# Patient Record
Sex: MALE | Race: ASIAN | NOT HISPANIC OR LATINO | URBAN - METROPOLITAN AREA
[De-identification: names, ages, dates, MRNs, and addresses within clinical notes are randomized per-mention and may not be internally consistent; named-entity substitution may affect disease eponyms.]

---

## 2015-07-07 RX ORDER — INSULIN GLARGINE 100 [IU]/ML
30 INJECTION, SOLUTION SUBCUTANEOUS
Qty: 0 | Refills: 0 | COMMUNITY
Start: 2015-07-07

## 2015-07-07 RX ORDER — ASPIRIN/CALCIUM CARB/MAGNESIUM 324 MG
1 TABLET ORAL
Qty: 0 | Refills: 0 | COMMUNITY
Start: 2015-07-07

## 2017-01-07 ENCOUNTER — OUTPATIENT (OUTPATIENT)
Dept: OUTPATIENT SERVICES | Facility: HOSPITAL | Age: 67
LOS: 1 days | End: 2017-01-07
Payer: MEDICAID

## 2017-01-07 ENCOUNTER — APPOINTMENT (OUTPATIENT)
Dept: CT IMAGING | Facility: IMAGING CENTER | Age: 67
End: 2017-01-07

## 2017-01-07 DIAGNOSIS — Z95.5 PRESENCE OF CORONARY ANGIOPLASTY IMPLANT AND GRAFT: Chronic | ICD-10-CM

## 2017-01-07 DIAGNOSIS — Z00.8 ENCOUNTER FOR OTHER GENERAL EXAMINATION: ICD-10-CM

## 2017-01-07 PROCEDURE — 74177 CT ABD & PELVIS W/CONTRAST: CPT

## 2017-01-07 PROCEDURE — 82565 ASSAY OF CREATININE: CPT

## 2017-01-25 ENCOUNTER — APPOINTMENT (OUTPATIENT)
Dept: CARDIOLOGY | Facility: CLINIC | Age: 67
End: 2017-01-25

## 2017-01-25 ENCOUNTER — NON-APPOINTMENT (OUTPATIENT)
Age: 67
End: 2017-01-25

## 2017-01-25 VITALS
SYSTOLIC BLOOD PRESSURE: 111 MMHG | OXYGEN SATURATION: 98 % | HEART RATE: 64 BPM | WEIGHT: 165 LBS | DIASTOLIC BLOOD PRESSURE: 67 MMHG | BODY MASS INDEX: 25.9 KG/M2 | HEIGHT: 67 IN

## 2017-03-17 ENCOUNTER — APPOINTMENT (OUTPATIENT)
Dept: RADIOLOGY | Facility: IMAGING CENTER | Age: 67
End: 2017-03-17

## 2017-03-17 ENCOUNTER — OUTPATIENT (OUTPATIENT)
Dept: OUTPATIENT SERVICES | Facility: HOSPITAL | Age: 67
LOS: 1 days | End: 2017-03-17
Payer: MEDICAID

## 2017-03-17 DIAGNOSIS — Z00.8 ENCOUNTER FOR OTHER GENERAL EXAMINATION: ICD-10-CM

## 2017-03-17 DIAGNOSIS — Z95.5 PRESENCE OF CORONARY ANGIOPLASTY IMPLANT AND GRAFT: Chronic | ICD-10-CM

## 2017-03-17 PROCEDURE — 72110 X-RAY EXAM L-2 SPINE 4/>VWS: CPT

## 2017-03-17 PROCEDURE — 72220 X-RAY EXAM SACRUM TAILBONE: CPT

## 2017-04-25 ENCOUNTER — APPOINTMENT (OUTPATIENT)
Dept: CARDIOLOGY | Facility: CLINIC | Age: 67
End: 2017-04-25

## 2017-04-25 ENCOUNTER — NON-APPOINTMENT (OUTPATIENT)
Age: 67
End: 2017-04-25

## 2017-04-25 VITALS
RESPIRATION RATE: 14 BRPM | DIASTOLIC BLOOD PRESSURE: 67 MMHG | HEART RATE: 58 BPM | HEIGHT: 67 IN | WEIGHT: 165 LBS | OXYGEN SATURATION: 96 % | BODY MASS INDEX: 25.9 KG/M2 | SYSTOLIC BLOOD PRESSURE: 131 MMHG

## 2017-05-31 ENCOUNTER — RX RENEWAL (OUTPATIENT)
Age: 67
End: 2017-05-31

## 2017-07-26 ENCOUNTER — APPOINTMENT (OUTPATIENT)
Dept: CARDIOLOGY | Facility: CLINIC | Age: 67
End: 2017-07-26

## 2017-09-06 ENCOUNTER — NON-APPOINTMENT (OUTPATIENT)
Age: 67
End: 2017-09-06

## 2017-09-06 ENCOUNTER — APPOINTMENT (OUTPATIENT)
Dept: CARDIOLOGY | Facility: CLINIC | Age: 67
End: 2017-09-06
Payer: MEDICAID

## 2017-09-06 VITALS
BODY MASS INDEX: 26.21 KG/M2 | HEIGHT: 67 IN | HEART RATE: 65 BPM | DIASTOLIC BLOOD PRESSURE: 77 MMHG | SYSTOLIC BLOOD PRESSURE: 131 MMHG | WEIGHT: 167 LBS | OXYGEN SATURATION: 99 %

## 2017-09-06 PROCEDURE — 99215 OFFICE O/P EST HI 40 MIN: CPT

## 2017-09-06 PROCEDURE — 93000 ELECTROCARDIOGRAM COMPLETE: CPT

## 2017-10-07 ENCOUNTER — INPATIENT (INPATIENT)
Facility: HOSPITAL | Age: 67
LOS: 1 days | Discharge: HOME | End: 2017-10-09
Attending: HOSPITALIST | Admitting: HOSPITALIST
Payer: MEDICAID

## 2017-10-07 VITALS
HEART RATE: 75 BPM | RESPIRATION RATE: 18 BRPM | DIASTOLIC BLOOD PRESSURE: 76 MMHG | OXYGEN SATURATION: 99 % | TEMPERATURE: 99 F | SYSTOLIC BLOOD PRESSURE: 107 MMHG

## 2017-10-07 DIAGNOSIS — R07.9 CHEST PAIN, UNSPECIFIED: ICD-10-CM

## 2017-10-07 DIAGNOSIS — I20.9 ANGINA PECTORIS, UNSPECIFIED: ICD-10-CM

## 2017-10-07 DIAGNOSIS — Z95.5 PRESENCE OF CORONARY ANGIOPLASTY IMPLANT AND GRAFT: Chronic | ICD-10-CM

## 2017-10-07 LAB
ALBUMIN SERPL ELPH-MCNC: 4 G/DL — SIGNIFICANT CHANGE UP (ref 3.3–5)
ALP SERPL-CCNC: 96 U/L — SIGNIFICANT CHANGE UP (ref 40–120)
ALT FLD-CCNC: 17 U/L — SIGNIFICANT CHANGE UP (ref 4–41)
APTT BLD: 28.3 SEC — SIGNIFICANT CHANGE UP (ref 27.5–37.4)
AST SERPL-CCNC: 18 U/L — SIGNIFICANT CHANGE UP (ref 4–40)
BASOPHILS # BLD AUTO: 0.04 K/UL — SIGNIFICANT CHANGE UP (ref 0–0.2)
BASOPHILS NFR BLD AUTO: 0.4 % — SIGNIFICANT CHANGE UP (ref 0–2)
BILIRUB SERPL-MCNC: 0.3 MG/DL — SIGNIFICANT CHANGE UP (ref 0.2–1.2)
BUN SERPL-MCNC: 15 MG/DL — SIGNIFICANT CHANGE UP (ref 7–23)
CALCIUM SERPL-MCNC: 8.7 MG/DL — SIGNIFICANT CHANGE UP (ref 8.4–10.5)
CHLORIDE SERPL-SCNC: 91 MMOL/L — LOW (ref 98–107)
CK MB BLD-MCNC: 2.5 — SIGNIFICANT CHANGE UP (ref 0–2.5)
CK MB BLD-MCNC: 4.36 NG/ML — SIGNIFICANT CHANGE UP (ref 1–6.6)
CK SERPL-CCNC: 176 U/L — SIGNIFICANT CHANGE UP (ref 30–200)
CO2 SERPL-SCNC: 26 MMOL/L — SIGNIFICANT CHANGE UP (ref 22–31)
CREAT SERPL-MCNC: 1.17 MG/DL — SIGNIFICANT CHANGE UP (ref 0.5–1.3)
EOSINOPHIL # BLD AUTO: 0.25 K/UL — SIGNIFICANT CHANGE UP (ref 0–0.5)
EOSINOPHIL NFR BLD AUTO: 2.5 % — SIGNIFICANT CHANGE UP (ref 0–6)
GLUCOSE SERPL-MCNC: 297 MG/DL — HIGH (ref 70–99)
HCT VFR BLD CALC: 38.4 % — LOW (ref 39–50)
HGB BLD-MCNC: 12.8 G/DL — LOW (ref 13–17)
IMM GRANULOCYTES # BLD AUTO: 0.02 # — SIGNIFICANT CHANGE UP
IMM GRANULOCYTES NFR BLD AUTO: 0.2 % — SIGNIFICANT CHANGE UP (ref 0–1.5)
INR BLD: 0.9 — SIGNIFICANT CHANGE UP (ref 0.88–1.17)
LYMPHOCYTES # BLD AUTO: 3.05 K/UL — SIGNIFICANT CHANGE UP (ref 1–3.3)
LYMPHOCYTES # BLD AUTO: 31.1 % — SIGNIFICANT CHANGE UP (ref 13–44)
MCHC RBC-ENTMCNC: 27.6 PG — SIGNIFICANT CHANGE UP (ref 27–34)
MCHC RBC-ENTMCNC: 33.3 % — SIGNIFICANT CHANGE UP (ref 32–36)
MCV RBC AUTO: 82.8 FL — SIGNIFICANT CHANGE UP (ref 80–100)
MONOCYTES # BLD AUTO: 1.08 K/UL — HIGH (ref 0–0.9)
MONOCYTES NFR BLD AUTO: 11 % — SIGNIFICANT CHANGE UP (ref 2–14)
NEUTROPHILS # BLD AUTO: 5.38 K/UL — SIGNIFICANT CHANGE UP (ref 1.8–7.4)
NEUTROPHILS NFR BLD AUTO: 54.8 % — SIGNIFICANT CHANGE UP (ref 43–77)
NRBC # FLD: 0 — SIGNIFICANT CHANGE UP
PLATELET # BLD AUTO: 270 K/UL — SIGNIFICANT CHANGE UP (ref 150–400)
PMV BLD: 9.8 FL — SIGNIFICANT CHANGE UP (ref 7–13)
POTASSIUM SERPL-MCNC: 3.6 MMOL/L — SIGNIFICANT CHANGE UP (ref 3.5–5.3)
POTASSIUM SERPL-SCNC: 3.6 MMOL/L — SIGNIFICANT CHANGE UP (ref 3.5–5.3)
PROT SERPL-MCNC: 7.4 G/DL — SIGNIFICANT CHANGE UP (ref 6–8.3)
PROTHROM AB SERPL-ACNC: 10.1 SEC — SIGNIFICANT CHANGE UP (ref 9.8–13.1)
RBC # BLD: 4.64 M/UL — SIGNIFICANT CHANGE UP (ref 4.2–5.8)
RBC # FLD: 13.8 % — SIGNIFICANT CHANGE UP (ref 10.3–14.5)
SODIUM SERPL-SCNC: 132 MMOL/L — LOW (ref 135–145)
TROPONIN T SERPL-MCNC: < 0.06 NG/ML — SIGNIFICANT CHANGE UP (ref 0–0.06)
WBC # BLD: 9.82 K/UL — SIGNIFICANT CHANGE UP (ref 3.8–10.5)
WBC # FLD AUTO: 9.82 K/UL — SIGNIFICANT CHANGE UP (ref 3.8–10.5)

## 2017-10-07 PROCEDURE — 71020: CPT | Mod: 26

## 2017-10-07 RX ORDER — DEXTROSE 50 % IN WATER 50 %
25 SYRINGE (ML) INTRAVENOUS ONCE
Qty: 0 | Refills: 0 | Status: DISCONTINUED | OUTPATIENT
Start: 2017-10-07 | End: 2017-10-09

## 2017-10-07 RX ORDER — ACETAMINOPHEN 500 MG
650 TABLET ORAL EVERY 6 HOURS
Qty: 0 | Refills: 0 | Status: DISCONTINUED | OUTPATIENT
Start: 2017-10-07 | End: 2017-10-09

## 2017-10-07 RX ORDER — SODIUM CHLORIDE 9 MG/ML
3 INJECTION INTRAMUSCULAR; INTRAVENOUS; SUBCUTANEOUS EVERY 8 HOURS
Qty: 0 | Refills: 0 | Status: DISCONTINUED | OUTPATIENT
Start: 2017-10-07 | End: 2017-10-09

## 2017-10-07 RX ORDER — CLOPIDOGREL BISULFATE 75 MG/1
75 TABLET, FILM COATED ORAL DAILY
Qty: 0 | Refills: 0 | Status: DISCONTINUED | OUTPATIENT
Start: 2017-10-08 | End: 2017-10-09

## 2017-10-07 RX ORDER — NITROGLYCERIN 6.5 MG
0.4 CAPSULE, EXTENDED RELEASE ORAL ONCE
Qty: 0 | Refills: 0 | Status: COMPLETED | OUTPATIENT
Start: 2017-10-07 | End: 2017-10-07

## 2017-10-07 RX ORDER — INSULIN LISPRO 100/ML
VIAL (ML) SUBCUTANEOUS AT BEDTIME
Qty: 0 | Refills: 0 | Status: DISCONTINUED | OUTPATIENT
Start: 2017-10-07 | End: 2017-10-09

## 2017-10-07 RX ORDER — INSULIN LISPRO 100/ML
10 VIAL (ML) SUBCUTANEOUS
Qty: 0 | Refills: 0 | Status: DISCONTINUED | OUTPATIENT
Start: 2017-10-07 | End: 2017-10-08

## 2017-10-07 RX ORDER — ASPIRIN/CALCIUM CARB/MAGNESIUM 324 MG
162 TABLET ORAL ONCE
Qty: 0 | Refills: 0 | Status: COMPLETED | OUTPATIENT
Start: 2017-10-07 | End: 2017-10-07

## 2017-10-07 RX ORDER — DEXTROSE 50 % IN WATER 50 %
1 SYRINGE (ML) INTRAVENOUS ONCE
Qty: 0 | Refills: 0 | Status: DISCONTINUED | OUTPATIENT
Start: 2017-10-07 | End: 2017-10-09

## 2017-10-07 RX ORDER — SODIUM CHLORIDE 9 MG/ML
1000 INJECTION, SOLUTION INTRAVENOUS
Qty: 0 | Refills: 0 | Status: DISCONTINUED | OUTPATIENT
Start: 2017-10-07 | End: 2017-10-09

## 2017-10-07 RX ORDER — ENOXAPARIN SODIUM 100 MG/ML
40 INJECTION SUBCUTANEOUS EVERY 24 HOURS
Qty: 0 | Refills: 0 | Status: DISCONTINUED | OUTPATIENT
Start: 2017-10-07 | End: 2017-10-09

## 2017-10-07 RX ORDER — INSULIN GLARGINE 100 [IU]/ML
30 INJECTION, SOLUTION SUBCUTANEOUS AT BEDTIME
Qty: 0 | Refills: 0 | Status: DISCONTINUED | OUTPATIENT
Start: 2017-10-07 | End: 2017-10-08

## 2017-10-07 RX ORDER — ASPIRIN/CALCIUM CARB/MAGNESIUM 324 MG
81 TABLET ORAL DAILY
Qty: 0 | Refills: 0 | Status: DISCONTINUED | OUTPATIENT
Start: 2017-10-07 | End: 2017-10-09

## 2017-10-07 RX ORDER — CLOPIDOGREL BISULFATE 75 MG/1
300 TABLET, FILM COATED ORAL ONCE
Qty: 0 | Refills: 0 | Status: COMPLETED | OUTPATIENT
Start: 2017-10-07 | End: 2017-10-07

## 2017-10-07 RX ORDER — INSULIN LISPRO 100/ML
VIAL (ML) SUBCUTANEOUS
Qty: 0 | Refills: 0 | Status: DISCONTINUED | OUTPATIENT
Start: 2017-10-07 | End: 2017-10-09

## 2017-10-07 RX ORDER — SENNA PLUS 8.6 MG/1
2 TABLET ORAL AT BEDTIME
Qty: 0 | Refills: 0 | Status: DISCONTINUED | OUTPATIENT
Start: 2017-10-07 | End: 2017-10-09

## 2017-10-07 RX ORDER — NITROGLYCERIN 6.5 MG
1 CAPSULE, EXTENDED RELEASE ORAL DAILY
Qty: 0 | Refills: 0 | Status: DISCONTINUED | OUTPATIENT
Start: 2017-10-07 | End: 2017-10-09

## 2017-10-07 RX ORDER — GLUCAGON INJECTION, SOLUTION 0.5 MG/.1ML
1 INJECTION, SOLUTION SUBCUTANEOUS ONCE
Qty: 0 | Refills: 0 | Status: DISCONTINUED | OUTPATIENT
Start: 2017-10-07 | End: 2017-10-09

## 2017-10-07 RX ORDER — DEXTROSE 50 % IN WATER 50 %
12.5 SYRINGE (ML) INTRAVENOUS ONCE
Qty: 0 | Refills: 0 | Status: DISCONTINUED | OUTPATIENT
Start: 2017-10-07 | End: 2017-10-09

## 2017-10-07 RX ORDER — SIMVASTATIN 20 MG/1
20 TABLET, FILM COATED ORAL AT BEDTIME
Qty: 0 | Refills: 0 | Status: DISCONTINUED | OUTPATIENT
Start: 2017-10-07 | End: 2017-10-09

## 2017-10-07 RX ORDER — METOPROLOL TARTRATE 50 MG
25 TABLET ORAL DAILY
Qty: 0 | Refills: 0 | Status: DISCONTINUED | OUTPATIENT
Start: 2017-10-07 | End: 2017-10-08

## 2017-10-07 RX ORDER — HYDROCHLOROTHIAZIDE 25 MG
12.5 TABLET ORAL DAILY
Qty: 0 | Refills: 0 | Status: DISCONTINUED | OUTPATIENT
Start: 2017-10-07 | End: 2017-10-09

## 2017-10-07 RX ORDER — DOCUSATE SODIUM 100 MG
100 CAPSULE ORAL THREE TIMES A DAY
Qty: 0 | Refills: 0 | Status: DISCONTINUED | OUTPATIENT
Start: 2017-10-07 | End: 2017-10-09

## 2017-10-07 RX ADMIN — Medication 0.4 MILLIGRAM(S): at 19:23

## 2017-10-07 RX ADMIN — Medication 162 MILLIGRAM(S): at 19:06

## 2017-10-07 RX ADMIN — Medication 5 MILLIGRAM(S): at 22:21

## 2017-10-07 RX ADMIN — CLOPIDOGREL BISULFATE 300 MILLIGRAM(S): 75 TABLET, FILM COATED ORAL at 22:21

## 2017-10-07 NOTE — H&P ADULT - PROBLEM SELECTOR PLAN 1
Plan as per cardiology:   Anngina pectoris. Recommendation: Chest pain likely 2/2 demand given elevated BP  -restart home antihypertensives  -start Enalapril 5mg daily  -nitro paste PRN for chest pain  -trend CE  -ASA, Plavix load then ASA 81mg, Plavix 75mg daily  -TTE in AM to evaluate LV function  -consider ischemic evaluation. Chest pain likely 2/2 demand given elevated BP On telemetry monitoring  Plan as per cardiology:   Angina pectoris. Recommendation: Chest pain likely 2/2 demand given elevated BP  -restart home antihypertensives  -start Enalapril 5mg daily  -nitro paste PRN for chest pain  -trend CE  -f/u repeat ECG  -ASA, Plavix load then ASA 81mg, Plavix 75mg daily  -TTE in AM to evaluate LV function  -consider ischemic evaluation. Chest pain likely 2/2 demand given elevated BP  Importance of medication compliance and medication clarification discussed with patient (patient under assumption that taking 1 tablet of HCTZ and 1 tablet of metoprolol was equal to taking 2 tabs of metoprolol)

## 2017-10-07 NOTE — ED PROVIDER NOTE - OBJECTIVE STATEMENT
67M h/o  HTN, HLD, DM, CAD s/p CABG in 2012 and stents in 2014 p/w CP. The pain is retrosternal, radiates to the L arm, worse with exertion, intermittent x 5 days lasting 4-5 hours at a time, associated with SOB. No fever, cough, N/V, or diaphoresis. The pain is not pleuritic. No hx of DVT/PE/cancer, calf pain/swelling, hemoptysis, or recent trauma/surgery/immobilization. Took ASA 81mg today. Cardiologist: Casimiro Pratt.

## 2017-10-07 NOTE — H&P ADULT - PROBLEM SELECTOR PLAN 3
F/u A1C  If elevated, consider Endocrine consult and Dietary counseling for better BS control.   FS QID  HISS  Continues Lantus and Humalog   DM diet. Glucose elevated to 297; notes random home FS = 300 to 400; fasting values ~ 180's, pre-lunch values ~ 200's and bedtime values ~ 300's.   F/u A1C - If elevated, consider Endocrine consult and Dietary counseling for better BG control.   FS QID  HISS  Continues Lantus (30 units HS) and Humalog   DM diet.

## 2017-10-07 NOTE — H&P ADULT - NSHPLABSRESULTS_GEN_ALL_CORE
CXR preliminary = clear    EKG NSR@ 74b/ min, TWI in V1V2, QW V1 V2 ( Seen by cardiology and no Heparin gtt/ urgent cath needed)     CARDIAC MARKERS ( 07 Oct 2017 18:30 )  x     / < 0.06 ng/mL / 176 u/L / 4.36 ng/mL / x                              12.8   9.82  )-----------( 270      ( 07 Oct 2017 18:30 )             38.4   10-07    132<L>  |  91<L>  |  15  ----------------------------<  297<H>  3.6   |  26  |  1.17    Ca    8.7      07 Oct 2017 18:30    TPro  7.4  /  Alb  4.0  /  TBili  0.3  /  DBili  x   /  AST  18  /  ALT  17  /  AlkPhos  96  10-07

## 2017-10-07 NOTE — ED PROVIDER NOTE - ATTENDING CONTRIBUTION TO CARE
ANABELLA Attending Note - Dr. Rodriguez  67M h/o  HTN, HLD, DM, CAD s/p CABG in 2012 and stents in 2014 p/w CP. The pain is retrosternal, radiates to the L arm, worse with exertion, intermittent x 5 days lasting 4-5 hours at a time, associated with SOB and patient states pain is similar to pain before his stent palacement.  PE: pt is alert and oriented, perrl, ent normal, membranes are moist, neck supple. no lymphadenopathy or thyroid enlargement, No JVD.  Chest clear to P&A, Heart- reg rhythm without murmur, rubs or gallops, radial pulses equal bilaterally.  Abd is soft, non-tender, Bowel sounds are active. no mass or organomegaly. : No CVA tenderness. Neuro:  Pt alert and oriented x 3. Perrl    Distal neurosensory is intact. Motor function is 5/5 strength bilaterally.  No focal deficits. Extremities:  No edema.  Skin: warm and dry.  Impression:  ACS  Plan: EKG was repeated because of continued 3/10 pain after NTG which decreased pain from 8 to 3.  Second EKG showed possible dynamic changes in V2-V3.  Cardiology was called.

## 2017-10-07 NOTE — H&P ADULT - NEGATIVE NEUROLOGICAL SYMPTOMS
no transient paralysis/no weakness/no generalized seizures/no syncope/no tremors/no focal seizures/no vertigo/no paresthesias

## 2017-10-07 NOTE — H&P ADULT - FAMILY HISTORY
Sibling  Still living? Unknown  Family history of diabetes mellitus type II, Age at diagnosis: Age Unknown

## 2017-10-07 NOTE — H&P ADULT - ASSESSMENT
67M admitted for chest pain. 67M, self ambulating with a history of HTN, Dyslipidemia, DM2, CAD s/p CABG in 2012 and stents (2) in 2014, BPH and Bladder neck obstruction, experiencing, non exertional, intermittent, substernal chest pain, radiating to the L arm, shortness of breath.  Admitted for chest pain.

## 2017-10-07 NOTE — ED PROVIDER NOTE - MEDICAL DECISION MAKING DETAILS
67M p/w CP consistent with ACS. EKG: NSR. Presentation not consistent with PE (Wells' = 0), PNA, or dissection. Plan: ekg, cxr, labs, troponin, aspirin, nitro, admit.

## 2017-10-07 NOTE — H&P ADULT - RS GEN PE MLT RESP DETAILS PC
no intercostal retractions/no rales/airway patent/no wheezes/good air movement/clear to auscultation bilaterally/no subcutaneous emphysema/no chest wall tenderness/breath sounds equal/no rhonchi/respirations non-labored

## 2017-10-07 NOTE — H&P ADULT - NEGATIVE OPHTHALMOLOGIC SYMPTOMS
no lacrimation L/no lacrimation R/no blurred vision L/no discharge L/no discharge R/no blurred vision R/no photophobia

## 2017-10-07 NOTE — ED PROVIDER NOTE - PSH
Bladder neck obstruction  3 surgeries for BPH, most recent 2012  History of coronary artery bypass graft  X2 in March of 2012  Presence of stent in coronary artery  x2 2014

## 2017-10-07 NOTE — CONSULT NOTE ADULT - PROBLEM SELECTOR RECOMMENDATION 9
Chest pain likely 2/2 demand given elevated BP  -restart home antihypertensives  -start Enalapril 5mg daily  -trend CE  -ASA, plavix load then ASA 81mg plavix 75mg daily  -TTE in AM to evaluate LV function  -consider ischemic evaluation Chest pain likely 2/2 demand given elevated BP  -restart home antihypertensives  -start Enalapril 5mg daily  -nitro paste PRN for chest pain  -trend CE  -ASA, plavix load then ASA 81mg plavix 75mg daily  -TTE in AM to evaluate LV function  -consider ischemic evaluation

## 2017-10-07 NOTE — H&P ADULT - HISTORY OF PRESENT ILLNESS
67M, self ambulating with a history of HTN, Dyslipidemia, DM2, CAD s/p CABG in 2012 and stents in 2014 experiencing, non exertional, intermittent, substernal chest pain, radiating to the L arm, SOB. The chest pain becomes worse on exertion.  In ED found to be hypertensive 's.  Chest pain relieved with sublingual nitro.  Denies nausea, vomit, palpitations, chills, diaphoresis. The pt reports, home SBP =  160-170's has been associated with chest pain and SOB.  He also notes random home FS = 300 to 400. Current BS = 297.   The pt was seen by the cardio team. Their consult and recommendations are in the chart.   Recommendation: Chest pain likely 2/2 demand given elevated BP  -restart home antihypertensives  -start Enalapril 5mg daily  -nitro paste PRN for chest pain  -trend CE  -ASA, Plavix load then ASA 81mg, Plavix 75mg daily  -TTE in AM to evaluate LV function  -consider ischemic evaluation. Chest pain likely 2/2 demand given elevated BP 67M, self ambulating with a history of HTN, Dyslipidemia, DM2, CAD s/p CABG in 2012 and stents (2) in 2014, BPH and Bladder neck obstruction, experiencing, non exertional, intermittent, substernal chest pain, radiating to the L arm, SOB. The chest pain becomes worse on exertion; limited to one block and associated with pains similar to that experienced prior to his CAB and stents placement.  In ED found to be hypertensive 's.  Chest pain relieved with sublingual nitro.  Denies nausea, vomit, palpitations, chills, diaphoresis. The pt reports, home SBP =  160-170's has been associated with chest pain and SOB.      He also notes random home FS = 300 to 400; fasting values ~ 180's, pre-lunch values ~ 200's and bedtime values ~ 300's. Current BS = 297.  The pt was seen by the cardio team. Their consult and recommendations are in the chart.   Recommendation: Chest pain likely 2/2 demand given elevated BP  -restart home antihypertensives  -start Enalapril 5mg daily  -nitro paste PRN for chest pain  -trend CE  -ASA, Plavix load then ASA 81mg, Plavix 75mg daily  -TTE in AM to evaluate LV function  -consider ischemic evaluation. Chest pain likely 2/2 demand given elevated BP

## 2017-10-07 NOTE — ED ADULT TRIAGE NOTE - CHIEF COMPLAINT QUOTE
Pt c/o left sided chest pain radiating to left arm. c/o SOB and MENDOZA x2 days. Denies n/v. Hx of CABG, cardiac stents x2

## 2017-10-07 NOTE — ED PROVIDER NOTE - PMH
CAD (coronary artery disease)  CABG x2 2012, Stents x2 2014  Diabetes mellitus    GERD (gastroesophageal reflux disease)    HLD (hyperlipidemia)    Hypertension

## 2017-10-07 NOTE — H&P ADULT - NEGATIVE ENMT SYMPTOMS
no tinnitus/no hearing difficulty/no recurrent cold sores/no ear pain/no sinus symptoms/no post-nasal discharge/no gum bleeding/no nasal congestion/no nasal discharge/no nasal obstruction/no vertigo/no abnormal taste sensation

## 2017-10-07 NOTE — ED ADULT NURSE NOTE - OBJECTIVE STATEMENT
pt presents with 4-5 days of chest pain pt alert and oriented x3, cardiac monitor shows RSR no ectopy, PIV placed labs drawn and sent as ordered Pt c/o pain at the present time 7/10 denies nausea or vomiting or any other concerns pt presents with 4-5 days of chest pain  radiating down his left arm with SOB on exertion pt alert and oriented x3, cardiac monitor shows RSR no ectopy, PIV placed labs drawn and sent as ordered Pt c/o pain at the present time 7/10 denies nausea or vomiting or any other concerns

## 2017-10-07 NOTE — H&P ADULT - PROBLEM SELECTOR PLAN 4
DASH diet  Continue BP med,   ACE and NTP added SBP elevated to 189 since being in the ED  DASH diet  Continue BP medl; discussed need for compliance and also clarified medications for patient  ACE and NTP added  Continue to encourage compliance

## 2017-10-08 DIAGNOSIS — E11.65 TYPE 2 DIABETES MELLITUS WITH HYPERGLYCEMIA: ICD-10-CM

## 2017-10-08 DIAGNOSIS — I25.10 ATHEROSCLEROTIC HEART DISEASE OF NATIVE CORONARY ARTERY WITHOUT ANGINA PECTORIS: ICD-10-CM

## 2017-10-08 DIAGNOSIS — E11.9 TYPE 2 DIABETES MELLITUS WITHOUT COMPLICATIONS: ICD-10-CM

## 2017-10-08 DIAGNOSIS — R07.9 CHEST PAIN, UNSPECIFIED: ICD-10-CM

## 2017-10-08 DIAGNOSIS — Z29.9 ENCOUNTER FOR PROPHYLACTIC MEASURES, UNSPECIFIED: ICD-10-CM

## 2017-10-08 DIAGNOSIS — I10 ESSENTIAL (PRIMARY) HYPERTENSION: ICD-10-CM

## 2017-10-08 DIAGNOSIS — E78.5 HYPERLIPIDEMIA, UNSPECIFIED: ICD-10-CM

## 2017-10-08 LAB
BUN SERPL-MCNC: 13 MG/DL — SIGNIFICANT CHANGE UP (ref 7–23)
CALCIUM SERPL-MCNC: 8.6 MG/DL — SIGNIFICANT CHANGE UP (ref 8.4–10.5)
CHLORIDE SERPL-SCNC: 97 MMOL/L — LOW (ref 98–107)
CHOLEST SERPL-MCNC: 133 MG/DL — SIGNIFICANT CHANGE UP (ref 120–199)
CK SERPL-CCNC: 126 U/L — SIGNIFICANT CHANGE UP (ref 30–200)
CK SERPL-CCNC: 127 U/L — SIGNIFICANT CHANGE UP (ref 30–200)
CO2 SERPL-SCNC: 24 MMOL/L — SIGNIFICANT CHANGE UP (ref 22–31)
CREAT SERPL-MCNC: 1.05 MG/DL — SIGNIFICANT CHANGE UP (ref 0.5–1.3)
GLUCOSE SERPL-MCNC: 406 MG/DL — HIGH (ref 70–99)
HBA1C BLD-MCNC: 12.8 % — HIGH (ref 4–5.6)
HCT VFR BLD CALC: 37.6 % — LOW (ref 39–50)
HDLC SERPL-MCNC: 36 MG/DL — SIGNIFICANT CHANGE UP (ref 35–55)
HGB BLD-MCNC: 12.4 G/DL — LOW (ref 13–17)
LIPID PNL WITH DIRECT LDL SERPL: 81 MG/DL — SIGNIFICANT CHANGE UP
MAGNESIUM SERPL-MCNC: 2.1 MG/DL — SIGNIFICANT CHANGE UP (ref 1.6–2.6)
MCHC RBC-ENTMCNC: 27.1 PG — SIGNIFICANT CHANGE UP (ref 27–34)
MCHC RBC-ENTMCNC: 33 % — SIGNIFICANT CHANGE UP (ref 32–36)
MCV RBC AUTO: 82.3 FL — SIGNIFICANT CHANGE UP (ref 80–100)
NRBC # FLD: 0 — SIGNIFICANT CHANGE UP
PHOSPHATE SERPL-MCNC: 2.9 MG/DL — SIGNIFICANT CHANGE UP (ref 2.5–4.5)
PLATELET # BLD AUTO: 259 K/UL — SIGNIFICANT CHANGE UP (ref 150–400)
PMV BLD: 9.9 FL — SIGNIFICANT CHANGE UP (ref 7–13)
POTASSIUM SERPL-MCNC: 4.5 MMOL/L — SIGNIFICANT CHANGE UP (ref 3.5–5.3)
POTASSIUM SERPL-SCNC: 4.5 MMOL/L — SIGNIFICANT CHANGE UP (ref 3.5–5.3)
RBC # BLD: 4.57 M/UL — SIGNIFICANT CHANGE UP (ref 4.2–5.8)
RBC # FLD: 13.8 % — SIGNIFICANT CHANGE UP (ref 10.3–14.5)
SODIUM SERPL-SCNC: 135 MMOL/L — SIGNIFICANT CHANGE UP (ref 135–145)
TRIGL SERPL-MCNC: 127 MG/DL — SIGNIFICANT CHANGE UP (ref 10–149)
TROPONIN T SERPL-MCNC: < 0.06 NG/ML — SIGNIFICANT CHANGE UP (ref 0–0.06)
TROPONIN T SERPL-MCNC: < 0.06 NG/ML — SIGNIFICANT CHANGE UP (ref 0–0.06)
TSH SERPL-MCNC: 2.36 UIU/ML — SIGNIFICANT CHANGE UP (ref 0.27–4.2)
WBC # BLD: 6.44 K/UL — SIGNIFICANT CHANGE UP (ref 3.8–10.5)
WBC # FLD AUTO: 6.44 K/UL — SIGNIFICANT CHANGE UP (ref 3.8–10.5)

## 2017-10-08 PROCEDURE — 99223 1ST HOSP IP/OBS HIGH 75: CPT

## 2017-10-08 PROCEDURE — 12345: CPT | Mod: NC

## 2017-10-08 PROCEDURE — 99222 1ST HOSP IP/OBS MODERATE 55: CPT

## 2017-10-08 PROCEDURE — 99223 1ST HOSP IP/OBS HIGH 75: CPT | Mod: GC

## 2017-10-08 RX ORDER — INFLUENZA VIRUS VACCINE 15; 15; 15; 15 UG/.5ML; UG/.5ML; UG/.5ML; UG/.5ML
0.5 SUSPENSION INTRAMUSCULAR ONCE
Qty: 0 | Refills: 0 | Status: DISCONTINUED | OUTPATIENT
Start: 2017-10-08 | End: 2017-10-09

## 2017-10-08 RX ORDER — HUMAN INSULIN 100 [IU]/ML
14 INJECTION, SUSPENSION SUBCUTANEOUS ONCE
Qty: 0 | Refills: 0 | Status: DISCONTINUED | OUTPATIENT
Start: 2017-10-08 | End: 2017-10-08

## 2017-10-08 RX ORDER — INSULIN GLARGINE 100 [IU]/ML
36 INJECTION, SOLUTION SUBCUTANEOUS AT BEDTIME
Qty: 0 | Refills: 0 | Status: DISCONTINUED | OUTPATIENT
Start: 2017-10-08 | End: 2017-10-09

## 2017-10-08 RX ORDER — INSULIN LISPRO 100/ML
12 VIAL (ML) SUBCUTANEOUS
Qty: 0 | Refills: 0 | Status: DISCONTINUED | OUTPATIENT
Start: 2017-10-08 | End: 2017-10-09

## 2017-10-08 RX ORDER — METOPROLOL TARTRATE 50 MG
25 TABLET ORAL
Qty: 0 | Refills: 0 | Status: DISCONTINUED | OUTPATIENT
Start: 2017-10-08 | End: 2017-10-08

## 2017-10-08 RX ORDER — METOPROLOL TARTRATE 50 MG
25 TABLET ORAL EVERY 12 HOURS
Qty: 0 | Refills: 0 | Status: DISCONTINUED | OUTPATIENT
Start: 2017-10-08 | End: 2017-10-09

## 2017-10-08 RX ADMIN — Medication 12 UNIT(S): at 18:19

## 2017-10-08 RX ADMIN — SODIUM CHLORIDE 3 MILLILITER(S): 9 INJECTION INTRAMUSCULAR; INTRAVENOUS; SUBCUTANEOUS at 14:00

## 2017-10-08 RX ADMIN — INSULIN GLARGINE 36 UNIT(S): 100 INJECTION, SOLUTION SUBCUTANEOUS at 21:17

## 2017-10-08 RX ADMIN — Medication 2: at 18:20

## 2017-10-08 RX ADMIN — SIMVASTATIN 20 MILLIGRAM(S): 20 TABLET, FILM COATED ORAL at 21:06

## 2017-10-08 RX ADMIN — Medication 10 UNIT(S): at 09:18

## 2017-10-08 RX ADMIN — Medication 100 MILLIGRAM(S): at 06:32

## 2017-10-08 RX ADMIN — CLOPIDOGREL BISULFATE 75 MILLIGRAM(S): 75 TABLET, FILM COATED ORAL at 12:56

## 2017-10-08 RX ADMIN — Medication 100 MILLIGRAM(S): at 21:06

## 2017-10-08 RX ADMIN — ENOXAPARIN SODIUM 40 MILLIGRAM(S): 100 INJECTION SUBCUTANEOUS at 06:32

## 2017-10-08 RX ADMIN — Medication 12 UNIT(S): at 12:56

## 2017-10-08 RX ADMIN — Medication 12.5 MILLIGRAM(S): at 06:32

## 2017-10-08 RX ADMIN — Medication 25 MILLIGRAM(S): at 18:20

## 2017-10-08 RX ADMIN — Medication 100 MILLIGRAM(S): at 16:20

## 2017-10-08 RX ADMIN — SENNA PLUS 2 TABLET(S): 8.6 TABLET ORAL at 21:06

## 2017-10-08 RX ADMIN — Medication 81 MILLIGRAM(S): at 12:56

## 2017-10-08 RX ADMIN — Medication 6: at 12:55

## 2017-10-08 RX ADMIN — SODIUM CHLORIDE 3 MILLILITER(S): 9 INJECTION INTRAMUSCULAR; INTRAVENOUS; SUBCUTANEOUS at 06:30

## 2017-10-08 RX ADMIN — Medication 10: at 09:16

## 2017-10-08 RX ADMIN — Medication 100 MILLIGRAM(S): at 13:06

## 2017-10-08 RX ADMIN — Medication 5 MILLIGRAM(S): at 09:44

## 2017-10-08 RX ADMIN — SODIUM CHLORIDE 3 MILLILITER(S): 9 INJECTION INTRAMUSCULAR; INTRAVENOUS; SUBCUTANEOUS at 22:00

## 2017-10-08 NOTE — PROGRESS NOTE ADULT - PROBLEM SELECTOR PLAN 4
BP better controlled. If BP still elevated, would recommend titrating up enalapril and/or HCTZ before adding additional antihypertensive agent to increase patient medication adherence  - metoprolol 25mg BID  - enalapril 5mg daily  - HCTZ 12.5mg

## 2017-10-08 NOTE — PROGRESS NOTE ADULT - PROBLEM SELECTOR PLAN 3
Poorly controlled with persistent hyperglycemia HbA1c 12.8%  FS QID  HISS  Continues Lantus (30 units HS) and Humalog   DM diet.  - endo consult, appreciate recs. Poorly controlled with persistent hyperglycemia HbA1c 12.8%  FS QID  HISS  - lantus 36 units HS) and Humalog 12 units TID as per endo recs  DM diet.  - endo consult, appreciate recs.

## 2017-10-08 NOTE — CONSULT NOTE ADULT - SUBJECTIVE AND OBJECTIVE BOX
HPI:  66 yo male with uncontrolled T2DM HbA1c 12.8%, HTN, HLD, CAD s/p CABG presents with chest pain and SOB.    T2DM- diagnosed about 20 years ago, has an endocrinologist Dr. Freeman? Previously on Humalog 75/25 mix then switched to Basaglar 30u qHS and Novolog 10u before breakfast and lunch. Pt usu does not eat dinner meals. Checks FS 3x daily. AM 220s, lunch 280s, dinner 320s. No hypoglycemia episodes or symptoms.  Was previously on Metformin but was discontinued.    Complications- last optho 4-5 months ago, mild retinopathy in L eye. + Neuropathy sx in lower extremities. No known CKD or proteinuria. Diet- AM - flower bread, yogurt, eggs, lunch- bread, smith, dinner-doesn't eat. Walks for exercise.    No family history of diabetes.    PAST MEDICAL & SURGICAL HISTORY:  GERD (gastroesophageal reflux disease)  HLD (hyperlipidemia)  Hypertension  CAD (coronary artery disease): CABG x2 2012, Stents x2 2014  Diabetes mellitus  Presence of stent in coronary artery: x2 2014  History of coronary artery bypass graft: X2 in March of 2012  Bladder neck obstruction: 3 surgeries for BPH, most recent 2012      FAMILY HISTORY:  Family history of diabetes mellitus type II (Sibling)      Social History: No smoking or EtoH use    Outpatient Medications:  insulin glargine 100 units/mL subcutaneous solution: 30 unit(s) subcutaneous once a day (at bedtime), Last Dose Taken:    · 	simvastatin 20 mg oral tablet: 1 tab(s) orally once a day (at bedtime), Last Dose Taken:    · 	aspirin 81 mg oral tablet, chewable: 2 tab(s) orally once a day, Last Dose Taken:    · 	clopidogrel 75 mg oral tablet: 1 tab(s) orally once a day, Last Dose Taken:    · 	docusate sodium 100 mg oral capsule: 1 cap(s) orally 3 times a day, Last Dose Taken:    · 	senna oral tablet: 2 tab(s) orally once a day (at bedtime), Last Dose Taken:    · 	Toprol-XL 25 mg oral tablet, extended release: 1 tab(s) orally once a day  · 	hydroCHLOROthiazide 12.5 mg oral tablet: 1 tab(s) orally once a day, Last Dose Taken:    · 	Basaglar KwikPen 100 units/mL subcutaneous solution: 30  subcutaneous once a day (at bedtime), Last Dose Taken:      MEDICATIONS  (STANDING):  aspirin enteric coated 81 milliGRAM(s) Oral daily  clopidogrel Tablet 75 milliGRAM(s) Oral daily  dextrose 5%. 1000 milliLiter(s) (50 mL/Hr) IV Continuous <Continuous>  dextrose 50% Injectable 12.5 Gram(s) IV Push once  dextrose 50% Injectable 25 Gram(s) IV Push once  dextrose 50% Injectable 25 Gram(s) IV Push once  docusate sodium 100 milliGRAM(s) Oral three times a day  enalapril 5 milliGRAM(s) Oral daily  enoxaparin Injectable 40 milliGRAM(s) SubCutaneous every 24 hours  hydrochlorothiazide 12.5 milliGRAM(s) Oral daily  insulin glargine Injectable (LANTUS) 30 Unit(s) SubCutaneous at bedtime  insulin lispro (HumaLOG) corrective regimen sliding scale   SubCutaneous three times a day before meals  insulin lispro (HumaLOG) corrective regimen sliding scale   SubCutaneous at bedtime  insulin lispro Injectable (HumaLOG) 10 Unit(s) SubCutaneous before breakfast  insulin lispro Injectable (HumaLOG) 10 Unit(s) SubCutaneous before lunch  metoprolol 25 milliGRAM(s) Oral every 12 hours  senna 2 Tablet(s) Oral at bedtime  simvastatin 20 milliGRAM(s) Oral at bedtime  sodium chloride 0.9% lock flush 3 milliLiter(s) IV Push every 8 hours    MEDICATIONS  (PRN):  acetaminophen   Tablet. 650 milliGRAM(s) Oral every 6 hours PRN mild and moderate pain  dextrose Gel 1 Dose(s) Oral once PRN Blood Glucose LESS THAN 70 milliGRAM(s)/deciliter  glucagon  Injectable 1 milliGRAM(s) IntraMuscular once PRN Glucose LESS THAN 70 milligrams/deciliter  nitroglycerin    2% Ointment 1 Inch(s) Transdermal daily PRN Chest pain      Allergies    No Known Allergies    Review of Systems:  Constitutional: No fever  Eyes: No blurry vision  Neuro: No tremors  HEENT: No pain  Cardiovascular: + chest pain, palpitations  Respiratory: + SOB, no cough  GI: No nausea, vomiting, abdominal pain  : No dysuria  Skin: no rash    ALL OTHER SYSTEMS REVIEWED AND NEGATIVE    PHYSICAL EXAM:  VITALS: T(C): 36.4 (10-08-17 @ 09:43)  T(F): 97.6 (10-08-17 @ 09:43), Max: 98.7 (10-07-17 @ 18:09)  HR: 80 (10-08-17 @ 09:43) (67 - 90)  BP: 124/68 (10-08-17 @ 09:43) (107/76 - 189/67)  RR:  (16 - 22)  SpO2:  (96% - 100%)  Wt(kg): --  GENERAL: NAD  EYES: No proptosis, no lid lag, anicteric  HEENT:  Atraumatic, Normocephalic, moist mucous membranes  THYROID: Normal size, no palpable nodules  RESPIRATORY: Clear to auscultation bilaterally; No rales, rhonchi, wheezing, or rubs  CARDIOVASCULAR: Regular rate and rhythm; No murmurs; no peripheral edema  GI: Soft, nontender, non distended, normal bowel sounds  SKIN: Dry, intact, No rashes or lesions  MUSCULOSKELETAL: Full range of motion, normal strength  NEURO: sensation intact, extraocular movements intact, no tremor, normal reflexes  PSYCH: Alert and oriented x 3, normal affect, normal mood    CAPILLARY BLOOD GLUCOSE  375 (10-08 @ 09:05)  395 (10-08 @ 01:12)                            12.4   6.44  )-----------( 259      ( 08 Oct 2017 06:45 )             37.6       10-08    135  |  97<L>  |  13  ----------------------------<  406<H>  4.5   |  24  |  1.05    EGFR if : 84  EGFR if non : 72    Ca    8.6      10-08  Mg     2.1     10-08  Phos  2.9     10-08    TPro  7.4  /  Alb  4.0  /  TBili  0.3  /  DBili  x   /  AST  18  /  ALT  17  /  AlkPhos  96  10-07      Thyroid Function Tests:  10-08 @ 06:45 TSH 2.36 FreeT4 -- T3 -- Anti TPO -- Anti Thyroglobulin Ab -- TSI --      Hemoglobin A1C, Whole Blood: 12.8 % <H> [4.0 - 5.6] (10-08-17 @ 06:45)      10-08 Chol 133 LDL 81 HDL 36 Trig 127
CHIEF COMPLAINT: Chest pain    HISTORY OF PRESENT ILLNESS:  This is a 67yoM with PMHx HTN, HLD, DM, CAD s/p CABG in 2012 and stents in 2014 p/w severe midsternal nonpleuritic chest pain x 2 days associated with SOB. In ED found to be hypertensive 's.  Chest pain relieved with sublingual nitro.  Denies recent illness, fever, nausea/vomiting, palpitations, recent travel or diaphoresis.  Does report increased BP taken at home -170's which has been associated with chest pain and SOB.  Stable angina unchanged.  Also notes blood sugar has been uncontrolled in the past year.  Random fingersticks done at home shows -400.     Allergies  No Known Allergies    MEDICATIONS:    Aspirin 81 MG TABS; TAKE 1 TABLET DAILY  HumaLOG 100 UNIT/ML Subcutaneous Solution; USE AS DIRECTED  HydroCHLOROthiazide 12.5 MG Oral Capsule; TAKE 1 CAPSULE ONCE DAILY  Lantus 100 UNIT/ML Subcutaneous Solution; INJECT SUBCUTANEOUSLY AS  DIRECTED  Metoprolol Tartrate 25 MG Oral Tablet; TAKE 1 TABLET TWICE DAILY  Senna 8.6 MG Oral Tablet; TAKE 1 TABLET 3 times daily  Simvastatin 20 MG Oral Tablet; TAKE 1 TABLET DAILY    PAST MEDICAL & SURGICAL HISTORY:  GERD (gastroesophageal reflux disease)  HLD (hyperlipidemia)  Hypertension  CAD (coronary artery disease): CABG x2 2012, Stents x2 2014  Diabetes mellitus  Presence of stent in coronary artery: x2 2014  History of coronary artery bypass graft: X2 in March of 2012  Bladder neck obstruction: 3 surgeries for BPH, most recent 2012    FAMILY HISTORY:  Family history of diabetes mellitus type II (Sibling)    SOCIAL HISTORY:    denies tobacco, alcohol or illicit drug use    REVIEW OF SYSTEMS:  See HPI. Otherwise, 10 point ROS done and otherwise negative.    PHYSICAL EXAM:  T(C): 37.1 (10-07-17 @ 18:09), Max: 37.1 (10-07-17 @ 18:09)  HR: 73 (10-07-17 @ 19:35) (73 - 90)  BP: 147/66 (10-07-17 @ 19:35) (107/76 - 189/67)  RR: 16 (10-07-17 @ 19:35) (16 - 22)  SpO2: 100% (10-07-17 @ 19:35) (99% - 100%)  Wt(kg): --  I&O's Summary      Appearance: Normal	  HEENT:   Normal oral mucosa, PERRL, EOMI	  Lymphatic: No lymphadenopathy  Cardiovascular: Normal S1 S2, No JVD, No murmurs, No edema  Respiratory: Lungs clear to auscultation	  Psychiatry: A & O x 3, Mood & affect appropriate  Gastrointestinal:  Soft, Non-tender, + BS	  Skin: No rashes, No ecchymoses, No cyanosis	  Neurologic: Non-focal  Extremities: Normal range of motion, No clubbing, cyanosis or edema  Vascular: Peripheral pulses palpable 2+ bilaterally    LABS:	 	  CBC Full  -  ( 07 Oct 2017 18:30 )  WBC Count : 9.82 K/uL  Hemoglobin : 12.8 g/dL  Hematocrit : 38.4 %  Platelet Count - Automated : 270 K/uL  Mean Cell Volume : 82.8 fL  Mean Cell Hemoglobin : 27.6 pg  Mean Cell Hemoglobin Concentration : 33.3 %  Auto Neutrophil # : 5.38 K/uL  Auto Lymphocyte # : 3.05 K/uL  Auto Monocyte # : 1.08 K/uL  Auto Eosinophil # : 0.25 K/uL  Auto Basophil # : 0.04 K/uL  Auto Neutrophil % : 54.8 %  Auto Lymphocyte % : 31.1 %  Auto Monocyte % : 11.0 %  Auto Eosinophil % : 2.5 %  Auto Basophil % : 0.4 %    10-07    132<L>  |  91<L>  |  15  ----------------------------<  297<H>  3.6   |  26  |  1.17    Ca    8.7      07 Oct 2017 18:30    TPro  7.4  /  Alb  4.0  /  TBili  0.3  /  DBili  x   /  AST  18  /  ALT  17  /  AlkPhos  96  10-07    CARDIAC MARKERS ( 07 Oct 2017 18:30 )  x     / < 0.06 ng/mL / 176 u/L / 4.36 ng/mL / x        EKG: NSR; no GEORGE; unchanged from previous EKG

## 2017-10-08 NOTE — CONSULT NOTE ADULT - ASSESSMENT
66 yo male with uncontrolled T2DM HbA1c 12.8%, HTN, HLD, CAD s/p CABG presents with chest pain and SOB.
67yoM with PMHx HTN, HLD, DM, CAD s/p CABG in 2012 and stents in 2014 p/w severe midsternal nonpleuritic chest pain x 2 days associated with SOB. In ED found to be hypertensive 's.  Chest pain relieved with sublingual nitro.

## 2017-10-08 NOTE — CONSULT NOTE ADULT - ATTENDING COMMENTS
Patient seen and examined and cased discussed with Dr Julio.  68 yo male with uncontrolled T2DM HbA1c 12.8%, HTN, HLD, CAD s/p CABG presents to ED with chest pain, pending evaluation.  Recently changed from mixed insulin to basal bolus about one week prior to admission.  Continue with basal bolus regimen as detailed in Dr. Julio's note.  Continue with HCTZ and lisinopril for BP control for BP goal of less than 140/90mmHg.  Given history of CABG, would change simvastatin to either Lipitor 40mg or Crestor 10mg.

## 2017-10-08 NOTE — CONSULT NOTE ADULT - PROBLEM SELECTOR RECOMMENDATION 9
inpatient: give NPH 14u x1 now stat  - increase Lantus to 36u qHS  - increase Humalog to 12u TID with meals  - continue with moderate scale with meals and at bedtime  - discharge plan: basal/bolus dose TBD  - encouraged consistent carb diet and exercise  - outpatient follow up with private endocrinologist Dr. Freeman

## 2017-10-09 ENCOUNTER — TRANSCRIPTION ENCOUNTER (OUTPATIENT)
Age: 67
End: 2017-10-09

## 2017-10-09 VITALS
SYSTOLIC BLOOD PRESSURE: 112 MMHG | HEART RATE: 78 BPM | DIASTOLIC BLOOD PRESSURE: 77 MMHG | TEMPERATURE: 98 F | RESPIRATION RATE: 18 BRPM | OXYGEN SATURATION: 98 %

## 2017-10-09 LAB
24R-OH-CALCIDIOL SERPL-MCNC: 21 NG/ML — LOW (ref 30–80)
BUN SERPL-MCNC: 16 MG/DL — SIGNIFICANT CHANGE UP (ref 7–23)
CALCIUM SERPL-MCNC: 8.7 MG/DL — SIGNIFICANT CHANGE UP (ref 8.4–10.5)
CHLORIDE SERPL-SCNC: 98 MMOL/L — SIGNIFICANT CHANGE UP (ref 98–107)
CO2 SERPL-SCNC: 22 MMOL/L — SIGNIFICANT CHANGE UP (ref 22–31)
CREAT SERPL-MCNC: 1.13 MG/DL — SIGNIFICANT CHANGE UP (ref 0.5–1.3)
GLUCOSE SERPL-MCNC: 195 MG/DL — HIGH (ref 70–99)
HCT VFR BLD CALC: 40.8 % — SIGNIFICANT CHANGE UP (ref 39–50)
HGB BLD-MCNC: 13.3 G/DL — SIGNIFICANT CHANGE UP (ref 13–17)
MAGNESIUM SERPL-MCNC: 2.1 MG/DL — SIGNIFICANT CHANGE UP (ref 1.6–2.6)
MCHC RBC-ENTMCNC: 26.5 PG — LOW (ref 27–34)
MCHC RBC-ENTMCNC: 32.6 % — SIGNIFICANT CHANGE UP (ref 32–36)
MCV RBC AUTO: 81.3 FL — SIGNIFICANT CHANGE UP (ref 80–100)
NRBC # FLD: 0 — SIGNIFICANT CHANGE UP
NT-PROBNP SERPL-SCNC: 59.57 PG/ML — SIGNIFICANT CHANGE UP
PLATELET # BLD AUTO: 291 K/UL — SIGNIFICANT CHANGE UP (ref 150–400)
PMV BLD: 9.8 FL — SIGNIFICANT CHANGE UP (ref 7–13)
POTASSIUM SERPL-MCNC: 3.8 MMOL/L — SIGNIFICANT CHANGE UP (ref 3.5–5.3)
POTASSIUM SERPL-SCNC: 3.8 MMOL/L — SIGNIFICANT CHANGE UP (ref 3.5–5.3)
RBC # BLD: 5.02 M/UL — SIGNIFICANT CHANGE UP (ref 4.2–5.8)
RBC # FLD: 13.9 % — SIGNIFICANT CHANGE UP (ref 10.3–14.5)
SODIUM SERPL-SCNC: 135 MMOL/L — SIGNIFICANT CHANGE UP (ref 135–145)
WBC # BLD: 9.13 K/UL — SIGNIFICANT CHANGE UP (ref 3.8–10.5)
WBC # FLD AUTO: 9.13 K/UL — SIGNIFICANT CHANGE UP (ref 3.8–10.5)

## 2017-10-09 PROCEDURE — 99239 HOSP IP/OBS DSCHRG MGMT >30: CPT

## 2017-10-09 PROCEDURE — 93306 TTE W/DOPPLER COMPLETE: CPT | Mod: 26

## 2017-10-09 PROCEDURE — 99232 SBSQ HOSP IP/OBS MODERATE 35: CPT

## 2017-10-09 PROCEDURE — 93016 CV STRESS TEST SUPVJ ONLY: CPT | Mod: GC

## 2017-10-09 PROCEDURE — 78452 HT MUSCLE IMAGE SPECT MULT: CPT | Mod: 26

## 2017-10-09 PROCEDURE — 93018 CV STRESS TEST I&R ONLY: CPT | Mod: GC

## 2017-10-09 RX ORDER — INSULIN GLARGINE 100 [IU]/ML
30 INJECTION, SOLUTION SUBCUTANEOUS
Qty: 0 | Refills: 0 | COMMUNITY

## 2017-10-09 RX ORDER — CLOPIDOGREL BISULFATE 75 MG/1
1 TABLET, FILM COATED ORAL
Qty: 30 | Refills: 0
Start: 2017-10-09 | End: 2017-11-08

## 2017-10-09 RX ORDER — INSULIN GLARGINE 100 [IU]/ML
36 INJECTION, SOLUTION SUBCUTANEOUS
Qty: 1 | Refills: 0
Start: 2017-10-09 | End: 2017-11-08

## 2017-10-09 RX ORDER — METOPROLOL TARTRATE 50 MG
1 TABLET ORAL
Qty: 0 | Refills: 0 | COMMUNITY

## 2017-10-09 RX ORDER — INSULIN ASPART 100 [IU]/ML
14 INJECTION, SOLUTION SUBCUTANEOUS
Qty: 1 | Refills: 0
Start: 2017-10-09 | End: 2017-11-08

## 2017-10-09 RX ORDER — DOCUSATE SODIUM 100 MG
1 CAPSULE ORAL
Qty: 0 | Refills: 0 | DISCHARGE
Start: 2017-10-09

## 2017-10-09 RX ORDER — INSULIN LISPRO 100/ML
14 VIAL (ML) SUBCUTANEOUS
Qty: 0 | Refills: 0 | Status: DISCONTINUED | OUTPATIENT
Start: 2017-10-09 | End: 2017-10-09

## 2017-10-09 RX ORDER — ERGOCALCIFEROL 1.25 MG/1
1 CAPSULE ORAL
Qty: 4 | Refills: 0
Start: 2017-10-09 | End: 2017-11-08

## 2017-10-09 RX ORDER — SIMVASTATIN 20 MG/1
1 TABLET, FILM COATED ORAL
Qty: 0 | Refills: 0 | DISCHARGE
Start: 2017-10-09

## 2017-10-09 RX ORDER — INSULIN ASPART 100 [IU]/ML
0 INJECTION, SOLUTION SUBCUTANEOUS
Qty: 0 | Refills: 0 | COMMUNITY

## 2017-10-09 RX ORDER — ASPIRIN/CALCIUM CARB/MAGNESIUM 324 MG
1 TABLET ORAL
Qty: 0 | Refills: 0 | DISCHARGE
Start: 2017-10-09

## 2017-10-09 RX ORDER — ERGOCALCIFEROL 1.25 MG/1
50000 CAPSULE ORAL
Qty: 0 | Refills: 0 | Status: DISCONTINUED | OUTPATIENT
Start: 2017-10-09 | End: 2017-10-09

## 2017-10-09 RX ORDER — METOPROLOL TARTRATE 50 MG
1 TABLET ORAL
Qty: 0 | Refills: 0 | DISCHARGE
Start: 2017-10-09

## 2017-10-09 RX ORDER — SENNA PLUS 8.6 MG/1
2 TABLET ORAL
Qty: 0 | Refills: 0 | DISCHARGE
Start: 2017-10-09

## 2017-10-09 RX ADMIN — Medication 100 MILLIGRAM(S): at 05:52

## 2017-10-09 RX ADMIN — Medication 25 MILLIGRAM(S): at 17:49

## 2017-10-09 RX ADMIN — Medication 14 UNIT(S): at 13:03

## 2017-10-09 RX ADMIN — ENOXAPARIN SODIUM 40 MILLIGRAM(S): 100 INJECTION SUBCUTANEOUS at 05:52

## 2017-10-09 RX ADMIN — Medication 12.5 MILLIGRAM(S): at 05:52

## 2017-10-09 RX ADMIN — Medication 12: at 13:01

## 2017-10-09 RX ADMIN — Medication 81 MILLIGRAM(S): at 12:58

## 2017-10-09 RX ADMIN — Medication 100 MILLIGRAM(S): at 12:58

## 2017-10-09 RX ADMIN — Medication 5 MILLIGRAM(S): at 05:52

## 2017-10-09 RX ADMIN — CLOPIDOGREL BISULFATE 75 MILLIGRAM(S): 75 TABLET, FILM COATED ORAL at 12:58

## 2017-10-09 RX ADMIN — Medication 8: at 17:48

## 2017-10-09 RX ADMIN — SODIUM CHLORIDE 3 MILLILITER(S): 9 INJECTION INTRAMUSCULAR; INTRAVENOUS; SUBCUTANEOUS at 05:51

## 2017-10-09 RX ADMIN — Medication 14 UNIT(S): at 17:51

## 2017-10-09 RX ADMIN — ERGOCALCIFEROL 50000 UNIT(S): 1.25 CAPSULE ORAL at 15:10

## 2017-10-09 NOTE — DISCHARGE NOTE ADULT - INSTRUCTIONS
diabetic. low salt. low fat pt to report any unusual symptoms such as chest pain shortness of breath, persistent nausea and vomiting, etc to MD/911

## 2017-10-09 NOTE — PROGRESS NOTE ADULT - PROBLEM SELECTOR PLAN 2
Pt undergoing w/u for CP with primary team.  If were found to have MI, this can be an additional reason for hyperglycemia on top of above mentioned factors in Interval history. Pt undergoing w/u for CP with primary team.  If were found to have MI, this can be an additional reason for hyperglycemia on top of above mentioned factors in Interval history.      p [phone]

## 2017-10-09 NOTE — DISCHARGE NOTE ADULT - MEDICATION SUMMARY - MEDICATIONS TO CHANGE
I will SWITCH the dose or number of times a day I take the medications listed below when I get home from the hospital:    aspirin 81 mg oral tablet, chewable  -- 2 tab(s) by mouth once a day    amLODIPine 2.5 mg oral tablet  -- 1 tab(s) by mouth once a day    insulin glargine 100 units/mL subcutaneous solution  -- 30 unit(s) subcutaneous once a day (at bedtime)    Basaglar KwikPen 100 units/mL subcutaneous solution  -- 30  subcutaneous once a day (at bedtime)

## 2017-10-09 NOTE — DISCHARGE NOTE ADULT - MEDICATION SUMMARY - MEDICATIONS TO TAKE
I will START or STAY ON the medications listed below when I get home from the hospital:    aspirin 81 mg oral delayed release tablet  -- 1 tab(s) by mouth once a day  -- Indication: For blood thinner    enalapril 5 mg oral tablet  -- 1 tab(s) by mouth once a day  -- Indication: For blood pressure    NovoLOG 100 units/mL subcutaneous solution  -- 14 unit(s) subcutaneous 3 times a day (before meals)   -- Indication: For Diabetes mellitus, type 2    insulin glargine 100 units/mL subcutaneous solution  -- 36 unit(s) subcutaneous once a day (at bedtime)   -- Indication: For Diabetes mellitus, type 2    simvastatin 20 mg oral tablet  -- 1 tab(s) by mouth once a day (at bedtime)  -- Indication: For Cholesterol    clopidogrel 75 mg oral tablet  -- 1 tab(s) by mouth once a day  -- Indication: For blood thinner    metoprolol tartrate 25 mg oral tablet  -- 1 tab(s) by mouth every 12 hours  -- Indication: For Heart rate    hydroCHLOROthiazide 12.5 mg oral capsule  -- 1 cap(s) by mouth once a day  -- Indication: For fluid balance    docusate sodium 100 mg oral capsule  -- 1 cap(s) by mouth 3 times a day  -- Indication: For Constipation    senna oral tablet  -- 2 tab(s) by mouth once a day (at bedtime)  -- Indication: For Constipation    ergocalciferol 50,000 intl units (1.25 mg) oral capsule  -- 1 cap(s) by mouth once a week  -- Indication: For supplement

## 2017-10-09 NOTE — DISCHARGE NOTE ADULT - CARE PLAN
Principal Discharge DX:	Chest pain  Goal:	stay pain free  Instructions for follow-up, activity and diet:	follow up with Cardiologist within 1 week  continue medications as prescribed  seek medical attention if symptoms of chest pain, shortness of breath  Secondary Diagnosis:	Diabetes mellitus, type 2  Instructions for follow-up, activity and diet:	dibateic diet  monitor blood sugars  the doses of your insulin have changed  follow up with Endocrinologist  seek medical attention for levels below 100 or above 250  Secondary Diagnosis:	HLD (hyperlipidemia)  Instructions for follow-up, activity and diet:	continue medications  follow up with MD for management

## 2017-10-09 NOTE — DISCHARGE NOTE ADULT - PATIENT PORTAL LINK FT
“You can access the Follow[facility] Patient Portal, offered by [facility], by registering with the following website: [email or web address]”

## 2017-10-09 NOTE — PROGRESS NOTE ADULT - PROBLEM SELECTOR PLAN 1
Currently working on titrating pt's outpt regimen of basaglar/novolog.  Would leave pt on 36 units lantus for now as appeared relatively flat o/n (by BMP glc reading today as again no AM fingerstick checked).  Would encourage to give novolog standing dose for meal tray if delivered late (can HOLD SSI if meal given at off time as stacking comes from giving SSI too close together).  Would recommend increasing from 12 to 14 units humalog tidac and continue moderate SSI for now.      As for BG of 466, this was from several things.  Pt's sugars was checked within 1 hour of finishing a late breakfast; pt was not given insulin coverage for breakfast either.  Further, pt was not given CDI for his AM BMP sugar of 196.  Would expect with pt's large SSI dose that is due at lunch for this to correct, but do need to pay close attention to this and push PO fluid intake (water) to the extent tolerated by pt.
Unclear precipitating factor. Possible symptom associated with hypertensive urgency since pt found to have SBP>180  - telemetry monitoring  - check pro-BNP  - continue home antihypertensives  - continue Enalapril 5mg daily  - nitro paste PRN for chest pain  - continue ASA 81mg, Plavix 75mg daily  - obtain TTE

## 2017-10-09 NOTE — PROGRESS NOTE ADULT - PROBLEM SELECTOR PROBLEM 1
Type 2 diabetes mellitus with hyperglycemia, with long-term current use of insulin
Chest pain, unspecified type

## 2017-10-09 NOTE — DISCHARGE NOTE ADULT - HOSPITAL COURSE
This is a 66 yo male with uncontrolled T2DM HbA1c 12.8%, HTN, CAD s/p CABG presents with chest pain and SOB. On admission, patient is hemodynamically stable and afebrile. EKG on admission shows not acute ST-T wave changes and is unchanged from previously. Labs indicate uncontrolled hyperglycemia with negative CE's 3x. CXR negative. Cardiology and Endocrinology consults placed. Cardio recommend TTE and NST. TTE is normal without any wall motion abnormalities and preserved EF. NST study completed this AM, results pending. Endocrinology increased insulin to Lantus 36units and Humalog 14 units while hospitalized due to elevated FS's. Patient also ASA and Plavix loaded. He was started on Enalapril 4mg qdaily, which he will be discharged with. Patient hemodynamically stable for discharge home. He has no further chest pain and is asymptomatic at this time. New medications on discharge are Plavix, Enalapril, Ergocalciferol, and higher doses of insulin. Patient is to follow up with cardiology, Endocrinology, and PCP.     Problem list:  1. Rule out ACS  2. CAD  4. Uncontrolled type 2 diabetes mellitus with other circulatory problem with long term insulin use  4. Essential HTN  5. Hyperlipidemia  6. Vitamin D Deficiency    DISCHARGE TIME- 35 MINUTES SPENT PREPARING DISCHARGE, INCLUDING PAPERWORK, MEDICATION RECONCILIATION, AND COUNSELLING OF PATIENT.   5. This is a 66 yo male with uncontrolled T2DM HbA1c 12.8%, HTN, CAD s/p CABG presents with chest pain and SOB. On admission, patient is hemodynamically stable and afebrile. EKG on admission shows not acute ST-T wave changes and is unchanged from previously. Labs indicate uncontrolled hyperglycemia with negative CE's 3x. CXR negative. Cardiology and Endocrinology consults placed. Cardio recommend TTE and NST. TTE is normal without any wall motion abnormalities and preserved EF. NST study completed this AM, results pending. Endocrinology increased insulin to Lantus 36units and Humalog 14 units while hospitalized due to elevated FS's. Patient also ASA and Plavix loaded. He was started on Enalapril 4mg qdaily, which he will be discharged with. Patient hemodynamically stable for discharge home. He has no further chest pain and is asymptomatic at this time. New medications on discharge are Plavix, Enalapril, Ergocalciferol, and higher doses of insulin. Patient is to follow up with cardiology, Endocrinology, and PCP.     Problem list:  1. Rule out ACS  2. CAD  4. Uncontrolled type 2 diabetes mellitus with other circulatory problem with long term insulin use  4. Essential HTN  5. Hyperlipidemia  6. Vitamin D Deficiency    DISCHARGE TIME- 35 MINUTES SPENT PREPARING DISCHARGE, INCLUDING PAPERWORK, MEDICATION RECONCILIATION, AND COUNSELLING OF PATIENT.   5.     ST is normal, STEVEN Rosales

## 2017-10-09 NOTE — DISCHARGE NOTE ADULT - CARE PROVIDER_API CALL
Casimiro Pratt), Cardiovascular Disease; Nuclear Cardiology  [id number] [address] [place] [address]  Phone: [phone]  Fax: [phone]    follow up,   with your Endocrinologist within 1 week  Phone: (   )    -  Fax: (   )    -

## 2017-10-09 NOTE — DISCHARGE NOTE ADULT - PLAN OF CARE
stay pain free follow up with Cardiologist within 1 week  continue medications as prescribed  seek medical attention if symptoms of chest pain, shortness of breath dibateic diet  monitor blood sugars  the doses of your insulin have changed  follow up with Endocrinologist  seek medical attention for levels below 100 or above 250 continue medications  follow up with MD for management

## 2017-10-09 NOTE — DISCHARGE NOTE ADULT - MEDICATION SUMMARY - MEDICATIONS TO STOP TAKING
I will STOP taking the medications listed below when I get home from the hospital:    metformin 1000 mg oral tablet  -- 1 tab(s) by mouth 2 times a day - resume metformin on 6/27/14.    isosorbide dinitrate 30 mg oral tablet  --  by mouth once a day    Percocet 5/325 325 mg-5 mg oral tablet  -- 1 tab(s) by mouth every 6 hours, As Needed MDD:6 for breakthrough pain  -- Caution federal law prohibits the transfer of this drug to any person other  than the person for whom it was prescribed.  May cause drowsiness.  Alcohol may intensify this effect.  Use care when operating dangerous machinery.  This prescription cannot be refilled.  This product contains acetaminophen.  Do not use  with any other product containing acetaminophen to prevent possible liver damage.  Using more of this medication than prescribed may cause serious breathing problems.    Toprol-XL 25 mg oral tablet, extended release  -- 1 tab(s) by mouth once a day

## 2017-10-09 NOTE — PROGRESS NOTE ADULT - SUBJECTIVE AND OBJECTIVE BOX
Chief Complaint: type 2 diabetes    Interval history: While I was in room, pt's lunchtime sugar was checked and was 466.  However, he was off unit in early AM at cardiac study, so sugar not checked at all (on AM BMP was 195) and insulin not given nor CDI.  However, pt did receive breakfast and ate it within 1 hour of lunch check, so multiple reasons for lunch sugar to be elevated.     MEDICATIONS  (STANDING):  aspirin enteric coated 81 milliGRAM(s) Oral daily  clopidogrel Tablet 75 milliGRAM(s) Oral daily  dextrose 5%. 1000 milliLiter(s) (50 mL/Hr) IV Continuous <Continuous>  dextrose 50% Injectable 12.5 Gram(s) IV Push once  dextrose 50% Injectable 25 Gram(s) IV Push once  dextrose 50% Injectable 25 Gram(s) IV Push once  docusate sodium 100 milliGRAM(s) Oral three times a day  enalapril 5 milliGRAM(s) Oral daily  enoxaparin Injectable 40 milliGRAM(s) SubCutaneous every 24 hours  hydrochlorothiazide 12.5 milliGRAM(s) Oral daily  influenza   Vaccine 0.5 milliLiter(s) IntraMuscular once  insulin glargine Injectable (LANTUS) 36 Unit(s) SubCutaneous at bedtime  insulin lispro (HumaLOG) corrective regimen sliding scale   SubCutaneous three times a day before meals  insulin lispro (HumaLOG) corrective regimen sliding scale   SubCutaneous at bedtime  insulin lispro Injectable (HumaLOG) 12 Unit(s) SubCutaneous three times a day before meals  metoprolol 25 milliGRAM(s) Oral every 12 hours  senna 2 Tablet(s) Oral at bedtime  simvastatin 20 milliGRAM(s) Oral at bedtime  sodium chloride 0.9% lock flush 3 milliLiter(s) IV Push every 8 hours    MEDICATIONS  (PRN):  acetaminophen   Tablet. 650 milliGRAM(s) Oral every 6 hours PRN mild and moderate pain  dextrose Gel 1 Dose(s) Oral once PRN Blood Glucose LESS THAN 70 milliGRAM(s)/deciliter  glucagon  Injectable 1 milliGRAM(s) IntraMuscular once PRN Glucose LESS THAN 70 milligrams/deciliter  guaiFENesin   Syrup  (Sugar-Free) 100 milliGRAM(s) Oral every 6 hours PRN Cough  nitroglycerin    2% Ointment 1 Inch(s) Transdermal daily PRN Chest pain      Allergies    No Known Allergies    Intolerances      Review of Systems:  Skin: no rash  Endocrine: no polyuria, polydipsia    ALL OTHER SYSTEMS REVIEWED AND NEGATIVE    PHYSICAL EXAM:  VITALS: T(C): 36.2 (10-09-17 @ 10:20)  T(F): 97.2 (10-09-17 @ 10:20), Max: 98.2 (10-08-17 @ 17:20)  HR: 79 (10-09-17 @ 10:20) (60 - 79)  BP: 106/61 (10-09-17 @ 10:20) (106/61 - 137/79)  RR:  (16 - 18)  SpO2:  (96% - 100%)  Wt(kg): --  GENERAL: NAD, well-developed  EYES: No proptosis, sclera anicteric  HEENT:  Atraumatic, Normocephalic, moist mucous membranes  SKIN: Dry, intact, No diffuse rashes   PSYCH: Alert and oriented x 3, normal affect, normal mood    CAPILLARY BLOOD GLUCOSE  466 (10-09 @ 12:05)  208 (10-08 @ 20:51)  171 (10-08 @ 17:44)  278 (10-08 @ 12:50)  375 (10-08 @ 09:05)  395 (10-08 @ 01:12)      10-09    135  |  98  |  16  ----------------------------<  195<H>  3.8   |  22  |  1.13    EGFR if : 76  EGFR if non : 66    Ca    8.7      10-09  Mg     2.1     10-09  Phos  2.9     10-08    TPro  7.4  /  Alb  4.0  /  TBili  0.3  /  DBili  x   /  AST  18  /  ALT  17  /  AlkPhos  96  10-07          Thyroid Function Tests:  10-08 @ 06:45 TSH 2.36 FreeT4 -- T3 -- Anti TPO -- Anti Thyroglobulin Ab -- TSI --      Hemoglobin A1C, Whole Blood: 12.8 % <H> [4.0 - 5.6] (10-08-17 @ 06:45)
Patient is a 67y old  Male who presents with a chief complaint of Chest pain x 2 days (07 Oct 2017 23:54)    SUBJECTIVE / OVERNIGHT EVENTS:  Patient currently without any chest pain or SOB. No complaints. Patient admits to longstanding history of poorly controlled DM and poor exercise tolerance of about 1 block due to SOB.    MEDICATIONS  (STANDING):  aspirin enteric coated 81 milliGRAM(s) Oral daily  clopidogrel Tablet 75 milliGRAM(s) Oral daily  dextrose 5%. 1000 milliLiter(s) (50 mL/Hr) IV Continuous <Continuous>  dextrose 50% Injectable 12.5 Gram(s) IV Push once  dextrose 50% Injectable 25 Gram(s) IV Push once  dextrose 50% Injectable 25 Gram(s) IV Push once  docusate sodium 100 milliGRAM(s) Oral three times a day  enalapril 5 milliGRAM(s) Oral daily  enoxaparin Injectable 40 milliGRAM(s) SubCutaneous every 24 hours  hydrochlorothiazide 12.5 milliGRAM(s) Oral daily  influenza   Vaccine 0.5 milliLiter(s) IntraMuscular once  insulin glargine Injectable (LANTUS) 36 Unit(s) SubCutaneous at bedtime  insulin lispro (HumaLOG) corrective regimen sliding scale   SubCutaneous three times a day before meals  insulin lispro (HumaLOG) corrective regimen sliding scale   SubCutaneous at bedtime  insulin lispro Injectable (HumaLOG) 12 Unit(s) SubCutaneous three times a day before meals  metoprolol 25 milliGRAM(s) Oral every 12 hours  senna 2 Tablet(s) Oral at bedtime  simvastatin 20 milliGRAM(s) Oral at bedtime  sodium chloride 0.9% lock flush 3 milliLiter(s) IV Push every 8 hours    MEDICATIONS  (PRN):  acetaminophen   Tablet. 650 milliGRAM(s) Oral every 6 hours PRN mild and moderate pain  dextrose Gel 1 Dose(s) Oral once PRN Blood Glucose LESS THAN 70 milliGRAM(s)/deciliter  glucagon  Injectable 1 milliGRAM(s) IntraMuscular once PRN Glucose LESS THAN 70 milligrams/deciliter  guaiFENesin   Syrup  (Sugar-Free) 100 milliGRAM(s) Oral every 6 hours PRN Cough  nitroglycerin    2% Ointment 1 Inch(s) Transdermal daily PRN Chest pain      Vital Signs Last 24 Hrs  T(C): 36.8 (08 Oct 2017 17:20), Max: 37.1 (07 Oct 2017 18:09)  T(F): 98.2 (08 Oct 2017 17:20), Max: 98.7 (07 Oct 2017 18:09)  HR: 70 (08 Oct 2017 17:20) (67 - 90)  BP: 117/56 (08 Oct 2017 17:20) (107/76 - 189/67)  BP(mean): --  RR: 16 (08 Oct 2017 17:20) (16 - 22)  SpO2: 98% (08 Oct 2017 17:20) (96% - 100%)  CAPILLARY BLOOD GLUCOSE  278 (08 Oct 2017 12:50)  375 (08 Oct 2017 09:05)  395 (08 Oct 2017 01:12)      PHYSICAL EXAM  GENERAL: NAD, well-developed  NECK: Supple, No JVD  CHEST/LUNG: Clear to auscultation bilaterally; No wheeze  HEART: Regular rate and rhythm; No murmurs, rubs, or gallops  ABDOMEN: Soft, Nontender, Nondistended; Bowel sounds present  EXTREMITIES:  2+ Peripheral Pulses, No clubbing, cyanosis, or edema  PSYCH: AAOx3    LABS:                        12.4   6.44  )-----------( 259      ( 08 Oct 2017 06:45 )             37.6     10-08    135  |  97<L>  |  13  ----------------------------<  406<H>  4.5   |  24  |  1.05    Ca    8.6      08 Oct 2017 06:45  Phos  2.9     10-08  Mg     2.1     10-08    TPro  7.4  /  Alb  4.0  /  TBili  0.3  /  DBili  x   /  AST  18  /  ALT  17  /  AlkPhos  96  10-07    PT/INR - ( 07 Oct 2017 18:30 )   PT: 10.1 SEC;   INR: 0.90          PTT - ( 07 Oct 2017 18:30 )  PTT:28.3 SEC  CARDIAC MARKERS ( 08 Oct 2017 06:45 )  x     / < 0.06 ng/mL / 126 u/L / x     / x      CARDIAC MARKERS ( 08 Oct 2017 00:30 )  x     / < 0.06 ng/mL / 127 u/L / x     / x      CARDIAC MARKERS ( 07 Oct 2017 18:30 )  x     / < 0.06 ng/mL / 176 u/L / 4.36 ng/mL / x        Consultant(s) Notes Reviewed:  Yes  Care Discussed with Consultants/Other Providers: Yes

## 2017-10-09 NOTE — PROGRESS NOTE ADULT - ASSESSMENT
66 yo male with uncontrolled T2DM HbA1c 12.8%, HTN, HLD, CAD s/p CABG presents with chest pain and SOB.
Patient is a 66 yo M with medical hx notable for HTN, poorly controlled DM2, CAD s/p CABG in 2012 and stents (2) in 2014 presenting with chest pain and SOB, admitted for further cardiac w/u to assess for ACS. Chest pain now resolved. Normal EKG. Cardiac enzymes neg x3.

## 2017-12-05 ENCOUNTER — RX RENEWAL (OUTPATIENT)
Age: 67
End: 2017-12-05

## 2017-12-06 ENCOUNTER — APPOINTMENT (OUTPATIENT)
Dept: CARDIOLOGY | Facility: CLINIC | Age: 67
End: 2017-12-06
Payer: MEDICAID

## 2017-12-06 ENCOUNTER — NON-APPOINTMENT (OUTPATIENT)
Age: 67
End: 2017-12-06

## 2017-12-06 VITALS
HEIGHT: 67 IN | DIASTOLIC BLOOD PRESSURE: 75 MMHG | HEART RATE: 65 BPM | BODY MASS INDEX: 27.15 KG/M2 | WEIGHT: 173 LBS | SYSTOLIC BLOOD PRESSURE: 125 MMHG | OXYGEN SATURATION: 98 %

## 2017-12-06 PROCEDURE — 93000 ELECTROCARDIOGRAM COMPLETE: CPT

## 2017-12-06 PROCEDURE — 99215 OFFICE O/P EST HI 40 MIN: CPT

## 2017-12-18 NOTE — ED ADULT NURSE NOTE - PAIN RATING/NUMBER SCALE (0-10): ACTIVITY
Dx: Gait instability, Heart failure, LVAD (left ventricular assist device) present          Authorized # of Visits:  [id number]         Next MD visit: none scheduled  Fall Risk: High        Precautions:  Pacemaker, O2 2L,    LVAD (brand: Heartware) Restrictions: demonstrating improved gait speed for improved participation in ADL such as community ambulation.  - original goal met, progress to <10 sec  · Pt will perform DGI with score of 20/24 or greater with least restrictive AD to demonstrate ability to ambulate sa fatigue) Flight of stairs with handrail x 13 steps, reciprocal CGA for safety (improved) - - - Flight of stairs with handrail x 8 steps, reciprocal SBA for safety Seated on SB min A  - marching x 20  - alt UE/LE x 10 R/L         - alt tap to cone stack 2 x 31# x 30 (likes this a lot) Shuttle  - DLS, 46# x 20, 89# x 15 (makes knees feel really good) Shuttle  - DLS, 86# x 20  - SLS, 18# x 15 R/L Shuttle  - DLS, 74# x 20  - SLS, 18# x 15 R/L - - Shuttle  - DLS, 86# x 20  - SLS, 31# x 15 R/L   Seated RTB rows x 7

## 2018-02-20 ENCOUNTER — RX RENEWAL (OUTPATIENT)
Age: 68
End: 2018-02-20

## 2018-03-06 ENCOUNTER — NON-APPOINTMENT (OUTPATIENT)
Age: 68
End: 2018-03-06

## 2018-03-06 ENCOUNTER — APPOINTMENT (OUTPATIENT)
Dept: CARDIOLOGY | Facility: CLINIC | Age: 68
End: 2018-03-06
Payer: MEDICAID

## 2018-03-06 VITALS
WEIGHT: 172 LBS | HEART RATE: 54 BPM | HEIGHT: 67 IN | OXYGEN SATURATION: 100 % | DIASTOLIC BLOOD PRESSURE: 73 MMHG | BODY MASS INDEX: 27 KG/M2 | SYSTOLIC BLOOD PRESSURE: 125 MMHG

## 2018-03-06 PROCEDURE — 99215 OFFICE O/P EST HI 40 MIN: CPT

## 2018-03-06 PROCEDURE — 93000 ELECTROCARDIOGRAM COMPLETE: CPT

## 2018-03-20 ENCOUNTER — APPOINTMENT (OUTPATIENT)
Dept: GASTROENTEROLOGY | Facility: CLINIC | Age: 68
End: 2018-03-20
Payer: MEDICAID

## 2018-03-20 VITALS
DIASTOLIC BLOOD PRESSURE: 70 MMHG | HEIGHT: 67 IN | HEART RATE: 68 BPM | WEIGHT: 172 LBS | SYSTOLIC BLOOD PRESSURE: 120 MMHG | BODY MASS INDEX: 27 KG/M2 | OXYGEN SATURATION: 99 % | TEMPERATURE: 98.6 F

## 2018-03-20 DIAGNOSIS — F15.90 OTHER STIMULANT USE, UNSPECIFIED, UNCOMPLICATED: ICD-10-CM

## 2018-03-20 DIAGNOSIS — Z78.9 OTHER SPECIFIED HEALTH STATUS: ICD-10-CM

## 2018-03-20 DIAGNOSIS — Z12.11 ENCOUNTER FOR SCREENING FOR MALIGNANT NEOPLASM OF COLON: ICD-10-CM

## 2018-03-20 PROCEDURE — 99204 OFFICE O/P NEW MOD 45 MIN: CPT

## 2018-03-20 RX ORDER — INSULIN ASPART 100 [IU]/ML
100 INJECTION, SOLUTION INTRAVENOUS; SUBCUTANEOUS
Refills: 0 | Status: ACTIVE | COMMUNITY

## 2018-03-20 RX ORDER — PEN NEEDLE, DIABETIC 32 GX 1/4"
32G X 6 MM NEEDLE, DISPOSABLE MISCELLANEOUS
Qty: 100 | Refills: 0 | Status: ACTIVE | COMMUNITY
Start: 2018-03-09

## 2018-04-13 ENCOUNTER — APPOINTMENT (OUTPATIENT)
Dept: OPHTHALMOLOGY | Facility: CLINIC | Age: 68
End: 2018-04-13

## 2018-04-27 ENCOUNTER — OUTPATIENT (OUTPATIENT)
Dept: OUTPATIENT SERVICES | Facility: HOSPITAL | Age: 68
LOS: 1 days | Discharge: HOME | End: 2018-04-27

## 2018-04-27 ENCOUNTER — APPOINTMENT (OUTPATIENT)
Dept: GASTROENTEROLOGY | Facility: HOSPITAL | Age: 68
End: 2018-04-27
Payer: MEDICAID

## 2018-04-27 DIAGNOSIS — Z95.5 PRESENCE OF CORONARY ANGIOPLASTY IMPLANT AND GRAFT: Chronic | ICD-10-CM

## 2018-04-27 DIAGNOSIS — R19.7 DIARRHEA, UNSPECIFIED: ICD-10-CM

## 2018-04-27 LAB
GLUCOSE BLDC GLUCOMTR-MCNC: 226 MG/DL — HIGH (ref 70–99)
GLUCOSE BLDC GLUCOMTR-MCNC: 296 MG/DL — HIGH (ref 70–99)
GLUCOSE BLDC GLUCOMTR-MCNC: 311 MG/DL — HIGH (ref 70–99)
GLUCOSE BLDC GLUCOMTR-MCNC: 320 MG/DL — HIGH (ref 70–99)
GLUCOSE BLDC GLUCOMTR-MCNC: 321 MG/DL — HIGH (ref 70–99)

## 2018-04-27 PROCEDURE — 45378 DIAGNOSTIC COLONOSCOPY: CPT

## 2018-05-29 ENCOUNTER — APPOINTMENT (OUTPATIENT)
Dept: RADIOLOGY | Facility: IMAGING CENTER | Age: 68
End: 2018-05-29
Payer: MEDICAID

## 2018-05-29 ENCOUNTER — OUTPATIENT (OUTPATIENT)
Dept: OUTPATIENT SERVICES | Facility: HOSPITAL | Age: 68
LOS: 1 days | End: 2018-05-29
Payer: MEDICAID

## 2018-05-29 DIAGNOSIS — Z00.8 ENCOUNTER FOR OTHER GENERAL EXAMINATION: ICD-10-CM

## 2018-05-29 DIAGNOSIS — Z95.5 PRESENCE OF CORONARY ANGIOPLASTY IMPLANT AND GRAFT: Chronic | ICD-10-CM

## 2018-05-29 PROCEDURE — 73521 X-RAY EXAM HIPS BI 2 VIEWS: CPT | Mod: 26

## 2018-05-29 PROCEDURE — 73521 X-RAY EXAM HIPS BI 2 VIEWS: CPT

## 2018-06-26 ENCOUNTER — APPOINTMENT (OUTPATIENT)
Dept: CARDIOLOGY | Facility: CLINIC | Age: 68
End: 2018-06-26
Payer: MEDICAID

## 2018-06-26 ENCOUNTER — NON-APPOINTMENT (OUTPATIENT)
Age: 68
End: 2018-06-26

## 2018-06-26 VITALS
OXYGEN SATURATION: 98 % | DIASTOLIC BLOOD PRESSURE: 77 MMHG | WEIGHT: 170 LBS | BODY MASS INDEX: 26.68 KG/M2 | HEART RATE: 73 BPM | SYSTOLIC BLOOD PRESSURE: 130 MMHG | HEIGHT: 67 IN

## 2018-06-26 PROCEDURE — 99215 OFFICE O/P EST HI 40 MIN: CPT

## 2018-06-26 PROCEDURE — 93000 ELECTROCARDIOGRAM COMPLETE: CPT

## 2018-07-01 ENCOUNTER — OUTPATIENT (OUTPATIENT)
Dept: OUTPATIENT SERVICES | Facility: HOSPITAL | Age: 68
LOS: 1 days | End: 2018-07-01
Payer: MEDICAID

## 2018-07-01 DIAGNOSIS — Z95.5 PRESENCE OF CORONARY ANGIOPLASTY IMPLANT AND GRAFT: Chronic | ICD-10-CM

## 2018-07-01 PROCEDURE — G9001: CPT

## 2018-07-06 ENCOUNTER — APPOINTMENT (OUTPATIENT)
Dept: CV DIAGNOSITCS | Facility: HOSPITAL | Age: 68
End: 2018-07-06
Payer: MEDICAID

## 2018-07-06 ENCOUNTER — OUTPATIENT (OUTPATIENT)
Dept: OUTPATIENT SERVICES | Facility: HOSPITAL | Age: 68
LOS: 1 days | End: 2018-07-06

## 2018-07-06 DIAGNOSIS — Z95.5 PRESENCE OF CORONARY ANGIOPLASTY IMPLANT AND GRAFT: Chronic | ICD-10-CM

## 2018-07-06 DIAGNOSIS — I25.10 ATHEROSCLEROTIC HEART DISEASE OF NATIVE CORONARY ARTERY WITHOUT ANGINA PECTORIS: ICD-10-CM

## 2018-07-06 PROCEDURE — 93880 EXTRACRANIAL BILAT STUDY: CPT | Mod: 26

## 2018-07-09 ENCOUNTER — EMERGENCY (EMERGENCY)
Facility: HOSPITAL | Age: 68
LOS: 1 days | Discharge: HOME | End: 2018-07-09
Attending: EMERGENCY MEDICINE | Admitting: EMERGENCY MEDICINE
Payer: MEDICAID

## 2018-07-09 VITALS
OXYGEN SATURATION: 100 % | TEMPERATURE: 98 F | SYSTOLIC BLOOD PRESSURE: 182 MMHG | HEART RATE: 78 BPM | DIASTOLIC BLOOD PRESSURE: 99 MMHG | RESPIRATION RATE: 16 BRPM

## 2018-07-09 DIAGNOSIS — Z95.5 PRESENCE OF CORONARY ANGIOPLASTY IMPLANT AND GRAFT: Chronic | ICD-10-CM

## 2018-07-09 PROCEDURE — 99283 EMERGENCY DEPT VISIT LOW MDM: CPT

## 2018-07-09 RX ORDER — OXYCODONE AND ACETAMINOPHEN 5; 325 MG/1; MG/1
1 TABLET ORAL ONCE
Qty: 0 | Refills: 0 | Status: DISCONTINUED | OUTPATIENT
Start: 2018-07-09 | End: 2018-07-09

## 2018-07-09 RX ORDER — DIAZEPAM 5 MG
1 TABLET ORAL
Qty: 8 | Refills: 0
Start: 2018-07-09 | End: 2018-07-10

## 2018-07-09 RX ORDER — LIDOCAINE 4 G/100G
1 CREAM TOPICAL ONCE
Qty: 0 | Refills: 0 | Status: COMPLETED | OUTPATIENT
Start: 2018-07-09 | End: 2018-07-09

## 2018-07-09 RX ADMIN — OXYCODONE AND ACETAMINOPHEN 1 TABLET(S): 5; 325 TABLET ORAL at 13:28

## 2018-07-09 RX ADMIN — LIDOCAINE 1 PATCH: 4 CREAM TOPICAL at 13:27

## 2018-07-09 NOTE — ED PROVIDER NOTE - MEDICAL DECISION MAKING DETAILS
68 Y/O M  with complaints of rt hip pain radiating down posterior aspect rt leg 1 mo duration, likely sciatica, no s/s of cord compression will attempt pain control, give percocet, muscle relaxer, lidocaine patch, back pain referral sheet for further care and eval.

## 2018-07-09 NOTE — ED PROVIDER NOTE - OBJECTIVE STATEMENT
66 Y/O M presents with progressively worsening rt hip pain radiating down right leg x 1 month. Pt visited PCP, had XR and physical therapy with no relief. Pain worst at night when lying down. Pt is ambulatory and is not on any pain medications. Denies any swelling, bowel, urinary incontinence or long travels. No further complaints.

## 2018-07-09 NOTE — ED PROVIDER NOTE - PHYSICAL EXAMINATION
normal gait, non tender to palpation over rt hip, FROM at rt hip normal gait, non tender to palpation over rt hip, FROM at rt hip, non ttp spinous midline  pos slr test at rle at 60 deg

## 2018-07-10 DIAGNOSIS — Z71.89 OTHER SPECIFIED COUNSELING: ICD-10-CM

## 2018-07-29 ENCOUNTER — APPOINTMENT (OUTPATIENT)
Dept: MRI IMAGING | Facility: IMAGING CENTER | Age: 68
End: 2018-07-29
Payer: MEDICAID

## 2018-07-29 ENCOUNTER — OUTPATIENT (OUTPATIENT)
Dept: OUTPATIENT SERVICES | Facility: HOSPITAL | Age: 68
LOS: 1 days | End: 2018-07-29
Payer: MEDICAID

## 2018-07-29 DIAGNOSIS — Z95.5 PRESENCE OF CORONARY ANGIOPLASTY IMPLANT AND GRAFT: Chronic | ICD-10-CM

## 2018-07-29 DIAGNOSIS — Z00.8 ENCOUNTER FOR OTHER GENERAL EXAMINATION: ICD-10-CM

## 2018-07-29 PROCEDURE — 72148 MRI LUMBAR SPINE W/O DYE: CPT

## 2018-07-29 PROCEDURE — 72148 MRI LUMBAR SPINE W/O DYE: CPT | Mod: 26

## 2018-07-30 ENCOUNTER — APPOINTMENT (OUTPATIENT)
Dept: ORTHOPEDIC SURGERY | Facility: CLINIC | Age: 68
End: 2018-07-30

## 2018-09-25 ENCOUNTER — NON-APPOINTMENT (OUTPATIENT)
Age: 68
End: 2018-09-25

## 2018-09-25 ENCOUNTER — APPOINTMENT (OUTPATIENT)
Dept: CARDIOLOGY | Facility: CLINIC | Age: 68
End: 2018-09-25
Payer: MEDICAID

## 2018-09-25 VITALS
SYSTOLIC BLOOD PRESSURE: 132 MMHG | DIASTOLIC BLOOD PRESSURE: 74 MMHG | HEIGHT: 67 IN | WEIGHT: 157 LBS | RESPIRATION RATE: 18 BRPM | HEART RATE: 61 BPM | BODY MASS INDEX: 24.64 KG/M2 | OXYGEN SATURATION: 98 % | TEMPERATURE: 97.8 F

## 2018-09-25 PROCEDURE — 99215 OFFICE O/P EST HI 40 MIN: CPT

## 2018-09-25 PROCEDURE — 93000 ELECTROCARDIOGRAM COMPLETE: CPT

## 2018-09-25 RX ORDER — LINACLOTIDE 290 UG/1
290 CAPSULE, GELATIN COATED ORAL
Qty: 30 | Refills: 2 | Status: DISCONTINUED | OUTPATIENT
Start: 2018-03-20 | End: 2018-09-25

## 2018-09-25 RX ORDER — METFORMIN HYDROCHLORIDE 1000 MG/1
1000 TABLET, COATED ORAL TWICE DAILY
Qty: 180 | Refills: 3 | Status: DISCONTINUED | COMMUNITY
Start: 2018-03-06 | End: 2018-09-25

## 2018-09-25 RX ORDER — POLYETHYLENE GLYCOL-3350, SODIUM CHLORIDE, POTASSIUM CHLORIDE AND SODIUM BICARBONATE 420; 11.2; 5.72; 1.48 G/438.4G; G/438.4G; G/438.4G; G/438.4G
420 POWDER, FOR SOLUTION ORAL
Qty: 420 | Refills: 0 | Status: DISCONTINUED | COMMUNITY
Start: 2018-03-20 | End: 2018-09-25

## 2018-09-25 RX ORDER — INSULIN GLARGINE 100 [IU]/ML
100 INJECTION, SOLUTION SUBCUTANEOUS
Refills: 0 | Status: ACTIVE | COMMUNITY
Start: 2018-09-25

## 2018-12-18 ENCOUNTER — APPOINTMENT (OUTPATIENT)
Dept: CARDIOLOGY | Facility: CLINIC | Age: 68
End: 2018-12-18
Payer: MEDICAID

## 2018-12-18 VITALS
WEIGHT: 164 LBS | OXYGEN SATURATION: 97 % | DIASTOLIC BLOOD PRESSURE: 76 MMHG | SYSTOLIC BLOOD PRESSURE: 131 MMHG | HEART RATE: 95 BPM | BODY MASS INDEX: 25.74 KG/M2 | HEIGHT: 67 IN

## 2018-12-18 DIAGNOSIS — I70.219 ATHEROSCLEROSIS OF NATIVE ARTERIES OF EXTREMITIES WITH INTERMITTENT CLAUDICATION, UNSPECIFIED EXTREMITY: ICD-10-CM

## 2018-12-18 PROCEDURE — 93000 ELECTROCARDIOGRAM COMPLETE: CPT

## 2018-12-18 PROCEDURE — 99215 OFFICE O/P EST HI 40 MIN: CPT

## 2018-12-18 NOTE — REASON FOR VISIT
[Follow-Up - Clinic] : a clinic follow-up of [Coronary Artery Disease] : coronary artery disease [Hyperlipidemia] : hyperlipidemia [Hypertension] : hypertension [FreeTextEntry1] : Since his last visit, the patient has been doing well. His only complaint is his left leg is cool to touch in the evenings. He denies having pain in that area, but notes that there is a significant temperature difference between his ankle and thigh, as well as his left ankle compared to his right ankle.\par \par 1. Peripheral arterial disease. Schedule LEROY.\par \par 2. Hypertension. Blood pressure is well controlled.\par \par 3. Coronary artery disease. There are no signs of angina.\par \par 4. Hyperlipidemia. Continue simvastatin.\par

## 2018-12-18 NOTE — PHYSICAL EXAM
[General Appearance - Well Developed] : well developed [Normal Appearance] : normal appearance [Well Groomed] : well groomed [General Appearance - Well Nourished] : well nourished [No Deformities] : no deformities [General Appearance - In No Acute Distress] : no acute distress [Normal Conjunctiva] : the conjunctiva exhibited no abnormalities [Eyelids - No Xanthelasma] : the eyelids demonstrated no xanthelasmas [Normal Oral Mucosa] : normal oral mucosa [No Oral Pallor] : no oral pallor [No Oral Cyanosis] : no oral cyanosis [Normal Jugular Venous A Waves Present] : normal jugular venous A waves present [Normal Jugular Venous V Waves Present] : normal jugular venous V waves present [No Jugular Venous Billings A Waves] : no jugular venous billings A waves [Respiration, Rhythm And Depth] : normal respiratory rhythm and effort [Exaggerated Use Of Accessory Muscles For Inspiration] : no accessory muscle use [Auscultation Breath Sounds / Voice Sounds] : lungs were clear to auscultation bilaterally [Heart Rate And Rhythm] : heart rate and rhythm were normal [Heart Sounds] : normal S1 and S2 [Murmurs] : no murmurs present [Abdomen Soft] : soft [Abdomen Tenderness] : non-tender [Abdomen Mass (___ Cm)] : no abdominal mass palpated [Abnormal Walk] : normal gait [Gait - Sufficient For Exercise Testing] : the gait was sufficient for exercise testing [Nail Clubbing] : no clubbing of the fingernails [Cyanosis, Localized] : no localized cyanosis [Petechial Hemorrhages (___cm)] : no petechial hemorrhages [Skin Color & Pigmentation] : normal skin color and pigmentation [] : no rash [No Venous Stasis] : no venous stasis [Skin Lesions] : no skin lesions [No Skin Ulcers] : no skin ulcer [No Xanthoma] : no  xanthoma was observed [FreeTextEntry1] : healing midline sternotomy wound [Oriented To Time, Place, And Person] : oriented to person, place, and time [Affect] : the affect was normal [Mood] : the mood was normal [No Anxiety] : not feeling anxious

## 2018-12-18 NOTE — ASSESSMENT
[FreeTextEntry1] : 1. Peripheral arterial disease. Schedule LEROY.\par \par 2. Hypertension. Blood pressure is well controlled.\par \par 3. Coronary artery disease. There are no signs of angina.\par \par 4. Hyperlipidemia. Continue simvastatin.\par

## 2018-12-27 ENCOUNTER — OUTPATIENT (OUTPATIENT)
Dept: OUTPATIENT SERVICES | Facility: HOSPITAL | Age: 68
LOS: 1 days | End: 2018-12-27

## 2018-12-27 ENCOUNTER — APPOINTMENT (OUTPATIENT)
Dept: CV DIAGNOSITCS | Facility: HOSPITAL | Age: 68
End: 2018-12-27
Payer: MEDICAID

## 2018-12-27 DIAGNOSIS — Z95.5 PRESENCE OF CORONARY ANGIOPLASTY IMPLANT AND GRAFT: Chronic | ICD-10-CM

## 2018-12-27 DIAGNOSIS — I70.219 ATHEROSCLEROSIS OF NATIVE ARTERIES OF EXTREMITIES WITH INTERMITTENT CLAUDICATION, UNSPECIFIED EXTREMITY: ICD-10-CM

## 2018-12-27 PROCEDURE — 93923 UPR/LXTR ART STDY 3+ LVLS: CPT | Mod: 26

## 2019-03-18 ENCOUNTER — RX RENEWAL (OUTPATIENT)
Age: 69
End: 2019-03-18

## 2019-03-19 ENCOUNTER — NON-APPOINTMENT (OUTPATIENT)
Age: 69
End: 2019-03-19

## 2019-03-19 ENCOUNTER — APPOINTMENT (OUTPATIENT)
Dept: CARDIOLOGY | Facility: CLINIC | Age: 69
End: 2019-03-19
Payer: MEDICAID

## 2019-03-19 VITALS
OXYGEN SATURATION: 97 % | BODY MASS INDEX: 25.11 KG/M2 | DIASTOLIC BLOOD PRESSURE: 74 MMHG | HEIGHT: 67 IN | WEIGHT: 160 LBS | HEART RATE: 71 BPM | SYSTOLIC BLOOD PRESSURE: 137 MMHG | RESPIRATION RATE: 16 BRPM

## 2019-03-19 PROCEDURE — 99214 OFFICE O/P EST MOD 30 MIN: CPT

## 2019-03-19 PROCEDURE — 93000 ELECTROCARDIOGRAM COMPLETE: CPT

## 2019-03-19 NOTE — PHYSICAL EXAM
[General Appearance - Well Developed] : well developed [Normal Appearance] : normal appearance [Well Groomed] : well groomed [General Appearance - Well Nourished] : well nourished [No Deformities] : no deformities [General Appearance - In No Acute Distress] : no acute distress [Normal Conjunctiva] : the conjunctiva exhibited no abnormalities [Eyelids - No Xanthelasma] : the eyelids demonstrated no xanthelasmas [Normal Oral Mucosa] : normal oral mucosa [No Oral Cyanosis] : no oral cyanosis [No Oral Pallor] : no oral pallor [Normal Jugular Venous A Waves Present] : normal jugular venous A waves present [Normal Jugular Venous V Waves Present] : normal jugular venous V waves present [No Jugular Venous Billings A Waves] : no jugular venous billings A waves [Respiration, Rhythm And Depth] : normal respiratory rhythm and effort [Exaggerated Use Of Accessory Muscles For Inspiration] : no accessory muscle use [Auscultation Breath Sounds / Voice Sounds] : lungs were clear to auscultation bilaterally [Heart Rate And Rhythm] : heart rate and rhythm were normal [Heart Sounds] : normal S1 and S2 [Murmurs] : no murmurs present [Abdomen Soft] : soft [Abdomen Tenderness] : non-tender [Abnormal Walk] : normal gait [Abdomen Mass (___ Cm)] : no abdominal mass palpated [Gait - Sufficient For Exercise Testing] : the gait was sufficient for exercise testing [Nail Clubbing] : no clubbing of the fingernails [Cyanosis, Localized] : no localized cyanosis [Petechial Hemorrhages (___cm)] : no petechial hemorrhages [Skin Color & Pigmentation] : normal skin color and pigmentation [] : no rash [Skin Lesions] : no skin lesions [No Venous Stasis] : no venous stasis [No Skin Ulcers] : no skin ulcer [No Xanthoma] : no  xanthoma was observed [Oriented To Time, Place, And Person] : oriented to person, place, and time [Affect] : the affect was normal [FreeTextEntry1] : healing midline sternotomy wound [Mood] : the mood was normal [No Anxiety] : not feeling anxious

## 2019-03-19 NOTE — ASSESSMENT
[FreeTextEntry1] : 1. Coronary artery disease. No signs of angina.\par \par 2. Hyperlipidemia. Continue simvastatin.\par \par 3. Hypertension. Blood pressure well controlled.\par

## 2019-03-19 NOTE — REASON FOR VISIT
[Follow-Up - Clinic] : a clinic follow-up of [Coronary Artery Disease] : coronary artery disease [Hyperlipidemia] : hyperlipidemia [Hypertension] : hypertension [FreeTextEntry1] : Since his last visit, he feels well and he has had no chest pain or shortness of breath. Continues to complain of coldness in his feet. His ABIs are negative for obstructive disease.\par \par 1. Coronary artery disease. No signs of angina.\par \par 2. Hyperlipidemia. Continue simvastatin.\par \par 3. Hypertension. Blood pressure well controlled.\par

## 2019-06-25 ENCOUNTER — NON-APPOINTMENT (OUTPATIENT)
Age: 69
End: 2019-06-25

## 2019-06-25 ENCOUNTER — APPOINTMENT (OUTPATIENT)
Dept: CARDIOLOGY | Facility: CLINIC | Age: 69
End: 2019-06-25
Payer: MEDICAID

## 2019-06-25 VITALS
HEART RATE: 67 BPM | OXYGEN SATURATION: 97 % | HEIGHT: 67 IN | WEIGHT: 160 LBS | SYSTOLIC BLOOD PRESSURE: 134 MMHG | BODY MASS INDEX: 25.11 KG/M2 | RESPIRATION RATE: 16 BRPM | DIASTOLIC BLOOD PRESSURE: 73 MMHG

## 2019-06-25 PROCEDURE — 99214 OFFICE O/P EST MOD 30 MIN: CPT

## 2019-06-25 PROCEDURE — 93000 ELECTROCARDIOGRAM COMPLETE: CPT

## 2019-06-25 NOTE — ASSESSMENT
[FreeTextEntry1] : 1. Coronary artery disease. Continue current medications. There are no signs of angina.\par \par 2. Hypertension. Blood pressure is well-controlled.\par \par 3. Hyperlipidemia. Continue simvastatin. He checks his blood work with his primary medical physician.\par \par Followup in 6 months\par

## 2019-06-25 NOTE — PHYSICAL EXAM
[General Appearance - Well Developed] : well developed [Normal Appearance] : normal appearance [Well Groomed] : well groomed [General Appearance - Well Nourished] : well nourished [No Deformities] : no deformities [General Appearance - In No Acute Distress] : no acute distress [Normal Conjunctiva] : the conjunctiva exhibited no abnormalities [Eyelids - No Xanthelasma] : the eyelids demonstrated no xanthelasmas [Normal Oral Mucosa] : normal oral mucosa [No Oral Pallor] : no oral pallor [No Oral Cyanosis] : no oral cyanosis [Normal Jugular Venous A Waves Present] : normal jugular venous A waves present [Normal Jugular Venous V Waves Present] : normal jugular venous V waves present [No Jugular Venous Billings A Waves] : no jugular venous billings A waves [Respiration, Rhythm And Depth] : normal respiratory rhythm and effort [Auscultation Breath Sounds / Voice Sounds] : lungs were clear to auscultation bilaterally [Exaggerated Use Of Accessory Muscles For Inspiration] : no accessory muscle use [Heart Sounds] : normal S1 and S2 [Murmurs] : no murmurs present [Heart Rate And Rhythm] : heart rate and rhythm were normal [Abdomen Soft] : soft [Abdomen Tenderness] : non-tender [Abdomen Mass (___ Cm)] : no abdominal mass palpated [Gait - Sufficient For Exercise Testing] : the gait was sufficient for exercise testing [Abnormal Walk] : normal gait [Cyanosis, Localized] : no localized cyanosis [Nail Clubbing] : no clubbing of the fingernails [Petechial Hemorrhages (___cm)] : no petechial hemorrhages [Skin Color & Pigmentation] : normal skin color and pigmentation [No Venous Stasis] : no venous stasis [] : no rash [Skin Lesions] : no skin lesions [No Skin Ulcers] : no skin ulcer [No Xanthoma] : no  xanthoma was observed [FreeTextEntry1] : healing midline sternotomy wound [Affect] : the affect was normal [Oriented To Time, Place, And Person] : oriented to person, place, and time [Mood] : the mood was normal [No Anxiety] : not feeling anxious

## 2019-06-25 NOTE — REASON FOR VISIT
[Follow-Up - Clinic] : a clinic follow-up of [Coronary Artery Disease] : coronary artery disease [Hyperlipidemia] : hyperlipidemia [FreeTextEntry1] : Since his last visit, he has been doing well. He has had no chest pain or shortness of breath.\par \par 1. Coronary artery disease. Continue current medications. There are no signs of angina.\par \par 2. Hypertension. Blood pressure is well-controlled.\par \par 3. Hyperlipidemia. Continue simvastatin. He checks his blood work with his primary medical physician.\par \par Followup in 6 months\par  [Hypertension] : hypertension

## 2019-07-30 ENCOUNTER — APPOINTMENT (OUTPATIENT)
Dept: GASTROENTEROLOGY | Facility: CLINIC | Age: 69
End: 2019-07-30
Payer: MEDICAID

## 2019-07-30 VITALS
SYSTOLIC BLOOD PRESSURE: 130 MMHG | OXYGEN SATURATION: 99 % | DIASTOLIC BLOOD PRESSURE: 70 MMHG | TEMPERATURE: 98 F | HEIGHT: 67 IN | BODY MASS INDEX: 25.11 KG/M2 | WEIGHT: 160 LBS | HEART RATE: 80 BPM

## 2019-07-30 VITALS — WEIGHT: 175 LBS | BODY MASS INDEX: 27.41 KG/M2

## 2019-07-30 PROCEDURE — 99214 OFFICE O/P EST MOD 30 MIN: CPT

## 2019-07-30 NOTE — HISTORY OF PRESENT ILLNESS
[de-identified] : 68 year old man with chronic constipation.  He also complains of RUQ pain and bloating. He is taking, MiraLAX, Senna and lactulose with no relief. He has failed linzess. He denies rectal bleeding, melena or hematemesis. He has stable CAD and is s/p coronary bypass.

## 2019-07-30 NOTE — PHYSICAL EXAM
[General Appearance - Alert] : alert [General Appearance - In No Acute Distress] : in no acute distress [Neck Cervical Mass (___cm)] : no neck mass was observed [Neck Appearance] : the appearance of the neck was normal [Thyroid Nodule] : there were no palpable thyroid nodules [Thyroid Diffuse Enlargement] : the thyroid was not enlarged [Jugular Venous Distention Increased] : there was no jugular-venous distention [Auscultation Breath Sounds / Voice Sounds] : lungs were clear to auscultation bilaterally [Heart Rate And Rhythm] : heart rate was normal and rhythm regular [Heart Sounds] : normal S1 and S2 [Heart Sounds Gallop] : no gallops [Murmurs] : no murmurs [Abdomen Soft] : soft [Bowel Sounds] : normal bowel sounds [Heart Sounds Pericardial Friction Rub] : no pericardial rub [] : no hepato-splenomegaly [Abdomen Tenderness] : non-tender [Cervical Lymph Nodes Enlarged Anterior Bilaterally] : anterior cervical [Cervical Lymph Nodes Enlarged Posterior Bilaterally] : posterior cervical [Abdomen Mass (___ Cm)] : no abdominal mass palpated [Supraclavicular Lymph Nodes Enlarged Bilaterally] : supraclavicular [No CVA Tenderness] : no ~M costovertebral angle tenderness [No Spinal Tenderness] : no spinal tenderness

## 2019-08-07 ENCOUNTER — FORM ENCOUNTER (OUTPATIENT)
Age: 69
End: 2019-08-07

## 2019-08-08 ENCOUNTER — APPOINTMENT (OUTPATIENT)
Dept: ULTRASOUND IMAGING | Facility: IMAGING CENTER | Age: 69
End: 2019-08-08
Payer: MEDICAID

## 2019-08-08 ENCOUNTER — OUTPATIENT (OUTPATIENT)
Dept: OUTPATIENT SERVICES | Facility: HOSPITAL | Age: 69
LOS: 1 days | End: 2019-08-08
Payer: MEDICAID

## 2019-08-08 DIAGNOSIS — Z95.5 PRESENCE OF CORONARY ANGIOPLASTY IMPLANT AND GRAFT: Chronic | ICD-10-CM

## 2019-08-08 DIAGNOSIS — R10.11 RIGHT UPPER QUADRANT PAIN: ICD-10-CM

## 2019-08-08 PROCEDURE — 76700 US EXAM ABDOM COMPLETE: CPT

## 2019-08-08 PROCEDURE — 76700 US EXAM ABDOM COMPLETE: CPT | Mod: 26

## 2019-08-13 ENCOUNTER — APPOINTMENT (OUTPATIENT)
Dept: GASTROENTEROLOGY | Facility: CLINIC | Age: 69
End: 2019-08-13
Payer: MEDICAID

## 2019-08-13 VITALS
TEMPERATURE: 98.4 F | SYSTOLIC BLOOD PRESSURE: 120 MMHG | OXYGEN SATURATION: 97 % | DIASTOLIC BLOOD PRESSURE: 70 MMHG | BODY MASS INDEX: 27.62 KG/M2 | RESPIRATION RATE: 16 BRPM | WEIGHT: 176 LBS | HEIGHT: 67 IN | HEART RATE: 66 BPM

## 2019-08-13 PROCEDURE — 99213 OFFICE O/P EST LOW 20 MIN: CPT

## 2019-08-13 RX ORDER — SENNOSIDES 8.6 MG TABLETS 8.6 MG/1
8.6 TABLET ORAL 3 TIMES DAILY
Qty: 270 | Refills: 1 | Status: DISCONTINUED | COMMUNITY
Start: 2017-01-25 | End: 2019-08-13

## 2019-08-13 NOTE — HISTORY OF PRESENT ILLNESS
[de-identified] : 69 year old man with constipation. He has no results with Truncal. He complains of the stools being hard. He takes Lactulose. He denies rectal bleeding, melena or hematemesis. abdominal sonogram is negative.

## 2019-08-13 NOTE — PHYSICAL EXAM
[General Appearance - Alert] : alert [General Appearance - In No Acute Distress] : in no acute distress [Auscultation Breath Sounds / Voice Sounds] : lungs were clear to auscultation bilaterally [Heart Rate And Rhythm] : heart rate was normal and rhythm regular [Murmurs] : no murmurs [Heart Sounds] : normal S1 and S2 [Heart Sounds Gallop] : no gallops [Heart Sounds Pericardial Friction Rub] : no pericardial rub [Bowel Sounds] : normal bowel sounds [Abdomen Soft] : soft [Abdomen Tenderness] : non-tender [] : no hepato-splenomegaly [Cervical Lymph Nodes Enlarged Posterior Bilaterally] : posterior cervical [Abdomen Mass (___ Cm)] : no abdominal mass palpated [Supraclavicular Lymph Nodes Enlarged Bilaterally] : supraclavicular [Cervical Lymph Nodes Enlarged Anterior Bilaterally] : anterior cervical [No Spinal Tenderness] : no spinal tenderness [No CVA Tenderness] : no ~M costovertebral angle tenderness

## 2019-09-10 ENCOUNTER — APPOINTMENT (OUTPATIENT)
Dept: GASTROENTEROLOGY | Facility: CLINIC | Age: 69
End: 2019-09-10
Payer: MEDICAID

## 2019-09-10 VITALS
DIASTOLIC BLOOD PRESSURE: 70 MMHG | HEIGHT: 67 IN | SYSTOLIC BLOOD PRESSURE: 130 MMHG | HEART RATE: 66 BPM | WEIGHT: 180 LBS | OXYGEN SATURATION: 96 % | TEMPERATURE: 97.5 F | BODY MASS INDEX: 28.25 KG/M2

## 2019-09-10 PROCEDURE — 99213 OFFICE O/P EST LOW 20 MIN: CPT

## 2019-09-10 RX ORDER — LORATADINE 5 MG
17 TABLET,CHEWABLE ORAL DAILY
Qty: 1 | Refills: 0 | Status: ACTIVE | COMMUNITY
Start: 2019-09-10 | End: 1900-01-01

## 2019-09-10 NOTE — PHYSICAL EXAM
[General Appearance - Alert] : alert [General Appearance - In No Acute Distress] : in no acute distress [Neck Appearance] : the appearance of the neck was normal [Thyroid Diffuse Enlargement] : the thyroid was not enlarged [Jugular Venous Distention Increased] : there was no jugular-venous distention [Neck Cervical Mass (___cm)] : no neck mass was observed [Thyroid Nodule] : there were no palpable thyroid nodules [Heart Rate And Rhythm] : heart rate was normal and rhythm regular [Auscultation Breath Sounds / Voice Sounds] : lungs were clear to auscultation bilaterally [Heart Sounds] : normal S1 and S2 [Murmurs] : no murmurs [Heart Sounds Gallop] : no gallops [Heart Sounds Pericardial Friction Rub] : no pericardial rub [Breast Palpation Mass] : no palpable masses [Breast Appearance] : normal in appearance [Abdomen Tenderness] : non-tender [Bowel Sounds] : normal bowel sounds [Abdomen Soft] : soft [Abdomen Mass (___ Cm)] : no abdominal mass palpated [] : no hepato-splenomegaly [Cervical Lymph Nodes Enlarged Posterior Bilaterally] : posterior cervical [Supraclavicular Lymph Nodes Enlarged Bilaterally] : supraclavicular [Cervical Lymph Nodes Enlarged Anterior Bilaterally] : anterior cervical [No CVA Tenderness] : no ~M costovertebral angle tenderness [No Spinal Tenderness] : no spinal tenderness

## 2019-09-10 NOTE — PHYSICAL EXAM
[General Appearance - Alert] : alert [General Appearance - In No Acute Distress] : in no acute distress [Neck Appearance] : the appearance of the neck was normal [Thyroid Diffuse Enlargement] : the thyroid was not enlarged [Neck Cervical Mass (___cm)] : no neck mass was observed [Jugular Venous Distention Increased] : there was no jugular-venous distention [Thyroid Nodule] : there were no palpable thyroid nodules [Heart Rate And Rhythm] : heart rate was normal and rhythm regular [Auscultation Breath Sounds / Voice Sounds] : lungs were clear to auscultation bilaterally [Heart Sounds] : normal S1 and S2 [Murmurs] : no murmurs [Heart Sounds Gallop] : no gallops [Heart Sounds Pericardial Friction Rub] : no pericardial rub [Breast Appearance] : normal in appearance [Breast Palpation Mass] : no palpable masses [Abdomen Soft] : soft [Bowel Sounds] : normal bowel sounds [Abdomen Tenderness] : non-tender [Abdomen Mass (___ Cm)] : no abdominal mass palpated [] : no hepato-splenomegaly [Cervical Lymph Nodes Enlarged Posterior Bilaterally] : posterior cervical [Supraclavicular Lymph Nodes Enlarged Bilaterally] : supraclavicular [Cervical Lymph Nodes Enlarged Anterior Bilaterally] : anterior cervical [No CVA Tenderness] : no ~M costovertebral angle tenderness [No Spinal Tenderness] : no spinal tenderness

## 2019-09-10 NOTE — HISTORY OF PRESENT ILLNESS
[de-identified] : 69 year old man with chronic constipation. he had no relief with Amitiza but he only took it once/day. He has not been taking stool softeners.

## 2019-09-10 NOTE — HISTORY OF PRESENT ILLNESS
[de-identified] : 69 year old man with chronic constipation. he had no relief with Amitiza but he only took it once/day. He has not been taking stool softeners.

## 2019-10-04 ENCOUNTER — APPOINTMENT (OUTPATIENT)
Dept: GASTROENTEROLOGY | Facility: HOSPITAL | Age: 69
End: 2019-10-04

## 2019-12-03 ENCOUNTER — MEDICATION RENEWAL (OUTPATIENT)
Age: 69
End: 2019-12-03

## 2019-12-31 ENCOUNTER — APPOINTMENT (OUTPATIENT)
Dept: CARDIOLOGY | Facility: CLINIC | Age: 69
End: 2019-12-31

## 2020-01-01 ENCOUNTER — APPOINTMENT (OUTPATIENT)
Dept: CV DIAGNOSTICS | Facility: HOSPITAL | Age: 70
End: 2020-01-01
Payer: MEDICAID

## 2020-01-01 ENCOUNTER — APPOINTMENT (OUTPATIENT)
Dept: GASTROENTEROLOGY | Facility: CLINIC | Age: 70
End: 2020-01-01
Payer: MEDICAID

## 2020-01-01 ENCOUNTER — EMERGENCY (EMERGENCY)
Facility: HOSPITAL | Age: 70
LOS: 1 days | Discharge: HOME | End: 2020-01-01
Attending: EMERGENCY MEDICINE | Admitting: EMERGENCY MEDICINE
Payer: MEDICAID

## 2020-01-01 ENCOUNTER — APPOINTMENT (OUTPATIENT)
Dept: CARDIOLOGY | Facility: CLINIC | Age: 70
End: 2020-01-01
Payer: MEDICAID

## 2020-01-01 ENCOUNTER — TRANSCRIPTION ENCOUNTER (OUTPATIENT)
Age: 70
End: 2020-01-01

## 2020-01-01 ENCOUNTER — OUTPATIENT (OUTPATIENT)
Dept: OUTPATIENT SERVICES | Facility: HOSPITAL | Age: 70
LOS: 1 days | End: 2020-01-01

## 2020-01-01 ENCOUNTER — INPATIENT (INPATIENT)
Facility: HOSPITAL | Age: 70
LOS: 1 days | Discharge: HOME HEALTH | End: 2020-10-02
Attending: STUDENT IN AN ORGANIZED HEALTH CARE EDUCATION/TRAINING PROGRAM | Admitting: STUDENT IN AN ORGANIZED HEALTH CARE EDUCATION/TRAINING PROGRAM
Payer: MEDICAID

## 2020-01-01 ENCOUNTER — RX RENEWAL (OUTPATIENT)
Age: 70
End: 2020-01-01

## 2020-01-01 ENCOUNTER — NON-APPOINTMENT (OUTPATIENT)
Age: 70
End: 2020-01-01

## 2020-01-01 ENCOUNTER — APPOINTMENT (OUTPATIENT)
Dept: NEUROLOGY | Facility: CLINIC | Age: 70
End: 2020-01-01

## 2020-01-01 ENCOUNTER — APPOINTMENT (OUTPATIENT)
Dept: NEUROLOGY | Facility: CLINIC | Age: 70
End: 2020-01-01
Payer: MEDICAID

## 2020-01-01 ENCOUNTER — APPOINTMENT (OUTPATIENT)
Dept: CARDIOLOGY | Facility: CLINIC | Age: 70
End: 2020-01-01

## 2020-01-01 ENCOUNTER — APPOINTMENT (OUTPATIENT)
Dept: GASTROENTEROLOGY | Facility: HOSPITAL | Age: 70
End: 2020-01-01
Payer: MEDICAID

## 2020-01-01 ENCOUNTER — OUTPATIENT (OUTPATIENT)
Dept: OUTPATIENT SERVICES | Facility: HOSPITAL | Age: 70
LOS: 1 days | Discharge: HOME | End: 2020-01-01

## 2020-01-01 VITALS
OXYGEN SATURATION: 100 % | HEIGHT: 67 IN | WEIGHT: 165 LBS | SYSTOLIC BLOOD PRESSURE: 157 MMHG | TEMPERATURE: 96.8 F | HEART RATE: 60 BPM | BODY MASS INDEX: 25.9 KG/M2 | DIASTOLIC BLOOD PRESSURE: 68 MMHG

## 2020-01-01 VITALS
RESPIRATION RATE: 21 BRPM | HEART RATE: 73 BPM | WEIGHT: 164.91 LBS | TEMPERATURE: 98 F | OXYGEN SATURATION: 99 % | HEIGHT: 67 IN | SYSTOLIC BLOOD PRESSURE: 156 MMHG | DIASTOLIC BLOOD PRESSURE: 56 MMHG

## 2020-01-01 VITALS
HEART RATE: 68 BPM | WEIGHT: 165 LBS | BODY MASS INDEX: 25.9 KG/M2 | DIASTOLIC BLOOD PRESSURE: 59 MMHG | SYSTOLIC BLOOD PRESSURE: 126 MMHG | HEIGHT: 67 IN | TEMPERATURE: 97.3 F

## 2020-01-01 VITALS
HEART RATE: 83 BPM | RESPIRATION RATE: 17 BRPM | DIASTOLIC BLOOD PRESSURE: 62 MMHG | OXYGEN SATURATION: 98 % | SYSTOLIC BLOOD PRESSURE: 150 MMHG

## 2020-01-01 VITALS
TEMPERATURE: 97 F | RESPIRATION RATE: 14 BRPM | HEART RATE: 75 BPM | DIASTOLIC BLOOD PRESSURE: 57 MMHG | OXYGEN SATURATION: 99 % | SYSTOLIC BLOOD PRESSURE: 144 MMHG

## 2020-01-01 VITALS
WEIGHT: 170 LBS | TEMPERATURE: 97.2 F | HEIGHT: 67 IN | BODY MASS INDEX: 26.68 KG/M2 | DIASTOLIC BLOOD PRESSURE: 63 MMHG | SYSTOLIC BLOOD PRESSURE: 144 MMHG | OXYGEN SATURATION: 98 % | HEART RATE: 56 BPM

## 2020-01-01 VITALS
HEIGHT: 67 IN | HEART RATE: 66 BPM | SYSTOLIC BLOOD PRESSURE: 147 MMHG | BODY MASS INDEX: 25.9 KG/M2 | DIASTOLIC BLOOD PRESSURE: 72 MMHG | WEIGHT: 165 LBS

## 2020-01-01 VITALS
TEMPERATURE: 97.6 F | HEIGHT: 67 IN | BODY MASS INDEX: 26.68 KG/M2 | HEART RATE: 64 BPM | OXYGEN SATURATION: 98 % | WEIGHT: 170 LBS | DIASTOLIC BLOOD PRESSURE: 70 MMHG | SYSTOLIC BLOOD PRESSURE: 153 MMHG

## 2020-01-01 VITALS
DIASTOLIC BLOOD PRESSURE: 69 MMHG | SYSTOLIC BLOOD PRESSURE: 157 MMHG | WEIGHT: 165 LBS | HEIGHT: 67 IN | BODY MASS INDEX: 25.9 KG/M2 | HEART RATE: 62 BPM

## 2020-01-01 VITALS
OXYGEN SATURATION: 98 % | WEIGHT: 169 LBS | SYSTOLIC BLOOD PRESSURE: 130 MMHG | BODY MASS INDEX: 26.53 KG/M2 | DIASTOLIC BLOOD PRESSURE: 70 MMHG | HEIGHT: 67 IN | TEMPERATURE: 98 F | HEART RATE: 68 BPM

## 2020-01-01 VITALS
SYSTOLIC BLOOD PRESSURE: 167 MMHG | HEIGHT: 67 IN | OXYGEN SATURATION: 100 % | HEART RATE: 78 BPM | RESPIRATION RATE: 16 BRPM | DIASTOLIC BLOOD PRESSURE: 68 MMHG | TEMPERATURE: 98 F

## 2020-01-01 VITALS
SYSTOLIC BLOOD PRESSURE: 137 MMHG | OXYGEN SATURATION: 100 % | DIASTOLIC BLOOD PRESSURE: 57 MMHG | TEMPERATURE: 98 F | HEART RATE: 60 BPM | RESPIRATION RATE: 19 BRPM

## 2020-01-01 VITALS — SYSTOLIC BLOOD PRESSURE: 136 MMHG | DIASTOLIC BLOOD PRESSURE: 70 MMHG | HEART RATE: 64 BPM

## 2020-01-01 VITALS — TEMPERATURE: 95.3 F

## 2020-01-01 VITALS — TEMPERATURE: 95.7 F

## 2020-01-01 DIAGNOSIS — I10 ESSENTIAL (PRIMARY) HYPERTENSION: ICD-10-CM

## 2020-01-01 DIAGNOSIS — K58.8 OTHER IRRITABLE BOWEL SYNDROME: ICD-10-CM

## 2020-01-01 DIAGNOSIS — Z95.5 PRESENCE OF CORONARY ANGIOPLASTY IMPLANT AND GRAFT: Chronic | ICD-10-CM

## 2020-01-01 DIAGNOSIS — E87.1 HYPO-OSMOLALITY AND HYPONATREMIA: ICD-10-CM

## 2020-01-01 DIAGNOSIS — R07.9 CHEST PAIN, UNSPECIFIED: ICD-10-CM

## 2020-01-01 DIAGNOSIS — K59.00 CONSTIPATION, UNSPECIFIED: ICD-10-CM

## 2020-01-01 DIAGNOSIS — E78.5 HYPERLIPIDEMIA, UNSPECIFIED: ICD-10-CM

## 2020-01-01 DIAGNOSIS — K59.09 OTHER CONSTIPATION: ICD-10-CM

## 2020-01-01 DIAGNOSIS — E78.49 OTHER HYPERLIPIDEMIA: ICD-10-CM

## 2020-01-01 DIAGNOSIS — E11.9 TYPE 2 DIABETES MELLITUS WITHOUT COMPLICATIONS: ICD-10-CM

## 2020-01-01 DIAGNOSIS — K58.9 IRRITABLE BOWEL SYNDROME WITHOUT DIARRHEA: ICD-10-CM

## 2020-01-01 DIAGNOSIS — R10.11 RIGHT UPPER QUADRANT PAIN: ICD-10-CM

## 2020-01-01 DIAGNOSIS — Z02.9 ENCOUNTER FOR ADMINISTRATIVE EXAMINATIONS, UNSPECIFIED: ICD-10-CM

## 2020-01-01 DIAGNOSIS — E11.9 TYPE 2 DIABETES MELLITUS W/OUT COMPLICATIONS: ICD-10-CM

## 2020-01-01 DIAGNOSIS — Z79.899 OTHER LONG TERM (CURRENT) DRUG THERAPY: ICD-10-CM

## 2020-01-01 DIAGNOSIS — I25.10 ATHEROSCLEROTIC HEART DISEASE OF NATIVE CORONARY ARTERY WITHOUT ANGINA PECTORIS: ICD-10-CM

## 2020-01-01 DIAGNOSIS — Z29.9 ENCOUNTER FOR PROPHYLACTIC MEASURES, UNSPECIFIED: ICD-10-CM

## 2020-01-01 DIAGNOSIS — K58.1 IRRITABLE BOWEL SYNDROME WITH CONSTIPATION: ICD-10-CM

## 2020-01-01 DIAGNOSIS — I20.8 OTHER FORMS OF ANGINA PECTORIS: ICD-10-CM

## 2020-01-01 DIAGNOSIS — E11.65 TYPE 2 DIABETES MELLITUS WITH HYPERGLYCEMIA: ICD-10-CM

## 2020-01-01 LAB
ALBUMIN SERPL ELPH-MCNC: 4.1 G/DL — SIGNIFICANT CHANGE UP (ref 3.3–5)
ALP SERPL-CCNC: 99 U/L — SIGNIFICANT CHANGE UP (ref 40–120)
ALT FLD-CCNC: 17 U/L — SIGNIFICANT CHANGE UP (ref 4–41)
ANION GAP SERPL CALC-SCNC: 11 MMO/L — SIGNIFICANT CHANGE UP (ref 7–14)
ANION GAP SERPL CALC-SCNC: 13 MMO/L — SIGNIFICANT CHANGE UP (ref 7–14)
ANION GAP SERPL CALC-SCNC: 15 MMO/L — HIGH (ref 7–14)
APPEARANCE UR: CLEAR — SIGNIFICANT CHANGE UP
APTT BLD: 30.5 SEC — SIGNIFICANT CHANGE UP (ref 27–36.3)
AST SERPL-CCNC: 16 U/L — SIGNIFICANT CHANGE UP (ref 4–40)
BASE EXCESS BLDV CALC-SCNC: 4.4 MMOL/L — SIGNIFICANT CHANGE UP
BASOPHILS # BLD AUTO: 0.02 K/UL — SIGNIFICANT CHANGE UP (ref 0–0.2)
BASOPHILS # BLD AUTO: 0.04 K/UL — SIGNIFICANT CHANGE UP (ref 0–0.2)
BASOPHILS NFR BLD AUTO: 0.2 % — SIGNIFICANT CHANGE UP (ref 0–2)
BASOPHILS NFR BLD AUTO: 0.5 % — SIGNIFICANT CHANGE UP (ref 0–2)
BILIRUB SERPL-MCNC: 0.4 MG/DL — SIGNIFICANT CHANGE UP (ref 0.2–1.2)
BILIRUB UR-MCNC: NEGATIVE — SIGNIFICANT CHANGE UP
BLOOD GAS VENOUS - CREATININE: 0.96 MG/DL — SIGNIFICANT CHANGE UP (ref 0.5–1.3)
BLOOD GAS VENOUS - FIO2: 21 — SIGNIFICANT CHANGE UP
BLOOD UR QL VISUAL: NEGATIVE — SIGNIFICANT CHANGE UP
BUN SERPL-MCNC: 13 MG/DL — SIGNIFICANT CHANGE UP (ref 7–23)
BUN SERPL-MCNC: 15 MG/DL — SIGNIFICANT CHANGE UP (ref 7–23)
BUN SERPL-MCNC: 15 MG/DL — SIGNIFICANT CHANGE UP (ref 7–23)
CALCIUM SERPL-MCNC: 9.1 MG/DL — SIGNIFICANT CHANGE UP (ref 8.4–10.5)
CALCIUM SERPL-MCNC: 9.5 MG/DL — SIGNIFICANT CHANGE UP (ref 8.4–10.5)
CALCIUM SERPL-MCNC: 9.6 MG/DL — SIGNIFICANT CHANGE UP (ref 8.4–10.5)
CHLORIDE BLDV-SCNC: 95 MMOL/L — LOW (ref 96–108)
CHLORIDE SERPL-SCNC: 90 MMOL/L — LOW (ref 98–107)
CHLORIDE SERPL-SCNC: 92 MMOL/L — LOW (ref 98–107)
CHLORIDE SERPL-SCNC: 97 MMOL/L — LOW (ref 98–107)
CHOLEST SERPL-MCNC: 115 MG/DL — LOW (ref 120–199)
CK MB BLD-MCNC: 10.15 NG/ML — HIGH (ref 1–6.6)
CK MB BLD-MCNC: 3.2 — HIGH (ref 0–2.5)
CK MB BLD-MCNC: 3.6 — HIGH (ref 0–2.5)
CK MB BLD-MCNC: 3.6 — HIGH (ref 0–2.5)
CK MB BLD-MCNC: 7.12 NG/ML — HIGH (ref 1–6.6)
CK MB BLD-MCNC: 7.82 NG/ML — HIGH (ref 1–6.6)
CK MB BLD-MCNC: 8.62 NG/ML — HIGH (ref 1–6.6)
CK SERPL-CCNC: 199 U/L — SIGNIFICANT CHANGE UP (ref 30–200)
CK SERPL-CCNC: 240 U/L — HIGH (ref 30–200)
CK SERPL-CCNC: 248 U/L — HIGH (ref 30–200)
CK SERPL-CCNC: 279 U/L — HIGH (ref 30–200)
CO2 SERPL-SCNC: 21 MMOL/L — LOW (ref 22–31)
CO2 SERPL-SCNC: 23 MMOL/L — SIGNIFICANT CHANGE UP (ref 22–31)
CO2 SERPL-SCNC: 26 MMOL/L — SIGNIFICANT CHANGE UP (ref 22–31)
COLOR SPEC: COLORLESS — SIGNIFICANT CHANGE UP
CREAT SERPL-MCNC: 0.83 MG/DL — SIGNIFICANT CHANGE UP (ref 0.5–1.3)
CREAT SERPL-MCNC: 0.86 MG/DL — SIGNIFICANT CHANGE UP (ref 0.5–1.3)
CREAT SERPL-MCNC: 0.94 MG/DL — SIGNIFICANT CHANGE UP (ref 0.5–1.3)
EOSINOPHIL # BLD AUTO: 0.06 K/UL — SIGNIFICANT CHANGE UP (ref 0–0.5)
EOSINOPHIL # BLD AUTO: 0.11 K/UL — SIGNIFICANT CHANGE UP (ref 0–0.5)
EOSINOPHIL NFR BLD AUTO: 0.8 % — SIGNIFICANT CHANGE UP (ref 0–6)
EOSINOPHIL NFR BLD AUTO: 1.2 % — SIGNIFICANT CHANGE UP (ref 0–6)
FOLATE SERPL-MCNC: 10.1 NG/ML — SIGNIFICANT CHANGE UP (ref 4.7–20)
GAS PNL BLDV: 125 MMOL/L — LOW (ref 136–146)
GLUCOSE BLDC GLUCOMTR-MCNC: 170 MG/DL — HIGH (ref 70–99)
GLUCOSE BLDC GLUCOMTR-MCNC: 220 MG/DL — HIGH (ref 70–99)
GLUCOSE BLDC GLUCOMTR-MCNC: 339 MG/DL — HIGH (ref 70–99)
GLUCOSE BLDC GLUCOMTR-MCNC: 367 MG/DL — HIGH (ref 70–99)
GLUCOSE BLDC GLUCOMTR-MCNC: 387 MG/DL — HIGH (ref 70–99)
GLUCOSE BLDC GLUCOMTR-MCNC: 435 MG/DL — HIGH (ref 70–99)
GLUCOSE BLDC GLUCOMTR-MCNC: 482 MG/DL — CRITICAL HIGH (ref 70–99)
GLUCOSE BLDV-MCNC: 459 MG/DL — CRITICAL HIGH (ref 70–99)
GLUCOSE SERPL-MCNC: 208 MG/DL — HIGH (ref 70–99)
GLUCOSE SERPL-MCNC: 239 MG/DL — HIGH (ref 70–99)
GLUCOSE SERPL-MCNC: 479 MG/DL — CRITICAL HIGH (ref 70–99)
GLUCOSE UR-MCNC: >1000 — HIGH
HAV IGM SER-ACNC: NONREACTIVE — SIGNIFICANT CHANGE UP
HBA1C BLD-MCNC: 9.7 % — HIGH (ref 4–5.6)
HBV CORE IGM SER-ACNC: NONREACTIVE — SIGNIFICANT CHANGE UP
HBV SURFACE AG SER-ACNC: NONREACTIVE — SIGNIFICANT CHANGE UP
HCO3 BLDV-SCNC: 26 MMOL/L — SIGNIFICANT CHANGE UP (ref 20–27)
HCT VFR BLD CALC: 32.4 % — LOW (ref 39–50)
HCT VFR BLD CALC: 33.7 % — LOW (ref 39–50)
HCT VFR BLD CALC: 35.7 % — LOW (ref 39–50)
HCT VFR BLDV CALC: 36.8 % — LOW (ref 39–51)
HCV AB S/CO SERPL IA: 0.1 S/CO — SIGNIFICANT CHANGE UP (ref 0–0.99)
HCV AB SERPL-IMP: SIGNIFICANT CHANGE UP
HDLC SERPL-MCNC: 47 MG/DL — SIGNIFICANT CHANGE UP (ref 35–55)
HGB BLD-MCNC: 10.7 G/DL — LOW (ref 13–17)
HGB BLD-MCNC: 11.1 G/DL — LOW (ref 13–17)
HGB BLD-MCNC: 11.5 G/DL — LOW (ref 13–17)
HGB BLDV-MCNC: 12 G/DL — LOW (ref 13–17)
IMM GRANULOCYTES NFR BLD AUTO: 0.3 % — SIGNIFICANT CHANGE UP (ref 0–1.5)
IMM GRANULOCYTES NFR BLD AUTO: 0.4 % — SIGNIFICANT CHANGE UP (ref 0–1.5)
INR BLD: 0.97 — SIGNIFICANT CHANGE UP (ref 0.88–1.16)
IRON SATN MFR SERPL: 272 UG/DL — SIGNIFICANT CHANGE UP (ref 155–535)
IRON SATN MFR SERPL: 65 UG/DL — SIGNIFICANT CHANGE UP (ref 45–165)
KETONES UR-MCNC: NEGATIVE — SIGNIFICANT CHANGE UP
LACTATE BLDV-MCNC: 1.6 MMOL/L — SIGNIFICANT CHANGE UP (ref 0.5–2)
LEUKOCYTE ESTERASE UR-ACNC: NEGATIVE — SIGNIFICANT CHANGE UP
LIPID PNL WITH DIRECT LDL SERPL: 58 MG/DL — SIGNIFICANT CHANGE UP
LYMPHOCYTES # BLD AUTO: 1.68 K/UL — SIGNIFICANT CHANGE UP (ref 1–3.3)
LYMPHOCYTES # BLD AUTO: 2.01 K/UL — SIGNIFICANT CHANGE UP (ref 1–3.3)
LYMPHOCYTES # BLD AUTO: 21.5 % — SIGNIFICANT CHANGE UP (ref 13–44)
LYMPHOCYTES # BLD AUTO: 21.7 % — SIGNIFICANT CHANGE UP (ref 13–44)
MAGNESIUM SERPL-MCNC: 2 MG/DL — SIGNIFICANT CHANGE UP (ref 1.6–2.6)
MAGNESIUM SERPL-MCNC: 2.2 MG/DL — SIGNIFICANT CHANGE UP (ref 1.6–2.6)
MCHC RBC-ENTMCNC: 27 PG — SIGNIFICANT CHANGE UP (ref 27–34)
MCHC RBC-ENTMCNC: 27.3 PG — SIGNIFICANT CHANGE UP (ref 27–34)
MCHC RBC-ENTMCNC: 27.6 PG — SIGNIFICANT CHANGE UP (ref 27–34)
MCHC RBC-ENTMCNC: 32.2 % — SIGNIFICANT CHANGE UP (ref 32–36)
MCHC RBC-ENTMCNC: 32.9 % — SIGNIFICANT CHANGE UP (ref 32–36)
MCHC RBC-ENTMCNC: 33 % — SIGNIFICANT CHANGE UP (ref 32–36)
MCV RBC AUTO: 82.8 FL — SIGNIFICANT CHANGE UP (ref 80–100)
MCV RBC AUTO: 83.5 FL — SIGNIFICANT CHANGE UP (ref 80–100)
MCV RBC AUTO: 83.8 FL — SIGNIFICANT CHANGE UP (ref 80–100)
MONOCYTES # BLD AUTO: 0.58 K/UL — SIGNIFICANT CHANGE UP (ref 0–0.9)
MONOCYTES # BLD AUTO: 0.74 K/UL — SIGNIFICANT CHANGE UP (ref 0–0.9)
MONOCYTES NFR BLD AUTO: 7.4 % — SIGNIFICANT CHANGE UP (ref 2–14)
MONOCYTES NFR BLD AUTO: 8 % — SIGNIFICANT CHANGE UP (ref 2–14)
NEUTROPHILS # BLD AUTO: 5.43 K/UL — SIGNIFICANT CHANGE UP (ref 1.8–7.4)
NEUTROPHILS # BLD AUTO: 6.37 K/UL — SIGNIFICANT CHANGE UP (ref 1.8–7.4)
NEUTROPHILS NFR BLD AUTO: 68.6 % — SIGNIFICANT CHANGE UP (ref 43–77)
NEUTROPHILS NFR BLD AUTO: 69.4 % — SIGNIFICANT CHANGE UP (ref 43–77)
NITRITE UR-MCNC: NEGATIVE — SIGNIFICANT CHANGE UP
NRBC # FLD: 0 K/UL — SIGNIFICANT CHANGE UP (ref 0–0)
PCO2 BLDV: 49 MMHG — SIGNIFICANT CHANGE UP (ref 41–51)
PH BLDV: 7.39 PH — SIGNIFICANT CHANGE UP (ref 7.32–7.43)
PH UR: 6 — SIGNIFICANT CHANGE UP (ref 5–8)
PHOSPHATE SERPL-MCNC: 2.9 MG/DL — SIGNIFICANT CHANGE UP (ref 2.5–4.5)
PLATELET # BLD AUTO: 331 K/UL — SIGNIFICANT CHANGE UP (ref 150–400)
PLATELET # BLD AUTO: 333 K/UL — SIGNIFICANT CHANGE UP (ref 150–400)
PLATELET # BLD AUTO: 341 K/UL — SIGNIFICANT CHANGE UP (ref 150–400)
PMV BLD: 9.5 FL — SIGNIFICANT CHANGE UP (ref 7–13)
PMV BLD: 9.7 FL — SIGNIFICANT CHANGE UP (ref 7–13)
PMV BLD: SIGNIFICANT CHANGE UP FL (ref 7–13)
PO2 BLDV: 21 MMHG — LOW (ref 35–40)
POTASSIUM BLDV-SCNC: 4.2 MMOL/L — SIGNIFICANT CHANGE UP (ref 3.4–4.5)
POTASSIUM SERPL-MCNC: 3.9 MMOL/L — SIGNIFICANT CHANGE UP (ref 3.5–5.3)
POTASSIUM SERPL-MCNC: 4.3 MMOL/L — SIGNIFICANT CHANGE UP (ref 3.5–5.3)
POTASSIUM SERPL-MCNC: 4.4 MMOL/L — SIGNIFICANT CHANGE UP (ref 3.5–5.3)
POTASSIUM SERPL-SCNC: 3.9 MMOL/L — SIGNIFICANT CHANGE UP (ref 3.5–5.3)
POTASSIUM SERPL-SCNC: 4.3 MMOL/L — SIGNIFICANT CHANGE UP (ref 3.5–5.3)
POTASSIUM SERPL-SCNC: 4.4 MMOL/L — SIGNIFICANT CHANGE UP (ref 3.5–5.3)
PROT SERPL-MCNC: 7.2 G/DL — SIGNIFICANT CHANGE UP (ref 6–8.3)
PROT UR-MCNC: NEGATIVE — SIGNIFICANT CHANGE UP
PROTHROM AB SERPL-ACNC: 11.1 SEC — SIGNIFICANT CHANGE UP (ref 10.6–13.6)
RBC # BLD: 3.88 M/UL — LOW (ref 4.2–5.8)
RBC # BLD: 4.07 M/UL — LOW (ref 4.2–5.8)
RBC # BLD: 4.26 M/UL — SIGNIFICANT CHANGE UP (ref 4.2–5.8)
RBC # FLD: 13.7 % — SIGNIFICANT CHANGE UP (ref 10.3–14.5)
RBC # FLD: 13.7 % — SIGNIFICANT CHANGE UP (ref 10.3–14.5)
RBC # FLD: 13.8 % — SIGNIFICANT CHANGE UP (ref 10.3–14.5)
SAO2 % BLDV: 27.3 % — LOW (ref 60–85)
SARS-COV-2 RNA SPEC QL NAA+PROBE: SIGNIFICANT CHANGE UP
SODIUM SERPL-SCNC: 127 MMOL/L — LOW (ref 135–145)
SODIUM SERPL-SCNC: 128 MMOL/L — LOW (ref 135–145)
SODIUM SERPL-SCNC: 133 MMOL/L — LOW (ref 135–145)
SP GR SPEC: 1.01 — SIGNIFICANT CHANGE UP (ref 1–1.04)
T4 FREE SERPL-MCNC: 1.28 NG/DL — SIGNIFICANT CHANGE UP (ref 0.9–1.8)
TRIGL SERPL-MCNC: 69 MG/DL — SIGNIFICANT CHANGE UP (ref 10–149)
TROPONIN T, HIGH SENSITIVITY: 34 NG/L — SIGNIFICANT CHANGE UP (ref ?–14)
TROPONIN T, HIGH SENSITIVITY: 35 NG/L — SIGNIFICANT CHANGE UP (ref ?–14)
TROPONIN T, HIGH SENSITIVITY: 38 NG/L — SIGNIFICANT CHANGE UP (ref ?–14)
TROPONIN T, HIGH SENSITIVITY: 39 NG/L — SIGNIFICANT CHANGE UP (ref ?–14)
TROPONIN T, HIGH SENSITIVITY: 42 NG/L — SIGNIFICANT CHANGE UP (ref ?–14)
TSH SERPL-MCNC: 2 UIU/ML — SIGNIFICANT CHANGE UP (ref 0.27–4.2)
UIBC SERPL-MCNC: 206.8 UG/DL — SIGNIFICANT CHANGE UP (ref 110–370)
UROBILINOGEN FLD QL: NORMAL — SIGNIFICANT CHANGE UP
VIT B12 SERPL-MCNC: 606 PG/ML — SIGNIFICANT CHANGE UP (ref 200–900)
WBC # BLD: 7.82 K/UL — SIGNIFICANT CHANGE UP (ref 3.8–10.5)
WBC # BLD: 8.85 K/UL — SIGNIFICANT CHANGE UP (ref 3.8–10.5)
WBC # BLD: 9.28 K/UL — SIGNIFICANT CHANGE UP (ref 3.8–10.5)
WBC # FLD AUTO: 7.82 K/UL — SIGNIFICANT CHANGE UP (ref 3.8–10.5)
WBC # FLD AUTO: 8.85 K/UL — SIGNIFICANT CHANGE UP (ref 3.8–10.5)
WBC # FLD AUTO: 9.28 K/UL — SIGNIFICANT CHANGE UP (ref 3.8–10.5)

## 2020-01-01 PROCEDURE — 99232 SBSQ HOSP IP/OBS MODERATE 35: CPT

## 2020-01-01 PROCEDURE — 99072 ADDL SUPL MATRL&STAF TM PHE: CPT

## 2020-01-01 PROCEDURE — 93000 ELECTROCARDIOGRAM COMPLETE: CPT

## 2020-01-01 PROCEDURE — 99205 OFFICE O/P NEW HI 60 MIN: CPT

## 2020-01-01 PROCEDURE — 93306 TTE W/DOPPLER COMPLETE: CPT | Mod: 26

## 2020-01-01 PROCEDURE — 78452 HT MUSCLE IMAGE SPECT MULT: CPT | Mod: 26

## 2020-01-01 PROCEDURE — 99285 EMERGENCY DEPT VISIT HI MDM: CPT

## 2020-01-01 PROCEDURE — 99214 OFFICE O/P EST MOD 30 MIN: CPT

## 2020-01-01 PROCEDURE — 93018 CV STRESS TEST I&R ONLY: CPT | Mod: GC

## 2020-01-01 PROCEDURE — 45378 DIAGNOSTIC COLONOSCOPY: CPT | Mod: 33

## 2020-01-01 PROCEDURE — 99239 HOSP IP/OBS DSCHRG MGMT >30: CPT

## 2020-01-01 PROCEDURE — 99213 OFFICE O/P EST LOW 20 MIN: CPT | Mod: 95

## 2020-01-01 PROCEDURE — 99223 1ST HOSP IP/OBS HIGH 75: CPT

## 2020-01-01 PROCEDURE — 71046 X-RAY EXAM CHEST 2 VIEWS: CPT | Mod: 26

## 2020-01-01 PROCEDURE — 99213 OFFICE O/P EST LOW 20 MIN: CPT

## 2020-01-01 PROCEDURE — 99204 OFFICE O/P NEW MOD 45 MIN: CPT

## 2020-01-01 PROCEDURE — 93016 CV STRESS TEST SUPVJ ONLY: CPT | Mod: GC

## 2020-01-01 PROCEDURE — 99283 EMERGENCY DEPT VISIT LOW MDM: CPT

## 2020-01-01 PROCEDURE — 99223 1ST HOSP IP/OBS HIGH 75: CPT | Mod: GC

## 2020-01-01 RX ORDER — SODIUM CHLORIDE 9 MG/ML
3 INJECTION INTRAMUSCULAR; INTRAVENOUS; SUBCUTANEOUS EVERY 8 HOURS
Refills: 0 | Status: DISCONTINUED | OUTPATIENT
Start: 2020-01-01 | End: 2020-01-01

## 2020-01-01 RX ORDER — DOCUSATE SODIUM 100 MG/1
100 CAPSULE, LIQUID FILLED ORAL 3 TIMES DAILY
Qty: 1 | Refills: 3 | Status: ACTIVE | OUTPATIENT
Start: 2019-08-13

## 2020-01-01 RX ORDER — POLYETHYLENE GLYCOL 3350 17 G/17G
17 POWDER, FOR SOLUTION ORAL AT BEDTIME
Refills: 0 | Status: DISCONTINUED | OUTPATIENT
Start: 2020-01-01 | End: 2020-01-01

## 2020-01-01 RX ORDER — METOPROLOL TARTRATE 50 MG
25 TABLET ORAL
Refills: 0 | Status: DISCONTINUED | OUTPATIENT
Start: 2020-01-01 | End: 2020-01-01

## 2020-01-01 RX ORDER — METOPROLOL TARTRATE 50 MG
1 TABLET ORAL
Qty: 60 | Refills: 0
Start: 2020-01-01 | End: 2020-01-01

## 2020-01-01 RX ORDER — INSULIN LISPRO 100/ML
12 VIAL (ML) SUBCUTANEOUS
Refills: 0 | Status: DISCONTINUED | OUTPATIENT
Start: 2020-01-01 | End: 2020-01-01

## 2020-01-01 RX ORDER — DEXTROSE 50 % IN WATER 50 %
12.5 SYRINGE (ML) INTRAVENOUS ONCE
Refills: 0 | Status: DISCONTINUED | OUTPATIENT
Start: 2020-01-01 | End: 2020-01-01

## 2020-01-01 RX ORDER — POLYETHYLENE GLYCOL 3350 17 G/17G
17 POWDER, FOR SOLUTION ORAL
Qty: 238 | Refills: 0
Start: 2020-01-01 | End: 2020-01-01

## 2020-01-01 RX ORDER — INSULIN GLARGINE 100 [IU]/ML
30 INJECTION, SOLUTION SUBCUTANEOUS
Qty: 1 | Refills: 0
Start: 2020-01-01

## 2020-01-01 RX ORDER — INSULIN LISPRO 100/ML
12 VIAL (ML) SUBCUTANEOUS
Qty: 5 | Refills: 0
Start: 2020-01-01

## 2020-01-01 RX ORDER — INSULIN LISPRO 100/ML
20 VIAL (ML) SUBCUTANEOUS
Qty: 0 | Refills: 0 | DISCHARGE
Start: 2020-01-01

## 2020-01-01 RX ORDER — INSULIN GLARGINE 100 [IU]/ML
40 INJECTION, SOLUTION SUBCUTANEOUS
Qty: 0 | Refills: 0 | DISCHARGE

## 2020-01-01 RX ORDER — PLECANATIDE 3 MG/1
1 TABLET ORAL
Qty: 0 | Refills: 0 | DISCHARGE

## 2020-01-01 RX ORDER — SODIUM CHLORIDE 9 MG/ML
1000 INJECTION, SOLUTION INTRAVENOUS
Refills: 0 | Status: DISCONTINUED | OUTPATIENT
Start: 2020-01-01 | End: 2020-01-01

## 2020-01-01 RX ORDER — LUBIPROSTONE 24 UG/1
24 CAPSULE, GELATIN COATED ORAL TWICE DAILY
Qty: 60 | Refills: 5 | Status: DISCONTINUED | COMMUNITY
Start: 2019-08-13 | End: 2020-01-01

## 2020-01-01 RX ORDER — ATORVASTATIN CALCIUM 80 MG/1
1 TABLET, FILM COATED ORAL
Qty: 30 | Refills: 0
Start: 2020-01-01 | End: 2020-01-01

## 2020-01-01 RX ORDER — INSULIN GLARGINE 100 [IU]/ML
20 INJECTION, SOLUTION SUBCUTANEOUS ONCE
Refills: 0 | Status: COMPLETED | OUTPATIENT
Start: 2020-01-01 | End: 2020-01-01

## 2020-01-01 RX ORDER — INSULIN LISPRO 100/ML
VIAL (ML) SUBCUTANEOUS AT BEDTIME
Refills: 0 | Status: DISCONTINUED | OUTPATIENT
Start: 2020-01-01 | End: 2020-01-01

## 2020-01-01 RX ORDER — TRAZODONE HCL 50 MG
1 TABLET ORAL
Qty: 0 | Refills: 0 | DISCHARGE

## 2020-01-01 RX ORDER — INSULIN GLARGINE 100 [IU]/ML
30 INJECTION, SOLUTION SUBCUTANEOUS EVERY MORNING
Refills: 0 | Status: DISCONTINUED | OUTPATIENT
Start: 2020-01-01 | End: 2020-01-01

## 2020-01-01 RX ORDER — TRAZODONE HCL 50 MG
1 TABLET ORAL
Qty: 30 | Refills: 0
Start: 2020-01-01 | End: 2020-01-01

## 2020-01-01 RX ORDER — DEXTROSE 50 % IN WATER 50 %
25 SYRINGE (ML) INTRAVENOUS ONCE
Refills: 0 | Status: DISCONTINUED | OUTPATIENT
Start: 2020-01-01 | End: 2020-01-01

## 2020-01-01 RX ORDER — ASPIRIN/CALCIUM CARB/MAGNESIUM 324 MG
81 TABLET ORAL DAILY
Refills: 0 | Status: DISCONTINUED | OUTPATIENT
Start: 2020-01-01 | End: 2020-01-01

## 2020-01-01 RX ORDER — INSULIN LISPRO 100/ML
VIAL (ML) SUBCUTANEOUS
Refills: 0 | Status: DISCONTINUED | OUTPATIENT
Start: 2020-01-01 | End: 2020-01-01

## 2020-01-01 RX ORDER — TRAZODONE HCL 50 MG
50 TABLET ORAL AT BEDTIME
Refills: 0 | Status: DISCONTINUED | OUTPATIENT
Start: 2020-01-01 | End: 2020-01-01

## 2020-01-01 RX ORDER — INSULIN GLARGINE 100 [IU]/ML
20 INJECTION, SOLUTION SUBCUTANEOUS
Qty: 0 | Refills: 0 | DISCHARGE
Start: 2020-01-01

## 2020-01-01 RX ORDER — DEXTROSE 50 % IN WATER 50 %
15 SYRINGE (ML) INTRAVENOUS ONCE
Refills: 0 | Status: DISCONTINUED | OUTPATIENT
Start: 2020-01-01 | End: 2020-01-01

## 2020-01-01 RX ORDER — INSULIN LISPRO 100/ML
6 VIAL (ML) SUBCUTANEOUS ONCE
Refills: 0 | Status: COMPLETED | OUTPATIENT
Start: 2020-01-01 | End: 2020-01-01

## 2020-01-01 RX ORDER — LACTULOSE 10 G/15ML
20 SOLUTION ORAL
Qty: 1 | Refills: 0 | Status: ACTIVE | COMMUNITY
Start: 2020-01-01

## 2020-01-01 RX ORDER — ASPIRIN/CALCIUM CARB/MAGNESIUM 324 MG
162 TABLET ORAL ONCE
Refills: 0 | Status: DISCONTINUED | OUTPATIENT
Start: 2020-01-01 | End: 2020-01-01

## 2020-01-01 RX ORDER — INFLUENZA VIRUS VACCINE 15; 15; 15; 15 UG/.5ML; UG/.5ML; UG/.5ML; UG/.5ML
0.5 SUSPENSION INTRAMUSCULAR ONCE
Refills: 0 | Status: DISCONTINUED | OUTPATIENT
Start: 2020-01-01 | End: 2020-01-01

## 2020-01-01 RX ORDER — INSULIN GLARGINE 100 [IU]/ML
40 INJECTION, SOLUTION SUBCUTANEOUS
Qty: 0 | Refills: 0 | DISCHARGE
Start: 2020-01-01

## 2020-01-01 RX ORDER — INSULIN LISPRO 100/ML
15 VIAL (ML) SUBCUTANEOUS
Refills: 0 | Status: DISCONTINUED | OUTPATIENT
Start: 2020-01-01 | End: 2020-01-01

## 2020-01-01 RX ORDER — LANOLIN ALCOHOL/MO/W.PET/CERES
3 CREAM (GRAM) TOPICAL AT BEDTIME
Refills: 0 | Status: DISCONTINUED | OUTPATIENT
Start: 2020-01-01 | End: 2020-01-01

## 2020-01-01 RX ORDER — MULTIVIT WITH MIN/MFOLATE/K2 340-15/3 G
1 POWDER (GRAM) ORAL ONCE
Refills: 0 | Status: COMPLETED | OUTPATIENT
Start: 2020-01-01 | End: 2020-01-01

## 2020-01-01 RX ORDER — GLUCAGON INJECTION, SOLUTION 0.5 MG/.1ML
1 INJECTION, SOLUTION SUBCUTANEOUS ONCE
Refills: 0 | Status: DISCONTINUED | OUTPATIENT
Start: 2020-01-01 | End: 2020-01-01

## 2020-01-01 RX ORDER — INSULIN GLARGINE 100 [IU]/ML
20 INJECTION, SOLUTION SUBCUTANEOUS EVERY MORNING
Refills: 0 | Status: DISCONTINUED | OUTPATIENT
Start: 2020-01-01 | End: 2020-01-01

## 2020-01-01 RX ORDER — SODIUM SULFATE, POTASSIUM SULFATE, MAGNESIUM SULFATE 17.5; 3.13; 1.6 G/ML; G/ML; G/ML
17.5-3.13-1.6 SOLUTION, CONCENTRATE ORAL
Qty: 1 | Refills: 0 | Status: ACTIVE | COMMUNITY
Start: 2020-01-01 | End: 1900-01-01

## 2020-01-01 RX ORDER — ATORVASTATIN CALCIUM 80 MG/1
80 TABLET, FILM COATED ORAL AT BEDTIME
Refills: 0 | Status: DISCONTINUED | OUTPATIENT
Start: 2020-01-01 | End: 2020-01-01

## 2020-01-01 RX ORDER — ASPIRIN/CALCIUM CARB/MAGNESIUM 324 MG
162 TABLET ORAL ONCE
Refills: 0 | Status: COMPLETED | OUTPATIENT
Start: 2020-01-01 | End: 2020-01-01

## 2020-01-01 RX ORDER — SIMVASTATIN 20 MG/1
20 TABLET, FILM COATED ORAL AT BEDTIME
Refills: 0 | Status: DISCONTINUED | OUTPATIENT
Start: 2020-01-01 | End: 2020-01-01

## 2020-01-01 RX ORDER — HEPARIN SODIUM 5000 [USP'U]/ML
5000 INJECTION INTRAVENOUS; SUBCUTANEOUS EVERY 12 HOURS
Refills: 0 | Status: DISCONTINUED | OUTPATIENT
Start: 2020-01-01 | End: 2020-01-01

## 2020-01-01 RX ORDER — INSULIN LISPRO 100/ML
7 VIAL (ML) SUBCUTANEOUS
Refills: 0 | Status: DISCONTINUED | OUTPATIENT
Start: 2020-01-01 | End: 2020-01-01

## 2020-01-01 RX ORDER — LANOLIN ALCOHOL/MO/W.PET/CERES
1 CREAM (GRAM) TOPICAL
Qty: 30 | Refills: 0
Start: 2020-01-01 | End: 2020-01-01

## 2020-01-01 RX ORDER — HYDROCHLOROTHIAZIDE 25 MG
12.5 TABLET ORAL DAILY
Refills: 0 | Status: DISCONTINUED | OUTPATIENT
Start: 2020-01-01 | End: 2020-01-01

## 2020-01-01 RX ORDER — POLYETHYLENE GLYCOL 3350, SODIUM CHLORIDE, SODIUM BICARBONATE AND POTASSIUM CHLORIDE WITH LEMON FLAVOR 420; 11.2; 5.72; 1.48 G/4L; G/4L; G/4L; G/4L
420 POWDER, FOR SOLUTION ORAL
Qty: 1 | Refills: 0 | Status: ACTIVE | COMMUNITY
Start: 2020-01-01 | End: 1900-01-01

## 2020-01-01 RX ORDER — LOSARTAN POTASSIUM 25 MG/1
25 TABLET, FILM COATED ORAL DAILY
Qty: 1 | Refills: 3 | Status: ACTIVE | COMMUNITY
Start: 2020-01-01 | End: 1900-01-01

## 2020-01-01 RX ORDER — ASPIRIN/CALCIUM CARB/MAGNESIUM 324 MG
1 TABLET ORAL
Qty: 30 | Refills: 0
Start: 2020-01-01 | End: 2020-01-01

## 2020-01-01 RX ORDER — INSULIN LISPRO 100/ML
20 VIAL (ML) SUBCUTANEOUS
Qty: 0 | Refills: 0 | DISCHARGE

## 2020-01-01 RX ORDER — HYDROCHLOROTHIAZIDE 25 MG/1
25 TABLET ORAL DAILY
Qty: 30 | Refills: 5 | Status: ACTIVE | COMMUNITY
Start: 2019-12-03 | End: 1900-01-01

## 2020-01-01 RX ADMIN — Medication 5: at 09:29

## 2020-01-01 RX ADMIN — INSULIN GLARGINE 30 UNIT(S): 100 INJECTION, SOLUTION SUBCUTANEOUS at 09:07

## 2020-01-01 RX ADMIN — Medication 1 BOTTLE: at 11:31

## 2020-01-01 RX ADMIN — POLYETHYLENE GLYCOL 3350 17 GRAM(S): 17 POWDER, FOR SOLUTION ORAL at 22:33

## 2020-01-01 RX ADMIN — Medication 6 UNIT(S): at 21:59

## 2020-01-01 RX ADMIN — SODIUM CHLORIDE 3 MILLILITER(S): 9 INJECTION INTRAMUSCULAR; INTRAVENOUS; SUBCUTANEOUS at 22:00

## 2020-01-01 RX ADMIN — Medication 25 MILLIGRAM(S): at 05:58

## 2020-01-01 RX ADMIN — HEPARIN SODIUM 5000 UNIT(S): 5000 INJECTION INTRAVENOUS; SUBCUTANEOUS at 17:16

## 2020-01-01 RX ADMIN — SIMVASTATIN 20 MILLIGRAM(S): 20 TABLET, FILM COATED ORAL at 22:33

## 2020-01-01 RX ADMIN — Medication 2: at 22:13

## 2020-01-01 RX ADMIN — HEPARIN SODIUM 5000 UNIT(S): 5000 INJECTION INTRAVENOUS; SUBCUTANEOUS at 06:13

## 2020-01-01 RX ADMIN — Medication 7 UNIT(S): at 13:00

## 2020-01-01 RX ADMIN — SODIUM CHLORIDE 3 MILLILITER(S): 9 INJECTION INTRAMUSCULAR; INTRAVENOUS; SUBCUTANEOUS at 08:34

## 2020-01-01 RX ADMIN — SODIUM CHLORIDE 3 MILLILITER(S): 9 INJECTION INTRAMUSCULAR; INTRAVENOUS; SUBCUTANEOUS at 06:24

## 2020-01-01 RX ADMIN — SODIUM CHLORIDE 3 MILLILITER(S): 9 INJECTION INTRAMUSCULAR; INTRAVENOUS; SUBCUTANEOUS at 14:08

## 2020-01-01 RX ADMIN — ATORVASTATIN CALCIUM 80 MILLIGRAM(S): 80 TABLET, FILM COATED ORAL at 22:09

## 2020-01-01 RX ADMIN — POLYETHYLENE GLYCOL 3350 17 GRAM(S): 17 POWDER, FOR SOLUTION ORAL at 22:13

## 2020-01-01 RX ADMIN — SODIUM CHLORIDE 3 MILLILITER(S): 9 INJECTION INTRAMUSCULAR; INTRAVENOUS; SUBCUTANEOUS at 13:01

## 2020-01-01 RX ADMIN — INSULIN GLARGINE 20 UNIT(S): 100 INJECTION, SOLUTION SUBCUTANEOUS at 09:36

## 2020-01-01 RX ADMIN — Medication 3 MILLIGRAM(S): at 22:13

## 2020-01-01 RX ADMIN — Medication 5 MILLIGRAM(S): at 05:58

## 2020-01-01 RX ADMIN — SODIUM CHLORIDE 3 MILLILITER(S): 9 INJECTION INTRAMUSCULAR; INTRAVENOUS; SUBCUTANEOUS at 22:34

## 2020-01-01 RX ADMIN — Medication 4: at 13:05

## 2020-01-01 RX ADMIN — Medication 7 UNIT(S): at 09:08

## 2020-01-01 RX ADMIN — Medication 8: at 17:52

## 2020-01-01 RX ADMIN — Medication 81 MILLIGRAM(S): at 11:39

## 2020-01-01 RX ADMIN — HEPARIN SODIUM 5000 UNIT(S): 5000 INJECTION INTRAVENOUS; SUBCUTANEOUS at 05:58

## 2020-01-01 RX ADMIN — Medication 2: at 09:09

## 2020-01-01 RX ADMIN — Medication 81 MILLIGRAM(S): at 13:00

## 2020-01-01 RX ADMIN — Medication 25 MILLIGRAM(S): at 22:33

## 2020-01-01 RX ADMIN — Medication 1 ENEMA: at 11:31

## 2020-01-01 RX ADMIN — Medication 50 MILLIGRAM(S): at 22:09

## 2020-01-01 RX ADMIN — INSULIN GLARGINE 20 UNIT(S): 100 INJECTION, SOLUTION SUBCUTANEOUS at 14:35

## 2020-01-01 RX ADMIN — Medication 25 MILLIGRAM(S): at 17:16

## 2020-01-01 RX ADMIN — Medication 8: at 13:00

## 2020-01-01 RX ADMIN — Medication 3 MILLIGRAM(S): at 22:33

## 2020-01-01 RX ADMIN — Medication 5 MILLIGRAM(S): at 06:12

## 2020-01-01 RX ADMIN — Medication 162 MILLIGRAM(S): at 14:35

## 2020-01-01 RX ADMIN — Medication 25 MILLIGRAM(S): at 06:13

## 2020-02-25 ENCOUNTER — NON-APPOINTMENT (OUTPATIENT)
Age: 70
End: 2020-02-25

## 2020-02-25 ENCOUNTER — APPOINTMENT (OUTPATIENT)
Dept: CARDIOLOGY | Facility: CLINIC | Age: 70
End: 2020-02-25
Payer: MEDICAID

## 2020-02-25 VITALS
OXYGEN SATURATION: 97 % | BODY MASS INDEX: 27.94 KG/M2 | RESPIRATION RATE: 16 BRPM | DIASTOLIC BLOOD PRESSURE: 71 MMHG | HEIGHT: 67 IN | HEART RATE: 55 BPM | WEIGHT: 178 LBS | SYSTOLIC BLOOD PRESSURE: 129 MMHG

## 2020-02-25 PROCEDURE — 99214 OFFICE O/P EST MOD 30 MIN: CPT

## 2020-02-25 PROCEDURE — 93000 ELECTROCARDIOGRAM COMPLETE: CPT

## 2020-02-25 NOTE — ASSESSMENT
[FreeTextEntry1] : 1.  CAD.  No signs of angina.  Continue current medications.\par \par 2.  Hyperlipidemia.  Continue simvastatin.\par \par 3.  Hypertension.  His blood pressure is adequately controlled.

## 2020-02-25 NOTE — REASON FOR VISIT
[Follow-Up - Clinic] : a clinic follow-up of [Coronary Artery Disease] : coronary artery disease [Hyperlipidemia] : hyperlipidemia [Hypertension] : hypertension [FreeTextEntry1] : Since his last visit, he has been doing very well.  He has had no chest pain or shortness of breath.  His major complaint is of sleep disturbance.  We reviewed proper sleep hygiene.\par \par 1.  CAD.  No signs of angina.  Continue current medications.\par \par 2.  Hyperlipidemia.  Continue simvastatin.\par \par 3.  Hypertension.  His blood pressure is adequately controlled.

## 2020-02-25 NOTE — PHYSICAL EXAM
[General Appearance - Well Developed] : well developed [Normal Appearance] : normal appearance [Well Groomed] : well groomed [General Appearance - In No Acute Distress] : no acute distress [General Appearance - Well Nourished] : well nourished [No Deformities] : no deformities [Normal Conjunctiva] : the conjunctiva exhibited no abnormalities [Normal Oral Mucosa] : normal oral mucosa [Eyelids - No Xanthelasma] : the eyelids demonstrated no xanthelasmas [No Oral Pallor] : no oral pallor [Normal Jugular Venous A Waves Present] : normal jugular venous A waves present [No Oral Cyanosis] : no oral cyanosis [Normal Jugular Venous V Waves Present] : normal jugular venous V waves present [No Jugular Venous Billings A Waves] : no jugular venous billings A waves [Respiration, Rhythm And Depth] : normal respiratory rhythm and effort [Auscultation Breath Sounds / Voice Sounds] : lungs were clear to auscultation bilaterally [Exaggerated Use Of Accessory Muscles For Inspiration] : no accessory muscle use [Heart Rate And Rhythm] : heart rate and rhythm were normal [Heart Sounds] : normal S1 and S2 [Abdomen Soft] : soft [Murmurs] : no murmurs present [Abdomen Tenderness] : non-tender [Abdomen Mass (___ Cm)] : no abdominal mass palpated [Abnormal Walk] : normal gait [Gait - Sufficient For Exercise Testing] : the gait was sufficient for exercise testing [Nail Clubbing] : no clubbing of the fingernails [Cyanosis, Localized] : no localized cyanosis [Petechial Hemorrhages (___cm)] : no petechial hemorrhages [Skin Color & Pigmentation] : normal skin color and pigmentation [] : no rash [No Venous Stasis] : no venous stasis [Skin Lesions] : no skin lesions [No Skin Ulcers] : no skin ulcer [No Xanthoma] : no  xanthoma was observed [FreeTextEntry1] : healing midline sternotomy wound [Oriented To Time, Place, And Person] : oriented to person, place, and time [Affect] : the affect was normal [Mood] : the mood was normal [No Anxiety] : not feeling anxious

## 2020-04-06 ENCOUNTER — APPOINTMENT (OUTPATIENT)
Dept: NEUROLOGY | Facility: CLINIC | Age: 70
End: 2020-04-06

## 2020-05-22 NOTE — REASON FOR VISIT
[Home] : at home, [unfilled] , at the time of the visit. [Medical Office: ([facility])___] : at the medical office located in  [Follow-Up: _____] : a [unfilled] follow-up visit [Verbal consent obtained from patient] : the patient, [unfilled]

## 2020-05-22 NOTE — HISTORY OF PRESENT ILLNESS
[_________] : Performed [unfilled] [de-identified] : 69 year old man with chronic constipation. he has been taking  MiraLAX, Lactulose ans a stool softener and remains constipated. His bowel movements are hard and he is straining. He does not believe he took the Amitiza. He denies rectal bleeding, melena or hematemesis. His CAD is stable. [de-identified] : negative

## 2020-06-09 PROBLEM — K58.1 IRRITABLE BOWEL SYNDROME WITH CONSTIPATION: Status: ACTIVE | Noted: 2020-01-01

## 2020-06-09 PROBLEM — R10.11 ABDOMINAL PAIN, RUQ (RIGHT UPPER QUADRANT): Status: ACTIVE | Noted: 2018-03-20

## 2020-06-09 NOTE — HISTORY OF PRESENT ILLNESS
[de-identified] : 69 year old man continues to complain of constipation. H is on Amitiza and Lactulose. His constipation has worsened over time. he sees occasional blood and has associated abdominal pain. His previous colonoscopy 4/2018 was unremarkable.

## 2020-06-09 NOTE — PHYSICAL EXAM
[General Appearance - Alert] : alert [Neck Appearance] : the appearance of the neck was normal [General Appearance - In No Acute Distress] : in no acute distress [Neck Cervical Mass (___cm)] : no neck mass was observed [Jugular Venous Distention Increased] : there was no jugular-venous distention [Thyroid Nodule] : there were no palpable thyroid nodules [Thyroid Diffuse Enlargement] : the thyroid was not enlarged [Auscultation Breath Sounds / Voice Sounds] : lungs were clear to auscultation bilaterally [Heart Rate And Rhythm] : heart rate was normal and rhythm regular [Heart Sounds] : normal S1 and S2 [Heart Sounds Gallop] : no gallops [Heart Sounds Pericardial Friction Rub] : no pericardial rub [Murmurs] : no murmurs [Bowel Sounds] : normal bowel sounds [Abdomen Soft] : soft [Abdomen Tenderness] : non-tender [Abdomen Mass (___ Cm)] : no abdominal mass palpated [] : no hepato-splenomegaly [Normal Sphincter Tone] : normal sphincter tone [No Rectal Mass] : no rectal mass [Cervical Lymph Nodes Enlarged Posterior Bilaterally] : posterior cervical [Supraclavicular Lymph Nodes Enlarged Bilaterally] : supraclavicular [Cervical Lymph Nodes Enlarged Anterior Bilaterally] : anterior cervical [No CVA Tenderness] : no ~M costovertebral angle tenderness [No Spinal Tenderness] : no spinal tenderness [Deep Tendon Reflexes (DTR)] : deep tendon reflexes were 2+ and symmetric [Sensation] : the sensory exam was normal to light touch and pinprick [No Focal Deficits] : no focal deficits [Occult Blood Positive] : stool was negative for occult blood [FreeTextEntry1] : mild hand tremors

## 2020-06-09 NOTE — ASSESSMENT
[FreeTextEntry1] : Patient with worsening constipation. Has some Parkinsonian features on exam Will refer to Neurology and repeat his colonoscopy.

## 2020-08-12 NOTE — ASSESSMENT
[FreeTextEntry1] : 1.  I believe his blood pressure is a result of his anxiety, but we have shifted his metoprolol dose to earlier in the day.  \par \par 2.  He has seen a neurologist, although I am unaware of the results.  He does report being unable to undergo the testing that was prescribed.  His symptoms do seem consistent with Parkinson's disease.

## 2020-08-12 NOTE — REASON FOR VISIT
[Follow-Up - Clinic] : a clinic follow-up of [Hypertension] : hypertension [FreeTextEntry1] : Since his last visit, there is been a market change in the patient's affect.  It is significantly flattened and he appears much slower than previously.  He does note some tremors.  He is here today for evaluation of his blood pressure.  He notes nighttime anxiety, and at that point his blood pressure accelerates to 200 mm.  It comes back down after going for a walk.\par \par 1.  I believe his blood pressure is a result of his anxiety, but we have shifted his metoprolol dose to earlier in the day.  \par \par \par 2.  He has seen a neurologist, although I am unaware of the results.  He does report being unable to undergo the testing that was prescribed.  His symptoms do seem consistent with Parkinson's disease.

## 2020-08-12 NOTE — PHYSICAL EXAM
[General Appearance - Well Developed] : well developed [General Appearance - Well Nourished] : well nourished [Normal Appearance] : normal appearance [Well Groomed] : well groomed [General Appearance - In No Acute Distress] : no acute distress [No Deformities] : no deformities [Eyelids - No Xanthelasma] : the eyelids demonstrated no xanthelasmas [Normal Oral Mucosa] : normal oral mucosa [Normal Conjunctiva] : the conjunctiva exhibited no abnormalities [No Oral Pallor] : no oral pallor [No Oral Cyanosis] : no oral cyanosis [Normal Jugular Venous A Waves Present] : normal jugular venous A waves present [No Jugular Venous Billings A Waves] : no jugular venous billings A waves [Normal Jugular Venous V Waves Present] : normal jugular venous V waves present [Respiration, Rhythm And Depth] : normal respiratory rhythm and effort [Auscultation Breath Sounds / Voice Sounds] : lungs were clear to auscultation bilaterally [Heart Rate And Rhythm] : heart rate and rhythm were normal [Exaggerated Use Of Accessory Muscles For Inspiration] : no accessory muscle use [Abdomen Soft] : soft [Murmurs] : no murmurs present [Heart Sounds] : normal S1 and S2 [Abdomen Mass (___ Cm)] : no abdominal mass palpated [Abdomen Tenderness] : non-tender [Nail Clubbing] : no clubbing of the fingernails [Abnormal Walk] : normal gait [Gait - Sufficient For Exercise Testing] : the gait was sufficient for exercise testing [Petechial Hemorrhages (___cm)] : no petechial hemorrhages [Cyanosis, Localized] : no localized cyanosis [Skin Color & Pigmentation] : normal skin color and pigmentation [] : no rash [No Venous Stasis] : no venous stasis [Skin Lesions] : no skin lesions [No Xanthoma] : no  xanthoma was observed [No Skin Ulcers] : no skin ulcer [FreeTextEntry1] : healing midline sternotomy wound [Oriented To Time, Place, And Person] : oriented to person, place, and time [Affect] : the affect was normal [Mood] : the mood was normal [No Anxiety] : not feeling anxious

## 2020-08-25 NOTE — HISTORY OF PRESENT ILLNESS
[de-identified] : 70 year old man with chronic constipation . He continues to complain of constipation despite taking Amitiza . Lactulose , MiraLAX and a stool softener. He denies rectal bleeding, melena or hematemesis. His recent colonoscopy was incomplete due to poor prep and will need to be repeated. He is being treated for Parkinson's disease and NIDDM.

## 2020-08-25 NOTE — PHYSICAL EXAM
[General Appearance - Alert] : alert [Neck Cervical Mass (___cm)] : no neck mass was observed [General Appearance - In No Acute Distress] : in no acute distress [Neck Appearance] : the appearance of the neck was normal [Jugular Venous Distention Increased] : there was no jugular-venous distention [Thyroid Diffuse Enlargement] : the thyroid was not enlarged [Thyroid Nodule] : there were no palpable thyroid nodules [Auscultation Breath Sounds / Voice Sounds] : lungs were clear to auscultation bilaterally [Heart Rate And Rhythm] : heart rate was normal and rhythm regular [Heart Sounds] : normal S1 and S2 [Murmurs] : no murmurs [Heart Sounds Gallop] : no gallops [Bowel Sounds] : normal bowel sounds [Heart Sounds Pericardial Friction Rub] : no pericardial rub [Abdomen Soft] : soft [Abdomen Tenderness] : non-tender [] : no hepato-splenomegaly [Abdomen Mass (___ Cm)] : no abdominal mass palpated [Supraclavicular Lymph Nodes Enlarged Bilaterally] : supraclavicular [Cervical Lymph Nodes Enlarged Anterior Bilaterally] : anterior cervical [Cervical Lymph Nodes Enlarged Posterior Bilaterally] : posterior cervical [No Spinal Tenderness] : no spinal tenderness [No CVA Tenderness] : no ~M costovertebral angle tenderness

## 2020-09-03 NOTE — ED ADULT NURSE NOTE - NSIMPLEMENTINTERV_GEN_ALL_ED
Implemented All Fall Risk Interventions:  [place] to call system. Call bell, personal items and telephone within reach. Instruct patient to call for assistance. Room bathroom lighting operational. Non-slip footwear when patient is off stretcher. Physically safe environment: no spills, clutter or unnecessary equipment. Stretcher in lowest position, wheels locked, appropriate side rails in place. Provide visual cue, wrist band, yellow gown, etc. Monitor gait and stability. Monitor for mental status changes and reorient to person, place, and time. Review medications for side effects contributing to fall risk. Reinforce activity limits and safety measures with patient and family.

## 2020-09-03 NOTE — ED ADULT NURSE NOTE - OBJECTIVE STATEMENT
Pt received to intake room 4, c/o constipation & not having a bowel movement in 6 days. Endorses having flatus, abdomen is soft, non-distended, denies N/V/D. Reports he had a colonoscopy 2 weeks ago. Has been taking Senna, Metamucil, Lactulose and Miralax with no relief. Hx of HTN, CAD, s/p CABG, DM. Denies anti-coagulant use, pt takes aspirin. Respirations are even & unlabored, denies chest pain, dyspnea, weakness, dizziness, headache, palpitations, cough. Pt is A&Ox4, ambulates with a cane at baseline. No IV access at this time. Call bell within reach, pt demonstrated how to use call bell with success.

## 2020-09-03 NOTE — ED PROVIDER NOTE - NSFOLLOWUPINSTRUCTIONS_ED_ALL_ED_FT
Constipation    Constipation is when a person has fewer than three bowel movements a week, has difficulty having a bowel movement, or has stools that are dry, hard, or larger than normal. Other symptoms can include abdominal pain or bloating. As people grow older, constipation is more common. A low-fiber diet, not taking in enough fluids, and taking certain medicines, including opioid painkillers, may make constipation worse. Treatment varies but may include dietary modifications (more fiber-rich foods), lifestyle modifications, and possible medications.     SEEK IMMEDIATE MEDICAL CARE IF YOU HAVE ANY OF THE FOLLOWING SYMPTOMS: bright red blood in your stool, constipation for longer than 4 days, abdominal or rectal pain, unexplained weight loss, or inability to pass gas.     If you have continued constipation, you may take Magnesium Citrate as instructed on the bottle. Follow up with your GI doctor in the next 5-7 days.

## 2020-09-03 NOTE — ED ADULT NURSE REASSESSMENT NOTE - NS ED NURSE REASSESS COMMENT FT1
Pt walked to bathroom with staff and cane. Pt had another bowel movement. Reports it was "a lot." Also endorses feeling "better and I want to go home." Dr. Bloch aware. Pt brought back to room, call bell within reach.

## 2020-09-03 NOTE — ED PROVIDER NOTE - ATTENDING CONTRIBUTION TO CARE
DR. BLOCH, ATTENDING MD-  I performed a face to face bedside interview with patient regarding history of present illness, review of symptoms and past medical history. I completed an independent physical exam.  I have discussed patient's plan of care with the resident.  Patient examined, mild tremor and rigidity, PERRL, heart sounds s1s2, lungs clear, abd soft protruberant no tenderness,  extrem no edema, neuro moving all extrem

## 2020-09-03 NOTE — ED PROVIDER NOTE - PATIENT PORTAL LINK FT
You can access the FollowMyHealth Patient Portal offered by [facility] by registering at the following website: [email or web address]. By joining [facility]’s FollowMyHealth portal, you will also be able to view your health information using other applications (apps) compatible with our system.

## 2020-09-03 NOTE — ED PROVIDER NOTE - OBJECTIVE STATEMENT
70 year old male PMH HTN, HLD, CAD s/p CABG on ASA, DM, constipation, p/w constipation x6 days. States he had a hard BM 6 days ago but unable to pass stool since. Having + flatus and +urge to defecate. Takes Senna daily and recently trial of Metamucyl, Lactulose, and Miralax w/o improvement. No hx of abd surgeries or SBO. Recent colonoscopy in August w/ poor prep and repeat recommended. Endorses abd distension. Denies f/c, cp, sob, abd pain, nausea, vomiting, back pain, or weakness.

## 2020-09-03 NOTE — ED PROVIDER NOTE - PHYSICAL EXAMINATION
GENERAL: A&Ox3, non-toxic appearing, no acute distress  HEENT: NCAT, EOMI, oral mucosa moist, normal conjunctiva  RESP: CTAB, no respiratory distress, no wheezes/rhonchi/rales, speaking in full sentences  CV: RRR, no murmurs/rubs/gallops  ABDOMEN: soft, non-tender, non-distended, no guarding, no CVA tenderness  MSK: no visible deformities  NEURO: no focal sensory or motor deficits, normal CN exam   SKIN: warm, normal color, well perfused, no rash  PSYCH: normal affect    -Hugh Pugh, PGY2 GENERAL: A&Ox3, non-toxic appearing, no acute distress  HEENT: NCAT, EOMI, oral mucosa moist, normal conjunctiva  RESP: CTAB, no respiratory distress, no wheezes/rhonchi/rales, speaking in full sentences  CV: RRR, no murmurs/rubs/gallops  ABDOMEN: soft, non-tender, mildly distended, no guarding  MSK: no visible deformities  NEURO: no focal sensory or motor deficits, MAEx4, resting tremors  SKIN: warm, normal color, well perfused, no rash  PSYCH: normal affect    -Hugh Pugh, PGY2

## 2020-09-03 NOTE — ED PROVIDER NOTE - PROGRESS NOTE DETAILS
DH: Patient had 2 BM in ED. Now feels much better and requesting to leave. Patient seen and evaluated w/ improvement in abd distension. Discussed return precautions and all questions answered. Pt in agreement w/ plan. CAOx3, NAD, VSS. Stable for d/c.

## 2020-09-03 NOTE — ED ADULT NURSE NOTE - CHIEF COMPLAINT QUOTE
Pt presents to ED for constipation x6days, denies abd pain at this time. Denies black/ dark stool at this time. pmhx of HTN, DM.  Inocencio (Grandson): [phone]

## 2020-09-03 NOTE — ED PROVIDER NOTE - CLINICAL SUMMARY MEDICAL DECISION MAKING FREE TEXT BOX
Hugh Pugh, PGY2: 70 year old male p/w constipation x6 days despite medications. No abd pain, f/c, n/v. +flatus, no abd surgeries. Abd soft and distended, non-tender. Doubt SBO. Plan for rectal exam +/- disimpaction, stool softeners, enema, reassess.

## 2020-09-03 NOTE — ED PROVIDER NOTE - NS ED ROS FT
GENERAL: no fever, no chills  EYES: no change in vision, no irritation  HEENT: no trouble swallowing or speaking  CARDIAC: no chest pain, no palpitations   PULMONARY: no cough, no shortness of breath, no wheezing  GI: no abdominal pain, no nausea, no vomiting, no diarrhea, +constipation, no melena, no hematochezia, no hematemesis  : no changes in urination, no dysuria  SKIN: no rashes  NEURO: no headache, no numbness, no weakness  MSK: no joint pain, no muscle pain, no back pain, no calf pain     -Hugh Pugh, PGY2

## 2020-09-03 NOTE — ED ADULT NURSE REASSESSMENT NOTE - NS ED NURSE REASSESS COMMENT FT1
Cane and red socks provided to pt. Pt walked with staff member beside him to bathroom. Pt reports having a bowel movement. Pt walked back to intake room 4 with a steady gait, using the cane. Call bell within reach

## 2020-09-03 NOTE — ED ADULT TRIAGE NOTE - CHIEF COMPLAINT QUOTE
Pt presents to ED for constipation x6days, denies abd pain at this time. Denies black/ dark stool at this time. pmhx of HTN, DM.  Inocencio (Grandson): [phone]

## 2020-09-30 NOTE — H&P ADULT - ATTENDING COMMENTS
69 y/o male HX of CAD, CABG, Stents x 2, HTN, DM type 2, admitted to TELE for CP x 3 days intermittent, worse with Exertion, + Elevated BP at home, + Hyperglycemia as well, glucose 479, S/P SQ Lantus 20 units x 1 tonight, NO SOB, + MENDOZA, no Fever, no cough, no HA, no dysuria, pt was seen by house Cardiology tonight, No Heparin drip, No Plavix as Per Cardiology, Elevated Troponin HS, + CK MB, Covid 19 PCR Neg., Na 127,     Vitals: Tem 97.3, HR 87, /87, RR 17, 100% RA,    Cardiology F/U, on Metoprolol, Hold HCTZ due to Hyponatremia, on ASA, Confirm Home meds, ECHO, fall/aspiration precaution, F/U CBC, CMP, SQ Lantus 20 units SQ Q AM, ENDO consult in AM, FS, sliding scale, HgbA1c, TSH, Free T4, Start Vasotec 5 mg PO QD, UA, DVT Prophylaxis: SQ Heparin, Hep A,B,C profile, Vit B12, Folate, Iron Studies, Ferritin, on Zocor,    Case D/W Pt & TELE PA,    Pt was seen & evaluated by me, Dr. AMANDA Gan on 9/30/2020.

## 2020-09-30 NOTE — H&P ADULT - PROBLEM SELECTOR PLAN 9
Transitions of Care Status:  1.  Name of PCP: Nik Hensley.  2.  PCP Contacted on Admission: [ ] Y    [ ] N    3.  PCP contacted at Discharge: [ ] Y    [ ] N    [ ] N/A  4.  Post-Discharge Appointment Date and Location:  5.  Summary of Handoff given to PCP: Heparin subcutaneous 5000 units q12h.

## 2020-09-30 NOTE — H&P ADULT - HISTORY OF PRESENT ILLNESS
71 y/o M with PMH of CAD (s/p CABG x2 in 2012 and Stent x2 in 2014), HTN, Type 2 Diabetes on insulin presents to the ED complaining of chest pain lasting 3 days. 71 y/o M with PMH of CAD (s/p CABG x2 in 2012 and Stent x2 in 2014), HTN, Type 2 Diabetes on insulin presents to the ED complaining of chest pain lasting 3 days. Patient states pain started 3 days ago and describes it as squeezing sensation. Patient states that pain is intermittent and is worse with exertion and resolves after approximately an hour. Patient endorses that this morning his blood pressure was elevated with SBP in  200's. Patient does endorses occasional dyspnea on exertion. Patient denies radiation of pain. Currently patient denies chest pain or shortness of breath.    In ED patient Serum glucose was elevated at 476. Anion gap was within normal limits. Patient was given 20 units of Lantus by ED after patient reported that he missed his morning dose. On interview patient reports that he takes 20 units Admelog before breakfast and he reports taking 40 units of Lantus before dinner.

## 2020-09-30 NOTE — H&P ADULT - NEGATIVE OPHTHALMOLOGIC SYMPTOMS
no blurred vision L/no blurred vision R no blurred vision L/no photophobia/no diplopia/no blurred vision R

## 2020-09-30 NOTE — H&P ADULT - PROBLEM SELECTOR PLAN 10
Transitions of Care Status:  1.  Name of PCP: Nik Hensley.  2.  PCP Contacted on Admission: [ ] Y    [ ] N    3.  PCP contacted at Discharge: [ ] Y    [ ] N    [ ] N/A  4.  Post-Discharge Appointment Date and Location:  5.  Summary of Handoff given to PCP:

## 2020-09-30 NOTE — H&P ADULT - PROBLEM SELECTOR PLAN 4
Continue atorvastatin 20 mg and Metoprolol 25 mg BID. Continue aspirin. atorvastatin 20 mg and Metoprolol 25 mg BID.

## 2020-09-30 NOTE — H&P ADULT - NSHPLABSRESULTS_GEN_ALL_CORE
Vital Signs Last 24 Hrs  T(C): 36.3 (30 Sep 2020 17:45), Max: 36.7 (30 Sep 2020 12:44)  T(F): 97.4 (30 Sep 2020 17:45), Max: 98.1 (30 Sep 2020 12:44)  HR: 68 (30 Sep 2020 17:45) (68 - 78)  BP: 144/76 (30 Sep 2020 17:45) (144/76 - 169/67)  BP(mean): --  RR: 17 (30 Sep 2020 17:45) (16 - 17)  SpO2: 100% (30 Sep 2020 17:45) (100% - 100%)                          11.5   7.82  )-----------( 333      ( 30 Sep 2020 13:32 )             35.7   09-30    127<L>  |  90<L>  |  15  ----------------------------<  479<HH>  4.4   |  26  |  0.94    Ca    9.5      30 Sep 2020 13:32    TPro  7.2  /  Alb  4.1  /  TBili  0.4  /  DBili  x   /  AST  16  /  ALT  17  /  AlkPhos  99  09-30    Troponin 39-->34  CK: 248  CKMB 7.82  CKMB 3.2  EKG: SR at 78 bpm. QT/QTc 394/449    PT/INR - ( 30 Sep 2020 13:32 )   PT: 11.1 SEC;   INR: 0.97          PTT - ( 30 Sep 2020 13:32 )  PTT:30.5 SEC    13:20 - VBG - pH: 7.39  | pCO2: 49    | pO2: 21    | Lactate: 1.6      COVID PCR: Negative  CXR: Clear lungs. No pleural effusions or pneumothorax.    Sternal wires mediastinal clips and left coronary stent again noted. Heart size and mediastinal width otherwise within normal limits.    Trachea midline.    Appearance of old distal right clavicular/AC joint posttraumatic deformity. Unremarkable remaining osseous structures.

## 2020-09-30 NOTE — PATIENT PROFILE ADULT - NSPRONUTRITIONRISK_GEN_A_NUR
Ears: no ear pain and no hearing problems.Nose: no nasal congestion and no nasal drainage.Mouth/Throat: no dysphagia, no hoarseness and no throat pain.Neck: no lumps, no pain, no stiffness and no swollen glands.
No indicators present

## 2020-09-30 NOTE — ED ADULT NURSE NOTE - OBJECTIVE STATEMENT
Received PT in room 18, PT AOx4, ambulatory at baseline c/o Received PT in room 15, PT AOx4, ambulatory at baseline c/o chest tightness with weakness for past 3 days. PT currently denies any chest tightness. PT states chest tightness is "on and off". PT on cardiac monitor NSR. PT breathing equal; unlabored on room air. PT denies SOB, NVD, fever, chills. 20G placed in right AC, labs sent. Safety measures in place. will continue to monitor.

## 2020-09-30 NOTE — H&P ADULT - NSICDXPASTSURGICALHX_GEN_ALL_CORE_FT
PAST SURGICAL HISTORY:  Bladder neck obstruction 3 surgeries for BPH, most recent 2012    History of coronary artery bypass graft X2 in March of 2012    Presence of stent in coronary artery x2 2014

## 2020-09-30 NOTE — H&P ADULT - PROBLEM SELECTOR PLAN 2
Patient states that he is on admelog 20 units before breakfast and 40 units of lantus before dinner. Patient reports missing am dose of insulin. Patient received 20 units of Lantus this afternoon. Patient placed on low dose insulin sliding scale. Patient states that he is on admelog 20 units before breakfast and 40 units of lantus before dinner. Patient reports missing am dose of insulin. Patient received 20 units of Lantus this afternoon. Patient placed on low dose insulin sliding scale. Discussed with Dr. Gan, will order 20 units of Lantus, to be given in AM. Endo consult emailed.

## 2020-09-30 NOTE — H&P ADULT - ASSESSMENT
71 y/o M with PMH of CAD (s/p CABG x2 in 2012 and Stent x2 in 2014), HTN, Type 2 Diabetes on insulin presents to the ED complaining of chest pain lasting 3 days. R/O ACS.

## 2020-09-30 NOTE — ED PROVIDER NOTE - PROGRESS NOTE DETAILS
Panfilo: pt given home dose of long acting insulin as he did not take it this morning. at this time no indication that the patient is in DKA, no abd pain, n/v Panfilo: Spoke to patients daughter and provided updates Panfilo: initial troponin elevated will repeat Panfilo: patient to be admitted, spoke to Dr Pratt and Cards fellow they are aware.

## 2020-09-30 NOTE — ED ADULT TRIAGE NOTE - CHIEF COMPLAINT QUOTE
Pt c/o chest pain intermittently for the last 3 days, worsens with exertion, improves with rest.  PMH: HTN DM2, CAD

## 2020-09-30 NOTE — CONSULT NOTE ADULT - SUBJECTIVE AND OBJECTIVE BOX
Patient seen and evaluated at bedside    Chief Complaint: chest pain    HPI: 70M CAD s/p CABG (LIMA-D1, saphenous-RPDA 2012), 2 ROWENA to 70% pLAD, HTN, T2DM who presents with exertional angina.     The patient states that about 2-3 days ago he started to have substernal squeezing chest pain about 7/10 in severity when he was walking. He then had chest pain again this morning, which was the same quality, however occurred at rest. He took his blood pressure this morning, and his systolic BP was in the 200s. The pain lasted for about 15-20 minutes and then subsided. He then decided to come to the ED. He additionally of note missed his insulin this morning because of his chest pain. Currently he denies any chest pain or shortness of breath.     ED course: AF, HR 70s, BP 160s/70s.     Outpatient cardiologist: Casimiro Pratt    PMHx:   GERD (gastroesophageal reflux disease)    HLD (hyperlipidemia)    Hypertension    CAD (coronary artery disease)    Diabetes mellitus        PSHx:   Presence of stent in coronary artery    Dyslipidemia    HTN (hypertension)    History of coronary artery bypass graft    Bladder neck obstruction        Allergies:  No Known Allergies      Home Meds:    Current Medications:       FAMILY HISTORY:  Family history of diabetes mellitus type II (Sibling)        Social History:  Smoking History:  Alcohol Use:  Drug Use:    REVIEW OF SYSTEMS:  CONSTITUTIONAL: No weakness, fevers or chills  EYES/ENT: No visual changes;  No dysphagia  NECK: No pain or stiffness  RESPIRATORY: No cough, wheezing, hemoptysis; No shortness of breath  CARDIOVASCULAR: No chest pain or palpitations; No lower extremity edema  GASTROINTESTINAL: No abdominal or epigastric pain. No nausea, vomiting, or hematemesis; No diarrhea or constipation. No melena or hematochezia.  BACK: No back pain  GENITOURINARY: No dysuria, frequency or hematuria  NEUROLOGICAL: No numbness or weakness  SKIN: No itching, burning, rashes, or lesions   All other review of systems is negative unless indicated above.    Physical Exam:  T(F): 98 (09-30), Max: 98.1 (09-30)  HR: 74 (09-30) (73 - 78)  BP: 169/67 (09-30) (164/73 - 169/67)  RR: 16 (09-30)  SpO2: 100% (09-30)  GENERAL: No acute distress, well-developed  HEAD:  Atraumatic, Normocephalic  ENT: EOMI, PERRLA, conjunctiva and sclera clear, Neck supple, No JVD, moist mucosa  CHEST/LUNG: Clear to auscultation bilaterally; No wheeze, equal breath sounds bilaterally   BACK: No spinal tenderness  HEART: Regular rate and rhythm; No murmurs, rubs, or gallops  ABDOMEN: Soft, Nontender, Nondistended; Bowel sounds present  EXTREMITIES:  No clubbing, cyanosis, or edema  PSYCH: Nl behavior, nl affect  NEUROLOGY: AAOx3, non-focal, cranial nerves intact  SKIN: Normal color, No rashes or lesions  LINES:    Cardiovascular Diagnostic Testing:    ECG: Personally reviewed:    Echo: Personally reviewed:    Stress Testing:    Cath:    Imaging:    CXR: Personally reviewed    Labs: Personally reviewed                        11.5   7.82  )-----------( 333      ( 30 Sep 2020 13:32 )             35.7     09-30    127<L>  |  90<L>  |  15  ----------------------------<  479<HH>  4.4   |  26  |  0.94    Ca    9.5      30 Sep 2020 13:32    TPro  7.2  /  Alb  4.1  /  TBili  0.4  /  DBili  x   /  AST  16  /  ALT  17  /  AlkPhos  99  09-30    PT/INR - ( 30 Sep 2020 13:32 )   PT: 11.1 SEC;   INR: 0.97          PTT - ( 30 Sep 2020 13:32 )  PTT:30.5 SEC                     Patient seen and evaluated at bedside    Chief Complaint: chest pain    HPI: 70M CAD s/p CABG (LIMA-D1, saphenous-RPDA 2012), 2 ROWENA to 70% pLAD, HTN, T2DM who presents with exertional angina.     The patient states that about 2-3 days ago he started to have substernal squeezing chest pain about 7/10 in severity when he was walking. He then had chest pain again this morning, which was the same quality, however occurred at rest. He took his blood pressure this morning, and his systolic BP was in the 200s. The pain lasted for about 15-20 minutes and then subsided. He then decided to come to the ED. He additionally of note missed his insulin this morning because of his chest pain. Currently he denies any chest pain or shortness of breath.     ED course: AF, HR 70s, BP 160s/70s.     Outpatient cardiologist: Casimiro Pratt    PMHx:   GERD (gastroesophageal reflux disease)    HLD (hyperlipidemia)    Hypertension    CAD (coronary artery disease)    Diabetes mellitus      PSHx:   Presence of stent in coronary artery    Dyslipidemia    HTN (hypertension)    History of coronary artery bypass graft    Bladder neck obstruction        Allergies:  No Known Allergies      Home Meds:    Current Medications:       FAMILY HISTORY:  Family history of diabetes mellitus type II (Sibling)        Social History:  Smoking History:  Alcohol Use:  Drug Use:    REVIEW OF SYSTEMS:  CONSTITUTIONAL: No weakness, fevers or chills  EYES/ENT: No visual changes;  No dysphagia  NECK: No pain or stiffness  RESPIRATORY: No cough, wheezing, hemoptysis; No current shortness of breath  CARDIOVASCULAR: No current chest pain or palpitations; No lower extremity edema  GASTROINTESTINAL: No abdominal or epigastric pain. No nausea, vomiting, or hematemesis; No diarrhea or constipation. No melena or hematochezia.  BACK: No back pain  GENITOURINARY: No dysuria, frequency or hematuria  NEUROLOGICAL: No numbness or weakness  SKIN: No itching, burning, rashes, or lesions   All other review of systems is negative unless indicated above.    Physical Exam:  T(F): 98 (09-30), Max: 98.1 (09-30)  HR: 74 (09-30) (73 - 78)  BP: 169/67 (09-30) (164/73 - 169/67)  RR: 16 (09-30)  SpO2: 100% (09-30)  GENERAL: No acute distress, well-developed  HEAD:  Atraumatic, Normocephalic  ENT: EOMI, PERRLA, conjunctiva and sclera clear, Neck supple, No JVD, moist mucosa  CHEST/LUNG: Clear to auscultation bilaterally; No wheeze, equal breath sounds bilaterally   BACK: No spinal tenderness  HEART: Regular rate and rhythm; No murmurs, rubs, or gallops  ABDOMEN: Soft, Nontender, Nondistended; Bowel sounds present  EXTREMITIES:  No clubbing, cyanosis, or edema  PSYCH: Nl behavior, nl affect  NEUROLOGY: AAOx3, non-focal, cranial nerves intact  SKIN: Normal color, No rashes or lesions    Cardiovascular Diagnostic Testing:    ECG: Personally reviewed: NSR, early R wave progression, no ST/T wave abnormalities    Echo: Personally reviewed:  ec< from: Transthoracic Echocardiogram (10.09.17 @ 08:26) >  1. Mitral annular calcification, otherwise normal mitral  valve. Minimal mitral regurgitation.  2. Normal left ventricular internal dimensions and wall  thicknesses.  3. Endocardium notwell visualized; grossly normal left  ventricular systolic function.  4. The right ventricle is not well visualized; grossly  normal right ventricular systolic function.  ------------------------------------------------------------------------  Confirmed on  10/9/2017 - 10:05:21 by Michele Childers M.D.  ------------------------------------------------------------------------    < end of copied text >    Stress Testing:  < from: Nuclear Stress Test-Pharmacologic (10.09.17 @ 09:53) >  * Myocardial Perfusion SPECT results are normal.  * Normal myocardial perfusion scan,with no evidence of  infarction or inducible ischemia.  * Post-stress gated wall motion analysis was performed  (LVEF > 70%;LVEDV = 62 ml.), revealing normal LV function.  ------------------------------------------------------------------------  Confirmed on  10/9/2017 - 14:55:56 by Ignacio South M.D.  ------------------------------------------------------------------------    < end of copied text >    Cath:  `< from: Cardiac Cath Lab - Adult (06.25.14 @ 16:27) >  DIAGNOSTIC RECOMMENDATIONS:  1. Successful PCI to the pLAD (3.0mm Promus Premier ROWENA and 2.75mm Promus  Premier ROWENA) in the setting of unstable angina with a positive FFR.  2. Aspirin 81mg daily indefinitely and clopidogrel 75mg daily for at least  one year, uninterrupted.  INTERVENTIONAL RECOMMENDATIONS:  1. Successful PCI to the pLAD (3.0mm Promus Premier ROWENA and 2.75mm Promus  Premier ROWENA) in the setting of unstable angina with a positive FFR.  2. Aspirin 81mg daily indefinitely and clopidogrel 75mg daily forat least  one year, uninterrupted.  Prepared and signed by  Jeff Franz M.D.    < end of copied text >      Imaging:    CXR: Personally reviewed    Labs: Personally reviewed                        11.5   7.82  )-----------( 333      ( 30 Sep 2020 13:32 )             35.7     09-30    127<L>  |  90<L>  |  15  ----------------------------<  479<HH>  4.4   |  26  |  0.94    Ca    9.5      30 Sep 2020 13:32    TPro  7.2  /  Alb  4.1  /  TBili  0.4  /  DBili  x   /  AST  16  /  ALT  17  /  AlkPhos  99  09-30    PT/INR - ( 30 Sep 2020 13:32 )   PT: 11.1 SEC;   INR: 0.97          PTT - ( 30 Sep 2020 13:32 )  PTT:30.5 SEC

## 2020-09-30 NOTE — PATIENT PROFILE ADULT - VISION (WITH CORRECTIVE LENSES IF THE PATIENT USUALLY WEARS THEM):
use eye glasses for reading/Normal vision: sees adequately in most situations; can see medication labels, newsprint

## 2020-09-30 NOTE — H&P ADULT - MUSCULOSKELETAL
details… detailed exam normal/ROM intact no joint swelling/no joint erythema/no calf tenderness/normal strength/ROM intact

## 2020-09-30 NOTE — ED PROVIDER NOTE - OBJECTIVE STATEMENT
71 y/o M hx CAD s/p stent x2 , CABG, HTN, DM on insulin presents with exertional angina x2-3 days. Associated with weakness but no syncope. At rest feels well. No SOb, f/c, cough, abd pain, bloody stools, leg swelling, dysuria/hematuria    Cards Dr. Pratt  PCP: Dr Alon Mays

## 2020-09-30 NOTE — ED PROVIDER NOTE - PHYSICAL EXAMINATION
Vitals: I have reviewed the patients vital signs. HTN, marked hyperglycemia  General: well appearing, well nourished, no acute distress  HEENT: atraumatic, normocephalic, airway patent, EOMI and appropriate tracking  Neck: no JVD, no tracheal deviation, no goiter  Chest/Lungs: no trauma, symmetric chest rise, lung sounds clear bilaterally, speaking in complete sentences  Heart: Regular rate, regular rhythm, S1S2, no MRG, skin well perfused  Abdomen: Soft and nontender throughout, no pulsatile mass, no bruising  Neuro: A+Ox3, CNII-XII intact, ambulating without difficulty, non dysarthric speech  Eyes: PERRL, EOMI, no conjunctival injection  MSK: all limbs at baseline strength, no wasting or atrophy,   Skin: no bleeding, no cyanosis, no jaundice, no new emergent lesions Vitals: I have reviewed the patients vital signs. HTN, marked hyperglycemia  General: well appearing, well nourished, no acute distress  HEENT: atraumatic, normocephalic, airway patent, EOMI and appropriate tracking  Neck: no JVD, no tracheal deviation  Chest/Lungs: no trauma, symmetric chest rise, lung sounds clear bilaterally, speaking in complete sentences  Heart: Regular rate, regular rhythm, S1S2, no MRG, skin well perfused, 2+ peripheral pulses bilaterally  Abdomen: Soft and nontender throughout, no rebound no guarding  Neuro: A+Ox3, able to move from wheelchair to stretcher without difficulty, non dysarthric speech, no facial droop or lateralizing weakness  Eyes: EOMI, no conjunctival injection  MSK: all limbs at baseline strength, no wasting or atrophy, able to resist gravity, 2+ pulses, neurovasc intact  Skin: no bleeding, no cyanosis, no jaundice, no new emergent lesions, no peripheral edema.

## 2020-09-30 NOTE — H&P ADULT - PROBLEM SELECTOR PLAN 1
Admit to tele, continue monitoring, Cardio recs appreciated. , CKMB 7.82, CKMB Index 3.2. Repeat troponin and CKMB ordered. Repeat EKG ordered. Admit to tele, continue monitoring, Cardio recs appreciated.   , CKMB 7.82, CKMB Index 3.2. Repeat troponin and CKMB ordered. Repeat EKG ordered.  Per Cardio continue trending troponin until peak,  If patient has chest pain overnight, would give SL NTG, if it helps but chest pain reoccurs would then try nitroglycerin paste. Admit to tele, continue monitoring, Cardio recs appreciated.   , CKMB 7.82, CKMB Index 3.2. Repeat troponin and CKMB ordered. Repeat EKG ordered: NSR, no changes.  Per Cardio continue trending troponin until peak,  If patient has chest pain overnight, would give SL NTG, if it helps but chest pain reoccurs would then try nitroglycerin paste.  Cardiology called regarding CK,CKMB: Given Troponin relatively flat and patient Admit to tele, continue monitoring, Cardio recs appreciated.   , CKMB 7.82, CKMB Index 3.2. Repeat troponin and CKMB ordered. Repeat EKG ordered: NSR, no changes.  Per Cardio continue trending troponin until peak,  If patient has chest pain overnight, would give SL NTG, if it helps but chest pain reoccurs would then try nitroglycerin paste.  Cardiology called regarding CK,CKMB: Given Troponin relatively flat and CKMB trending no need for heparin drip or plavix load.

## 2020-09-30 NOTE — ED PROVIDER NOTE - CLINICAL SUMMARY MEDICAL DECISION MAKING FREE TEXT BOX
71 y/o M hx CAD s/p stent x2 , CABG, HTN, DM on insulin presents with exertional angina x2-3 days. Associated with weakness but no syncope. At rest feels well. No SOb, f/c, cough, abd pain, bloody stools, leg swelling, dysuria/hematuria. Last echo 1 month ago reported normal by patient. Likely stable angina, high risk patient due to history. Will check trop, cxr, ekg, basic labs, likely admit for further provocative testing.

## 2020-09-30 NOTE — ED PROVIDER NOTE - ATTENDING CONTRIBUTION TO CARE
NSR@78; artifact creating reading of NSST I have seen and examined the patient on the patient´s visit date. I have reviewed the note written by Tulio Whittaker MD on that visit day. I have supervised and participated as necessary in the performance of procedures indicated for patient management and was available at all phases of the patient´s visit when needed. We discussed the history, physical exam findings, mnagement plan, and  medical decision making. I have made my additons, exceptions, and revisions within the chart and I agree with H and P as documented in its entirety. The data and my interpretation of any data collected from labs, interventions and imaging appear below as well as my independent medical decision making and considerations    The patient is a 70y Male patient of Dr Pratt who has a past medical and surgery history of GERD HLD Hypertension CAD/stents/CABG, Diabetes mellitus Bladder neck obstruction and 3 surgeries for BPH PTED with exertional angina as described  Vital Signs Last 24 Hrs  T(F): 98.1 HR: 73 BP: 164/73 RR: 17 SpO2: 100% (30 Sep 2020 14:11) (100% - 100%)  PE: as described; my additions and exceptions are noted in the chart  DATA:  EKG:  NSR@78; artifact creating reading of NSST                        11.5   7.82  )-----------( 333      ( 30 Sep 2020 13:32 )             35.7   Mean Cell Volume: 83.8 fL (09-30-20 @ 13:32)  Auto Neutrophil %: 69.4 % (09-30-20 @ 13:32)  Auto Eosinophil %: 0.8 % (09-30-20 @ 13:32)  09-30    127<L>  |  90<L>  |  15  ----------------------------<  479<HH>  4.4   |  26  |  0.94    Ca    9.5      30 Sep 2020 13:32    TPro  7.2  /  Alb  4.1  /  TBili  0.4  /  DBili  x   /  AST  16  /  ALT  17  /  AlkPhos  99  09-30    CXR: Clear    Plan  ASA  Admit for further workup/cardiology consult  serial trops/EKG's

## 2020-09-30 NOTE — H&P ADULT - PROBLEM SELECTOR PLAN 6
IV attempted X 1 by this RN without success. Pt tolerated well.  Dressing applied     Aldair Talley RN  05/04/20 9063 Cardio recs appreciated. Metoprolol 25 mg BID, Enalapril 5 mg daily added per Cardio. Cardio recs appreciated. Metoprolol 25 mg BID, Enalapril 5 mg daily added per Cardio. Hydrochlorothiazide on hold.

## 2020-09-30 NOTE — CHART NOTE - NSCHARTNOTEFT_GEN_A_CORE
Spoke to Cardiology provider Garrett regarding patient's, CK level. Was told given CK was trending down, there is no indication to start patient on Heparin drip or load with plavix. Cardiology called again when CKMB and CKMB index resulted. Per Cardiology CKMB trending down and patient is without EKG changes or chest pain so there is no indication to load plavix or start Heparin drip. Will continue to monitor.

## 2020-09-30 NOTE — H&P ADULT - PROBLEM SELECTOR PLAN 8
Transitions of Care Status:  1.  Name of PCP: Nik Hensley.  2.  PCP Contacted on Admission: [ ] Y    [ ] N    3.  PCP contacted at Discharge: [ ] Y    [ ] N    [ ] N/A  4.  Post-Discharge Appointment Date and Location:  5.  Summary of Handoff given to PCP: Heparin subcutaneous 5000 units q12h. Med rec pharmacist emailed regarding insulin dosages.

## 2020-09-30 NOTE — H&P ADULT - NSICDXPASTMEDICALHX_GEN_ALL_CORE_FT
PAST MEDICAL HISTORY:  CAD (coronary artery disease) CABG x2 2012, Stents x2 2014    Diabetes mellitus     GERD (gastroesophageal reflux disease)     HLD (hyperlipidemia)     Hypertension

## 2020-09-30 NOTE — H&P ADULT - NSICDXFAMILYHX_GEN_ALL_CORE_FT
FAMILY HISTORY:  Sibling  Still living? Unknown  Family history of diabetes mellitus type II, Age at diagnosis: Age Unknown

## 2020-09-30 NOTE — H&P ADULT - NEUROLOGICAL DETAILS
alert and oriented x 3/sensation intact/cranial nerves intact/normal strength/responds to verbal commands responds to verbal commands/sensation intact/cranial nerves intact/responds to pain/alert and oriented x 3/normal strength

## 2020-09-30 NOTE — H&P ADULT - NSHPSOCIALHISTORY_GEN_ALL_CORE
Patient lives with family. Denies smoking, drinking or use of illicit drugs. Patient states that he sometimes ambulates with a cane.

## 2020-10-01 NOTE — PROGRESS NOTE ADULT - ASSESSMENT
70M CAD s/p CABG (LIMA-D1, saphenous-RPDA 2012), 2 ROWENA to 70% pLAD, HTN, T2DM who presents with exertional angina.     Chest pain: Likely related to hypertension as his chest pain occurred when SBP 200s, and chest pain resolved with BP control with downtrending troponins. However patient does have CAD. EKG without ischemic changes. Trop 39 - 34 - 35  - continue enalapril 5 mg  - continue lopressor 25 BID, HCTZ 12.5 QD   - would continue to trend troponins until peak  - echo ordered and pending  - if patient has chest pain, would give SL NTG, and if that helps but chest pain reoccurs, would then try nitroglycerin paste    CAD s/p CABG (LIMA-D1, saphenous-RPDA 2012), 2 ROWENA to 70% pLAD:  - continue ASA, simvastatin 20 (can discuss increasing to high intensity statin)    HTN: Currently 164/73  - continue enalapril 5 mg, lopressor 25 BID, fold in home HCTZ 12.5 QD when able    Clary Flores MD  Cardiology Fellow, PGY-4  [phone]  For all other Cardiology service contact information, go to [email or web address] and use "[email or web address]" to login.   70M CAD s/p CABG (LIMA-D1, saphenous-RPDA 2012), 2 ROWENA to 70% pLAD, HTN, T2DM who presents with exertional angina.     Chest pain: Likely related to hypertension as his chest pain occurred when SBP 200s, and chest pain resolved with BP control with downtrending troponins. However patient does have CAD. EKG without ischemic changes. Trop 39 - 34 - 35  - continue enalapril 5 mg  - continue lopressor 25 BID, HCTZ 12.5 QD   - would continue to trend troponins until peak  - echo ordered and pending  - patient can follow up with cardiologist as outpatient and discuss possible nuclear stress test, however does not need to be done as inpatient  - if patient has chest pain, would give SL NTG, and if that helps but chest pain reoccurs, would then try nitroglycerin paste    CAD s/p CABG (LIMA-D1, saphenous-RPDA 2012), 2 ROWENA to 70% pLAD:  - continue ASA, simvastatin 20 (can discuss increasing to high intensity statin)    HTN: Currently 164/73  - continue enalapril 5 mg, lopressor 25 BID, fold in home HCTZ 12.5 QD when able    Clary Flores MD  Cardiology Fellow, PGY-4  [phone]  For all other Cardiology service contact information, go to [email or web address] and use "[email or web address]" to login.

## 2020-10-01 NOTE — PROGRESS NOTE ADULT - PROBLEM SELECTOR PLAN 1
No events on tele, continue monitoring, No EKG changes,   Cardio recs appreciated. Plan to f/u TTE and outpt Stress test  If patient has chest pain overnight, would give SL NTG, if it helps but chest pain reoccurs would then try nitroglycerin paste.  Cardiology called regarding CK,CKMB: Given Troponin relatively flat and CKMB trending no need for heparin drip or plavix load.

## 2020-10-01 NOTE — PROGRESS NOTE ADULT - ASSESSMENT
69 y/o M with PMH of CAD (s/p CABG x2 in 2012 and Stent x2 in 2014), HTN, Type 2 Diabetes on insulin presents to the ED complaining of chest pain lasting 3 days. R/O ACS.

## 2020-10-01 NOTE — CONSULT NOTE ADULT - ASSESSMENT
71 y/o M with PMH of CAD (s/p CABG x2 in 2012 and Stent x2 in 2014), HTN, Type 2 Diabetes on insulin presents to the ED complaining of chest pain lasting 3 days admitted for cardiac workup. Consulted for T2DM
  70M CAD s/p CABG (LIMA-D1, saphenous-RPDA 2012), 2 ROWENA to 70% pLAD, HTN, T2DM who presents with exertional angina.    Chest pain: Likely related to hypertension as his chest pain occurred when SBP 200s. However patient does have CAD. EKG without ischemic changes. Trop 39, pending repeat.   - BP control, would start with adding enalapril 5 mg  - continue lopressor 25 BID, HCTZ 12.5 QD   - would continue to trend troponins until peak  - if troponins increase, reasonable to order echo and nuclear stress test  - if patient has chest pain overnight, would give SL NTG, and if that helps but chest pain reoccurs, would then try nitroglycerin paste    CAD s/p CABG (LIMA-D1, saphenous-RPDA 2012), 2 ROWENA to 70% pLAD:  - continue ASA, simvastatin 20 (can discuss increasing to high intensity statin)    HTN: Currently 164/73  - add enalapril 5 mg, continue lopressor 25 BID, HCTZ 12.5 QD     Clary Flores MD  Cardiology Fellow, PGY-4  [phone]  For all other Cardiology service contact information, go to [email or web address] and use "[email or web address]" to login.

## 2020-10-01 NOTE — PROGRESS NOTE ADULT - PROBLEM SELECTOR PLAN 2
Patient states that he is on admelog 20 units before breakfast and 40 units of lantus before dinner  Will resume humalog, further changes per Endo recs

## 2020-10-01 NOTE — CONSULT NOTE ADULT - PROBLEM SELECTOR RECOMMENDATION 9
T2DM uncontrolled with hgb a1c of 9.7% due to diet nonadherence and no insulin with lunch or dinner time.   -change lantus to 30 units qAM   -change humalog to 7 units tidac   -change humalog correction scale to moderate tidac an dqhs  -nutrition consult    discharge  Basal/bolus insulin TBD. Patient willing to do insulin 4 times  day.   [address] [facility] 3rd floor   [facility] [address] [phone]

## 2020-10-01 NOTE — CONSULT NOTE ADULT - ATTENDING COMMENTS
70 year old man with known CAD s/p CABG and pLAD PCI (2014), uncontrolled type 2 diabetes on insulin, uncontrolled hypertension who presented with chest pain in setting of uncontrolled BP. Troponin x2 negative. ECG no ischemic changes. Chest pain improved this morning as is the blood pressure. He reports pain somewhat improved with ambulation.    Needs management of diabetes  agree with above recs for BP management  continue aspirin  change simvastatin to Lipitor 80 or rosuvastatin 40.  Patient should follow up with Dr. Pratt
70M uncontrolled DM2 HbA1c 9.7% at home on basal and premeal breakfast only. Needs full basal bolus plan TBD. Outpatient endocrine follow up.  Endocrine team consulted for uncontrolled diabetes. Patient is high risk with high level decision making due to uncontrolled diabetes which places patient at high risk for cardiovascular and cerebrovascular events. Patient with lability of glucose requiring close monitoring and insulin adjustments.    José Miguel Trujillo MD  Division of Endocrinology  Pager: [phone]    If after 6PM or before 9AM, or on weekends/holidays, please call endocrine answering service for assistance ([phone]).  For nonurgent matters email [email or web address] for assistance.

## 2020-10-01 NOTE — CONSULT NOTE ADULT - SUBJECTIVE AND OBJECTIVE BOX
HPI:  69 y/o M with PMH of CAD (s/p CABG x2 in 2012 and Stent x2 in 2014), HTN, Type 2 Diabetes on insulin presents to the ED complaining of chest pain lasting 3 days.    Endocrine History:  T2DM with hgb a1c of 9.7% on basaglar 40 units qhs and novolog 20 units with breakfast. Does not take it with lunch or dinner. Checks FS couple times a week fasting uusally 110-150s with occasional numbers in 400s. Rare hypoglycemic event to 40s in the past 6 months due to not eating breakfast after taking novolog. Does not follow with endocrinologist. Patient does not drink juice or soda but eats mainly carbohydrate.   Complicated by retinopathy, peripheral neuropathy and CAD.      PAST MEDICAL & SURGICAL HISTORY:  GERD (gastroesophageal reflux disease)    HLD (hyperlipidemia)    Hypertension    CAD (coronary artery disease)  CABG x2 2012, Stents x2 2014    Diabetes mellitus    Presence of stent in coronary artery  x2 2014    History of coronary artery bypass graft  X2 in March of 2012    Bladder neck obstruction  3 surgeries for BPH, most recent 2012        FAMILY HISTORY:  Family history of diabetes mellitus type II (Sibling)        Social History: No history of tobacco, etoh or illicit drug use.     Outpatient Medications: Home Medications:  Admelog SoloStar 100 units/mL injectable solution: 20 unit(s) injectable 3 times a day (before meals) (01 Oct 2020 14:27)  aspirin 81 mg oral delayed release tablet: 1 tab(s) orally once a day (01 Oct 2020 14:27)  Basaglar KwikPen 100 units/mL subcutaneous solution: 40 unit(s) subcutaneous once a day (at bedtime) (01 Oct 2020 14:27)  hydroCHLOROthiazide 25 mg oral tablet: 1 tab(s) orally once a day (01 Oct 2020 14:27)  Metoprolol Tartrate 50 mg oral tablet: 1 tab(s) orally 2 times a day (01 Oct 2020 14:27)  simvastatin 20 mg oral tablet: 1 tab(s) orally once a day (at bedtime) (01 Oct 2020 14:27)  traZODone 50 mg oral tablet: 1 tab(s) orally once a day (at bedtime) (01 Oct 2020 14:27)      MEDICATIONS  (STANDING):  aspirin enteric coated 81 milliGRAM(s) Oral daily  atorvastatin 80 milliGRAM(s) Oral at bedtime  dextrose 5%. 1000 milliLiter(s) (50 mL/Hr) IV Continuous <Continuous>  dextrose 50% Injectable 12.5 Gram(s) IV Push once  dextrose 50% Injectable 25 Gram(s) IV Push once  dextrose 50% Injectable 25 Gram(s) IV Push once  enalapril 5 milliGRAM(s) Oral daily  heparin   Injectable 5000 Unit(s) SubCutaneous every 12 hours  influenza   Vaccine 0.5 milliLiter(s) IntraMuscular once  insulin glargine Injectable (LANTUS) 30 Unit(s) SubCutaneous every morning  insulin lispro (HumaLOG) corrective regimen sliding scale   SubCutaneous three times a day before meals  insulin lispro (HumaLOG) corrective regimen sliding scale   SubCutaneous at bedtime  insulin lispro Injectable (HumaLOG) 7 Unit(s) SubCutaneous three times a day before meals  melatonin 3 milliGRAM(s) Oral at bedtime  metoprolol tartrate 25 milliGRAM(s) Oral two times a day  polyethylene glycol 3350 17 Gram(s) Oral at bedtime  sodium chloride 0.9% lock flush 3 milliLiter(s) IV Push every 8 hours  traZODone 50 milliGRAM(s) Oral at bedtime    MEDICATIONS  (PRN):  dextrose 40% Gel 15 Gram(s) Oral once PRN Blood Glucose LESS THAN 70 milliGRAM(s)/deciliter  glucagon  Injectable 1 milliGRAM(s) IntraMuscular once PRN Glucose LESS THAN 70 milligrams/deciliter      Allergies    No Known Allergies    Intolerances      Review of Systems:  Constitutional: No fever, chills   Neuro: No tremors, headache   Cardiovascular: No chest pain, palpitations  Respiratory: No SOB, no cough  GI: No nausea, vomiting, abdominal pain  : No dysuria, polyuria   Skin: no rash, ulcers   Psych: no depression, anxiety   Endocrine: no polyphagia, polydipsia     ALL OTHER SYSTEMS REVIEWED AND NEGATIVE        PHYSICAL EXAM:  VITALS: T(C): 36.8 (10-01-20 @ 09:45)  T(F): 98.2 (10-01-20 @ 09:45), Max: 98.2 (10-01-20 @ 09:45)  HR: 61 (10-01-20 @ 09:45) (61 - 87)  BP: 119/56 (10-01-20 @ 09:45) (119/56 - 169/67)  RR:  (16 - 18)  SpO2:  (98% - 100%)  Wt(kg): --  GENERAL: NAD, well-groomed, well-developed  EYES: No proptosis, anicteric  HEENT:  Atraumatic, Normocephalic, moist mucous membranes  RESPIRATORY: Clear to auscultation bilaterally; No rales, rhonchi, wheezing  CARDIOVASCULAR: Regular rate and rhythm; No murmurs  GI: Soft, nontender, non distended, normal bowel sounds  SKIN: Dry, intact, No rashes or lesions  NEURO: AOx3, moves all extremities spontaneously   PSYCH: Reactive affect, euthymic mood    POCT Blood Glucose.: 308 mg/dL (10-01-20 @ 12:28)  POCT Blood Glucose.: 367 mg/dL (10-01-20 @ 08:31)  POCT Blood Glucose.: 220 mg/dL (10-01-20 @ 04:06)  POCT Blood Glucose.: 387 mg/dL (09-30-20 @ 23:51)  POCT Blood Glucose.: 482 mg/dL (09-30-20 @ 21:28)  POCT Blood Glucose.: 435 mg/dL (09-30-20 @ 21:26)  POCT Blood Glucose.: 339 mg/dL (09-30-20 @ 17:49)  POCT Blood Glucose.: 476 mg/dL (09-30-20 @ 12:43)                            10.7   9.28  )-----------( 331      ( 01 Oct 2020 06:30 )             32.4       10-01    128<L>  |  92<L>  |  15  ----------------------------<  239<H>  4.3   |  21<L>  |  0.83    EGFR if : 102  EGFR if non : 88    Ca    9.1      10-01  Mg     2.0     10-01  Phos  2.9     10-01    TPro  7.2  /  Alb  4.1  /  TBili  0.4  /  DBili  x   /  AST  16  /  ALT  17  /  AlkPhos  99  09-30      Thyroid Function Tests:  10-01 @ 06:30 TSH 2.00 FreeT4 1.28 T3 -- Anti TPO -- Anti Thyroglobulin Ab -- TSI --      10-01 Chol 115<L> LDL 58 HDL 47 Trig 69    Hemoglobin A1C     Radiology:

## 2020-10-01 NOTE — CONSULT NOTE ADULT - PROBLEM SELECTOR RECOMMENDATION 3
at goal on lopressor and enalapril    Chicho Soto MD, Endocrine Fellow   Pager  from 9-5PM. After hours and on weekends please call [phone]

## 2020-10-01 NOTE — PROGRESS NOTE ADULT - PROBLEM SELECTOR PLAN 6
Cardio recs appreciated. Metoprolol 25 mg BID, Enalapril 5 mg daily added per Cardio. Hydrochlorothiazide on hold.

## 2020-10-02 NOTE — PROGRESS NOTE ADULT - SUBJECTIVE AND OBJECTIVE BOX
[facility] Division of Hospital Medicine  Ac Tony MD  Pager [phone]      Patient is a 70y old  Male who presents with a chief complaint of Chest pain, hyperglycemia, (01 Oct 2020 10:26)      SUBJECTIVE / OVERNIGHT EVENTS: Improved CP. No SOB    MEDICATIONS  (STANDING):  aspirin enteric coated 81 milliGRAM(s) Oral daily  atorvastatin 80 milliGRAM(s) Oral at bedtime  dextrose 5%. 1000 milliLiter(s) (50 mL/Hr) IV Continuous <Continuous>  dextrose 50% Injectable 12.5 Gram(s) IV Push once  dextrose 50% Injectable 25 Gram(s) IV Push once  dextrose 50% Injectable 25 Gram(s) IV Push once  enalapril 5 milliGRAM(s) Oral daily  heparin   Injectable 5000 Unit(s) SubCutaneous every 12 hours  influenza   Vaccine 0.5 milliLiter(s) IntraMuscular once  insulin glargine Injectable (LANTUS) 20 Unit(s) SubCutaneous every morning  insulin lispro (HumaLOG) corrective regimen sliding scale   SubCutaneous three times a day before meals  insulin lispro (HumaLOG) corrective regimen sliding scale   SubCutaneous at bedtime  insulin lispro Injectable (HumaLOG) 15 Unit(s) SubCutaneous three times a day before meals  melatonin 3 milliGRAM(s) Oral at bedtime  metoprolol tartrate 25 milliGRAM(s) Oral two times a day  polyethylene glycol 3350 17 Gram(s) Oral at bedtime  sodium chloride 0.9% lock flush 3 milliLiter(s) IV Push every 8 hours  traZODone 50 milliGRAM(s) Oral at bedtime    MEDICATIONS  (PRN):  dextrose 40% Gel 15 Gram(s) Oral once PRN Blood Glucose LESS THAN 70 milliGRAM(s)/deciliter  glucagon  Injectable 1 milliGRAM(s) IntraMuscular once PRN Glucose LESS THAN 70 milligrams/deciliter      CAPILLARY BLOOD GLUCOSE      POCT Blood Glucose.: 308 mg/dL (01 Oct 2020 12:28)  POCT Blood Glucose.: 367 mg/dL (01 Oct 2020 08:31)  POCT Blood Glucose.: 220 mg/dL (01 Oct 2020 04:06)  POCT Blood Glucose.: 387 mg/dL (30 Sep 2020 23:51)  POCT Blood Glucose.: 482 mg/dL (30 Sep 2020 21:28)  POCT Blood Glucose.: 435 mg/dL (30 Sep 2020 21:26)  POCT Blood Glucose.: 339 mg/dL (30 Sep 2020 17:49)    I&O's Summary    30 Sep 2020 07:01  -  01 Oct 2020 07:00  --------------------------------------------------------  IN: 236 mL / OUT: 1100 mL / NET: -864 mL        PHYSICAL EXAM:  Vital Signs Last 24 Hrs  T(C): 36.8 (01 Oct 2020 09:45), Max: 36.8 (01 Oct 2020 09:45)  T(F): 98.2 (01 Oct 2020 09:45), Max: 98.2 (01 Oct 2020 09:45)  HR: 61 (01 Oct 2020 09:45) (61 - 87)  BP: 119/56 (01 Oct 2020 09:45) (119/56 - 169/67)  BP(mean): --  RR: 18 (01 Oct 2020 09:45) (16 - 18)  SpO2: 98% (01 Oct 2020 09:45) (98% - 100%)    CONSTITUTIONAL: NAD  EYES: conjunctiva and sclera clear  ENMT: mmm  NECK: Supple,  RESPIRATORY: Normal respiratory effort; lungs are clear to auscultation bilaterally  CARDIOVASCULAR: Regular rate and rhythm, + S1 and S2  ABDOMEN: Nontender to palpation, normoactive bowel sounds, no rebound/guarding  MUSCULOSKELETAL:  no clubbing or cyanosis of digits;   PSYCH: A+O x 3  NEUROLOGY: no gross deficits   SKIN: No rashes;     LABS:                        10.7   9.28  )-----------( 331      ( 01 Oct 2020 06:30 )             32.4     10-01    128<L>  |  92<L>  |  15  ----------------------------<  239<H>  4.3   |  21<L>  |  0.83    Ca    9.1      01 Oct 2020 06:30  Phos  2.9     10-01  Mg     2.0     10-01    TPro  7.2  /  Alb  4.1  /  TBili  0.4  /  DBili  x   /  AST  16  /  ALT  17  /  AlkPhos  99  09-30    PT/INR - ( 30 Sep 2020 13:32 )   PT: 11.1 SEC;   INR: 0.97          PTT - ( 30 Sep 2020 13:32 )  PTT:30.5 SEC  CARDIAC MARKERS ( 01 Oct 2020 06:30 )  x     / x     / 240 u/L / 8.62 ng/mL / x      CARDIAC MARKERS ( 30 Sep 2020 19:47 )  x     / x     / 199 u/L / 7.12 ng/mL / x      CARDIAC MARKERS ( 30 Sep 2020 13:32 )  x     / x     / 248 u/L / 7.82 ng/mL / x          Urinalysis Basic - ( 30 Sep 2020 21:43 )    Color: COLORLESS / Appearance: CLEAR / S.013 / pH: 6.0  Gluc: >1000 / Ketone: NEGATIVE  / Bili: NEGATIVE / Urobili: NORMAL   Blood: NEGATIVE / Protein: NEGATIVE / Nitrite: NEGATIVE   Leuk Esterase: NEGATIVE / RBC: x / WBC x   Sq Epi: x / Non Sq Epi: x / Bacteria: x                
Patient seen and examined at bedside.    Overnight Events: Patient had no complaints from overnight. He no longer has any chest pain or shortness of breath even with walking to the hallway. He does state that he sometimes gets anxiety when he's sitting around doing nothing, but still no chest pain. Blood pressure is better controlled today,  140s/60s.     Current Meds:  aspirin enteric coated 81 milliGRAM(s) Oral daily  dextrose 40% Gel 15 Gram(s) Oral once PRN  dextrose 5%. 1000 milliLiter(s) IV Continuous <Continuous>  dextrose 50% Injectable 12.5 Gram(s) IV Push once  dextrose 50% Injectable 25 Gram(s) IV Push once  dextrose 50% Injectable 25 Gram(s) IV Push once  enalapril 5 milliGRAM(s) Oral daily  glucagon  Injectable 1 milliGRAM(s) IntraMuscular once PRN  heparin   Injectable 5000 Unit(s) SubCutaneous every 12 hours  influenza   Vaccine 0.5 milliLiter(s) IntraMuscular once  insulin glargine Injectable (LANTUS) 20 Unit(s) SubCutaneous every morning  insulin lispro (HumaLOG) corrective regimen sliding scale   SubCutaneous three times a day before meals  insulin lispro (HumaLOG) corrective regimen sliding scale   SubCutaneous at bedtime  melatonin 3 milliGRAM(s) Oral at bedtime  metoprolol tartrate 25 milliGRAM(s) Oral two times a day  polyethylene glycol 3350 17 Gram(s) Oral at bedtime  simvastatin 20 milliGRAM(s) Oral at bedtime  sodium chloride 0.9% lock flush 3 milliLiter(s) IV Push every 8 hours    Vitals:  T(F): 97.3 (10-01), Max: 98.1 ()  HR: 62 (10-01) (62 - 87)  BP: 142/63 (10-01) (142/63 - 169/67)  RR: 17 (10-01)  SpO2: 98% (10-01)  I&O's Summary    30 Sep 2020 07:01  -  01 Oct 2020 07:00  --------------------------------------------------------  IN: 236 mL / OUT: 1100 mL / NET: -864 mL    Physical Exam:  Appearance: No acute distress; well appearing  Eyes:  EOMI, pink conjunctiva  HENT: Normal oral mucosa  Cardiovascular: RRR, S1, S2, no murmurs, rubs, or gallops; no edema; no JVD  Respiratory: Clear to auscultation bilaterally  Gastrointestinal: soft, non-tender, non-distended with normal bowel sounds  Musculoskeletal: No clubbing; no joint deformity   Neurologic: Non-focal  Lymphatic: No lymphadenopathy  Psychiatry: AAOx3, mood & affect appropriate  Skin: No rashes               10.7   9.28  )-----------( 331      ( 01 Oct 2020 06:30 )             32.4     10-    128<L>  |  92<L>  |  15  ----------------------------<  239<H>  4.3   |  21<L>  |  0.83    Ca    9.1      01 Oct 2020 06:30  Phos  2.9     10-  Mg     2.0     10-01    TPro  7.2  /  Alb  4.1  /  TBili  0.4  /  DBili  x   /  AST  16  /  ALT  17  /  AlkPhos  99  09-30    PT/INR - ( 30 Sep 2020 13:32 )   PT: 11.1 SEC;   INR: 0.97          PTT - ( 30 Sep 2020 13:32 )  PTT:30.5 SEC  CARDIAC MARKERS ( 01 Oct 2020 06:30 )  x     / x     / x     / 240 u/L / 8.62 ng/mL / x      CARDIAC MARKERS ( 30 Sep 2020 19:47 )  x     / x     / x     / 199 u/L / 7.12 ng/mL / x      CARDIAC MARKERS ( 30 Sep 2020 13:32 )  x     / x     / x     / 248 u/L / 7.82 ng/mL / x        Total Cholesterol: 115  LDL: 58  HDL: 47  T      ECG: Personally reviewed: NSR, early R wave progression, no ST/T wave abnormalities    Echo: Personally reviewed:  ec< from: Transthoracic Echocardiogram (10.09.17 @ 08:26) >  1. Mitral annular calcification, otherwise normal mitral  valve. Minimal mitral regurgitation.  2. Normal left ventricular internal dimensions and wall  thicknesses.  3. Endocardium notwell visualized; grossly normal left  ventricular systolic function.  4. The right ventricle is not well visualized; grossly  normal right ventricular systolic function.  ------------------------------------------------------------------------  Confirmed on  10/9/2017 - 10:05:21 by Michele Childers M.D.  ------------------------------------------------------------------------    < end of copied text >    Stress Testing:  < from: Nuclear Stress Test-Pharmacologic (10.09.17 @ 09:53) >  * Myocardial Perfusion SPECT results are normal.  * Normal myocardial perfusion scan,with no evidence of  infarction or inducible ischemia.  * Post-stress gated wall motion analysis was performed  (LVEF > 70%;LVEDV = 62 ml.), revealing normal LV function.  ------------------------------------------------------------------------  Confirmed on  10/9/2017 - 14:55:56 by Ignacio South M.D.  ------------------------------------------------------------------------    < end of copied text >    Cath:  `< from: Cardiac Cath Lab - Adult (14 @ 16:27) >  DIAGNOSTIC RECOMMENDATIONS:  1. Successful PCI to the pLAD (3.0mm Promus Premier ROWENA and 2.75mm Promus  Premier ROWENA) in the setting of unstable angina with a positive FFR.  2. Aspirin 81mg daily indefinitely and clopidogrel 75mg daily for at least  one year, uninterrupted.  INTERVENTIONAL RECOMMENDATIONS:  1. Successful PCI to the pLAD (3.0mm Promus Premier ROWENA and 2.75mm Promus  Premier ROWENA) in the setting of unstable angina with a positive FFR.  2. Aspirin 81mg daily indefinitely and clopidogrel 75mg daily forat least  one year, uninterrupted.  Prepared and signed by  Jeff Franz M.D.    < end of copied text >    Interpretation of Telemetry: NSR      
[facility] Division of [facility] Medicine  Lenin Flores MD  In House Pager [phone]    Patient is a 70y old  Male who presents with a chief complaint of Chest pain, hyperglycemia, (01 Oct 2020 16:04)      SUBJECTIVE / OVERNIGHT EVENTS:  No overnight events. Patient seen and examined at bedside, labs and vitals reviewed.  Patient have no complaints, no cp or SOB overnight. He had echo done with nl LVEF.   No fever, no chills, no SOB, no CP, no n/v/d, no abd pain, no dysuria      MEDICATIONS  (STANDING):  aspirin enteric coated 81 milliGRAM(s) Oral daily  atorvastatin 80 milliGRAM(s) Oral at bedtime  dextrose 5%. 1000 milliLiter(s) (50 mL/Hr) IV Continuous <Continuous>  dextrose 50% Injectable 12.5 Gram(s) IV Push once  dextrose 50% Injectable 25 Gram(s) IV Push once  dextrose 50% Injectable 25 Gram(s) IV Push once  enalapril 5 milliGRAM(s) Oral daily  heparin   Injectable 5000 Unit(s) SubCutaneous every 12 hours  influenza   Vaccine 0.5 milliLiter(s) IntraMuscular once  insulin glargine Injectable (LANTUS) 30 Unit(s) SubCutaneous every morning  insulin lispro (HumaLOG) corrective regimen sliding scale   SubCutaneous three times a day before meals  insulin lispro (HumaLOG) corrective regimen sliding scale   SubCutaneous at bedtime  insulin lispro Injectable (HumaLOG) 7 Unit(s) SubCutaneous three times a day before meals  melatonin 3 milliGRAM(s) Oral at bedtime  metoprolol tartrate 25 milliGRAM(s) Oral two times a day  polyethylene glycol 3350 17 Gram(s) Oral at bedtime  sodium chloride 0.9% lock flush 3 milliLiter(s) IV Push every 8 hours  traZODone 50 milliGRAM(s) Oral at bedtime    MEDICATIONS  (PRN):  dextrose 40% Gel 15 Gram(s) Oral once PRN Blood Glucose LESS THAN 70 milliGRAM(s)/deciliter  glucagon  Injectable 1 milliGRAM(s) IntraMuscular once PRN Glucose LESS THAN 70 milligrams/deciliter    CAPILLARY BLOOD GLUCOSE      POCT Blood Glucose.: 189 mg/dL (02 Oct 2020 08:55)  POCT Blood Glucose.: 265 mg/dL (01 Oct 2020 21:57)  POCT Blood Glucose.: 311 mg/dL (01 Oct 2020 17:28)  POCT Blood Glucose.: 308 mg/dL (01 Oct 2020 12:28)    I&O's Summary    01 Oct 2020 07:01  -  02 Oct 2020 07:00  --------------------------------------------------------  IN: 1236 mL / OUT: 600 mL / NET: 636 mL        PHYSICAL EXAM:  Vital Signs Last 24 Hrs  T(C): 36.3 (02 Oct 2020 09:30), Max: 36.9 (01 Oct 2020 12:45)  T(F): 97.4 (02 Oct 2020 09:30), Max: 98.5 (01 Oct 2020 16:45)  HR: 62 (02 Oct 2020 09:30) (62 - 72)  BP: 109/53 (02 Oct 2020 09:30) (109/53 - 167/75)  BP(mean): --  RR: 16 (02 Oct 2020 09:30) (16 - 19)  SpO2: 100% (02 Oct 2020 09:30) (98% - 100%)  Gen: NAD; resting in bed.  Pulm: no respiratory distress; CTA b/l; no wheezing  Cards: RRR, nl S1/S2; no obvious murmurs  Abd: soft; NT on exam  Ext: no cyanosis; no edema  Skin: no rash; no cyanosis    LABS:                        11.1   8.85  )-----------( 341      ( 02 Oct 2020 07:08 )             33.7     10-02    133<L>  |  97<L>  |  13  ----------------------------<  208<H>  3.9   |  23  |  0.86    Ca    9.6      02 Oct 2020 07:08  Phos  2.9     10-01  Mg     2.2     10-02    TPro  7.2  /  Alb  4.1  /  TBili  0.4  /  DBili  x   /  AST  16  /  ALT  17  /  AlkPhos  99  09-30    PT/INR - ( 30 Sep 2020 13:32 )   PT: 11.1 SEC;   INR: 0.97          PTT - ( 30 Sep 2020 13:32 )  PTT:30.5 SEC  CARDIAC MARKERS ( 02 Oct 2020 07:08 )  x     / x     / 279 u/L / 10.15 ng/mL / x      CARDIAC MARKERS ( 01 Oct 2020 06:30 )  x     / x     / 240 u/L / 8.62 ng/mL / x      CARDIAC MARKERS ( 30 Sep 2020 19:47 )  x     / x     / 199 u/L / 7.12 ng/mL / x      CARDIAC MARKERS ( 30 Sep 2020 13:32 )  x     / x     / 248 u/L / 7.82 ng/mL / x          Urinalysis Basic - ( 30 Sep 2020 21:43 )    Color: COLORLESS / Appearance: CLEAR / S.013 / pH: 6.0  Gluc: >1000 / Ketone: NEGATIVE  / Bili: NEGATIVE / Urobili: NORMAL   Blood: NEGATIVE / Protein: NEGATIVE / Nitrite: NEGATIVE   Leuk Esterase: NEGATIVE / RBC: x / WBC x   Sq Epi: x / Non Sq Epi: x / Bacteria: x          RADIOLOGY & ADDITIONAL TESTS:  Results Reviewed: Y  Imaging Personally Reviewed: Y  Electrocardiogram Personally Reviewed: Y    COORDINATION OF CARE:  Care Discussed with Consultants/Other Providers [Y/N]: Y  Prior or Outpatient Records Reviewed [Y/N]: Y  
    Chief Complaint: DM2    History:  tolerating po  ate breakfast prior to insulin given at breakfast per RN  for discharge home today    MEDICATIONS  (STANDING):  aspirin enteric coated 81 milliGRAM(s) Oral daily  atorvastatin 80 milliGRAM(s) Oral at bedtime  dextrose 5%. 1000 milliLiter(s) (50 mL/Hr) IV Continuous <Continuous>  dextrose 50% Injectable 12.5 Gram(s) IV Push once  dextrose 50% Injectable 25 Gram(s) IV Push once  dextrose 50% Injectable 25 Gram(s) IV Push once  enalapril 5 milliGRAM(s) Oral daily  heparin   Injectable 5000 Unit(s) SubCutaneous every 12 hours  influenza   Vaccine 0.5 milliLiter(s) IntraMuscular once  insulin glargine Injectable (LANTUS) 30 Unit(s) SubCutaneous every morning  insulin lispro (HumaLOG) corrective regimen sliding scale   SubCutaneous three times a day before meals  insulin lispro (HumaLOG) corrective regimen sliding scale   SubCutaneous at bedtime  insulin lispro Injectable (HumaLOG) 7 Unit(s) SubCutaneous three times a day before meals  melatonin 3 milliGRAM(s) Oral at bedtime  metoprolol tartrate 25 milliGRAM(s) Oral two times a day  polyethylene glycol 3350 17 Gram(s) Oral at bedtime  sodium chloride 0.9% lock flush 3 milliLiter(s) IV Push every 8 hours  traZODone 50 milliGRAM(s) Oral at bedtime    MEDICATIONS  (PRN):  dextrose 40% Gel 15 Gram(s) Oral once PRN Blood Glucose LESS THAN 70 milliGRAM(s)/deciliter  glucagon  Injectable 1 milliGRAM(s) IntraMuscular once PRN Glucose LESS THAN 70 milligrams/deciliter      Allergies    No Known Allergies    Intolerances      Review of Systems:    ALL OTHER SYSTEMS REVIEWED AND NEGATIVE    PHYSICAL EXAM:  VITALS: T(C): 36.6 (10-02-20 @ 13:30)  T(F): 97.8 (10-02-20 @ 13:30), Max: 98.5 (10-01-20 @ 16:45)  HR: 60 (10-02-20 @ 13:30) (60 - 72)  BP: 137/57 (10-02-20 @ 13:30) (109/53 - 167/75)  RR:  (16 - 19)  SpO2:  (98% - 100%)  Wt(kg): --  GENERAL: NAD, well-groomed, well-developed  EYES: No proptosis, no lid lag, anicteric  HEENT:  Atraumatic, Normocephalic, moist mucous membranes  RESPIRATORY: nonlabored respirations  PSYCH: Alert and oriented x 3, normal affect, normal mood    CAPILLARY BLOOD GLUCOSE      POCT Blood Glucose.: 315 mg/dL (02 Oct 2020 12:41)  POCT Blood Glucose.: 189 mg/dL (02 Oct 2020 08:55)  POCT Blood Glucose.: 265 mg/dL (01 Oct 2020 21:57)  POCT Blood Glucose.: 311 mg/dL (01 Oct 2020 17:28)      10-02    133<L>  |  97<L>  |  13  ----------------------------<  208<H>  3.9   |  23  |  0.86    EGFR if : 100  EGFR if non : 87    Ca    9.6      10-02  Mg     2.2     10-02  Phos  2.9     10-01    TPro  7.2  /  Alb  4.1  /  TBili  0.4  /  DBili  x   /  AST  16  /  ALT  17  /  AlkPhos  99  09-30      A1C with Estimated Average Glucose: 9.7 % (10-01-20 @ 06:30)      Thyroid Function Tests:  10-01 @ 06:30 TSH 2.00 FreeT4 1.28 T3 -- Anti TPO -- Anti Thyroglobulin Ab -- TSI --

## 2020-10-02 NOTE — DIETITIAN INITIAL EVALUATION ADULT. - ADD RECOMMEND
1- Continue current diet order, which remains appropriate at this time. 2- Monitor weights, labs, BM's, skin integrity, p.o. intake. 3- Suggest outpatient follow up with an endocrinologist to ensure long-term DM diet comprehension and compliance. 4- RD remains available, re-consult as needed.

## 2020-10-02 NOTE — DISCHARGE NOTE PROVIDER - HOSPITAL COURSE
71 y/o M with PMH of CAD (s/p CABG x2 in 2012 and Stent x2 in 2014), HTN, Type 2 Diabetes on insulin presents to the ED complaining of chest pain lasting 3 days, admitted for rule out ACS. No events on tele, continue monitoring, No EKG changes. Troponins indeterminate, but relatively flat. House Cardiology consulted, no need for heparin gtt or plavix load at this time. Patient no longer complaining of chest pain. Echocardiogram showed EF 64%, concentric LVH, normal LV systolic function, no wall motion abnormalities. Patient to follow up for outpatient stress test. Continue aspirin, Metoprolol 25 mg BID, Atorvastatin increased to 80mg daily. Enalapril 5mg daily added per Cardiology recommendations for better BP control as HTN likely contributed to patient's chest pain. Patient also with uncontrolled diabetes with hyperglycemia, so Endocrine was consulted and adjusted his insulin regimen. Patient noted with hyponatremia and HCTZ held with improvement in sodium level; pt to follow up outpatient for further monitoring.    Case discussed with Dr. Flores and patient is medically stable for discharge home. 69 y/o M with PMH of CAD (s/p CABG x2 in 2012 and Stent x2 in 2014), HTN, Type 2 Diabetes on insulin presents to the ED complaining of chest pain lasting 3 days, admitted for rule out ACS. No events on tele, continue monitoring, No EKG changes. Troponins indeterminate, but relatively flat. House Cardiology consulted, no need for heparin gtt or plavix load at this time. Patient no longer complaining of chest pain. Echocardiogram showed EF 64%, concentric LVH, normal LV systolic function, no wall motion abnormalities. Patient to follow up for outpatient stress test. Continue aspirin, Metoprolol 25 mg BID, Atorvastatin increased to 80mg daily. Enalapril 5mg daily added per Cardiology recommendations for better BP control as HTN likely contributed to patient's chest pain. Patient also with uncontrolled diabetes with hyperglycemia, so Endocrine was consulted and adjusted his insulin regimen (Lantus 30U sq QHS and Humalog 12U sq TIDAC). Patient noted with hyponatremia and HCTZ held with improvement in sodium level; pt to follow up outpatient for further monitoring.    Case discussed with Dr. Flores and patient is medically stable for discharge home. Patient has a Cardiology appointment scheduled with Dr. Pratt on 11/18/20 at 11:45AM.

## 2020-10-02 NOTE — DISCHARGE NOTE PROVIDER - NSDCFUSCHEDAPPT_GEN_ALL_CORE_FT
SHAHZAD FIELD ; 11/18/2020 ; [facility] [id number]  SHAHZAD FIELD ; 12/02/2020 ; [facility] [address]

## 2020-10-02 NOTE — DIETITIAN INITIAL EVALUATION ADULT. - PROBLEM SELECTOR PLAN 1
Admit to tele, continue monitoring, Cardio recs appreciated.   , CKMB 7.82, CKMB Index 3.2. Repeat troponin and CKMB ordered. Repeat EKG ordered: NSR, no changes.  Per Cardio continue trending troponin until peak,  If patient has chest pain overnight, would give SL NTG, if it helps but chest pain reoccurs would then try nitroglycerin paste.  Cardiology called regarding CK,CKMB: Given Troponin relatively flat and CKMB trending no need for heparin drip or plavix load.

## 2020-10-02 NOTE — PROGRESS NOTE ADULT - ASSESSMENT
69 y/o M with PMH of CAD (s/p CABG x2 in 2012 and Stent x2 in 2014), HTN, Type 2 Diabetes on insulin presents to the ED complaining of chest pain lasting 3 days admitted for cardiac workup. Consulted for T2DM uncontrolled HbA1c 9.7%.    Problem/Recommendation - 1:  Problem: Type 2 diabetes mellitus with hyperglycemia, with long-term current use of insulin. Recommendation: T2DM uncontrolled with hgb a1c of 9.7% due to diet nonadherence and no insulin with lunch or dinner time.   While inpatient:  -continue Lantus 30 units qAM   -Increase Humalog to 12 units tidac   -change humalog correction scale to moderate tidac an dqhs  -nutrition consult    discharge  Basal/bolus insulin. Patient willing to do insulin 4 times  day.   Basaglar pen 30 units qhs (next dose tomorrow night since received dose this AM)  Novolog pen 12/12/12   on taking Novolog and then eating afterwards.  [address] [facility] 3rd floor   [facility] [address]  [phone].    Problem/Recommendation - 2:  ·  Problem: Other hyperlipidemia.  Recommendation: continue lipitor LDL 58 close to goal.     Problem/Recommendation - 3:  ·  Problem: Essential hypertension.  Recommendation: at goal on lopressor and enalapril

## 2020-10-02 NOTE — PROGRESS NOTE ADULT - PROBLEM SELECTOR PROBLEM 1
Type 2 diabetes mellitus with hyperglycemia, with long-term current use of insulin
R/O ACS (acute coronary syndrome)
R/O ACS (acute coronary syndrome)

## 2020-10-02 NOTE — DIETITIAN INITIAL EVALUATION ADULT. - PERTINENT MEDS FT
MEDICATIONS  (STANDING):  aspirin enteric coated 81 milliGRAM(s) Oral daily  atorvastatin 80 milliGRAM(s) Oral at bedtime  dextrose 5%. 1000 milliLiter(s) (50 mL/Hr) IV Continuous <Continuous>  dextrose 50% Injectable 12.5 Gram(s) IV Push once  dextrose 50% Injectable 25 Gram(s) IV Push once  dextrose 50% Injectable 25 Gram(s) IV Push once  enalapril 5 milliGRAM(s) Oral daily  heparin   Injectable 5000 Unit(s) SubCutaneous every 12 hours  influenza   Vaccine 0.5 milliLiter(s) IntraMuscular once  insulin glargine Injectable (LANTUS) 30 Unit(s) SubCutaneous every morning  insulin lispro (HumaLOG) corrective regimen sliding scale   SubCutaneous three times a day before meals  insulin lispro (HumaLOG) corrective regimen sliding scale   SubCutaneous at bedtime  insulin lispro Injectable (HumaLOG) 7 Unit(s) SubCutaneous three times a day before meals  melatonin 3 milliGRAM(s) Oral at bedtime  metoprolol tartrate 25 milliGRAM(s) Oral two times a day  polyethylene glycol 3350 17 Gram(s) Oral at bedtime  sodium chloride 0.9% lock flush 3 milliLiter(s) IV Push every 8 hours  traZODone 50 milliGRAM(s) Oral at bedtime    MEDICATIONS  (PRN):  dextrose 40% Gel 15 Gram(s) Oral once PRN Blood Glucose LESS THAN 70 milliGRAM(s)/deciliter  glucagon  Injectable 1 milliGRAM(s) IntraMuscular once PRN Glucose LESS THAN 70 milligrams/deciliter

## 2020-10-02 NOTE — DIETITIAN INITIAL EVALUATION ADULT. - PROBLEM SELECTOR PLAN 10
Transitions of Care Status:  1.  Name of PCP: Nki Hensley.  2.  PCP Contacted on Admission: [ ] Y    [ ] N    3.  PCP contacted at Discharge: [ ] Y    [ ] N    [ ] N/A  4.  Post-Discharge Appointment Date and Location:  5.  Summary of Handoff given to PCP:

## 2020-10-02 NOTE — PROGRESS NOTE ADULT - REASON FOR ADMISSION
Chest pain, hyperglycemia,

## 2020-10-02 NOTE — DIETITIAN INITIAL EVALUATION ADULT. - PROBLEM SELECTOR PLAN 2
Patient states that he is on admelog 20 units before breakfast and 40 units of lantus before dinner. Patient reports missing am dose of insulin. Patient received 20 units of Lantus this afternoon. Patient placed on low dose insulin sliding scale. Discussed with Dr. Gan, will order 20 units of Lantus, to be given in AM. Endo consult emailed.

## 2020-10-02 NOTE — DIETITIAN INITIAL EVALUATION ADULT. - PERTINENT LABORATORY DATA
10-02 Na133 mmol/L<L> Glu 208 mg/dL<H> K+ 3.9 mmol/L Cr  0.86 mg/dL BUN 13 mg/dL 10-01 Phos 2.9 mg/dL 09-30 Alb 4.1 g/dL 10-01 Chol 115 mg/dL<L> LDL 58 mg/dL HDL 47 mg/dL Trig 69 mg/dL    A1C with Estimated Average Glucose: 9.7 %

## 2020-10-02 NOTE — DISCHARGE NOTE NURSING/CASE MANAGEMENT/SOCIAL WORK - PATIENT PORTAL LINK FT
You can access the FollowMyHealth Patient Portal offered by [facility] by registering at the following website: [email or web address]. By joining [facility]’s FollowMyHealth portal, you will also be able to view your health information using other applications (apps) compatible with our system.

## 2020-10-02 NOTE — PROGRESS NOTE ADULT - PROBLEM SELECTOR PLAN 1
No events on tele, continue monitoring, No EKG changes,   Cardio recs appreciated. Plan to f/u TTE and outpt Stress test  If patient has chest pain overnight, would give SL NTG, if it helps but chest pain reoccurs would then try nitroglycerin paste.  Cardiology called regarding CK,CKMB: Given Troponin relatively flat and CKMB trending no need for heparin drip or plavix load.  echo repeated on 10/2 consistent with priro finding, no new WMA. EF >60%  outpatient stress test per cardiology  stable for discharge from medicine perspective.

## 2020-10-02 NOTE — DIETITIAN INITIAL EVALUATION ADULT. - OTHER INFO
Patient is a 71 y/o M with PMH of CAD (s/p CABG x2 in 2012 and Stent x2 in 2014), HTN, Type 2 Diabetes on insulin presents to the ED complaining of chest pain lasting 3 days. R/O ACS.  RDN met with patient at bedside, he is Icelandic Speaking but able to communicate in English which is preferred over use of  phone.  He is with reports of good PO intake and appetite.  Patient denies any nausea/vomiting/diarrhea/constipation or difficulty chewing and swallowing. Pt endorses taking insulin prior to admission, but states he takes it twice daily. RD provided the patient with extensive DM diet education; including, carb counting, label reading, and meal planning. Carbohydrate sources extensively reviewed and better choices encouraged. Mixed meals also encouraged to promote glycemic control. Written materials provided on all topics discussed.  Suggest outpatient follow up with an Endocrinologist and Dietitian to ensure long-term DM diet comprehension and compliance. RD remains available, re-consult as needed.

## 2020-10-02 NOTE — DISCHARGE NOTE PROVIDER - NSDCCPCAREPLAN_GEN_ALL_CORE_FT
PRINCIPAL DISCHARGE DIAGNOSIS  Diagnosis: Stable angina  Assessment and Plan of Treatment: High blood pressure likely contributed to your chest pain. Cardiology team saw you this admission and recommended Metoprolol 25mg twice a day and added Enalapril 5mg daily. Hydrochlorothiazide is on hold as this can cause low sodium levels. Your blood pressure is better controlled and your chest pain resolved. Follow up with your Cardiologist within 1-2 weeks to schedule an outpatient stress test.      SECONDARY DISCHARGE DIAGNOSES  Diagnosis: Type 2 diabetes mellitus with hyperglycemia, with long-term current use of insulin  Assessment and Plan of Treatment: Your blood sugars were elevated this admission. The Endocrinology team saw you this admission and adjusted your insulin regimen. Monitor finger sticks pre-meal and bedtime, low salt, fat and carbohydrate diet, minimize glucose intake.  Exercise daily for at least 30 minutes and weight loss.  Follow up with primary care physician and endocrinologist for routine Hemoglobin A1C checks and management.  Follow up with your ophthalmologist for routine yearly vision exams.    Diagnosis: CAD (coronary artery disease)  Assessment and Plan of Treatment: Continue aspirin. Atorvastatin was increased to 80mg daily. Continue blood pressure medications as stated above.  Continue low salt, fat, cholesterol and carbohydrate diet. Follow up with your Cardiologist within 1-2 weeks for outpatient stress test.    Diagnosis: Hyponatremia  Assessment and Plan of Treatment: Your sodium levels were low this admission, likely due to Hydrochlorothiazide, which is now being held. Follow up with your Primary Care Physician within 1-2 weeks to monitor your sodium levels.    Diagnosis: Essential hypertension  Assessment and Plan of Treatment: Low sodium and fat diet. Continue Metoprolol 25mg twice a day and Enalapril 5mg daily. Follow up with your Primary Care Physician and Cardiologist within 1-2 weeks for further management.     PRINCIPAL DISCHARGE DIAGNOSIS  Diagnosis: Stable angina  Assessment and Plan of Treatment: High blood pressure likely contributed to your chest pain. The Cardiology team saw you this admission and recommended Metoprolol 25mg twice a day and added Enalapril 5mg daily. Hydrochlorothiazide was discontinued as this can cause low sodium levels. Your blood pressure is better controlled and your chest pain resolved. Follow up with your Cardiologist, Dr. Pratt, within 1-2 weeks to discuss scheduling an outpatient stress test. If stress test is abnormal, you will likely need an angiogram. You have an appointment scheduled with Dr. Pratt on 11/18/20 at 11:45AM.      SECONDARY DISCHARGE DIAGNOSES  Diagnosis: Type 2 diabetes mellitus with hyperglycemia, with long-term current use of insulin  Assessment and Plan of Treatment: Your blood sugars were elevated this admission. The Endocrinology team saw you this admission and adjusted your insulin regimen. Continue Basaglar 30 units at bedtime (starting tomorrow night, 10/3/20, as you already received a dose this morning). Continue Admelog 12 units three times a day before meals. Monitor finger sticks pre-meal and bedtime, low salt, fat and carbohydrate diet, minimize glucose intake.  Exercise daily for at least 30 minutes and weight loss.  Follow up with Endocrinology for routine Hemoglobin A1C checks and management, office located at [address] [place] [address]; call [phone] for appointment.  Follow up with your ophthalmologist for routine yearly vision exams.    Diagnosis: CAD (coronary artery disease)  Assessment and Plan of Treatment: Continue aspirin. Atorvastatin was increased to 80mg daily. Continue blood pressure medications as stated above.  Continue low salt, fat, cholesterol and carbohydrate diet. Follow up with your Cardiologist within 1-2 weeks for outpatient stress test.    Diagnosis: Hyponatremia  Assessment and Plan of Treatment: Your sodium levels were low this admission, likely due to Hydrochlorothiazide, which is now being held. Follow up with your Primary Care Physician within 1-2 weeks to monitor your sodium levels.    Diagnosis: Essential hypertension  Assessment and Plan of Treatment: Low sodium and fat diet. Continue Metoprolol 25mg twice a day and Enalapril 5mg daily. Follow up with your Primary Care Physician and Cardiologist within 1-2 weeks for further management.

## 2020-10-02 NOTE — DISCHARGE NOTE PROVIDER - CARE PROVIDER_API CALL
Nik Mays  INTERNAL MEDICINE  [address], [place] [id number]  Phone: [phone]  Fax: [phone]  Follow Up Time:    Nik Mays  INTERNAL MEDICINE  [address], [place] [id number]  Phone: [phone]  Fax: [phone]  Follow Up Time:     Endocrinology,   [address] [facility] [address] [facility] [address]  Phone: [phone]  Fax: (   )    -  Follow Up Time:     Casimiro Pratt  CARDIOVASCULAR DISEASE  [address] [place] [address]  Phone: [phone]  Fax: [phone]  Follow Up Time: 2 weeks

## 2020-10-02 NOTE — PROGRESS NOTE ADULT - PROBLEM SELECTOR PLAN 2
Patient states that he is on admelog 20 units before breakfast and 40 units of lantus before dinner  f/u endo recs for discharge regimen.

## 2020-10-02 NOTE — DISCHARGE NOTE PROVIDER - CARE PROVIDERS DIRECT ADDRESSES
,DirectAddress_Unknown ,DirectAddress_Unknown,DirectAddress_Unknown,[email or web address]

## 2020-10-02 NOTE — DISCHARGE NOTE PROVIDER - NSDCMRMEDTOKEN_GEN_ALL_CORE_FT
Admelog SoloStar 100 units/mL injectable solution: 20 unit(s) injectable 3 times a day (before meals)  aspirin 81 mg oral delayed release tablet: 1 tab(s) orally once a day  Basaglar KwikPen 100 units/mL subcutaneous solution: 40 unit(s) subcutaneous once a day (at bedtime)  hydroCHLOROthiazide 25 mg oral tablet: 1 tab(s) orally once a day  Metoprolol Tartrate 50 mg oral tablet: 1 tab(s) orally 2 times a day  simvastatin 20 mg oral tablet: 1 tab(s) orally once a day (at bedtime)  traZODone 50 mg oral tablet: 1 tab(s) orally once a day (at bedtime)   Admelog SoloStar 100 units/mL injectable solution: 12 unit(s) injectable 3 times a day (before meals)   aspirin 81 mg oral delayed release tablet: 1 tab(s) orally once a day  atorvastatin 80 mg oral tablet: 1 tab(s) orally once a day (at bedtime)  Basaglar KwikPen 100 units/mL subcutaneous solution: 30 unit(s) subcutaneous once a day (at bedtime)   enalapril 5 mg oral tablet: 1 tab(s) orally once a day  melatonin 3 mg oral tablet: 1 tab(s) orally once a day (at bedtime)  metoprolol tartrate 25 mg oral tablet: 1 tab(s) orally 2 times a day  polyethylene glycol 3350 oral powder for reconstitution: 17 gram(s) orally once a day (at bedtime)  traZODone 50 mg oral tablet: 1 tab(s) orally once a day (at bedtime)

## 2020-10-02 NOTE — DISCHARGE NOTE PROVIDER - PROVIDER TOKENS
PROVIDER:[TOKEN:[[id number]]] PROVIDER:[TOKEN:[[id number]]],FREE:[LAST:[Endocrinology],PHONE:[phone]],FAX:[(   )    -],ADDRESS:[[address] [place] [address]]],PROVIDER:[TOKEN:[[id number]],FOLLOWUP:[2 weeks]]

## 2020-10-02 NOTE — PROGRESS NOTE ADULT - ATTENDING COMMENTS
70 year old man with known CAD s/p CABG and pLAD PCI (2014), uncontrolled type 2 diabetes on insulin, uncontrolled hypertension who presented with chest pain in setting of uncontrolled BP. Troponin x2 negative. ECG no ischemic changes. Chest pain improved this morning as is the blood pressure. He reports pain somewhat improved with ambulation.    Needs management of diabetes  agree with above recs for BP management  continue aspirin  change simvastatin to Lipitor 80 or rosuvastatin 40.
José Miguel Trujillo MD  Division of Endocrinology  Pager: [phone]    If after 6PM or before 9AM, or on weekends/holidays, please call endocrine answering service for assistance ([phone]).  For nonurgent matters email [email or web address] for assistance.
45 minutes spent coordinating discharge

## 2020-10-02 NOTE — DISCHARGE NOTE PROVIDER - NSDCFUADDAPPT_GEN_ALL_CORE_FT
Please follow up with Dr. Pratt in 1-2 weeks to discuss outpatient stress test. You also have an appointment scheduled with him on 11/18/20 at 11:45AM.

## 2020-11-13 NOTE — HISTORY OF PRESENT ILLNESS
[FreeTextEntry1] : This  is a 70-year-old right-handed male who presents with chief complaints of tremors\par At this visit he is accompanied by his grandson Washington\par \par He reports gradual onset of tremors for a few months now. The tremors initially started in his head and  progressed to involve his hands. The tremor is present at rest and is equal in both sides. not with activity. He does feel slow in the activities. He has not had any falls or any changes in balance. He reports that when he lays down he feels vertiginous and dizzy with neck extension. Does not have any dizziness on standing up.\par He was experiencing drooling, constipation and has had difficulty with controlling blood pressure for a few years. It fluctuates between being high and low. no urinary incontinance\par His memory is mildly affected, denies any hallucinations\par Insomnia-difficulty falling asleep, no vivid dreams, sometimes talks in his sleep but no dream enactment\par He reports neck pain but no restriction of neck movement\par No change in handwriting\par \par Patient states that about 2 weeks ago his primary care physician started him on Sinemet. He was asked to take it 3 times a day but he takes it only twice a day because he feels his tremors get worse after the medication he denies any side effects \par \par Denies any weakness. Denies any other neurological complaints\par Review of systems-a complete review of systems is performed and is negative except as listed in history of present illness\par \par Past medical history\par Cardiac stents\par Hypertension\par Diabetes\par Hyperlipidemia\par \par Family history\par Maternal grandfather had tremors\par \par

## 2020-11-13 NOTE — PHYSICAL EXAM
[FreeTextEntry1] : Extraocular movements are intact\par Face is symmetric tongue and uvula are midline and symmetric\par Speech is clear and understandable\par \par Irregular head tremor is present, some restriction of neck movement but equal bilaterally, no worsening of tremor with turning in either direction\par \par Grade 1 resting tremor in right upper extremity zero on the left\par Bilateral bradykinesia\par Head and opening and closing 2 on the right, one on the left\par Rapid alternating movements one bilaterally\par Finger taps 2 on the right one on the left\par Neck rigidity normal bilaterally\par \par tone grade 1 rigidity on the right normal on the left\par Tone tone in the neck\par No postural or action tremors are seen, no dysmetria or incoordination with hand movement\par Difficulty getting up from chair\par Posture is slightly stooped, reduced left arm swing, small steps\par Unable to tandem walk\par \par Deep tendon reflexes are 1+ symmetric\par \par Drop in 21 points of systolic blood pressure on standing up

## 2020-11-13 NOTE — DISCUSSION/SUMMARY
[FreeTextEntry1] : This is a 70-year-old right-handed man who presents with chief complaints of tremors which began a few months ago initially with head tremors progressing to involve the upper extremities. Patient also reports slowness of daily activities, drooling, constipation, blood pressure fluctuations and mild cognitive changes. He denies any difficulty with balance, no falls, no urinary incontinence, no hallucinations.\par He was recently started on Sinemet and feels that it makes his symptoms worse\par On exam he is noted to have an irregular head tremor with no posturing and slight restriction of neck movements from side to side. Resting tremor and bradykinesia right slightly worse than left. He has difficulty with tandem walk.\par \par Impression-parkinsonism, a few red flags such as reportedly no response to meds, head tremor, gait ataxia, BP fluctuation. mild cognitive changes, dizziness, neck pain\par \par Plan\par MRI brain, MRA head and neck - new onset of tremor, bradykinesia, dizziness with neck extension - r/o CVA, vascular compromise, intracranial causes - has difficulty laying flat - stand up MRI\par MRI Cspine - reports neck pain\par Bienvenido scan - after MRI\par continue sinemet - takes twice a day, monitor for any improvement in sx\par monitor BP - 21 point drop in sbp on standing up today\par f/u in 1 month

## 2020-11-19 NOTE — REASON FOR VISIT
[Follow-Up - Clinic] : a clinic follow-up of [Coronary Artery Disease] : coronary artery disease [Hypertension] : hypertension [FreeTextEntry1] : Since his last visit, the patient underwent stress testing which was normal.  He no longer has chest discomfort.  His blood pressure has been mildly elevated and he is not on an ACE or an ARB.\par \par 1.  Hypertension.  Given his diabetes and suboptimal blood pressure control, I started losartan 25 mg daily.  His creatinine, which was checked in October and the hospital, was normal.\par \par 2.  CAD.  There are no signs of angina, and his recent stress test in October was normal.\par \par Follow-up 6 months\par

## 2020-11-19 NOTE — ASSESSMENT
[FreeTextEntry1] : 1.  Hypertension.  Given his diabetes and suboptimal blood pressure control, I started losartan 25 mg daily.  His creatinine, which was checked in October and the hospital, was normal.\par \par 2.  CAD.  There are no signs of angina, and his recent stress test in October was normal.\par \par Follow-up 6 months\par

## 2020-12-02 PROBLEM — K59.09 CHRONIC CONSTIPATION: Status: ACTIVE | Noted: 2018-03-20

## 2020-12-02 NOTE — HISTORY OF PRESENT ILLNESS
[Heartburn] : denies heartburn [Nausea] : denies nausea [Vomiting] : denies vomiting [Constipation] : constipation worsened [Yellow Skin Or Eyes (Jaundice)] : denies jaundice [_________] : Performed [unfilled] [de-identified] : Laurel presents to the office today with his grandson for a second opinion on constipation.  He was previously followed by Dr. Macias.\par \par The patient reports worsening gradual constipation for several years.  Previously, he had formed stools daily but he can now go 5-6 days without a BM.  He attempts to sit on the toilet daily but nothing happens after straining.  He has tried many things in the past for constipation with varying results.  Per chart review, he has tried Miralax, Senna, lactulose, Linzess, Trulance, Amitiza, and Colace.  He currently using lactulose and milk of magnesia in addition to enemas at home and has also presented to the ER for constipation requiring enemas.  He underwent colonoscopies with Dr. Macias in April 2018 and August 2020 which showed hemorrhoids but the preparation was fair both times.  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  An abdominal US in 2019 was normal.  The patient has had diabetes for over 20 years and is currently being evaluated for parkinsonism after developing tremors this year. [de-identified] : hemorhoids, fair prep

## 2020-12-02 NOTE — ASSESSMENT
[FreeTextEntry1] : 1.  Chronic constipation, worsening with lack of response to most laxatives.  Colonoscopy in 2018 and 2020 with fair prep but no obstructive lesions seen.  May be secondary to neurogenic bowel in setting of long-standing diabetes (diabetic enteropathy) or may be related to neurologic disorder.  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  Amyloidosis can also cause constipation in setting of colonic deposition or autonomic neuropathy.\par 2.  Parkinsonism.\par 3.  IDDM.\par 4.  CAD status post CABG.\par 5.  HTN.\par 6.  HLD.\par \par Recs:\par - Prior [employer] and hospital records reviewed.\par - Patient was given samples of Linzess 290 mcg (take 1 daily before breakfast) to use daily for the next 2 weeks.  If no response, patient was also given samples of Motegrity (prucalopride) to use.  Patient was advised to minimize lactulose given current bloating and advised that he could use Colace or milk of magnesia as needed.\par - Follow up in 2-3 months.  Pending current neurologic evaluation, can consider Sitzmark studies to evaluate colonic motility or imaging to evaluate for CIPO (chronic intestinal pseudo-obstruction) which can occur with colonic amyloidosis.
(4) walks frequently

## 2020-12-02 NOTE — PHYSICAL EXAM
[General Appearance - Alert] : alert [General Appearance - In No Acute Distress] : in no acute distress [Sclera] : the sclera and conjunctiva were normal [Extraocular Movements] : extraocular movements were intact [Outer Ear] : the ears and nose were normal in appearance [Hearing Threshold Finger Rub Not Treasure] : hearing was normal [Neck Appearance] : the appearance of the neck was normal [Auscultation Breath Sounds / Voice Sounds] : lungs were clear to auscultation bilaterally [Heart Rate And Rhythm] : heart rate was normal and rhythm regular [Heart Sounds] : normal S1 and S2 [Edema] : there was no peripheral edema [Bowel Sounds] : normal bowel sounds [Abdomen Soft] : soft [Abdomen Tenderness] : non-tender [] : no hepato-splenomegaly [Abdomen Mass (___ Cm)] : no abdominal mass palpated [Abdomen Hernia] : no hernia was discovered [Cervical Lymph Nodes Enlarged Anterior Bilaterally] : anterior cervical [Supraclavicular Lymph Nodes Enlarged Bilaterally] : supraclavicular [No CVA Tenderness] : no ~M costovertebral angle tenderness [Skin Color & Pigmentation] : normal skin color and pigmentation [Skin Turgor] : normal skin turgor [FreeTextEntry1] : tremor in hands and head [Oriented To Time, Place, And Person] : oriented to person, place, and time [Impaired Insight] : insight and judgment were intact

## 2020-12-11 NOTE — DATA REVIEWED
[de-identified] : Stand up on a great neck done 11/ 24 2020\par \par MRI brain\par Moderate central and diffuse cortical atrophy\par No evidence of intracranial mass or focal lesion abnormality \par Mild mucosal thickening within the maxillary antra bilaterally\par \par MRA carotid arteries\par No focal stenosis or significant atherosclerotic disease involving the cervical carotid arteries\par \par \par I had a partial MRI cervical spine report showing disc bulge is seen at C4-C5 C5-C6\par C6-7 broad-based shallow disc herniation deforms the ventral thecal sac counterclockwise rotatory scoliosis

## 2020-12-11 NOTE — HISTORY OF PRESENT ILLNESS
[FreeTextEntry1] : This  is a 70-year-old right-handed male who presents with chief complaints of tremors\par At this visit he is accompanied by his grandson Washington\par \par He reports gradual onset of tremors for a few months now. The tremors initially started in his head and  progressed to involve his hands. The tremor is present at rest and is equal in both sides. not with activity. He does feel slow in the activities. He has not had any falls or any changes in balance. He reports that when he lays down he feels vertiginous and dizzy with neck extension. Does not have any dizziness on standing up.\par He was experiencing drooling, constipation and has had difficulty with controlling blood pressure for a few years. It fluctuates between being high and low. no urinary incontinance\par His memory is mildly affected, denies any hallucinations\par Insomnia-difficulty falling asleep, no vivid dreams, sometimes talks in his sleep but no dream enactment\par He reports neck pain but no restriction of neck movement\par No change in handwriting\par \par Patient states that about 2 weeks ago his primary care physician started him on Sinemet. He was asked to take it 3 times a day but he takes it only twice a day because he feels his tremors get worse after the medication he denies any side effects \par \par Denies any weakness. Denies any other neurological complaints\par Review of systems-a complete review of systems is performed and is negative except as listed in history of present illness\par \par Past medical history\par Cardiac stents\par Hypertension\par Diabetes\par Hyperlipidemia\par \par Family history\par Maternal grandfather had tremors\par \par interval history- unchanged, mri done. still takes LD bid, no change in sx, neck pain, tremor in head and R hand. no falls, no dysphagia\par \par

## 2020-12-11 NOTE — DISCUSSION/SUMMARY
[FreeTextEntry1] : This is a 70-year-old right-handed man who presents with chief complaints of tremors which began a few months ago initially with head tremors progressing to involve the upper extremities. Patient also reports slowness of daily activities, drooling, constipation, blood pressure fluctuations and mild cognitive changes. He denies any difficulty with balance, no falls, no urinary incontinence, no hallucinations.\par He was recently started on Sinemet and feels that it makes his symptoms worse\par On exam he is noted to have an irregular head tremor with no posturing and slight restriction of neck movements from side to side. Resting tremor and bradykinesia right slightly worse than left. He has difficulty with tandem walk.\par \par Impression-parkinsonism, a few red flags such as reportedly no response to meds, head tremor, gait ataxia, BP fluctuation. mild cognitive changes, dizziness, neck pain\par \par Plan\par MRI brain,mra neck -as above \par MRI Cspine,  - only partial report sent, will call to get full report\par /if mra head done - no report\par Bienvenido scan \par continue sinemet -take tid\par monitor BP\par f/u in 1 month

## 2020-12-16 PROBLEM — Z12.11 ENCOUNTER FOR SCREENING COLONOSCOPY: Status: RESOLVED | Noted: 2018-03-20 | Resolved: 2020-01-01

## 2021-01-01 ENCOUNTER — APPOINTMENT (OUTPATIENT)
Dept: GASTROENTEROLOGY | Facility: CLINIC | Age: 71
End: 2021-01-01

## 2021-01-01 ENCOUNTER — NON-APPOINTMENT (OUTPATIENT)
Age: 71
End: 2021-01-01

## 2021-01-01 ENCOUNTER — APPOINTMENT (OUTPATIENT)
Dept: NEUROLOGY | Facility: CLINIC | Age: 71
End: 2021-01-01
Payer: MEDICAID

## 2021-01-01 ENCOUNTER — INPATIENT (INPATIENT)
Facility: HOSPITAL | Age: 71
LOS: 1 days | Discharge: HOME | End: 2021-03-16
Attending: INTERNAL MEDICINE | Admitting: INTERNAL MEDICINE
Payer: MEDICAID

## 2021-01-01 ENCOUNTER — APPOINTMENT (OUTPATIENT)
Dept: NUCLEAR MEDICINE | Facility: IMAGING CENTER | Age: 71
End: 2021-01-01

## 2021-01-01 ENCOUNTER — TRANSCRIPTION ENCOUNTER (OUTPATIENT)
Age: 71
End: 2021-01-01

## 2021-01-01 ENCOUNTER — APPOINTMENT (OUTPATIENT)
Dept: NEUROLOGY | Facility: CLINIC | Age: 71
End: 2021-01-01

## 2021-01-01 ENCOUNTER — INPATIENT (INPATIENT)
Facility: HOSPITAL | Age: 71
LOS: 32 days | End: 2021-04-19
Attending: INTERNAL MEDICINE | Admitting: INTERNAL MEDICINE
Payer: MEDICAID

## 2021-01-01 ENCOUNTER — OUTPATIENT (OUTPATIENT)
Dept: OUTPATIENT SERVICES | Facility: HOSPITAL | Age: 71
LOS: 1 days | End: 2021-01-01
Payer: MEDICAID

## 2021-01-01 ENCOUNTER — APPOINTMENT (OUTPATIENT)
Dept: CARDIOLOGY | Facility: CLINIC | Age: 71
End: 2021-01-01
Payer: MEDICAID

## 2021-01-01 VITALS
HEART RATE: 68 BPM | SYSTOLIC BLOOD PRESSURE: 121 MMHG | TEMPERATURE: 98 F | DIASTOLIC BLOOD PRESSURE: 51 MMHG | OXYGEN SATURATION: 99 % | HEIGHT: 67 IN | RESPIRATION RATE: 17 BRPM

## 2021-01-01 VITALS
HEIGHT: 67 IN | TEMPERATURE: 98 F | HEART RATE: 67 BPM | SYSTOLIC BLOOD PRESSURE: 130 MMHG | OXYGEN SATURATION: 99 % | RESPIRATION RATE: 24 BRPM | DIASTOLIC BLOOD PRESSURE: 44 MMHG

## 2021-01-01 VITALS
WEIGHT: 165 LBS | SYSTOLIC BLOOD PRESSURE: 130 MMHG | DIASTOLIC BLOOD PRESSURE: 70 MMHG | HEART RATE: 68 BPM | BODY MASS INDEX: 25.9 KG/M2 | HEIGHT: 67 IN

## 2021-01-01 VITALS
TEMPERATURE: 94.6 F | HEART RATE: 66 BPM | BODY MASS INDEX: 25.9 KG/M2 | RESPIRATION RATE: 16 BRPM | HEIGHT: 67 IN | DIASTOLIC BLOOD PRESSURE: 67 MMHG | SYSTOLIC BLOOD PRESSURE: 120 MMHG | WEIGHT: 165 LBS | OXYGEN SATURATION: 100 %

## 2021-01-01 VITALS
HEART RATE: 61 BPM | TEMPERATURE: 98 F | OXYGEN SATURATION: 93 % | HEART RATE: 58 BPM | RESPIRATION RATE: 18 BRPM | SYSTOLIC BLOOD PRESSURE: 120 MMHG | OXYGEN SATURATION: 87 % | RESPIRATION RATE: 34 BRPM | DIASTOLIC BLOOD PRESSURE: 54 MMHG

## 2021-01-01 DIAGNOSIS — I10 ESSENTIAL (PRIMARY) HYPERTENSION: ICD-10-CM

## 2021-01-01 DIAGNOSIS — I25.10 ATHEROSCLEROTIC HEART DISEASE OF NATIVE CORONARY ARTERY WITHOUT ANGINA PECTORIS: ICD-10-CM

## 2021-01-01 DIAGNOSIS — R53.1 WEAKNESS: ICD-10-CM

## 2021-01-01 DIAGNOSIS — Z29.9 ENCOUNTER FOR PROPHYLACTIC MEASURES, UNSPECIFIED: ICD-10-CM

## 2021-01-01 DIAGNOSIS — U07.1 COVID-19: ICD-10-CM

## 2021-01-01 DIAGNOSIS — E11.65 TYPE 2 DIABETES MELLITUS WITH HYPERGLYCEMIA: ICD-10-CM

## 2021-01-01 DIAGNOSIS — G20 PARKINSON'S DISEASE: ICD-10-CM

## 2021-01-01 DIAGNOSIS — E78.5 HYPERLIPIDEMIA, UNSPECIFIED: ICD-10-CM

## 2021-01-01 DIAGNOSIS — E11.9 TYPE 2 DIABETES MELLITUS WITHOUT COMPLICATIONS: ICD-10-CM

## 2021-01-01 DIAGNOSIS — R73.9 HYPERGLYCEMIA, UNSPECIFIED: ICD-10-CM

## 2021-01-01 DIAGNOSIS — K59.00 CONSTIPATION, UNSPECIFIED: ICD-10-CM

## 2021-01-01 DIAGNOSIS — E87.6 HYPOKALEMIA: ICD-10-CM

## 2021-01-01 DIAGNOSIS — E87.1 HYPO-OSMOLALITY AND HYPONATREMIA: ICD-10-CM

## 2021-01-01 DIAGNOSIS — I25.10 ATHEROSCLEROTIC HEART DISEASE OF NATIVE CORONARY ARTERY W/OUT ANGINA PECTORIS: ICD-10-CM

## 2021-01-01 DIAGNOSIS — Z95.5 PRESENCE OF CORONARY ANGIOPLASTY IMPLANT AND GRAFT: Chronic | ICD-10-CM

## 2021-01-01 DIAGNOSIS — E11.69 TYPE 2 DIABETES MELLITUS WITH OTHER SPECIFIED COMPLICATION: ICD-10-CM

## 2021-01-01 DIAGNOSIS — E83.51 HYPOCALCEMIA: ICD-10-CM

## 2021-01-01 LAB
-  AMIKACIN: SIGNIFICANT CHANGE UP
-  AMPICILLIN/SULBACTAM: SIGNIFICANT CHANGE UP
-  AMPICILLIN/SULBACTAM: SIGNIFICANT CHANGE UP
-  AZTREONAM: SIGNIFICANT CHANGE UP
-  CEFAZOLIN: SIGNIFICANT CHANGE UP
-  CEFAZOLIN: SIGNIFICANT CHANGE UP
-  CEFEPIME: SIGNIFICANT CHANGE UP
-  CEFTAZIDIME: SIGNIFICANT CHANGE UP
-  CEFTRIAXONE: SIGNIFICANT CHANGE UP
-  CIPROFLOXACIN: SIGNIFICANT CHANGE UP
-  CLINDAMYCIN: SIGNIFICANT CHANGE UP
-  CLINDAMYCIN: SIGNIFICANT CHANGE UP
-  DAPTOMYCIN: SIGNIFICANT CHANGE UP
-  ERYTHROMYCIN: SIGNIFICANT CHANGE UP
-  ERYTHROMYCIN: SIGNIFICANT CHANGE UP
-  GENTAMICIN: SIGNIFICANT CHANGE UP
-  LEVOFLOXACIN: SIGNIFICANT CHANGE UP
-  LEVOFLOXACIN: SIGNIFICANT CHANGE UP
-  LINEZOLID: SIGNIFICANT CHANGE UP
-  LINEZOLID: SIGNIFICANT CHANGE UP
-  MEROPENEM: SIGNIFICANT CHANGE UP
-  OXACILLIN: SIGNIFICANT CHANGE UP
-  OXACILLIN: SIGNIFICANT CHANGE UP
-  PENICILLIN: SIGNIFICANT CHANGE UP
-  PENICILLIN: SIGNIFICANT CHANGE UP
-  PIPERACILLIN/TAZOBACTAM: SIGNIFICANT CHANGE UP
-  RIFAMPIN: SIGNIFICANT CHANGE UP
-  RIFAMPIN: SIGNIFICANT CHANGE UP
-  STAPHYLOCOCCUS EPIDERMIDIS: SIGNIFICANT CHANGE UP
-  TETRACYCLINE: SIGNIFICANT CHANGE UP
-  TETRACYCLINE: SIGNIFICANT CHANGE UP
-  TOBRAMYCIN: SIGNIFICANT CHANGE UP
-  TRIMETHOPRIM/SULFAMETHOXAZOLE: SIGNIFICANT CHANGE UP
-  VANCOMYCIN: SIGNIFICANT CHANGE UP
-  VANCOMYCIN: SIGNIFICANT CHANGE UP
A1C WITH ESTIMATED AVERAGE GLUCOSE RESULT: 10.3 % — HIGH (ref 4–5.6)
ALBUMIN SERPL ELPH-MCNC: 1.1 G/DL — LOW (ref 3.3–5)
ALBUMIN SERPL ELPH-MCNC: 1.2 G/DL — LOW (ref 3.3–5)
ALBUMIN SERPL ELPH-MCNC: 1.3 G/DL — LOW (ref 3.3–5)
ALBUMIN SERPL ELPH-MCNC: 1.3 G/DL — LOW (ref 3.3–5)
ALBUMIN SERPL ELPH-MCNC: 1.4 G/DL — LOW (ref 3.3–5)
ALBUMIN SERPL ELPH-MCNC: 1.5 G/DL — LOW (ref 3.3–5)
ALBUMIN SERPL ELPH-MCNC: 1.6 G/DL — LOW (ref 3.3–5)
ALBUMIN SERPL ELPH-MCNC: 1.7 G/DL — LOW (ref 3.3–5)
ALBUMIN SERPL ELPH-MCNC: 1.8 G/DL — LOW (ref 3.3–5)
ALBUMIN SERPL ELPH-MCNC: 1.9 G/DL — LOW (ref 3.3–5)
ALBUMIN SERPL ELPH-MCNC: 2 G/DL — LOW (ref 3.3–5)
ALBUMIN SERPL ELPH-MCNC: 2.1 G/DL — LOW (ref 3.3–5)
ALBUMIN SERPL ELPH-MCNC: 2.2 G/DL — LOW (ref 3.3–5)
ALBUMIN SERPL ELPH-MCNC: 2.3 G/DL — LOW (ref 3.3–5)
ALBUMIN SERPL ELPH-MCNC: 2.4 G/DL — LOW (ref 3.3–5)
ALBUMIN SERPL ELPH-MCNC: 2.5 G/DL — LOW (ref 3.3–5)
ALBUMIN SERPL ELPH-MCNC: 2.9 G/DL — LOW (ref 3.3–5)
ALBUMIN SERPL ELPH-MCNC: 3 G/DL — LOW (ref 3.3–5)
ALBUMIN SERPL ELPH-MCNC: 3.1 G/DL — LOW (ref 3.3–5)
ALBUMIN SERPL ELPH-MCNC: 3.1 G/DL — LOW (ref 3.3–5)
ALBUMIN SERPL ELPH-MCNC: 3.2 G/DL — LOW (ref 3.3–5)
ALBUMIN SERPL ELPH-MCNC: 3.4 G/DL — SIGNIFICANT CHANGE UP (ref 3.3–5)
ALBUMIN SERPL ELPH-MCNC: 3.6 G/DL — SIGNIFICANT CHANGE UP (ref 3.3–5)
ALP SERPL-CCNC: 100 U/L — SIGNIFICANT CHANGE UP (ref 40–120)
ALP SERPL-CCNC: 101 U/L — SIGNIFICANT CHANGE UP (ref 40–120)
ALP SERPL-CCNC: 104 U/L — SIGNIFICANT CHANGE UP (ref 40–120)
ALP SERPL-CCNC: 105 U/L — SIGNIFICANT CHANGE UP (ref 40–120)
ALP SERPL-CCNC: 109 U/L — SIGNIFICANT CHANGE UP (ref 40–120)
ALP SERPL-CCNC: 110 U/L — SIGNIFICANT CHANGE UP (ref 40–120)
ALP SERPL-CCNC: 114 U/L — SIGNIFICANT CHANGE UP (ref 40–120)
ALP SERPL-CCNC: 116 U/L — SIGNIFICANT CHANGE UP (ref 40–120)
ALP SERPL-CCNC: 120 U/L — SIGNIFICANT CHANGE UP (ref 40–120)
ALP SERPL-CCNC: 125 U/L — HIGH (ref 40–120)
ALP SERPL-CCNC: 127 U/L — HIGH (ref 40–120)
ALP SERPL-CCNC: 131 U/L — HIGH (ref 40–120)
ALP SERPL-CCNC: 138 U/L — HIGH (ref 40–120)
ALP SERPL-CCNC: 140 U/L — HIGH (ref 40–120)
ALP SERPL-CCNC: 143 U/L — HIGH (ref 40–120)
ALP SERPL-CCNC: 147 U/L — HIGH (ref 40–120)
ALP SERPL-CCNC: 147 U/L — HIGH (ref 40–120)
ALP SERPL-CCNC: 149 U/L — HIGH (ref 40–120)
ALP SERPL-CCNC: 151 U/L — HIGH (ref 40–120)
ALP SERPL-CCNC: 151 U/L — HIGH (ref 40–120)
ALP SERPL-CCNC: 163 U/L — HIGH (ref 40–120)
ALP SERPL-CCNC: 178 U/L — HIGH (ref 40–120)
ALP SERPL-CCNC: 180 U/L — HIGH (ref 40–120)
ALP SERPL-CCNC: 197 U/L — HIGH (ref 40–120)
ALP SERPL-CCNC: 206 U/L — HIGH (ref 40–120)
ALP SERPL-CCNC: 214 U/L — HIGH (ref 40–120)
ALP SERPL-CCNC: 234 U/L — HIGH (ref 40–120)
ALP SERPL-CCNC: 251 U/L — HIGH (ref 40–120)
ALP SERPL-CCNC: 262 U/L — HIGH (ref 40–120)
ALP SERPL-CCNC: 505 U/L — HIGH (ref 40–120)
ALP SERPL-CCNC: 60 U/L — SIGNIFICANT CHANGE UP (ref 40–120)
ALP SERPL-CCNC: 63 U/L — SIGNIFICANT CHANGE UP (ref 40–120)
ALP SERPL-CCNC: 77 U/L — SIGNIFICANT CHANGE UP (ref 40–120)
ALP SERPL-CCNC: 77 U/L — SIGNIFICANT CHANGE UP (ref 40–120)
ALP SERPL-CCNC: 79 U/L — SIGNIFICANT CHANGE UP (ref 40–120)
ALP SERPL-CCNC: 80 U/L — SIGNIFICANT CHANGE UP (ref 40–120)
ALP SERPL-CCNC: 81 U/L — SIGNIFICANT CHANGE UP (ref 40–120)
ALP SERPL-CCNC: 85 U/L — SIGNIFICANT CHANGE UP (ref 40–120)
ALP SERPL-CCNC: 89 U/L — SIGNIFICANT CHANGE UP (ref 40–120)
ALP SERPL-CCNC: 90 U/L — SIGNIFICANT CHANGE UP (ref 40–120)
ALP SERPL-CCNC: 96 U/L — SIGNIFICANT CHANGE UP (ref 40–120)
ALP SERPL-CCNC: 96 U/L — SIGNIFICANT CHANGE UP (ref 40–120)
ALP SERPL-CCNC: 97 U/L — SIGNIFICANT CHANGE UP (ref 40–120)
ALT FLD-CCNC: 10 U/L — SIGNIFICANT CHANGE UP (ref 4–41)
ALT FLD-CCNC: 11 U/L — SIGNIFICANT CHANGE UP (ref 4–41)
ALT FLD-CCNC: 11 U/L — SIGNIFICANT CHANGE UP (ref 4–41)
ALT FLD-CCNC: 15 U/L — SIGNIFICANT CHANGE UP (ref 4–41)
ALT FLD-CCNC: 15 U/L — SIGNIFICANT CHANGE UP (ref 4–41)
ALT FLD-CCNC: 23 U/L — SIGNIFICANT CHANGE UP (ref 4–41)
ALT FLD-CCNC: 26 U/L — SIGNIFICANT CHANGE UP (ref 4–41)
ALT FLD-CCNC: 26 U/L — SIGNIFICANT CHANGE UP (ref 4–41)
ALT FLD-CCNC: 27 U/L — SIGNIFICANT CHANGE UP (ref 4–41)
ALT FLD-CCNC: 278 U/L — HIGH (ref 4–41)
ALT FLD-CCNC: 28 U/L — SIGNIFICANT CHANGE UP (ref 4–41)
ALT FLD-CCNC: 283 U/L — HIGH (ref 4–41)
ALT FLD-CCNC: 29 U/L — SIGNIFICANT CHANGE UP (ref 4–41)
ALT FLD-CCNC: 293 U/L — HIGH (ref 4–41)
ALT FLD-CCNC: 30 U/L — SIGNIFICANT CHANGE UP (ref 4–41)
ALT FLD-CCNC: 31 U/L — SIGNIFICANT CHANGE UP (ref 4–41)
ALT FLD-CCNC: 318 U/L — HIGH (ref 4–41)
ALT FLD-CCNC: 35 U/L — SIGNIFICANT CHANGE UP (ref 4–41)
ALT FLD-CCNC: 36 U/L — SIGNIFICANT CHANGE UP (ref 4–41)
ALT FLD-CCNC: 37 U/L — SIGNIFICANT CHANGE UP (ref 4–41)
ALT FLD-CCNC: 375 U/L — HIGH (ref 4–41)
ALT FLD-CCNC: 5 U/L — SIGNIFICANT CHANGE UP (ref 4–41)
ALT FLD-CCNC: 5 U/L — SIGNIFICANT CHANGE UP (ref 4–41)
ALT FLD-CCNC: 6 U/L — SIGNIFICANT CHANGE UP (ref 4–41)
ALT FLD-CCNC: 6 U/L — SIGNIFICANT CHANGE UP (ref 4–41)
ALT FLD-CCNC: 7 U/L — SIGNIFICANT CHANGE UP (ref 4–41)
ALT FLD-CCNC: 8 U/L — SIGNIFICANT CHANGE UP (ref 4–41)
ALT FLD-CCNC: 9 U/L — SIGNIFICANT CHANGE UP (ref 4–41)
ALT FLD-CCNC: <5 U/L — LOW (ref 4–41)
ALT FLD-CCNC: <5 U/L — SIGNIFICANT CHANGE UP (ref 4–41)
AMMONIA BLD-MCNC: 37 UMOL/L — SIGNIFICANT CHANGE UP (ref 11–55)
ANION GAP SERPL CALC-SCNC: 10 MMOL/L — SIGNIFICANT CHANGE UP (ref 7–14)
ANION GAP SERPL CALC-SCNC: 11 MMOL/L — SIGNIFICANT CHANGE UP (ref 7–14)
ANION GAP SERPL CALC-SCNC: 12 MMOL/L — SIGNIFICANT CHANGE UP (ref 7–14)
ANION GAP SERPL CALC-SCNC: 13 MMOL/L — SIGNIFICANT CHANGE UP (ref 7–14)
ANION GAP SERPL CALC-SCNC: 14 MMOL/L — SIGNIFICANT CHANGE UP (ref 7–14)
ANION GAP SERPL CALC-SCNC: 15 MMOL/L — HIGH (ref 7–14)
ANION GAP SERPL CALC-SCNC: 15 MMOL/L — HIGH (ref 7–14)
ANION GAP SERPL CALC-SCNC: 16 MMOL/L — HIGH (ref 7–14)
ANION GAP SERPL CALC-SCNC: 16 MMOL/L — HIGH (ref 7–14)
ANION GAP SERPL CALC-SCNC: 22 MMOL/L — HIGH (ref 7–14)
ANION GAP SERPL CALC-SCNC: 6 MMOL/L — LOW (ref 7–14)
ANION GAP SERPL CALC-SCNC: 7 MMOL/L — SIGNIFICANT CHANGE UP (ref 7–14)
ANION GAP SERPL CALC-SCNC: 8 MMOL/L — SIGNIFICANT CHANGE UP (ref 7–14)
ANION GAP SERPL CALC-SCNC: 9 MMOL/L — SIGNIFICANT CHANGE UP (ref 7–14)
ANISOCYTOSIS BLD QL: SLIGHT — SIGNIFICANT CHANGE UP
APPEARANCE UR: ABNORMAL
APPEARANCE UR: ABNORMAL
APPEARANCE UR: CLEAR — SIGNIFICANT CHANGE UP
APPEARANCE UR: CLEAR — SIGNIFICANT CHANGE UP
APTT BLD: 29.3 SEC — SIGNIFICANT CHANGE UP (ref 27–36.3)
APTT BLD: 30 SEC — SIGNIFICANT CHANGE UP (ref 27–36.3)
APTT BLD: 30.3 SEC — SIGNIFICANT CHANGE UP (ref 27–36.3)
APTT BLD: 32.9 SEC — SIGNIFICANT CHANGE UP (ref 27–36.3)
APTT BLD: 33.2 SEC — SIGNIFICANT CHANGE UP (ref 27–36.3)
APTT BLD: 33.4 SEC — SIGNIFICANT CHANGE UP (ref 27–36.3)
APTT BLD: 33.6 SEC — SIGNIFICANT CHANGE UP (ref 27–36.3)
APTT BLD: 33.6 SEC — SIGNIFICANT CHANGE UP (ref 27–36.3)
APTT BLD: 33.9 SEC — SIGNIFICANT CHANGE UP (ref 27–36.3)
APTT BLD: 34.1 SEC — SIGNIFICANT CHANGE UP (ref 27–36.3)
APTT BLD: 34.6 SEC — SIGNIFICANT CHANGE UP (ref 27–36.3)
APTT BLD: 34.9 SEC — SIGNIFICANT CHANGE UP (ref 27–36.3)
APTT BLD: 35.3 SEC — SIGNIFICANT CHANGE UP (ref 27–36.3)
APTT BLD: 36.6 SEC — HIGH (ref 27–36.3)
APTT BLD: 36.7 SEC — HIGH (ref 27–36.3)
APTT BLD: 39.4 SEC — HIGH (ref 27–36.3)
APTT BLD: 50.1 SEC — HIGH (ref 27–36.3)
AST SERPL-CCNC: 117 U/L — HIGH (ref 4–40)
AST SERPL-CCNC: 1272 U/L — HIGH (ref 4–40)
AST SERPL-CCNC: 1297 U/L — HIGH (ref 4–40)
AST SERPL-CCNC: 13 U/L — SIGNIFICANT CHANGE UP (ref 4–40)
AST SERPL-CCNC: 14 U/L — SIGNIFICANT CHANGE UP (ref 4–40)
AST SERPL-CCNC: 14 U/L — SIGNIFICANT CHANGE UP (ref 4–40)
AST SERPL-CCNC: 1407 U/L — HIGH (ref 4–40)
AST SERPL-CCNC: 16 U/L — SIGNIFICANT CHANGE UP (ref 4–40)
AST SERPL-CCNC: 16 U/L — SIGNIFICANT CHANGE UP (ref 4–40)
AST SERPL-CCNC: 18 U/L — SIGNIFICANT CHANGE UP (ref 4–40)
AST SERPL-CCNC: 19 U/L — SIGNIFICANT CHANGE UP (ref 4–40)
AST SERPL-CCNC: 21 U/L — SIGNIFICANT CHANGE UP (ref 4–40)
AST SERPL-CCNC: 21 U/L — SIGNIFICANT CHANGE UP (ref 4–40)
AST SERPL-CCNC: 2103 U/L — HIGH (ref 4–40)
AST SERPL-CCNC: 23 U/L — SIGNIFICANT CHANGE UP (ref 4–40)
AST SERPL-CCNC: 24 U/L — SIGNIFICANT CHANGE UP (ref 4–40)
AST SERPL-CCNC: 25 U/L — SIGNIFICANT CHANGE UP (ref 4–40)
AST SERPL-CCNC: 26 U/L — SIGNIFICANT CHANGE UP (ref 4–40)
AST SERPL-CCNC: 26 U/L — SIGNIFICANT CHANGE UP (ref 4–40)
AST SERPL-CCNC: 27 U/L — SIGNIFICANT CHANGE UP (ref 4–40)
AST SERPL-CCNC: 29 U/L — SIGNIFICANT CHANGE UP (ref 4–40)
AST SERPL-CCNC: 30 U/L — SIGNIFICANT CHANGE UP (ref 4–40)
AST SERPL-CCNC: 31 U/L — SIGNIFICANT CHANGE UP (ref 4–40)
AST SERPL-CCNC: 32 U/L — SIGNIFICANT CHANGE UP (ref 4–40)
AST SERPL-CCNC: 33 U/L — SIGNIFICANT CHANGE UP (ref 4–40)
AST SERPL-CCNC: 34 U/L — SIGNIFICANT CHANGE UP (ref 4–40)
AST SERPL-CCNC: 35 U/L — SIGNIFICANT CHANGE UP (ref 4–40)
AST SERPL-CCNC: 38 U/L — SIGNIFICANT CHANGE UP (ref 4–40)
AST SERPL-CCNC: 40 U/L — SIGNIFICANT CHANGE UP (ref 4–40)
AST SERPL-CCNC: 41 U/L — HIGH (ref 4–40)
AST SERPL-CCNC: 50 U/L — HIGH (ref 4–40)
AST SERPL-CCNC: 51 U/L — HIGH (ref 4–40)
AST SERPL-CCNC: 54 U/L — HIGH (ref 4–40)
AST SERPL-CCNC: 57 U/L — HIGH (ref 4–40)
AST SERPL-CCNC: 61 U/L — HIGH (ref 4–40)
AST SERPL-CCNC: 61 U/L — HIGH (ref 4–40)
AST SERPL-CCNC: 62 U/L — HIGH (ref 4–40)
AST SERPL-CCNC: 66 U/L — HIGH (ref 4–40)
AST SERPL-CCNC: 83 U/L — HIGH (ref 4–40)
AST SERPL-CCNC: 94 U/L — HIGH (ref 4–40)
AST SERPL-CCNC: 996 U/L — HIGH (ref 4–40)
BACTERIA # UR AUTO: NEGATIVE — SIGNIFICANT CHANGE UP
BACTERIA # UR AUTO: NEGATIVE — SIGNIFICANT CHANGE UP
BASE EXCESS BLDA CALC-SCNC: -1.7 MMOL/L — SIGNIFICANT CHANGE UP (ref -2–2)
BASE EXCESS BLDA CALC-SCNC: 0.4 MMOL/L — SIGNIFICANT CHANGE UP (ref -2–2)
BASE EXCESS BLDA CALC-SCNC: 2.3 MMOL/L — HIGH (ref -2–2)
BASE EXCESS BLDA CALC-SCNC: 2.4 MMOL/L — HIGH (ref -2–2)
BASE EXCESS BLDA CALC-SCNC: 2.8 MMOL/L — HIGH (ref -2–2)
BASE EXCESS BLDV CALC-SCNC: 5.1 MMOL/L — HIGH (ref -3–2)
BASOPHILS # BLD AUTO: 0 K/UL — SIGNIFICANT CHANGE UP (ref 0–0.2)
BASOPHILS # BLD AUTO: 0.01 K/UL — SIGNIFICANT CHANGE UP (ref 0–0.2)
BASOPHILS # BLD AUTO: 0.02 K/UL — SIGNIFICANT CHANGE UP (ref 0–0.2)
BASOPHILS # BLD AUTO: 0.03 K/UL — SIGNIFICANT CHANGE UP (ref 0–0.2)
BASOPHILS # BLD AUTO: 0.04 K/UL — SIGNIFICANT CHANGE UP (ref 0–0.2)
BASOPHILS # BLD AUTO: 0.04 K/UL — SIGNIFICANT CHANGE UP (ref 0–0.2)
BASOPHILS # BLD AUTO: 0.05 K/UL — SIGNIFICANT CHANGE UP (ref 0–0.2)
BASOPHILS # BLD AUTO: 0.05 K/UL — SIGNIFICANT CHANGE UP (ref 0–0.2)
BASOPHILS # BLD AUTO: 0.06 K/UL — SIGNIFICANT CHANGE UP (ref 0–0.2)
BASOPHILS # BLD AUTO: 0.06 K/UL — SIGNIFICANT CHANGE UP (ref 0–0.2)
BASOPHILS # BLD AUTO: 0.08 K/UL — SIGNIFICANT CHANGE UP (ref 0–0.2)
BASOPHILS # BLD AUTO: 0.09 K/UL — SIGNIFICANT CHANGE UP (ref 0–0.2)
BASOPHILS # BLD AUTO: 0.13 K/UL — SIGNIFICANT CHANGE UP (ref 0–0.2)
BASOPHILS # BLD AUTO: 0.13 K/UL — SIGNIFICANT CHANGE UP (ref 0–0.2)
BASOPHILS NFR BLD AUTO: 0 % — SIGNIFICANT CHANGE UP (ref 0–2)
BASOPHILS NFR BLD AUTO: 0.1 % — SIGNIFICANT CHANGE UP (ref 0–2)
BASOPHILS NFR BLD AUTO: 0.2 % — SIGNIFICANT CHANGE UP (ref 0–2)
BASOPHILS NFR BLD AUTO: 0.3 % — SIGNIFICANT CHANGE UP (ref 0–2)
BASOPHILS NFR BLD AUTO: 0.4 % — SIGNIFICANT CHANGE UP (ref 0–2)
BASOPHILS NFR BLD AUTO: 0.4 % — SIGNIFICANT CHANGE UP (ref 0–2)
BASOPHILS NFR BLD AUTO: 0.6 % — SIGNIFICANT CHANGE UP (ref 0–2)
BASOPHILS NFR BLD AUTO: 0.6 % — SIGNIFICANT CHANGE UP (ref 0–2)
BILIRUB DIRECT SERPL-MCNC: <0.2 MG/DL — SIGNIFICANT CHANGE UP (ref 0–0.2)
BILIRUB INDIRECT FLD-MCNC: >0.1 MG/DL — SIGNIFICANT CHANGE UP (ref 0–1)
BILIRUB INDIRECT FLD-MCNC: >0.1 MG/DL — SIGNIFICANT CHANGE UP (ref 0–1)
BILIRUB INDIRECT FLD-MCNC: >0.3 MG/DL — SIGNIFICANT CHANGE UP (ref 0–1)
BILIRUB SERPL-MCNC: 0.2 MG/DL — SIGNIFICANT CHANGE UP (ref 0.2–1.2)
BILIRUB SERPL-MCNC: 0.3 MG/DL — SIGNIFICANT CHANGE UP (ref 0.2–1.2)
BILIRUB SERPL-MCNC: 0.4 MG/DL — SIGNIFICANT CHANGE UP (ref 0.2–1.2)
BILIRUB SERPL-MCNC: 0.5 MG/DL — SIGNIFICANT CHANGE UP (ref 0.2–1.2)
BILIRUB SERPL-MCNC: 0.6 MG/DL — SIGNIFICANT CHANGE UP (ref 0.2–1.2)
BILIRUB SERPL-MCNC: 0.6 MG/DL — SIGNIFICANT CHANGE UP (ref 0.2–1.2)
BILIRUB SERPL-MCNC: 0.7 MG/DL — SIGNIFICANT CHANGE UP (ref 0.2–1.2)
BILIRUB SERPL-MCNC: 0.8 MG/DL — SIGNIFICANT CHANGE UP (ref 0.2–1.2)
BILIRUB SERPL-MCNC: 0.8 MG/DL — SIGNIFICANT CHANGE UP (ref 0.2–1.2)
BILIRUB SERPL-MCNC: <0.2 MG/DL — SIGNIFICANT CHANGE UP (ref 0.2–1.2)
BILIRUB UR-MCNC: NEGATIVE — SIGNIFICANT CHANGE UP
BLD GP AB SCN SERPL QL: NEGATIVE — SIGNIFICANT CHANGE UP
BLOOD GAS ARTERIAL - LYTES,HGB,ICA,LACT RESULT: SIGNIFICANT CHANGE UP
BLOOD GAS ARTERIAL COMPREHENSIVE RESULT: SIGNIFICANT CHANGE UP
BLOOD GAS VENOUS - CREATININE: 1.2 MG/DL — SIGNIFICANT CHANGE UP (ref 0.5–1.3)
BLOOD GAS VENOUS COMPREHENSIVE RESULT: SIGNIFICANT CHANGE UP
BLOOD GAS VENOUS COMPREHENSIVE RESULT: SIGNIFICANT CHANGE UP
BUN SERPL-MCNC: 13 MG/DL — SIGNIFICANT CHANGE UP (ref 7–23)
BUN SERPL-MCNC: 14 MG/DL — SIGNIFICANT CHANGE UP (ref 7–23)
BUN SERPL-MCNC: 15 MG/DL — SIGNIFICANT CHANGE UP (ref 7–23)
BUN SERPL-MCNC: 15 MG/DL — SIGNIFICANT CHANGE UP (ref 7–23)
BUN SERPL-MCNC: 18 MG/DL — SIGNIFICANT CHANGE UP (ref 7–23)
BUN SERPL-MCNC: 18 MG/DL — SIGNIFICANT CHANGE UP (ref 7–23)
BUN SERPL-MCNC: 19 MG/DL — SIGNIFICANT CHANGE UP (ref 7–23)
BUN SERPL-MCNC: 19 MG/DL — SIGNIFICANT CHANGE UP (ref 7–23)
BUN SERPL-MCNC: 24 MG/DL — HIGH (ref 7–23)
BUN SERPL-MCNC: 28 MG/DL — HIGH (ref 7–23)
BUN SERPL-MCNC: 29 MG/DL — HIGH (ref 7–23)
BUN SERPL-MCNC: 30 MG/DL — HIGH (ref 7–23)
BUN SERPL-MCNC: 31 MG/DL — HIGH (ref 7–23)
BUN SERPL-MCNC: 36 MG/DL — HIGH (ref 7–23)
BUN SERPL-MCNC: 36 MG/DL — HIGH (ref 7–23)
BUN SERPL-MCNC: 37 MG/DL — HIGH (ref 7–23)
BUN SERPL-MCNC: 37 MG/DL — HIGH (ref 7–23)
BUN SERPL-MCNC: 38 MG/DL — HIGH (ref 7–23)
BUN SERPL-MCNC: 38 MG/DL — HIGH (ref 7–23)
BUN SERPL-MCNC: 39 MG/DL — HIGH (ref 7–23)
BUN SERPL-MCNC: 40 MG/DL — HIGH (ref 7–23)
BUN SERPL-MCNC: 41 MG/DL — HIGH (ref 7–23)
BUN SERPL-MCNC: 44 MG/DL — HIGH (ref 7–23)
BUN SERPL-MCNC: 46 MG/DL — HIGH (ref 7–23)
BUN SERPL-MCNC: 47 MG/DL — HIGH (ref 7–23)
BUN SERPL-MCNC: 48 MG/DL — HIGH (ref 7–23)
BUN SERPL-MCNC: 49 MG/DL — HIGH (ref 7–23)
BUN SERPL-MCNC: 49 MG/DL — HIGH (ref 7–23)
BUN SERPL-MCNC: 50 MG/DL — HIGH (ref 7–23)
BUN SERPL-MCNC: 51 MG/DL — HIGH (ref 7–23)
BUN SERPL-MCNC: 53 MG/DL — HIGH (ref 7–23)
BUN SERPL-MCNC: 54 MG/DL — HIGH (ref 7–23)
BUN SERPL-MCNC: 55 MG/DL — HIGH (ref 7–23)
BUN SERPL-MCNC: 57 MG/DL — HIGH (ref 7–23)
BUN SERPL-MCNC: 66 MG/DL — HIGH (ref 7–23)
BUN SERPL-MCNC: 68 MG/DL — HIGH (ref 7–23)
BUN SERPL-MCNC: 69 MG/DL — HIGH (ref 7–23)
BUN SERPL-MCNC: 70 MG/DL — HIGH (ref 7–23)
BUN SERPL-MCNC: 72 MG/DL — HIGH (ref 7–23)
BUN SERPL-MCNC: 72 MG/DL — HIGH (ref 7–23)
BUN SERPL-MCNC: 77 MG/DL — HIGH (ref 7–23)
BUN SERPL-MCNC: 96 MG/DL — HIGH (ref 7–23)
C DIFF BY PCR RESULT: SIGNIFICANT CHANGE UP
C DIFF TOX GENS STL QL NAA+PROBE: SIGNIFICANT CHANGE UP
CALCIUM SERPL-MCNC: 6.6 MG/DL — LOW (ref 8.4–10.5)
CALCIUM SERPL-MCNC: 6.8 MG/DL — LOW (ref 8.4–10.5)
CALCIUM SERPL-MCNC: 7 MG/DL — LOW (ref 8.4–10.5)
CALCIUM SERPL-MCNC: 7.2 MG/DL — LOW (ref 8.4–10.5)
CALCIUM SERPL-MCNC: 7.3 MG/DL — LOW (ref 8.4–10.5)
CALCIUM SERPL-MCNC: 7.4 MG/DL — LOW (ref 8.4–10.5)
CALCIUM SERPL-MCNC: 7.5 MG/DL — LOW (ref 8.4–10.5)
CALCIUM SERPL-MCNC: 7.6 MG/DL — LOW (ref 8.4–10.5)
CALCIUM SERPL-MCNC: 7.7 MG/DL — LOW (ref 8.4–10.5)
CALCIUM SERPL-MCNC: 7.8 MG/DL — LOW (ref 8.4–10.5)
CALCIUM SERPL-MCNC: 7.9 MG/DL — LOW (ref 8.4–10.5)
CALCIUM SERPL-MCNC: 8 MG/DL — LOW (ref 8.4–10.5)
CALCIUM SERPL-MCNC: 8.1 MG/DL — LOW (ref 8.4–10.5)
CALCIUM SERPL-MCNC: 8.2 MG/DL — LOW (ref 8.4–10.5)
CALCIUM SERPL-MCNC: 8.2 MG/DL — LOW (ref 8.4–10.5)
CALCIUM SERPL-MCNC: 8.4 MG/DL — SIGNIFICANT CHANGE UP (ref 8.4–10.5)
CALCIUM SERPL-MCNC: 8.5 MG/DL — SIGNIFICANT CHANGE UP (ref 8.4–10.5)
CALCIUM SERPL-MCNC: 8.5 MG/DL — SIGNIFICANT CHANGE UP (ref 8.4–10.5)
CHLORIDE BLDV-SCNC: 92 MMOL/L — LOW (ref 96–108)
CHLORIDE SERPL-SCNC: 100 MMOL/L — SIGNIFICANT CHANGE UP (ref 98–107)
CHLORIDE SERPL-SCNC: 100 MMOL/L — SIGNIFICANT CHANGE UP (ref 98–107)
CHLORIDE SERPL-SCNC: 101 MMOL/L — SIGNIFICANT CHANGE UP (ref 98–107)
CHLORIDE SERPL-SCNC: 103 MMOL/L — SIGNIFICANT CHANGE UP (ref 98–107)
CHLORIDE SERPL-SCNC: 103 MMOL/L — SIGNIFICANT CHANGE UP (ref 98–107)
CHLORIDE SERPL-SCNC: 105 MMOL/L — SIGNIFICANT CHANGE UP (ref 98–107)
CHLORIDE SERPL-SCNC: 106 MMOL/L — SIGNIFICANT CHANGE UP (ref 98–107)
CHLORIDE SERPL-SCNC: 107 MMOL/L — SIGNIFICANT CHANGE UP (ref 98–107)
CHLORIDE SERPL-SCNC: 108 MMOL/L — HIGH (ref 98–107)
CHLORIDE SERPL-SCNC: 109 MMOL/L — HIGH (ref 98–107)
CHLORIDE SERPL-SCNC: 110 MMOL/L — HIGH (ref 98–107)
CHLORIDE SERPL-SCNC: 111 MMOL/L — HIGH (ref 98–107)
CHLORIDE SERPL-SCNC: 112 MMOL/L — HIGH (ref 98–107)
CHLORIDE SERPL-SCNC: 113 MMOL/L — HIGH (ref 98–107)
CHLORIDE SERPL-SCNC: 114 MMOL/L — HIGH (ref 98–107)
CHLORIDE SERPL-SCNC: 114 MMOL/L — HIGH (ref 98–107)
CHLORIDE SERPL-SCNC: 115 MMOL/L — HIGH (ref 98–107)
CHLORIDE SERPL-SCNC: 115 MMOL/L — HIGH (ref 98–107)
CHLORIDE SERPL-SCNC: 116 MMOL/L — HIGH (ref 98–107)
CHLORIDE SERPL-SCNC: 117 MMOL/L — HIGH (ref 98–107)
CHLORIDE SERPL-SCNC: 118 MMOL/L — HIGH (ref 98–107)
CHLORIDE SERPL-SCNC: 118 MMOL/L — HIGH (ref 98–107)
CHLORIDE SERPL-SCNC: 120 MMOL/L — HIGH (ref 98–107)
CHLORIDE SERPL-SCNC: 83 MMOL/L — LOW (ref 98–107)
CHLORIDE SERPL-SCNC: 86 MMOL/L — LOW (ref 98–107)
CHLORIDE SERPL-SCNC: 89 MMOL/L — LOW (ref 98–107)
CHLORIDE SERPL-SCNC: 91 MMOL/L — LOW (ref 98–107)
CHLORIDE SERPL-SCNC: 96 MMOL/L — LOW (ref 98–107)
CHLORIDE SERPL-SCNC: 97 MMOL/L — LOW (ref 98–107)
CHLORIDE SERPL-SCNC: 99 MMOL/L — SIGNIFICANT CHANGE UP (ref 98–107)
CHLORIDE UR-SCNC: <20 MMOL/L — SIGNIFICANT CHANGE UP
CK SERPL-CCNC: 136 U/L — SIGNIFICANT CHANGE UP (ref 30–200)
CK SERPL-CCNC: 242 U/L — HIGH (ref 30–200)
CO2 SERPL-SCNC: 19 MMOL/L — LOW (ref 22–31)
CO2 SERPL-SCNC: 20 MMOL/L — LOW (ref 22–31)
CO2 SERPL-SCNC: 21 MMOL/L — LOW (ref 22–31)
CO2 SERPL-SCNC: 22 MMOL/L — SIGNIFICANT CHANGE UP (ref 22–31)
CO2 SERPL-SCNC: 23 MMOL/L — SIGNIFICANT CHANGE UP (ref 22–31)
CO2 SERPL-SCNC: 24 MMOL/L — SIGNIFICANT CHANGE UP (ref 22–31)
CO2 SERPL-SCNC: 25 MMOL/L — SIGNIFICANT CHANGE UP (ref 22–31)
CO2 SERPL-SCNC: 26 MMOL/L — SIGNIFICANT CHANGE UP (ref 22–31)
CO2 SERPL-SCNC: 27 MMOL/L — SIGNIFICANT CHANGE UP (ref 22–31)
CO2 SERPL-SCNC: 27 MMOL/L — SIGNIFICANT CHANGE UP (ref 22–31)
CO2 SERPL-SCNC: 29 MMOL/L — SIGNIFICANT CHANGE UP (ref 22–31)
COLOR SPEC: SIGNIFICANT CHANGE UP
COLOR SPEC: YELLOW — SIGNIFICANT CHANGE UP
COMMENT - URINE: SIGNIFICANT CHANGE UP
CREAT SERPL-MCNC: 0.71 MG/DL — SIGNIFICANT CHANGE UP (ref 0.5–1.3)
CREAT SERPL-MCNC: 0.74 MG/DL — SIGNIFICANT CHANGE UP (ref 0.5–1.3)
CREAT SERPL-MCNC: 0.75 MG/DL — SIGNIFICANT CHANGE UP (ref 0.5–1.3)
CREAT SERPL-MCNC: 0.86 MG/DL — SIGNIFICANT CHANGE UP (ref 0.5–1.3)
CREAT SERPL-MCNC: 0.87 MG/DL — SIGNIFICANT CHANGE UP (ref 0.5–1.3)
CREAT SERPL-MCNC: 0.87 MG/DL — SIGNIFICANT CHANGE UP (ref 0.5–1.3)
CREAT SERPL-MCNC: 0.89 MG/DL — SIGNIFICANT CHANGE UP (ref 0.5–1.3)
CREAT SERPL-MCNC: 0.89 MG/DL — SIGNIFICANT CHANGE UP (ref 0.5–1.3)
CREAT SERPL-MCNC: 0.9 MG/DL — SIGNIFICANT CHANGE UP (ref 0.5–1.3)
CREAT SERPL-MCNC: 0.91 MG/DL — SIGNIFICANT CHANGE UP (ref 0.5–1.3)
CREAT SERPL-MCNC: 0.92 MG/DL — SIGNIFICANT CHANGE UP (ref 0.5–1.3)
CREAT SERPL-MCNC: 0.92 MG/DL — SIGNIFICANT CHANGE UP (ref 0.5–1.3)
CREAT SERPL-MCNC: 0.93 MG/DL — SIGNIFICANT CHANGE UP (ref 0.5–1.3)
CREAT SERPL-MCNC: 0.94 MG/DL — SIGNIFICANT CHANGE UP (ref 0.5–1.3)
CREAT SERPL-MCNC: 0.94 MG/DL — SIGNIFICANT CHANGE UP (ref 0.5–1.3)
CREAT SERPL-MCNC: 1 MG/DL — SIGNIFICANT CHANGE UP (ref 0.5–1.3)
CREAT SERPL-MCNC: 1.09 MG/DL — SIGNIFICANT CHANGE UP (ref 0.5–1.3)
CREAT SERPL-MCNC: 1.11 MG/DL — SIGNIFICANT CHANGE UP (ref 0.5–1.3)
CREAT SERPL-MCNC: 1.12 MG/DL — SIGNIFICANT CHANGE UP (ref 0.5–1.3)
CREAT SERPL-MCNC: 1.12 MG/DL — SIGNIFICANT CHANGE UP (ref 0.5–1.3)
CREAT SERPL-MCNC: 1.15 MG/DL — SIGNIFICANT CHANGE UP (ref 0.5–1.3)
CREAT SERPL-MCNC: 1.17 MG/DL — SIGNIFICANT CHANGE UP (ref 0.5–1.3)
CREAT SERPL-MCNC: 1.18 MG/DL — SIGNIFICANT CHANGE UP (ref 0.5–1.3)
CREAT SERPL-MCNC: 1.21 MG/DL — SIGNIFICANT CHANGE UP (ref 0.5–1.3)
CREAT SERPL-MCNC: 1.23 MG/DL — SIGNIFICANT CHANGE UP (ref 0.5–1.3)
CREAT SERPL-MCNC: 1.31 MG/DL — HIGH (ref 0.5–1.3)
CREAT SERPL-MCNC: 1.34 MG/DL — HIGH (ref 0.5–1.3)
CREAT SERPL-MCNC: 1.35 MG/DL — HIGH (ref 0.5–1.3)
CREAT SERPL-MCNC: 1.39 MG/DL — HIGH (ref 0.5–1.3)
CREAT SERPL-MCNC: 1.49 MG/DL — HIGH (ref 0.5–1.3)
CREAT SERPL-MCNC: 1.49 MG/DL — HIGH (ref 0.5–1.3)
CREAT SERPL-MCNC: 1.52 MG/DL — HIGH (ref 0.5–1.3)
CREAT SERPL-MCNC: 1.57 MG/DL — HIGH (ref 0.5–1.3)
CREAT SERPL-MCNC: 1.7 MG/DL — HIGH (ref 0.5–1.3)
CREAT SERPL-MCNC: 1.74 MG/DL — HIGH (ref 0.5–1.3)
CREAT SERPL-MCNC: 1.76 MG/DL — HIGH (ref 0.5–1.3)
CREAT SERPL-MCNC: 1.78 MG/DL — HIGH (ref 0.5–1.3)
CREAT SERPL-MCNC: 1.78 MG/DL — HIGH (ref 0.5–1.3)
CREAT SERPL-MCNC: 1.83 MG/DL — HIGH (ref 0.5–1.3)
CREAT SERPL-MCNC: 1.94 MG/DL — HIGH (ref 0.5–1.3)
CREAT SERPL-MCNC: 2.04 MG/DL — HIGH (ref 0.5–1.3)
CREAT SERPL-MCNC: 2.14 MG/DL — HIGH (ref 0.5–1.3)
CREAT SERPL-MCNC: 2.25 MG/DL — HIGH (ref 0.5–1.3)
CREAT SERPL-MCNC: 2.26 MG/DL — HIGH (ref 0.5–1.3)
CREAT SERPL-MCNC: 2.34 MG/DL — HIGH (ref 0.5–1.3)
CREAT SERPL-MCNC: 2.56 MG/DL — HIGH (ref 0.5–1.3)
CREAT SERPL-MCNC: 2.86 MG/DL — HIGH (ref 0.5–1.3)
CREAT SERPL-MCNC: 3.5 MG/DL — HIGH (ref 0.5–1.3)
CREAT SERPL-MCNC: 3.96 MG/DL — HIGH (ref 0.5–1.3)
CREAT SERPL-MCNC: 4.04 MG/DL — HIGH (ref 0.5–1.3)
CREAT SERPL-MCNC: 4.13 MG/DL — HIGH (ref 0.5–1.3)
CREAT SERPL-MCNC: 4.44 MG/DL — HIGH (ref 0.5–1.3)
CRP SERPL-MCNC: 117.6 MG/L — HIGH
CRP SERPL-MCNC: 124.5 MG/L — HIGH
CRP SERPL-MCNC: 178.9 MG/L — HIGH
CRP SERPL-MCNC: 250.5 MG/L — HIGH
CRP SERPL-MCNC: 63.2 MG/L — HIGH
CRP SERPL-MCNC: 67.1 MG/L — HIGH
CULTURE RESULTS: SIGNIFICANT CHANGE UP
D DIMER BLD IA.RAPID-MCNC: 1042 NG/ML DDU — HIGH
D DIMER BLD IA.RAPID-MCNC: 1626 NG/ML DDU — HIGH
D DIMER BLD IA.RAPID-MCNC: 1884 NG/ML DDU — HIGH
D DIMER BLD IA.RAPID-MCNC: 2090 NG/ML DDU — HIGH
D DIMER BLD IA.RAPID-MCNC: 2098 NG/ML DDU — HIGH
D DIMER BLD IA.RAPID-MCNC: 2193 NG/ML DDU — HIGH
D DIMER BLD IA.RAPID-MCNC: 2251 NG/ML DDU — HIGH
D DIMER BLD IA.RAPID-MCNC: 3171 NG/ML DDU — HIGH
D DIMER BLD IA.RAPID-MCNC: 385 NG/ML DDU — HIGH
D DIMER BLD IA.RAPID-MCNC: 462 NG/ML DDU — HIGH
D DIMER BLD IA.RAPID-MCNC: 6227 NG/ML DDU — SIGNIFICANT CHANGE UP
DIFF PNL FLD: ABNORMAL
DIFF PNL FLD: NEGATIVE — SIGNIFICANT CHANGE UP
EOSINOPHIL # BLD AUTO: 0 K/UL — SIGNIFICANT CHANGE UP (ref 0–0.5)
EOSINOPHIL # BLD AUTO: 0.01 K/UL — SIGNIFICANT CHANGE UP (ref 0–0.5)
EOSINOPHIL # BLD AUTO: 0.02 K/UL — SIGNIFICANT CHANGE UP (ref 0–0.5)
EOSINOPHIL # BLD AUTO: 0.04 K/UL — SIGNIFICANT CHANGE UP (ref 0–0.5)
EOSINOPHIL # BLD AUTO: 0.04 K/UL — SIGNIFICANT CHANGE UP (ref 0–0.5)
EOSINOPHIL # BLD AUTO: 0.09 K/UL — SIGNIFICANT CHANGE UP (ref 0–0.5)
EOSINOPHIL # BLD AUTO: 0.12 K/UL — SIGNIFICANT CHANGE UP (ref 0–0.5)
EOSINOPHIL # BLD AUTO: 0.12 K/UL — SIGNIFICANT CHANGE UP (ref 0–0.5)
EOSINOPHIL # BLD AUTO: 0.24 K/UL — SIGNIFICANT CHANGE UP (ref 0–0.5)
EOSINOPHIL # BLD AUTO: 0.25 K/UL — SIGNIFICANT CHANGE UP (ref 0–0.5)
EOSINOPHIL # BLD AUTO: 0.3 K/UL — SIGNIFICANT CHANGE UP (ref 0–0.5)
EOSINOPHIL # BLD AUTO: 0.31 K/UL — SIGNIFICANT CHANGE UP (ref 0–0.5)
EOSINOPHIL # BLD AUTO: 0.41 K/UL — SIGNIFICANT CHANGE UP (ref 0–0.5)
EOSINOPHIL # BLD AUTO: 0.42 K/UL — SIGNIFICANT CHANGE UP (ref 0–0.5)
EOSINOPHIL # BLD AUTO: 0.46 K/UL — SIGNIFICANT CHANGE UP (ref 0–0.5)
EOSINOPHIL # BLD AUTO: 0.52 K/UL — HIGH (ref 0–0.5)
EOSINOPHIL # BLD AUTO: 0.55 K/UL — HIGH (ref 0–0.5)
EOSINOPHIL # BLD AUTO: 0.81 K/UL — HIGH (ref 0–0.5)
EOSINOPHIL # BLD AUTO: 0.92 K/UL — HIGH (ref 0–0.5)
EOSINOPHIL # BLD AUTO: 1.02 K/UL — HIGH (ref 0–0.5)
EOSINOPHIL # BLD AUTO: 1.38 K/UL — HIGH (ref 0–0.5)
EOSINOPHIL NFR BLD AUTO: 0 % — SIGNIFICANT CHANGE UP (ref 0–6)
EOSINOPHIL NFR BLD AUTO: 0.1 % — SIGNIFICANT CHANGE UP (ref 0–6)
EOSINOPHIL NFR BLD AUTO: 0.2 % — SIGNIFICANT CHANGE UP (ref 0–6)
EOSINOPHIL NFR BLD AUTO: 0.3 % — SIGNIFICANT CHANGE UP (ref 0–6)
EOSINOPHIL NFR BLD AUTO: 0.7 % — SIGNIFICANT CHANGE UP (ref 0–6)
EOSINOPHIL NFR BLD AUTO: 0.9 % — SIGNIFICANT CHANGE UP (ref 0–6)
EOSINOPHIL NFR BLD AUTO: 1.3 % — SIGNIFICANT CHANGE UP (ref 0–6)
EOSINOPHIL NFR BLD AUTO: 1.3 % — SIGNIFICANT CHANGE UP (ref 0–6)
EOSINOPHIL NFR BLD AUTO: 1.4 % — SIGNIFICANT CHANGE UP (ref 0–6)
EOSINOPHIL NFR BLD AUTO: 1.7 % — SIGNIFICANT CHANGE UP (ref 0–6)
EOSINOPHIL NFR BLD AUTO: 3.3 % — SIGNIFICANT CHANGE UP (ref 0–6)
EOSINOPHIL NFR BLD AUTO: 3.5 % — SIGNIFICANT CHANGE UP (ref 0–6)
EOSINOPHIL NFR BLD AUTO: 4.5 % — SIGNIFICANT CHANGE UP (ref 0–6)
EOSINOPHIL NFR BLD AUTO: 4.6 % — SIGNIFICANT CHANGE UP (ref 0–6)
EOSINOPHIL NFR BLD AUTO: 4.9 % — SIGNIFICANT CHANGE UP (ref 0–6)
EOSINOPHIL NFR BLD AUTO: 5.5 % — SIGNIFICANT CHANGE UP (ref 0–6)
EOSINOPHIL NFR BLD AUTO: 6 % — SIGNIFICANT CHANGE UP (ref 0–6)
EOSINOPHIL NFR BLD AUTO: 6.1 % — HIGH (ref 0–6)
EOSINOPHIL NFR BLD AUTO: 7 % — HIGH (ref 0–6)
EOSINOPHIL NFR BLD AUTO: 9 % — HIGH (ref 0–6)
EPI CELLS # UR: 1 /HPF — SIGNIFICANT CHANGE UP (ref 0–5)
EPI CELLS # UR: 14 /HPF — HIGH (ref 0–5)
ERYTHROCYTE [SEDIMENTATION RATE] IN BLOOD: >140 MM/HR — HIGH (ref 1–15)
ESTIMATED AVERAGE GLUCOSE: 249 MG/DL — HIGH (ref 68–114)
FERRITIN SERPL-MCNC: 1126 NG/ML — HIGH (ref 30–400)
FERRITIN SERPL-MCNC: 2250 NG/ML — HIGH (ref 30–400)
FERRITIN SERPL-MCNC: 524 NG/ML — HIGH (ref 30–400)
FERRITIN SERPL-MCNC: 548 NG/ML — HIGH (ref 30–400)
FERRITIN SERPL-MCNC: 660 NG/ML — HIGH (ref 30–400)
FERRITIN SERPL-MCNC: 779 NG/ML — HIGH (ref 30–400)
FOLATE SERPL-MCNC: 6.2 NG/ML — SIGNIFICANT CHANGE UP (ref 3.1–17.5)
GAS PNL BLDA: SIGNIFICANT CHANGE UP
GAS PNL BLDV: 121 MMOL/L — LOW (ref 136–146)
GLUCOSE BLDC GLUCOMTR-MCNC: 102 MG/DL — HIGH (ref 70–99)
GLUCOSE BLDC GLUCOMTR-MCNC: 104 MG/DL — HIGH (ref 70–99)
GLUCOSE BLDC GLUCOMTR-MCNC: 105 MG/DL — HIGH (ref 70–99)
GLUCOSE BLDC GLUCOMTR-MCNC: 105 MG/DL — HIGH (ref 70–99)
GLUCOSE BLDC GLUCOMTR-MCNC: 109 MG/DL — HIGH (ref 70–99)
GLUCOSE BLDC GLUCOMTR-MCNC: 109 MG/DL — HIGH (ref 70–99)
GLUCOSE BLDC GLUCOMTR-MCNC: 113 MG/DL — HIGH (ref 70–99)
GLUCOSE BLDC GLUCOMTR-MCNC: 118 MG/DL — HIGH (ref 70–99)
GLUCOSE BLDC GLUCOMTR-MCNC: 119 MG/DL — HIGH (ref 70–99)
GLUCOSE BLDC GLUCOMTR-MCNC: 119 MG/DL — HIGH (ref 70–99)
GLUCOSE BLDC GLUCOMTR-MCNC: 120 MG/DL — HIGH (ref 70–99)
GLUCOSE BLDC GLUCOMTR-MCNC: 121 MG/DL — HIGH (ref 70–99)
GLUCOSE BLDC GLUCOMTR-MCNC: 122 MG/DL — HIGH (ref 70–99)
GLUCOSE BLDC GLUCOMTR-MCNC: 125 MG/DL — HIGH (ref 70–99)
GLUCOSE BLDC GLUCOMTR-MCNC: 126 MG/DL — HIGH (ref 70–99)
GLUCOSE BLDC GLUCOMTR-MCNC: 126 MG/DL — HIGH (ref 70–99)
GLUCOSE BLDC GLUCOMTR-MCNC: 127 MG/DL — HIGH (ref 70–99)
GLUCOSE BLDC GLUCOMTR-MCNC: 131 MG/DL — HIGH (ref 70–99)
GLUCOSE BLDC GLUCOMTR-MCNC: 131 MG/DL — HIGH (ref 70–99)
GLUCOSE BLDC GLUCOMTR-MCNC: 135 MG/DL — HIGH (ref 70–99)
GLUCOSE BLDC GLUCOMTR-MCNC: 136 MG/DL — HIGH (ref 70–99)
GLUCOSE BLDC GLUCOMTR-MCNC: 137 MG/DL — HIGH (ref 70–99)
GLUCOSE BLDC GLUCOMTR-MCNC: 138 MG/DL — HIGH (ref 70–99)
GLUCOSE BLDC GLUCOMTR-MCNC: 139 MG/DL — HIGH (ref 70–99)
GLUCOSE BLDC GLUCOMTR-MCNC: 140 MG/DL — HIGH (ref 70–99)
GLUCOSE BLDC GLUCOMTR-MCNC: 141 MG/DL — HIGH (ref 70–99)
GLUCOSE BLDC GLUCOMTR-MCNC: 142 MG/DL — HIGH (ref 70–99)
GLUCOSE BLDC GLUCOMTR-MCNC: 142 MG/DL — HIGH (ref 70–99)
GLUCOSE BLDC GLUCOMTR-MCNC: 145 MG/DL — HIGH (ref 70–99)
GLUCOSE BLDC GLUCOMTR-MCNC: 145 MG/DL — HIGH (ref 70–99)
GLUCOSE BLDC GLUCOMTR-MCNC: 148 MG/DL — HIGH (ref 70–99)
GLUCOSE BLDC GLUCOMTR-MCNC: 148 MG/DL — HIGH (ref 70–99)
GLUCOSE BLDC GLUCOMTR-MCNC: 149 MG/DL — HIGH (ref 70–99)
GLUCOSE BLDC GLUCOMTR-MCNC: 150 MG/DL — HIGH (ref 70–99)
GLUCOSE BLDC GLUCOMTR-MCNC: 151 MG/DL — HIGH (ref 70–99)
GLUCOSE BLDC GLUCOMTR-MCNC: 151 MG/DL — HIGH (ref 70–99)
GLUCOSE BLDC GLUCOMTR-MCNC: 152 MG/DL — HIGH (ref 70–99)
GLUCOSE BLDC GLUCOMTR-MCNC: 154 MG/DL — HIGH (ref 70–99)
GLUCOSE BLDC GLUCOMTR-MCNC: 155 MG/DL — HIGH (ref 70–99)
GLUCOSE BLDC GLUCOMTR-MCNC: 157 MG/DL — HIGH (ref 70–99)
GLUCOSE BLDC GLUCOMTR-MCNC: 161 MG/DL — HIGH (ref 70–99)
GLUCOSE BLDC GLUCOMTR-MCNC: 162 MG/DL — HIGH (ref 70–99)
GLUCOSE BLDC GLUCOMTR-MCNC: 166 MG/DL — HIGH (ref 70–99)
GLUCOSE BLDC GLUCOMTR-MCNC: 167 MG/DL — HIGH (ref 70–99)
GLUCOSE BLDC GLUCOMTR-MCNC: 168 MG/DL — HIGH (ref 70–99)
GLUCOSE BLDC GLUCOMTR-MCNC: 168 MG/DL — HIGH (ref 70–99)
GLUCOSE BLDC GLUCOMTR-MCNC: 170 MG/DL — HIGH (ref 70–99)
GLUCOSE BLDC GLUCOMTR-MCNC: 171 MG/DL — HIGH (ref 70–99)
GLUCOSE BLDC GLUCOMTR-MCNC: 171 MG/DL — HIGH (ref 70–99)
GLUCOSE BLDC GLUCOMTR-MCNC: 172 MG/DL — HIGH (ref 70–99)
GLUCOSE BLDC GLUCOMTR-MCNC: 174 MG/DL — HIGH (ref 70–99)
GLUCOSE BLDC GLUCOMTR-MCNC: 175 MG/DL — HIGH (ref 70–99)
GLUCOSE BLDC GLUCOMTR-MCNC: 178 MG/DL — HIGH (ref 70–99)
GLUCOSE BLDC GLUCOMTR-MCNC: 178 MG/DL — HIGH (ref 70–99)
GLUCOSE BLDC GLUCOMTR-MCNC: 180 MG/DL — HIGH (ref 70–99)
GLUCOSE BLDC GLUCOMTR-MCNC: 181 MG/DL — HIGH (ref 70–99)
GLUCOSE BLDC GLUCOMTR-MCNC: 183 MG/DL — HIGH (ref 70–99)
GLUCOSE BLDC GLUCOMTR-MCNC: 184 MG/DL — HIGH (ref 70–99)
GLUCOSE BLDC GLUCOMTR-MCNC: 185 MG/DL — HIGH (ref 70–99)
GLUCOSE BLDC GLUCOMTR-MCNC: 186 MG/DL — HIGH (ref 70–99)
GLUCOSE BLDC GLUCOMTR-MCNC: 188 MG/DL — HIGH (ref 70–99)
GLUCOSE BLDC GLUCOMTR-MCNC: 190 MG/DL — HIGH (ref 70–99)
GLUCOSE BLDC GLUCOMTR-MCNC: 191 MG/DL — HIGH (ref 70–99)
GLUCOSE BLDC GLUCOMTR-MCNC: 194 MG/DL — HIGH (ref 70–99)
GLUCOSE BLDC GLUCOMTR-MCNC: 195 MG/DL — HIGH (ref 70–99)
GLUCOSE BLDC GLUCOMTR-MCNC: 197 MG/DL — HIGH (ref 70–99)
GLUCOSE BLDC GLUCOMTR-MCNC: 200 MG/DL — HIGH (ref 70–99)
GLUCOSE BLDC GLUCOMTR-MCNC: 200 MG/DL — HIGH (ref 70–99)
GLUCOSE BLDC GLUCOMTR-MCNC: 204 MG/DL — HIGH (ref 70–99)
GLUCOSE BLDC GLUCOMTR-MCNC: 204 MG/DL — HIGH (ref 70–99)
GLUCOSE BLDC GLUCOMTR-MCNC: 208 MG/DL — HIGH (ref 70–99)
GLUCOSE BLDC GLUCOMTR-MCNC: 208 MG/DL — HIGH (ref 70–99)
GLUCOSE BLDC GLUCOMTR-MCNC: 209 MG/DL — HIGH (ref 70–99)
GLUCOSE BLDC GLUCOMTR-MCNC: 210 MG/DL — HIGH (ref 70–99)
GLUCOSE BLDC GLUCOMTR-MCNC: 217 MG/DL — HIGH (ref 70–99)
GLUCOSE BLDC GLUCOMTR-MCNC: 218 MG/DL — HIGH (ref 70–99)
GLUCOSE BLDC GLUCOMTR-MCNC: 222 MG/DL — HIGH (ref 70–99)
GLUCOSE BLDC GLUCOMTR-MCNC: 225 MG/DL — HIGH (ref 70–99)
GLUCOSE BLDC GLUCOMTR-MCNC: 228 MG/DL — HIGH (ref 70–99)
GLUCOSE BLDC GLUCOMTR-MCNC: 234 MG/DL — HIGH (ref 70–99)
GLUCOSE BLDC GLUCOMTR-MCNC: 235 MG/DL — HIGH (ref 70–99)
GLUCOSE BLDC GLUCOMTR-MCNC: 242 MG/DL — HIGH (ref 70–99)
GLUCOSE BLDC GLUCOMTR-MCNC: 242 MG/DL — HIGH (ref 70–99)
GLUCOSE BLDC GLUCOMTR-MCNC: 247 MG/DL — HIGH (ref 70–99)
GLUCOSE BLDC GLUCOMTR-MCNC: 249 MG/DL — HIGH (ref 70–99)
GLUCOSE BLDC GLUCOMTR-MCNC: 249 MG/DL — HIGH (ref 70–99)
GLUCOSE BLDC GLUCOMTR-MCNC: 250 MG/DL — HIGH (ref 70–99)
GLUCOSE BLDC GLUCOMTR-MCNC: 258 MG/DL — HIGH (ref 70–99)
GLUCOSE BLDC GLUCOMTR-MCNC: 26 MG/DL — CRITICAL LOW (ref 70–99)
GLUCOSE BLDC GLUCOMTR-MCNC: 26 MG/DL — CRITICAL LOW (ref 70–99)
GLUCOSE BLDC GLUCOMTR-MCNC: 265 MG/DL — HIGH (ref 70–99)
GLUCOSE BLDC GLUCOMTR-MCNC: 267 MG/DL — HIGH (ref 70–99)
GLUCOSE BLDC GLUCOMTR-MCNC: 268 MG/DL — HIGH (ref 70–99)
GLUCOSE BLDC GLUCOMTR-MCNC: 27 MG/DL — CRITICAL LOW (ref 70–99)
GLUCOSE BLDC GLUCOMTR-MCNC: 271 MG/DL — HIGH (ref 70–99)
GLUCOSE BLDC GLUCOMTR-MCNC: 278 MG/DL — HIGH (ref 70–99)
GLUCOSE BLDC GLUCOMTR-MCNC: 281 MG/DL — HIGH (ref 70–99)
GLUCOSE BLDC GLUCOMTR-MCNC: 284 MG/DL — HIGH (ref 70–99)
GLUCOSE BLDC GLUCOMTR-MCNC: 284 MG/DL — HIGH (ref 70–99)
GLUCOSE BLDC GLUCOMTR-MCNC: 287 MG/DL — HIGH (ref 70–99)
GLUCOSE BLDC GLUCOMTR-MCNC: 288 MG/DL — HIGH (ref 70–99)
GLUCOSE BLDC GLUCOMTR-MCNC: 289 MG/DL — HIGH (ref 70–99)
GLUCOSE BLDC GLUCOMTR-MCNC: 290 MG/DL — HIGH (ref 70–99)
GLUCOSE BLDC GLUCOMTR-MCNC: 295 MG/DL — HIGH (ref 70–99)
GLUCOSE BLDC GLUCOMTR-MCNC: 295 MG/DL — HIGH (ref 70–99)
GLUCOSE BLDC GLUCOMTR-MCNC: 297 MG/DL — HIGH (ref 70–99)
GLUCOSE BLDC GLUCOMTR-MCNC: 302 MG/DL — HIGH (ref 70–99)
GLUCOSE BLDC GLUCOMTR-MCNC: 307 MG/DL — HIGH (ref 70–99)
GLUCOSE BLDC GLUCOMTR-MCNC: 311 MG/DL — HIGH (ref 70–99)
GLUCOSE BLDC GLUCOMTR-MCNC: 318 MG/DL — HIGH (ref 70–99)
GLUCOSE BLDC GLUCOMTR-MCNC: 323 MG/DL — HIGH (ref 70–99)
GLUCOSE BLDC GLUCOMTR-MCNC: 327 MG/DL — HIGH (ref 70–99)
GLUCOSE BLDC GLUCOMTR-MCNC: 335 MG/DL — HIGH (ref 70–99)
GLUCOSE BLDC GLUCOMTR-MCNC: 337 MG/DL — HIGH (ref 70–99)
GLUCOSE BLDC GLUCOMTR-MCNC: 339 MG/DL — HIGH (ref 70–99)
GLUCOSE BLDC GLUCOMTR-MCNC: 34 MG/DL — CRITICAL LOW (ref 70–99)
GLUCOSE BLDC GLUCOMTR-MCNC: 344 MG/DL — HIGH (ref 70–99)
GLUCOSE BLDC GLUCOMTR-MCNC: 349 MG/DL — HIGH (ref 70–99)
GLUCOSE BLDC GLUCOMTR-MCNC: 361 MG/DL — HIGH (ref 70–99)
GLUCOSE BLDC GLUCOMTR-MCNC: 361 MG/DL — HIGH (ref 70–99)
GLUCOSE BLDC GLUCOMTR-MCNC: 362 MG/DL — HIGH (ref 70–99)
GLUCOSE BLDC GLUCOMTR-MCNC: 362 MG/DL — HIGH (ref 70–99)
GLUCOSE BLDC GLUCOMTR-MCNC: 37 MG/DL — CRITICAL LOW (ref 70–99)
GLUCOSE BLDC GLUCOMTR-MCNC: 374 MG/DL — HIGH (ref 70–99)
GLUCOSE BLDC GLUCOMTR-MCNC: 382 MG/DL — HIGH (ref 70–99)
GLUCOSE BLDC GLUCOMTR-MCNC: 392 MG/DL — HIGH (ref 70–99)
GLUCOSE BLDC GLUCOMTR-MCNC: 397 MG/DL — HIGH (ref 70–99)
GLUCOSE BLDC GLUCOMTR-MCNC: 439 MG/DL — HIGH (ref 70–99)
GLUCOSE BLDC GLUCOMTR-MCNC: 44 MG/DL — CRITICAL LOW (ref 70–99)
GLUCOSE BLDC GLUCOMTR-MCNC: 44 MG/DL — CRITICAL LOW (ref 70–99)
GLUCOSE BLDC GLUCOMTR-MCNC: 58 MG/DL — LOW (ref 70–99)
GLUCOSE BLDC GLUCOMTR-MCNC: 60 MG/DL — LOW (ref 70–99)
GLUCOSE BLDC GLUCOMTR-MCNC: 66 MG/DL — LOW (ref 70–99)
GLUCOSE BLDC GLUCOMTR-MCNC: 67 MG/DL — LOW (ref 70–99)
GLUCOSE BLDC GLUCOMTR-MCNC: 72 MG/DL — SIGNIFICANT CHANGE UP (ref 70–99)
GLUCOSE BLDC GLUCOMTR-MCNC: 80 MG/DL — SIGNIFICANT CHANGE UP (ref 70–99)
GLUCOSE BLDC GLUCOMTR-MCNC: 80 MG/DL — SIGNIFICANT CHANGE UP (ref 70–99)
GLUCOSE BLDC GLUCOMTR-MCNC: 81 MG/DL — SIGNIFICANT CHANGE UP (ref 70–99)
GLUCOSE BLDC GLUCOMTR-MCNC: 83 MG/DL — SIGNIFICANT CHANGE UP (ref 70–99)
GLUCOSE BLDC GLUCOMTR-MCNC: 86 MG/DL — SIGNIFICANT CHANGE UP (ref 70–99)
GLUCOSE BLDC GLUCOMTR-MCNC: 88 MG/DL — SIGNIFICANT CHANGE UP (ref 70–99)
GLUCOSE BLDC GLUCOMTR-MCNC: 92 MG/DL — SIGNIFICANT CHANGE UP (ref 70–99)
GLUCOSE BLDC GLUCOMTR-MCNC: 93 MG/DL — SIGNIFICANT CHANGE UP (ref 70–99)
GLUCOSE BLDC GLUCOMTR-MCNC: 96 MG/DL — SIGNIFICANT CHANGE UP (ref 70–99)
GLUCOSE BLDC GLUCOMTR-MCNC: 99 MG/DL — SIGNIFICANT CHANGE UP (ref 70–99)
GLUCOSE BLDC GLUCOMTR-MCNC: <25 MG/DL — CRITICAL LOW (ref 70–99)
GLUCOSE BLDV-MCNC: 260 MG/DL — HIGH (ref 70–99)
GLUCOSE SERPL-MCNC: 103 MG/DL — HIGH (ref 70–99)
GLUCOSE SERPL-MCNC: 124 MG/DL — HIGH (ref 70–99)
GLUCOSE SERPL-MCNC: 134 MG/DL — HIGH (ref 70–99)
GLUCOSE SERPL-MCNC: 135 MG/DL — HIGH (ref 70–99)
GLUCOSE SERPL-MCNC: 144 MG/DL — HIGH (ref 70–99)
GLUCOSE SERPL-MCNC: 144 MG/DL — HIGH (ref 70–99)
GLUCOSE SERPL-MCNC: 148 MG/DL — HIGH (ref 70–99)
GLUCOSE SERPL-MCNC: 148 MG/DL — HIGH (ref 70–99)
GLUCOSE SERPL-MCNC: 155 MG/DL — HIGH (ref 70–99)
GLUCOSE SERPL-MCNC: 159 MG/DL — HIGH (ref 70–99)
GLUCOSE SERPL-MCNC: 159 MG/DL — HIGH (ref 70–99)
GLUCOSE SERPL-MCNC: 160 MG/DL — HIGH (ref 70–99)
GLUCOSE SERPL-MCNC: 162 MG/DL — HIGH (ref 70–99)
GLUCOSE SERPL-MCNC: 166 MG/DL — HIGH (ref 70–99)
GLUCOSE SERPL-MCNC: 167 MG/DL — HIGH (ref 70–99)
GLUCOSE SERPL-MCNC: 177 MG/DL — HIGH (ref 70–99)
GLUCOSE SERPL-MCNC: 178 MG/DL — HIGH (ref 70–99)
GLUCOSE SERPL-MCNC: 181 MG/DL — HIGH (ref 70–99)
GLUCOSE SERPL-MCNC: 185 MG/DL — HIGH (ref 70–99)
GLUCOSE SERPL-MCNC: 198 MG/DL — HIGH (ref 70–99)
GLUCOSE SERPL-MCNC: 198 MG/DL — HIGH (ref 70–99)
GLUCOSE SERPL-MCNC: 199 MG/DL — HIGH (ref 70–99)
GLUCOSE SERPL-MCNC: 199 MG/DL — HIGH (ref 70–99)
GLUCOSE SERPL-MCNC: 221 MG/DL — HIGH (ref 70–99)
GLUCOSE SERPL-MCNC: 225 MG/DL — HIGH (ref 70–99)
GLUCOSE SERPL-MCNC: 234 MG/DL — HIGH (ref 70–99)
GLUCOSE SERPL-MCNC: 246 MG/DL — HIGH (ref 70–99)
GLUCOSE SERPL-MCNC: 248 MG/DL — HIGH (ref 70–99)
GLUCOSE SERPL-MCNC: 251 MG/DL — HIGH (ref 70–99)
GLUCOSE SERPL-MCNC: 268 MG/DL — HIGH (ref 70–99)
GLUCOSE SERPL-MCNC: 269 MG/DL — HIGH (ref 70–99)
GLUCOSE SERPL-MCNC: 294 MG/DL — HIGH (ref 70–99)
GLUCOSE SERPL-MCNC: 299 MG/DL — HIGH (ref 70–99)
GLUCOSE SERPL-MCNC: 302 MG/DL — HIGH (ref 70–99)
GLUCOSE SERPL-MCNC: 307 MG/DL — HIGH (ref 70–99)
GLUCOSE SERPL-MCNC: 319 MG/DL — HIGH (ref 70–99)
GLUCOSE SERPL-MCNC: 35 MG/DL — CRITICAL LOW (ref 70–99)
GLUCOSE SERPL-MCNC: 373 MG/DL — HIGH (ref 70–99)
GLUCOSE SERPL-MCNC: 380 MG/DL — HIGH (ref 70–99)
GLUCOSE SERPL-MCNC: 409 MG/DL — HIGH (ref 70–99)
GLUCOSE SERPL-MCNC: 438 MG/DL — HIGH (ref 70–99)
GLUCOSE SERPL-MCNC: 67 MG/DL — LOW (ref 70–99)
GLUCOSE SERPL-MCNC: 70 MG/DL — SIGNIFICANT CHANGE UP (ref 70–99)
GLUCOSE SERPL-MCNC: 85 MG/DL — SIGNIFICANT CHANGE UP (ref 70–99)
GLUCOSE SERPL-MCNC: 92 MG/DL — SIGNIFICANT CHANGE UP (ref 70–99)
GLUCOSE SERPL-MCNC: 93 MG/DL — SIGNIFICANT CHANGE UP (ref 70–99)
GLUCOSE SERPL-MCNC: 98 MG/DL — SIGNIFICANT CHANGE UP (ref 70–99)
GLUCOSE UR QL: ABNORMAL
GLUCOSE UR QL: ABNORMAL
GLUCOSE UR QL: NEGATIVE — SIGNIFICANT CHANGE UP
GLUCOSE UR QL: NEGATIVE — SIGNIFICANT CHANGE UP
GRAM STN FLD: SIGNIFICANT CHANGE UP
HCO3 BLDA-SCNC: 23 MMOL/L — SIGNIFICANT CHANGE UP (ref 22–26)
HCO3 BLDA-SCNC: 25 MMOL/L — SIGNIFICANT CHANGE UP (ref 22–26)
HCO3 BLDA-SCNC: 26 MMOL/L — SIGNIFICANT CHANGE UP (ref 22–26)
HCO3 BLDA-SCNC: 27 MMOL/L — HIGH (ref 22–26)
HCO3 BLDA-SCNC: 27 MMOL/L — HIGH (ref 22–26)
HCO3 BLDV-SCNC: 28 MMOL/L — HIGH (ref 20–27)
HCT VFR BLD CALC: 21.4 % — LOW (ref 39–50)
HCT VFR BLD CALC: 21.5 % — LOW (ref 39–50)
HCT VFR BLD CALC: 21.8 % — LOW (ref 39–50)
HCT VFR BLD CALC: 22 % — LOW (ref 39–50)
HCT VFR BLD CALC: 22 % — LOW (ref 39–50)
HCT VFR BLD CALC: 22.3 % — LOW (ref 39–50)
HCT VFR BLD CALC: 22.7 % — LOW (ref 39–50)
HCT VFR BLD CALC: 23.3 % — LOW (ref 39–50)
HCT VFR BLD CALC: 23.3 % — LOW (ref 39–50)
HCT VFR BLD CALC: 23.8 % — LOW (ref 39–50)
HCT VFR BLD CALC: 24 % — LOW (ref 39–50)
HCT VFR BLD CALC: 24.4 % — LOW (ref 39–50)
HCT VFR BLD CALC: 24.5 % — LOW (ref 39–50)
HCT VFR BLD CALC: 24.6 % — LOW (ref 39–50)
HCT VFR BLD CALC: 24.7 % — LOW (ref 39–50)
HCT VFR BLD CALC: 24.9 % — LOW (ref 39–50)
HCT VFR BLD CALC: 24.9 % — LOW (ref 39–50)
HCT VFR BLD CALC: 25.3 % — LOW (ref 39–50)
HCT VFR BLD CALC: 25.3 % — LOW (ref 39–50)
HCT VFR BLD CALC: 25.4 % — LOW (ref 39–50)
HCT VFR BLD CALC: 25.4 % — LOW (ref 39–50)
HCT VFR BLD CALC: 25.6 % — LOW (ref 39–50)
HCT VFR BLD CALC: 25.7 % — LOW (ref 39–50)
HCT VFR BLD CALC: 25.8 % — LOW (ref 39–50)
HCT VFR BLD CALC: 25.9 % — LOW (ref 39–50)
HCT VFR BLD CALC: 26 % — LOW (ref 39–50)
HCT VFR BLD CALC: 26.8 % — LOW (ref 39–50)
HCT VFR BLD CALC: 27.1 % — LOW (ref 39–50)
HCT VFR BLD CALC: 27.2 % — LOW (ref 39–50)
HCT VFR BLD CALC: 27.5 % — LOW (ref 39–50)
HCT VFR BLD CALC: 27.7 % — LOW (ref 39–50)
HCT VFR BLD CALC: 28 % — LOW (ref 39–50)
HCT VFR BLD CALC: 28.2 % — LOW (ref 39–50)
HCT VFR BLD CALC: 28.3 % — LOW (ref 39–50)
HCT VFR BLD CALC: 28.8 % — LOW (ref 39–50)
HCT VFR BLD CALC: 30.8 % — LOW (ref 39–50)
HCT VFR BLD CALC: 31 % — LOW (ref 39–50)
HCT VFR BLD CALC: 32 % — LOW (ref 39–50)
HCT VFR BLD CALC: 34.5 % — LOW (ref 39–50)
HCT VFR BLD CALC: 34.7 % — LOW (ref 39–50)
HCT VFR BLD CALC: 35.7 % — LOW (ref 39–50)
HCT VFR BLD CALC: 36.4 % — LOW (ref 39–50)
HCT VFR BLD CALC: 36.4 % — LOW (ref 39–50)
HCT VFR BLDA CALC: 35.5 % — LOW (ref 39–51)
HCV AB S/CO SERPL IA: 0.1 S/CO — SIGNIFICANT CHANGE UP (ref 0–0.99)
HCV AB SERPL-IMP: SIGNIFICANT CHANGE UP
HGB BLD CALC-MCNC: 11.5 G/DL — LOW (ref 13–17)
HGB BLD-MCNC: 10 G/DL — LOW (ref 13–17)
HGB BLD-MCNC: 10.5 G/DL — LOW (ref 13–17)
HGB BLD-MCNC: 10.7 G/DL — LOW (ref 13–17)
HGB BLD-MCNC: 11.5 G/DL — LOW (ref 13–17)
HGB BLD-MCNC: 11.5 G/DL — LOW (ref 13–17)
HGB BLD-MCNC: 11.9 G/DL — LOW (ref 13–17)
HGB BLD-MCNC: 12 G/DL — LOW (ref 13–17)
HGB BLD-MCNC: 12.2 G/DL — LOW (ref 13–17)
HGB BLD-MCNC: 6.7 G/DL — CRITICAL LOW (ref 13–17)
HGB BLD-MCNC: 6.9 G/DL — CRITICAL LOW (ref 13–17)
HGB BLD-MCNC: 7 G/DL — CRITICAL LOW (ref 13–17)
HGB BLD-MCNC: 7.1 G/DL — LOW (ref 13–17)
HGB BLD-MCNC: 7.2 G/DL — LOW (ref 13–17)
HGB BLD-MCNC: 7.3 G/DL — LOW (ref 13–17)
HGB BLD-MCNC: 7.3 G/DL — LOW (ref 13–17)
HGB BLD-MCNC: 7.4 G/DL — LOW (ref 13–17)
HGB BLD-MCNC: 7.5 G/DL — LOW (ref 13–17)
HGB BLD-MCNC: 7.5 G/DL — LOW (ref 13–17)
HGB BLD-MCNC: 7.6 G/DL — LOW (ref 13–17)
HGB BLD-MCNC: 7.6 G/DL — LOW (ref 13–17)
HGB BLD-MCNC: 7.7 G/DL — LOW (ref 13–17)
HGB BLD-MCNC: 7.8 G/DL — LOW (ref 13–17)
HGB BLD-MCNC: 7.8 G/DL — LOW (ref 13–17)
HGB BLD-MCNC: 8 G/DL — LOW (ref 13–17)
HGB BLD-MCNC: 8.1 G/DL — LOW (ref 13–17)
HGB BLD-MCNC: 8.2 G/DL — LOW (ref 13–17)
HGB BLD-MCNC: 8.2 G/DL — LOW (ref 13–17)
HGB BLD-MCNC: 8.4 G/DL — LOW (ref 13–17)
HGB BLD-MCNC: 8.5 G/DL — LOW (ref 13–17)
HGB BLD-MCNC: 8.5 G/DL — LOW (ref 13–17)
HGB BLD-MCNC: 8.6 G/DL — LOW (ref 13–17)
HGB BLD-MCNC: 8.6 G/DL — LOW (ref 13–17)
HGB BLD-MCNC: 8.8 G/DL — LOW (ref 13–17)
HGB BLD-MCNC: 8.9 G/DL — LOW (ref 13–17)
HGB BLD-MCNC: 8.9 G/DL — LOW (ref 13–17)
HGB BLD-MCNC: 9.1 G/DL — LOW (ref 13–17)
HGB BLD-MCNC: 9.1 G/DL — LOW (ref 13–17)
HYALINE CASTS # UR AUTO: 1 /LPF — SIGNIFICANT CHANGE UP (ref 0–7)
HYALINE CASTS # UR AUTO: 37 /LPF — HIGH (ref 0–7)
HYPOCHROMIA BLD QL: SLIGHT — SIGNIFICANT CHANGE UP
IANC: 11.11 K/UL — HIGH (ref 1.5–8.5)
IANC: 11.2 K/UL — HIGH (ref 1.5–8.5)
IANC: 11.72 K/UL — HIGH (ref 1.5–8.5)
IANC: 12.2 K/UL — HIGH (ref 1.5–8.5)
IANC: 12.79 K/UL — HIGH (ref 1.5–8.5)
IANC: 13.43 K/UL — HIGH (ref 1.5–8.5)
IANC: 13.64 K/UL — HIGH (ref 1.5–8.5)
IANC: 13.67 K/UL — HIGH (ref 1.5–8.5)
IANC: 13.71 K/UL — HIGH (ref 1.5–8.5)
IANC: 14.13 K/UL — HIGH (ref 1.5–8.5)
IANC: 16.63 K/UL — HIGH (ref 1.5–8.5)
IANC: 16.63 K/UL — HIGH (ref 1.5–8.5)
IANC: 16.94 K/UL — HIGH (ref 1.5–8.5)
IANC: 17.03 K/UL — HIGH (ref 1.5–8.5)
IANC: 17.67 K/UL — HIGH (ref 1.5–8.5)
IANC: 18.35 K/UL — HIGH (ref 1.5–8.5)
IANC: 18.39 K/UL — HIGH (ref 1.5–8.5)
IANC: 18.42 K/UL — HIGH (ref 1.5–8.5)
IANC: 18.95 K/UL — HIGH (ref 1.5–8.5)
IANC: 2.81 K/UL — SIGNIFICANT CHANGE UP (ref 1.5–8.5)
IANC: 23.5 K/UL — HIGH (ref 1.5–8.5)
IANC: 28.47 K/UL — HIGH (ref 1.5–8.5)
IANC: 5.52 K/UL — SIGNIFICANT CHANGE UP (ref 1.5–8.5)
IANC: 5.72 K/UL — SIGNIFICANT CHANGE UP (ref 1.5–8.5)
IANC: 6.04 K/UL — SIGNIFICANT CHANGE UP (ref 1.5–8.5)
IANC: 6.16 K/UL — SIGNIFICANT CHANGE UP (ref 1.5–8.5)
IANC: 7.61 K/UL — SIGNIFICANT CHANGE UP (ref 1.5–8.5)
IANC: 7.68 K/UL — SIGNIFICANT CHANGE UP (ref 1.5–8.5)
IANC: 7.69 K/UL — SIGNIFICANT CHANGE UP (ref 1.5–8.5)
IANC: 7.88 K/UL — SIGNIFICANT CHANGE UP (ref 1.5–8.5)
IANC: 8.12 K/UL — SIGNIFICANT CHANGE UP (ref 1.5–8.5)
IMM GRANULOCYTES NFR BLD AUTO: 0.5 % — SIGNIFICANT CHANGE UP (ref 0–1.5)
IMM GRANULOCYTES NFR BLD AUTO: 0.6 % — SIGNIFICANT CHANGE UP (ref 0–1.5)
IMM GRANULOCYTES NFR BLD AUTO: 0.6 % — SIGNIFICANT CHANGE UP (ref 0–1.5)
IMM GRANULOCYTES NFR BLD AUTO: 0.7 % — SIGNIFICANT CHANGE UP (ref 0–1.5)
IMM GRANULOCYTES NFR BLD AUTO: 0.7 % — SIGNIFICANT CHANGE UP (ref 0–1.5)
IMM GRANULOCYTES NFR BLD AUTO: 0.8 % — SIGNIFICANT CHANGE UP (ref 0–1.5)
IMM GRANULOCYTES NFR BLD AUTO: 0.8 % — SIGNIFICANT CHANGE UP (ref 0–1.5)
IMM GRANULOCYTES NFR BLD AUTO: 0.9 % — SIGNIFICANT CHANGE UP (ref 0–1.5)
IMM GRANULOCYTES NFR BLD AUTO: 1 % — SIGNIFICANT CHANGE UP (ref 0–1.5)
IMM GRANULOCYTES NFR BLD AUTO: 1.1 % — SIGNIFICANT CHANGE UP (ref 0–1.5)
IMM GRANULOCYTES NFR BLD AUTO: 1.1 % — SIGNIFICANT CHANGE UP (ref 0–1.5)
IMM GRANULOCYTES NFR BLD AUTO: 1.2 % — SIGNIFICANT CHANGE UP (ref 0–1.5)
IMM GRANULOCYTES NFR BLD AUTO: 1.2 % — SIGNIFICANT CHANGE UP (ref 0–1.5)
IMM GRANULOCYTES NFR BLD AUTO: 1.6 % — HIGH (ref 0–1.5)
IMM GRANULOCYTES NFR BLD AUTO: 1.9 % — HIGH (ref 0–1.5)
IMM GRANULOCYTES NFR BLD AUTO: 10.3 % — HIGH (ref 0–1.5)
IMM GRANULOCYTES NFR BLD AUTO: 11.3 % — HIGH (ref 0–1.5)
IMM GRANULOCYTES NFR BLD AUTO: 2.1 % — HIGH (ref 0–1.5)
IMM GRANULOCYTES NFR BLD AUTO: 2.2 % — HIGH (ref 0–1.5)
IMM GRANULOCYTES NFR BLD AUTO: 4.5 % — HIGH (ref 0–1.5)
IMM GRANULOCYTES NFR BLD AUTO: 5.3 % — HIGH (ref 0–1.5)
IMM GRANULOCYTES NFR BLD AUTO: 9.3 % — HIGH (ref 0–1.5)
INR BLD: 1.03 RATIO — SIGNIFICANT CHANGE UP (ref 0.88–1.16)
INR BLD: 1.05 RATIO — SIGNIFICANT CHANGE UP (ref 0.88–1.16)
INR BLD: 1.08 RATIO — SIGNIFICANT CHANGE UP (ref 0.88–1.16)
INR BLD: 1.14 RATIO — SIGNIFICANT CHANGE UP (ref 0.88–1.16)
INR BLD: 1.17 RATIO — HIGH (ref 0.88–1.16)
INR BLD: 1.18 RATIO — HIGH (ref 0.88–1.16)
INR BLD: 1.19 RATIO — HIGH (ref 0.88–1.16)
INR BLD: 1.2 RATIO — HIGH (ref 0.88–1.16)
INR BLD: 1.2 RATIO — HIGH (ref 0.88–1.16)
INR BLD: 1.21 RATIO — HIGH (ref 0.88–1.16)
INR BLD: 1.23 RATIO — HIGH (ref 0.88–1.16)
INR BLD: 1.23 RATIO — HIGH (ref 0.88–1.16)
INR BLD: 1.26 RATIO — HIGH (ref 0.88–1.16)
INR BLD: 1.27 RATIO — HIGH (ref 0.88–1.16)
INR BLD: 1.28 RATIO — HIGH (ref 0.88–1.16)
INR BLD: 1.3 RATIO — HIGH (ref 0.88–1.16)
INR BLD: 1.3 RATIO — HIGH (ref 0.88–1.16)
INR BLD: 1.35 RATIO — HIGH (ref 0.88–1.16)
INR BLD: 1.41 RATIO — HIGH (ref 0.88–1.16)
INR BLD: 1.56 RATIO — HIGH (ref 0.88–1.16)
KETONES UR-MCNC: ABNORMAL
KETONES UR-MCNC: NEGATIVE — SIGNIFICANT CHANGE UP
LACTATE BLDA-MCNC: 2.7 MMOL/L — HIGH (ref 0.5–2)
LACTATE BLDV-MCNC: 1.3 MMOL/L — SIGNIFICANT CHANGE UP (ref 0.5–2)
LACTATE SERPL-SCNC: 1.2 MMOL/L — SIGNIFICANT CHANGE UP (ref 0.5–2)
LDH SERPL L TO P-CCNC: 614 U/L — HIGH (ref 135–225)
LDH SERPL L TO P-CCNC: 657 U/L — HIGH (ref 135–225)
LDH SERPL L TO P-CCNC: 680 U/L — HIGH (ref 135–225)
LDH SERPL L TO P-CCNC: 735 U/L — HIGH (ref 135–225)
LEUKOCYTE ESTERASE UR-ACNC: ABNORMAL
LEUKOCYTE ESTERASE UR-ACNC: NEGATIVE — SIGNIFICANT CHANGE UP
LEVETIRACETAM SERPL-MCNC: 16.5 UG/ML — SIGNIFICANT CHANGE UP (ref 10–40)
LEVETIRACETAM SERPL-MCNC: 17.9 UG/ML — SIGNIFICANT CHANGE UP (ref 10–40)
LEVETIRACETAM SERPL-MCNC: 21.9 UG/ML — SIGNIFICANT CHANGE UP (ref 10–40)
LEVETIRACETAM SERPL-MCNC: 30.5 UG/ML — SIGNIFICANT CHANGE UP (ref 10–40)
LEVETIRACETAM SERPL-MCNC: 53.8 UG/ML — HIGH (ref 10–40)
LEVETIRACETAM SERPL-MCNC: 59 UG/ML — HIGH (ref 10–40)
LEVETIRACETAM SERPL-MCNC: 65.7 UG/ML — HIGH (ref 10–40)
LEVETIRACETAM SERPL-MCNC: 73.1 UG/ML — HIGH (ref 10–40)
LEVETIRACETAM SERPL-MCNC: 73.9 UG/ML — HIGH (ref 10–40)
LYMPHOCYTES # BLD AUTO: 0 % — LOW (ref 13–44)
LYMPHOCYTES # BLD AUTO: 0 % — LOW (ref 13–44)
LYMPHOCYTES # BLD AUTO: 0 K/UL — LOW (ref 1–3.3)
LYMPHOCYTES # BLD AUTO: 0 K/UL — LOW (ref 1–3.3)
LYMPHOCYTES # BLD AUTO: 0.48 K/UL — LOW (ref 1–3.3)
LYMPHOCYTES # BLD AUTO: 0.53 K/UL — LOW (ref 1–3.3)
LYMPHOCYTES # BLD AUTO: 0.54 K/UL — LOW (ref 1–3.3)
LYMPHOCYTES # BLD AUTO: 0.54 K/UL — LOW (ref 1–3.3)
LYMPHOCYTES # BLD AUTO: 0.64 K/UL — LOW (ref 1–3.3)
LYMPHOCYTES # BLD AUTO: 0.66 K/UL — LOW (ref 1–3.3)
LYMPHOCYTES # BLD AUTO: 0.68 K/UL — LOW (ref 1–3.3)
LYMPHOCYTES # BLD AUTO: 0.75 K/UL — LOW (ref 1–3.3)
LYMPHOCYTES # BLD AUTO: 0.76 K/UL — LOW (ref 1–3.3)
LYMPHOCYTES # BLD AUTO: 0.83 K/UL — LOW (ref 1–3.3)
LYMPHOCYTES # BLD AUTO: 0.86 K/UL — LOW (ref 1–3.3)
LYMPHOCYTES # BLD AUTO: 0.86 K/UL — LOW (ref 1–3.3)
LYMPHOCYTES # BLD AUTO: 0.91 K/UL — LOW (ref 1–3.3)
LYMPHOCYTES # BLD AUTO: 0.96 K/UL — LOW (ref 1–3.3)
LYMPHOCYTES # BLD AUTO: 0.98 K/UL — LOW (ref 1–3.3)
LYMPHOCYTES # BLD AUTO: 1.04 K/UL — SIGNIFICANT CHANGE UP (ref 1–3.3)
LYMPHOCYTES # BLD AUTO: 1.09 K/UL — SIGNIFICANT CHANGE UP (ref 1–3.3)
LYMPHOCYTES # BLD AUTO: 1.09 K/UL — SIGNIFICANT CHANGE UP (ref 1–3.3)
LYMPHOCYTES # BLD AUTO: 1.16 K/UL — SIGNIFICANT CHANGE UP (ref 1–3.3)
LYMPHOCYTES # BLD AUTO: 1.18 K/UL — SIGNIFICANT CHANGE UP (ref 1–3.3)
LYMPHOCYTES # BLD AUTO: 1.21 K/UL — SIGNIFICANT CHANGE UP (ref 1–3.3)
LYMPHOCYTES # BLD AUTO: 1.21 K/UL — SIGNIFICANT CHANGE UP (ref 1–3.3)
LYMPHOCYTES # BLD AUTO: 1.25 K/UL — SIGNIFICANT CHANGE UP (ref 1–3.3)
LYMPHOCYTES # BLD AUTO: 1.26 K/UL — SIGNIFICANT CHANGE UP (ref 1–3.3)
LYMPHOCYTES # BLD AUTO: 1.27 K/UL — SIGNIFICANT CHANGE UP (ref 1–3.3)
LYMPHOCYTES # BLD AUTO: 1.28 K/UL — SIGNIFICANT CHANGE UP (ref 1–3.3)
LYMPHOCYTES # BLD AUTO: 1.31 K/UL — SIGNIFICANT CHANGE UP (ref 1–3.3)
LYMPHOCYTES # BLD AUTO: 1.49 K/UL — SIGNIFICANT CHANGE UP (ref 1–3.3)
LYMPHOCYTES # BLD AUTO: 1.8 % — LOW (ref 13–44)
LYMPHOCYTES # BLD AUTO: 10.6 % — LOW (ref 13–44)
LYMPHOCYTES # BLD AUTO: 11.4 % — LOW (ref 13–44)
LYMPHOCYTES # BLD AUTO: 11.6 % — LOW (ref 13–44)
LYMPHOCYTES # BLD AUTO: 12.2 % — LOW (ref 13–44)
LYMPHOCYTES # BLD AUTO: 13.1 % — SIGNIFICANT CHANGE UP (ref 13–44)
LYMPHOCYTES # BLD AUTO: 15.7 % — SIGNIFICANT CHANGE UP (ref 13–44)
LYMPHOCYTES # BLD AUTO: 16.6 % — SIGNIFICANT CHANGE UP (ref 13–44)
LYMPHOCYTES # BLD AUTO: 2.41 K/UL — SIGNIFICANT CHANGE UP (ref 1–3.3)
LYMPHOCYTES # BLD AUTO: 22 % — SIGNIFICANT CHANGE UP (ref 13–44)
LYMPHOCYTES # BLD AUTO: 3.5 % — LOW (ref 13–44)
LYMPHOCYTES # BLD AUTO: 3.6 % — LOW (ref 13–44)
LYMPHOCYTES # BLD AUTO: 3.8 % — LOW (ref 13–44)
LYMPHOCYTES # BLD AUTO: 4.3 % — LOW (ref 13–44)
LYMPHOCYTES # BLD AUTO: 4.4 % — LOW (ref 13–44)
LYMPHOCYTES # BLD AUTO: 4.4 % — LOW (ref 13–44)
LYMPHOCYTES # BLD AUTO: 4.6 % — LOW (ref 13–44)
LYMPHOCYTES # BLD AUTO: 4.8 % — LOW (ref 13–44)
LYMPHOCYTES # BLD AUTO: 4.8 % — LOW (ref 13–44)
LYMPHOCYTES # BLD AUTO: 5.3 % — LOW (ref 13–44)
LYMPHOCYTES # BLD AUTO: 5.8 % — LOW (ref 13–44)
LYMPHOCYTES # BLD AUTO: 5.9 % — LOW (ref 13–44)
LYMPHOCYTES # BLD AUTO: 6 % — LOW (ref 13–44)
LYMPHOCYTES # BLD AUTO: 6.2 % — LOW (ref 13–44)
LYMPHOCYTES # BLD AUTO: 6.5 % — LOW (ref 13–44)
LYMPHOCYTES # BLD AUTO: 6.7 % — LOW (ref 13–44)
LYMPHOCYTES # BLD AUTO: 6.9 % — LOW (ref 13–44)
LYMPHOCYTES # BLD AUTO: 6.9 % — LOW (ref 13–44)
LYMPHOCYTES # BLD AUTO: 8.2 % — LOW (ref 13–44)
LYMPHOCYTES # BLD AUTO: 9.3 % — LOW (ref 13–44)
MAGNESIUM SERPL-MCNC: 1.5 MG/DL — LOW (ref 1.6–2.6)
MAGNESIUM SERPL-MCNC: 1.6 MG/DL — SIGNIFICANT CHANGE UP (ref 1.6–2.6)
MAGNESIUM SERPL-MCNC: 1.7 MG/DL — SIGNIFICANT CHANGE UP (ref 1.6–2.6)
MAGNESIUM SERPL-MCNC: 1.9 MG/DL — SIGNIFICANT CHANGE UP (ref 1.6–2.6)
MAGNESIUM SERPL-MCNC: 2.1 MG/DL — SIGNIFICANT CHANGE UP (ref 1.6–2.6)
MAGNESIUM SERPL-MCNC: 2.2 MG/DL — SIGNIFICANT CHANGE UP (ref 1.6–2.6)
MAGNESIUM SERPL-MCNC: 2.3 MG/DL — SIGNIFICANT CHANGE UP (ref 1.6–2.6)
MAGNESIUM SERPL-MCNC: 2.4 MG/DL — SIGNIFICANT CHANGE UP (ref 1.6–2.6)
MAGNESIUM SERPL-MCNC: 2.5 MG/DL — SIGNIFICANT CHANGE UP (ref 1.6–2.6)
MAGNESIUM SERPL-MCNC: 2.6 MG/DL — SIGNIFICANT CHANGE UP (ref 1.6–2.6)
MAGNESIUM SERPL-MCNC: 2.7 MG/DL — HIGH (ref 1.6–2.6)
MAGNESIUM SERPL-MCNC: 2.8 MG/DL — HIGH (ref 1.6–2.6)
MAGNESIUM SERPL-MCNC: 3 MG/DL — HIGH (ref 1.6–2.6)
MANUAL SMEAR VERIFICATION: SIGNIFICANT CHANGE UP
MCHC RBC-ENTMCNC: 26 PG — LOW (ref 27–34)
MCHC RBC-ENTMCNC: 26.5 PG — LOW (ref 27–34)
MCHC RBC-ENTMCNC: 26.6 PG — LOW (ref 27–34)
MCHC RBC-ENTMCNC: 26.6 PG — LOW (ref 27–34)
MCHC RBC-ENTMCNC: 26.7 PG — LOW (ref 27–34)
MCHC RBC-ENTMCNC: 26.8 PG — LOW (ref 27–34)
MCHC RBC-ENTMCNC: 26.8 PG — LOW (ref 27–34)
MCHC RBC-ENTMCNC: 26.9 PG — LOW (ref 27–34)
MCHC RBC-ENTMCNC: 27 PG — SIGNIFICANT CHANGE UP (ref 27–34)
MCHC RBC-ENTMCNC: 27 PG — SIGNIFICANT CHANGE UP (ref 27–34)
MCHC RBC-ENTMCNC: 27.1 PG — SIGNIFICANT CHANGE UP (ref 27–34)
MCHC RBC-ENTMCNC: 27.2 PG — SIGNIFICANT CHANGE UP (ref 27–34)
MCHC RBC-ENTMCNC: 27.3 PG — SIGNIFICANT CHANGE UP (ref 27–34)
MCHC RBC-ENTMCNC: 27.4 PG — SIGNIFICANT CHANGE UP (ref 27–34)
MCHC RBC-ENTMCNC: 27.4 PG — SIGNIFICANT CHANGE UP (ref 27–34)
MCHC RBC-ENTMCNC: 27.5 PG — SIGNIFICANT CHANGE UP (ref 27–34)
MCHC RBC-ENTMCNC: 27.6 PG — SIGNIFICANT CHANGE UP (ref 27–34)
MCHC RBC-ENTMCNC: 27.6 PG — SIGNIFICANT CHANGE UP (ref 27–34)
MCHC RBC-ENTMCNC: 27.7 PG — SIGNIFICANT CHANGE UP (ref 27–34)
MCHC RBC-ENTMCNC: 27.8 PG — SIGNIFICANT CHANGE UP (ref 27–34)
MCHC RBC-ENTMCNC: 28 PG — SIGNIFICANT CHANGE UP (ref 27–34)
MCHC RBC-ENTMCNC: 28.4 PG — SIGNIFICANT CHANGE UP (ref 27–34)
MCHC RBC-ENTMCNC: 28.7 GM/DL — LOW (ref 32–36)
MCHC RBC-ENTMCNC: 29.4 GM/DL — LOW (ref 32–36)
MCHC RBC-ENTMCNC: 29.5 GM/DL — LOW (ref 32–36)
MCHC RBC-ENTMCNC: 29.5 GM/DL — LOW (ref 32–36)
MCHC RBC-ENTMCNC: 29.6 GM/DL — LOW (ref 32–36)
MCHC RBC-ENTMCNC: 29.7 GM/DL — LOW (ref 32–36)
MCHC RBC-ENTMCNC: 29.9 GM/DL — LOW (ref 32–36)
MCHC RBC-ENTMCNC: 30.2 GM/DL — LOW (ref 32–36)
MCHC RBC-ENTMCNC: 30.2 GM/DL — LOW (ref 32–36)
MCHC RBC-ENTMCNC: 30.7 GM/DL — LOW (ref 32–36)
MCHC RBC-ENTMCNC: 30.9 GM/DL — LOW (ref 32–36)
MCHC RBC-ENTMCNC: 31 GM/DL — LOW (ref 32–36)
MCHC RBC-ENTMCNC: 31.3 GM/DL — LOW (ref 32–36)
MCHC RBC-ENTMCNC: 31.4 GM/DL — LOW (ref 32–36)
MCHC RBC-ENTMCNC: 31.5 GM/DL — LOW (ref 32–36)
MCHC RBC-ENTMCNC: 31.6 GM/DL — LOW (ref 32–36)
MCHC RBC-ENTMCNC: 31.8 GM/DL — LOW (ref 32–36)
MCHC RBC-ENTMCNC: 32.1 GM/DL — SIGNIFICANT CHANGE UP (ref 32–36)
MCHC RBC-ENTMCNC: 32.1 GM/DL — SIGNIFICANT CHANGE UP (ref 32–36)
MCHC RBC-ENTMCNC: 32.2 GM/DL — SIGNIFICANT CHANGE UP (ref 32–36)
MCHC RBC-ENTMCNC: 32.2 GM/DL — SIGNIFICANT CHANGE UP (ref 32–36)
MCHC RBC-ENTMCNC: 32.3 GM/DL — SIGNIFICANT CHANGE UP (ref 32–36)
MCHC RBC-ENTMCNC: 32.4 GM/DL — SIGNIFICANT CHANGE UP (ref 32–36)
MCHC RBC-ENTMCNC: 32.5 GM/DL — SIGNIFICANT CHANGE UP (ref 32–36)
MCHC RBC-ENTMCNC: 32.5 GM/DL — SIGNIFICANT CHANGE UP (ref 32–36)
MCHC RBC-ENTMCNC: 32.8 GM/DL — SIGNIFICANT CHANGE UP (ref 32–36)
MCHC RBC-ENTMCNC: 33 GM/DL — SIGNIFICANT CHANGE UP (ref 32–36)
MCHC RBC-ENTMCNC: 33.1 GM/DL — SIGNIFICANT CHANGE UP (ref 32–36)
MCHC RBC-ENTMCNC: 33.1 GM/DL — SIGNIFICANT CHANGE UP (ref 32–36)
MCHC RBC-ENTMCNC: 33.2 GM/DL — SIGNIFICANT CHANGE UP (ref 32–36)
MCHC RBC-ENTMCNC: 33.3 GM/DL — SIGNIFICANT CHANGE UP (ref 32–36)
MCHC RBC-ENTMCNC: 33.3 GM/DL — SIGNIFICANT CHANGE UP (ref 32–36)
MCHC RBC-ENTMCNC: 33.4 GM/DL — SIGNIFICANT CHANGE UP (ref 32–36)
MCHC RBC-ENTMCNC: 33.5 GM/DL — SIGNIFICANT CHANGE UP (ref 32–36)
MCHC RBC-ENTMCNC: 33.6 GM/DL — SIGNIFICANT CHANGE UP (ref 32–36)
MCHC RBC-ENTMCNC: 34.1 GM/DL — SIGNIFICANT CHANGE UP (ref 32–36)
MCV RBC AUTO: 80.8 FL — SIGNIFICANT CHANGE UP (ref 80–100)
MCV RBC AUTO: 80.9 FL — SIGNIFICANT CHANGE UP (ref 80–100)
MCV RBC AUTO: 81 FL — SIGNIFICANT CHANGE UP (ref 80–100)
MCV RBC AUTO: 81.1 FL — SIGNIFICANT CHANGE UP (ref 80–100)
MCV RBC AUTO: 81.6 FL — SIGNIFICANT CHANGE UP (ref 80–100)
MCV RBC AUTO: 82.1 FL — SIGNIFICANT CHANGE UP (ref 80–100)
MCV RBC AUTO: 82.4 FL — SIGNIFICANT CHANGE UP (ref 80–100)
MCV RBC AUTO: 82.8 FL — SIGNIFICANT CHANGE UP (ref 80–100)
MCV RBC AUTO: 82.9 FL — SIGNIFICANT CHANGE UP (ref 80–100)
MCV RBC AUTO: 83.2 FL — SIGNIFICANT CHANGE UP (ref 80–100)
MCV RBC AUTO: 83.2 FL — SIGNIFICANT CHANGE UP (ref 80–100)
MCV RBC AUTO: 83.8 FL — SIGNIFICANT CHANGE UP (ref 80–100)
MCV RBC AUTO: 84.1 FL — SIGNIFICANT CHANGE UP (ref 80–100)
MCV RBC AUTO: 84.1 FL — SIGNIFICANT CHANGE UP (ref 80–100)
MCV RBC AUTO: 84.2 FL — SIGNIFICANT CHANGE UP (ref 80–100)
MCV RBC AUTO: 84.3 FL — SIGNIFICANT CHANGE UP (ref 80–100)
MCV RBC AUTO: 84.4 FL — SIGNIFICANT CHANGE UP (ref 80–100)
MCV RBC AUTO: 85.2 FL — SIGNIFICANT CHANGE UP (ref 80–100)
MCV RBC AUTO: 85.3 FL — SIGNIFICANT CHANGE UP (ref 80–100)
MCV RBC AUTO: 85.7 FL — SIGNIFICANT CHANGE UP (ref 80–100)
MCV RBC AUTO: 85.9 FL — SIGNIFICANT CHANGE UP (ref 80–100)
MCV RBC AUTO: 85.9 FL — SIGNIFICANT CHANGE UP (ref 80–100)
MCV RBC AUTO: 86.3 FL — SIGNIFICANT CHANGE UP (ref 80–100)
MCV RBC AUTO: 86.6 FL — SIGNIFICANT CHANGE UP (ref 80–100)
MCV RBC AUTO: 87.1 FL — SIGNIFICANT CHANGE UP (ref 80–100)
MCV RBC AUTO: 87.3 FL — SIGNIFICANT CHANGE UP (ref 80–100)
MCV RBC AUTO: 87.3 FL — SIGNIFICANT CHANGE UP (ref 80–100)
MCV RBC AUTO: 87.7 FL — SIGNIFICANT CHANGE UP (ref 80–100)
MCV RBC AUTO: 87.8 FL — SIGNIFICANT CHANGE UP (ref 80–100)
MCV RBC AUTO: 88 FL — SIGNIFICANT CHANGE UP (ref 80–100)
MCV RBC AUTO: 88.1 FL — SIGNIFICANT CHANGE UP (ref 80–100)
MCV RBC AUTO: 88.2 FL — SIGNIFICANT CHANGE UP (ref 80–100)
MCV RBC AUTO: 88.2 FL — SIGNIFICANT CHANGE UP (ref 80–100)
MCV RBC AUTO: 88.6 FL — SIGNIFICANT CHANGE UP (ref 80–100)
MCV RBC AUTO: 88.9 FL — SIGNIFICANT CHANGE UP (ref 80–100)
MCV RBC AUTO: 89.9 FL — SIGNIFICANT CHANGE UP (ref 80–100)
MCV RBC AUTO: 90.1 FL — SIGNIFICANT CHANGE UP (ref 80–100)
MCV RBC AUTO: 90.6 FL — SIGNIFICANT CHANGE UP (ref 80–100)
MCV RBC AUTO: 91.2 FL — SIGNIFICANT CHANGE UP (ref 80–100)
MCV RBC AUTO: 93.1 FL — SIGNIFICANT CHANGE UP (ref 80–100)
MCV RBC AUTO: 93.2 FL — SIGNIFICANT CHANGE UP (ref 80–100)
METHOD TYPE: SIGNIFICANT CHANGE UP
MICROCYTES BLD QL: SLIGHT — SIGNIFICANT CHANGE UP
MONOCYTES # BLD AUTO: 0.13 K/UL — SIGNIFICANT CHANGE UP (ref 0–0.9)
MONOCYTES # BLD AUTO: 0.14 K/UL — SIGNIFICANT CHANGE UP (ref 0–0.9)
MONOCYTES # BLD AUTO: 0.16 K/UL — SIGNIFICANT CHANGE UP (ref 0–0.9)
MONOCYTES # BLD AUTO: 0.16 K/UL — SIGNIFICANT CHANGE UP (ref 0–0.9)
MONOCYTES # BLD AUTO: 0.22 K/UL — SIGNIFICANT CHANGE UP (ref 0–0.9)
MONOCYTES # BLD AUTO: 0.24 K/UL — SIGNIFICANT CHANGE UP (ref 0–0.9)
MONOCYTES # BLD AUTO: 0.29 K/UL — SIGNIFICANT CHANGE UP (ref 0–0.9)
MONOCYTES # BLD AUTO: 0.32 K/UL — SIGNIFICANT CHANGE UP (ref 0–0.9)
MONOCYTES # BLD AUTO: 0.34 K/UL — SIGNIFICANT CHANGE UP (ref 0–0.9)
MONOCYTES # BLD AUTO: 0.39 K/UL — SIGNIFICANT CHANGE UP (ref 0–0.9)
MONOCYTES # BLD AUTO: 0.45 K/UL — SIGNIFICANT CHANGE UP (ref 0–0.9)
MONOCYTES # BLD AUTO: 0.49 K/UL — SIGNIFICANT CHANGE UP (ref 0–0.9)
MONOCYTES # BLD AUTO: 0.5 K/UL — SIGNIFICANT CHANGE UP (ref 0–0.9)
MONOCYTES # BLD AUTO: 0.51 K/UL — SIGNIFICANT CHANGE UP (ref 0–0.9)
MONOCYTES # BLD AUTO: 0.51 K/UL — SIGNIFICANT CHANGE UP (ref 0–0.9)
MONOCYTES # BLD AUTO: 0.52 K/UL — SIGNIFICANT CHANGE UP (ref 0–0.9)
MONOCYTES # BLD AUTO: 0.55 K/UL — SIGNIFICANT CHANGE UP (ref 0–0.9)
MONOCYTES # BLD AUTO: 0.56 K/UL — SIGNIFICANT CHANGE UP (ref 0–0.9)
MONOCYTES # BLD AUTO: 0.66 K/UL — SIGNIFICANT CHANGE UP (ref 0–0.9)
MONOCYTES # BLD AUTO: 0.72 K/UL — SIGNIFICANT CHANGE UP (ref 0–0.9)
MONOCYTES # BLD AUTO: 0.75 K/UL — SIGNIFICANT CHANGE UP (ref 0–0.9)
MONOCYTES # BLD AUTO: 0.83 K/UL — SIGNIFICANT CHANGE UP (ref 0–0.9)
MONOCYTES # BLD AUTO: 0.85 K/UL — SIGNIFICANT CHANGE UP (ref 0–0.9)
MONOCYTES # BLD AUTO: 0.87 K/UL — SIGNIFICANT CHANGE UP (ref 0–0.9)
MONOCYTES # BLD AUTO: 0.99 K/UL — HIGH (ref 0–0.9)
MONOCYTES # BLD AUTO: 1.01 K/UL — HIGH (ref 0–0.9)
MONOCYTES # BLD AUTO: 1.02 K/UL — HIGH (ref 0–0.9)
MONOCYTES # BLD AUTO: 1.02 K/UL — HIGH (ref 0–0.9)
MONOCYTES # BLD AUTO: 1.22 K/UL — HIGH (ref 0–0.9)
MONOCYTES # BLD AUTO: 1.26 K/UL — HIGH (ref 0–0.9)
MONOCYTES # BLD AUTO: 1.66 K/UL — HIGH (ref 0–0.9)
MONOCYTES NFR BLD AUTO: 0.9 % — LOW (ref 2–14)
MONOCYTES NFR BLD AUTO: 0.9 % — LOW (ref 2–14)
MONOCYTES NFR BLD AUTO: 1 % — LOW (ref 2–14)
MONOCYTES NFR BLD AUTO: 1.4 % — LOW (ref 2–14)
MONOCYTES NFR BLD AUTO: 1.9 % — LOW (ref 2–14)
MONOCYTES NFR BLD AUTO: 2.6 % — SIGNIFICANT CHANGE UP (ref 2–14)
MONOCYTES NFR BLD AUTO: 2.6 % — SIGNIFICANT CHANGE UP (ref 2–14)
MONOCYTES NFR BLD AUTO: 3.2 % — SIGNIFICANT CHANGE UP (ref 2–14)
MONOCYTES NFR BLD AUTO: 3.3 % — SIGNIFICANT CHANGE UP (ref 2–14)
MONOCYTES NFR BLD AUTO: 3.4 % — SIGNIFICANT CHANGE UP (ref 2–14)
MONOCYTES NFR BLD AUTO: 3.5 % — SIGNIFICANT CHANGE UP (ref 2–14)
MONOCYTES NFR BLD AUTO: 3.6 % — SIGNIFICANT CHANGE UP (ref 2–14)
MONOCYTES NFR BLD AUTO: 3.7 % — SIGNIFICANT CHANGE UP (ref 2–14)
MONOCYTES NFR BLD AUTO: 4 % — SIGNIFICANT CHANGE UP (ref 2–14)
MONOCYTES NFR BLD AUTO: 4.1 % — SIGNIFICANT CHANGE UP (ref 2–14)
MONOCYTES NFR BLD AUTO: 4.1 % — SIGNIFICANT CHANGE UP (ref 2–14)
MONOCYTES NFR BLD AUTO: 4.2 % — SIGNIFICANT CHANGE UP (ref 2–14)
MONOCYTES NFR BLD AUTO: 4.4 % — SIGNIFICANT CHANGE UP (ref 2–14)
MONOCYTES NFR BLD AUTO: 4.5 % — SIGNIFICANT CHANGE UP (ref 2–14)
MONOCYTES NFR BLD AUTO: 4.5 % — SIGNIFICANT CHANGE UP (ref 2–14)
MONOCYTES NFR BLD AUTO: 4.6 % — SIGNIFICANT CHANGE UP (ref 2–14)
MONOCYTES NFR BLD AUTO: 5 % — SIGNIFICANT CHANGE UP (ref 2–14)
MONOCYTES NFR BLD AUTO: 5.5 % — SIGNIFICANT CHANGE UP (ref 2–14)
MONOCYTES NFR BLD AUTO: 5.6 % — SIGNIFICANT CHANGE UP (ref 2–14)
MONOCYTES NFR BLD AUTO: 5.6 % — SIGNIFICANT CHANGE UP (ref 2–14)
MONOCYTES NFR BLD AUTO: 5.7 % — SIGNIFICANT CHANGE UP (ref 2–14)
MONOCYTES NFR BLD AUTO: 5.7 % — SIGNIFICANT CHANGE UP (ref 2–14)
MONOCYTES NFR BLD AUTO: 6.1 % — SIGNIFICANT CHANGE UP (ref 2–14)
MONOCYTES NFR BLD AUTO: 7 % — SIGNIFICANT CHANGE UP (ref 2–14)
MONOCYTES NFR BLD AUTO: 7 % — SIGNIFICANT CHANGE UP (ref 2–14)
MONOCYTES NFR BLD AUTO: 8 % — SIGNIFICANT CHANGE UP (ref 2–14)
MRSA PCR RESULT.: SIGNIFICANT CHANGE UP
MRSA SPEC QL CULT: SIGNIFICANT CHANGE UP
NEUTROPHILS # BLD AUTO: 11.11 K/UL — HIGH (ref 1.8–7.4)
NEUTROPHILS # BLD AUTO: 11.2 K/UL — HIGH (ref 1.8–7.4)
NEUTROPHILS # BLD AUTO: 11.72 K/UL — HIGH (ref 1.8–7.4)
NEUTROPHILS # BLD AUTO: 12.19 K/UL — HIGH (ref 1.8–7.4)
NEUTROPHILS # BLD AUTO: 13.34 K/UL — HIGH (ref 1.8–7.4)
NEUTROPHILS # BLD AUTO: 13.43 K/UL — HIGH (ref 1.8–7.4)
NEUTROPHILS # BLD AUTO: 13.64 K/UL — HIGH (ref 1.8–7.4)
NEUTROPHILS # BLD AUTO: 13.67 K/UL — HIGH (ref 1.8–7.4)
NEUTROPHILS # BLD AUTO: 13.71 K/UL — HIGH (ref 1.8–7.4)
NEUTROPHILS # BLD AUTO: 14.63 K/UL — HIGH (ref 1.8–7.4)
NEUTROPHILS # BLD AUTO: 16.63 K/UL — HIGH (ref 1.8–7.4)
NEUTROPHILS # BLD AUTO: 16.63 K/UL — HIGH (ref 1.8–7.4)
NEUTROPHILS # BLD AUTO: 16.94 K/UL — HIGH (ref 1.8–7.4)
NEUTROPHILS # BLD AUTO: 17.03 K/UL — HIGH (ref 1.8–7.4)
NEUTROPHILS # BLD AUTO: 17.67 K/UL — HIGH (ref 1.8–7.4)
NEUTROPHILS # BLD AUTO: 18.35 K/UL — HIGH (ref 1.8–7.4)
NEUTROPHILS # BLD AUTO: 18.39 K/UL — HIGH (ref 1.8–7.4)
NEUTROPHILS # BLD AUTO: 18.42 K/UL — HIGH (ref 1.8–7.4)
NEUTROPHILS # BLD AUTO: 18.93 K/UL — HIGH (ref 1.8–7.4)
NEUTROPHILS # BLD AUTO: 2.89 K/UL — SIGNIFICANT CHANGE UP (ref 1.8–7.4)
NEUTROPHILS # BLD AUTO: 25.92 K/UL — HIGH (ref 1.8–7.4)
NEUTROPHILS # BLD AUTO: 28.47 K/UL — HIGH (ref 1.8–7.4)
NEUTROPHILS # BLD AUTO: 5.52 K/UL — SIGNIFICANT CHANGE UP (ref 1.8–7.4)
NEUTROPHILS # BLD AUTO: 5.72 K/UL — SIGNIFICANT CHANGE UP (ref 1.8–7.4)
NEUTROPHILS # BLD AUTO: 6.04 K/UL — SIGNIFICANT CHANGE UP (ref 1.8–7.4)
NEUTROPHILS # BLD AUTO: 6.16 K/UL — SIGNIFICANT CHANGE UP (ref 1.8–7.4)
NEUTROPHILS # BLD AUTO: 7.61 K/UL — HIGH (ref 1.8–7.4)
NEUTROPHILS # BLD AUTO: 7.68 K/UL — HIGH (ref 1.8–7.4)
NEUTROPHILS # BLD AUTO: 7.88 K/UL — HIGH (ref 1.8–7.4)
NEUTROPHILS # BLD AUTO: 8.08 K/UL — HIGH (ref 1.8–7.4)
NEUTROPHILS # BLD AUTO: 8.12 K/UL — HIGH (ref 1.8–7.4)
NEUTROPHILS NFR BLD AUTO: 68.6 % — SIGNIFICANT CHANGE UP (ref 43–77)
NEUTROPHILS NFR BLD AUTO: 72.3 % — SIGNIFICANT CHANGE UP (ref 43–77)
NEUTROPHILS NFR BLD AUTO: 72.3 % — SIGNIFICANT CHANGE UP (ref 43–77)
NEUTROPHILS NFR BLD AUTO: 72.5 % — SIGNIFICANT CHANGE UP (ref 43–77)
NEUTROPHILS NFR BLD AUTO: 74 % — SIGNIFICANT CHANGE UP (ref 43–77)
NEUTROPHILS NFR BLD AUTO: 77.2 % — HIGH (ref 43–77)
NEUTROPHILS NFR BLD AUTO: 77.4 % — HIGH (ref 43–77)
NEUTROPHILS NFR BLD AUTO: 78.7 % — HIGH (ref 43–77)
NEUTROPHILS NFR BLD AUTO: 80.1 % — HIGH (ref 43–77)
NEUTROPHILS NFR BLD AUTO: 80.1 % — HIGH (ref 43–77)
NEUTROPHILS NFR BLD AUTO: 80.3 % — HIGH (ref 43–77)
NEUTROPHILS NFR BLD AUTO: 81.9 % — HIGH (ref 43–77)
NEUTROPHILS NFR BLD AUTO: 82.5 % — HIGH (ref 43–77)
NEUTROPHILS NFR BLD AUTO: 82.7 % — HIGH (ref 43–77)
NEUTROPHILS NFR BLD AUTO: 82.9 % — HIGH (ref 43–77)
NEUTROPHILS NFR BLD AUTO: 82.9 % — HIGH (ref 43–77)
NEUTROPHILS NFR BLD AUTO: 83.3 % — HIGH (ref 43–77)
NEUTROPHILS NFR BLD AUTO: 83.3 % — HIGH (ref 43–77)
NEUTROPHILS NFR BLD AUTO: 83.4 % — HIGH (ref 43–77)
NEUTROPHILS NFR BLD AUTO: 83.9 % — HIGH (ref 43–77)
NEUTROPHILS NFR BLD AUTO: 84.5 % — HIGH (ref 43–77)
NEUTROPHILS NFR BLD AUTO: 84.7 % — HIGH (ref 43–77)
NEUTROPHILS NFR BLD AUTO: 85.4 % — HIGH (ref 43–77)
NEUTROPHILS NFR BLD AUTO: 89.2 % — HIGH (ref 43–77)
NEUTROPHILS NFR BLD AUTO: 90.7 % — HIGH (ref 43–77)
NEUTROPHILS NFR BLD AUTO: 90.8 % — HIGH (ref 43–77)
NEUTROPHILS NFR BLD AUTO: 91 % — HIGH (ref 43–77)
NEUTROPHILS NFR BLD AUTO: 91.4 % — HIGH (ref 43–77)
NEUTROPHILS NFR BLD AUTO: 91.9 % — HIGH (ref 43–77)
NEUTROPHILS NFR BLD AUTO: 92.5 % — HIGH (ref 43–77)
NEUTROPHILS NFR BLD AUTO: 96.1 % — HIGH (ref 43–77)
NITRITE UR-MCNC: NEGATIVE — SIGNIFICANT CHANGE UP
NRBC # BLD: 0 /100 WBCS — SIGNIFICANT CHANGE UP
NRBC # BLD: 1 /100 WBCS — SIGNIFICANT CHANGE UP
NRBC # BLD: 1 /100 WBCS — SIGNIFICANT CHANGE UP
NRBC # BLD: 2 /100 WBCS — SIGNIFICANT CHANGE UP
NRBC # FLD: 0 K/UL — SIGNIFICANT CHANGE UP
NRBC # FLD: 0.02 K/UL — HIGH
NRBC # FLD: 0.03 K/UL — HIGH
NRBC # FLD: 0.04 K/UL — HIGH
NRBC # FLD: 0.19 K/UL — HIGH
NRBC # FLD: 0.25 K/UL — HIGH
NRBC # FLD: 0.3 K/UL — HIGH
NRBC # FLD: 0.37 K/UL — HIGH
NRBC # FLD: 0.41 K/UL — HIGH
NRBC # FLD: 0.46 K/UL — HIGH
NT-PROBNP SERPL-SCNC: 1095 PG/ML — HIGH
NT-PROBNP SERPL-SCNC: 1142 PG/ML — HIGH
NT-PROBNP SERPL-SCNC: 1405 PG/ML — HIGH
NT-PROBNP SERPL-SCNC: 1819 PG/ML — HIGH
NT-PROBNP SERPL-SCNC: 1940 PG/ML — HIGH
NT-PROBNP SERPL-SCNC: 2212 PG/ML — HIGH
NT-PROBNP SERPL-SCNC: 2257 PG/ML — HIGH
NT-PROBNP SERPL-SCNC: 2524 PG/ML — HIGH
NT-PROBNP SERPL-SCNC: 2679 PG/ML — HIGH
NT-PROBNP SERPL-SCNC: 274 PG/ML — SIGNIFICANT CHANGE UP
NT-PROBNP SERPL-SCNC: 3177 PG/ML — HIGH
NT-PROBNP SERPL-SCNC: 7922 PG/ML — HIGH
NT-PROBNP SERPL-SCNC: 864 PG/ML — HIGH
NT-PROBNP SERPL-SCNC: HIGH PG/ML
NT-PROBNP SERPL-SCNC: HIGH PG/ML
ORGANISM # SPEC MICROSCOPIC CNT: SIGNIFICANT CHANGE UP
OSMOLALITY SERPL: 273 MOSM/KG — LOW (ref 275–295)
OSMOLALITY UR: 353 MOSM/KG — SIGNIFICANT CHANGE UP (ref 50–1200)
OSMOLALITY UR: 566 MOSM/KG — SIGNIFICANT CHANGE UP (ref 50–1200)
PCO2 BLDA: 37 MMHG — SIGNIFICANT CHANGE UP (ref 35–48)
PCO2 BLDA: 38 MMHG — SIGNIFICANT CHANGE UP (ref 35–48)
PCO2 BLDA: 39 MMHG — SIGNIFICANT CHANGE UP (ref 35–48)
PCO2 BLDA: 40 MMHG — SIGNIFICANT CHANGE UP (ref 35–48)
PCO2 BLDA: 42 MMHG — SIGNIFICANT CHANGE UP (ref 35–48)
PCO2 BLDV: 43 MMHG — SIGNIFICANT CHANGE UP (ref 41–51)
PH BLDA: 7.35 — SIGNIFICANT CHANGE UP (ref 7.35–7.45)
PH BLDA: 7.41 — SIGNIFICANT CHANGE UP (ref 7.35–7.45)
PH BLDA: 7.44 — SIGNIFICANT CHANGE UP (ref 7.35–7.45)
PH BLDA: 7.44 — SIGNIFICANT CHANGE UP (ref 7.35–7.45)
PH BLDA: 7.46 — HIGH (ref 7.35–7.45)
PH BLDV: 7.44 — HIGH (ref 7.32–7.43)
PH UR: 5 — SIGNIFICANT CHANGE UP (ref 5–8)
PH UR: 5.5 — SIGNIFICANT CHANGE UP (ref 5–8)
PH UR: 6 — SIGNIFICANT CHANGE UP (ref 5–8)
PH UR: 6 — SIGNIFICANT CHANGE UP (ref 5–8)
PHOSPHATE SERPL-MCNC: 1.9 MG/DL — LOW (ref 2.5–4.5)
PHOSPHATE SERPL-MCNC: 2.1 MG/DL — LOW (ref 2.5–4.5)
PHOSPHATE SERPL-MCNC: 2.3 MG/DL — LOW (ref 2.5–4.5)
PHOSPHATE SERPL-MCNC: 2.4 MG/DL — LOW (ref 2.5–4.5)
PHOSPHATE SERPL-MCNC: 2.5 MG/DL — SIGNIFICANT CHANGE UP (ref 2.5–4.5)
PHOSPHATE SERPL-MCNC: 2.6 MG/DL — SIGNIFICANT CHANGE UP (ref 2.5–4.5)
PHOSPHATE SERPL-MCNC: 2.9 MG/DL — SIGNIFICANT CHANGE UP (ref 2.5–4.5)
PHOSPHATE SERPL-MCNC: 3 MG/DL — SIGNIFICANT CHANGE UP (ref 2.5–4.5)
PHOSPHATE SERPL-MCNC: 3 MG/DL — SIGNIFICANT CHANGE UP (ref 2.5–4.5)
PHOSPHATE SERPL-MCNC: 3.1 MG/DL — SIGNIFICANT CHANGE UP (ref 2.5–4.5)
PHOSPHATE SERPL-MCNC: 3.1 MG/DL — SIGNIFICANT CHANGE UP (ref 2.5–4.5)
PHOSPHATE SERPL-MCNC: 3.3 MG/DL — SIGNIFICANT CHANGE UP (ref 2.5–4.5)
PHOSPHATE SERPL-MCNC: 3.5 MG/DL — SIGNIFICANT CHANGE UP (ref 2.5–4.5)
PHOSPHATE SERPL-MCNC: 3.5 MG/DL — SIGNIFICANT CHANGE UP (ref 2.5–4.5)
PHOSPHATE SERPL-MCNC: 3.6 MG/DL — SIGNIFICANT CHANGE UP (ref 2.5–4.5)
PHOSPHATE SERPL-MCNC: 3.7 MG/DL — SIGNIFICANT CHANGE UP (ref 2.5–4.5)
PHOSPHATE SERPL-MCNC: 3.7 MG/DL — SIGNIFICANT CHANGE UP (ref 2.5–4.5)
PHOSPHATE SERPL-MCNC: 3.9 MG/DL — SIGNIFICANT CHANGE UP (ref 2.5–4.5)
PHOSPHATE SERPL-MCNC: 4.1 MG/DL — SIGNIFICANT CHANGE UP (ref 2.5–4.5)
PHOSPHATE SERPL-MCNC: 4.1 MG/DL — SIGNIFICANT CHANGE UP (ref 2.5–4.5)
PHOSPHATE SERPL-MCNC: 4.2 MG/DL — SIGNIFICANT CHANGE UP (ref 2.5–4.5)
PHOSPHATE SERPL-MCNC: 4.5 MG/DL — SIGNIFICANT CHANGE UP (ref 2.5–4.5)
PHOSPHATE SERPL-MCNC: 4.6 MG/DL — HIGH (ref 2.5–4.5)
PHOSPHATE SERPL-MCNC: 4.6 MG/DL — HIGH (ref 2.5–4.5)
PHOSPHATE SERPL-MCNC: 4.7 MG/DL — HIGH (ref 2.5–4.5)
PHOSPHATE SERPL-MCNC: 5.6 MG/DL — HIGH (ref 2.5–4.5)
PHOSPHATE SERPL-MCNC: 6.7 MG/DL — HIGH (ref 2.5–4.5)
PHOSPHATE SERPL-MCNC: 6.8 MG/DL — HIGH (ref 2.5–4.5)
PHOSPHATE SERPL-MCNC: 6.8 MG/DL — HIGH (ref 2.5–4.5)
PHOSPHATE SERPL-MCNC: 7.1 MG/DL — HIGH (ref 2.5–4.5)
PHOSPHATE SERPL-MCNC: 7.4 MG/DL — HIGH (ref 2.5–4.5)
PHOSPHATE SERPL-MCNC: 7.6 MG/DL — HIGH (ref 2.5–4.5)
PLAT MORPH BLD: NORMAL — SIGNIFICANT CHANGE UP
PLATELET # BLD AUTO: 1047 K/UL — CRITICAL HIGH (ref 150–400)
PLATELET # BLD AUTO: 1100 K/UL — CRITICAL HIGH (ref 150–400)
PLATELET # BLD AUTO: 141 K/UL — LOW (ref 150–400)
PLATELET # BLD AUTO: 153 K/UL — SIGNIFICANT CHANGE UP (ref 150–400)
PLATELET # BLD AUTO: 167 K/UL — SIGNIFICANT CHANGE UP (ref 150–400)
PLATELET # BLD AUTO: 199 K/UL — SIGNIFICANT CHANGE UP (ref 150–400)
PLATELET # BLD AUTO: 215 K/UL — SIGNIFICANT CHANGE UP (ref 150–400)
PLATELET # BLD AUTO: 222 K/UL — SIGNIFICANT CHANGE UP (ref 150–400)
PLATELET # BLD AUTO: 222 K/UL — SIGNIFICANT CHANGE UP (ref 150–400)
PLATELET # BLD AUTO: 227 K/UL — SIGNIFICANT CHANGE UP (ref 150–400)
PLATELET # BLD AUTO: 231 K/UL — SIGNIFICANT CHANGE UP (ref 150–400)
PLATELET # BLD AUTO: 238 K/UL — SIGNIFICANT CHANGE UP (ref 150–400)
PLATELET # BLD AUTO: 239 K/UL — SIGNIFICANT CHANGE UP (ref 150–400)
PLATELET # BLD AUTO: 240 K/UL — SIGNIFICANT CHANGE UP (ref 150–400)
PLATELET # BLD AUTO: 242 K/UL — SIGNIFICANT CHANGE UP (ref 150–400)
PLATELET # BLD AUTO: 244 K/UL — SIGNIFICANT CHANGE UP (ref 150–400)
PLATELET # BLD AUTO: 247 K/UL — SIGNIFICANT CHANGE UP (ref 150–400)
PLATELET # BLD AUTO: 249 K/UL — SIGNIFICANT CHANGE UP (ref 150–400)
PLATELET # BLD AUTO: 250 K/UL — SIGNIFICANT CHANGE UP (ref 150–400)
PLATELET # BLD AUTO: 250 K/UL — SIGNIFICANT CHANGE UP (ref 150–400)
PLATELET # BLD AUTO: 266 K/UL — SIGNIFICANT CHANGE UP (ref 150–400)
PLATELET # BLD AUTO: 266 K/UL — SIGNIFICANT CHANGE UP (ref 150–400)
PLATELET # BLD AUTO: 268 K/UL — SIGNIFICANT CHANGE UP (ref 150–400)
PLATELET # BLD AUTO: 290 K/UL — SIGNIFICANT CHANGE UP (ref 150–400)
PLATELET # BLD AUTO: 294 K/UL — SIGNIFICANT CHANGE UP (ref 150–400)
PLATELET # BLD AUTO: 328 K/UL — SIGNIFICANT CHANGE UP (ref 150–400)
PLATELET # BLD AUTO: 336 K/UL — SIGNIFICANT CHANGE UP (ref 150–400)
PLATELET # BLD AUTO: 357 K/UL — SIGNIFICANT CHANGE UP (ref 150–400)
PLATELET # BLD AUTO: 370 K/UL — SIGNIFICANT CHANGE UP (ref 150–400)
PLATELET # BLD AUTO: 402 K/UL — HIGH (ref 150–400)
PLATELET # BLD AUTO: 432 K/UL — HIGH (ref 150–400)
PLATELET # BLD AUTO: 453 K/UL — HIGH (ref 150–400)
PLATELET # BLD AUTO: 520 K/UL — HIGH (ref 150–400)
PLATELET # BLD AUTO: 558 K/UL — HIGH (ref 150–400)
PLATELET # BLD AUTO: 592 K/UL — HIGH (ref 150–400)
PLATELET # BLD AUTO: 623 K/UL — HIGH (ref 150–400)
PLATELET # BLD AUTO: 673 K/UL — HIGH (ref 150–400)
PLATELET # BLD AUTO: 793 K/UL — HIGH (ref 150–400)
PLATELET # BLD AUTO: 805 K/UL — HIGH (ref 150–400)
PLATELET # BLD AUTO: 833 K/UL — HIGH (ref 150–400)
PLATELET # BLD AUTO: 845 K/UL — HIGH (ref 150–400)
PLATELET # BLD AUTO: 868 K/UL — HIGH (ref 150–400)
PLATELET # BLD AUTO: 924 K/UL — HIGH (ref 150–400)
PLATELET # BLD AUTO: 940 K/UL — HIGH (ref 150–400)
PLATELET # BLD AUTO: 960 K/UL — HIGH (ref 150–400)
PLATELET COUNT - ESTIMATE: NORMAL — SIGNIFICANT CHANGE UP
PO2 BLDA: 140 MMHG — HIGH (ref 83–108)
PO2 BLDA: 53 MMHG — LOW (ref 83–108)
PO2 BLDA: 58 MMHG — LOW (ref 83–108)
PO2 BLDA: 78 MMHG — LOW (ref 83–108)
PO2 BLDA: 94 MMHG — SIGNIFICANT CHANGE UP (ref 83–108)
PO2 BLDV: 35 MMHG — SIGNIFICANT CHANGE UP (ref 35–40)
POIKILOCYTOSIS BLD QL AUTO: SLIGHT — SIGNIFICANT CHANGE UP
POTASSIUM BLDV-SCNC: 3.3 MMOL/L — LOW (ref 3.4–4.5)
POTASSIUM SERPL-MCNC: 3.3 MMOL/L — LOW (ref 3.5–5.3)
POTASSIUM SERPL-MCNC: 3.4 MMOL/L — LOW (ref 3.5–5.3)
POTASSIUM SERPL-MCNC: 3.4 MMOL/L — LOW (ref 3.5–5.3)
POTASSIUM SERPL-MCNC: 3.5 MMOL/L — SIGNIFICANT CHANGE UP (ref 3.5–5.3)
POTASSIUM SERPL-MCNC: 3.7 MMOL/L — SIGNIFICANT CHANGE UP (ref 3.5–5.3)
POTASSIUM SERPL-MCNC: 3.8 MMOL/L — SIGNIFICANT CHANGE UP (ref 3.5–5.3)
POTASSIUM SERPL-MCNC: 3.9 MMOL/L — SIGNIFICANT CHANGE UP (ref 3.5–5.3)
POTASSIUM SERPL-MCNC: 4 MMOL/L — SIGNIFICANT CHANGE UP (ref 3.5–5.3)
POTASSIUM SERPL-MCNC: 4 MMOL/L — SIGNIFICANT CHANGE UP (ref 3.5–5.3)
POTASSIUM SERPL-MCNC: 4.1 MMOL/L — SIGNIFICANT CHANGE UP (ref 3.5–5.3)
POTASSIUM SERPL-MCNC: 4.1 MMOL/L — SIGNIFICANT CHANGE UP (ref 3.5–5.3)
POTASSIUM SERPL-MCNC: 4.2 MMOL/L — SIGNIFICANT CHANGE UP (ref 3.5–5.3)
POTASSIUM SERPL-MCNC: 4.3 MMOL/L — SIGNIFICANT CHANGE UP (ref 3.5–5.3)
POTASSIUM SERPL-MCNC: 4.4 MMOL/L — SIGNIFICANT CHANGE UP (ref 3.5–5.3)
POTASSIUM SERPL-MCNC: 4.4 MMOL/L — SIGNIFICANT CHANGE UP (ref 3.5–5.3)
POTASSIUM SERPL-MCNC: 4.5 MMOL/L — SIGNIFICANT CHANGE UP (ref 3.5–5.3)
POTASSIUM SERPL-MCNC: 4.6 MMOL/L — SIGNIFICANT CHANGE UP (ref 3.5–5.3)
POTASSIUM SERPL-MCNC: 4.7 MMOL/L — SIGNIFICANT CHANGE UP (ref 3.5–5.3)
POTASSIUM SERPL-MCNC: 4.8 MMOL/L — SIGNIFICANT CHANGE UP (ref 3.5–5.3)
POTASSIUM SERPL-MCNC: 4.9 MMOL/L — SIGNIFICANT CHANGE UP (ref 3.5–5.3)
POTASSIUM SERPL-MCNC: 5.1 MMOL/L — SIGNIFICANT CHANGE UP (ref 3.5–5.3)
POTASSIUM SERPL-MCNC: 5.5 MMOL/L — HIGH (ref 3.5–5.3)
POTASSIUM SERPL-MCNC: 5.5 MMOL/L — HIGH (ref 3.5–5.3)
POTASSIUM SERPL-MCNC: 5.8 MMOL/L — HIGH (ref 3.5–5.3)
POTASSIUM SERPL-MCNC: SIGNIFICANT CHANGE UP MMOL/L (ref 3.5–5.3)
POTASSIUM SERPL-SCNC: 3.3 MMOL/L — LOW (ref 3.5–5.3)
POTASSIUM SERPL-SCNC: 3.4 MMOL/L — LOW (ref 3.5–5.3)
POTASSIUM SERPL-SCNC: 3.4 MMOL/L — LOW (ref 3.5–5.3)
POTASSIUM SERPL-SCNC: 3.5 MMOL/L — SIGNIFICANT CHANGE UP (ref 3.5–5.3)
POTASSIUM SERPL-SCNC: 3.7 MMOL/L — SIGNIFICANT CHANGE UP (ref 3.5–5.3)
POTASSIUM SERPL-SCNC: 3.8 MMOL/L — SIGNIFICANT CHANGE UP (ref 3.5–5.3)
POTASSIUM SERPL-SCNC: 3.9 MMOL/L — SIGNIFICANT CHANGE UP (ref 3.5–5.3)
POTASSIUM SERPL-SCNC: 4 MMOL/L — SIGNIFICANT CHANGE UP (ref 3.5–5.3)
POTASSIUM SERPL-SCNC: 4 MMOL/L — SIGNIFICANT CHANGE UP (ref 3.5–5.3)
POTASSIUM SERPL-SCNC: 4.1 MMOL/L — SIGNIFICANT CHANGE UP (ref 3.5–5.3)
POTASSIUM SERPL-SCNC: 4.1 MMOL/L — SIGNIFICANT CHANGE UP (ref 3.5–5.3)
POTASSIUM SERPL-SCNC: 4.2 MMOL/L — SIGNIFICANT CHANGE UP (ref 3.5–5.3)
POTASSIUM SERPL-SCNC: 4.3 MMOL/L — SIGNIFICANT CHANGE UP (ref 3.5–5.3)
POTASSIUM SERPL-SCNC: 4.4 MMOL/L — SIGNIFICANT CHANGE UP (ref 3.5–5.3)
POTASSIUM SERPL-SCNC: 4.4 MMOL/L — SIGNIFICANT CHANGE UP (ref 3.5–5.3)
POTASSIUM SERPL-SCNC: 4.5 MMOL/L — SIGNIFICANT CHANGE UP (ref 3.5–5.3)
POTASSIUM SERPL-SCNC: 4.6 MMOL/L — SIGNIFICANT CHANGE UP (ref 3.5–5.3)
POTASSIUM SERPL-SCNC: 4.7 MMOL/L — SIGNIFICANT CHANGE UP (ref 3.5–5.3)
POTASSIUM SERPL-SCNC: 4.8 MMOL/L — SIGNIFICANT CHANGE UP (ref 3.5–5.3)
POTASSIUM SERPL-SCNC: 4.9 MMOL/L — SIGNIFICANT CHANGE UP (ref 3.5–5.3)
POTASSIUM SERPL-SCNC: 5.1 MMOL/L — SIGNIFICANT CHANGE UP (ref 3.5–5.3)
POTASSIUM SERPL-SCNC: 5.5 MMOL/L — HIGH (ref 3.5–5.3)
POTASSIUM SERPL-SCNC: 5.5 MMOL/L — HIGH (ref 3.5–5.3)
POTASSIUM SERPL-SCNC: 5.8 MMOL/L — HIGH (ref 3.5–5.3)
POTASSIUM SERPL-SCNC: SIGNIFICANT CHANGE UP MMOL/L (ref 3.5–5.3)
POTASSIUM UR-SCNC: 33.6 MMOL/L — SIGNIFICANT CHANGE UP
PROCALCITONIN SERPL-MCNC: 0.07 NG/ML — SIGNIFICANT CHANGE UP (ref 0.02–0.1)
PROCALCITONIN SERPL-MCNC: 0.1 NG/ML — SIGNIFICANT CHANGE UP (ref 0.02–0.1)
PROCALCITONIN SERPL-MCNC: 0.19 NG/ML — HIGH (ref 0.02–0.1)
PROCALCITONIN SERPL-MCNC: 0.21 NG/ML — HIGH (ref 0.02–0.1)
PROCALCITONIN SERPL-MCNC: 0.89 NG/ML — HIGH (ref 0.02–0.1)
PROCALCITONIN SERPL-MCNC: 3.95 NG/ML — HIGH (ref 0.02–0.1)
PROT SERPL-MCNC: 4.9 G/DL — LOW (ref 6–8.3)
PROT SERPL-MCNC: 5 G/DL — LOW (ref 6–8.3)
PROT SERPL-MCNC: 5.1 G/DL — LOW (ref 6–8.3)
PROT SERPL-MCNC: 5.1 G/DL — LOW (ref 6–8.3)
PROT SERPL-MCNC: 5.2 G/DL — LOW (ref 6–8.3)
PROT SERPL-MCNC: 5.2 G/DL — LOW (ref 6–8.3)
PROT SERPL-MCNC: 5.3 G/DL — LOW (ref 6–8.3)
PROT SERPL-MCNC: 5.4 G/DL — LOW (ref 6–8.3)
PROT SERPL-MCNC: 5.6 G/DL — LOW (ref 6–8.3)
PROT SERPL-MCNC: 5.6 G/DL — LOW (ref 6–8.3)
PROT SERPL-MCNC: 5.7 G/DL — LOW (ref 6–8.3)
PROT SERPL-MCNC: 5.8 G/DL — LOW (ref 6–8.3)
PROT SERPL-MCNC: 5.9 G/DL — LOW (ref 6–8.3)
PROT SERPL-MCNC: 5.9 G/DL — LOW (ref 6–8.3)
PROT SERPL-MCNC: 6 G/DL — SIGNIFICANT CHANGE UP (ref 6–8.3)
PROT SERPL-MCNC: 6.1 G/DL — SIGNIFICANT CHANGE UP (ref 6–8.3)
PROT SERPL-MCNC: 6.2 G/DL — SIGNIFICANT CHANGE UP (ref 6–8.3)
PROT SERPL-MCNC: 6.2 G/DL — SIGNIFICANT CHANGE UP (ref 6–8.3)
PROT SERPL-MCNC: 6.3 G/DL — SIGNIFICANT CHANGE UP (ref 6–8.3)
PROT SERPL-MCNC: 6.4 G/DL — SIGNIFICANT CHANGE UP (ref 6–8.3)
PROT SERPL-MCNC: 6.4 G/DL — SIGNIFICANT CHANGE UP (ref 6–8.3)
PROT SERPL-MCNC: 6.6 G/DL — SIGNIFICANT CHANGE UP (ref 6–8.3)
PROT SERPL-MCNC: 6.7 G/DL — SIGNIFICANT CHANGE UP (ref 6–8.3)
PROT SERPL-MCNC: 6.7 G/DL — SIGNIFICANT CHANGE UP (ref 6–8.3)
PROT SERPL-MCNC: 6.8 G/DL — SIGNIFICANT CHANGE UP (ref 6–8.3)
PROT SERPL-MCNC: 6.8 G/DL — SIGNIFICANT CHANGE UP (ref 6–8.3)
PROT SERPL-MCNC: 6.9 G/DL — SIGNIFICANT CHANGE UP (ref 6–8.3)
PROT UR-MCNC: ABNORMAL
PROTHROM AB SERPL-ACNC: 11.8 SEC — SIGNIFICANT CHANGE UP (ref 10.6–13.6)
PROTHROM AB SERPL-ACNC: 12.1 SEC — SIGNIFICANT CHANGE UP (ref 10.6–13.6)
PROTHROM AB SERPL-ACNC: 12.4 SEC — SIGNIFICANT CHANGE UP (ref 10.6–13.6)
PROTHROM AB SERPL-ACNC: 13 SEC — SIGNIFICANT CHANGE UP (ref 10.6–13.6)
PROTHROM AB SERPL-ACNC: 13.2 SEC — SIGNIFICANT CHANGE UP (ref 10.6–13.6)
PROTHROM AB SERPL-ACNC: 13.5 SEC — SIGNIFICANT CHANGE UP (ref 10.6–13.6)
PROTHROM AB SERPL-ACNC: 13.5 SEC — SIGNIFICANT CHANGE UP (ref 10.6–13.6)
PROTHROM AB SERPL-ACNC: 13.7 SEC — HIGH (ref 10.6–13.6)
PROTHROM AB SERPL-ACNC: 14 SEC — HIGH (ref 10.6–13.6)
PROTHROM AB SERPL-ACNC: 14 SEC — HIGH (ref 10.6–13.6)
PROTHROM AB SERPL-ACNC: 14.2 SEC — HIGH (ref 10.6–13.6)
PROTHROM AB SERPL-ACNC: 14.2 SEC — HIGH (ref 10.6–13.6)
PROTHROM AB SERPL-ACNC: 14.3 SEC — HIGH (ref 10.6–13.6)
PROTHROM AB SERPL-ACNC: 14.3 SEC — HIGH (ref 10.6–13.6)
PROTHROM AB SERPL-ACNC: 14.4 SEC — HIGH (ref 10.6–13.6)
PROTHROM AB SERPL-ACNC: 14.8 SEC — HIGH (ref 10.6–13.6)
PROTHROM AB SERPL-ACNC: 14.8 SEC — HIGH (ref 10.6–13.6)
PROTHROM AB SERPL-ACNC: 15.3 SEC — HIGH (ref 10.6–13.6)
PROTHROM AB SERPL-ACNC: 15.8 SEC — HIGH (ref 10.6–13.6)
PROTHROM AB SERPL-ACNC: 17.6 SEC — HIGH (ref 10.6–13.6)
RBC # BLD: 2.46 M/UL — LOW (ref 4.2–5.8)
RBC # BLD: 2.48 M/UL — LOW (ref 4.2–5.8)
RBC # BLD: 2.55 M/UL — LOW (ref 4.2–5.8)
RBC # BLD: 2.56 M/UL — LOW (ref 4.2–5.8)
RBC # BLD: 2.56 M/UL — LOW (ref 4.2–5.8)
RBC # BLD: 2.61 M/UL — LOW (ref 4.2–5.8)
RBC # BLD: 2.62 M/UL — LOW (ref 4.2–5.8)
RBC # BLD: 2.64 M/UL — LOW (ref 4.2–5.8)
RBC # BLD: 2.65 M/UL — LOW (ref 4.2–5.8)
RBC # BLD: 2.67 M/UL — LOW (ref 4.2–5.8)
RBC # BLD: 2.69 M/UL — LOW (ref 4.2–5.8)
RBC # BLD: 2.71 M/UL — LOW (ref 4.2–5.8)
RBC # BLD: 2.78 M/UL — LOW (ref 4.2–5.8)
RBC # BLD: 2.79 M/UL — LOW (ref 4.2–5.8)
RBC # BLD: 2.82 M/UL — LOW (ref 4.2–5.8)
RBC # BLD: 2.83 M/UL — LOW (ref 4.2–5.8)
RBC # BLD: 2.84 M/UL — LOW (ref 4.2–5.8)
RBC # BLD: 2.88 M/UL — LOW (ref 4.2–5.8)
RBC # BLD: 2.95 M/UL — LOW (ref 4.2–5.8)
RBC # BLD: 2.96 M/UL — LOW (ref 4.2–5.8)
RBC # BLD: 2.98 M/UL — LOW (ref 4.2–5.8)
RBC # BLD: 2.98 M/UL — LOW (ref 4.2–5.8)
RBC # BLD: 3 M/UL — LOW (ref 4.2–5.8)
RBC # BLD: 3.02 M/UL — LOW (ref 4.2–5.8)
RBC # BLD: 3.04 M/UL — LOW (ref 4.2–5.8)
RBC # BLD: 3.08 M/UL — LOW (ref 4.2–5.8)
RBC # BLD: 3.09 M/UL — LOW (ref 4.2–5.8)
RBC # BLD: 3.12 M/UL — LOW (ref 4.2–5.8)
RBC # BLD: 3.15 M/UL — LOW (ref 4.2–5.8)
RBC # BLD: 3.22 M/UL — LOW (ref 4.2–5.8)
RBC # BLD: 3.24 M/UL — LOW (ref 4.2–5.8)
RBC # BLD: 3.25 M/UL — LOW (ref 4.2–5.8)
RBC # BLD: 3.29 M/UL — LOW (ref 4.2–5.8)
RBC # BLD: 3.33 M/UL — LOW (ref 4.2–5.8)
RBC # BLD: 3.4 M/UL — LOW (ref 4.2–5.8)
RBC # BLD: 3.7 M/UL — LOW (ref 4.2–5.8)
RBC # BLD: 3.8 M/UL — LOW (ref 4.2–5.8)
RBC # BLD: 3.95 M/UL — LOW (ref 4.2–5.8)
RBC # BLD: 4.25 M/UL — SIGNIFICANT CHANGE UP (ref 4.2–5.8)
RBC # BLD: 4.27 M/UL — SIGNIFICANT CHANGE UP (ref 4.2–5.8)
RBC # BLD: 4.35 M/UL — SIGNIFICANT CHANGE UP (ref 4.2–5.8)
RBC # BLD: 4.42 M/UL — SIGNIFICANT CHANGE UP (ref 4.2–5.8)
RBC # BLD: 4.5 M/UL — SIGNIFICANT CHANGE UP (ref 4.2–5.8)
RBC # FLD: 13.3 % — SIGNIFICANT CHANGE UP (ref 10.3–14.5)
RBC # FLD: 13.5 % — SIGNIFICANT CHANGE UP (ref 10.3–14.5)
RBC # FLD: 13.7 % — SIGNIFICANT CHANGE UP (ref 10.3–14.5)
RBC # FLD: 14.3 % — SIGNIFICANT CHANGE UP (ref 10.3–14.5)
RBC # FLD: 14.3 % — SIGNIFICANT CHANGE UP (ref 10.3–14.5)
RBC # FLD: 14.4 % — SIGNIFICANT CHANGE UP (ref 10.3–14.5)
RBC # FLD: 14.4 % — SIGNIFICANT CHANGE UP (ref 10.3–14.5)
RBC # FLD: 14.6 % — HIGH (ref 10.3–14.5)
RBC # FLD: 14.8 % — HIGH (ref 10.3–14.5)
RBC # FLD: 14.9 % — HIGH (ref 10.3–14.5)
RBC # FLD: 15 % — HIGH (ref 10.3–14.5)
RBC # FLD: 15 % — HIGH (ref 10.3–14.5)
RBC # FLD: 15.2 % — HIGH (ref 10.3–14.5)
RBC # FLD: 15.3 % — HIGH (ref 10.3–14.5)
RBC # FLD: 15.3 % — HIGH (ref 10.3–14.5)
RBC # FLD: 15.4 % — HIGH (ref 10.3–14.5)
RBC # FLD: 15.5 % — HIGH (ref 10.3–14.5)
RBC # FLD: 15.6 % — HIGH (ref 10.3–14.5)
RBC # FLD: 15.6 % — HIGH (ref 10.3–14.5)
RBC # FLD: 15.7 % — HIGH (ref 10.3–14.5)
RBC # FLD: 15.8 % — HIGH (ref 10.3–14.5)
RBC # FLD: 15.9 % — HIGH (ref 10.3–14.5)
RBC # FLD: 16 % — HIGH (ref 10.3–14.5)
RBC # FLD: 16.1 % — HIGH (ref 10.3–14.5)
RBC # FLD: 16.3 % — HIGH (ref 10.3–14.5)
RBC # FLD: 16.4 % — HIGH (ref 10.3–14.5)
RBC # FLD: 16.5 % — HIGH (ref 10.3–14.5)
RBC # FLD: 16.7 % — HIGH (ref 10.3–14.5)
RBC # FLD: 16.8 % — HIGH (ref 10.3–14.5)
RBC BLD AUTO: ABNORMAL
RBC CASTS # UR COMP ASSIST: 1 /HPF — SIGNIFICANT CHANGE UP (ref 0–4)
RBC CASTS # UR COMP ASSIST: 4 /HPF — SIGNIFICANT CHANGE UP (ref 0–4)
RH IG SCN BLD-IMP: POSITIVE — SIGNIFICANT CHANGE UP
S AUREUS DNA NOSE QL NAA+PROBE: SIGNIFICANT CHANGE UP
SAO2 % BLDA: 86.6 % — LOW (ref 95–99)
SAO2 % BLDA: 91.4 % — LOW (ref 95–99)
SAO2 % BLDA: 96.2 % — SIGNIFICANT CHANGE UP (ref 95–99)
SAO2 % BLDA: 97.1 % — SIGNIFICANT CHANGE UP (ref 95–99)
SAO2 % BLDA: 98.3 % — SIGNIFICANT CHANGE UP (ref 95–99)
SAO2 % BLDV: 65.5 % — SIGNIFICANT CHANGE UP (ref 60–85)
SARS-COV-2 RNA SPEC QL NAA+PROBE: DETECTED
SARS-COV-2 RNA SPEC QL NAA+PROBE: DETECTED
SODIUM SERPL-SCNC: 124 MMOL/L — LOW (ref 135–145)
SODIUM SERPL-SCNC: 126 MMOL/L — LOW (ref 135–145)
SODIUM SERPL-SCNC: 127 MMOL/L — LOW (ref 135–145)
SODIUM SERPL-SCNC: 127 MMOL/L — LOW (ref 135–145)
SODIUM SERPL-SCNC: 128 MMOL/L — LOW (ref 135–145)
SODIUM SERPL-SCNC: 130 MMOL/L — LOW (ref 135–145)
SODIUM SERPL-SCNC: 130 MMOL/L — LOW (ref 135–145)
SODIUM SERPL-SCNC: 131 MMOL/L — LOW (ref 135–145)
SODIUM SERPL-SCNC: 132 MMOL/L — LOW (ref 135–145)
SODIUM SERPL-SCNC: 136 MMOL/L — SIGNIFICANT CHANGE UP (ref 135–145)
SODIUM SERPL-SCNC: 137 MMOL/L — SIGNIFICANT CHANGE UP (ref 135–145)
SODIUM SERPL-SCNC: 138 MMOL/L — SIGNIFICANT CHANGE UP (ref 135–145)
SODIUM SERPL-SCNC: 139 MMOL/L — SIGNIFICANT CHANGE UP (ref 135–145)
SODIUM SERPL-SCNC: 140 MMOL/L — SIGNIFICANT CHANGE UP (ref 135–145)
SODIUM SERPL-SCNC: 141 MMOL/L — SIGNIFICANT CHANGE UP (ref 135–145)
SODIUM SERPL-SCNC: 142 MMOL/L — SIGNIFICANT CHANGE UP (ref 135–145)
SODIUM SERPL-SCNC: 143 MMOL/L — SIGNIFICANT CHANGE UP (ref 135–145)
SODIUM SERPL-SCNC: 144 MMOL/L — SIGNIFICANT CHANGE UP (ref 135–145)
SODIUM SERPL-SCNC: 145 MMOL/L — SIGNIFICANT CHANGE UP (ref 135–145)
SODIUM SERPL-SCNC: 145 MMOL/L — SIGNIFICANT CHANGE UP (ref 135–145)
SODIUM SERPL-SCNC: 146 MMOL/L — HIGH (ref 135–145)
SODIUM SERPL-SCNC: 147 MMOL/L — HIGH (ref 135–145)
SODIUM SERPL-SCNC: 148 MMOL/L — HIGH (ref 135–145)
SODIUM SERPL-SCNC: 149 MMOL/L — HIGH (ref 135–145)
SODIUM SERPL-SCNC: 150 MMOL/L — HIGH (ref 135–145)
SODIUM SERPL-SCNC: 150 MMOL/L — HIGH (ref 135–145)
SODIUM SERPL-SCNC: 152 MMOL/L — HIGH (ref 135–145)
SODIUM UR-SCNC: 20 MMOL/L — SIGNIFICANT CHANGE UP
SODIUM UR-SCNC: 21 MMOL/L — SIGNIFICANT CHANGE UP
SODIUM UR-SCNC: 29 MMOL/L — SIGNIFICANT CHANGE UP
SODIUM UR-SCNC: <20 MMOL/L — SIGNIFICANT CHANGE UP
SP GR SPEC: 1.01 — SIGNIFICANT CHANGE UP (ref 1.01–1.02)
SP GR SPEC: 1.01 — SIGNIFICANT CHANGE UP (ref 1.01–1.02)
SP GR SPEC: 1.02 — SIGNIFICANT CHANGE UP (ref 1.01–1.02)
SP GR SPEC: 1.03 — HIGH (ref 1.01–1.02)
SPECIMEN SOURCE: SIGNIFICANT CHANGE UP
T4 FREE SERPL-MCNC: 1.1 NG/DL — SIGNIFICANT CHANGE UP (ref 0.9–1.8)
TRIGL SERPL-MCNC: 229 MG/DL — HIGH
TRIGL SERPL-MCNC: 417 MG/DL — HIGH
TROPONIN T, HIGH SENSITIVITY RESULT: 40 NG/L — SIGNIFICANT CHANGE UP
TROPONIN T, HIGH SENSITIVITY RESULT: 41 NG/L — SIGNIFICANT CHANGE UP
TROPONIN T, HIGH SENSITIVITY RESULT: 44 NG/L — SIGNIFICANT CHANGE UP
TSH SERPL-MCNC: 0.96 UIU/ML — SIGNIFICANT CHANGE UP (ref 0.27–4.2)
TSH SERPL-MCNC: 4.52 UIU/ML — HIGH (ref 0.27–4.2)
UROBILINOGEN FLD QL: SIGNIFICANT CHANGE UP
VANCOMYCIN FLD-MCNC: 13.3 UG/ML — SIGNIFICANT CHANGE UP
VANCOMYCIN FLD-MCNC: 16.9 UG/ML — SIGNIFICANT CHANGE UP
VANCOMYCIN FLD-MCNC: 22.9 UG/ML — SIGNIFICANT CHANGE UP
VANCOMYCIN TROUGH SERPL-MCNC: 13.3 UG/ML — SIGNIFICANT CHANGE UP (ref 10–20)
VANCOMYCIN TROUGH SERPL-MCNC: 16 UG/ML — SIGNIFICANT CHANGE UP (ref 10–20)
VANCOMYCIN TROUGH SERPL-MCNC: 18.8 UG/ML — SIGNIFICANT CHANGE UP (ref 10–20)
VANCOMYCIN TROUGH SERPL-MCNC: 19.3 UG/ML — SIGNIFICANT CHANGE UP (ref 10–20)
VANCOMYCIN TROUGH SERPL-MCNC: 21.1 UG/ML — HIGH (ref 10–20)
VANCOMYCIN TROUGH SERPL-MCNC: 22.5 UG/ML — HIGH (ref 10–20)
VANCOMYCIN TROUGH SERPL-MCNC: 26.7 UG/ML — CRITICAL HIGH (ref 10–20)
VIT B12 SERPL-MCNC: 734 PG/ML — SIGNIFICANT CHANGE UP (ref 200–900)
WBC # BLD: 10.14 K/UL — SIGNIFICANT CHANGE UP (ref 3.8–10.5)
WBC # BLD: 10.67 K/UL — HIGH (ref 3.8–10.5)
WBC # BLD: 12.24 K/UL — HIGH (ref 3.8–10.5)
WBC # BLD: 12.75 K/UL — HIGH (ref 3.8–10.5)
WBC # BLD: 12.86 K/UL — HIGH (ref 3.8–10.5)
WBC # BLD: 12.94 K/UL — HIGH (ref 3.8–10.5)
WBC # BLD: 13.11 K/UL — HIGH (ref 3.8–10.5)
WBC # BLD: 13.12 K/UL — HIGH (ref 3.8–10.5)
WBC # BLD: 14.18 K/UL — HIGH (ref 3.8–10.5)
WBC # BLD: 14.76 K/UL — HIGH (ref 3.8–10.5)
WBC # BLD: 14.9 K/UL — HIGH (ref 3.8–10.5)
WBC # BLD: 15.1 K/UL — HIGH (ref 3.8–10.5)
WBC # BLD: 15.29 K/UL — HIGH (ref 3.8–10.5)
WBC # BLD: 15.67 K/UL — HIGH (ref 3.8–10.5)
WBC # BLD: 15.85 K/UL — HIGH (ref 3.8–10.5)
WBC # BLD: 16.69 K/UL — HIGH (ref 3.8–10.5)
WBC # BLD: 17.89 K/UL — HIGH (ref 3.8–10.5)
WBC # BLD: 18.67 K/UL — HIGH (ref 3.8–10.5)
WBC # BLD: 20.01 K/UL — HIGH (ref 3.8–10.5)
WBC # BLD: 20.31 K/UL — HIGH (ref 3.8–10.5)
WBC # BLD: 20.32 K/UL — HIGH (ref 3.8–10.5)
WBC # BLD: 20.53 K/UL — HIGH (ref 3.8–10.5)
WBC # BLD: 20.79 K/UL — HIGH (ref 3.8–10.5)
WBC # BLD: 21.64 K/UL — HIGH (ref 3.8–10.5)
WBC # BLD: 21.79 K/UL — HIGH (ref 3.8–10.5)
WBC # BLD: 22.14 K/UL — HIGH (ref 3.8–10.5)
WBC # BLD: 22.84 K/UL — HIGH (ref 3.8–10.5)
WBC # BLD: 22.88 K/UL — HIGH (ref 3.8–10.5)
WBC # BLD: 23.69 K/UL — HIGH (ref 3.8–10.5)
WBC # BLD: 26.2 K/UL — HIGH (ref 3.8–10.5)
WBC # BLD: 28.21 K/UL — HIGH (ref 3.8–10.5)
WBC # BLD: 3.67 K/UL — LOW (ref 3.8–10.5)
WBC # BLD: 3.9 K/UL — SIGNIFICANT CHANGE UP (ref 3.8–10.5)
WBC # BLD: 31.54 K/UL — HIGH (ref 3.8–10.5)
WBC # BLD: 4.96 K/UL — SIGNIFICANT CHANGE UP (ref 3.8–10.5)
WBC # BLD: 7.63 K/UL — SIGNIFICANT CHANGE UP (ref 3.8–10.5)
WBC # BLD: 7.88 K/UL — SIGNIFICANT CHANGE UP (ref 3.8–10.5)
WBC # BLD: 8.09 K/UL — SIGNIFICANT CHANGE UP (ref 3.8–10.5)
WBC # BLD: 8.36 K/UL — SIGNIFICANT CHANGE UP (ref 3.8–10.5)
WBC # BLD: 8.4 K/UL — SIGNIFICANT CHANGE UP (ref 3.8–10.5)
WBC # BLD: 8.99 K/UL — SIGNIFICANT CHANGE UP (ref 3.8–10.5)
WBC # BLD: 9.21 K/UL — SIGNIFICANT CHANGE UP (ref 3.8–10.5)
WBC # BLD: 9.27 K/UL — SIGNIFICANT CHANGE UP (ref 3.8–10.5)
WBC # BLD: 9.28 K/UL — SIGNIFICANT CHANGE UP (ref 3.8–10.5)
WBC # BLD: 9.39 K/UL — SIGNIFICANT CHANGE UP (ref 3.8–10.5)
WBC # FLD AUTO: 10.14 K/UL — SIGNIFICANT CHANGE UP (ref 3.8–10.5)
WBC # FLD AUTO: 10.67 K/UL — HIGH (ref 3.8–10.5)
WBC # FLD AUTO: 12.24 K/UL — HIGH (ref 3.8–10.5)
WBC # FLD AUTO: 12.75 K/UL — HIGH (ref 3.8–10.5)
WBC # FLD AUTO: 12.86 K/UL — HIGH (ref 3.8–10.5)
WBC # FLD AUTO: 12.94 K/UL — HIGH (ref 3.8–10.5)
WBC # FLD AUTO: 13.11 K/UL — HIGH (ref 3.8–10.5)
WBC # FLD AUTO: 13.12 K/UL — HIGH (ref 3.8–10.5)
WBC # FLD AUTO: 14.18 K/UL — HIGH (ref 3.8–10.5)
WBC # FLD AUTO: 14.76 K/UL — HIGH (ref 3.8–10.5)
WBC # FLD AUTO: 14.9 K/UL — HIGH (ref 3.8–10.5)
WBC # FLD AUTO: 15.1 K/UL — HIGH (ref 3.8–10.5)
WBC # FLD AUTO: 15.29 K/UL — HIGH (ref 3.8–10.5)
WBC # FLD AUTO: 15.67 K/UL — HIGH (ref 3.8–10.5)
WBC # FLD AUTO: 15.85 K/UL — HIGH (ref 3.8–10.5)
WBC # FLD AUTO: 16.69 K/UL — HIGH (ref 3.8–10.5)
WBC # FLD AUTO: 17.89 K/UL — HIGH (ref 3.8–10.5)
WBC # FLD AUTO: 18.67 K/UL — HIGH (ref 3.8–10.5)
WBC # FLD AUTO: 20.01 K/UL — HIGH (ref 3.8–10.5)
WBC # FLD AUTO: 20.31 K/UL — HIGH (ref 3.8–10.5)
WBC # FLD AUTO: 20.32 K/UL — HIGH (ref 3.8–10.5)
WBC # FLD AUTO: 20.53 K/UL — HIGH (ref 3.8–10.5)
WBC # FLD AUTO: 20.79 K/UL — HIGH (ref 3.8–10.5)
WBC # FLD AUTO: 21.64 K/UL — HIGH (ref 3.8–10.5)
WBC # FLD AUTO: 21.79 K/UL — HIGH (ref 3.8–10.5)
WBC # FLD AUTO: 22.14 K/UL — HIGH (ref 3.8–10.5)
WBC # FLD AUTO: 22.84 K/UL — HIGH (ref 3.8–10.5)
WBC # FLD AUTO: 22.88 K/UL — HIGH (ref 3.8–10.5)
WBC # FLD AUTO: 23.69 K/UL — HIGH (ref 3.8–10.5)
WBC # FLD AUTO: 26.2 K/UL — HIGH (ref 3.8–10.5)
WBC # FLD AUTO: 28.21 K/UL — HIGH (ref 3.8–10.5)
WBC # FLD AUTO: 3.67 K/UL — LOW (ref 3.8–10.5)
WBC # FLD AUTO: 3.9 K/UL — SIGNIFICANT CHANGE UP (ref 3.8–10.5)
WBC # FLD AUTO: 31.54 K/UL — HIGH (ref 3.8–10.5)
WBC # FLD AUTO: 4.96 K/UL — SIGNIFICANT CHANGE UP (ref 3.8–10.5)
WBC # FLD AUTO: 7.63 K/UL — SIGNIFICANT CHANGE UP (ref 3.8–10.5)
WBC # FLD AUTO: 7.88 K/UL — SIGNIFICANT CHANGE UP (ref 3.8–10.5)
WBC # FLD AUTO: 8.09 K/UL — SIGNIFICANT CHANGE UP (ref 3.8–10.5)
WBC # FLD AUTO: 8.36 K/UL — SIGNIFICANT CHANGE UP (ref 3.8–10.5)
WBC # FLD AUTO: 8.4 K/UL — SIGNIFICANT CHANGE UP (ref 3.8–10.5)
WBC # FLD AUTO: 8.99 K/UL — SIGNIFICANT CHANGE UP (ref 3.8–10.5)
WBC # FLD AUTO: 9.21 K/UL — SIGNIFICANT CHANGE UP (ref 3.8–10.5)
WBC # FLD AUTO: 9.27 K/UL — SIGNIFICANT CHANGE UP (ref 3.8–10.5)
WBC # FLD AUTO: 9.28 K/UL — SIGNIFICANT CHANGE UP (ref 3.8–10.5)
WBC # FLD AUTO: 9.39 K/UL — SIGNIFICANT CHANGE UP (ref 3.8–10.5)
WBC UR QL: 1 /HPF — SIGNIFICANT CHANGE UP (ref 0–5)
WBC UR QL: 78 /HPF — HIGH (ref 0–5)

## 2021-01-01 PROCEDURE — 36556 INSERT NON-TUNNEL CV CATH: CPT | Mod: GC

## 2021-01-01 PROCEDURE — 99284 EMERGENCY DEPT VISIT MOD MDM: CPT | Mod: 25

## 2021-01-01 PROCEDURE — 31600 PLANNED TRACHEOSTOMY: CPT | Mod: GC

## 2021-01-01 PROCEDURE — 95720 EEG PHY/QHP EA INCR W/VEEG: CPT

## 2021-01-01 PROCEDURE — 99233 SBSQ HOSP IP/OBS HIGH 50: CPT

## 2021-01-01 PROCEDURE — 99291 CRITICAL CARE FIRST HOUR: CPT

## 2021-01-01 PROCEDURE — 99223 1ST HOSP IP/OBS HIGH 75: CPT

## 2021-01-01 PROCEDURE — 99291 CRITICAL CARE FIRST HOUR: CPT | Mod: 25

## 2021-01-01 PROCEDURE — 93010 ELECTROCARDIOGRAM REPORT: CPT

## 2021-01-01 PROCEDURE — 31622 DX BRONCHOSCOPE/WASH: CPT | Mod: GC

## 2021-01-01 PROCEDURE — 99232 SBSQ HOSP IP/OBS MODERATE 35: CPT

## 2021-01-01 PROCEDURE — 99214 OFFICE O/P EST MOD 30 MIN: CPT

## 2021-01-01 PROCEDURE — A9584: CPT

## 2021-01-01 PROCEDURE — 71045 X-RAY EXAM CHEST 1 VIEW: CPT | Mod: 26

## 2021-01-01 PROCEDURE — 99072 ADDL SUPL MATRL&STAF TM PHE: CPT

## 2021-01-01 PROCEDURE — 76536 US EXAM OF HEAD AND NECK: CPT | Mod: 26,GC

## 2021-01-01 PROCEDURE — 71275 CT ANGIOGRAPHY CHEST: CPT | Mod: 26

## 2021-01-01 PROCEDURE — 99222 1ST HOSP IP/OBS MODERATE 55: CPT

## 2021-01-01 PROCEDURE — 78803 RP LOCLZJ TUM SPECT 1 AREA: CPT | Mod: 26

## 2021-01-01 PROCEDURE — 70491 CT SOFT TISSUE NECK W/DYE: CPT | Mod: 26

## 2021-01-01 PROCEDURE — 78803 RP LOCLZJ TUM SPECT 1 AREA: CPT

## 2021-01-01 PROCEDURE — 93000 ELECTROCARDIOGRAM COMPLETE: CPT

## 2021-01-01 PROCEDURE — 70450 CT HEAD/BRAIN W/O DYE: CPT | Mod: 26

## 2021-01-01 PROCEDURE — 93971 EXTREMITY STUDY: CPT | Mod: 26

## 2021-01-01 PROCEDURE — 99233 SBSQ HOSP IP/OBS HIGH 50: CPT | Mod: GC

## 2021-01-01 PROCEDURE — 93970 EXTREMITY STUDY: CPT | Mod: 26

## 2021-01-01 PROCEDURE — 99214 OFFICE O/P EST MOD 30 MIN: CPT | Mod: 95

## 2021-01-01 PROCEDURE — 36620 INSERTION CATHETER ARTERY: CPT | Mod: GC

## 2021-01-01 PROCEDURE — 71045 X-RAY EXAM CHEST 1 VIEW: CPT | Mod: 26,77

## 2021-01-01 PROCEDURE — 99232 SBSQ HOSP IP/OBS MODERATE 35: CPT | Mod: GC

## 2021-01-01 PROCEDURE — 95816 EEG AWAKE AND DROWSY: CPT | Mod: 26

## 2021-01-01 RX ORDER — FENTANYL CITRATE 50 UG/ML
50 INJECTION INTRAVENOUS ONCE
Refills: 0 | Status: DISCONTINUED | OUTPATIENT
Start: 2021-01-01 | End: 2021-01-01

## 2021-01-01 RX ORDER — MIDAZOLAM HYDROCHLORIDE 1 MG/ML
4 INJECTION, SOLUTION INTRAMUSCULAR; INTRAVENOUS ONCE
Refills: 0 | Status: DISCONTINUED | OUTPATIENT
Start: 2021-01-01 | End: 2021-01-01

## 2021-01-01 RX ORDER — DEXAMETHASONE 0.5 MG/5ML
6 ELIXIR ORAL ONCE
Refills: 0 | Status: COMPLETED | OUTPATIENT
Start: 2021-01-01 | End: 2021-01-01

## 2021-01-01 RX ORDER — POTASSIUM CHLORIDE 20 MEQ
20 PACKET (EA) ORAL ONCE
Refills: 0 | Status: COMPLETED | OUTPATIENT
Start: 2021-01-01 | End: 2021-01-01

## 2021-01-01 RX ORDER — KETOROLAC TROMETHAMINE 0.5 %
1 DROPS OPHTHALMIC (EYE)
Refills: 0 | Status: DISCONTINUED | OUTPATIENT
Start: 2021-01-01 | End: 2021-01-01

## 2021-01-01 RX ORDER — SODIUM CHLORIDE 9 MG/ML
1000 INJECTION, SOLUTION INTRAVENOUS
Refills: 0 | Status: DISCONTINUED | OUTPATIENT
Start: 2021-01-01 | End: 2021-01-01

## 2021-01-01 RX ORDER — VANCOMYCIN HCL 1 G
1000 VIAL (EA) INTRAVENOUS EVERY 12 HOURS
Refills: 0 | Status: DISCONTINUED | OUTPATIENT
Start: 2021-01-01 | End: 2021-01-01

## 2021-01-01 RX ORDER — FUROSEMIDE 40 MG
40 TABLET ORAL ONCE
Refills: 0 | Status: COMPLETED | OUTPATIENT
Start: 2021-01-01 | End: 2021-01-01

## 2021-01-01 RX ORDER — HUMAN INSULIN 100 [IU]/ML
16 INJECTION, SUSPENSION SUBCUTANEOUS EVERY 6 HOURS
Refills: 0 | Status: DISCONTINUED | OUTPATIENT
Start: 2021-01-01 | End: 2021-01-01

## 2021-01-01 RX ORDER — HUMAN INSULIN 100 [IU]/ML
10 INJECTION, SUSPENSION SUBCUTANEOUS EVERY 6 HOURS
Refills: 0 | Status: DISCONTINUED | OUTPATIENT
Start: 2021-01-01 | End: 2021-01-01

## 2021-01-01 RX ORDER — HEPARIN SODIUM 5000 [USP'U]/ML
5000 INJECTION INTRAVENOUS; SUBCUTANEOUS EVERY 8 HOURS
Refills: 0 | Status: DISCONTINUED | OUTPATIENT
Start: 2021-01-01 | End: 2021-01-01

## 2021-01-01 RX ORDER — CEFEPIME 1 G/1
1000 INJECTION, POWDER, FOR SOLUTION INTRAMUSCULAR; INTRAVENOUS ONCE
Refills: 0 | Status: COMPLETED | OUTPATIENT
Start: 2021-01-01 | End: 2021-01-01

## 2021-01-01 RX ORDER — HUMAN INSULIN 100 [IU]/ML
17 INJECTION, SUSPENSION SUBCUTANEOUS EVERY 6 HOURS
Refills: 0 | Status: DISCONTINUED | OUTPATIENT
Start: 2021-01-01 | End: 2021-01-01

## 2021-01-01 RX ORDER — AMIODARONE HYDROCHLORIDE 400 MG/1
0.5 TABLET ORAL
Qty: 450 | Refills: 0 | Status: DISCONTINUED | OUTPATIENT
Start: 2021-01-01 | End: 2021-01-01

## 2021-01-01 RX ORDER — ASPIRIN/CALCIUM CARB/MAGNESIUM 324 MG
81 TABLET ORAL DAILY
Refills: 0 | Status: DISCONTINUED | OUTPATIENT
Start: 2021-01-01 | End: 2021-01-01

## 2021-01-01 RX ORDER — DEXMEDETOMIDINE HYDROCHLORIDE IN 0.9% SODIUM CHLORIDE 4 UG/ML
0.5 INJECTION INTRAVENOUS
Qty: 400 | Refills: 0 | Status: DISCONTINUED | OUTPATIENT
Start: 2021-01-01 | End: 2021-01-01

## 2021-01-01 RX ORDER — PROPOFOL 10 MG/ML
30 INJECTION, EMULSION INTRAVENOUS
Qty: 1000 | Refills: 0 | Status: DISCONTINUED | OUTPATIENT
Start: 2021-01-01 | End: 2021-01-01

## 2021-01-01 RX ORDER — SIMVASTATIN 20 MG/1
20 TABLET, FILM COATED ORAL AT BEDTIME
Refills: 0 | Status: DISCONTINUED | OUTPATIENT
Start: 2021-01-01 | End: 2021-01-01

## 2021-01-01 RX ORDER — REMDESIVIR 5 MG/ML
200 INJECTION INTRAVENOUS EVERY 24 HOURS
Refills: 0 | Status: COMPLETED | OUTPATIENT
Start: 2021-01-01 | End: 2021-01-01

## 2021-01-01 RX ORDER — PREDNISOLONE SODIUM PHOSPHATE 1 %
1 DROPS OPHTHALMIC (EYE)
Qty: 0 | Refills: 0 | DISCHARGE

## 2021-01-01 RX ORDER — DEXAMETHASONE 0.5 MG/5ML
6 ELIXIR ORAL DAILY
Refills: 0 | Status: COMPLETED | OUTPATIENT
Start: 2021-01-01 | End: 2021-01-01

## 2021-01-01 RX ORDER — HYDRALAZINE HCL 50 MG
10 TABLET ORAL ONCE
Refills: 0 | Status: COMPLETED | OUTPATIENT
Start: 2021-01-01 | End: 2021-01-01

## 2021-01-01 RX ORDER — GLUCAGON INJECTION, SOLUTION 0.5 MG/.1ML
1 INJECTION, SOLUTION SUBCUTANEOUS ONCE
Refills: 0 | Status: DISCONTINUED | OUTPATIENT
Start: 2021-01-01 | End: 2021-01-01

## 2021-01-01 RX ORDER — LEVETIRACETAM 250 MG/1
500 TABLET, FILM COATED ORAL EVERY 12 HOURS
Refills: 0 | Status: DISCONTINUED | OUTPATIENT
Start: 2021-01-01 | End: 2021-01-01

## 2021-01-01 RX ORDER — SODIUM CHLORIDE 9 MG/ML
1000 INJECTION INTRAMUSCULAR; INTRAVENOUS; SUBCUTANEOUS
Refills: 0 | Status: DISCONTINUED | OUTPATIENT
Start: 2021-01-01 | End: 2021-01-01

## 2021-01-01 RX ORDER — REMDESIVIR 5 MG/ML
INJECTION INTRAVENOUS
Refills: 0 | Status: COMPLETED | OUTPATIENT
Start: 2021-01-01 | End: 2021-01-01

## 2021-01-01 RX ORDER — DEXTROSE 50 % IN WATER 50 %
15 SYRINGE (ML) INTRAVENOUS ONCE
Refills: 0 | Status: DISCONTINUED | OUTPATIENT
Start: 2021-01-01 | End: 2021-01-01

## 2021-01-01 RX ORDER — METOPROLOL TARTRATE 50 MG
25 TABLET ORAL
Refills: 0 | Status: DISCONTINUED | OUTPATIENT
Start: 2021-01-01 | End: 2021-01-01

## 2021-01-01 RX ORDER — HUMAN INSULIN 100 [IU]/ML
11 INJECTION, SUSPENSION SUBCUTANEOUS EVERY 6 HOURS
Refills: 0 | Status: DISCONTINUED | OUTPATIENT
Start: 2021-01-01 | End: 2021-01-01

## 2021-01-01 RX ORDER — INSULIN LISPRO 100/ML
VIAL (ML) SUBCUTANEOUS EVERY 6 HOURS
Refills: 0 | Status: DISCONTINUED | OUTPATIENT
Start: 2021-01-01 | End: 2021-01-01

## 2021-01-01 RX ORDER — HUMAN INSULIN 100 [IU]/ML
5 INJECTION, SUSPENSION SUBCUTANEOUS EVERY 6 HOURS
Refills: 0 | Status: DISCONTINUED | OUTPATIENT
Start: 2021-01-01 | End: 2021-01-01

## 2021-01-01 RX ORDER — TRAZODONE HCL 50 MG
50 TABLET ORAL AT BEDTIME
Refills: 0 | Status: DISCONTINUED | OUTPATIENT
Start: 2021-01-01 | End: 2021-01-01

## 2021-01-01 RX ORDER — VASOPRESSIN 20 [USP'U]/ML
0.04 INJECTION INTRAVENOUS
Qty: 50 | Refills: 0 | Status: DISCONTINUED | OUTPATIENT
Start: 2021-01-01 | End: 2021-01-01

## 2021-01-01 RX ORDER — DEXMEDETOMIDINE HYDROCHLORIDE IN 0.9% SODIUM CHLORIDE 4 UG/ML
1.5 INJECTION INTRAVENOUS
Qty: 400 | Refills: 0 | Status: DISCONTINUED | OUTPATIENT
Start: 2021-01-01 | End: 2021-01-01

## 2021-01-01 RX ORDER — CHLORHEXIDINE GLUCONATE 213 G/1000ML
15 SOLUTION TOPICAL EVERY 12 HOURS
Refills: 0 | Status: DISCONTINUED | OUTPATIENT
Start: 2021-01-01 | End: 2021-01-01

## 2021-01-01 RX ORDER — INSULIN LISPRO 100/ML
VIAL (ML) SUBCUTANEOUS AT BEDTIME
Refills: 0 | Status: DISCONTINUED | OUTPATIENT
Start: 2021-01-01 | End: 2021-01-01

## 2021-01-01 RX ORDER — ENOXAPARIN SODIUM 100 MG/ML
40 INJECTION SUBCUTANEOUS DAILY
Refills: 0 | Status: DISCONTINUED | OUTPATIENT
Start: 2021-01-01 | End: 2021-01-01

## 2021-01-01 RX ORDER — METHADONE HYDROCHLORIDE 40 MG/1
20 TABLET ORAL ONCE
Refills: 0 | Status: DISCONTINUED | OUTPATIENT
Start: 2021-01-01 | End: 2021-01-01

## 2021-01-01 RX ORDER — SODIUM CHLORIDE 9 MG/ML
500 INJECTION, SOLUTION INTRAVENOUS ONCE
Refills: 0 | Status: COMPLETED | OUTPATIENT
Start: 2021-01-01 | End: 2021-01-01

## 2021-01-01 RX ORDER — SIMVASTATIN 20 MG/1
1 TABLET, FILM COATED ORAL
Qty: 0 | Refills: 0 | DISCHARGE

## 2021-01-01 RX ORDER — CEFEPIME 1 G/1
1000 INJECTION, POWDER, FOR SOLUTION INTRAMUSCULAR; INTRAVENOUS EVERY 12 HOURS
Refills: 0 | Status: DISCONTINUED | OUTPATIENT
Start: 2021-01-01 | End: 2021-01-01

## 2021-01-01 RX ORDER — FENTANYL CITRATE 50 UG/ML
100 INJECTION INTRAVENOUS ONCE
Refills: 0 | Status: DISCONTINUED | OUTPATIENT
Start: 2021-01-01 | End: 2021-01-01

## 2021-01-01 RX ORDER — AZITHROMYCIN 500 MG/1
500 TABLET, FILM COATED ORAL ONCE
Refills: 0 | Status: COMPLETED | OUTPATIENT
Start: 2021-01-01 | End: 2021-01-01

## 2021-01-01 RX ORDER — SODIUM BICARBONATE 1 MEQ/ML
50 SYRINGE (ML) INTRAVENOUS ONCE
Refills: 0 | Status: COMPLETED | OUTPATIENT
Start: 2021-01-01 | End: 2021-01-01

## 2021-01-01 RX ORDER — ROCURONIUM BROMIDE 10 MG/ML
50 VIAL (ML) INTRAVENOUS ONCE
Refills: 0 | Status: COMPLETED | OUTPATIENT
Start: 2021-01-01 | End: 2021-01-01

## 2021-01-01 RX ORDER — CISATRACURIUM BESYLATE 2 MG/ML
3 INJECTION INTRAVENOUS
Qty: 200 | Refills: 0 | Status: DISCONTINUED | OUTPATIENT
Start: 2021-01-01 | End: 2021-01-01

## 2021-01-01 RX ORDER — METHADONE HYDROCHLORIDE 40 MG/1
10 TABLET ORAL THREE TIMES A DAY
Refills: 0 | Status: DISCONTINUED | OUTPATIENT
Start: 2021-01-01 | End: 2021-01-01

## 2021-01-01 RX ORDER — VANCOMYCIN HCL 1 G
1250 VIAL (EA) INTRAVENOUS EVERY 12 HOURS
Refills: 0 | Status: DISCONTINUED | OUTPATIENT
Start: 2021-01-01 | End: 2021-01-01

## 2021-01-01 RX ORDER — INSULIN LISPRO 100/ML
VIAL (ML) SUBCUTANEOUS
Refills: 0 | Status: DISCONTINUED | OUTPATIENT
Start: 2021-01-01 | End: 2021-01-01

## 2021-01-01 RX ORDER — POTASSIUM CHLORIDE 20 MEQ
10 PACKET (EA) ORAL ONCE
Refills: 0 | Status: COMPLETED | OUTPATIENT
Start: 2021-01-01 | End: 2021-01-01

## 2021-01-01 RX ORDER — ACETAMINOPHEN 500 MG
1000 TABLET ORAL ONCE
Refills: 0 | Status: COMPLETED | OUTPATIENT
Start: 2021-01-01 | End: 2021-01-01

## 2021-01-01 RX ORDER — POTASSIUM PHOSPHATE, MONOBASIC POTASSIUM PHOSPHATE, DIBASIC 236; 224 MG/ML; MG/ML
15 INJECTION, SOLUTION INTRAVENOUS ONCE
Refills: 0 | Status: COMPLETED | OUTPATIENT
Start: 2021-01-01 | End: 2021-01-01

## 2021-01-01 RX ORDER — ENOXAPARIN SODIUM 100 MG/ML
80 INJECTION SUBCUTANEOUS
Refills: 0 | Status: DISCONTINUED | OUTPATIENT
Start: 2021-01-01 | End: 2021-01-01

## 2021-01-01 RX ORDER — CEFTRIAXONE 500 MG/1
1000 INJECTION, POWDER, FOR SOLUTION INTRAMUSCULAR; INTRAVENOUS ONCE
Refills: 0 | Status: COMPLETED | OUTPATIENT
Start: 2021-01-01 | End: 2021-01-01

## 2021-01-01 RX ORDER — HUMAN INSULIN 100 [IU]/ML
12 INJECTION, SUSPENSION SUBCUTANEOUS EVERY 6 HOURS
Refills: 0 | Status: DISCONTINUED | OUTPATIENT
Start: 2021-01-01 | End: 2021-01-01

## 2021-01-01 RX ORDER — FENTANYL CITRATE 50 UG/ML
3 INJECTION INTRAVENOUS
Qty: 2500 | Refills: 0 | Status: DISCONTINUED | OUTPATIENT
Start: 2021-01-01 | End: 2021-01-01

## 2021-01-01 RX ORDER — HUMAN INSULIN 100 [IU]/ML
15 INJECTION, SUSPENSION SUBCUTANEOUS EVERY 6 HOURS
Refills: 0 | Status: DISCONTINUED | OUTPATIENT
Start: 2021-01-01 | End: 2021-01-01

## 2021-01-01 RX ORDER — FENTANYL CITRATE 50 UG/ML
0.5 INJECTION INTRAVENOUS
Qty: 2500 | Refills: 0 | Status: DISCONTINUED | OUTPATIENT
Start: 2021-01-01 | End: 2021-01-01

## 2021-01-01 RX ORDER — FUROSEMIDE 40 MG
10 TABLET ORAL
Qty: 500 | Refills: 0 | Status: DISCONTINUED | OUTPATIENT
Start: 2021-01-01 | End: 2021-01-01

## 2021-01-01 RX ORDER — CEFEPIME 1 G/1
INJECTION, POWDER, FOR SOLUTION INTRAMUSCULAR; INTRAVENOUS
Refills: 0 | Status: DISCONTINUED | OUTPATIENT
Start: 2021-01-01 | End: 2021-01-01

## 2021-01-01 RX ORDER — SODIUM ZIRCONIUM CYCLOSILICATE 10 G/10G
10 POWDER, FOR SUSPENSION ORAL ONCE
Refills: 0 | Status: COMPLETED | OUTPATIENT
Start: 2021-01-01 | End: 2021-01-01

## 2021-01-01 RX ORDER — ALBUMIN HUMAN 25 %
50 VIAL (ML) INTRAVENOUS EVERY 6 HOURS
Refills: 0 | Status: DISCONTINUED | OUTPATIENT
Start: 2021-01-01 | End: 2021-01-01

## 2021-01-01 RX ORDER — INSULIN LISPRO 100/ML
3 VIAL (ML) SUBCUTANEOUS
Qty: 1 | Refills: 0
Start: 2021-01-01 | End: 2021-01-01

## 2021-01-01 RX ORDER — VANCOMYCIN HCL 1 G
1250 VIAL (EA) INTRAVENOUS ONCE
Refills: 0 | Status: COMPLETED | OUTPATIENT
Start: 2021-01-01 | End: 2021-01-01

## 2021-01-01 RX ORDER — GABAPENTIN 400 MG/1
900 CAPSULE ORAL ONCE
Refills: 0 | Status: COMPLETED | OUTPATIENT
Start: 2021-01-01 | End: 2021-01-01

## 2021-01-01 RX ORDER — FENTANYL CITRATE 50 UG/ML
25 INJECTION INTRAVENOUS ONCE
Refills: 0 | Status: DISCONTINUED | OUTPATIENT
Start: 2021-01-01 | End: 2021-01-01

## 2021-01-01 RX ORDER — ACETAMINOPHEN 500 MG
650 TABLET ORAL ONCE
Refills: 0 | Status: COMPLETED | OUTPATIENT
Start: 2021-01-01 | End: 2021-01-01

## 2021-01-01 RX ORDER — HYDRALAZINE HCL 50 MG
25 TABLET ORAL ONCE
Refills: 0 | Status: DISCONTINUED | OUTPATIENT
Start: 2021-01-01 | End: 2021-01-01

## 2021-01-01 RX ORDER — POTASSIUM CHLORIDE 20 MEQ
40 PACKET (EA) ORAL ONCE
Refills: 0 | Status: COMPLETED | OUTPATIENT
Start: 2021-01-01 | End: 2021-01-01

## 2021-01-01 RX ORDER — KETOROLAC TROMETHAMINE 0.5 %
1 DROPS OPHTHALMIC (EYE)
Qty: 0 | Refills: 0 | DISCHARGE

## 2021-01-01 RX ORDER — SODIUM BICARBONATE 1 MEQ/ML
0.09 SYRINGE (ML) INTRAVENOUS
Qty: 150 | Refills: 0 | Status: DISCONTINUED | OUTPATIENT
Start: 2021-01-01 | End: 2021-01-01

## 2021-01-01 RX ORDER — PIPERACILLIN AND TAZOBACTAM 4; .5 G/20ML; G/20ML
3.38 INJECTION, POWDER, LYOPHILIZED, FOR SOLUTION INTRAVENOUS ONCE
Refills: 0 | Status: COMPLETED | OUTPATIENT
Start: 2021-01-01 | End: 2021-01-01

## 2021-01-01 RX ORDER — REMDESIVIR 5 MG/ML
100 INJECTION INTRAVENOUS EVERY 24 HOURS
Refills: 0 | Status: DISCONTINUED | OUTPATIENT
Start: 2021-01-01 | End: 2021-01-01

## 2021-01-01 RX ORDER — ACETAMINOPHEN 500 MG
650 TABLET ORAL EVERY 6 HOURS
Refills: 0 | Status: DISCONTINUED | OUTPATIENT
Start: 2021-01-01 | End: 2021-01-01

## 2021-01-01 RX ORDER — ALBUMIN HUMAN 25 %
25 VIAL (ML) INTRAVENOUS ONCE
Refills: 0 | Status: COMPLETED | OUTPATIENT
Start: 2021-01-01 | End: 2021-01-01

## 2021-01-01 RX ORDER — HUMAN INSULIN 100 [IU]/ML
20 INJECTION, SUSPENSION SUBCUTANEOUS EVERY 6 HOURS
Refills: 0 | Status: DISCONTINUED | OUTPATIENT
Start: 2021-01-01 | End: 2021-01-01

## 2021-01-01 RX ORDER — LANOLIN ALCOHOL/MO/W.PET/CERES
3 CREAM (GRAM) TOPICAL AT BEDTIME
Refills: 0 | Status: DISCONTINUED | OUTPATIENT
Start: 2021-01-01 | End: 2021-01-01

## 2021-01-01 RX ORDER — ENOXAPARIN SODIUM 100 MG/ML
40 INJECTION SUBCUTANEOUS AT BEDTIME
Refills: 0 | Status: DISCONTINUED | OUTPATIENT
Start: 2021-01-01 | End: 2021-01-01

## 2021-01-01 RX ORDER — DEXTROSE 50 % IN WATER 50 %
12.5 SYRINGE (ML) INTRAVENOUS ONCE
Refills: 0 | Status: DISCONTINUED | OUTPATIENT
Start: 2021-01-01 | End: 2021-01-01

## 2021-01-01 RX ORDER — INSULIN GLARGINE 100 [IU]/ML
30 INJECTION, SOLUTION SUBCUTANEOUS AT BEDTIME
Refills: 0 | Status: DISCONTINUED | OUTPATIENT
Start: 2021-01-01 | End: 2021-01-01

## 2021-01-01 RX ORDER — INSULIN HUMAN 100 [IU]/ML
8 INJECTION, SOLUTION SUBCUTANEOUS
Qty: 100 | Refills: 0 | Status: DISCONTINUED | OUTPATIENT
Start: 2021-01-01 | End: 2021-01-01

## 2021-01-01 RX ORDER — ALBUMIN HUMAN 25 %
250 VIAL (ML) INTRAVENOUS ONCE
Refills: 0 | Status: COMPLETED | OUTPATIENT
Start: 2021-01-01 | End: 2021-01-01

## 2021-01-01 RX ORDER — DEXMEDETOMIDINE HYDROCHLORIDE IN 0.9% SODIUM CHLORIDE 4 UG/ML
0.2 INJECTION INTRAVENOUS
Qty: 400 | Refills: 0 | Status: DISCONTINUED | OUTPATIENT
Start: 2021-01-01 | End: 2021-01-01

## 2021-01-01 RX ORDER — MEROPENEM 1 G/30ML
1000 INJECTION INTRAVENOUS EVERY 12 HOURS
Refills: 0 | Status: DISCONTINUED | OUTPATIENT
Start: 2021-01-01 | End: 2021-01-01

## 2021-01-01 RX ORDER — PHENYLEPHRINE HYDROCHLORIDE 10 MG/ML
0.1 INJECTION INTRAVENOUS
Qty: 160 | Refills: 0 | Status: DISCONTINUED | OUTPATIENT
Start: 2021-01-01 | End: 2021-01-01

## 2021-01-01 RX ORDER — DEXTROSE 50 % IN WATER 50 %
50 SYRINGE (ML) INTRAVENOUS ONCE
Refills: 0 | Status: COMPLETED | OUTPATIENT
Start: 2021-01-01 | End: 2021-01-01

## 2021-01-01 RX ORDER — NOREPINEPHRINE BITARTRATE/D5W 8 MG/250ML
0.05 PLASTIC BAG, INJECTION (ML) INTRAVENOUS
Qty: 8 | Refills: 0 | Status: DISCONTINUED | OUTPATIENT
Start: 2021-01-01 | End: 2021-01-01

## 2021-01-01 RX ORDER — INSULIN GLARGINE 100 [IU]/ML
40 INJECTION, SOLUTION SUBCUTANEOUS AT BEDTIME
Refills: 0 | Status: DISCONTINUED | OUTPATIENT
Start: 2021-01-01 | End: 2021-01-01

## 2021-01-01 RX ORDER — VANCOMYCIN HCL 1 G
1000 VIAL (EA) INTRAVENOUS ONCE
Refills: 0 | Status: COMPLETED | OUTPATIENT
Start: 2021-01-01 | End: 2021-01-01

## 2021-01-01 RX ORDER — MAGNESIUM SULFATE 500 MG/ML
1 VIAL (ML) INJECTION ONCE
Refills: 0 | Status: COMPLETED | OUTPATIENT
Start: 2021-01-01 | End: 2021-01-01

## 2021-01-01 RX ORDER — BUMETANIDE 0.25 MG/ML
2 INJECTION INTRAMUSCULAR; INTRAVENOUS ONCE
Refills: 0 | Status: DISCONTINUED | OUTPATIENT
Start: 2021-01-01 | End: 2021-01-01

## 2021-01-01 RX ORDER — POTASSIUM CHLORIDE 20 MEQ
20 PACKET (EA) ORAL ONCE
Refills: 0 | Status: DISCONTINUED | OUTPATIENT
Start: 2021-01-01 | End: 2021-01-01

## 2021-01-01 RX ORDER — MEROPENEM 1 G/30ML
INJECTION INTRAVENOUS
Refills: 0 | Status: DISCONTINUED | OUTPATIENT
Start: 2021-01-01 | End: 2021-01-01

## 2021-01-01 RX ORDER — VANCOMYCIN HCL 1 G
VIAL (EA) INTRAVENOUS
Refills: 0 | Status: DISCONTINUED | OUTPATIENT
Start: 2021-01-01 | End: 2021-01-01

## 2021-01-01 RX ORDER — IMMUNE GLOBULIN (HUMAN) 10 G/100ML
25 INJECTION INTRAVENOUS; SUBCUTANEOUS DAILY
Refills: 0 | Status: DISCONTINUED | OUTPATIENT
Start: 2021-01-01 | End: 2021-01-01

## 2021-01-01 RX ORDER — MEROPENEM 1 G/30ML
1000 INJECTION INTRAVENOUS ONCE
Refills: 0 | Status: COMPLETED | OUTPATIENT
Start: 2021-01-01 | End: 2021-01-01

## 2021-01-01 RX ORDER — METHADONE HYDROCHLORIDE 40 MG/1
10 TABLET ORAL EVERY 8 HOURS
Refills: 0 | Status: DISCONTINUED | OUTPATIENT
Start: 2021-01-01 | End: 2021-01-01

## 2021-01-01 RX ORDER — CIPROFLOXACIN HCL 0.3 %
1 DROPS OPHTHALMIC (EYE)
Qty: 0 | Refills: 0 | DISCHARGE

## 2021-01-01 RX ORDER — FUROSEMIDE 40 MG
20 TABLET ORAL ONCE
Refills: 0 | Status: COMPLETED | OUTPATIENT
Start: 2021-01-01 | End: 2021-01-01

## 2021-01-01 RX ORDER — FENTANYL CITRATE 50 UG/ML
50 INJECTION INTRAVENOUS EVERY 6 HOURS
Refills: 0 | Status: DISCONTINUED | OUTPATIENT
Start: 2021-01-01 | End: 2021-01-01

## 2021-01-01 RX ORDER — CEFEPIME 1 G/1
2000 INJECTION, POWDER, FOR SOLUTION INTRAMUSCULAR; INTRAVENOUS EVERY 12 HOURS
Refills: 0 | Status: DISCONTINUED | OUTPATIENT
Start: 2021-01-01 | End: 2021-01-01

## 2021-01-01 RX ORDER — INSULIN LISPRO 100/ML
6 VIAL (ML) SUBCUTANEOUS
Refills: 0 | Status: DISCONTINUED | OUTPATIENT
Start: 2021-01-01 | End: 2021-01-01

## 2021-01-01 RX ORDER — DEXTROSE 50 % IN WATER 50 %
25 SYRINGE (ML) INTRAVENOUS ONCE
Refills: 0 | Status: DISCONTINUED | OUTPATIENT
Start: 2021-01-01 | End: 2021-01-01

## 2021-01-01 RX ORDER — PREDNISOLONE SODIUM PHOSPHATE 1 %
1 DROPS OPHTHALMIC (EYE)
Refills: 0 | Status: DISCONTINUED | OUTPATIENT
Start: 2021-01-01 | End: 2021-01-01

## 2021-01-01 RX ORDER — HUMAN INSULIN 100 [IU]/ML
8 INJECTION, SUSPENSION SUBCUTANEOUS EVERY 6 HOURS
Refills: 0 | Status: DISCONTINUED | OUTPATIENT
Start: 2021-01-01 | End: 2021-01-01

## 2021-01-01 RX ORDER — SODIUM ZIRCONIUM CYCLOSILICATE 10 G/10G
5 POWDER, FOR SUSPENSION ORAL EVERY 8 HOURS
Refills: 0 | Status: DISCONTINUED | OUTPATIENT
Start: 2021-01-01 | End: 2021-01-01

## 2021-01-01 RX ORDER — SODIUM CHLORIDE 9 MG/ML
500 INJECTION INTRAMUSCULAR; INTRAVENOUS; SUBCUTANEOUS ONCE
Refills: 0 | Status: COMPLETED | OUTPATIENT
Start: 2021-01-01 | End: 2021-01-01

## 2021-01-01 RX ORDER — INSULIN GLARGINE 100 [IU]/ML
20 INJECTION, SOLUTION SUBCUTANEOUS AT BEDTIME
Refills: 0 | Status: DISCONTINUED | OUTPATIENT
Start: 2021-01-01 | End: 2021-01-01

## 2021-01-01 RX ORDER — MIDAZOLAM HYDROCHLORIDE 1 MG/ML
1 INJECTION, SOLUTION INTRAMUSCULAR; INTRAVENOUS ONCE
Refills: 0 | Status: COMPLETED | OUTPATIENT
Start: 2021-01-01 | End: 2021-01-01

## 2021-01-01 RX ORDER — MORPHINE SULFATE 50 MG/1
2 CAPSULE, EXTENDED RELEASE ORAL ONCE
Refills: 0 | Status: DISCONTINUED | OUTPATIENT
Start: 2021-01-01 | End: 2021-01-01

## 2021-01-01 RX ORDER — ACETYLCYSTEINE 200 MG/ML
5 VIAL (ML) MISCELLANEOUS THREE TIMES A DAY
Refills: 0 | Status: COMPLETED | OUTPATIENT
Start: 2021-01-01 | End: 2021-01-01

## 2021-01-01 RX ORDER — POLYETHYLENE GLYCOL 3350 17 G/17G
17 POWDER, FOR SOLUTION ORAL
Refills: 0 | Status: DISCONTINUED | OUTPATIENT
Start: 2021-01-01 | End: 2021-01-01

## 2021-01-01 RX ORDER — CALCIUM GLUCONATE 100 MG/ML
1 VIAL (ML) INTRAVENOUS ONCE
Refills: 0 | Status: COMPLETED | OUTPATIENT
Start: 2021-01-01 | End: 2021-01-01

## 2021-01-01 RX ORDER — ONDANSETRON 8 MG/1
4 TABLET, FILM COATED ORAL ONCE
Refills: 0 | Status: COMPLETED | OUTPATIENT
Start: 2021-01-01 | End: 2021-01-01

## 2021-01-01 RX ORDER — AMPICILLIN SODIUM AND SULBACTAM SODIUM 250; 125 MG/ML; MG/ML
3 INJECTION, POWDER, FOR SUSPENSION INTRAMUSCULAR; INTRAVENOUS EVERY 6 HOURS
Refills: 0 | Status: DISCONTINUED | OUTPATIENT
Start: 2021-01-01 | End: 2021-01-01

## 2021-01-01 RX ORDER — INSULIN GLARGINE 100 [IU]/ML
15 INJECTION, SOLUTION SUBCUTANEOUS AT BEDTIME
Refills: 0 | Status: DISCONTINUED | OUTPATIENT
Start: 2021-01-01 | End: 2021-01-01

## 2021-01-01 RX ORDER — REMDESIVIR 5 MG/ML
100 INJECTION INTRAVENOUS EVERY 24 HOURS
Refills: 0 | Status: COMPLETED | OUTPATIENT
Start: 2021-01-01 | End: 2021-01-01

## 2021-01-01 RX ORDER — AMPICILLIN SODIUM AND SULBACTAM SODIUM 250; 125 MG/ML; MG/ML
INJECTION, POWDER, FOR SUSPENSION INTRAMUSCULAR; INTRAVENOUS
Refills: 0 | Status: DISCONTINUED | OUTPATIENT
Start: 2021-01-01 | End: 2021-01-01

## 2021-01-01 RX ORDER — CLONAZEPAM 1 MG
1 TABLET ORAL
Refills: 0 | Status: DISCONTINUED | OUTPATIENT
Start: 2021-01-01 | End: 2021-01-01

## 2021-01-01 RX ORDER — HUMAN INSULIN 100 [IU]/ML
13 INJECTION, SUSPENSION SUBCUTANEOUS EVERY 6 HOURS
Refills: 0 | Status: DISCONTINUED | OUTPATIENT
Start: 2021-01-01 | End: 2021-01-01

## 2021-01-01 RX ORDER — AMIODARONE HYDROCHLORIDE 400 MG/1
150 TABLET ORAL ONCE
Refills: 0 | Status: COMPLETED | OUTPATIENT
Start: 2021-01-01 | End: 2021-01-01

## 2021-01-01 RX ORDER — KETAMINE HYDROCHLORIDE 100 MG/ML
0.25 INJECTION INTRAMUSCULAR; INTRAVENOUS
Qty: 1000 | Refills: 0 | Status: DISCONTINUED | OUTPATIENT
Start: 2021-01-01 | End: 2021-01-01

## 2021-01-01 RX ORDER — AMIODARONE HYDROCHLORIDE 400 MG/1
1 TABLET ORAL
Qty: 450 | Refills: 0 | Status: DISCONTINUED | OUTPATIENT
Start: 2021-01-01 | End: 2021-01-01

## 2021-01-01 RX ORDER — PANTOPRAZOLE SODIUM 20 MG/1
40 TABLET, DELAYED RELEASE ORAL EVERY 12 HOURS
Refills: 0 | Status: DISCONTINUED | OUTPATIENT
Start: 2021-01-01 | End: 2021-01-01

## 2021-01-01 RX ORDER — INSULIN HUMAN 100 [IU]/ML
8 INJECTION, SOLUTION SUBCUTANEOUS ONCE
Refills: 0 | Status: COMPLETED | OUTPATIENT
Start: 2021-01-01 | End: 2021-01-01

## 2021-01-01 RX ORDER — DEXAMETHASONE 0.5 MG/5ML
6 ELIXIR ORAL DAILY
Refills: 0 | Status: DISCONTINUED | OUTPATIENT
Start: 2021-01-01 | End: 2021-01-01

## 2021-01-01 RX ORDER — CARBIDOPA AND LEVODOPA 25; 100 MG/1; MG/1
2 TABLET ORAL
Refills: 0 | Status: DISCONTINUED | OUTPATIENT
Start: 2021-01-01 | End: 2021-01-01

## 2021-01-01 RX ORDER — LEVETIRACETAM 250 MG/1
1000 TABLET, FILM COATED ORAL ONCE
Refills: 0 | Status: COMPLETED | OUTPATIENT
Start: 2021-01-01 | End: 2021-01-01

## 2021-01-01 RX ORDER — SODIUM CHLORIDE 9 MG/ML
10 INJECTION INTRAMUSCULAR; INTRAVENOUS; SUBCUTANEOUS
Refills: 0 | Status: DISCONTINUED | OUTPATIENT
Start: 2021-01-01 | End: 2021-01-01

## 2021-01-01 RX ORDER — AMPICILLIN SODIUM AND SULBACTAM SODIUM 250; 125 MG/ML; MG/ML
1.5 INJECTION, POWDER, FOR SUSPENSION INTRAMUSCULAR; INTRAVENOUS ONCE
Refills: 0 | Status: COMPLETED | OUTPATIENT
Start: 2021-01-01 | End: 2021-01-01

## 2021-01-01 RX ORDER — MIDAZOLAM HYDROCHLORIDE 1 MG/ML
2 INJECTION, SOLUTION INTRAMUSCULAR; INTRAVENOUS ONCE
Refills: 0 | Status: DISCONTINUED | OUTPATIENT
Start: 2021-01-01 | End: 2021-01-01

## 2021-01-01 RX ORDER — CHLORHEXIDINE GLUCONATE 213 G/1000ML
1 SOLUTION TOPICAL
Refills: 0 | Status: DISCONTINUED | OUTPATIENT
Start: 2021-01-01 | End: 2021-01-01

## 2021-01-01 RX ORDER — SODIUM CHLORIDE 9 MG/ML
1000 INJECTION, SOLUTION INTRAVENOUS ONCE
Refills: 0 | Status: COMPLETED | OUTPATIENT
Start: 2021-01-01 | End: 2021-01-01

## 2021-01-01 RX ORDER — HYDRALAZINE HCL 50 MG
75 TABLET ORAL EVERY 8 HOURS
Refills: 0 | Status: DISCONTINUED | OUTPATIENT
Start: 2021-01-01 | End: 2021-01-01

## 2021-01-01 RX ORDER — FUROSEMIDE 40 MG
40 TABLET ORAL ONCE
Refills: 0 | Status: DISCONTINUED | OUTPATIENT
Start: 2021-01-01 | End: 2021-01-01

## 2021-01-01 RX ORDER — NOREPINEPHRINE BITARTRATE/D5W 8 MG/250ML
0.05 PLASTIC BAG, INJECTION (ML) INTRAVENOUS
Qty: 16 | Refills: 0 | Status: DISCONTINUED | OUTPATIENT
Start: 2021-01-01 | End: 2021-01-01

## 2021-01-01 RX ORDER — METOCLOPRAMIDE HCL 10 MG
5 TABLET ORAL EVERY 6 HOURS
Refills: 0 | Status: COMPLETED | OUTPATIENT
Start: 2021-01-01 | End: 2021-01-01

## 2021-01-01 RX ORDER — SODIUM CHLORIDE 9 MG/ML
1000 INJECTION INTRAMUSCULAR; INTRAVENOUS; SUBCUTANEOUS ONCE
Refills: 0 | Status: COMPLETED | OUTPATIENT
Start: 2021-01-01 | End: 2021-01-01

## 2021-01-01 RX ORDER — SENNA PLUS 8.6 MG/1
10 TABLET ORAL DAILY
Refills: 0 | Status: DISCONTINUED | OUTPATIENT
Start: 2021-01-01 | End: 2021-01-01

## 2021-01-01 RX ORDER — DEXMEDETOMIDINE HYDROCHLORIDE IN 0.9% SODIUM CHLORIDE 4 UG/ML
0.05 INJECTION INTRAVENOUS
Qty: 400 | Refills: 0 | Status: DISCONTINUED | OUTPATIENT
Start: 2021-01-01 | End: 2021-01-01

## 2021-01-01 RX ORDER — CIPROFLOXACIN HCL 0.3 %
1 DROPS OPHTHALMIC (EYE)
Refills: 0 | Status: DISCONTINUED | OUTPATIENT
Start: 2021-01-01 | End: 2021-01-01

## 2021-01-01 RX ORDER — INSULIN LISPRO 100/ML
3 VIAL (ML) SUBCUTANEOUS
Refills: 0 | Status: DISCONTINUED | OUTPATIENT
Start: 2021-01-01 | End: 2021-01-01

## 2021-01-01 RX ORDER — PROPOFOL 10 MG/ML
25 INJECTION, EMULSION INTRAVENOUS
Qty: 1000 | Refills: 0 | Status: DISCONTINUED | OUTPATIENT
Start: 2021-01-01 | End: 2021-01-01

## 2021-01-01 RX ORDER — MIDAZOLAM HYDROCHLORIDE 1 MG/ML
0.02 INJECTION, SOLUTION INTRAMUSCULAR; INTRAVENOUS
Qty: 100 | Refills: 0 | Status: DISCONTINUED | OUTPATIENT
Start: 2021-01-01 | End: 2021-01-01

## 2021-01-01 RX ORDER — INSULIN LISPRO 100/ML
8 VIAL (ML) SUBCUTANEOUS
Refills: 0 | Status: DISCONTINUED | OUTPATIENT
Start: 2021-01-01 | End: 2021-01-01

## 2021-01-01 RX ORDER — SODIUM BICARBONATE 1 MEQ/ML
1300 SYRINGE (ML) INTRAVENOUS EVERY 12 HOURS
Refills: 0 | Status: DISCONTINUED | OUTPATIENT
Start: 2021-01-01 | End: 2021-01-01

## 2021-01-01 RX ORDER — PROPOFOL 10 MG/ML
50 INJECTION, EMULSION INTRAVENOUS ONCE
Refills: 0 | Status: COMPLETED | OUTPATIENT
Start: 2021-01-01 | End: 2021-01-01

## 2021-01-01 RX ORDER — POLYETHYLENE GLYCOL 3350 17 G/17G
17 POWDER, FOR SOLUTION ORAL DAILY
Refills: 0 | Status: DISCONTINUED | OUTPATIENT
Start: 2021-01-01 | End: 2021-01-01

## 2021-01-01 RX ORDER — HUMAN INSULIN 100 [IU]/ML
5 INJECTION, SUSPENSION SUBCUTANEOUS ONCE
Refills: 0 | Status: COMPLETED | OUTPATIENT
Start: 2021-01-01 | End: 2021-01-01

## 2021-01-01 RX ORDER — SENNA PLUS 8.6 MG/1
2 TABLET ORAL AT BEDTIME
Refills: 0 | Status: DISCONTINUED | OUTPATIENT
Start: 2021-01-01 | End: 2021-01-01

## 2021-01-01 RX ORDER — HYDROMORPHONE HYDROCHLORIDE 2 MG/ML
0.5 INJECTION INTRAMUSCULAR; INTRAVENOUS; SUBCUTANEOUS ONCE
Refills: 0 | Status: DISCONTINUED | OUTPATIENT
Start: 2021-01-01 | End: 2021-01-01

## 2021-01-01 RX ORDER — HYDRALAZINE HCL 50 MG
50 TABLET ORAL EVERY 8 HOURS
Refills: 0 | Status: DISCONTINUED | OUTPATIENT
Start: 2021-01-01 | End: 2021-01-01

## 2021-01-01 RX ORDER — ROCURONIUM BROMIDE 10 MG/ML
20 VIAL (ML) INTRAVENOUS ONCE
Refills: 0 | Status: COMPLETED | OUTPATIENT
Start: 2021-01-01 | End: 2021-01-01

## 2021-01-01 RX ORDER — HYDROMORPHONE HYDROCHLORIDE 2 MG/ML
1 INJECTION INTRAMUSCULAR; INTRAVENOUS; SUBCUTANEOUS ONCE
Refills: 0 | Status: DISCONTINUED | OUTPATIENT
Start: 2021-01-01 | End: 2021-01-01

## 2021-01-01 RX ORDER — REMDESIVIR 5 MG/ML
INJECTION INTRAVENOUS
Refills: 0 | Status: DISCONTINUED | OUTPATIENT
Start: 2021-01-01 | End: 2021-01-01

## 2021-01-01 RX ORDER — LEVETIRACETAM 250 MG/1
1000 TABLET, FILM COATED ORAL EVERY 12 HOURS
Refills: 0 | Status: DISCONTINUED | OUTPATIENT
Start: 2021-01-01 | End: 2021-01-01

## 2021-01-01 RX ORDER — NOREPINEPHRINE BITARTRATE/D5W 8 MG/250ML
0.1 PLASTIC BAG, INJECTION (ML) INTRAVENOUS
Qty: 8 | Refills: 0 | Status: DISCONTINUED | OUTPATIENT
Start: 2021-01-01 | End: 2021-01-01

## 2021-01-01 RX ORDER — PIPERACILLIN AND TAZOBACTAM 4; .5 G/20ML; G/20ML
3.38 INJECTION, POWDER, LYOPHILIZED, FOR SOLUTION INTRAVENOUS EVERY 8 HOURS
Refills: 0 | Status: DISCONTINUED | OUTPATIENT
Start: 2021-01-01 | End: 2021-01-01

## 2021-01-01 RX ORDER — HUMAN INSULIN 100 [IU]/ML
4 INJECTION, SUSPENSION SUBCUTANEOUS EVERY 6 HOURS
Refills: 0 | Status: DISCONTINUED | OUTPATIENT
Start: 2021-01-01 | End: 2021-01-01

## 2021-01-01 RX ORDER — PANTOPRAZOLE SODIUM 20 MG/1
40 TABLET, DELAYED RELEASE ORAL DAILY
Refills: 0 | Status: DISCONTINUED | OUTPATIENT
Start: 2021-01-01 | End: 2021-01-01

## 2021-01-01 RX ORDER — ALBUMIN HUMAN 25 %
250 VIAL (ML) INTRAVENOUS ONCE
Refills: 0 | Status: DISCONTINUED | OUTPATIENT
Start: 2021-01-01 | End: 2021-01-01

## 2021-01-01 RX ORDER — CARBIDOPA AND LEVODOPA 25; 100 MG/1; MG/1
2 TABLET ORAL
Qty: 0 | Refills: 0 | DISCHARGE

## 2021-01-01 RX ADMIN — KETAMINE HYDROCHLORIDE 1.99 MG/KG/HR: 100 INJECTION INTRAMUSCULAR; INTRAVENOUS at 15:13

## 2021-01-01 RX ADMIN — HUMAN INSULIN 8 UNIT(S): 100 INJECTION, SUSPENSION SUBCUTANEOUS at 05:15

## 2021-01-01 RX ADMIN — Medication 4: at 11:18

## 2021-01-01 RX ADMIN — MEROPENEM 100 MILLIGRAM(S): 1 INJECTION INTRAVENOUS at 17:16

## 2021-01-01 RX ADMIN — HYDROMORPHONE HYDROCHLORIDE 1 MILLIGRAM(S): 2 INJECTION INTRAMUSCULAR; INTRAVENOUS; SUBCUTANEOUS at 16:34

## 2021-01-01 RX ADMIN — Medication 1 DROP(S): at 06:10

## 2021-01-01 RX ADMIN — IMMUNE GLOBULIN (HUMAN) 41.67 GRAM(S): 10 INJECTION INTRAVENOUS; SUBCUTANEOUS at 11:29

## 2021-01-01 RX ADMIN — Medication 1 DROP(S): at 23:40

## 2021-01-01 RX ADMIN — Medication 8: at 23:50

## 2021-01-01 RX ADMIN — Medication 1 DROP(S): at 17:09

## 2021-01-01 RX ADMIN — POLYETHYLENE GLYCOL 3350 17 GRAM(S): 17 POWDER, FOR SOLUTION ORAL at 12:58

## 2021-01-01 RX ADMIN — POTASSIUM PHOSPHATE, MONOBASIC POTASSIUM PHOSPHATE, DIBASIC 62.5 MILLIMOLE(S): 236; 224 INJECTION, SOLUTION INTRAVENOUS at 22:40

## 2021-01-01 RX ADMIN — SENNA PLUS 5 MILLILITER(S): 8.6 TABLET ORAL at 12:08

## 2021-01-01 RX ADMIN — METHADONE HYDROCHLORIDE 20 MILLIGRAM(S): 40 TABLET ORAL at 21:20

## 2021-01-01 RX ADMIN — CARBIDOPA AND LEVODOPA 2 TABLET(S): 25; 100 TABLET ORAL at 20:57

## 2021-01-01 RX ADMIN — POLYETHYLENE GLYCOL 3350 17 GRAM(S): 17 POWDER, FOR SOLUTION ORAL at 14:47

## 2021-01-01 RX ADMIN — CHLORHEXIDINE GLUCONATE 1 APPLICATION(S): 213 SOLUTION TOPICAL at 06:46

## 2021-01-01 RX ADMIN — SIMVASTATIN 20 MILLIGRAM(S): 20 TABLET, FILM COATED ORAL at 22:14

## 2021-01-01 RX ADMIN — Medication 81 MILLIGRAM(S): at 11:12

## 2021-01-01 RX ADMIN — Medication 100 MILLIEQUIVALENT(S): at 14:12

## 2021-01-01 RX ADMIN — HUMAN INSULIN 5 UNIT(S): 100 INJECTION, SUSPENSION SUBCUTANEOUS at 05:28

## 2021-01-01 RX ADMIN — Medication 1 APPLICATION(S): at 17:49

## 2021-01-01 RX ADMIN — SIMVASTATIN 20 MILLIGRAM(S): 20 TABLET, FILM COATED ORAL at 21:07

## 2021-01-01 RX ADMIN — DEXMEDETOMIDINE HYDROCHLORIDE IN 0.9% SODIUM CHLORIDE 3.99 MICROGRAM(S)/KG/HR: 4 INJECTION INTRAVENOUS at 04:13

## 2021-01-01 RX ADMIN — POLYETHYLENE GLYCOL 3350 17 GRAM(S): 17 POWDER, FOR SOLUTION ORAL at 12:25

## 2021-01-01 RX ADMIN — LEVETIRACETAM 400 MILLIGRAM(S): 250 TABLET, FILM COATED ORAL at 05:16

## 2021-01-01 RX ADMIN — CHLORHEXIDINE GLUCONATE 15 MILLILITER(S): 213 SOLUTION TOPICAL at 17:13

## 2021-01-01 RX ADMIN — Medication 2: at 05:38

## 2021-01-01 RX ADMIN — CHLORHEXIDINE GLUCONATE 15 MILLILITER(S): 213 SOLUTION TOPICAL at 17:20

## 2021-01-01 RX ADMIN — Medication 81 MILLIGRAM(S): at 11:26

## 2021-01-01 RX ADMIN — REMDESIVIR 500 MILLIGRAM(S): 5 INJECTION INTRAVENOUS at 05:53

## 2021-01-01 RX ADMIN — CHLORHEXIDINE GLUCONATE 15 MILLILITER(S): 213 SOLUTION TOPICAL at 05:17

## 2021-01-01 RX ADMIN — Medication 1 DROP(S): at 17:14

## 2021-01-01 RX ADMIN — LEVETIRACETAM 400 MILLIGRAM(S): 250 TABLET, FILM COATED ORAL at 05:19

## 2021-01-01 RX ADMIN — Medication 1 DROP(S): at 05:23

## 2021-01-01 RX ADMIN — HUMAN INSULIN 10 UNIT(S): 100 INJECTION, SUSPENSION SUBCUTANEOUS at 21:42

## 2021-01-01 RX ADMIN — HUMAN INSULIN 8 UNIT(S): 100 INJECTION, SUSPENSION SUBCUTANEOUS at 12:01

## 2021-01-01 RX ADMIN — HEPARIN SODIUM 5000 UNIT(S): 5000 INJECTION INTRAVENOUS; SUBCUTANEOUS at 22:48

## 2021-01-01 RX ADMIN — CEFEPIME 100 MILLIGRAM(S): 1 INJECTION, POWDER, FOR SOLUTION INTRAMUSCULAR; INTRAVENOUS at 17:10

## 2021-01-01 RX ADMIN — Medication 1 MILLIGRAM(S): at 16:40

## 2021-01-01 RX ADMIN — AMIODARONE HYDROCHLORIDE 33.3 MG/MIN: 400 TABLET ORAL at 14:15

## 2021-01-01 RX ADMIN — Medication 1 APPLICATION(S): at 05:51

## 2021-01-01 RX ADMIN — SENNA PLUS 10 MILLILITER(S): 8.6 TABLET ORAL at 11:05

## 2021-01-01 RX ADMIN — Medication 1 DROP(S): at 00:39

## 2021-01-01 RX ADMIN — Medication 1 DROP(S): at 18:07

## 2021-01-01 RX ADMIN — Medication 81 MILLIGRAM(S): at 11:41

## 2021-01-01 RX ADMIN — Medication 1 DROP(S): at 01:21

## 2021-01-01 RX ADMIN — Medication 1 DROP(S): at 17:23

## 2021-01-01 RX ADMIN — LEVETIRACETAM 400 MILLIGRAM(S): 250 TABLET, FILM COATED ORAL at 05:34

## 2021-01-01 RX ADMIN — FENTANYL CITRATE 100 MICROGRAM(S): 50 INJECTION INTRAVENOUS at 19:05

## 2021-01-01 RX ADMIN — SIMVASTATIN 20 MILLIGRAM(S): 20 TABLET, FILM COATED ORAL at 21:26

## 2021-01-01 RX ADMIN — HEPARIN SODIUM 5000 UNIT(S): 5000 INJECTION INTRAVENOUS; SUBCUTANEOUS at 05:11

## 2021-01-01 RX ADMIN — PANTOPRAZOLE SODIUM 40 MILLIGRAM(S): 20 TABLET, DELAYED RELEASE ORAL at 14:11

## 2021-01-01 RX ADMIN — SENNA PLUS 10 MILLILITER(S): 8.6 TABLET ORAL at 11:06

## 2021-01-01 RX ADMIN — Medication 1 DROP(S): at 11:30

## 2021-01-01 RX ADMIN — Medication 1 DROP(S): at 11:31

## 2021-01-01 RX ADMIN — HEPARIN SODIUM 5000 UNIT(S): 5000 INJECTION INTRAVENOUS; SUBCUTANEOUS at 05:26

## 2021-01-01 RX ADMIN — Medication 1: at 12:24

## 2021-01-01 RX ADMIN — CHLORHEXIDINE GLUCONATE 1 APPLICATION(S): 213 SOLUTION TOPICAL at 07:11

## 2021-01-01 RX ADMIN — Medication 400 MILLIGRAM(S): at 20:49

## 2021-01-01 RX ADMIN — SENNA PLUS 10 MILLILITER(S): 8.6 TABLET ORAL at 11:33

## 2021-01-01 RX ADMIN — MEROPENEM 100 MILLIGRAM(S): 1 INJECTION INTRAVENOUS at 18:04

## 2021-01-01 RX ADMIN — Medication 81 MILLIGRAM(S): at 12:26

## 2021-01-01 RX ADMIN — CEFEPIME 100 MILLIGRAM(S): 1 INJECTION, POWDER, FOR SOLUTION INTRAMUSCULAR; INTRAVENOUS at 07:50

## 2021-01-01 RX ADMIN — Medication 1 DROP(S): at 06:48

## 2021-01-01 RX ADMIN — LEVETIRACETAM 400 MILLIGRAM(S): 250 TABLET, FILM COATED ORAL at 05:14

## 2021-01-01 RX ADMIN — LEVETIRACETAM 400 MILLIGRAM(S): 250 TABLET, FILM COATED ORAL at 17:17

## 2021-01-01 RX ADMIN — Medication 50 MEQ/KG/HR: at 20:15

## 2021-01-01 RX ADMIN — Medication 100 MILLIEQUIVALENT(S): at 17:44

## 2021-01-01 RX ADMIN — Medication 6: at 12:01

## 2021-01-01 RX ADMIN — FENTANYL CITRATE 0.8 MICROGRAM(S)/KG/HR: 50 INJECTION INTRAVENOUS at 19:00

## 2021-01-01 RX ADMIN — Medication 8: at 11:56

## 2021-01-01 RX ADMIN — POLYETHYLENE GLYCOL 3350 17 GRAM(S): 17 POWDER, FOR SOLUTION ORAL at 13:25

## 2021-01-01 RX ADMIN — PANTOPRAZOLE SODIUM 40 MILLIGRAM(S): 20 TABLET, DELAYED RELEASE ORAL at 12:46

## 2021-01-01 RX ADMIN — HUMAN INSULIN 13 UNIT(S): 100 INJECTION, SUSPENSION SUBCUTANEOUS at 06:32

## 2021-01-01 RX ADMIN — Medication 75 MILLIGRAM(S): at 17:45

## 2021-01-01 RX ADMIN — SENNA PLUS 10 MILLILITER(S): 8.6 TABLET ORAL at 11:31

## 2021-01-01 RX ADMIN — SIMVASTATIN 20 MILLIGRAM(S): 20 TABLET, FILM COATED ORAL at 21:10

## 2021-01-01 RX ADMIN — HEPARIN SODIUM 5000 UNIT(S): 5000 INJECTION INTRAVENOUS; SUBCUTANEOUS at 06:08

## 2021-01-01 RX ADMIN — Medication 1 DROP(S): at 06:11

## 2021-01-01 RX ADMIN — Medication 1 DROP(S): at 05:10

## 2021-01-01 RX ADMIN — CHLORHEXIDINE GLUCONATE 15 MILLILITER(S): 213 SOLUTION TOPICAL at 18:33

## 2021-01-01 RX ADMIN — Medication 75 MEQ/KG/HR: at 04:33

## 2021-01-01 RX ADMIN — Medication 1 APPLICATION(S): at 05:16

## 2021-01-01 RX ADMIN — HEPARIN SODIUM 5000 UNIT(S): 5000 INJECTION INTRAVENOUS; SUBCUTANEOUS at 13:04

## 2021-01-01 RX ADMIN — FENTANYL CITRATE 50 MICROGRAM(S): 50 INJECTION INTRAVENOUS at 18:46

## 2021-01-01 RX ADMIN — HUMAN INSULIN 15 UNIT(S): 100 INJECTION, SUSPENSION SUBCUTANEOUS at 05:40

## 2021-01-01 RX ADMIN — Medication 1 APPLICATION(S): at 17:56

## 2021-01-01 RX ADMIN — FENTANYL CITRATE 0.8 MICROGRAM(S)/KG/HR: 50 INJECTION INTRAVENOUS at 04:00

## 2021-01-01 RX ADMIN — Medication 81 MILLIGRAM(S): at 12:06

## 2021-01-01 RX ADMIN — Medication 2: at 06:01

## 2021-01-01 RX ADMIN — Medication 250 MILLIGRAM(S): at 07:49

## 2021-01-01 RX ADMIN — PROPOFOL 14.3 MICROGRAM(S)/KG/MIN: 10 INJECTION, EMULSION INTRAVENOUS at 02:20

## 2021-01-01 RX ADMIN — Medication 2: at 11:26

## 2021-01-01 RX ADMIN — HUMAN INSULIN 10 UNIT(S): 100 INJECTION, SUSPENSION SUBCUTANEOUS at 23:29

## 2021-01-01 RX ADMIN — MEROPENEM 100 MILLIGRAM(S): 1 INJECTION INTRAVENOUS at 05:34

## 2021-01-01 RX ADMIN — AMPICILLIN SODIUM AND SULBACTAM SODIUM 200 GRAM(S): 250; 125 INJECTION, POWDER, FOR SUSPENSION INTRAMUSCULAR; INTRAVENOUS at 00:00

## 2021-01-01 RX ADMIN — HEPARIN SODIUM 5000 UNIT(S): 5000 INJECTION INTRAVENOUS; SUBCUTANEOUS at 14:07

## 2021-01-01 RX ADMIN — DEXMEDETOMIDINE HYDROCHLORIDE IN 0.9% SODIUM CHLORIDE 1 MICROGRAM(S)/KG/HR: 4 INJECTION INTRAVENOUS at 21:31

## 2021-01-01 RX ADMIN — Medication 81 MILLIGRAM(S): at 12:17

## 2021-01-01 RX ADMIN — Medication 1 DROP(S): at 12:19

## 2021-01-01 RX ADMIN — Medication 6 MILLIGRAM(S): at 05:15

## 2021-01-01 RX ADMIN — SENNA PLUS 10 MILLILITER(S): 8.6 TABLET ORAL at 12:26

## 2021-01-01 RX ADMIN — KETAMINE HYDROCHLORIDE 1.99 MG/KG/HR: 100 INJECTION INTRAMUSCULAR; INTRAVENOUS at 06:34

## 2021-01-01 RX ADMIN — MEROPENEM 100 MILLIGRAM(S): 1 INJECTION INTRAVENOUS at 06:14

## 2021-01-01 RX ADMIN — PANTOPRAZOLE SODIUM 40 MILLIGRAM(S): 20 TABLET, DELAYED RELEASE ORAL at 11:26

## 2021-01-01 RX ADMIN — HEPARIN SODIUM 5000 UNIT(S): 5000 INJECTION INTRAVENOUS; SUBCUTANEOUS at 21:22

## 2021-01-01 RX ADMIN — MEROPENEM 100 MILLIGRAM(S): 1 INJECTION INTRAVENOUS at 05:42

## 2021-01-01 RX ADMIN — HEPARIN SODIUM 5000 UNIT(S): 5000 INJECTION INTRAVENOUS; SUBCUTANEOUS at 21:30

## 2021-01-01 RX ADMIN — Medication 4: at 13:38

## 2021-01-01 RX ADMIN — Medication 25 MILLIGRAM(S): at 17:40

## 2021-01-01 RX ADMIN — VASOPRESSIN 2.4 UNIT(S)/MIN: 20 INJECTION INTRAVENOUS at 17:36

## 2021-01-01 RX ADMIN — Medication 81 MILLIGRAM(S): at 11:31

## 2021-01-01 RX ADMIN — HEPARIN SODIUM 5000 UNIT(S): 5000 INJECTION INTRAVENOUS; SUBCUTANEOUS at 21:14

## 2021-01-01 RX ADMIN — Medication 1 DROP(S): at 05:13

## 2021-01-01 RX ADMIN — Medication 81 MILLIGRAM(S): at 11:02

## 2021-01-01 RX ADMIN — Medication 2: at 09:19

## 2021-01-01 RX ADMIN — PHENYLEPHRINE HYDROCHLORIDE 1.49 MICROGRAM(S)/KG/MIN: 10 INJECTION INTRAVENOUS at 22:11

## 2021-01-01 RX ADMIN — Medication 2: at 05:59

## 2021-01-01 RX ADMIN — CISATRACURIUM BESYLATE 14.3 MICROGRAM(S)/KG/MIN: 2 INJECTION INTRAVENOUS at 18:16

## 2021-01-01 RX ADMIN — PROPOFOL 14.3 MICROGRAM(S)/KG/MIN: 10 INJECTION, EMULSION INTRAVENOUS at 23:56

## 2021-01-01 RX ADMIN — PROPOFOL 14.3 MICROGRAM(S)/KG/MIN: 10 INJECTION, EMULSION INTRAVENOUS at 06:43

## 2021-01-01 RX ADMIN — Medication 3 UNIT(S): at 21:02

## 2021-01-01 RX ADMIN — CARBIDOPA AND LEVODOPA 2 TABLET(S): 25; 100 TABLET ORAL at 21:14

## 2021-01-01 RX ADMIN — REMDESIVIR 500 MILLIGRAM(S): 5 INJECTION INTRAVENOUS at 19:17

## 2021-01-01 RX ADMIN — Medication 1 DROP(S): at 11:59

## 2021-01-01 RX ADMIN — Medication 1 DROP(S): at 05:28

## 2021-01-01 RX ADMIN — HEPARIN SODIUM 5000 UNIT(S): 5000 INJECTION INTRAVENOUS; SUBCUTANEOUS at 14:11

## 2021-01-01 RX ADMIN — Medication 1 DROP(S): at 05:55

## 2021-01-01 RX ADMIN — HEPARIN SODIUM 5000 UNIT(S): 5000 INJECTION INTRAVENOUS; SUBCUTANEOUS at 21:31

## 2021-01-01 RX ADMIN — DEXMEDETOMIDINE HYDROCHLORIDE IN 0.9% SODIUM CHLORIDE 1 MICROGRAM(S)/KG/HR: 4 INJECTION INTRAVENOUS at 07:43

## 2021-01-01 RX ADMIN — SENNA PLUS 10 MILLILITER(S): 8.6 TABLET ORAL at 11:38

## 2021-01-01 RX ADMIN — HEPARIN SODIUM 5000 UNIT(S): 5000 INJECTION INTRAVENOUS; SUBCUTANEOUS at 14:15

## 2021-01-01 RX ADMIN — Medication 1 DROP(S): at 01:19

## 2021-01-01 RX ADMIN — CHLORHEXIDINE GLUCONATE 1 APPLICATION(S): 213 SOLUTION TOPICAL at 06:09

## 2021-01-01 RX ADMIN — ENOXAPARIN SODIUM 40 MILLIGRAM(S): 100 INJECTION SUBCUTANEOUS at 21:00

## 2021-01-01 RX ADMIN — Medication 1 DROP(S): at 17:18

## 2021-01-01 RX ADMIN — METHADONE HYDROCHLORIDE 10 MILLIGRAM(S): 40 TABLET ORAL at 22:09

## 2021-01-01 RX ADMIN — CEFEPIME 100 MILLIGRAM(S): 1 INJECTION, POWDER, FOR SOLUTION INTRAMUSCULAR; INTRAVENOUS at 17:33

## 2021-01-01 RX ADMIN — KETAMINE HYDROCHLORIDE 1.99 MG/KG/HR: 100 INJECTION INTRAMUSCULAR; INTRAVENOUS at 05:15

## 2021-01-01 RX ADMIN — FENTANYL CITRATE 0.8 MICROGRAM(S)/KG/HR: 50 INJECTION INTRAVENOUS at 01:26

## 2021-01-01 RX ADMIN — HEPARIN SODIUM 5000 UNIT(S): 5000 INJECTION INTRAVENOUS; SUBCUTANEOUS at 21:10

## 2021-01-01 RX ADMIN — Medication 6 UNIT(S): at 09:29

## 2021-01-01 RX ADMIN — Medication 650 MILLIGRAM(S): at 19:35

## 2021-01-01 RX ADMIN — Medication 1 DROP(S): at 23:29

## 2021-01-01 RX ADMIN — Medication 100 MILLIEQUIVALENT(S): at 12:12

## 2021-01-01 RX ADMIN — Medication 6 MILLIGRAM(S): at 05:39

## 2021-01-01 RX ADMIN — HEPARIN SODIUM 5000 UNIT(S): 5000 INJECTION INTRAVENOUS; SUBCUTANEOUS at 22:52

## 2021-01-01 RX ADMIN — PROPOFOL 12 MICROGRAM(S)/KG/MIN: 10 INJECTION, EMULSION INTRAVENOUS at 22:01

## 2021-01-01 RX ADMIN — Medication 8: at 00:13

## 2021-01-01 RX ADMIN — HEPARIN SODIUM 5000 UNIT(S): 5000 INJECTION INTRAVENOUS; SUBCUTANEOUS at 22:50

## 2021-01-01 RX ADMIN — Medication 1 DROP(S): at 12:02

## 2021-01-01 RX ADMIN — Medication 25 MILLIGRAM(S): at 05:26

## 2021-01-01 RX ADMIN — SENNA PLUS 10 MILLILITER(S): 8.6 TABLET ORAL at 13:03

## 2021-01-01 RX ADMIN — Medication 1 DROP(S): at 06:01

## 2021-01-01 RX ADMIN — PROPOFOL 14.3 MICROGRAM(S)/KG/MIN: 10 INJECTION, EMULSION INTRAVENOUS at 21:17

## 2021-01-01 RX ADMIN — Medication 81 MILLIGRAM(S): at 11:27

## 2021-01-01 RX ADMIN — Medication 1 DROP(S): at 17:00

## 2021-01-01 RX ADMIN — HUMAN INSULIN 5 UNIT(S): 100 INJECTION, SUSPENSION SUBCUTANEOUS at 02:59

## 2021-01-01 RX ADMIN — Medication 2: at 17:34

## 2021-01-01 RX ADMIN — LEVETIRACETAM 400 MILLIGRAM(S): 250 TABLET, FILM COATED ORAL at 05:28

## 2021-01-01 RX ADMIN — FENTANYL CITRATE 0.8 MICROGRAM(S)/KG/HR: 50 INJECTION INTRAVENOUS at 00:32

## 2021-01-01 RX ADMIN — SODIUM CHLORIDE 1000 MILLILITER(S): 9 INJECTION INTRAMUSCULAR; INTRAVENOUS; SUBCUTANEOUS at 23:00

## 2021-01-01 RX ADMIN — CHLORHEXIDINE GLUCONATE 15 MILLILITER(S): 213 SOLUTION TOPICAL at 17:12

## 2021-01-01 RX ADMIN — Medication 1 DROP(S): at 05:36

## 2021-01-01 RX ADMIN — Medication 4: at 18:40

## 2021-01-01 RX ADMIN — Medication 5 MG/HR: at 06:48

## 2021-01-01 RX ADMIN — CHLORHEXIDINE GLUCONATE 1 APPLICATION(S): 213 SOLUTION TOPICAL at 07:31

## 2021-01-01 RX ADMIN — Medication 1 DROP(S): at 05:39

## 2021-01-01 RX ADMIN — HUMAN INSULIN 5 UNIT(S): 100 INJECTION, SUSPENSION SUBCUTANEOUS at 02:05

## 2021-01-01 RX ADMIN — CARBIDOPA AND LEVODOPA 2 TABLET(S): 25; 100 TABLET ORAL at 22:49

## 2021-01-01 RX ADMIN — Medication 2: at 09:52

## 2021-01-01 RX ADMIN — Medication 100 MEQ/KG/HR: at 01:10

## 2021-01-01 RX ADMIN — Medication 25 MILLIGRAM(S): at 17:52

## 2021-01-01 RX ADMIN — FENTANYL CITRATE 50 MICROGRAM(S): 50 INJECTION INTRAVENOUS at 15:25

## 2021-01-01 RX ADMIN — Medication 1 APPLICATION(S): at 17:09

## 2021-01-01 RX ADMIN — HEPARIN SODIUM 5000 UNIT(S): 5000 INJECTION INTRAVENOUS; SUBCUTANEOUS at 13:14

## 2021-01-01 RX ADMIN — DEXMEDETOMIDINE HYDROCHLORIDE IN 0.9% SODIUM CHLORIDE 1 MICROGRAM(S)/KG/HR: 4 INJECTION INTRAVENOUS at 21:41

## 2021-01-01 RX ADMIN — PANTOPRAZOLE SODIUM 40 MILLIGRAM(S): 20 TABLET, DELAYED RELEASE ORAL at 13:04

## 2021-01-01 RX ADMIN — INSULIN GLARGINE 15 UNIT(S): 100 INJECTION, SOLUTION SUBCUTANEOUS at 22:29

## 2021-01-01 RX ADMIN — SENNA PLUS 10 MILLILITER(S): 8.6 TABLET ORAL at 12:12

## 2021-01-01 RX ADMIN — METHADONE HYDROCHLORIDE 10 MILLIGRAM(S): 40 TABLET ORAL at 13:33

## 2021-01-01 RX ADMIN — Medication 10: at 17:23

## 2021-01-01 RX ADMIN — Medication 25 MILLIGRAM(S): at 05:17

## 2021-01-01 RX ADMIN — Medication 81 MILLIGRAM(S): at 12:12

## 2021-01-01 RX ADMIN — Medication 250 MILLIGRAM(S): at 08:14

## 2021-01-01 RX ADMIN — Medication 100 GRAM(S): at 10:59

## 2021-01-01 RX ADMIN — Medication 250 MILLIGRAM(S): at 05:35

## 2021-01-01 RX ADMIN — Medication 1 DROP(S): at 23:47

## 2021-01-01 RX ADMIN — HEPARIN SODIUM 5000 UNIT(S): 5000 INJECTION INTRAVENOUS; SUBCUTANEOUS at 22:12

## 2021-01-01 RX ADMIN — Medication 3 MILLIGRAM(S): at 22:16

## 2021-01-01 RX ADMIN — MEROPENEM 100 MILLIGRAM(S): 1 INJECTION INTRAVENOUS at 05:37

## 2021-01-01 RX ADMIN — Medication 1 APPLICATION(S): at 17:34

## 2021-01-01 RX ADMIN — Medication 6 MILLIGRAM(S): at 04:32

## 2021-01-01 RX ADMIN — HUMAN INSULIN 5 UNIT(S): 100 INJECTION, SUSPENSION SUBCUTANEOUS at 05:52

## 2021-01-01 RX ADMIN — SIMVASTATIN 20 MILLIGRAM(S): 20 TABLET, FILM COATED ORAL at 21:41

## 2021-01-01 RX ADMIN — Medication 1 DROP(S): at 11:38

## 2021-01-01 RX ADMIN — Medication 1 DROP(S): at 05:41

## 2021-01-01 RX ADMIN — CARBIDOPA AND LEVODOPA 2 TABLET(S): 25; 100 TABLET ORAL at 00:26

## 2021-01-01 RX ADMIN — Medication 1 DROP(S): at 12:07

## 2021-01-01 RX ADMIN — Medication 1 APPLICATION(S): at 05:10

## 2021-01-01 RX ADMIN — SODIUM CHLORIDE 6000 MILLILITER(S): 9 INJECTION INTRAMUSCULAR; INTRAVENOUS; SUBCUTANEOUS at 18:02

## 2021-01-01 RX ADMIN — Medication 1 APPLICATION(S): at 05:28

## 2021-01-01 RX ADMIN — Medication 1 DROP(S): at 06:49

## 2021-01-01 RX ADMIN — ONDANSETRON 4 MILLIGRAM(S): 8 TABLET, FILM COATED ORAL at 18:52

## 2021-01-01 RX ADMIN — Medication 50 MILLILITER(S): at 23:37

## 2021-01-01 RX ADMIN — Medication 81 MILLIGRAM(S): at 11:54

## 2021-01-01 RX ADMIN — FENTANYL CITRATE 23.9 MICROGRAM(S)/KG/HR: 50 INJECTION INTRAVENOUS at 13:50

## 2021-01-01 RX ADMIN — HUMAN INSULIN 10 UNIT(S): 100 INJECTION, SUSPENSION SUBCUTANEOUS at 00:08

## 2021-01-01 RX ADMIN — CARBIDOPA AND LEVODOPA 2 TABLET(S): 25; 100 TABLET ORAL at 21:20

## 2021-01-01 RX ADMIN — Medication 1 DROP(S): at 05:21

## 2021-01-01 RX ADMIN — Medication 2: at 01:25

## 2021-01-01 RX ADMIN — HUMAN INSULIN 5 UNIT(S): 100 INJECTION, SUSPENSION SUBCUTANEOUS at 00:58

## 2021-01-01 RX ADMIN — HUMAN INSULIN 10 UNIT(S): 100 INJECTION, SUSPENSION SUBCUTANEOUS at 05:04

## 2021-01-01 RX ADMIN — METHADONE HYDROCHLORIDE 10 MILLIGRAM(S): 40 TABLET ORAL at 05:37

## 2021-01-01 RX ADMIN — Medication 81 MILLIGRAM(S): at 13:28

## 2021-01-01 RX ADMIN — Medication 81 MILLIGRAM(S): at 11:32

## 2021-01-01 RX ADMIN — POLYETHYLENE GLYCOL 3350 17 GRAM(S): 17 POWDER, FOR SOLUTION ORAL at 13:28

## 2021-01-01 RX ADMIN — Medication 1 DROP(S): at 23:15

## 2021-01-01 RX ADMIN — Medication 1 APPLICATION(S): at 17:26

## 2021-01-01 RX ADMIN — HUMAN INSULIN 8 UNIT(S): 100 INJECTION, SUSPENSION SUBCUTANEOUS at 23:11

## 2021-01-01 RX ADMIN — Medication 6: at 17:19

## 2021-01-01 RX ADMIN — SODIUM ZIRCONIUM CYCLOSILICATE 10 GRAM(S): 10 POWDER, FOR SUSPENSION ORAL at 02:07

## 2021-01-01 RX ADMIN — PROPOFOL 23.9 MICROGRAM(S)/KG/MIN: 10 INJECTION, EMULSION INTRAVENOUS at 22:40

## 2021-01-01 RX ADMIN — HUMAN INSULIN 5 UNIT(S): 100 INJECTION, SUSPENSION SUBCUTANEOUS at 17:20

## 2021-01-01 RX ADMIN — Medication 4: at 11:55

## 2021-01-01 RX ADMIN — Medication 6: at 00:46

## 2021-01-01 RX ADMIN — Medication 6 MILLIGRAM(S): at 06:08

## 2021-01-01 RX ADMIN — KETAMINE HYDROCHLORIDE 1.99 MG/KG/HR: 100 INJECTION INTRAMUSCULAR; INTRAVENOUS at 00:29

## 2021-01-01 RX ADMIN — PANTOPRAZOLE SODIUM 40 MILLIGRAM(S): 20 TABLET, DELAYED RELEASE ORAL at 12:26

## 2021-01-01 RX ADMIN — HEPARIN SODIUM 5000 UNIT(S): 5000 INJECTION INTRAVENOUS; SUBCUTANEOUS at 15:15

## 2021-01-01 RX ADMIN — HEPARIN SODIUM 5000 UNIT(S): 5000 INJECTION INTRAVENOUS; SUBCUTANEOUS at 14:28

## 2021-01-01 RX ADMIN — FENTANYL CITRATE 23.9 MICROGRAM(S)/KG/HR: 50 INJECTION INTRAVENOUS at 05:21

## 2021-01-01 RX ADMIN — Medication 1 DROP(S): at 18:33

## 2021-01-01 RX ADMIN — DEXMEDETOMIDINE HYDROCHLORIDE IN 0.9% SODIUM CHLORIDE 1 MICROGRAM(S)/KG/HR: 4 INJECTION INTRAVENOUS at 01:13

## 2021-01-01 RX ADMIN — INSULIN GLARGINE 40 UNIT(S): 100 INJECTION, SOLUTION SUBCUTANEOUS at 21:30

## 2021-01-01 RX ADMIN — HEPARIN SODIUM 5000 UNIT(S): 5000 INJECTION INTRAVENOUS; SUBCUTANEOUS at 06:14

## 2021-01-01 RX ADMIN — CARBIDOPA AND LEVODOPA 2 TABLET(S): 25; 100 TABLET ORAL at 08:16

## 2021-01-01 RX ADMIN — Medication 1 DROP(S): at 11:13

## 2021-01-01 RX ADMIN — POLYETHYLENE GLYCOL 3350 17 GRAM(S): 17 POWDER, FOR SOLUTION ORAL at 11:24

## 2021-01-01 RX ADMIN — Medication 1 DROP(S): at 13:00

## 2021-01-01 RX ADMIN — PROPOFOL 14.3 MICROGRAM(S)/KG/MIN: 10 INJECTION, EMULSION INTRAVENOUS at 03:30

## 2021-01-01 RX ADMIN — SIMVASTATIN 20 MILLIGRAM(S): 20 TABLET, FILM COATED ORAL at 22:59

## 2021-01-01 RX ADMIN — DEXMEDETOMIDINE HYDROCHLORIDE IN 0.9% SODIUM CHLORIDE 3.99 MICROGRAM(S)/KG/HR: 4 INJECTION INTRAVENOUS at 06:15

## 2021-01-01 RX ADMIN — HEPARIN SODIUM 5000 UNIT(S): 5000 INJECTION INTRAVENOUS; SUBCUTANEOUS at 05:34

## 2021-01-01 RX ADMIN — METHADONE HYDROCHLORIDE 10 MILLIGRAM(S): 40 TABLET ORAL at 14:11

## 2021-01-01 RX ADMIN — Medication 1 DROP(S): at 05:07

## 2021-01-01 RX ADMIN — SENNA PLUS 10 MILLILITER(S): 8.6 TABLET ORAL at 11:04

## 2021-01-01 RX ADMIN — CARBIDOPA AND LEVODOPA 2 TABLET(S): 25; 100 TABLET ORAL at 20:45

## 2021-01-01 RX ADMIN — CARBIDOPA AND LEVODOPA 2 TABLET(S): 25; 100 TABLET ORAL at 13:03

## 2021-01-01 RX ADMIN — CHLORHEXIDINE GLUCONATE 15 MILLILITER(S): 213 SOLUTION TOPICAL at 04:31

## 2021-01-01 RX ADMIN — HUMAN INSULIN 4 UNIT(S): 100 INJECTION, SUSPENSION SUBCUTANEOUS at 11:34

## 2021-01-01 RX ADMIN — Medication 1 DROP(S): at 18:49

## 2021-01-01 RX ADMIN — Medication 1 APPLICATION(S): at 17:38

## 2021-01-01 RX ADMIN — PHENYLEPHRINE HYDROCHLORIDE 1.49 MICROGRAM(S)/KG/MIN: 10 INJECTION INTRAVENOUS at 20:09

## 2021-01-01 RX ADMIN — Medication 50 MILLIGRAM(S): at 05:19

## 2021-01-01 RX ADMIN — Medication 1 DROP(S): at 05:29

## 2021-01-01 RX ADMIN — CARBIDOPA AND LEVODOPA 2 TABLET(S): 25; 100 TABLET ORAL at 09:02

## 2021-01-01 RX ADMIN — HEPARIN SODIUM 5000 UNIT(S): 5000 INJECTION INTRAVENOUS; SUBCUTANEOUS at 14:54

## 2021-01-01 RX ADMIN — MEROPENEM 100 MILLIGRAM(S): 1 INJECTION INTRAVENOUS at 06:34

## 2021-01-01 RX ADMIN — Medication 40 MILLIGRAM(S): at 00:08

## 2021-01-01 RX ADMIN — Medication 3 UNIT(S): at 08:40

## 2021-01-01 RX ADMIN — ENOXAPARIN SODIUM 80 MILLIGRAM(S): 100 INJECTION SUBCUTANEOUS at 05:54

## 2021-01-01 RX ADMIN — Medication 4: at 12:13

## 2021-01-01 RX ADMIN — Medication 6: at 02:57

## 2021-01-01 RX ADMIN — HEPARIN SODIUM 5000 UNIT(S): 5000 INJECTION INTRAVENOUS; SUBCUTANEOUS at 14:47

## 2021-01-01 RX ADMIN — Medication 6 MILLIGRAM(S): at 12:14

## 2021-01-01 RX ADMIN — Medication 6 MILLIGRAM(S): at 13:16

## 2021-01-01 RX ADMIN — Medication 1 DROP(S): at 01:06

## 2021-01-01 RX ADMIN — CEFEPIME 100 MILLIGRAM(S): 1 INJECTION, POWDER, FOR SOLUTION INTRAMUSCULAR; INTRAVENOUS at 05:38

## 2021-01-01 RX ADMIN — FENTANYL CITRATE 23.9 MICROGRAM(S)/KG/HR: 50 INJECTION INTRAVENOUS at 17:01

## 2021-01-01 RX ADMIN — Medication 250 MILLIGRAM(S): at 17:44

## 2021-01-01 RX ADMIN — HUMAN INSULIN 13 UNIT(S): 100 INJECTION, SUSPENSION SUBCUTANEOUS at 23:09

## 2021-01-01 RX ADMIN — Medication 4: at 23:13

## 2021-01-01 RX ADMIN — INSULIN HUMAN 8 UNIT(S): 100 INJECTION, SOLUTION SUBCUTANEOUS at 02:33

## 2021-01-01 RX ADMIN — Medication 3 UNIT(S): at 09:53

## 2021-01-01 RX ADMIN — METHADONE HYDROCHLORIDE 10 MILLIGRAM(S): 40 TABLET ORAL at 23:18

## 2021-01-01 RX ADMIN — FENTANYL CITRATE 100 MICROGRAM(S): 50 INJECTION INTRAVENOUS at 21:15

## 2021-01-01 RX ADMIN — SODIUM CHLORIDE 1000 MILLILITER(S): 9 INJECTION INTRAMUSCULAR; INTRAVENOUS; SUBCUTANEOUS at 09:12

## 2021-01-01 RX ADMIN — SIMVASTATIN 20 MILLIGRAM(S): 20 TABLET, FILM COATED ORAL at 22:36

## 2021-01-01 RX ADMIN — PIPERACILLIN AND TAZOBACTAM 25 GRAM(S): 4; .5 INJECTION, POWDER, LYOPHILIZED, FOR SOLUTION INTRAVENOUS at 21:41

## 2021-01-01 RX ADMIN — Medication 5 MILLIGRAM(S): at 23:12

## 2021-01-01 RX ADMIN — FENTANYL CITRATE 0.8 MICROGRAM(S)/KG/HR: 50 INJECTION INTRAVENOUS at 00:38

## 2021-01-01 RX ADMIN — Medication 1 DROP(S): at 17:17

## 2021-01-01 RX ADMIN — Medication 1 MILLIGRAM(S): at 12:30

## 2021-01-01 RX ADMIN — FENTANYL CITRATE 23.9 MICROGRAM(S)/KG/HR: 50 INJECTION INTRAVENOUS at 05:11

## 2021-01-01 RX ADMIN — Medication 50 MILLIEQUIVALENT(S): at 00:31

## 2021-01-01 RX ADMIN — HUMAN INSULIN 16 UNIT(S): 100 INJECTION, SUSPENSION SUBCUTANEOUS at 12:25

## 2021-01-01 RX ADMIN — HEPARIN SODIUM 5000 UNIT(S): 5000 INJECTION INTRAVENOUS; SUBCUTANEOUS at 14:30

## 2021-01-01 RX ADMIN — CHLORHEXIDINE GLUCONATE 1 APPLICATION(S): 213 SOLUTION TOPICAL at 06:19

## 2021-01-01 RX ADMIN — FENTANYL CITRATE 50 MICROGRAM(S): 50 INJECTION INTRAVENOUS at 01:04

## 2021-01-01 RX ADMIN — Medication 1 MILLIGRAM(S): at 18:36

## 2021-01-01 RX ADMIN — GABAPENTIN 900 MILLIGRAM(S): 400 CAPSULE ORAL at 05:36

## 2021-01-01 RX ADMIN — Medication 25 MILLIGRAM(S): at 06:49

## 2021-01-01 RX ADMIN — Medication 1 DROP(S): at 23:12

## 2021-01-01 RX ADMIN — DEXMEDETOMIDINE HYDROCHLORIDE IN 0.9% SODIUM CHLORIDE 1 MICROGRAM(S)/KG/HR: 4 INJECTION INTRAVENOUS at 03:58

## 2021-01-01 RX ADMIN — Medication 1 DROP(S): at 17:24

## 2021-01-01 RX ADMIN — FENTANYL CITRATE 50 MICROGRAM(S): 50 INJECTION INTRAVENOUS at 16:43

## 2021-01-01 RX ADMIN — CARBIDOPA AND LEVODOPA 2 TABLET(S): 25; 100 TABLET ORAL at 11:36

## 2021-01-01 RX ADMIN — FENTANYL CITRATE 100 MICROGRAM(S): 50 INJECTION INTRAVENOUS at 19:04

## 2021-01-01 RX ADMIN — Medication 1 APPLICATION(S): at 17:24

## 2021-01-01 RX ADMIN — HUMAN INSULIN 10 UNIT(S): 100 INJECTION, SUSPENSION SUBCUTANEOUS at 05:36

## 2021-01-01 RX ADMIN — MEROPENEM 100 MILLIGRAM(S): 1 INJECTION INTRAVENOUS at 17:13

## 2021-01-01 RX ADMIN — CARBIDOPA AND LEVODOPA 2 TABLET(S): 25; 100 TABLET ORAL at 07:50

## 2021-01-01 RX ADMIN — SIMVASTATIN 20 MILLIGRAM(S): 20 TABLET, FILM COATED ORAL at 21:24

## 2021-01-01 RX ADMIN — Medication 1300 MILLIGRAM(S): at 05:14

## 2021-01-01 RX ADMIN — Medication 3 UNIT(S): at 18:40

## 2021-01-01 RX ADMIN — HUMAN INSULIN 10 UNIT(S): 100 INJECTION, SUSPENSION SUBCUTANEOUS at 17:57

## 2021-01-01 RX ADMIN — MEROPENEM 100 MILLIGRAM(S): 1 INJECTION INTRAVENOUS at 17:15

## 2021-01-01 RX ADMIN — FENTANYL CITRATE 0.8 MICROGRAM(S)/KG/HR: 50 INJECTION INTRAVENOUS at 04:31

## 2021-01-01 RX ADMIN — Medication 1 DROP(S): at 00:00

## 2021-01-01 RX ADMIN — Medication 1 DROP(S): at 17:34

## 2021-01-01 RX ADMIN — MIDAZOLAM HYDROCHLORIDE 2 MILLIGRAM(S): 1 INJECTION, SOLUTION INTRAMUSCULAR; INTRAVENOUS at 18:47

## 2021-01-01 RX ADMIN — Medication 1 DROP(S): at 05:15

## 2021-01-01 RX ADMIN — KETAMINE HYDROCHLORIDE 1.99 MG/KG/HR: 100 INJECTION INTRAMUSCULAR; INTRAVENOUS at 21:43

## 2021-01-01 RX ADMIN — Medication 5 MILLILITER(S): at 09:16

## 2021-01-01 RX ADMIN — PANTOPRAZOLE SODIUM 40 MILLIGRAM(S): 20 TABLET, DELAYED RELEASE ORAL at 11:29

## 2021-01-01 RX ADMIN — Medication 7.47 MICROGRAM(S)/KG/MIN: at 20:09

## 2021-01-01 RX ADMIN — PROPOFOL 14.3 MICROGRAM(S)/KG/MIN: 10 INJECTION, EMULSION INTRAVENOUS at 21:29

## 2021-01-01 RX ADMIN — Medication 1 DROP(S): at 05:57

## 2021-01-01 RX ADMIN — SIMVASTATIN 20 MILLIGRAM(S): 20 TABLET, FILM COATED ORAL at 21:59

## 2021-01-01 RX ADMIN — HEPARIN SODIUM 5000 UNIT(S): 5000 INJECTION INTRAVENOUS; SUBCUTANEOUS at 13:03

## 2021-01-01 RX ADMIN — Medication 1 MILLIGRAM(S): at 21:30

## 2021-01-01 RX ADMIN — HEPARIN SODIUM 5000 UNIT(S): 5000 INJECTION INTRAVENOUS; SUBCUTANEOUS at 06:07

## 2021-01-01 RX ADMIN — Medication 1 DROP(S): at 05:22

## 2021-01-01 RX ADMIN — CHLORHEXIDINE GLUCONATE 1 APPLICATION(S): 213 SOLUTION TOPICAL at 06:13

## 2021-01-01 RX ADMIN — Medication 7.47 MICROGRAM(S)/KG/MIN: at 21:43

## 2021-01-01 RX ADMIN — CISATRACURIUM BESYLATE 14.3 MICROGRAM(S)/KG/MIN: 2 INJECTION INTRAVENOUS at 16:14

## 2021-01-01 RX ADMIN — Medication 650 MILLIGRAM(S): at 17:30

## 2021-01-01 RX ADMIN — Medication 6: at 06:15

## 2021-01-01 RX ADMIN — Medication 1 DROP(S): at 05:52

## 2021-01-01 RX ADMIN — METHADONE HYDROCHLORIDE 10 MILLIGRAM(S): 40 TABLET ORAL at 21:14

## 2021-01-01 RX ADMIN — HUMAN INSULIN 10 UNIT(S): 100 INJECTION, SUSPENSION SUBCUTANEOUS at 23:56

## 2021-01-01 RX ADMIN — SIMVASTATIN 20 MILLIGRAM(S): 20 TABLET, FILM COATED ORAL at 21:46

## 2021-01-01 RX ADMIN — CHLORHEXIDINE GLUCONATE 15 MILLILITER(S): 213 SOLUTION TOPICAL at 17:59

## 2021-01-01 RX ADMIN — INSULIN GLARGINE 30 UNIT(S): 100 INJECTION, SOLUTION SUBCUTANEOUS at 22:52

## 2021-01-01 RX ADMIN — HEPARIN SODIUM 5000 UNIT(S): 5000 INJECTION INTRAVENOUS; SUBCUTANEOUS at 05:17

## 2021-01-01 RX ADMIN — Medication 1 DROP(S): at 17:02

## 2021-01-01 RX ADMIN — Medication 1 DROP(S): at 11:34

## 2021-01-01 RX ADMIN — Medication 1 DROP(S): at 17:38

## 2021-01-01 RX ADMIN — LEVETIRACETAM 400 MILLIGRAM(S): 250 TABLET, FILM COATED ORAL at 05:42

## 2021-01-01 RX ADMIN — Medication 1 APPLICATION(S): at 18:01

## 2021-01-01 RX ADMIN — FENTANYL CITRATE 0.8 MICROGRAM(S)/KG/HR: 50 INJECTION INTRAVENOUS at 06:53

## 2021-01-01 RX ADMIN — CHLORHEXIDINE GLUCONATE 1 APPLICATION(S): 213 SOLUTION TOPICAL at 11:13

## 2021-01-01 RX ADMIN — HUMAN INSULIN 10 UNIT(S): 100 INJECTION, SUSPENSION SUBCUTANEOUS at 00:14

## 2021-01-01 RX ADMIN — CARBIDOPA AND LEVODOPA 2 TABLET(S): 25; 100 TABLET ORAL at 07:01

## 2021-01-01 RX ADMIN — PROPOFOL 14.3 MICROGRAM(S)/KG/MIN: 10 INJECTION, EMULSION INTRAVENOUS at 19:44

## 2021-01-01 RX ADMIN — CARBIDOPA AND LEVODOPA 2 TABLET(S): 25; 100 TABLET ORAL at 07:43

## 2021-01-01 RX ADMIN — PANTOPRAZOLE SODIUM 40 MILLIGRAM(S): 20 TABLET, DELAYED RELEASE ORAL at 12:58

## 2021-01-01 RX ADMIN — POLYETHYLENE GLYCOL 3350 17 GRAM(S): 17 POWDER, FOR SOLUTION ORAL at 11:25

## 2021-01-01 RX ADMIN — INSULIN GLARGINE 40 UNIT(S): 100 INJECTION, SOLUTION SUBCUTANEOUS at 21:52

## 2021-01-01 RX ADMIN — Medication 20 MILLIEQUIVALENT(S): at 12:34

## 2021-01-01 RX ADMIN — FENTANYL CITRATE 50 MICROGRAM(S): 50 INJECTION INTRAVENOUS at 09:01

## 2021-01-01 RX ADMIN — Medication 50 MILLILITER(S): at 06:09

## 2021-01-01 RX ADMIN — HUMAN INSULIN 17 UNIT(S): 100 INJECTION, SUSPENSION SUBCUTANEOUS at 06:23

## 2021-01-01 RX ADMIN — POLYETHYLENE GLYCOL 3350 17 GRAM(S): 17 POWDER, FOR SOLUTION ORAL at 11:19

## 2021-01-01 RX ADMIN — HEPARIN SODIUM 5000 UNIT(S): 5000 INJECTION INTRAVENOUS; SUBCUTANEOUS at 21:37

## 2021-01-01 RX ADMIN — FENTANYL CITRATE 50 MICROGRAM(S): 50 INJECTION INTRAVENOUS at 14:19

## 2021-01-01 RX ADMIN — Medication 1 DROP(S): at 00:47

## 2021-01-01 RX ADMIN — Medication 4: at 11:27

## 2021-01-01 RX ADMIN — AMPICILLIN SODIUM AND SULBACTAM SODIUM 200 GRAM(S): 250; 125 INJECTION, POWDER, FOR SUSPENSION INTRAMUSCULAR; INTRAVENOUS at 12:42

## 2021-01-01 RX ADMIN — SENNA PLUS 10 MILLILITER(S): 8.6 TABLET ORAL at 11:55

## 2021-01-01 RX ADMIN — CARBIDOPA AND LEVODOPA 2 TABLET(S): 25; 100 TABLET ORAL at 08:21

## 2021-01-01 RX ADMIN — Medication 4: at 06:24

## 2021-01-01 RX ADMIN — Medication 2: at 18:38

## 2021-01-01 RX ADMIN — SENNA PLUS 10 MILLILITER(S): 8.6 TABLET ORAL at 11:19

## 2021-01-01 RX ADMIN — REMDESIVIR 500 MILLIGRAM(S): 5 INJECTION INTRAVENOUS at 05:15

## 2021-01-01 RX ADMIN — FENTANYL CITRATE 3.99 MICROGRAM(S)/KG/HR: 50 INJECTION INTRAVENOUS at 21:24

## 2021-01-01 RX ADMIN — Medication 14.9 MICROGRAM(S)/KG/MIN: at 21:20

## 2021-01-01 RX ADMIN — Medication 75 MILLIGRAM(S): at 17:14

## 2021-01-01 RX ADMIN — Medication 1 DROP(S): at 23:37

## 2021-01-01 RX ADMIN — HUMAN INSULIN 5 UNIT(S): 100 INJECTION, SUSPENSION SUBCUTANEOUS at 17:24

## 2021-01-01 RX ADMIN — Medication 25 MILLIGRAM(S): at 17:16

## 2021-01-01 RX ADMIN — MIDAZOLAM HYDROCHLORIDE 4 MILLIGRAM(S): 1 INJECTION, SOLUTION INTRAMUSCULAR; INTRAVENOUS at 23:36

## 2021-01-01 RX ADMIN — PANTOPRAZOLE SODIUM 40 MILLIGRAM(S): 20 TABLET, DELAYED RELEASE ORAL at 11:05

## 2021-01-01 RX ADMIN — Medication 1 APPLICATION(S): at 05:12

## 2021-01-01 RX ADMIN — MEROPENEM 100 MILLIGRAM(S): 1 INJECTION INTRAVENOUS at 06:12

## 2021-01-01 RX ADMIN — HUMAN INSULIN 5 UNIT(S): 100 INJECTION, SUSPENSION SUBCUTANEOUS at 11:38

## 2021-01-01 RX ADMIN — Medication 81 MILLIGRAM(S): at 13:25

## 2021-01-01 RX ADMIN — CHLORHEXIDINE GLUCONATE 1 APPLICATION(S): 213 SOLUTION TOPICAL at 07:21

## 2021-01-01 RX ADMIN — CARBIDOPA AND LEVODOPA 2 TABLET(S): 25; 100 TABLET ORAL at 07:29

## 2021-01-01 RX ADMIN — Medication 1 DROP(S): at 12:13

## 2021-01-01 RX ADMIN — HEPARIN SODIUM 5000 UNIT(S): 5000 INJECTION INTRAVENOUS; SUBCUTANEOUS at 05:16

## 2021-01-01 RX ADMIN — HUMAN INSULIN 4 UNIT(S): 100 INJECTION, SUSPENSION SUBCUTANEOUS at 17:12

## 2021-01-01 RX ADMIN — SENNA PLUS 10 MILLILITER(S): 8.6 TABLET ORAL at 11:26

## 2021-01-01 RX ADMIN — FENTANYL CITRATE 23.9 MICROGRAM(S)/KG/HR: 50 INJECTION INTRAVENOUS at 03:12

## 2021-01-01 RX ADMIN — CARBIDOPA AND LEVODOPA 2 TABLET(S): 25; 100 TABLET ORAL at 20:59

## 2021-01-01 RX ADMIN — Medication 166.67 MILLIGRAM(S): at 18:17

## 2021-01-01 RX ADMIN — Medication 1 DROP(S): at 13:25

## 2021-01-01 RX ADMIN — PROPOFOL 23.9 MICROGRAM(S)/KG/MIN: 10 INJECTION, EMULSION INTRAVENOUS at 05:09

## 2021-01-01 RX ADMIN — HUMAN INSULIN 13 UNIT(S): 100 INJECTION, SUSPENSION SUBCUTANEOUS at 17:02

## 2021-01-01 RX ADMIN — HEPARIN SODIUM 5000 UNIT(S): 5000 INJECTION INTRAVENOUS; SUBCUTANEOUS at 05:39

## 2021-01-01 RX ADMIN — CEFEPIME 100 MILLIGRAM(S): 1 INJECTION, POWDER, FOR SOLUTION INTRAMUSCULAR; INTRAVENOUS at 17:07

## 2021-01-01 RX ADMIN — PIPERACILLIN AND TAZOBACTAM 200 GRAM(S): 4; .5 INJECTION, POWDER, LYOPHILIZED, FOR SOLUTION INTRAVENOUS at 10:24

## 2021-01-01 RX ADMIN — Medication 1 DROP(S): at 12:03

## 2021-01-01 RX ADMIN — HUMAN INSULIN 16 UNIT(S): 100 INJECTION, SUSPENSION SUBCUTANEOUS at 05:50

## 2021-01-01 RX ADMIN — Medication 1 MILLIGRAM(S): at 06:42

## 2021-01-01 RX ADMIN — HEPARIN SODIUM 5000 UNIT(S): 5000 INJECTION INTRAVENOUS; SUBCUTANEOUS at 13:16

## 2021-01-01 RX ADMIN — PHENYLEPHRINE HYDROCHLORIDE 1.49 MICROGRAM(S)/KG/MIN: 10 INJECTION INTRAVENOUS at 12:54

## 2021-01-01 RX ADMIN — MEROPENEM 100 MILLIGRAM(S): 1 INJECTION INTRAVENOUS at 17:43

## 2021-01-01 RX ADMIN — FENTANYL CITRATE 100 MICROGRAM(S): 50 INJECTION INTRAVENOUS at 20:25

## 2021-01-01 RX ADMIN — AMPICILLIN SODIUM AND SULBACTAM SODIUM 200 GRAM(S): 250; 125 INJECTION, POWDER, FOR SUSPENSION INTRAMUSCULAR; INTRAVENOUS at 18:04

## 2021-01-01 RX ADMIN — PANTOPRAZOLE SODIUM 40 MILLIGRAM(S): 20 TABLET, DELAYED RELEASE ORAL at 05:35

## 2021-01-01 RX ADMIN — Medication 650 MILLIGRAM(S): at 20:35

## 2021-01-01 RX ADMIN — METHADONE HYDROCHLORIDE 10 MILLIGRAM(S): 40 TABLET ORAL at 06:27

## 2021-01-01 RX ADMIN — Medication 10: at 23:55

## 2021-01-01 RX ADMIN — Medication 1 DROP(S): at 17:30

## 2021-01-01 RX ADMIN — Medication 1 DROP(S): at 17:59

## 2021-01-01 RX ADMIN — Medication 1 DROP(S): at 01:36

## 2021-01-01 RX ADMIN — Medication 166.67 MILLIGRAM(S): at 06:05

## 2021-01-01 RX ADMIN — Medication 1 DROP(S): at 06:09

## 2021-01-01 RX ADMIN — Medication 40 MILLIEQUIVALENT(S): at 07:55

## 2021-01-01 RX ADMIN — Medication 1 DROP(S): at 18:06

## 2021-01-01 RX ADMIN — PROPOFOL 14.3 MICROGRAM(S)/KG/MIN: 10 INJECTION, EMULSION INTRAVENOUS at 14:15

## 2021-01-01 RX ADMIN — PANTOPRAZOLE SODIUM 40 MILLIGRAM(S): 20 TABLET, DELAYED RELEASE ORAL at 11:12

## 2021-01-01 RX ADMIN — PANTOPRAZOLE SODIUM 40 MILLIGRAM(S): 20 TABLET, DELAYED RELEASE ORAL at 11:34

## 2021-01-01 RX ADMIN — LEVETIRACETAM 400 MILLIGRAM(S): 250 TABLET, FILM COATED ORAL at 17:52

## 2021-01-01 RX ADMIN — Medication 1 DROP(S): at 22:14

## 2021-01-01 RX ADMIN — Medication 1 DROP(S): at 01:22

## 2021-01-01 RX ADMIN — LEVETIRACETAM 400 MILLIGRAM(S): 250 TABLET, FILM COATED ORAL at 18:22

## 2021-01-01 RX ADMIN — HUMAN INSULIN 15 UNIT(S): 100 INJECTION, SUSPENSION SUBCUTANEOUS at 23:57

## 2021-01-01 RX ADMIN — PROPOFOL 14.3 MICROGRAM(S)/KG/MIN: 10 INJECTION, EMULSION INTRAVENOUS at 14:48

## 2021-01-01 RX ADMIN — CHLORHEXIDINE GLUCONATE 1 APPLICATION(S): 213 SOLUTION TOPICAL at 04:33

## 2021-01-01 RX ADMIN — Medication 1 DROP(S): at 05:04

## 2021-01-01 RX ADMIN — HUMAN INSULIN 20 UNIT(S): 100 INJECTION, SUSPENSION SUBCUTANEOUS at 23:56

## 2021-01-01 RX ADMIN — FENTANYL CITRATE 0.8 MICROGRAM(S)/KG/HR: 50 INJECTION INTRAVENOUS at 19:41

## 2021-01-01 RX ADMIN — HUMAN INSULIN 4 UNIT(S): 100 INJECTION, SUSPENSION SUBCUTANEOUS at 00:02

## 2021-01-01 RX ADMIN — Medication 1 DROP(S): at 11:07

## 2021-01-01 RX ADMIN — HUMAN INSULIN 12 UNIT(S): 100 INJECTION, SUSPENSION SUBCUTANEOUS at 14:04

## 2021-01-01 RX ADMIN — MEROPENEM 100 MILLIGRAM(S): 1 INJECTION INTRAVENOUS at 05:39

## 2021-01-01 RX ADMIN — CISATRACURIUM BESYLATE 14.3 MICROGRAM(S)/KG/MIN: 2 INJECTION INTRAVENOUS at 21:27

## 2021-01-01 RX ADMIN — Medication 25 MILLIGRAM(S): at 17:49

## 2021-01-01 RX ADMIN — PANTOPRAZOLE SODIUM 40 MILLIGRAM(S): 20 TABLET, DELAYED RELEASE ORAL at 11:24

## 2021-01-01 RX ADMIN — LEVETIRACETAM 400 MILLIGRAM(S): 250 TABLET, FILM COATED ORAL at 05:11

## 2021-01-01 RX ADMIN — SIMVASTATIN 20 MILLIGRAM(S): 20 TABLET, FILM COATED ORAL at 21:14

## 2021-01-01 RX ADMIN — HUMAN INSULIN 13 UNIT(S): 100 INJECTION, SUSPENSION SUBCUTANEOUS at 17:10

## 2021-01-01 RX ADMIN — HEPARIN SODIUM 5000 UNIT(S): 5000 INJECTION INTRAVENOUS; SUBCUTANEOUS at 06:27

## 2021-01-01 RX ADMIN — HYDROMORPHONE HYDROCHLORIDE 0.5 MILLIGRAM(S): 2 INJECTION INTRAMUSCULAR; INTRAVENOUS; SUBCUTANEOUS at 23:37

## 2021-01-01 RX ADMIN — Medication 25 MILLIGRAM(S): at 05:37

## 2021-01-01 RX ADMIN — HEPARIN SODIUM 5000 UNIT(S): 5000 INJECTION INTRAVENOUS; SUBCUTANEOUS at 22:59

## 2021-01-01 RX ADMIN — Medication 250 MILLIGRAM(S): at 23:57

## 2021-01-01 RX ADMIN — Medication 1 DROP(S): at 12:26

## 2021-01-01 RX ADMIN — Medication 125 MILLILITER(S): at 23:00

## 2021-01-01 RX ADMIN — Medication 1 DROP(S): at 06:12

## 2021-01-01 RX ADMIN — Medication 6: at 22:10

## 2021-01-01 RX ADMIN — FENTANYL CITRATE 0.8 MICROGRAM(S)/KG/HR: 50 INJECTION INTRAVENOUS at 06:43

## 2021-01-01 RX ADMIN — METHADONE HYDROCHLORIDE 10 MILLIGRAM(S): 40 TABLET ORAL at 05:17

## 2021-01-01 RX ADMIN — HYDROMORPHONE HYDROCHLORIDE 1 MILLIGRAM(S): 2 INJECTION INTRAMUSCULAR; INTRAVENOUS; SUBCUTANEOUS at 16:04

## 2021-01-01 RX ADMIN — CARBIDOPA AND LEVODOPA 2 TABLET(S): 25; 100 TABLET ORAL at 20:05

## 2021-01-01 RX ADMIN — HEPARIN SODIUM 5000 UNIT(S): 5000 INJECTION INTRAVENOUS; SUBCUTANEOUS at 13:28

## 2021-01-01 RX ADMIN — FENTANYL CITRATE 0.8 MICROGRAM(S)/KG/HR: 50 INJECTION INTRAVENOUS at 22:11

## 2021-01-01 RX ADMIN — HUMAN INSULIN 5 UNIT(S): 100 INJECTION, SUSPENSION SUBCUTANEOUS at 18:07

## 2021-01-01 RX ADMIN — FENTANYL CITRATE 0.8 MICROGRAM(S)/KG/HR: 50 INJECTION INTRAVENOUS at 21:59

## 2021-01-01 RX ADMIN — POLYETHYLENE GLYCOL 3350 17 GRAM(S): 17 POWDER, FOR SOLUTION ORAL at 11:54

## 2021-01-01 RX ADMIN — Medication 650 MILLIGRAM(S): at 08:21

## 2021-01-01 RX ADMIN — Medication 8 UNIT(S): at 09:19

## 2021-01-01 RX ADMIN — AMPICILLIN SODIUM AND SULBACTAM SODIUM 200 GRAM(S): 250; 125 INJECTION, POWDER, FOR SUSPENSION INTRAMUSCULAR; INTRAVENOUS at 06:01

## 2021-01-01 RX ADMIN — Medication 20 MILLIGRAM(S): at 12:58

## 2021-01-01 RX ADMIN — MEROPENEM 100 MILLIGRAM(S): 1 INJECTION INTRAVENOUS at 05:17

## 2021-01-01 RX ADMIN — Medication 1 DROP(S): at 05:40

## 2021-01-01 RX ADMIN — Medication 1 DROP(S): at 17:45

## 2021-01-01 RX ADMIN — POLYETHYLENE GLYCOL 3350 17 GRAM(S): 17 POWDER, FOR SOLUTION ORAL at 11:30

## 2021-01-01 RX ADMIN — FENTANYL CITRATE 0.8 MICROGRAM(S)/KG/HR: 50 INJECTION INTRAVENOUS at 02:21

## 2021-01-01 RX ADMIN — CARBIDOPA AND LEVODOPA 2 TABLET(S): 25; 100 TABLET ORAL at 21:59

## 2021-01-01 RX ADMIN — Medication 650 MILLIGRAM(S): at 05:24

## 2021-01-01 RX ADMIN — Medication 2: at 05:54

## 2021-01-01 RX ADMIN — SIMVASTATIN 20 MILLIGRAM(S): 20 TABLET, FILM COATED ORAL at 21:31

## 2021-01-01 RX ADMIN — Medication 25 MILLIGRAM(S): at 17:09

## 2021-01-01 RX ADMIN — HEPARIN SODIUM 5000 UNIT(S): 5000 INJECTION INTRAVENOUS; SUBCUTANEOUS at 23:18

## 2021-01-01 RX ADMIN — HEPARIN SODIUM 5000 UNIT(S): 5000 INJECTION INTRAVENOUS; SUBCUTANEOUS at 05:13

## 2021-01-01 RX ADMIN — CEFEPIME 100 MILLIGRAM(S): 1 INJECTION, POWDER, FOR SOLUTION INTRAMUSCULAR; INTRAVENOUS at 05:07

## 2021-01-01 RX ADMIN — POLYETHYLENE GLYCOL 3350 17 GRAM(S): 17 POWDER, FOR SOLUTION ORAL at 11:38

## 2021-01-01 RX ADMIN — LEVETIRACETAM 400 MILLIGRAM(S): 250 TABLET, FILM COATED ORAL at 05:41

## 2021-01-01 RX ADMIN — AMPICILLIN SODIUM AND SULBACTAM SODIUM 200 GRAM(S): 250; 125 INJECTION, POWDER, FOR SUSPENSION INTRAMUSCULAR; INTRAVENOUS at 12:27

## 2021-01-01 RX ADMIN — FENTANYL CITRATE 0.8 MICROGRAM(S)/KG/HR: 50 INJECTION INTRAVENOUS at 23:46

## 2021-01-01 RX ADMIN — FENTANYL CITRATE 23.9 MICROGRAM(S)/KG/HR: 50 INJECTION INTRAVENOUS at 01:59

## 2021-01-01 RX ADMIN — CARBIDOPA AND LEVODOPA 2 TABLET(S): 25; 100 TABLET ORAL at 07:21

## 2021-01-01 RX ADMIN — CARBIDOPA AND LEVODOPA 2 TABLET(S): 25; 100 TABLET ORAL at 20:54

## 2021-01-01 RX ADMIN — MEROPENEM 100 MILLIGRAM(S): 1 INJECTION INTRAVENOUS at 17:33

## 2021-01-01 RX ADMIN — AMPICILLIN SODIUM AND SULBACTAM SODIUM 200 GRAM(S): 250; 125 INJECTION, POWDER, FOR SUSPENSION INTRAMUSCULAR; INTRAVENOUS at 01:27

## 2021-01-01 RX ADMIN — POLYETHYLENE GLYCOL 3350 17 GRAM(S): 17 POWDER, FOR SOLUTION ORAL at 11:40

## 2021-01-01 RX ADMIN — Medication 1 DROP(S): at 11:36

## 2021-01-01 RX ADMIN — Medication 1 DROP(S): at 06:24

## 2021-01-01 RX ADMIN — HUMAN INSULIN 13 UNIT(S): 100 INJECTION, SUSPENSION SUBCUTANEOUS at 17:23

## 2021-01-01 RX ADMIN — POLYETHYLENE GLYCOL 3350 17 GRAM(S): 17 POWDER, FOR SOLUTION ORAL at 11:08

## 2021-01-01 RX ADMIN — PANTOPRAZOLE SODIUM 40 MILLIGRAM(S): 20 TABLET, DELAYED RELEASE ORAL at 12:25

## 2021-01-01 RX ADMIN — HUMAN INSULIN 20 UNIT(S): 100 INJECTION, SUSPENSION SUBCUTANEOUS at 17:38

## 2021-01-01 RX ADMIN — CHLORHEXIDINE GLUCONATE 15 MILLILITER(S): 213 SOLUTION TOPICAL at 17:17

## 2021-01-01 RX ADMIN — HUMAN INSULIN 8 UNIT(S): 100 INJECTION, SUSPENSION SUBCUTANEOUS at 06:14

## 2021-01-01 RX ADMIN — Medication 1 DROP(S): at 00:01

## 2021-01-01 RX ADMIN — HUMAN INSULIN 13 UNIT(S): 100 INJECTION, SUSPENSION SUBCUTANEOUS at 05:38

## 2021-01-01 RX ADMIN — HEPARIN SODIUM 5000 UNIT(S): 5000 INJECTION INTRAVENOUS; SUBCUTANEOUS at 21:41

## 2021-01-01 RX ADMIN — Medication 5 MILLIGRAM(S): at 22:09

## 2021-01-01 RX ADMIN — Medication 7.47 MICROGRAM(S)/KG/MIN: at 04:10

## 2021-01-01 RX ADMIN — IMMUNE GLOBULIN (HUMAN) 41.67 GRAM(S): 10 INJECTION INTRAVENOUS; SUBCUTANEOUS at 15:10

## 2021-01-01 RX ADMIN — CARBIDOPA AND LEVODOPA 2 TABLET(S): 25; 100 TABLET ORAL at 09:30

## 2021-01-01 RX ADMIN — Medication 2: at 12:01

## 2021-01-01 RX ADMIN — Medication 166.67 MILLIGRAM(S): at 17:33

## 2021-01-01 RX ADMIN — CEFTRIAXONE 100 MILLIGRAM(S): 500 INJECTION, POWDER, FOR SOLUTION INTRAMUSCULAR; INTRAVENOUS at 11:33

## 2021-01-01 RX ADMIN — HEPARIN SODIUM 5000 UNIT(S): 5000 INJECTION INTRAVENOUS; SUBCUTANEOUS at 13:40

## 2021-01-01 RX ADMIN — MEROPENEM 100 MILLIGRAM(S): 1 INJECTION INTRAVENOUS at 17:09

## 2021-01-01 RX ADMIN — FENTANYL CITRATE 0.8 MICROGRAM(S)/KG/HR: 50 INJECTION INTRAVENOUS at 07:21

## 2021-01-01 RX ADMIN — HEPARIN SODIUM 5000 UNIT(S): 5000 INJECTION INTRAVENOUS; SUBCUTANEOUS at 05:43

## 2021-01-01 RX ADMIN — CHLORHEXIDINE GLUCONATE 15 MILLILITER(S): 213 SOLUTION TOPICAL at 05:35

## 2021-01-01 RX ADMIN — Medication 5 MILLIGRAM(S): at 11:12

## 2021-01-01 RX ADMIN — HEPARIN SODIUM 5000 UNIT(S): 5000 INJECTION INTRAVENOUS; SUBCUTANEOUS at 13:58

## 2021-01-01 RX ADMIN — Medication 1000 MILLIGRAM(S): at 00:30

## 2021-01-01 RX ADMIN — Medication 1 DROP(S): at 17:08

## 2021-01-01 RX ADMIN — HYDROMORPHONE HYDROCHLORIDE 0.5 MILLIGRAM(S): 2 INJECTION INTRAMUSCULAR; INTRAVENOUS; SUBCUTANEOUS at 00:10

## 2021-01-01 RX ADMIN — Medication 1 DROP(S): at 11:27

## 2021-01-01 RX ADMIN — SODIUM ZIRCONIUM CYCLOSILICATE 5 GRAM(S): 10 POWDER, FOR SUSPENSION ORAL at 05:13

## 2021-01-01 RX ADMIN — Medication 1 DROP(S): at 05:38

## 2021-01-01 RX ADMIN — FENTANYL CITRATE 50 MICROGRAM(S): 50 INJECTION INTRAVENOUS at 14:36

## 2021-01-01 RX ADMIN — Medication 250 MILLIGRAM(S): at 02:26

## 2021-01-01 RX ADMIN — Medication 50 MILLILITER(S): at 06:52

## 2021-01-01 RX ADMIN — CHLORHEXIDINE GLUCONATE 15 MILLILITER(S): 213 SOLUTION TOPICAL at 06:09

## 2021-01-01 RX ADMIN — Medication 1 DROP(S): at 11:33

## 2021-01-01 RX ADMIN — Medication 400 MILLIGRAM(S): at 23:10

## 2021-01-01 RX ADMIN — Medication 81 MILLIGRAM(S): at 12:01

## 2021-01-01 RX ADMIN — HUMAN INSULIN 16 UNIT(S): 100 INJECTION, SUSPENSION SUBCUTANEOUS at 00:30

## 2021-01-01 RX ADMIN — CARBIDOPA AND LEVODOPA 2 TABLET(S): 25; 100 TABLET ORAL at 19:54

## 2021-01-01 RX ADMIN — CHLORHEXIDINE GLUCONATE 1 APPLICATION(S): 213 SOLUTION TOPICAL at 05:16

## 2021-01-01 RX ADMIN — Medication 1 APPLICATION(S): at 18:08

## 2021-01-01 RX ADMIN — CHLORHEXIDINE GLUCONATE 15 MILLILITER(S): 213 SOLUTION TOPICAL at 05:09

## 2021-01-01 RX ADMIN — SENNA PLUS 10 MILLILITER(S): 8.6 TABLET ORAL at 11:40

## 2021-01-01 RX ADMIN — Medication 1 DROP(S): at 11:19

## 2021-01-01 RX ADMIN — Medication 1 APPLICATION(S): at 17:12

## 2021-01-01 RX ADMIN — Medication 1 DROP(S): at 23:59

## 2021-01-01 RX ADMIN — MIDAZOLAM HYDROCHLORIDE 4 MILLIGRAM(S): 1 INJECTION, SOLUTION INTRAMUSCULAR; INTRAVENOUS at 22:51

## 2021-01-01 RX ADMIN — SIMVASTATIN 20 MILLIGRAM(S): 20 TABLET, FILM COATED ORAL at 22:12

## 2021-01-01 RX ADMIN — CARBIDOPA AND LEVODOPA 2 TABLET(S): 25; 100 TABLET ORAL at 23:28

## 2021-01-01 RX ADMIN — Medication 6 UNIT(S): at 13:27

## 2021-01-01 RX ADMIN — Medication 6: at 13:28

## 2021-01-01 RX ADMIN — Medication 1 DROP(S): at 12:25

## 2021-01-01 RX ADMIN — FENTANYL CITRATE 0.8 MICROGRAM(S)/KG/HR: 50 INJECTION INTRAVENOUS at 18:06

## 2021-01-01 RX ADMIN — FENTANYL CITRATE 0.8 MICROGRAM(S)/KG/HR: 50 INJECTION INTRAVENOUS at 02:27

## 2021-01-01 RX ADMIN — Medication 1 DROP(S): at 23:52

## 2021-01-01 RX ADMIN — Medication 10 MILLIGRAM(S): at 22:21

## 2021-01-01 RX ADMIN — Medication 2: at 17:23

## 2021-01-01 RX ADMIN — CARBIDOPA AND LEVODOPA 2 TABLET(S): 25; 100 TABLET ORAL at 21:21

## 2021-01-01 RX ADMIN — Medication 1 DROP(S): at 17:26

## 2021-01-01 RX ADMIN — Medication 650 MILLIGRAM(S): at 08:00

## 2021-01-01 RX ADMIN — CEFEPIME 100 MILLIGRAM(S): 1 INJECTION, POWDER, FOR SOLUTION INTRAMUSCULAR; INTRAVENOUS at 11:40

## 2021-01-01 RX ADMIN — Medication 1 DROP(S): at 17:13

## 2021-01-01 RX ADMIN — CARBIDOPA AND LEVODOPA 2 TABLET(S): 25; 100 TABLET ORAL at 19:21

## 2021-01-01 RX ADMIN — Medication 50 MILLILITER(S): at 05:41

## 2021-01-01 RX ADMIN — SIMVASTATIN 20 MILLIGRAM(S): 20 TABLET, FILM COATED ORAL at 22:52

## 2021-01-01 RX ADMIN — FENTANYL CITRATE 100 MICROGRAM(S): 50 INJECTION INTRAVENOUS at 17:24

## 2021-01-01 RX ADMIN — SENNA PLUS 10 MILLILITER(S): 8.6 TABLET ORAL at 13:08

## 2021-01-01 RX ADMIN — Medication 1 APPLICATION(S): at 18:00

## 2021-01-01 RX ADMIN — FENTANYL CITRATE 0.8 MICROGRAM(S)/KG/HR: 50 INJECTION INTRAVENOUS at 05:14

## 2021-01-01 RX ADMIN — POLYETHYLENE GLYCOL 3350 17 GRAM(S): 17 POWDER, FOR SOLUTION ORAL at 11:04

## 2021-01-01 RX ADMIN — Medication 2: at 17:19

## 2021-01-01 RX ADMIN — MEROPENEM 100 MILLIGRAM(S): 1 INJECTION INTRAVENOUS at 17:03

## 2021-01-01 RX ADMIN — POLYETHYLENE GLYCOL 3350 17 GRAM(S): 17 POWDER, FOR SOLUTION ORAL at 12:04

## 2021-01-01 RX ADMIN — HUMAN INSULIN 10 UNIT(S): 100 INJECTION, SUSPENSION SUBCUTANEOUS at 11:20

## 2021-01-01 RX ADMIN — SENNA PLUS 2 TABLET(S): 8.6 TABLET ORAL at 22:52

## 2021-01-01 RX ADMIN — SODIUM CHLORIDE 75 MILLILITER(S): 9 INJECTION INTRAMUSCULAR; INTRAVENOUS; SUBCUTANEOUS at 15:15

## 2021-01-01 RX ADMIN — Medication 1 APPLICATION(S): at 06:28

## 2021-01-01 RX ADMIN — Medication 1 DROP(S): at 00:55

## 2021-01-01 RX ADMIN — Medication 1 DROP(S): at 17:01

## 2021-01-01 RX ADMIN — SIMVASTATIN 20 MILLIGRAM(S): 20 TABLET, FILM COATED ORAL at 22:10

## 2021-01-01 RX ADMIN — HUMAN INSULIN 10 UNIT(S): 100 INJECTION, SUSPENSION SUBCUTANEOUS at 12:03

## 2021-01-01 RX ADMIN — CHLORHEXIDINE GLUCONATE 1 APPLICATION(S): 213 SOLUTION TOPICAL at 07:00

## 2021-01-01 RX ADMIN — DEXMEDETOMIDINE HYDROCHLORIDE IN 0.9% SODIUM CHLORIDE 1 MICROGRAM(S)/KG/HR: 4 INJECTION INTRAVENOUS at 06:36

## 2021-01-01 RX ADMIN — REMDESIVIR 500 MILLIGRAM(S): 5 INJECTION INTRAVENOUS at 05:18

## 2021-01-01 RX ADMIN — Medication 1 DROP(S): at 17:12

## 2021-01-01 RX ADMIN — ENOXAPARIN SODIUM 80 MILLIGRAM(S): 100 INJECTION SUBCUTANEOUS at 19:54

## 2021-01-01 RX ADMIN — CARBIDOPA AND LEVODOPA 2 TABLET(S): 25; 100 TABLET ORAL at 07:47

## 2021-01-01 RX ADMIN — SODIUM ZIRCONIUM CYCLOSILICATE 10 GRAM(S): 10 POWDER, FOR SUSPENSION ORAL at 06:14

## 2021-01-01 RX ADMIN — Medication 1 APPLICATION(S): at 06:09

## 2021-01-01 RX ADMIN — Medication 1 DROP(S): at 00:53

## 2021-01-01 RX ADMIN — Medication 20 MILLIEQUIVALENT(S): at 17:18

## 2021-01-01 RX ADMIN — Medication 250 MILLIGRAM(S): at 05:38

## 2021-01-01 RX ADMIN — POLYETHYLENE GLYCOL 3350 17 GRAM(S): 17 POWDER, FOR SOLUTION ORAL at 11:31

## 2021-01-01 RX ADMIN — Medication 1 APPLICATION(S): at 06:14

## 2021-01-01 RX ADMIN — Medication 81 MILLIGRAM(S): at 11:05

## 2021-01-01 RX ADMIN — FENTANYL CITRATE 0.8 MICROGRAM(S)/KG/HR: 50 INJECTION INTRAVENOUS at 09:12

## 2021-01-01 RX ADMIN — CHLORHEXIDINE GLUCONATE 1 APPLICATION(S): 213 SOLUTION TOPICAL at 06:25

## 2021-01-01 RX ADMIN — LEVETIRACETAM 400 MILLIGRAM(S): 250 TABLET, FILM COATED ORAL at 17:40

## 2021-01-01 RX ADMIN — MIDAZOLAM HYDROCHLORIDE 1.59 MG/KG/HR: 1 INJECTION, SOLUTION INTRAMUSCULAR; INTRAVENOUS at 22:51

## 2021-01-01 RX ADMIN — HEPARIN SODIUM 5000 UNIT(S): 5000 INJECTION INTRAVENOUS; SUBCUTANEOUS at 13:23

## 2021-01-01 RX ADMIN — CHLORHEXIDINE GLUCONATE 1 APPLICATION(S): 213 SOLUTION TOPICAL at 06:01

## 2021-01-01 RX ADMIN — CARBIDOPA AND LEVODOPA 2 TABLET(S): 25; 100 TABLET ORAL at 07:39

## 2021-01-01 RX ADMIN — Medication 1 DROP(S): at 05:18

## 2021-01-01 RX ADMIN — Medication 650 MILLIGRAM(S): at 11:30

## 2021-01-01 RX ADMIN — Medication 1 DROP(S): at 00:32

## 2021-01-01 RX ADMIN — PANTOPRAZOLE SODIUM 40 MILLIGRAM(S): 20 TABLET, DELAYED RELEASE ORAL at 11:30

## 2021-01-01 RX ADMIN — LEVETIRACETAM 400 MILLIGRAM(S): 250 TABLET, FILM COATED ORAL at 17:34

## 2021-01-01 RX ADMIN — FENTANYL CITRATE 0.8 MICROGRAM(S)/KG/HR: 50 INJECTION INTRAVENOUS at 10:44

## 2021-01-01 RX ADMIN — Medication 1 DROP(S): at 17:20

## 2021-01-01 RX ADMIN — Medication 1 DROP(S): at 06:28

## 2021-01-01 RX ADMIN — Medication 0: at 05:16

## 2021-01-01 RX ADMIN — POLYETHYLENE GLYCOL 3350 17 GRAM(S): 17 POWDER, FOR SOLUTION ORAL at 06:34

## 2021-01-01 RX ADMIN — HUMAN INSULIN 10 UNIT(S): 100 INJECTION, SUSPENSION SUBCUTANEOUS at 17:19

## 2021-01-01 RX ADMIN — Medication 6: at 17:54

## 2021-01-01 RX ADMIN — CEFEPIME 100 MILLIGRAM(S): 1 INJECTION, POWDER, FOR SOLUTION INTRAMUSCULAR; INTRAVENOUS at 17:01

## 2021-01-01 RX ADMIN — KETAMINE HYDROCHLORIDE 1.99 MG/KG/HR: 100 INJECTION INTRAMUSCULAR; INTRAVENOUS at 01:49

## 2021-01-01 RX ADMIN — KETAMINE HYDROCHLORIDE 1.99 MG/KG/HR: 100 INJECTION INTRAMUSCULAR; INTRAVENOUS at 22:11

## 2021-01-01 RX ADMIN — PANTOPRAZOLE SODIUM 40 MILLIGRAM(S): 20 TABLET, DELAYED RELEASE ORAL at 12:05

## 2021-01-01 RX ADMIN — HUMAN INSULIN 12 UNIT(S): 100 INJECTION, SUSPENSION SUBCUTANEOUS at 17:23

## 2021-01-01 RX ADMIN — CARBIDOPA AND LEVODOPA 2 TABLET(S): 25; 100 TABLET ORAL at 07:13

## 2021-01-01 RX ADMIN — HUMAN INSULIN 13 UNIT(S): 100 INJECTION, SUSPENSION SUBCUTANEOUS at 06:43

## 2021-01-01 RX ADMIN — Medication 1 DROP(S): at 17:53

## 2021-01-01 RX ADMIN — Medication 1 DROP(S): at 05:17

## 2021-01-01 RX ADMIN — HEPARIN SODIUM 5000 UNIT(S): 5000 INJECTION INTRAVENOUS; SUBCUTANEOUS at 13:33

## 2021-01-01 RX ADMIN — CHLORHEXIDINE GLUCONATE 1 APPLICATION(S): 213 SOLUTION TOPICAL at 07:37

## 2021-01-01 RX ADMIN — Medication 81 MILLIGRAM(S): at 11:38

## 2021-01-01 RX ADMIN — FENTANYL CITRATE 50 MICROGRAM(S): 50 INJECTION INTRAVENOUS at 12:46

## 2021-01-01 RX ADMIN — Medication 1 DROP(S): at 05:37

## 2021-01-01 RX ADMIN — Medication 250 MILLIGRAM(S): at 16:53

## 2021-01-01 RX ADMIN — FENTANYL CITRATE 0.8 MICROGRAM(S)/KG/HR: 50 INJECTION INTRAVENOUS at 05:35

## 2021-01-01 RX ADMIN — Medication 1 APPLICATION(S): at 05:17

## 2021-01-01 RX ADMIN — CARBIDOPA AND LEVODOPA 2 TABLET(S): 25; 100 TABLET ORAL at 22:15

## 2021-01-01 RX ADMIN — Medication 1 APPLICATION(S): at 17:33

## 2021-01-01 RX ADMIN — HUMAN INSULIN 13 UNIT(S): 100 INJECTION, SUSPENSION SUBCUTANEOUS at 17:52

## 2021-01-01 RX ADMIN — Medication 25 MILLIGRAM(S): at 17:00

## 2021-01-01 RX ADMIN — CARBIDOPA AND LEVODOPA 2 TABLET(S): 25; 100 TABLET ORAL at 20:16

## 2021-01-01 RX ADMIN — SIMVASTATIN 20 MILLIGRAM(S): 20 TABLET, FILM COATED ORAL at 22:09

## 2021-01-01 RX ADMIN — METHADONE HYDROCHLORIDE 10 MILLIGRAM(S): 40 TABLET ORAL at 21:10

## 2021-01-01 RX ADMIN — CARBIDOPA AND LEVODOPA 2 TABLET(S): 25; 100 TABLET ORAL at 21:12

## 2021-01-01 RX ADMIN — Medication 1 DROP(S): at 11:12

## 2021-01-01 RX ADMIN — CARBIDOPA AND LEVODOPA 2 TABLET(S): 25; 100 TABLET ORAL at 21:23

## 2021-01-01 RX ADMIN — Medication 2: at 05:07

## 2021-01-01 RX ADMIN — HUMAN INSULIN 10 UNIT(S): 100 INJECTION, SUSPENSION SUBCUTANEOUS at 17:02

## 2021-01-01 RX ADMIN — MORPHINE SULFATE 2 MILLIGRAM(S): 50 CAPSULE, EXTENDED RELEASE ORAL at 18:10

## 2021-01-01 RX ADMIN — HUMAN INSULIN 10 UNIT(S): 100 INJECTION, SUSPENSION SUBCUTANEOUS at 17:46

## 2021-01-01 RX ADMIN — POLYETHYLENE GLYCOL 3350 17 GRAM(S): 17 POWDER, FOR SOLUTION ORAL at 11:05

## 2021-01-01 RX ADMIN — FENTANYL CITRATE 50 MICROGRAM(S): 50 INJECTION INTRAVENOUS at 05:25

## 2021-01-01 RX ADMIN — DEXMEDETOMIDINE HYDROCHLORIDE IN 0.9% SODIUM CHLORIDE 3.99 MICROGRAM(S)/KG/HR: 4 INJECTION INTRAVENOUS at 07:21

## 2021-01-01 RX ADMIN — PHENYLEPHRINE HYDROCHLORIDE 1.49 MICROGRAM(S)/KG/MIN: 10 INJECTION INTRAVENOUS at 08:23

## 2021-01-01 RX ADMIN — Medication 81 MILLIGRAM(S): at 11:34

## 2021-01-01 RX ADMIN — Medication 1 DROP(S): at 05:35

## 2021-01-01 RX ADMIN — PROPOFOL 50 MILLIGRAM(S): 10 INJECTION, EMULSION INTRAVENOUS at 16:05

## 2021-01-01 RX ADMIN — HUMAN INSULIN 20 UNIT(S): 100 INJECTION, SUSPENSION SUBCUTANEOUS at 06:25

## 2021-01-01 RX ADMIN — HEPARIN SODIUM 5000 UNIT(S): 5000 INJECTION INTRAVENOUS; SUBCUTANEOUS at 21:00

## 2021-01-01 RX ADMIN — DEXMEDETOMIDINE HYDROCHLORIDE IN 0.9% SODIUM CHLORIDE 1 MICROGRAM(S)/KG/HR: 4 INJECTION INTRAVENOUS at 22:35

## 2021-01-01 RX ADMIN — HUMAN INSULIN 17 UNIT(S): 100 INJECTION, SUSPENSION SUBCUTANEOUS at 17:13

## 2021-01-01 RX ADMIN — Medication 2: at 00:03

## 2021-01-01 RX ADMIN — CARBIDOPA AND LEVODOPA 2 TABLET(S): 25; 100 TABLET ORAL at 00:33

## 2021-01-01 RX ADMIN — Medication 6: at 11:43

## 2021-01-01 RX ADMIN — FENTANYL CITRATE 0.8 MICROGRAM(S)/KG/HR: 50 INJECTION INTRAVENOUS at 23:56

## 2021-01-01 RX ADMIN — Medication 40 MILLIGRAM(S): at 16:58

## 2021-01-01 RX ADMIN — Medication 1 DROP(S): at 18:00

## 2021-01-01 RX ADMIN — Medication 1 DROP(S): at 13:29

## 2021-01-01 RX ADMIN — Medication 4: at 23:35

## 2021-01-01 RX ADMIN — LEVETIRACETAM 400 MILLIGRAM(S): 250 TABLET, FILM COATED ORAL at 05:49

## 2021-01-01 RX ADMIN — AMPICILLIN SODIUM AND SULBACTAM SODIUM 100 GRAM(S): 250; 125 INJECTION, POWDER, FOR SUSPENSION INTRAMUSCULAR; INTRAVENOUS at 19:42

## 2021-01-01 RX ADMIN — POLYETHYLENE GLYCOL 3350 17 GRAM(S): 17 POWDER, FOR SOLUTION ORAL at 18:49

## 2021-01-01 RX ADMIN — CHLORHEXIDINE GLUCONATE 15 MILLILITER(S): 213 SOLUTION TOPICAL at 06:24

## 2021-01-01 RX ADMIN — Medication 6: at 11:17

## 2021-01-01 RX ADMIN — Medication 50 MILLIGRAM(S): at 06:12

## 2021-01-01 RX ADMIN — CHLORHEXIDINE GLUCONATE 15 MILLILITER(S): 213 SOLUTION TOPICAL at 06:10

## 2021-01-01 RX ADMIN — Medication 6 MILLIGRAM(S): at 05:53

## 2021-01-01 RX ADMIN — CHLORHEXIDINE GLUCONATE 15 MILLILITER(S): 213 SOLUTION TOPICAL at 17:25

## 2021-01-01 RX ADMIN — Medication 1 DROP(S): at 11:06

## 2021-01-01 RX ADMIN — Medication 1 APPLICATION(S): at 05:52

## 2021-01-01 RX ADMIN — Medication 0: at 21:52

## 2021-01-01 RX ADMIN — Medication 1 DROP(S): at 23:19

## 2021-01-01 RX ADMIN — Medication 6 MILLIGRAM(S): at 06:49

## 2021-01-01 RX ADMIN — Medication 1300 MILLIGRAM(S): at 06:16

## 2021-01-01 RX ADMIN — Medication 2: at 23:26

## 2021-01-01 RX ADMIN — Medication 5 MILLILITER(S): at 08:00

## 2021-01-01 RX ADMIN — PANTOPRAZOLE SODIUM 40 MILLIGRAM(S): 20 TABLET, DELAYED RELEASE ORAL at 05:39

## 2021-01-01 RX ADMIN — Medication 1 DROP(S): at 13:08

## 2021-01-01 RX ADMIN — Medication 1 DROP(S): at 23:58

## 2021-01-01 RX ADMIN — HEPARIN SODIUM 5000 UNIT(S): 5000 INJECTION INTRAVENOUS; SUBCUTANEOUS at 21:59

## 2021-01-01 RX ADMIN — POLYETHYLENE GLYCOL 3350 17 GRAM(S): 17 POWDER, FOR SOLUTION ORAL at 12:17

## 2021-01-01 RX ADMIN — Medication 1 DROP(S): at 23:57

## 2021-01-01 RX ADMIN — Medication 50 MILLIGRAM(S): at 16:37

## 2021-01-01 RX ADMIN — Medication 4: at 05:27

## 2021-01-01 RX ADMIN — Medication 100 MILLIEQUIVALENT(S): at 17:01

## 2021-01-01 RX ADMIN — CHLORHEXIDINE GLUCONATE 1 APPLICATION(S): 213 SOLUTION TOPICAL at 06:11

## 2021-01-01 RX ADMIN — Medication 1 APPLICATION(S): at 05:53

## 2021-01-01 RX ADMIN — FENTANYL CITRATE 50 MICROGRAM(S): 50 INJECTION INTRAVENOUS at 19:41

## 2021-01-01 RX ADMIN — Medication 2: at 05:51

## 2021-01-01 RX ADMIN — HEPARIN SODIUM 5000 UNIT(S): 5000 INJECTION INTRAVENOUS; SUBCUTANEOUS at 22:08

## 2021-01-01 RX ADMIN — Medication 1 APPLICATION(S): at 05:39

## 2021-01-01 RX ADMIN — Medication 50 MILLILITER(S): at 17:36

## 2021-01-01 RX ADMIN — SENNA PLUS 10 MILLILITER(S): 8.6 TABLET ORAL at 11:25

## 2021-01-01 RX ADMIN — Medication 75 MEQ/KG/HR: at 21:44

## 2021-01-01 RX ADMIN — CHLORHEXIDINE GLUCONATE 15 MILLILITER(S): 213 SOLUTION TOPICAL at 17:45

## 2021-01-01 RX ADMIN — HUMAN INSULIN 5 UNIT(S): 100 INJECTION, SUSPENSION SUBCUTANEOUS at 00:46

## 2021-01-01 RX ADMIN — Medication 1 DROP(S): at 17:40

## 2021-01-01 RX ADMIN — FENTANYL CITRATE 100 MICROGRAM(S): 50 INJECTION INTRAVENOUS at 23:01

## 2021-01-01 RX ADMIN — HEPARIN SODIUM 5000 UNIT(S): 5000 INJECTION INTRAVENOUS; SUBCUTANEOUS at 21:24

## 2021-01-01 RX ADMIN — Medication 50 MILLIGRAM(S): at 22:36

## 2021-01-01 RX ADMIN — Medication 1 DROP(S): at 18:16

## 2021-01-01 RX ADMIN — HEPARIN SODIUM 5000 UNIT(S): 5000 INJECTION INTRAVENOUS; SUBCUTANEOUS at 13:10

## 2021-01-01 RX ADMIN — HUMAN INSULIN 16 UNIT(S): 100 INJECTION, SUSPENSION SUBCUTANEOUS at 18:39

## 2021-01-01 RX ADMIN — Medication 25 MILLIGRAM(S): at 06:07

## 2021-01-01 RX ADMIN — SIMVASTATIN 20 MILLIGRAM(S): 20 TABLET, FILM COATED ORAL at 21:22

## 2021-01-01 RX ADMIN — REMDESIVIR 500 MILLIGRAM(S): 5 INJECTION INTRAVENOUS at 06:49

## 2021-01-01 RX ADMIN — MIDAZOLAM HYDROCHLORIDE 2 MILLIGRAM(S): 1 INJECTION, SOLUTION INTRAMUSCULAR; INTRAVENOUS at 16:05

## 2021-01-01 RX ADMIN — REMDESIVIR 500 MILLIGRAM(S): 5 INJECTION INTRAVENOUS at 05:19

## 2021-01-01 RX ADMIN — PANTOPRAZOLE SODIUM 40 MILLIGRAM(S): 20 TABLET, DELAYED RELEASE ORAL at 12:00

## 2021-01-01 RX ADMIN — HUMAN INSULIN 10 UNIT(S): 100 INJECTION, SUSPENSION SUBCUTANEOUS at 11:29

## 2021-01-01 RX ADMIN — Medication 650 MILLIGRAM(S): at 07:19

## 2021-01-01 RX ADMIN — Medication 5 MILLIGRAM(S): at 05:15

## 2021-01-01 RX ADMIN — Medication 1 DROP(S): at 23:38

## 2021-01-01 RX ADMIN — HUMAN INSULIN 10 UNIT(S): 100 INJECTION, SUSPENSION SUBCUTANEOUS at 11:34

## 2021-01-01 RX ADMIN — PANTOPRAZOLE SODIUM 40 MILLIGRAM(S): 20 TABLET, DELAYED RELEASE ORAL at 05:10

## 2021-01-01 RX ADMIN — HUMAN INSULIN 5 UNIT(S): 100 INJECTION, SUSPENSION SUBCUTANEOUS at 05:46

## 2021-01-01 RX ADMIN — PROPOFOL 12 MICROGRAM(S)/KG/MIN: 10 INJECTION, EMULSION INTRAVENOUS at 07:44

## 2021-01-01 RX ADMIN — HUMAN INSULIN 10 UNIT(S): 100 INJECTION, SUSPENSION SUBCUTANEOUS at 05:07

## 2021-01-01 RX ADMIN — Medication 1300 MILLIGRAM(S): at 05:12

## 2021-01-01 RX ADMIN — Medication 1 APPLICATION(S): at 17:04

## 2021-01-01 RX ADMIN — Medication 1 DROP(S): at 11:05

## 2021-01-01 RX ADMIN — CARBIDOPA AND LEVODOPA 2 TABLET(S): 25; 100 TABLET ORAL at 20:19

## 2021-01-01 RX ADMIN — CHLORHEXIDINE GLUCONATE 15 MILLILITER(S): 213 SOLUTION TOPICAL at 05:15

## 2021-01-01 RX ADMIN — Medication 2: at 05:52

## 2021-01-01 RX ADMIN — Medication 3: at 18:56

## 2021-01-01 RX ADMIN — HEPARIN SODIUM 5000 UNIT(S): 5000 INJECTION INTRAVENOUS; SUBCUTANEOUS at 15:42

## 2021-01-01 RX ADMIN — Medication 1 DROP(S): at 05:42

## 2021-01-01 RX ADMIN — HUMAN INSULIN 5 UNIT(S): 100 INJECTION, SUSPENSION SUBCUTANEOUS at 11:36

## 2021-01-01 RX ADMIN — Medication 1 DROP(S): at 17:05

## 2021-01-01 RX ADMIN — HEPARIN SODIUM 5000 UNIT(S): 5000 INJECTION INTRAVENOUS; SUBCUTANEOUS at 06:24

## 2021-01-01 RX ADMIN — HUMAN INSULIN 10 UNIT(S): 100 INJECTION, SUSPENSION SUBCUTANEOUS at 01:28

## 2021-01-01 RX ADMIN — CHLORHEXIDINE GLUCONATE 15 MILLILITER(S): 213 SOLUTION TOPICAL at 05:32

## 2021-01-01 RX ADMIN — PHENYLEPHRINE HYDROCHLORIDE 1.49 MICROGRAM(S)/KG/MIN: 10 INJECTION INTRAVENOUS at 21:42

## 2021-01-01 RX ADMIN — Medication 1 APPLICATION(S): at 05:45

## 2021-01-01 RX ADMIN — HEPARIN SODIUM 5000 UNIT(S): 5000 INJECTION INTRAVENOUS; SUBCUTANEOUS at 13:11

## 2021-01-01 RX ADMIN — HEPARIN SODIUM 5000 UNIT(S): 5000 INJECTION INTRAVENOUS; SUBCUTANEOUS at 05:28

## 2021-01-01 RX ADMIN — Medication 650 MILLIGRAM(S): at 06:46

## 2021-01-01 RX ADMIN — CHLORHEXIDINE GLUCONATE 1 APPLICATION(S): 213 SOLUTION TOPICAL at 07:43

## 2021-01-01 RX ADMIN — CHLORHEXIDINE GLUCONATE 15 MILLILITER(S): 213 SOLUTION TOPICAL at 05:39

## 2021-01-01 RX ADMIN — CHLORHEXIDINE GLUCONATE 15 MILLILITER(S): 213 SOLUTION TOPICAL at 17:46

## 2021-01-01 RX ADMIN — HUMAN INSULIN 15 UNIT(S): 100 INJECTION, SUSPENSION SUBCUTANEOUS at 06:15

## 2021-01-01 RX ADMIN — Medication 1300 MILLIGRAM(S): at 17:17

## 2021-01-01 RX ADMIN — Medication 50 MILLILITER(S): at 13:02

## 2021-01-01 RX ADMIN — Medication 10: at 23:35

## 2021-01-01 RX ADMIN — LEVETIRACETAM 400 MILLIGRAM(S): 250 TABLET, FILM COATED ORAL at 17:09

## 2021-01-01 RX ADMIN — PANTOPRAZOLE SODIUM 40 MILLIGRAM(S): 20 TABLET, DELAYED RELEASE ORAL at 11:37

## 2021-01-01 RX ADMIN — FENTANYL CITRATE 0.8 MICROGRAM(S)/KG/HR: 50 INJECTION INTRAVENOUS at 20:33

## 2021-01-01 RX ADMIN — DEXMEDETOMIDINE HYDROCHLORIDE IN 0.9% SODIUM CHLORIDE 1 MICROGRAM(S)/KG/HR: 4 INJECTION INTRAVENOUS at 22:10

## 2021-01-01 RX ADMIN — HEPARIN SODIUM 5000 UNIT(S): 5000 INJECTION INTRAVENOUS; SUBCUTANEOUS at 14:34

## 2021-01-01 RX ADMIN — ENOXAPARIN SODIUM 40 MILLIGRAM(S): 100 INJECTION SUBCUTANEOUS at 13:27

## 2021-01-01 RX ADMIN — HUMAN INSULIN 13 UNIT(S): 100 INJECTION, SUSPENSION SUBCUTANEOUS at 11:29

## 2021-01-01 RX ADMIN — FENTANYL CITRATE 50 MICROGRAM(S): 50 INJECTION INTRAVENOUS at 19:11

## 2021-01-01 RX ADMIN — CARBIDOPA AND LEVODOPA 2 TABLET(S): 25; 100 TABLET ORAL at 21:46

## 2021-01-01 RX ADMIN — ENOXAPARIN SODIUM 80 MILLIGRAM(S): 100 INJECTION SUBCUTANEOUS at 15:32

## 2021-01-01 RX ADMIN — Medication 3 MILLIGRAM(S): at 22:53

## 2021-01-01 RX ADMIN — PROPOFOL 12 MICROGRAM(S)/KG/MIN: 10 INJECTION, EMULSION INTRAVENOUS at 20:08

## 2021-01-01 RX ADMIN — CHLORHEXIDINE GLUCONATE 1 APPLICATION(S): 213 SOLUTION TOPICAL at 06:07

## 2021-01-01 RX ADMIN — HUMAN INSULIN 15 UNIT(S): 100 INJECTION, SUSPENSION SUBCUTANEOUS at 11:24

## 2021-01-01 RX ADMIN — FENTANYL CITRATE 0.8 MICROGRAM(S)/KG/HR: 50 INJECTION INTRAVENOUS at 02:15

## 2021-01-01 RX ADMIN — HUMAN INSULIN 4 UNIT(S): 100 INJECTION, SUSPENSION SUBCUTANEOUS at 11:20

## 2021-01-01 RX ADMIN — Medication 650 MILLIGRAM(S): at 17:00

## 2021-01-01 RX ADMIN — Medication 2: at 11:07

## 2021-01-01 RX ADMIN — CARBIDOPA AND LEVODOPA 2 TABLET(S): 25; 100 TABLET ORAL at 21:28

## 2021-01-01 RX ADMIN — Medication 81 MILLIGRAM(S): at 14:11

## 2021-01-01 RX ADMIN — CARBIDOPA AND LEVODOPA 2 TABLET(S): 25; 100 TABLET ORAL at 08:43

## 2021-01-01 RX ADMIN — Medication 250 MILLIGRAM(S): at 20:40

## 2021-01-01 RX ADMIN — FENTANYL CITRATE 50 MICROGRAM(S): 50 INJECTION INTRAVENOUS at 11:41

## 2021-01-01 RX ADMIN — DEXMEDETOMIDINE HYDROCHLORIDE IN 0.9% SODIUM CHLORIDE 1 MICROGRAM(S)/KG/HR: 4 INJECTION INTRAVENOUS at 06:10

## 2021-01-01 RX ADMIN — HUMAN INSULIN 13 UNIT(S): 100 INJECTION, SUSPENSION SUBCUTANEOUS at 00:57

## 2021-01-01 RX ADMIN — SIMVASTATIN 20 MILLIGRAM(S): 20 TABLET, FILM COATED ORAL at 21:00

## 2021-01-01 RX ADMIN — CHLORHEXIDINE GLUCONATE 15 MILLILITER(S): 213 SOLUTION TOPICAL at 18:32

## 2021-01-01 RX ADMIN — Medication 3 UNIT(S): at 18:14

## 2021-01-01 RX ADMIN — Medication 25 MILLIGRAM(S): at 06:34

## 2021-01-01 RX ADMIN — Medication 1300 MILLIGRAM(S): at 17:15

## 2021-01-01 RX ADMIN — METHADONE HYDROCHLORIDE 10 MILLIGRAM(S): 40 TABLET ORAL at 14:55

## 2021-01-01 RX ADMIN — Medication 50 MILLIGRAM(S): at 22:16

## 2021-01-01 RX ADMIN — CARBIDOPA AND LEVODOPA 2 TABLET(S): 25; 100 TABLET ORAL at 10:02

## 2021-01-01 RX ADMIN — Medication 1 DROP(S): at 05:16

## 2021-01-01 RX ADMIN — Medication 1 DROP(S): at 06:25

## 2021-01-01 RX ADMIN — Medication 1 DROP(S): at 00:08

## 2021-01-01 RX ADMIN — CHLORHEXIDINE GLUCONATE 15 MILLILITER(S): 213 SOLUTION TOPICAL at 17:18

## 2021-01-01 RX ADMIN — Medication 1 DROP(S): at 12:35

## 2021-01-01 RX ADMIN — Medication 1 DROP(S): at 18:52

## 2021-01-01 RX ADMIN — HEPARIN SODIUM 5000 UNIT(S): 5000 INJECTION INTRAVENOUS; SUBCUTANEOUS at 05:37

## 2021-01-01 RX ADMIN — AMPICILLIN SODIUM AND SULBACTAM SODIUM 200 GRAM(S): 250; 125 INJECTION, POWDER, FOR SUSPENSION INTRAMUSCULAR; INTRAVENOUS at 06:07

## 2021-01-01 RX ADMIN — FENTANYL CITRATE 100 MICROGRAM(S): 50 INJECTION INTRAVENOUS at 15:13

## 2021-01-01 RX ADMIN — HUMAN INSULIN 4 UNIT(S): 100 INJECTION, SUSPENSION SUBCUTANEOUS at 18:11

## 2021-01-01 RX ADMIN — Medication 6 MILLIGRAM(S): at 06:33

## 2021-01-01 RX ADMIN — Medication 3 UNIT(S): at 13:38

## 2021-01-01 RX ADMIN — Medication 25 MILLIGRAM(S): at 21:01

## 2021-01-01 RX ADMIN — MIDAZOLAM HYDROCHLORIDE 4 MILLIGRAM(S): 1 INJECTION, SOLUTION INTRAMUSCULAR; INTRAVENOUS at 11:03

## 2021-01-01 RX ADMIN — PROPOFOL 12 MICROGRAM(S)/KG/MIN: 10 INJECTION, EMULSION INTRAVENOUS at 01:13

## 2021-01-01 RX ADMIN — ENOXAPARIN SODIUM 40 MILLIGRAM(S): 100 INJECTION SUBCUTANEOUS at 12:01

## 2021-01-01 RX ADMIN — HEPARIN SODIUM 5000 UNIT(S): 5000 INJECTION INTRAVENOUS; SUBCUTANEOUS at 13:30

## 2021-01-01 RX ADMIN — REMDESIVIR 500 MILLIGRAM(S): 5 INJECTION INTRAVENOUS at 20:59

## 2021-01-01 RX ADMIN — Medication 1000 MILLIGRAM(S): at 23:02

## 2021-01-01 RX ADMIN — Medication 4: at 17:02

## 2021-01-01 RX ADMIN — FENTANYL CITRATE 50 MICROGRAM(S): 50 INJECTION INTRAVENOUS at 08:17

## 2021-01-01 RX ADMIN — HEPARIN SODIUM 5000 UNIT(S): 5000 INJECTION INTRAVENOUS; SUBCUTANEOUS at 06:34

## 2021-01-01 RX ADMIN — Medication 10: at 17:13

## 2021-01-01 RX ADMIN — Medication 1 MILLIGRAM(S): at 09:10

## 2021-01-01 RX ADMIN — Medication 6: at 17:45

## 2021-01-01 RX ADMIN — Medication 6 MILLIGRAM(S): at 08:22

## 2021-01-01 RX ADMIN — Medication 400 MILLIGRAM(S): at 23:48

## 2021-01-01 RX ADMIN — DEXMEDETOMIDINE HYDROCHLORIDE IN 0.9% SODIUM CHLORIDE 1 MICROGRAM(S)/KG/HR: 4 INJECTION INTRAVENOUS at 16:58

## 2021-01-01 RX ADMIN — FENTANYL CITRATE 0.8 MICROGRAM(S)/KG/HR: 50 INJECTION INTRAVENOUS at 17:06

## 2021-01-01 RX ADMIN — CARBIDOPA AND LEVODOPA 2 TABLET(S): 25; 100 TABLET ORAL at 21:57

## 2021-01-01 RX ADMIN — Medication 1 DROP(S): at 17:32

## 2021-01-01 RX ADMIN — Medication 6: at 17:24

## 2021-01-01 RX ADMIN — Medication 81 MILLIGRAM(S): at 12:24

## 2021-01-01 RX ADMIN — PHENYLEPHRINE HYDROCHLORIDE 1.49 MICROGRAM(S)/KG/MIN: 10 INJECTION INTRAVENOUS at 07:45

## 2021-01-01 RX ADMIN — Medication 6 MILLIGRAM(S): at 09:11

## 2021-01-01 RX ADMIN — INSULIN HUMAN 8 UNIT(S)/HR: 100 INJECTION, SOLUTION SUBCUTANEOUS at 06:11

## 2021-01-01 RX ADMIN — MIDAZOLAM HYDROCHLORIDE 4 MILLIGRAM(S): 1 INJECTION, SOLUTION INTRAMUSCULAR; INTRAVENOUS at 19:24

## 2021-01-01 RX ADMIN — PANTOPRAZOLE SODIUM 40 MILLIGRAM(S): 20 TABLET, DELAYED RELEASE ORAL at 18:58

## 2021-01-01 RX ADMIN — POLYETHYLENE GLYCOL 3350 17 GRAM(S): 17 POWDER, FOR SOLUTION ORAL at 11:06

## 2021-01-01 RX ADMIN — PROPOFOL 14.3 MICROGRAM(S)/KG/MIN: 10 INJECTION, EMULSION INTRAVENOUS at 09:10

## 2021-01-01 RX ADMIN — CARBIDOPA AND LEVODOPA 2 TABLET(S): 25; 100 TABLET ORAL at 08:57

## 2021-01-01 RX ADMIN — LEVETIRACETAM 400 MILLIGRAM(S): 250 TABLET, FILM COATED ORAL at 06:20

## 2021-01-01 RX ADMIN — Medication 250 MILLIGRAM(S): at 17:19

## 2021-01-01 RX ADMIN — HEPARIN SODIUM 5000 UNIT(S): 5000 INJECTION INTRAVENOUS; SUBCUTANEOUS at 22:35

## 2021-01-01 RX ADMIN — HEPARIN SODIUM 5000 UNIT(S): 5000 INJECTION INTRAVENOUS; SUBCUTANEOUS at 05:08

## 2021-01-01 RX ADMIN — PROPOFOL 14.3 MICROGRAM(S)/KG/MIN: 10 INJECTION, EMULSION INTRAVENOUS at 12:28

## 2021-01-01 RX ADMIN — IMMUNE GLOBULIN (HUMAN) 41.67 GRAM(S): 10 INJECTION INTRAVENOUS; SUBCUTANEOUS at 14:08

## 2021-01-01 RX ADMIN — HEPARIN SODIUM 5000 UNIT(S): 5000 INJECTION INTRAVENOUS; SUBCUTANEOUS at 05:35

## 2021-01-01 RX ADMIN — Medication 75 MEQ/KG/HR: at 12:36

## 2021-01-01 RX ADMIN — FENTANYL CITRATE 50 MICROGRAM(S): 50 INJECTION INTRAVENOUS at 10:41

## 2021-01-01 RX ADMIN — Medication 25 MILLIGRAM(S): at 05:52

## 2021-01-01 RX ADMIN — HUMAN INSULIN 10 UNIT(S): 100 INJECTION, SUSPENSION SUBCUTANEOUS at 11:56

## 2021-01-01 RX ADMIN — Medication 2: at 11:05

## 2021-01-01 RX ADMIN — CHLORHEXIDINE GLUCONATE 15 MILLILITER(S): 213 SOLUTION TOPICAL at 05:10

## 2021-01-01 RX ADMIN — FENTANYL CITRATE 0.8 MICROGRAM(S)/KG/HR: 50 INJECTION INTRAVENOUS at 21:42

## 2021-01-01 RX ADMIN — SIMVASTATIN 20 MILLIGRAM(S): 20 TABLET, FILM COATED ORAL at 21:30

## 2021-01-01 RX ADMIN — CARBIDOPA AND LEVODOPA 2 TABLET(S): 25; 100 TABLET ORAL at 07:11

## 2021-01-01 RX ADMIN — MEROPENEM 100 MILLIGRAM(S): 1 INJECTION INTRAVENOUS at 05:21

## 2021-01-01 RX ADMIN — HUMAN INSULIN 17 UNIT(S): 100 INJECTION, SUSPENSION SUBCUTANEOUS at 23:34

## 2021-01-01 RX ADMIN — Medication 1 DROP(S): at 23:30

## 2021-01-01 RX ADMIN — AMPICILLIN SODIUM AND SULBACTAM SODIUM 200 GRAM(S): 250; 125 INJECTION, POWDER, FOR SUSPENSION INTRAMUSCULAR; INTRAVENOUS at 18:44

## 2021-01-01 RX ADMIN — FENTANYL CITRATE 0.8 MICROGRAM(S)/KG/HR: 50 INJECTION INTRAVENOUS at 21:22

## 2021-01-01 RX ADMIN — Medication 75 MEQ/KG/HR: at 22:11

## 2021-01-01 RX ADMIN — FENTANYL CITRATE 50 MICROGRAM(S): 50 INJECTION INTRAVENOUS at 08:38

## 2021-01-01 RX ADMIN — FENTANYL CITRATE 100 MICROGRAM(S): 50 INJECTION INTRAVENOUS at 18:19

## 2021-01-01 RX ADMIN — Medication 1 DROP(S): at 21:00

## 2021-01-01 RX ADMIN — CISATRACURIUM BESYLATE 14.3 MICROGRAM(S)/KG/MIN: 2 INJECTION INTRAVENOUS at 21:43

## 2021-01-01 RX ADMIN — DEXMEDETOMIDINE HYDROCHLORIDE IN 0.9% SODIUM CHLORIDE 3.99 MICROGRAM(S)/KG/HR: 4 INJECTION INTRAVENOUS at 04:23

## 2021-01-01 RX ADMIN — Medication 1 DROP(S): at 06:23

## 2021-01-01 RX ADMIN — Medication 81 MILLIGRAM(S): at 11:04

## 2021-01-01 RX ADMIN — CARBIDOPA AND LEVODOPA 2 TABLET(S): 25; 100 TABLET ORAL at 08:18

## 2021-01-01 RX ADMIN — Medication 4: at 09:29

## 2021-01-01 RX ADMIN — CISATRACURIUM BESYLATE 14.3 MICROGRAM(S)/KG/MIN: 2 INJECTION INTRAVENOUS at 22:23

## 2021-01-01 RX ADMIN — HUMAN INSULIN 13 UNIT(S): 100 INJECTION, SUSPENSION SUBCUTANEOUS at 23:13

## 2021-01-01 RX ADMIN — AMPICILLIN SODIUM AND SULBACTAM SODIUM 200 GRAM(S): 250; 125 INJECTION, POWDER, FOR SUSPENSION INTRAMUSCULAR; INTRAVENOUS at 00:11

## 2021-01-01 RX ADMIN — Medication 1 DROP(S): at 06:39

## 2021-01-01 RX ADMIN — FENTANYL CITRATE 0.8 MICROGRAM(S)/KG/HR: 50 INJECTION INTRAVENOUS at 06:06

## 2021-01-01 RX ADMIN — PANTOPRAZOLE SODIUM 40 MILLIGRAM(S): 20 TABLET, DELAYED RELEASE ORAL at 11:55

## 2021-01-01 RX ADMIN — FENTANYL CITRATE 50 MICROGRAM(S): 50 INJECTION INTRAVENOUS at 23:49

## 2021-01-01 RX ADMIN — FENTANYL CITRATE 25 MICROGRAM(S): 50 INJECTION INTRAVENOUS at 17:52

## 2021-01-01 RX ADMIN — MEROPENEM 100 MILLIGRAM(S): 1 INJECTION INTRAVENOUS at 18:52

## 2021-01-01 RX ADMIN — CHLORHEXIDINE GLUCONATE 1 APPLICATION(S): 213 SOLUTION TOPICAL at 06:04

## 2021-01-01 RX ADMIN — CISATRACURIUM BESYLATE 14.3 MICROGRAM(S)/KG/MIN: 2 INJECTION INTRAVENOUS at 03:12

## 2021-01-01 RX ADMIN — FENTANYL CITRATE 50 MICROGRAM(S): 50 INJECTION INTRAVENOUS at 01:18

## 2021-01-01 RX ADMIN — Medication 8: at 17:19

## 2021-01-01 RX ADMIN — MEROPENEM 100 MILLIGRAM(S): 1 INJECTION INTRAVENOUS at 15:20

## 2021-01-01 RX ADMIN — Medication 25 MILLIGRAM(S): at 17:22

## 2021-01-01 RX ADMIN — CARBIDOPA AND LEVODOPA 2 TABLET(S): 25; 100 TABLET ORAL at 09:55

## 2021-01-01 RX ADMIN — HEPARIN SODIUM 5000 UNIT(S): 5000 INJECTION INTRAVENOUS; SUBCUTANEOUS at 22:15

## 2021-01-01 RX ADMIN — Medication 40 MILLIEQUIVALENT(S): at 20:59

## 2021-01-01 RX ADMIN — HUMAN INSULIN 5 UNIT(S): 100 INJECTION, SUSPENSION SUBCUTANEOUS at 11:18

## 2021-01-01 RX ADMIN — Medication 1 DROP(S): at 06:34

## 2021-01-01 RX ADMIN — CEFEPIME 100 MILLIGRAM(S): 1 INJECTION, POWDER, FOR SOLUTION INTRAMUSCULAR; INTRAVENOUS at 06:09

## 2021-01-01 RX ADMIN — PROPOFOL 14.3 MICROGRAM(S)/KG/MIN: 10 INJECTION, EMULSION INTRAVENOUS at 04:31

## 2021-01-01 RX ADMIN — Medication 75 MEQ/KG/HR: at 12:58

## 2021-01-01 RX ADMIN — Medication 81 MILLIGRAM(S): at 11:19

## 2021-01-01 RX ADMIN — SENNA PLUS 5 MILLILITER(S): 8.6 TABLET ORAL at 11:28

## 2021-01-01 RX ADMIN — Medication 25 MILLIGRAM(S): at 17:33

## 2021-01-01 RX ADMIN — Medication 2: at 21:31

## 2021-01-01 RX ADMIN — HUMAN INSULIN 8 UNIT(S): 100 INJECTION, SUSPENSION SUBCUTANEOUS at 11:56

## 2021-01-01 RX ADMIN — CARBIDOPA AND LEVODOPA 2 TABLET(S): 25; 100 TABLET ORAL at 23:44

## 2021-01-01 RX ADMIN — INSULIN GLARGINE 30 UNIT(S): 100 INJECTION, SOLUTION SUBCUTANEOUS at 22:09

## 2021-01-01 RX ADMIN — Medication 81 MILLIGRAM(S): at 11:25

## 2021-01-01 RX ADMIN — Medication 250 MILLIGRAM(S): at 22:53

## 2021-01-01 RX ADMIN — Medication 25 MILLIGRAM(S): at 07:19

## 2021-01-01 RX ADMIN — AMPICILLIN SODIUM AND SULBACTAM SODIUM 200 GRAM(S): 250; 125 INJECTION, POWDER, FOR SUSPENSION INTRAMUSCULAR; INTRAVENOUS at 09:09

## 2021-01-01 RX ADMIN — Medication 1 APPLICATION(S): at 06:35

## 2021-01-01 RX ADMIN — Medication 2: at 23:39

## 2021-01-01 RX ADMIN — CARBIDOPA AND LEVODOPA 2 TABLET(S): 25; 100 TABLET ORAL at 00:02

## 2021-01-01 RX ADMIN — Medication 2: at 17:00

## 2021-01-01 RX ADMIN — CARBIDOPA AND LEVODOPA 2 TABLET(S): 25; 100 TABLET ORAL at 14:04

## 2021-01-01 RX ADMIN — POLYETHYLENE GLYCOL 3350 17 GRAM(S): 17 POWDER, FOR SOLUTION ORAL at 13:04

## 2021-01-01 RX ADMIN — KETAMINE HYDROCHLORIDE 1.99 MG/KG/HR: 100 INJECTION INTRAMUSCULAR; INTRAVENOUS at 21:22

## 2021-01-01 RX ADMIN — Medication 4: at 00:58

## 2021-01-01 RX ADMIN — LEVETIRACETAM 400 MILLIGRAM(S): 250 TABLET, FILM COATED ORAL at 17:59

## 2021-01-01 RX ADMIN — SODIUM CHLORIDE 666.67 MILLILITER(S): 9 INJECTION, SOLUTION INTRAVENOUS at 22:34

## 2021-01-01 RX ADMIN — Medication 1 APPLICATION(S): at 17:30

## 2021-01-01 RX ADMIN — MEROPENEM 100 MILLIGRAM(S): 1 INJECTION INTRAVENOUS at 17:26

## 2021-01-01 RX ADMIN — AZITHROMYCIN 255 MILLIGRAM(S): 500 TABLET, FILM COATED ORAL at 12:27

## 2021-01-01 RX ADMIN — HEPARIN SODIUM 5000 UNIT(S): 5000 INJECTION INTRAVENOUS; SUBCUTANEOUS at 05:46

## 2021-01-01 RX ADMIN — PROPOFOL 14.3 MICROGRAM(S)/KG/MIN: 10 INJECTION, EMULSION INTRAVENOUS at 23:53

## 2021-01-01 RX ADMIN — LEVETIRACETAM 400 MILLIGRAM(S): 250 TABLET, FILM COATED ORAL at 17:43

## 2021-01-01 RX ADMIN — Medication 81 MILLIGRAM(S): at 12:03

## 2021-01-01 RX ADMIN — Medication 1 DROP(S): at 23:32

## 2021-01-01 RX ADMIN — Medication 25 MILLILITER(S): at 11:08

## 2021-01-01 RX ADMIN — HUMAN INSULIN 10 UNIT(S): 100 INJECTION, SUSPENSION SUBCUTANEOUS at 05:59

## 2021-01-01 RX ADMIN — HEPARIN SODIUM 5000 UNIT(S): 5000 INJECTION INTRAVENOUS; SUBCUTANEOUS at 22:10

## 2021-01-01 RX ADMIN — AMIODARONE HYDROCHLORIDE 16.7 MG/MIN: 400 TABLET ORAL at 07:20

## 2021-01-01 RX ADMIN — MEROPENEM 100 MILLIGRAM(S): 1 INJECTION INTRAVENOUS at 05:18

## 2021-01-01 RX ADMIN — Medication 81 MILLIGRAM(S): at 12:59

## 2021-01-01 RX ADMIN — HUMAN INSULIN 8 UNIT(S): 100 INJECTION, SUSPENSION SUBCUTANEOUS at 23:36

## 2021-01-01 RX ADMIN — POLYETHYLENE GLYCOL 3350 17 GRAM(S): 17 POWDER, FOR SOLUTION ORAL at 11:26

## 2021-01-01 RX ADMIN — Medication 25 MILLIGRAM(S): at 18:02

## 2021-01-01 RX ADMIN — SIMVASTATIN 20 MILLIGRAM(S): 20 TABLET, FILM COATED ORAL at 22:38

## 2021-01-01 RX ADMIN — CARBIDOPA AND LEVODOPA 2 TABLET(S): 25; 100 TABLET ORAL at 19:02

## 2021-01-01 RX ADMIN — HUMAN INSULIN 10 UNIT(S): 100 INJECTION, SUSPENSION SUBCUTANEOUS at 17:35

## 2021-01-01 RX ADMIN — FENTANYL CITRATE 100 MICROGRAM(S): 50 INJECTION INTRAVENOUS at 15:14

## 2021-01-01 RX ADMIN — Medication 25 MILLIGRAM(S): at 06:28

## 2021-01-01 RX ADMIN — Medication 650 MILLIGRAM(S): at 06:30

## 2021-01-01 RX ADMIN — PANTOPRAZOLE SODIUM 40 MILLIGRAM(S): 20 TABLET, DELAYED RELEASE ORAL at 12:17

## 2021-01-01 RX ADMIN — HEPARIN SODIUM 5000 UNIT(S): 5000 INJECTION INTRAVENOUS; SUBCUTANEOUS at 22:38

## 2021-01-01 RX ADMIN — CHLORHEXIDINE GLUCONATE 1 APPLICATION(S): 213 SOLUTION TOPICAL at 06:10

## 2021-01-01 RX ADMIN — FENTANYL CITRATE 0.8 MICROGRAM(S)/KG/HR: 50 INJECTION INTRAVENOUS at 07:44

## 2021-01-01 RX ADMIN — Medication 4: at 17:57

## 2021-01-01 RX ADMIN — PROPOFOL 12 MICROGRAM(S)/KG/MIN: 10 INJECTION, EMULSION INTRAVENOUS at 21:41

## 2021-01-01 RX ADMIN — VASOPRESSIN 2.4 UNIT(S)/MIN: 20 INJECTION INTRAVENOUS at 21:44

## 2021-01-01 RX ADMIN — SODIUM CHLORIDE 1000 MILLILITER(S): 9 INJECTION, SOLUTION INTRAVENOUS at 23:04

## 2021-01-01 RX ADMIN — Medication 1 DROP(S): at 11:55

## 2021-01-01 RX ADMIN — LEVETIRACETAM 400 MILLIGRAM(S): 250 TABLET, FILM COATED ORAL at 18:14

## 2021-01-01 RX ADMIN — FENTANYL CITRATE 100 MICROGRAM(S): 50 INJECTION INTRAVENOUS at 18:33

## 2021-01-01 RX ADMIN — Medication 1 APPLICATION(S): at 17:14

## 2021-01-01 RX ADMIN — HUMAN INSULIN 8 UNIT(S): 100 INJECTION, SUSPENSION SUBCUTANEOUS at 05:53

## 2021-01-01 RX ADMIN — SODIUM ZIRCONIUM CYCLOSILICATE 5 GRAM(S): 10 POWDER, FOR SUSPENSION ORAL at 22:12

## 2021-01-01 RX ADMIN — MEROPENEM 100 MILLIGRAM(S): 1 INJECTION INTRAVENOUS at 01:07

## 2021-01-01 RX ADMIN — SODIUM CHLORIDE 75 MILLILITER(S): 9 INJECTION INTRAMUSCULAR; INTRAVENOUS; SUBCUTANEOUS at 18:30

## 2021-01-01 RX ADMIN — POLYETHYLENE GLYCOL 3350 17 GRAM(S): 17 POWDER, FOR SOLUTION ORAL at 12:35

## 2021-01-01 RX ADMIN — CHLORHEXIDINE GLUCONATE 15 MILLILITER(S): 213 SOLUTION TOPICAL at 17:05

## 2021-01-01 RX ADMIN — Medication 1 DROP(S): at 00:02

## 2021-01-01 RX ADMIN — CARBIDOPA AND LEVODOPA 2 TABLET(S): 25; 100 TABLET ORAL at 09:25

## 2021-01-01 RX ADMIN — CHLORHEXIDINE GLUCONATE 1 APPLICATION(S): 213 SOLUTION TOPICAL at 05:36

## 2021-01-01 RX ADMIN — HUMAN INSULIN 10 UNIT(S): 100 INJECTION, SUSPENSION SUBCUTANEOUS at 11:12

## 2021-01-01 RX ADMIN — Medication 1 DROP(S): at 11:42

## 2021-01-01 RX ADMIN — PIPERACILLIN AND TAZOBACTAM 25 GRAM(S): 4; .5 INJECTION, POWDER, LYOPHILIZED, FOR SOLUTION INTRAVENOUS at 05:36

## 2021-01-01 RX ADMIN — PANTOPRAZOLE SODIUM 40 MILLIGRAM(S): 20 TABLET, DELAYED RELEASE ORAL at 17:01

## 2021-01-01 RX ADMIN — FENTANYL CITRATE 0.8 MICROGRAM(S)/KG/HR: 50 INJECTION INTRAVENOUS at 20:09

## 2021-01-01 RX ADMIN — CARBIDOPA AND LEVODOPA 2 TABLET(S): 25; 100 TABLET ORAL at 11:25

## 2021-01-01 RX ADMIN — HEPARIN SODIUM 5000 UNIT(S): 5000 INJECTION INTRAVENOUS; SUBCUTANEOUS at 17:07

## 2021-01-01 RX ADMIN — HUMAN INSULIN 20 UNIT(S): 100 INJECTION, SUSPENSION SUBCUTANEOUS at 12:13

## 2021-01-01 RX ADMIN — Medication 20 MILLIGRAM(S): at 18:16

## 2021-01-01 RX ADMIN — Medication 1 DROP(S): at 01:27

## 2021-01-01 RX ADMIN — CHLORHEXIDINE GLUCONATE 1 APPLICATION(S): 213 SOLUTION TOPICAL at 06:26

## 2021-01-01 RX ADMIN — Medication 4: at 05:47

## 2021-01-01 RX ADMIN — LEVETIRACETAM 400 MILLIGRAM(S): 250 TABLET, FILM COATED ORAL at 06:09

## 2021-01-01 RX ADMIN — FENTANYL CITRATE 100 MICROGRAM(S): 50 INJECTION INTRAVENOUS at 23:54

## 2021-01-01 RX ADMIN — PROPOFOL 23.9 MICROGRAM(S)/KG/MIN: 10 INJECTION, EMULSION INTRAVENOUS at 18:16

## 2021-01-01 RX ADMIN — Medication 1 DROP(S): at 17:43

## 2021-01-01 RX ADMIN — Medication 5 MILLILITER(S): at 04:49

## 2021-01-01 RX ADMIN — ENOXAPARIN SODIUM 40 MILLIGRAM(S): 100 INJECTION SUBCUTANEOUS at 11:31

## 2021-01-01 RX ADMIN — HUMAN INSULIN 4 UNIT(S): 100 INJECTION, SUSPENSION SUBCUTANEOUS at 12:26

## 2021-01-01 RX ADMIN — Medication 1 DROP(S): at 00:31

## 2021-01-01 RX ADMIN — PROPOFOL 14.3 MICROGRAM(S)/KG/MIN: 10 INJECTION, EMULSION INTRAVENOUS at 09:12

## 2021-01-01 RX ADMIN — PANTOPRAZOLE SODIUM 40 MILLIGRAM(S): 20 TABLET, DELAYED RELEASE ORAL at 17:00

## 2021-01-01 RX ADMIN — METHADONE HYDROCHLORIDE 10 MILLIGRAM(S): 40 TABLET ORAL at 05:26

## 2021-01-01 RX ADMIN — PIPERACILLIN AND TAZOBACTAM 25 GRAM(S): 4; .5 INJECTION, POWDER, LYOPHILIZED, FOR SOLUTION INTRAVENOUS at 17:01

## 2021-01-01 RX ADMIN — CHLORHEXIDINE GLUCONATE 1 APPLICATION(S): 213 SOLUTION TOPICAL at 05:10

## 2021-01-01 RX ADMIN — CARBIDOPA AND LEVODOPA 2 TABLET(S): 25; 100 TABLET ORAL at 21:22

## 2021-01-01 RX ADMIN — HEPARIN SODIUM 5000 UNIT(S): 5000 INJECTION INTRAVENOUS; SUBCUTANEOUS at 17:44

## 2021-01-01 RX ADMIN — PANTOPRAZOLE SODIUM 40 MILLIGRAM(S): 20 TABLET, DELAYED RELEASE ORAL at 11:03

## 2021-01-01 RX ADMIN — Medication 1 DROP(S): at 04:48

## 2021-01-01 RX ADMIN — Medication 6 MILLIGRAM(S): at 17:51

## 2021-01-01 RX ADMIN — Medication 2: at 11:11

## 2021-01-01 RX ADMIN — Medication 10 MILLIEQUIVALENT(S): at 17:00

## 2021-01-01 RX ADMIN — DEXMEDETOMIDINE HYDROCHLORIDE IN 0.9% SODIUM CHLORIDE 1 MICROGRAM(S)/KG/HR: 4 INJECTION INTRAVENOUS at 11:39

## 2021-01-01 RX ADMIN — DEXMEDETOMIDINE HYDROCHLORIDE IN 0.9% SODIUM CHLORIDE 3.99 MICROGRAM(S)/KG/HR: 4 INJECTION INTRAVENOUS at 23:19

## 2021-01-01 RX ADMIN — Medication 81 MILLIGRAM(S): at 11:08

## 2021-01-01 RX ADMIN — CHLORHEXIDINE GLUCONATE 1 APPLICATION(S): 213 SOLUTION TOPICAL at 06:33

## 2021-01-01 RX ADMIN — Medication 25 MILLIGRAM(S): at 17:38

## 2021-01-01 RX ADMIN — CEFEPIME 100 MILLIGRAM(S): 1 INJECTION, POWDER, FOR SOLUTION INTRAMUSCULAR; INTRAVENOUS at 20:39

## 2021-01-01 RX ADMIN — PANTOPRAZOLE SODIUM 40 MILLIGRAM(S): 20 TABLET, DELAYED RELEASE ORAL at 11:19

## 2021-01-01 RX ADMIN — Medication 650 MILLIGRAM(S): at 05:49

## 2021-01-01 RX ADMIN — Medication 1 DROP(S): at 05:09

## 2021-01-01 RX ADMIN — HEPARIN SODIUM 5000 UNIT(S): 5000 INJECTION INTRAVENOUS; SUBCUTANEOUS at 12:24

## 2021-01-01 RX ADMIN — HEPARIN SODIUM 5000 UNIT(S): 5000 INJECTION INTRAVENOUS; SUBCUTANEOUS at 21:46

## 2021-01-01 RX ADMIN — HUMAN INSULIN 8 UNIT(S): 100 INJECTION, SUSPENSION SUBCUTANEOUS at 17:21

## 2021-01-01 RX ADMIN — CARBIDOPA AND LEVODOPA 2 TABLET(S): 25; 100 TABLET ORAL at 22:35

## 2021-01-01 RX ADMIN — FENTANYL CITRATE 50 MICROGRAM(S): 50 INJECTION INTRAVENOUS at 23:42

## 2021-01-01 RX ADMIN — Medication 1 DROP(S): at 19:03

## 2021-01-01 RX ADMIN — CARBIDOPA AND LEVODOPA 2 TABLET(S): 25; 100 TABLET ORAL at 09:13

## 2021-01-01 RX ADMIN — Medication 2: at 23:19

## 2021-01-01 RX ADMIN — SIMVASTATIN 20 MILLIGRAM(S): 20 TABLET, FILM COATED ORAL at 22:16

## 2021-01-01 RX ADMIN — DEXMEDETOMIDINE HYDROCHLORIDE IN 0.9% SODIUM CHLORIDE 1 MICROGRAM(S)/KG/HR: 4 INJECTION INTRAVENOUS at 13:57

## 2021-01-01 RX ADMIN — Medication 8: at 11:37

## 2021-01-01 RX ADMIN — PROPOFOL 12 MICROGRAM(S)/KG/MIN: 10 INJECTION, EMULSION INTRAVENOUS at 04:01

## 2021-01-01 RX ADMIN — MIDAZOLAM HYDROCHLORIDE 4 MILLIGRAM(S): 1 INJECTION, SOLUTION INTRAMUSCULAR; INTRAVENOUS at 21:20

## 2021-01-01 RX ADMIN — SIMVASTATIN 20 MILLIGRAM(S): 20 TABLET, FILM COATED ORAL at 23:18

## 2021-01-01 RX ADMIN — HUMAN INSULIN 10 UNIT(S): 100 INJECTION, SUSPENSION SUBCUTANEOUS at 11:42

## 2021-01-01 RX ADMIN — KETAMINE HYDROCHLORIDE 1.99 MG/KG/HR: 100 INJECTION INTRAMUSCULAR; INTRAVENOUS at 12:49

## 2021-01-01 RX ADMIN — Medication 1 APPLICATION(S): at 06:10

## 2021-01-01 RX ADMIN — Medication 1 DROP(S): at 17:48

## 2021-01-01 RX ADMIN — MEROPENEM 100 MILLIGRAM(S): 1 INJECTION INTRAVENOUS at 06:27

## 2021-01-01 RX ADMIN — Medication 2: at 05:27

## 2021-01-01 RX ADMIN — Medication 81 MILLIGRAM(S): at 11:23

## 2021-01-01 RX ADMIN — FENTANYL CITRATE 50 MICROGRAM(S): 50 INJECTION INTRAVENOUS at 06:25

## 2021-01-01 RX ADMIN — CHLORHEXIDINE GLUCONATE 15 MILLILITER(S): 213 SOLUTION TOPICAL at 17:34

## 2021-01-01 RX ADMIN — SIMVASTATIN 20 MILLIGRAM(S): 20 TABLET, FILM COATED ORAL at 21:52

## 2021-01-01 RX ADMIN — HEPARIN SODIUM 5000 UNIT(S): 5000 INJECTION INTRAVENOUS; SUBCUTANEOUS at 06:11

## 2021-01-01 RX ADMIN — Medication 2: at 12:24

## 2021-01-01 RX ADMIN — Medication 2: at 18:06

## 2021-01-01 RX ADMIN — Medication 10 MILLIGRAM(S): at 12:42

## 2021-01-01 RX ADMIN — Medication 1 DROP(S): at 06:59

## 2021-01-01 RX ADMIN — Medication 10: at 06:24

## 2021-01-01 RX ADMIN — PROPOFOL 14.3 MICROGRAM(S)/KG/MIN: 10 INJECTION, EMULSION INTRAVENOUS at 00:31

## 2021-01-01 RX ADMIN — Medication 8: at 11:05

## 2021-01-01 RX ADMIN — CISATRACURIUM BESYLATE 14.3 MICROGRAM(S)/KG/MIN: 2 INJECTION INTRAVENOUS at 23:20

## 2021-01-01 RX ADMIN — Medication 4: at 06:59

## 2021-01-01 RX ADMIN — HEPARIN SODIUM 5000 UNIT(S): 5000 INJECTION INTRAVENOUS; SUBCUTANEOUS at 21:26

## 2021-01-01 RX ADMIN — CARBIDOPA AND LEVODOPA 2 TABLET(S): 25; 100 TABLET ORAL at 10:20

## 2021-01-01 RX ADMIN — Medication 40 MILLIEQUIVALENT(S): at 12:12

## 2021-01-01 RX ADMIN — HEPARIN SODIUM 5000 UNIT(S): 5000 INJECTION INTRAVENOUS; SUBCUTANEOUS at 05:36

## 2021-01-01 RX ADMIN — LEVETIRACETAM 400 MILLIGRAM(S): 250 TABLET, FILM COATED ORAL at 17:19

## 2021-01-01 RX ADMIN — Medication 2: at 06:00

## 2021-01-01 RX ADMIN — Medication 1 DROP(S): at 11:03

## 2021-01-01 RX ADMIN — POLYETHYLENE GLYCOL 3350 17 GRAM(S): 17 POWDER, FOR SOLUTION ORAL at 11:03

## 2021-01-01 RX ADMIN — HUMAN INSULIN 16 UNIT(S): 100 INJECTION, SUSPENSION SUBCUTANEOUS at 03:17

## 2021-01-01 RX ADMIN — Medication 100 MILLIEQUIVALENT(S): at 16:09

## 2021-01-01 RX ADMIN — Medication 3: at 11:34

## 2021-01-01 RX ADMIN — SIMVASTATIN 20 MILLIGRAM(S): 20 TABLET, FILM COATED ORAL at 21:37

## 2021-01-01 RX ADMIN — Medication 1 DROP(S): at 23:55

## 2021-01-01 RX ADMIN — FENTANYL CITRATE 50 MICROGRAM(S): 50 INJECTION INTRAVENOUS at 22:10

## 2021-01-01 RX ADMIN — Medication 4: at 00:34

## 2021-01-01 RX ADMIN — Medication 4: at 11:28

## 2021-01-01 RX ADMIN — DEXMEDETOMIDINE HYDROCHLORIDE IN 0.9% SODIUM CHLORIDE 1 MICROGRAM(S)/KG/HR: 4 INJECTION INTRAVENOUS at 15:25

## 2021-01-01 RX ADMIN — FENTANYL CITRATE 25 MICROGRAM(S): 50 INJECTION INTRAVENOUS at 17:18

## 2021-01-01 RX ADMIN — CHLORHEXIDINE GLUCONATE 1 APPLICATION(S): 213 SOLUTION TOPICAL at 11:49

## 2021-01-01 RX ADMIN — DEXMEDETOMIDINE HYDROCHLORIDE IN 0.9% SODIUM CHLORIDE 9.96 MICROGRAM(S)/KG/HR: 4 INJECTION INTRAVENOUS at 23:57

## 2021-01-01 RX ADMIN — POLYETHYLENE GLYCOL 3350 17 GRAM(S): 17 POWDER, FOR SOLUTION ORAL at 05:23

## 2021-01-01 RX ADMIN — HUMAN INSULIN 11 UNIT(S): 100 INJECTION, SUSPENSION SUBCUTANEOUS at 11:07

## 2021-01-01 RX ADMIN — Medication 250 MILLIGRAM(S): at 18:40

## 2021-01-01 RX ADMIN — Medication 6: at 18:27

## 2021-01-01 RX ADMIN — LEVETIRACETAM 400 MILLIGRAM(S): 250 TABLET, FILM COATED ORAL at 17:32

## 2021-01-01 RX ADMIN — FENTANYL CITRATE 100 MICROGRAM(S): 50 INJECTION INTRAVENOUS at 16:54

## 2021-01-01 RX ADMIN — CHLORHEXIDINE GLUCONATE 15 MILLILITER(S): 213 SOLUTION TOPICAL at 17:10

## 2021-01-01 RX ADMIN — HEPARIN SODIUM 5000 UNIT(S): 5000 INJECTION INTRAVENOUS; SUBCUTANEOUS at 04:32

## 2021-01-01 RX ADMIN — Medication 1300 MILLIGRAM(S): at 17:53

## 2021-01-01 RX ADMIN — HUMAN INSULIN 13 UNIT(S): 100 INJECTION, SUSPENSION SUBCUTANEOUS at 18:27

## 2021-01-01 RX ADMIN — Medication 81 MILLIGRAM(S): at 12:34

## 2021-01-01 RX ADMIN — Medication 25 MILLIGRAM(S): at 05:45

## 2021-01-01 RX ADMIN — Medication 1 DROP(S): at 06:20

## 2021-01-01 RX ADMIN — HUMAN INSULIN 15 UNIT(S): 100 INJECTION, SUSPENSION SUBCUTANEOUS at 11:07

## 2021-01-01 RX ADMIN — Medication 2: at 23:46

## 2021-01-01 RX ADMIN — FENTANYL CITRATE 0.8 MICROGRAM(S)/KG/HR: 50 INJECTION INTRAVENOUS at 21:17

## 2021-01-01 RX ADMIN — DEXMEDETOMIDINE HYDROCHLORIDE IN 0.9% SODIUM CHLORIDE 3.99 MICROGRAM(S)/KG/HR: 4 INJECTION INTRAVENOUS at 08:17

## 2021-01-01 RX ADMIN — Medication 1 APPLICATION(S): at 17:10

## 2021-01-01 RX ADMIN — FENTANYL CITRATE 0.8 MICROGRAM(S)/KG/HR: 50 INJECTION INTRAVENOUS at 14:15

## 2021-01-01 RX ADMIN — Medication 6: at 05:15

## 2021-01-01 RX ADMIN — Medication 1 DROP(S): at 17:49

## 2021-01-01 RX ADMIN — FENTANYL CITRATE 50 MICROGRAM(S): 50 INJECTION INTRAVENOUS at 00:10

## 2021-01-01 RX ADMIN — HUMAN INSULIN 13 UNIT(S): 100 INJECTION, SUSPENSION SUBCUTANEOUS at 23:40

## 2021-01-01 RX ADMIN — Medication 50 MILLIGRAM(S): at 23:30

## 2021-01-01 RX ADMIN — Medication 1 DROP(S): at 05:53

## 2021-01-01 RX ADMIN — Medication 81 MILLIGRAM(S): at 11:06

## 2021-01-01 RX ADMIN — POTASSIUM PHOSPHATE, MONOBASIC POTASSIUM PHOSPHATE, DIBASIC 62.5 MILLIMOLE(S): 236; 224 INJECTION, SOLUTION INTRAVENOUS at 15:23

## 2021-01-01 RX ADMIN — PANTOPRAZOLE SODIUM 40 MILLIGRAM(S): 20 TABLET, DELAYED RELEASE ORAL at 12:12

## 2021-01-01 RX ADMIN — Medication 8: at 12:27

## 2021-01-01 RX ADMIN — HUMAN INSULIN 15 UNIT(S): 100 INJECTION, SUSPENSION SUBCUTANEOUS at 17:01

## 2021-01-01 RX ADMIN — Medication 1 DROP(S): at 22:15

## 2021-01-01 RX ADMIN — Medication 20 MILLIEQUIVALENT(S): at 02:53

## 2021-01-01 RX ADMIN — LEVETIRACETAM 400 MILLIGRAM(S): 250 TABLET, FILM COATED ORAL at 17:15

## 2021-01-01 RX ADMIN — VASOPRESSIN 2.4 UNIT(S)/MIN: 20 INJECTION INTRAVENOUS at 02:43

## 2021-01-01 RX ADMIN — HUMAN INSULIN 13 UNIT(S): 100 INJECTION, SUSPENSION SUBCUTANEOUS at 23:20

## 2021-01-01 RX ADMIN — CARBIDOPA AND LEVODOPA 2 TABLET(S): 25; 100 TABLET ORAL at 08:35

## 2021-01-01 RX ADMIN — DEXMEDETOMIDINE HYDROCHLORIDE IN 0.9% SODIUM CHLORIDE 9.96 MICROGRAM(S)/KG/HR: 4 INJECTION INTRAVENOUS at 03:31

## 2021-01-01 RX ADMIN — MEROPENEM 100 MILLIGRAM(S): 1 INJECTION INTRAVENOUS at 16:12

## 2021-01-01 RX ADMIN — METHADONE HYDROCHLORIDE 10 MILLIGRAM(S): 40 TABLET ORAL at 13:41

## 2021-01-01 RX ADMIN — Medication 3 UNIT(S): at 12:25

## 2021-01-01 RX ADMIN — HEPARIN SODIUM 5000 UNIT(S): 5000 INJECTION INTRAVENOUS; SUBCUTANEOUS at 06:29

## 2021-01-01 RX ADMIN — Medication 1 APPLICATION(S): at 17:17

## 2021-01-01 RX ADMIN — CHLORHEXIDINE GLUCONATE 1 APPLICATION(S): 213 SOLUTION TOPICAL at 05:32

## 2021-01-01 RX ADMIN — Medication 1 APPLICATION(S): at 05:21

## 2021-01-01 RX ADMIN — SENNA PLUS 10 MILLILITER(S): 8.6 TABLET ORAL at 12:25

## 2021-01-01 RX ADMIN — Medication 81 MILLIGRAM(S): at 11:39

## 2021-01-01 RX ADMIN — Medication 1 APPLICATION(S): at 18:52

## 2021-01-01 RX ADMIN — Medication 1 DROP(S): at 11:25

## 2021-01-01 RX ADMIN — FENTANYL CITRATE 0.8 MICROGRAM(S)/KG/HR: 50 INJECTION INTRAVENOUS at 06:17

## 2021-01-01 RX ADMIN — CARBIDOPA AND LEVODOPA 2 TABLET(S): 25; 100 TABLET ORAL at 11:49

## 2021-01-01 RX ADMIN — Medication 40 MILLIGRAM(S): at 09:55

## 2021-01-01 RX ADMIN — HUMAN INSULIN 5 UNIT(S): 100 INJECTION, SUSPENSION SUBCUTANEOUS at 17:34

## 2021-01-01 RX ADMIN — HEPARIN SODIUM 5000 UNIT(S): 5000 INJECTION INTRAVENOUS; SUBCUTANEOUS at 05:09

## 2021-01-01 RX ADMIN — HUMAN INSULIN 5 UNIT(S): 100 INJECTION, SUSPENSION SUBCUTANEOUS at 11:26

## 2021-01-01 RX ADMIN — HUMAN INSULIN 10 UNIT(S): 100 INJECTION, SUSPENSION SUBCUTANEOUS at 17:24

## 2021-01-01 RX ADMIN — Medication 1 DROP(S): at 17:29

## 2021-01-01 RX ADMIN — HUMAN INSULIN 4 UNIT(S): 100 INJECTION, SUSPENSION SUBCUTANEOUS at 00:10

## 2021-01-01 RX ADMIN — Medication 1 DROP(S): at 07:21

## 2021-01-01 RX ADMIN — Medication 1 DROP(S): at 00:18

## 2021-01-01 RX ADMIN — Medication 81 MILLIGRAM(S): at 13:03

## 2021-01-01 RX ADMIN — Medication 5 MG/HR: at 21:42

## 2021-01-01 RX ADMIN — CARBIDOPA AND LEVODOPA 2 TABLET(S): 25; 100 TABLET ORAL at 22:14

## 2021-01-01 RX ADMIN — Medication 25 MILLIGRAM(S): at 17:43

## 2021-01-01 RX ADMIN — PIPERACILLIN AND TAZOBACTAM 25 GRAM(S): 4; .5 INJECTION, POWDER, LYOPHILIZED, FOR SOLUTION INTRAVENOUS at 13:41

## 2021-01-01 RX ADMIN — Medication 3.74 MICROGRAM(S)/KG/MIN: at 15:43

## 2021-01-01 RX ADMIN — Medication 1 DROP(S): at 17:56

## 2021-01-01 RX ADMIN — LEVETIRACETAM 400 MILLIGRAM(S): 250 TABLET, FILM COATED ORAL at 18:00

## 2021-01-01 RX ADMIN — POLYETHYLENE GLYCOL 3350 17 GRAM(S): 17 POWDER, FOR SOLUTION ORAL at 11:27

## 2021-01-01 RX ADMIN — HUMAN INSULIN 12 UNIT(S): 100 INJECTION, SUSPENSION SUBCUTANEOUS at 17:38

## 2021-01-01 RX ADMIN — HUMAN INSULIN 10 UNIT(S): 100 INJECTION, SUSPENSION SUBCUTANEOUS at 01:26

## 2021-01-01 RX ADMIN — Medication 40 MILLIEQUIVALENT(S): at 09:19

## 2021-01-01 RX ADMIN — HUMAN INSULIN 13 UNIT(S): 100 INJECTION, SUSPENSION SUBCUTANEOUS at 11:20

## 2021-01-01 RX ADMIN — HEPARIN SODIUM 5000 UNIT(S): 5000 INJECTION INTRAVENOUS; SUBCUTANEOUS at 05:19

## 2021-01-01 RX ADMIN — SENNA PLUS 10 MILLILITER(S): 8.6 TABLET ORAL at 12:17

## 2021-01-01 RX ADMIN — HUMAN INSULIN 5 UNIT(S): 100 INJECTION, SUSPENSION SUBCUTANEOUS at 06:01

## 2021-01-01 RX ADMIN — CHLORHEXIDINE GLUCONATE 1 APPLICATION(S): 213 SOLUTION TOPICAL at 08:03

## 2021-01-01 RX ADMIN — Medication 1 DROP(S): at 11:26

## 2021-01-01 RX ADMIN — CARBIDOPA AND LEVODOPA 2 TABLET(S): 25; 100 TABLET ORAL at 19:42

## 2021-01-01 RX ADMIN — Medication 2: at 17:39

## 2021-01-01 RX ADMIN — FENTANYL CITRATE 50 MICROGRAM(S): 50 INJECTION INTRAVENOUS at 00:34

## 2021-01-01 RX ADMIN — Medication 1300 MILLIGRAM(S): at 17:19

## 2021-01-01 RX ADMIN — POLYETHYLENE GLYCOL 3350 17 GRAM(S): 17 POWDER, FOR SOLUTION ORAL at 11:41

## 2021-01-01 RX ADMIN — Medication 14.9 MICROGRAM(S)/KG/MIN: at 18:01

## 2021-01-01 RX ADMIN — Medication 6: at 00:29

## 2021-01-01 RX ADMIN — Medication 20 MILLIGRAM(S): at 12:28

## 2021-01-01 RX ADMIN — CEFEPIME 100 MILLIGRAM(S): 1 INJECTION, POWDER, FOR SOLUTION INTRAMUSCULAR; INTRAVENOUS at 06:29

## 2021-01-01 RX ADMIN — Medication 50 MILLIGRAM(S): at 00:26

## 2021-01-01 RX ADMIN — CARBIDOPA AND LEVODOPA 2 TABLET(S): 25; 100 TABLET ORAL at 09:18

## 2021-01-01 RX ADMIN — FENTANYL CITRATE 50 MICROGRAM(S): 50 INJECTION INTRAVENOUS at 11:11

## 2021-01-01 RX ADMIN — CARBIDOPA AND LEVODOPA 2 TABLET(S): 25; 100 TABLET ORAL at 21:17

## 2021-01-01 RX ADMIN — Medication 100 GRAM(S): at 02:07

## 2021-01-01 RX ADMIN — HEPARIN SODIUM 5000 UNIT(S): 5000 INJECTION INTRAVENOUS; SUBCUTANEOUS at 21:07

## 2021-01-01 RX ADMIN — HUMAN INSULIN 4 UNIT(S): 100 INJECTION, SUSPENSION SUBCUTANEOUS at 06:36

## 2021-01-01 RX ADMIN — Medication 2: at 23:08

## 2021-01-01 RX ADMIN — SENNA PLUS 10 MILLILITER(S): 8.6 TABLET ORAL at 11:24

## 2021-01-01 RX ADMIN — SIMVASTATIN 20 MILLIGRAM(S): 20 TABLET, FILM COATED ORAL at 22:50

## 2021-01-01 RX ADMIN — HUMAN INSULIN 4 UNIT(S): 100 INJECTION, SUSPENSION SUBCUTANEOUS at 06:00

## 2021-01-01 RX ADMIN — Medication 100 MILLIEQUIVALENT(S): at 11:09

## 2021-01-01 RX ADMIN — Medication 4: at 05:37

## 2021-01-01 RX ADMIN — HEPARIN SODIUM 5000 UNIT(S): 5000 INJECTION INTRAVENOUS; SUBCUTANEOUS at 21:09

## 2021-01-01 RX ADMIN — HUMAN INSULIN 8 UNIT(S): 100 INJECTION, SUSPENSION SUBCUTANEOUS at 17:59

## 2021-01-01 RX ADMIN — Medication 6: at 17:36

## 2021-01-01 RX ADMIN — SODIUM ZIRCONIUM CYCLOSILICATE 5 GRAM(S): 10 POWDER, FOR SUSPENSION ORAL at 13:24

## 2021-01-01 RX ADMIN — PANTOPRAZOLE SODIUM 40 MILLIGRAM(S): 20 TABLET, DELAYED RELEASE ORAL at 11:42

## 2021-01-01 RX ADMIN — HEPARIN SODIUM 5000 UNIT(S): 5000 INJECTION INTRAVENOUS; SUBCUTANEOUS at 15:24

## 2021-01-01 RX ADMIN — HEPARIN SODIUM 5000 UNIT(S): 5000 INJECTION INTRAVENOUS; SUBCUTANEOUS at 13:48

## 2021-01-01 RX ADMIN — AMIODARONE HYDROCHLORIDE 618 MILLIGRAM(S): 400 TABLET ORAL at 10:37

## 2021-01-01 NOTE — H&P ADULT - SUPRACLAVICULAR R
female infant born at 40+2 weeks gestation via  to a *** y/o  mother. Maternal history and prenatal course noted fro bacterial vaginitis, treated. Maternal blood type B+. Prenatal labs notable for Hep B neg, HIV neg, RPR non-reactive, and rubella immune. GBS negative. ROM with clear fluid *** hours prior to delivery. Delivery uncomplicated, Apgars 9/9. Erythromycin and vitamin K to be given by the OB team. Admitted to the  nursery for routine care.    Vital Signs Last 24 Hrs  T(C): 37.1 (06 Aug 2021 23:08), Max: 37.1 (06 Aug 2021 23:08)  T(F): 98.7 (06 Aug 2021 23:08), Max: 98.7 (06 Aug 2021 23:08)  HR: 150 (06 Aug 2021 23:08) (148 - 150)  RR: 50 (06 Aug 2021 23:08) (48 - 50)    Physical Exam:    Gen: awake, alert, active  HEENT: anterior fontanel open soft and flat, no cleft lip/palate, ears normal set, no ear pits or tags. no lesions in mouth/throat,  red reflex positive bilaterally, nares clinically patent  Resp: good air entry and clear to auscultation bilaterally  Cardio: Normal S1/S2, regular rate and rhythm, no murmurs, rubs or gallops, 2+ femoral pulses bilaterally  Abd: soft, non tender, non distended, normal bowel sounds, no organomegaly,  umbilicus clean/dry/intact  Neuro: +grasp/suck/hermes, normal tone  Extremities: negative guzmán and ortolani, full range of motion x 4, no crepitus  Skin: no rash  Genitals: Normal female anatomy,  Grzegorz 1, anus appears normal female infant born at 40+2 weeks gestation via  vaginal to a 19 y/o  mother. Maternal history and prenatal course noted fro bacterial vaginitis, treated. Maternal blood type B+. Prenatal labs notable for Hep B neg, HIV neg, RPR non-reactive, and rubella immune. GBS negative. ROM with clear fluid *** hours prior to delivery. Delivery uncomplicated, Apgars 9/9. Erythromycin and vitamin K to be given by the OB team. Admitted to the  nursery for routine care.    Vital Signs Last 24 Hrs  T(C): 37.1 (06 Aug 2021 23:08), Max: 37.1 (06 Aug 2021 23:08)  T(F): 98.7 (06 Aug 2021 23:08), Max: 98.7 (06 Aug 2021 23:08)  HR: 150 (06 Aug 2021 23:08) (148 - 150)  RR: 50 (06 Aug 2021 23:08) (48 - 50)    Physical Exam:    Gen: awake, alert, active  HEENT: anterior fontanel open soft and flat, no cleft lip/palate, ears normal set, no ear pits or tags. no lesions in mouth/throat,  red reflex positive bilaterally, nares clinically patent  Resp: good air entry and clear to auscultation bilaterally  Cardio: Normal S1/S2, regular rate and rhythm, no murmurs, rubs or gallops, 2+ femoral pulses bilaterally  Abd: soft, non tender, non distended, normal bowel sounds, no organomegaly,  umbilicus clean/dry/intact  Neuro: +grasp/suck/hermes, normal tone  Extremities: negative guzmán and ortolani, full range of motion x 4, no crepitus  Skin: no rash  Genitals: Normal female anatomy,  Grzegorz 1, anus appears normal female infant born at 40+2 weeks gestation via  vaginal to a 19 y/o  mother. Mother presented to L& D for decreased FM and low normal ZACK. Maternal history and prenatal course noted fro bacterial vaginitis, treated. Maternal blood type B+. Prenatal labs notable for Hep B neg, HIV neg, RPR non-reactive, and rubella immune. GBS negative. ROM with clear fluid *** hours prior to delivery. Delivery uncomplicated, Apgars 9/9. Erythromycin and vitamin K to be given by the OB team. Admitted to the  nursery for routine care.    Vital Signs Last 24 Hrs  T(C): 37.1 (06 Aug 2021 23:08), Max: 37.1 (06 Aug 2021 23:08)  T(F): 98.7 (06 Aug 2021 23:08), Max: 98.7 (06 Aug 2021 23:08)  HR: 150 (06 Aug 2021 23:08) (148 - 150)  RR: 50 (06 Aug 2021 23:08) (48 - 50)    Physical Exam:    Gen: awake, alert, active  HEENT: anterior fontanel open soft and flat, no cleft lip/palate, ears normal set, no ear pits or tags. no lesions in mouth/throat,  red reflex positive bilaterally, nares clinically patent  Resp: good air entry and clear to auscultation bilaterally  Cardio: Normal S1/S2, regular rate and rhythm, no murmurs, rubs or gallops, 2+ femoral pulses bilaterally  Abd: soft, non tender, non distended, normal bowel sounds, no organomegaly,  umbilicus clean/dry/intact  Neuro: +grasp/suck/hermes, normal tone  Extremities: negative guzmán and ortolani, full range of motion x 4, no crepitus  Skin: no rash  Genitals: Normal female anatomy,  Grzegorz 1, anus appears normal normal

## 2021-01-28 NOTE — DISCUSSION/SUMMARY
[FreeTextEntry1] : This is a 70-year-old right-handed man who presents with chief complaints of tremors which began a few months ago initially with head tremors progressing to involve the upper extremities. Patient also reports slowness of daily activities, drooling, constipation, blood pressure fluctuations and mild cognitive changes. He denies any difficulty with balance, no falls, no urinary incontinence, no hallucinations.\par He was recently started on Sinemet and feels that it makes his symptoms worse\par On exam he is noted to have an irregular head tremor with no posturing and slight restriction of neck movements from side to side. Resting tremor and bradykinesia right slightly worse than left. He has difficulty with tandem walk.\par \par Impression-parkinsonism, a few red flags such as reportedly no response to meds, head tremor, gait ataxia, BP fluctuation. mild cognitive changes, dizziness, neck pain\par DaTscan is abnormal\par \par Plan\par Results of [facility] scan discussed with the patient and his grandson.  Images shown and report printed for the records.\par Increase sinemet to 2 tablets 3 times a day, for side effects\par monitor BP, if normal may consider low-dose propanolol for head tremor, Botox may be another option\par Discussed physical therapy, will hold off to coronavirus pandemic has subsided.  Encouraged to exercise stay active.  Fall precautions discussed\par All questions were addressed and answered\par f/u in 1 month

## 2021-01-28 NOTE — PHYSICAL EXAM
[FreeTextEntry1] : Extraocular movements are intact\par Face is symmetric tongue and uvula are midline and symmetric\par Speech is clear and understandable\par \par Irregular head tremor is present, some restriction of neck movement but equal bilaterally, no worsening of tremor with turning in either direction\par \par Grade 1 resting tremor in right upper extremity zero on the left\par Bilateral bradykinesia\par Head and opening and closing 2 on the right, one on the left\par Rapid alternating movements one bilaterally\par Finger taps 2 on the right one on the left\par Neck rigidity normal bilaterally\par \par tone grade 1 rigidity on the right normal on the left\par Tone tone in the neck\par No postural or action tremors are seen, no dysmetria or incoordination with hand movement\par Difficulty getting up from chair\par Posture is slightly stooped, reduced left arm swing, small steps\par Unable to tandem walk\par \par Deep tendon reflexes are 1+ symmetric\par \par

## 2021-01-28 NOTE — HISTORY OF PRESENT ILLNESS
[FreeTextEntry1] : This  is a 70-year-old right-handed male who presents with chief complaints of tremors\par At this visit he is accompanied by his grandson Washington\par \par He reports gradual onset of tremors for a few months now. The tremors initially started in his head and  progressed to involve his hands. The tremor is present at rest and is equal in both sides. not with activity. He does feel slow in the activities. He has not had any falls or any changes in balance. He reports that when he lays down he feels vertiginous and dizzy with neck extension. Does not have any dizziness on standing up.\par He was experiencing drooling, constipation and has had difficulty with controlling blood pressure for a few years. It fluctuates between being high and low. no urinary incontinance\par His memory is mildly affected, denies any hallucinations\par Insomnia-difficulty falling asleep, no vivid dreams, sometimes talks in his sleep but no dream enactment\par He reports neck pain but no restriction of neck movement\par No change in handwriting\par \par Patient states that about 2 weeks ago his primary care physician started him on Sinemet. He was asked to take it 3 times a day but he takes it only twice a day because he feels his tremors get worse after the medication he denies any side effects \par \par Denies any weakness. Denies any other neurological complaints\par Review of systems-a complete review of systems is performed and is negative except as listed in history of present illness\par \par Past medical history\par Cardiac stents\par Hypertension\par Diabetes\par Hyperlipidemia\par \par Family history\par Maternal grandfather had tremors\par \par interval history-bothered by head tremor and tremors in his hands.  Reports no falls, slight imbalance at times especially if he walks without his cane no dysphagia.  He has increased levodopa to 1 tablet 3 times a day has not had any side effects on it.  Is not noticed any benefit.  Has not taken his morning dose yet.  DaTscan done here to follow results. \par \par

## 2021-01-28 NOTE — DATA REVIEWED
[de-identified] : Stand up on a great neck done 11/ 24 2020\par \par MRI brain\par Moderate central and diffuse cortical atrophy\par No evidence of intracranial mass or focal lesion abnormality \par Mild mucosal thickening within the maxillary antra bilaterally\par \par MRA carotid arteries\par No focal stenosis or significant atherosclerotic disease involving the cervical carotid arteries\par \par \par I had a partial MRI cervical spine report showing disc bulge is seen at C4-C5 C5-C6\par C6-7 broad-based shallow disc herniation deforms the ventral thecal sac counterclockwise rotatory scoliosis

## 2021-02-09 PROBLEM — E78.5 HYPERLIPIDEMIA: Status: ACTIVE | Noted: 2017-09-07

## 2021-02-09 NOTE — REASON FOR VISIT
[Follow-Up - Clinic] : a clinic follow-up of [Coronary Artery Disease] : coronary artery disease [Hyperlipidemia] : hyperlipidemia [Hypertension] : hypertension [FreeTextEntry1] : No chest pain or dyspnea. Scheduled for cataract surgery 2/17.\par \par 1. May safely proceed with planned procedure without further cardiac testing. There are no active cardiac conditions. He should continue his aspirin, but is on no other antiplatelets or anticoagulants.\par \par 2. CAD. No angina.\par \par 3. HLD.  Continue lipitor.\par \par 4. HTN. controlled.

## 2021-02-09 NOTE — ASSESSMENT
[FreeTextEntry1] : 1. May safely proceed with planned procedure without further cardiac testing. There are no active cardiac conditions. He should continue his aspirin, but is on no other antiplatelets or anticoagulants.\par \par 2. CAD. No angina.\par \par 3. HLD.  Continue lipitor.\par \par 4. HTN. controlled.

## 2021-02-25 NOTE — HISTORY OF PRESENT ILLNESS
[Home] : at home, [unfilled] , at the time of the visit. [Medical Office: ([facility])___] : at the medical office located in  [Verbal consent obtained from patient] : the patient, [unfilled] [FreeTextEntry1] : This  is a 70-year-old right-handed male who presents with chief complaints of tremors\par At this visit he is accompanied by his grandson Washington\par \par He reports gradual onset of tremors for a few months now. The tremors initially started in his head and  progressed to involve his hands. The tremor is present at rest and is equal in both sides. not with activity. He does feel slow in the activities. He has not had any falls or any changes in balance. He reports that when he lays down he feels vertiginous and dizzy with neck extension. Does not have any dizziness on standing up.\par He was experiencing drooling, constipation and has had difficulty with controlling blood pressure for a few years. It fluctuates between being high and low. no urinary incontinance\par His memory is mildly affected, denies any hallucinations\par Insomnia-difficulty falling asleep, no vivid dreams, sometimes talks in his sleep but no dream enactment\par He reports neck pain but no restriction of neck movement\par No change in handwriting\par \par Patient states that about 2 weeks ago his primary care physician started him on Sinemet. He was asked to take it 3 times a day but he takes it only twice a day because he feels his tremors get worse after the medication he denies any side effects \par \par Denies any weakness. Denies any other neurological complaints\par Review of systems-a complete review of systems is performed and is negative except as listed in history of present illness\par \par Past medical history\par Cardiac stents\par Hypertension\par Diabetes\par Hyperlipidemia\par \par Family history\par Maternal grandfather had tremors\par \par interval history-patient followed up for Parkinson's disease.  He has increased Sinemet to 1-1/2 tablet 3 times a day.  He does not find any benefit on it.  His head tremor bothers him the most.  This is present especially when he is sitting by himself.  He also notices hand tremor.  He reports constipation which seems to be getting worse but he does not drink enough fluids.  He denies any side effects on medication.  The tremor also bothers him when he is trying to fall asleep.  His cataract surgery went well.   Reports no falls, slight imbalance at times especially if he walks without his cane no dysphagia.  \par \par

## 2021-02-25 NOTE — DISCUSSION/SUMMARY
[FreeTextEntry1] : This is a 70-year-old right-handed man who presents with chief complaints of tremors which began a few months ago initially with head tremors progressing to involve the upper extremities. Patient also reports slowness of daily activities, drooling, constipation, blood pressure fluctuations and mild cognitive changes. He denies any difficulty with balance, no falls, no urinary incontinence, no hallucinations.\par He was recently started on Sinemet and feels that it makes his symptoms worse\par On exam he is noted to have an irregular head tremor with no posturing and slight restriction of neck movements from side to side. Resting tremor and bradykinesia right slightly worse than left. He has difficulty with tandem walk.\par \par Impression-parkinsonism, a few red flags such as reportedly no response to meds, head tremor, gait ataxia, BP fluctuation. mild cognitive changes, dizziness, neck pain\par DaTscan is abnormal\par \par Plan\par Increase sinemet to 2 tablets 3 times a day, monitor for side effects\par May consider controlled release at bedtime if nighttime symptoms bother him\par Encouraged to drink more fluids and to use stool softeners.  Patient does have MiraLAX and docusate sodium\par monitor BP, if normal may consider low-dose propanolol for head tremor, Botox may be another option\par Discussed physical therapy, will hold off to coronavirus pandemic has subsided.  Encouraged to exercise stay active.  Fall precautions discussed\par All questions were addressed and answered\par f/u in 1 month

## 2021-02-25 NOTE — DATA REVIEWED
[de-identified] : Stand up on a great neck done 11/ 24 2020\par \par MRI brain\par Moderate central and diffuse cortical atrophy\par No evidence of intracranial mass or focal lesion abnormality \par Mild mucosal thickening within the maxillary antra bilaterally\par \par MRA carotid arteries\par No focal stenosis or significant atherosclerotic disease involving the cervical carotid arteries\par \par \par I had a partial MRI cervical spine report showing disc bulge is seen at C4-C5 C5-C6\par C6-7 broad-based shallow disc herniation deforms the ventral thecal sac counterclockwise rotatory scoliosis

## 2021-03-14 NOTE — ED ADULT TRIAGE NOTE - CHIEF COMPLAINT QUOTE
pt arrives w/ c/o weakness since 3/9/2021. as per pt granddaughter pt got 1st pfizer vaccine and then was exposed to his son who was covid positive. pt began to have weakness and now is unable to ambulate on his own. pt denies any chest pain, nausea, vomiting, fever, cough, chills. pt wife was brought in earlier this evening in cardiac arrest. pt arrives w/ c/o weakness since 3/9/2021. as per pt granddaughter pt got 1st pfizer vaccine and then was exposed to his son who was covid positive. pt began to have weakness and now is unable to ambulate on his own. pt denies any chest pain, nausea, vomiting, fever, cough, chills. pt wife was brought in earlier this evening in cardiac arrest with similar symptoms. Charge RN aware.

## 2021-03-14 NOTE — ED PROVIDER NOTE - CLINICAL SUMMARY MEDICAL DECISION MAKING FREE TEXT BOX
Jazmín Jarquin, PGY-1: 71 YO male, hx of CAD s/p stent x2 , CABG, HTN, DM on insulin, PD on carbidopa, p/w weakness. whole body x4d. covid + contacts at home. VS stable, satting mid 90s on RA. suspect covid, worsening of PD. low suspicion ACS, CHF. plan for cardiac w/u, fluids, covid. Jazmín Jarquin, PGY-1: 69 YO male, hx of CAD s/p stent x2 , CABG, HTN, DM on insulin, PD on carbidopa, p/w weakness. whole body x4d. covid + contacts at home. VS stable, satting mid 90s on RA. suspect covid, worsening of PD. low suspicion ACS, CHF. plan for cardiac w/u, fluids, covid.    josé luis: pt with 69 yo witnessed wife in cardiac arrest this am.  patient with cabg, dm, parkinsons, family member at home with covid.  generalized weakness, unable to eat drink last 12 hours. also weak not getting out of bed.  here arms and legs move with good strength, daughter at bedside, awake.

## 2021-03-14 NOTE — CONSULT NOTE ADULT - ASSESSMENT
kaylie is a 69 yo M with pmh of CAD s/p stents, DM-II, BPH s/p turp, HTN, and Parkinson's Disease on Sinemet presents ot [facility] for weakness. History was obtained by primary team. This morning, patient found his wife unresponsive at home and EMS was called- patient had ROSC s/p cardiac arrest. Patient has been tired fo the past four days. Patient uses a walker at baseline and has had poor appetite over the past couple days. Patient is diagnosed positive for Covid and was found to be hyponatremic on admission. Patient follows Dr. Tsai outpatient, and his Sinemet dose was recently increased ro 2 tabs TID. kaylie is a 69 yo M with pmh of CAD s/p stents, DM-II, BPH s/p turp, HTN, and Parkinson's Disease on Sinemet presents ot J for weakness. History was obtained by primary team. This morning, patient found his wife unresponsive at home and EMS was called- patient had ROSC s/p cardiac arrest. Patient has been tired fo the past four days. Patient uses a walker at baseline and has had poor appetite over the past couple days. Patient is diagnosed positive for Covid and was found to be hyponatremic on admission. Patient follows Dr. Tsai outpatient, and his Sinemet dose was recently increased ro 2 tabs TID.    Impression/ Plan   Increased weakness likely 2/2 due to hyponatremia and active Covid 19 infection.   Recommendations:   Continue home sinemet doses   Continue home medications   Correction of electrolyte derrangements   Defer management to primary team   Patient can follow-up with Dr. Tsai once discharge within 1 week. [address] [place] [address]. [phone].       Plan to be discussed with Neurology Attending.

## 2021-03-14 NOTE — CONSULT NOTE ADULT - SUBJECTIVE AND OBJECTIVE BOX
MRN-[id number]  Patient is a 70y old  Male who presents with a chief complaint of Weakness (14 Mar 2021 14:07)    HPI:  Patient is a 71 yo M with pmh of CAD s/p stents, DM-II, BPH s/p turp, HTN, and Parkinson's Disease on Sinemet presents ot [facility] for weakness. History was obtained by primary team. This morning, patient found his wife unresponsive at home and EMS was called- patient had ROSC s/p cardiac arrest. Patient has been tired fo the past four days. Patient uses a walker at baseline and has had poor appetite over the past couple days. Patient is diagnosed positive for Covid and was found to be hyponatremic on admission. Patient follows Dr. Tsai outpatient, and his Sinemet dose was recently increased ro 2 tabs TID. Patient     PAST MEDICAL & SURGICAL HISTORY:  BPH (benign prostatic hyperplasia)    GERD (gastroesophageal reflux disease)    HLD (hyperlipidemia)    Hypertension    CAD (coronary artery disease)  CABG x2 , Stents x2     Diabetes mellitus    Presence of stent in coronary artery  x2     History of coronary artery bypass graft  X2 in 2012    Bladder neck obstruction  3 surgeries for BPH, most recent       FAMILY HISTORY:  Family history of diabetes mellitus type II (Sibling)      Social Hx:  Nonsmoker, no drug or alcohol use    Home Medications:  Admelog SoloStar 100 units/mL injectable solution: 20 unit(s) injectable once a day (in the morning) (14 Mar 2021 13:08)  Basaglar KwikPen 100 units/mL subcutaneous solution: 40 unit(s) subcutaneous once a day (at bedtime) (14 Mar 2021 13:08)  carbidopa-levodopa 10 mg-100 mg oral tablet: 2 tab(s) orally 2 times a day (14 Mar 2021 13:04)  ciprofloxacin 0.3% ophthalmic solution: 1 drop(s) in the left eye 4 times a day (14 Mar 2021 13:07)  hydroCHLOROthiazide 25 mg oral tablet: 1 tab(s) orally once a day (14 Mar 2021 13:03)  ketorolac 0.5% ophthalmic solution: 1 drop(s) in the left eye 2 times a day (14 Mar 2021 13:05)  prednisoLONE acetate 1% ophthalmic suspension: 1 drop(s) in the left eye 4 times a day (14 Mar 2021 13:05)  simvastatin 20 mg oral tablet: 1 tab(s) orally once a day (at bedtime) (14 Mar 2021 13:02)    MEDICATIONS  (STANDING):  aspirin enteric coated 81 milliGRAM(s) Oral daily  carbidopa/levodopa  10/100 2 Tablet(s) Oral <User Schedule>  ciprofloxacin  0.3% Ophthalmic Solution 1 Drop(s) Left EYE four times a day  dextrose 40% Gel 15 Gram(s) Oral once  dextrose 5%. 1000 milliLiter(s) (50 mL/Hr) IV Continuous <Continuous>  dextrose 5%. 1000 milliLiter(s) (100 mL/Hr) IV Continuous <Continuous>  dextrose 50% Injectable 25 Gram(s) IV Push once  dextrose 50% Injectable 12.5 Gram(s) IV Push once  dextrose 50% Injectable 25 Gram(s) IV Push once  enoxaparin Injectable 40 milliGRAM(s) SubCutaneous daily  glucagon  Injectable 1 milliGRAM(s) IntraMuscular once  insulin glargine Injectable (LANTUS) 30 Unit(s) SubCutaneous at bedtime  insulin lispro (ADMELOG) corrective regimen sliding scale   SubCutaneous three times a day before meals  insulin lispro (ADMELOG) corrective regimen sliding scale   SubCutaneous at bedtime  insulin lispro Injectable (ADMELOG) 3 Unit(s) SubCutaneous three times a day before meals  ketorolac 0.5% Ophthalmic Solution 1 Drop(s) Left EYE two times a day  melatonin 3 milliGRAM(s) Oral at bedtime  metoprolol tartrate 25 milliGRAM(s) Oral two times a day  polyethylene glycol 3350 17 Gram(s) Oral two times a day  potassium chloride   Powder 40 milliEquivalent(s) Oral once  prednisoLONE acetate 1% Suspension 1 Drop(s) Left EYE four times a day  remdesivir  IVPB   IV Intermittent   remdesivir  IVPB 200 milliGRAM(s) IV Intermittent every 24 hours  senna 2 Tablet(s) Oral at bedtime  simvastatin 20 milliGRAM(s) Oral at bedtime  sodium chloride 0.9%. 1000 milliLiter(s) (75 mL/Hr) IV Continuous <Continuous>  traZODone 50 milliGRAM(s) Oral at bedtime    MEDICATIONS  (PRN):    Allergies  No Known Allergies    Intolerances      REVIEW OF SYSTEMS  General:	  Skin/Breast:	  Ophthalmologic:  ENMT:	  Respiratory and Thorax:	  Cardiovascular:	  Gastrointestinal:	  Genitourinary:	  Musculoskeletal:	  Neurological:	  Psychiatric:	  Hematology/Lymphatics:	  Endocrine:	  Allergic/Immunologic:	    ROS: Pertinent positives in HPI, all other ROS were reviewed and are negative.      Vital Signs Last 24 Hrs  T(C): 37.7 (14 Mar 2021 15:04), Max: 37.7 (14 Mar 2021 15:04)  T(F): 99.8 (14 Mar 2021 15:04), Max: 99.8 (14 Mar 2021 15:04)  HR: 73 (14 Mar 2021 15:04) (67 - 73)  BP: 160/74 (14 Mar 2021 15:04) (121/51 - 160/74)  BP(mean): --  RR: 18 (14 Mar 2021 15:04) (17 - 18)  SpO2: 98% (14 Mar 2021 15:) (98% - 100%)    GENERAL EXAM:  Constitutional: awake and alert. NAD  HEENT: PERRLA, EOMI  Neck: Supple  Respiratory: Breath sounds are clear bilaterally  Cardiovascular: S1 and S2, regular / irregular rhythm  Gastrointestinal: soft, nontender  Extremities: no edema, no cyanosis  Vascular: no carotid bruits  Musculoskeletal: no joint swelling/tenderness, no abnormal movements  Skin: no rashes    NEUROLOGICAL EXAM:  MS: AAOX3, fluent, attends b/l; recent and remote memory intact; normal attention, language and fund of knowledge.   CN: VFF, EOMI, PERRL, no ISIAH, no APD,  V1-3 intact, no facial asymmetry, t/p midline, SCM/trap intact.  Eyes-Fundi: no papilledema.  Motor: Strength: 5/5 4x. Tone: normal. Bulk: normal. DTR 2+ symm.  Plantar flex b/l. Sensation: intact to LT/PP/Vibration/Position/Temperature 4x.   Coordination: intact 4x.   Gait:  Romberg negative, pull test negative; walks with narrow base, pivots in 2 steps.    NIHSS  mRS    Labs:   cbc                      12.2   3.90  )-----------( 153      ( 14 Mar 2021 08:57 )             36.4     [id number]    126<L>  |  86<L>  |  19  ----------------------------<  234<H>  3.3<L>   |  29  |  1.18    Ca    8.0<L>      14 Mar 2021 10:26    TPro  6.3  /  Alb  3.0<L>  /  TBili  0.3  /  DBili  <0.2  /  AST  66<H>  /  ALT  23  /  AlkPhos  60  03-14    CoagsPT/INR - ( 14 Mar 2021 13:15 )   PT: 12.1 sec;   INR: 1.05 ratio           Lipids  A1C  CardiacMarkers    LFTsLIVER FUNCTIONS - ( 14 Mar 2021 13:15 )  Alb: 3.0 g/dL / Pro: 6.3 g/dL / ALK PHOS: 60 U/L / ALT: 23 U/L / AST: 66 U/L / GGT: x           UAUrinalysis Basic - ( 14 Mar 2021 12:49 )    Color: Yellow / Appearance: Clear / S.013 / pH: x  Gluc: x / Ketone: Trace  / Bili: Negative / Urobili: <2 mg/dL   Blood: x / Protein: 30 mg/dL / Nitrite: Negative   Leuk Esterase: Negative / RBC: 4 /HPF / WBC 5 /HPF   Sq Epi: x / Non Sq Epi: 0 /HPF / Bacteria: Negative      CSF  Immunological Labs    Radiology:  -CT Head  -MRI brain  -MRA brain/Carotids  -EEG  -EKG  -TTE/IAN [id number]  Patient is a 70y old  Male who presents with a chief complaint of Weakness (14 Mar 2021 14:07)    HPI:  Patient is a 69 yo M with pmh of CAD s/p stents, DM-II, BPH s/p turp, HTN, and Parkinson's Disease on Sinemet presents ot [facility] for weakness. History was obtained by primary team. This morning, patient found his wife unresponsive at home and EMS was called- patient had ROSC s/p cardiac arrest. Patient has been tired fo the past four days. Patient uses a walker at baseline and has had poor appetite over the past couple days. Patient is diagnosed positive for Covid and was found to be hyponatremic on admission. Patient follows Dr. Tsai outpatient, and his Sinemet dose was recently increased ro 2 tabs TID. Patient     PAST MEDICAL & SURGICAL HISTORY:  BPH (benign prostatic hyperplasia)    GERD (gastroesophageal reflux disease)    HLD (hyperlipidemia)    Hypertension    CAD (coronary artery disease)  CABG x2 , Stents x2     Diabetes mellitus    Presence of stent in coronary artery  x2     History of coronary artery bypass graft  X2 in 2012    Bladder neck obstruction  3 surgeries for BPH, most recent       FAMILY HISTORY:  Family history of diabetes mellitus type II (Sibling)      Social Hx:  Nonsmoker, no drug or alcohol use    Home Medications:  Admelog SoloStar 100 units/mL injectable solution: 20 unit(s) injectable once a day (in the morning) (14 Mar 2021 13:08)  Basaglar KwikPen 100 units/mL subcutaneous solution: 40 unit(s) subcutaneous once a day (at bedtime) (14 Mar 2021 13:08)  carbidopa-levodopa 10 mg-100 mg oral tablet: 2 tab(s) orally 2 times a day (14 Mar 2021 13:04)  ciprofloxacin 0.3% ophthalmic solution: 1 drop(s) in the left eye 4 times a day (14 Mar 2021 13:07)  hydroCHLOROthiazide 25 mg oral tablet: 1 tab(s) orally once a day (14 Mar 2021 13:03)  ketorolac 0.5% ophthalmic solution: 1 drop(s) in the left eye 2 times a day (14 Mar 2021 13:05)  prednisoLONE acetate 1% ophthalmic suspension: 1 drop(s) in the left eye 4 times a day (14 Mar 2021 13:05)  simvastatin 20 mg oral tablet: 1 tab(s) orally once a day (at bedtime) (14 Mar 2021 13:02)    MEDICATIONS  (STANDING):  aspirin enteric coated 81 milliGRAM(s) Oral daily  carbidopa/levodopa  10/100 2 Tablet(s) Oral <User Schedule>  ciprofloxacin  0.3% Ophthalmic Solution 1 Drop(s) Left EYE four times a day  dextrose 40% Gel 15 Gram(s) Oral once  dextrose 5%. 1000 milliLiter(s) (50 mL/Hr) IV Continuous <Continuous>  dextrose 5%. 1000 milliLiter(s) (100 mL/Hr) IV Continuous <Continuous>  dextrose 50% Injectable 25 Gram(s) IV Push once  dextrose 50% Injectable 12.5 Gram(s) IV Push once  dextrose 50% Injectable 25 Gram(s) IV Push once  enoxaparin Injectable 40 milliGRAM(s) SubCutaneous daily  glucagon  Injectable 1 milliGRAM(s) IntraMuscular once  insulin glargine Injectable (LANTUS) 30 Unit(s) SubCutaneous at bedtime  insulin lispro (ADMELOG) corrective regimen sliding scale   SubCutaneous three times a day before meals  insulin lispro (ADMELOG) corrective regimen sliding scale   SubCutaneous at bedtime  insulin lispro Injectable (ADMELOG) 3 Unit(s) SubCutaneous three times a day before meals  ketorolac 0.5% Ophthalmic Solution 1 Drop(s) Left EYE two times a day  melatonin 3 milliGRAM(s) Oral at bedtime  metoprolol tartrate 25 milliGRAM(s) Oral two times a day  polyethylene glycol 3350 17 Gram(s) Oral two times a day  potassium chloride   Powder 40 milliEquivalent(s) Oral once  prednisoLONE acetate 1% Suspension 1 Drop(s) Left EYE four times a day  remdesivir  IVPB   IV Intermittent   remdesivir  IVPB 200 milliGRAM(s) IV Intermittent every 24 hours  senna 2 Tablet(s) Oral at bedtime  simvastatin 20 milliGRAM(s) Oral at bedtime  sodium chloride 0.9%. 1000 milliLiter(s) (75 mL/Hr) IV Continuous <Continuous>  traZODone 50 milliGRAM(s) Oral at bedtime    MEDICATIONS  (PRN):    Allergies  No Known Allergies    Intolerances      REVIEW OF SYSTEMS  General: Mentions fevers 	  Ophthalmologic: Denies blurred vision   Musculoskeletal:	 Denies weakness   Neurological: Denies headaches, tingling and weakness.       ROS: Pertinent positives in HPI, all other ROS were reviewed and are negative.      Vital Signs Last 24 Hrs  T(C): 37.7 (14 Mar 2021 15:04), Max: 37.7 (14 Mar 2021 15:04)  T(F): 99.8 (14 Mar 2021 15:04), Max: 99.8 (14 Mar 2021 15:04)  HR: 73 (14 Mar 2021 15:04) (67 - 73)  BP: 160/74 (14 Mar 2021 15:04) (121/51 - 160/74)  BP(mean): --  RR: 18 (14 Mar 2021 15:04) (17 - 18)  SpO2: 98% (14 Mar 2021 15:04) (98% - 100%)    GENERAL EXAM:  Constitutional: Awake   HEENT: PERRL EOMI, pupils symmetric b/l.       NEUROLOGICAL EXAM:  MS: AAOX3,speech is  fluent, extinction wnl.   CN: VFF, EOMI, PERRL,   V1-3 intact, no facial asymmetry, t/p midline, SCM/trap intact.  Motor: Strength: 5/5 4x.  Tone: normal.  DTR 2+ symm. in patellar, biceps and triceps b/l.   Sensation: intact to LT and temperature in all extremities b/l.   Coordination: finger to nose intact b/l. Heel to shin intact b/l. Distal tremors noted in the LUE.     Labs:   cbc                      12.2   3.90  )-----------( 153      ( 14 Mar 2021 08:57 )             36.4     [id number]    126<L>  |  86<L>  |  19  ----------------------------<  234<H>  3.3<L>   |  29  |  1.18    Ca    8.0<L>      14 Mar 2021 10:26    TPro  6.3  /  Alb  3.0<L>  /  TBili  0.3  /  DBili  <0.2  /  AST  66<H>  /  ALT  23  /  AlkPhos  60  03-14    CoagsPT/INR - ( 14 Mar 2021 13:15 )   PT: 12.1 sec;   INR: 1.05 ratio           Lipids  A1C  CardiacMarkers    LFTsLIVER FUNCTIONS - ( 14 Mar 2021 13:15 )  Alb: 3.0 g/dL / Pro: 6.3 g/dL / ALK PHOS: 60 U/L / ALT: 23 U/L / AST: 66 U/L / GGT: x           UAUrinalysis Basic - ( 14 Mar 2021 12:49 )    Color: Yellow / Appearance: Clear / S.013 / pH: x  Gluc: x / Ketone: Trace  / Bili: Negative / Urobili: <2 mg/dL   Blood: x / Protein: 30 mg/dL / Nitrite: Negative   Leuk Esterase: Negative / RBC: 4 /HPF / WBC 5 /HPF   Sq Epi: x / Non Sq Epi: 0 /HPF / Bacteria: Negative      CSF  Immunological Labs    Radiology:   [id number]  Patient is a 70y old  Male who presents with a chief complaint of Weakness (14 Mar 2021 14:07)    HPI:  Patient is a 71 yo M with pmh of CAD s/p stents, DM-II, BPH s/p turp, HTN, and Parkinson's Disease on Sinemet presents ot [facility] for weakness. History was obtained by primary team. This morning, patient found his wife unresponsive at home and EMS was called- patient had ROSC s/p cardiac arrest. Patient has been tired fo the past four days. Patient uses a walker at baseline and has had poor appetite over the past couple days. Patient is diagnosed positive for Covid and was found to be hyponatremic on admission. Patient follows Dr. Tsai outpatient, and his Sinemet dose was recently increased ro 2 tabs TID. Patient denies headaches, numbness, tingling. Mentions fevers, and fatigue.      PAST MEDICAL & SURGICAL HISTORY:  BPH (benign prostatic hyperplasia)    GERD (gastroesophageal reflux disease)    HLD (hyperlipidemia)    Hypertension    CAD (coronary artery disease)  CABG x2 , Stents x2     Diabetes mellitus    Presence of stent in coronary artery  x2     History of coronary artery bypass graft  X2 in 2012    Bladder neck obstruction  3 surgeries for BPH, most recent       FAMILY HISTORY:  Family history of diabetes mellitus type II (Sibling)      Social Hx:  Nonsmoker, no drug or alcohol use    Home Medications:  Admelog SoloStar 100 units/mL injectable solution: 20 unit(s) injectable once a day (in the morning) (14 Mar 2021 13:08)  Basaglar KwikPen 100 units/mL subcutaneous solution: 40 unit(s) subcutaneous once a day (at bedtime) (14 Mar 2021 13:08)  carbidopa-levodopa 10 mg-100 mg oral tablet: 2 tab(s) orally 2 times a day (14 Mar 2021 13:04)  ciprofloxacin 0.3% ophthalmic solution: 1 drop(s) in the left eye 4 times a day (14 Mar 2021 13:07)  hydroCHLOROthiazide 25 mg oral tablet: 1 tab(s) orally once a day (14 Mar 2021 13:03)  ketorolac 0.5% ophthalmic solution: 1 drop(s) in the left eye 2 times a day (14 Mar 2021 13:05)  prednisoLONE acetate 1% ophthalmic suspension: 1 drop(s) in the left eye 4 times a day (14 Mar 2021 13:05)  simvastatin 20 mg oral tablet: 1 tab(s) orally once a day (at bedtime) (14 Mar 2021 13:02)    MEDICATIONS  (STANDING):  aspirin enteric coated 81 milliGRAM(s) Oral daily  carbidopa/levodopa  10/100 2 Tablet(s) Oral <User Schedule>  ciprofloxacin  0.3% Ophthalmic Solution 1 Drop(s) Left EYE four times a day  dextrose 40% Gel 15 Gram(s) Oral once  dextrose 5%. 1000 milliLiter(s) (50 mL/Hr) IV Continuous <Continuous>  dextrose 5%. 1000 milliLiter(s) (100 mL/Hr) IV Continuous <Continuous>  dextrose 50% Injectable 25 Gram(s) IV Push once  dextrose 50% Injectable 12.5 Gram(s) IV Push once  dextrose 50% Injectable 25 Gram(s) IV Push once  enoxaparin Injectable 40 milliGRAM(s) SubCutaneous daily  glucagon  Injectable 1 milliGRAM(s) IntraMuscular once  insulin glargine Injectable (LANTUS) 30 Unit(s) SubCutaneous at bedtime  insulin lispro (ADMELOG) corrective regimen sliding scale   SubCutaneous three times a day before meals  insulin lispro (ADMELOG) corrective regimen sliding scale   SubCutaneous at bedtime  insulin lispro Injectable (ADMELOG) 3 Unit(s) SubCutaneous three times a day before meals  ketorolac 0.5% Ophthalmic Solution 1 Drop(s) Left EYE two times a day  melatonin 3 milliGRAM(s) Oral at bedtime  metoprolol tartrate 25 milliGRAM(s) Oral two times a day  polyethylene glycol 3350 17 Gram(s) Oral two times a day  potassium chloride   Powder 40 milliEquivalent(s) Oral once  prednisoLONE acetate 1% Suspension 1 Drop(s) Left EYE four times a day  remdesivir  IVPB   IV Intermittent   remdesivir  IVPB 200 milliGRAM(s) IV Intermittent every 24 hours  senna 2 Tablet(s) Oral at bedtime  simvastatin 20 milliGRAM(s) Oral at bedtime  sodium chloride 0.9%. 1000 milliLiter(s) (75 mL/Hr) IV Continuous <Continuous>  traZODone 50 milliGRAM(s) Oral at bedtime    MEDICATIONS  (PRN):    Allergies  No Known Allergies    Intolerances      REVIEW OF SYSTEMS  General: Mentions fevers 	  Ophthalmologic: Denies blurred vision   Musculoskeletal:	 Denies weakness   Neurological: Denies headaches, tingling and weakness.       ROS: Pertinent positives in HPI, all other ROS were reviewed and are negative.      Vital Signs Last 24 Hrs  T(C): 37.7 (14 Mar 2021 15:04), Max: 37.7 (14 Mar 2021 15:04)  T(F): 99.8 (14 Mar 2021 15:04), Max: 99.8 (14 Mar 2021 15:04)  HR: 73 (14 Mar 2021 15:04) (67 - 73)  BP: 160/74 (14 Mar 2021 15:04) (121/51 - 160/74)  BP(mean): --  RR: 18 (14 Mar 2021 15:04) (17 - 18)  SpO2: 98% (14 Mar 2021 15:04) (98% - 100%)    GENERAL EXAM:  Constitutional: Awake   HEENT: PERRL EOMI, pupils symmetric b/l.       NEUROLOGICAL EXAM:  MS: AAOX3,speech is  fluent, extinction wnl.   CN: VFF, EOMI, PERRL,   V1-3 intact, no facial asymmetry, t/p midline, SCM/trap intact.  Motor: Strength: 5/5 4x.  Tone: normal.  DTR 2+ symm. in patellar, biceps and triceps b/l.   Sensation: intact to LT and temperature in all extremities b/l.   Coordination: finger to nose intact b/l. Heel to shin intact b/l. Distal tremors noted in the LUE.     Labs:   cbc                      12.2   3.90  )-----------( 153      ( 14 Mar 2021 08:57 )             36.4     [id number]    126<L>  |  86<L>  |  19  ----------------------------<  234<H>  3.3<L>   |  29  |  1.18    Ca    8.0<L>      14 Mar 2021 10:26    TPro  6.3  /  Alb  3.0<L>  /  TBili  0.3  /  DBili  <0.2  /  AST  66<H>  /  ALT  23  /  AlkPhos  60  03-14    CoagsPT/INR - ( 14 Mar 2021 13:15 )   PT: 12.1 sec;   INR: 1.05 ratio           Lipids  A1C  CardiacMarkers    LFTsLIVER FUNCTIONS - ( 14 Mar 2021 13:15 )  Alb: 3.0 g/dL / Pro: 6.3 g/dL / ALK PHOS: 60 U/L / ALT: 23 U/L / AST: 66 U/L / GGT: x           UAUrinalysis Basic - ( 14 Mar 2021 12:49 )    Color: Yellow / Appearance: Clear / S.013 / pH: x  Gluc: x / Ketone: Trace  / Bili: Negative / Urobili: <2 mg/dL   Blood: x / Protein: 30 mg/dL / Nitrite: Negative   Leuk Esterase: Negative / RBC: 4 /HPF / WBC 5 /HPF   Sq Epi: x / Non Sq Epi: 0 /HPF / Bacteria: Negative      CSF  Immunological Labs    Radiology:

## 2021-03-14 NOTE — ED PROVIDER NOTE - PHYSICAL EXAMINATION
Gen: WDWN, NAD  HEENT: EOMI, no nasal discharge, mucous membranes mildly dry.   CV: RRR, +S1/S2, no M/R/G  Resp: CTAB, no W/R/R  GI: Abdomen soft non-distended, NTTP, no masses  MSK: No open wounds, no bruising, no LE edema  Neuro: A&Ox4, following commands, moving all four extremities spontaneously. CN3-12 intact, AOX3, EOMI. PERRLA, 5/5 strength in b/l UE/LE.  Sensation intact bl in UE/LE.  Psych: appropriate mood

## 2021-03-14 NOTE — H&P ADULT - PROBLEM SELECTOR PLAN 5
Check A1c in AM  On Lantus 40 units qHS, Admelog 20 units before breakfast (patient only eats 2 meals per day)  Will c/w Lantus 30 units qHS while inpatient  Continue with FS qac and qHS  Low dose ISS

## 2021-03-14 NOTE — ED PROVIDER NOTE - NS ED ROS FT
Gen: Denies fevers  HEENT: + mild h/a.   CV: Denies chest pain  Skin: denies color changes  Resp: Denies SOB, cough  Endo: Denies increased urination  GI: + decreased PO intake.   Msk: + generalized weakness  : Denies dysuria  Neuro: Denies LOC

## 2021-03-14 NOTE — ED PROVIDER NOTE - OBJECTIVE STATEMENT
69 YO male, hx of CAD s/p stent x2 , CABG, HTN, DM on insulin, PD on carbidopa, p/w weakness. whole body x4d. typically walks with walker, now needs assistance to ambulate. endorses decreased PO intake. son + for covid 4d prior. denies fevers, cough, SOB. no chest pain, dizziness. denies recent changes to meds.

## 2021-03-14 NOTE — H&P ADULT - HISTORY OF PRESENT ILLNESS
70 year old Maltese/Jhonny-speaking male with a history of CAD s/p CABG and 2 stents, Type 2 DM, HTN, BPH s/p TURP and Parkinson's disease presenting with weakness. Patient normally walks with a walker but over the past few days he has needed assistance to walk around his house. He has also had poor appetite during this time. Patient's son who lives with him tested positive for COVID on 3/9 without showing any symptoms and was due to travel for work. This morning, patient found his wife unresponsive at home and EMS was called- patient had ROSC s/p cardiac arrest and is currently hospitalized at [facility]. Patient denies fevers, chills, SOB, chest pain, or abdominal pain. He does endorse having chronic constipation.     ED vitals: Temp 98.3 HR 68 /51 RR 17 O2 99% on room air. COVID PCR returned positive. Labs remarkable for Na 124, K+ 3.3, Cl 83. CXR showed patchy opacities L>R. Patient was given 1L NS, azithromycin, and CTX.

## 2021-03-14 NOTE — H&P ADULT - PROBLEM SELECTOR PLAN 2
Na 124 on admission, likely 2/2 poor PO intake in setting of COVID  -Urine sodium <20, urine osmolality 353 suggestive of hypovolemic hyponatremia   -Start NS @75cc for now, monitor BMp q6h  -Goal correction of 8-10 mEq Na within 24h  -Hold HCTZ

## 2021-03-14 NOTE — ED ADULT NURSE NOTE - NSFALLRSKASSESASSIST_ED_ALL_ED
yes Elidel Counseling: Patient may experience a mild burning sensation during topical application. Elidel is not approved in children less than 2 years of age. There have been case reports of hematologic and skin malignancies in patients using topical calcineurin inhibitors although causality is questionable.

## 2021-03-14 NOTE — H&P ADULT - ASSESSMENT
70 year old Bulgarian/Jhonny-speaking male with a history of CAD s/p CABG and 2 stents, Type 2 DM, HTN, BPH s/p TURP and Parkinson's disease presenting with weakness, found to be COVID positive with hyponatremia.

## 2021-03-14 NOTE — H&P ADULT - NSICDXPASTMEDICALHX_GEN_ALL_CORE_FT
PAST MEDICAL HISTORY:  BPH (benign prostatic hyperplasia)     CAD (coronary artery disease) CABG x2 2012, Stents x2 2014    Diabetes mellitus     GERD (gastroesophageal reflux disease)     HLD (hyperlipidemia)     Hypertension

## 2021-03-14 NOTE — H&P ADULT - PROBLEM SELECTOR PLAN 1
COVID PCR positive on 3/14, patient with no respiratory symptoms, saturating well on room air  -Given high risk of progression to severe disease, will start Remdesivir if AST/ALT less than 5x ULN  -Not a candidate for Decadron at this time  -Monitor Cr and AST/ALT while on Remdesivir  -Monitor O2 saturation  -Trend inflammatory markers- procalcitonin, D-dimer, LDH, CRP, ferritin

## 2021-03-14 NOTE — H&P ADULT - NSHPLABSRESULTS_GEN_ALL_CORE
12.2   3.90  )-----------( 153      ( 14 Mar 2021 08:57 )             36.4     03-14    126<L>  |  86<L>  |  19  ----------------------------<  234<H>  3.3<L>   |  29  |  1.18    Ca    8.0<L>      14 Mar 2021 10:26    PT/INR - ( 14 Mar 2021 13:15 )   PT: 12.1 sec;   INR: 1.05 ratio         D-dimer 385       Ferritin 548  .5  Procalcitonin 0.10    Serum osmolality 273    Urinalysis Basic - ( 14 Mar 2021 12:49 )    Color: Yellow / Appearance: Clear / S.013 / pH: x  Gluc: x / Ketone: Trace  / Bili: Negative / Urobili: <2 mg/dL   Blood: x / Protein: 30 mg/dL / Nitrite: Negative   Leuk Esterase: Negative / RBC: 4 /HPF / WBC 5 /HPF   Sq Epi: x / Non Sq Epi: 0 /HPF / Bacteria: Negative    COVID PCR positive    EKG personally reviewed- NSR, no ischemic changes noted    CXR personally reviewed: B/l patchy opacities, L>R

## 2021-03-14 NOTE — ED ADULT NURSE NOTE - OBJECTIVE STATEMENT
Pt received to room 15 presents with generalized weakness for past 4 days. Pt a&ox3, ambulatory with assistance however as per daughter pt has been bedbound past 4 days. Pt endorsing decreased appetite, constipation, and fatigue. Pt has hx of Parkinson's and CABG, pt denies CP , SOB, diarrhea, n/v, fever/chills or any other symptoms. Resident at bedside assessing pt, will continue to monitor.

## 2021-03-14 NOTE — ED PROVIDER NOTE - ATTENDING CONTRIBUTION TO CARE
josé luis: pt with 69 yo witnessed wife in cardiac arrest this am.  patient with cabg, dm, parkinsons, family member at home with covid.  generalized weakness, unable to eat drink last 12 hours. also weak not getting out of bed.  here arms and legs move with good strength, daughter at bedside, awake.    I performed a history and physical exam of the patient and discussed their management with the resident and /or advanced care provider. I reviewed the resident and /or ACP's note and agree with the documented findings and plan of care. My medical decison making and observations are found above. josé luis: pt with 69 yo witnessed wife in cardiac arrest this am.  patient with cabg, dm, parkinsons, family member at home with covid.  generalized weakness, unable to eat drink last 12 hours. also weak not getting out of bed.  here arms and legs move with good strength, daughter at bedside, awake.    I performed a history and physical exam of the patient and discussed their management with the resident and /or advanced care provider. I reviewed the resident and /or ACP's note and agree with the documented findings and plan of care. My medical decision making and observations are found above..

## 2021-03-14 NOTE — H&P ADULT - NSHPSOCIALHISTORY_GEN_ALL_CORE
Lives at home with his wife, son and his family. Ambulates with a walker. Denies history of smoking, no alcohol use. No travel outside the [place] recently.

## 2021-03-14 NOTE — ED ADULT NURSE NOTE - CHIEF COMPLAINT QUOTE
pt arrives w/ c/o weakness since 3/9/2021. as per pt granddaughter pt got 1st pfizer vaccine and then was exposed to his son who was covid positive. pt began to have weakness and now is unable to ambulate on his own. pt denies any chest pain, nausea, vomiting, fever, cough, chills. pt wife was brought in earlier this evening in cardiac arrest with similar symptoms. Charge RN aware.

## 2021-03-15 NOTE — PHYSICAL THERAPY INITIAL EVALUATION ADULT - ADDITIONAL COMMENTS
Pt lives at home with wife, son and his son's family. ambulates with rolling walker. wife is currently hospitalized. History obtained from EMR secondary to pt is unreliable historian.     Pt left semi-mcfadden in bed, NAD. +call bell. lines and tubes intact. RN aware of session. Isolation precautions maintained. PCA present outside room.

## 2021-03-15 NOTE — CONSULT NOTE ADULT - ASSESSMENT
EKG: SR no ischemic changes     Echo: < from: Transthoracic Echocardiogram (10.01.20 @ 18:39) >  1. Calcified trileaflet aortic valve with normal opening.  Minimal aortic regurgitation.  2. Increased relative wall thickness with normal left  ventricular mass index, consistent with concentric left  ventricular remodeling.  3. Normal left ventricular systolic function. No segmental  wall motion abnormalities.  4. Normal right ventricular size and function.    < end of copied text >    Assessment and Plan     1) CAD s/p CABG and PCI : c/w asa, zocor and metoprolol, denies CP    2) COVID: on steroids  t/t per pulm and ID     3) HTN: c/w metoprolol    4) Parkinsons f/u neuro

## 2021-03-15 NOTE — PROGRESS NOTE ADULT - ASSESSMENT
70 year old Mongolian/Jhonny-speaking male with a history of CAD s/p CABG and 2 stents, Type 2 DM, HTN, BPH s/p TURP and Parkinson's disease presenting with weakness, found to be COVID positive with hyponatremia.

## 2021-03-15 NOTE — PHYSICAL THERAPY INITIAL EVALUATION ADULT - GENERAL OBSERVATIONS, REHAB EVAL
Pt received semi-mcfadden in bed, 2L, +IV, NAD. Pt agreeable to PT consultation. Cleared for PT as per RA Guevara

## 2021-03-15 NOTE — CONSULT NOTE ADULT - SUBJECTIVE AND OBJECTIVE BOX
PULMONARY CONSULT NOTE      SHAHZAD FIELD  MRN-[id number]    Patient is a 70y old  Male who presents with a chief complaint of Weakness (14 Mar 2021 17:20)      HISTORY OF PRESENT ILLNESS:  71 yo male with f CAD (s/p CABG x2 in 2012 and Stent x2 in 2014), HTN, Type 2 Diabetes on insulin, BPH admitted for weakness  found to be covid +    He is on room air. + chest congestion. denies fevers or chils at home. does not know of any covid exposure  denies smoking.     Allergies    No Known Allergies    Intolerances        PAST MEDICAL & SURGICAL HISTORY:  BPH (benign prostatic hyperplasia)    GERD (gastroesophageal reflux disease)    HLD (hyperlipidemia)    Hypertension    CAD (coronary artery disease)  CABG x2 2012, Stents x2 2014    Diabetes mellitus    Presence of stent in coronary artery  x2 2014    History of coronary artery bypass graft  X2 in March of 2012    Bladder neck obstruction  3 surgeries for BPH, most recent 2012            FAMILY HISTORY:  Family history of diabetes mellitus type II (Sibling)      Prescriptions:  aspirin 81 mg oral delayed release tablet: 1 tab(s) orally once a day (02 Oct 2020 13:22)  melatonin 3 mg oral tablet: 1 tab(s) orally once a day (at bedtime) (02 Oct 2020 13:22)  metoprolol tartrate 25 mg oral tablet: 1 tab(s) orally 2 times a day (02 Oct 2020 13:22)  polyethylene glycol 3350 oral powder for reconstitution: 17 gram(s) orally once a day (at bedtime) (02 Oct 2020 13:22)  traZODone 50 mg oral tablet: 1 tab(s) orally once a day (at bedtime) (02 Oct 2020 13:22)      SOCIAL HISTORY  Smoking History: denies      REVIEW OF SYSTEMS:  chest congestion  weakness    Vital Signs Last 24 Hrs  T(C): 36.3 (15 Mar 2021 10:20), Max: 39.4 (14 Mar 2021 22:03)  T(F): 97.4 (15 Mar 2021 10:20), Max: 103 (14 Mar 2021 22:03)  HR: 49 (15 Mar 2021 10:20) (49 - 84)  BP: 108/46 (15 Mar 2021 10:20) (108/46 - 160/74)  BP(mean): --  RR: 20 (15 Mar 2021 10:20) (18 - 20)  SpO2: 100% (15 Mar 2021 10:20) (94% - 100%)    PHYSICAL EXAMINATION:  elderly male in nad  na/at  regular rate  not labored  on room air  awake/alert     MEDICATIONS  (STANDING):  aspirin enteric coated 81 milliGRAM(s) Oral daily  carbidopa/levodopa  10/100 2 Tablet(s) Oral <User Schedule>  ciprofloxacin  0.3% Ophthalmic Solution 1 Drop(s) Left EYE four times a day  dexAMETHasone     Tablet 6 milliGRAM(s) Oral daily  dextrose 40% Gel 15 Gram(s) Oral once  dextrose 5%. 1000 milliLiter(s) (50 mL/Hr) IV Continuous <Continuous>  dextrose 5%. 1000 milliLiter(s) (100 mL/Hr) IV Continuous <Continuous>  dextrose 50% Injectable 25 Gram(s) IV Push once  dextrose 50% Injectable 12.5 Gram(s) IV Push once  dextrose 50% Injectable 25 Gram(s) IV Push once  enoxaparin Injectable 40 milliGRAM(s) SubCutaneous daily  glucagon  Injectable 1 milliGRAM(s) IntraMuscular once  insulin glargine Injectable (LANTUS) 30 Unit(s) SubCutaneous at bedtime  insulin lispro (ADMELOG) corrective regimen sliding scale   SubCutaneous three times a day before meals  insulin lispro (ADMELOG) corrective regimen sliding scale   SubCutaneous at bedtime  insulin lispro Injectable (ADMELOG) 3 Unit(s) SubCutaneous three times a day before meals  ketorolac 0.5% Ophthalmic Solution 1 Drop(s) Left EYE two times a day  melatonin 3 milliGRAM(s) Oral at bedtime  metoprolol tartrate 25 milliGRAM(s) Oral two times a day  polyethylene glycol 3350 17 Gram(s) Oral two times a day  potassium phosphate IVPB 15 milliMole(s) IV Intermittent once  prednisoLONE acetate 1% Suspension 1 Drop(s) Left EYE four times a day  remdesivir  IVPB   IV Intermittent   remdesivir  IVPB 100 milliGRAM(s) IV Intermittent every 24 hours  senna 2 Tablet(s) Oral at bedtime  simvastatin 20 milliGRAM(s) Oral at bedtime  sodium chloride 0.9%. 1000 milliLiter(s) (75 mL/Hr) IV Continuous <Continuous>  traZODone 50 milliGRAM(s) Oral at bedtime      MEDICATIONS  (PRN):        LABS:   CBC Full  -  ( 15 Mar 2021 06:30 )  WBC Count : 3.67 K/uL  RBC Count : 4.25 M/uL  Hemoglobin : 11.5 g/dL  Hematocrit : 34.7 %  Platelet Count - Automated : 141 K/uL  Mean Cell Volume : 81.6 fL  Mean Cell Hemoglobin : 27.1 pg  Mean Cell Hemoglobin Concentration : 33.1 gm/dL  Auto Neutrophil # : x  Auto Lymphocyte # : x  Auto Monocyte # : x  Auto Eosinophil # : x  Auto Basophil # : x  Auto Neutrophil % : x  Auto Lymphocyte % : x  Auto Monocyte % : x  Auto Eosinophil % : x  Auto Basophil % : x    PT/INR - ( 14 Mar 2021 13:15 )   PT: 12.1 sec;   INR: 1.05 ratio           03-15    127<L>  |  91<L>  |  14  ----------------------------<  67<L>  3.3<L>   |  24  |  0.93    Ca    7.7<L>      15 Mar 2021 06:30  Phos  2.1     03-15  Mg     1.6     03-15    TPro  6.1  /  Alb  3.2<L>  /  TBili  0.3  /  DBili  x   /  AST  54<H>  /  ALT  <5  /  AlkPhos  63  03-15                RADIOLOGY & ADDITIONAL STUDIES:  < from: Xray Chest 1 View- PORTABLE-Urgent (Xray Chest 1 View- PORTABLE-Urgent .) (03.14.21 @ 09:53) >    EXAM:  XR CHEST PORTABLE URGENT 1V        PROCEDURE DATE:  Mar 14 2021         INTERPRETATION:  CLINICAL INFORMATION: Weakness.    TECHNIQUE: One view of the chest.    COMPARISON: Radiograph chest 9/30/2020.    FINDINGS:  LINES/TUBES: None    LUNGS: Patchy left lower lung opacities.    PLEURA: No pleural effusion or pneumothorax.    HEART AND MEDIASTINUM: Status post median sternotomy Heart size is poorly assessed..    SKELETON: Unremarkable skeletal structures.    IMPRESSION:  Patchy left lower lung opacities may represent pneumonia.              < end of copied text >    ECHO:  e< from: Transthoracic Echocardiogram (10.01.20 @ 18:39) >    ------------------------------------------------------------------------  CONCLUSIONS:  1. Calcified trileaflet aortic valve with normal opening.  Minimal aortic regurgitation.  2. Increased relative wall thickness with normal left  ventricular mass index, consistent with concentric left  ventricular remodeling.  3. Normal left ventricular systolic function. No segmental  wall motion abnormalities.  4. Normal right ventricular size and function.  ------------------------------------------------------------------------  Confirmed on10/2/2020 - 10:00:16 by Reinier Pearson M.D. RPVI    < end of copied text >    ASSESSMENT:  71 yo with weakness found to be covid 19 +    PLAN:  on remdesivir  check room air pulse ox   if > 94% may not need dexamethasone  obtain BMI- to make sure he doesnt need lovenox BID  for now continue lovenox daily  trend markers    COVID precautions    Thank you for allowing me to participate in the care of this patient.  Please feel free to call me for any questions/concerns.      Yani Tiwari,   [facility] Pulmonary/Sleep Medicine  [phone]

## 2021-03-15 NOTE — PROGRESS NOTE ADULT - PROBLEM SELECTOR PROBLEM 4
Coronary artery disease involving native coronary artery of native heart without angina pectoris
normal...

## 2021-03-15 NOTE — PHYSICAL THERAPY INITIAL EVALUATION ADULT - LEVEL OF CONSCIOUSNESS, REHAB EVAL
slightly lethargic, closes eyes spontaneously throughout session - improves with verbal cues/alert/confused

## 2021-03-15 NOTE — CONSULT NOTE ADULT - SUBJECTIVE AND OBJECTIVE BOX
Patient is a 70y old  Male who presents with a chief complaint of Weakness (15 Mar 2021 13:08)    From HPI" HPI:  70 year old Ukrainian/Jhonny-speaking male with a history of CAD s/p CABG and 2 stents, Type 2 DM, HTN, BPH s/p TURP and Parkinson's disease presenting with weakness. Patient normally walks with a walker but over the past few days he has needed assistance to walk around his house. He has also had poor appetite during this time. Patient's son who lives with him tested positive for COVID on 3/9 without showing any symptoms and was due to travel for work. This morning, patient found his wife unresponsive at home and EMS was called- patient had ROSC s/p cardiac arrest and is currently hospitalized at [facility]. Patient denies fevers, chills, SOB, chest pain, or abdominal pain. He does endorse having chronic constipation.     ED vitals: Temp 98.3 HR 68 /51 RR 17 O2 99% on room air. COVID PCR returned positive. Labs remarkable for Na 124, K+ 3.3, Cl 83. CXR showed patchy opacities L>R. Patient was given 1L NS, azithromycin, and CTX.  (14 Mar 2021 14:07)  "    Above verified-agree with above unless noted below.    ID consulted     PAST MEDICAL & SURGICAL HISTORY:  BPH (benign prostatic hyperplasia)    GERD (gastroesophageal reflux disease)    HLD (hyperlipidemia)    Hypertension    CAD (coronary artery disease)  CABG x2 2012, Stents x2 2014    Diabetes mellitus    Presence of stent in coronary artery  x2 2014    History of coronary artery bypass graft  X2 in March of 2012    Bladder neck obstruction  3 surgeries for BPH, most recent 2012        Social history: denies     Smoking,    ETOH,      IVDU       FAMILY HISTORY:  Family history of diabetes mellitus type II (Sibling)    - Family history of medical problems in all first degree relatives reviewed.None of these were found to be related to patients current illness.    REVIEW OF SYSTEMS  General:	+malaise + fatigue or chills. Fevers absent    Skin:No rash  	  Ophthalmologic:Denies any visual complaints,discharge redness or photophobia  	  ENMT:No nasal discharge,headache,sinus congestion or throat pain.No dental complaints    Respiratory and Thorax:occ cough,sputum denies  chest pain.+ shortness of breath  	  Cardiovascular:	No chest pain,palpitaions or dizziness    Gastrointestinal:	NO nausea,abdominal pain or diarrhea.    Genitourinary:	No dysuria,frequency. No flank pain    Musculoskeletal:	No joint swelling or pain.No weakness    Neurological:No confusion,diziness.No extremity weakness.No bladder or bowel incontinence	    Psychiatric:No delusions or hallucinations	    Hematology/Lymphatics:	No LN swelling.No gum bleeding     Endocrine:	No recent weight gain or loss.No abnormal heat/cold intolerance    Allergic/Immunologic:	No hives or rash   Allergies    No Known Allergies    Intolerances        Antimicrobials:    remdesivir  IVPB   IV Intermittent   remdesivir  IVPB 100 milliGRAM(s) IV Intermittent every 24 hours      Prior Antimicrobials:  MEDICATIONS  (STANDING):  azithromycin  IVPB   255 mL/Hr IV Intermittent (03-14-21 @ 12:27)    cefTRIAXone   IVPB   100 mL/Hr IV Intermittent (03-14-21 @ 11:33)    remdesivir  IVPB   500 mL/Hr IV Intermittent (03-14-21 @ 20:59)      Other medications reviewed  MEDICATIONS  (STANDING):  aspirin enteric coated 81 milliGRAM(s) Oral daily  carbidopa/levodopa  10/100 2 Tablet(s) Oral <User Schedule>  ciprofloxacin  0.3% Ophthalmic Solution 1 Drop(s) Left EYE four times a day  dexAMETHasone     Tablet 6 milliGRAM(s) Oral daily  dextrose 40% Gel 15 Gram(s) Oral once  dextrose 5%. 1000 milliLiter(s) (50 mL/Hr) IV Continuous <Continuous>  dextrose 5%. 1000 milliLiter(s) (100 mL/Hr) IV Continuous <Continuous>  dextrose 50% Injectable 25 Gram(s) IV Push once  dextrose 50% Injectable 12.5 Gram(s) IV Push once  dextrose 50% Injectable 25 Gram(s) IV Push once  enoxaparin Injectable 40 milliGRAM(s) SubCutaneous daily  glucagon  Injectable 1 milliGRAM(s) IntraMuscular once  insulin glargine Injectable (LANTUS) 30 Unit(s) SubCutaneous at bedtime  insulin lispro (ADMELOG) corrective regimen sliding scale   SubCutaneous three times a day before meals  insulin lispro (ADMELOG) corrective regimen sliding scale   SubCutaneous at bedtime  insulin lispro Injectable (ADMELOG) 3 Unit(s) SubCutaneous three times a day before meals  ketorolac 0.5% Ophthalmic Solution 1 Drop(s) Left EYE two times a day  melatonin 3 milliGRAM(s) Oral at bedtime  metoprolol tartrate 25 milliGRAM(s) Oral two times a day  polyethylene glycol 3350 17 Gram(s) Oral two times a day  potassium phosphate IVPB 15 milliMole(s) IV Intermittent once  prednisoLONE acetate 1% Suspension 1 Drop(s) Left EYE four times a day  remdesivir  IVPB   IV Intermittent   remdesivir  IVPB 100 milliGRAM(s) IV Intermittent every 24 hours  senna 2 Tablet(s) Oral at bedtime  simvastatin 20 milliGRAM(s) Oral at bedtime  sodium chloride 0.9%. 1000 milliLiter(s) (75 mL/Hr) IV Continuous <Continuous>  traZODone 50 milliGRAM(s) Oral at bedtime        Vital Signs Last 24 Hrs  T(C): 36.3 (15 Mar 2021 10:20), Max: 39.4 (14 Mar 2021 22:03)  T(F): 97.4 (15 Mar 2021 10:20), Max: 103 (14 Mar 2021 22:03)  HR: 49 (15 Mar 2021 10:20) (49 - 84)  BP: 108/46 (15 Mar 2021 10:20) (108/46 - 160/74)  BP(mean): --  RR: 20 (15 Mar 2021 10:20) (18 - 20)  SpO2: 100% (15 Mar 2021 10:20) (94% - 100%)    PHYSICAL EXAM:Pleasant patient in no acute distress.      Constitutional:Comfortable.Awake and alert  No cachexia     Eyes:PERRL EOMI.NO discharge or conjunctival injection    ENMT:No sinus tenderness.No thrush.No pharyngeal exudate or erythema.Fair dental hygiene    Neck:Supple,No LN,no JVD      Respiratory:Good air entry bilaterally,occ crackles    Cardiovascular:S1 S2 wnl, No murmurs,rub or gallops    Gastrointestinal:Soft BS(+) no tenderness no masses ,No rebound or guarding    Genitourinary:No CVA tendereness         Extremities:No cyanosis,clubbing or edema.    Vascular:peripheral pulses felt    Neurological:AAO X 3,No grossly focal deficits        Musculoskeletal:No joint swelling or LOM              Labs:                            11.5   3.67  )-----------( 141      ( 15 Mar 2021 06:30 )             34.7         03-15    127<L>  |  91<L>  |  14  ----------------------------<  67<L>  3.3<L>   |  24  |  0.93    Ca    7.7<L>      15 Mar 2021 06:30  Phos  2.1     03-15  Mg     1.6     03-15    TPro  6.1  /  Alb  3.2<L>  /  TBili  0.3  /  DBili  x   /  AST  54<H>  /  ALT  <5  /  AlkPhos  63  03-15    < from: Xray Chest 1 View- PORTABLE-Urgent (Xray Chest 1 View- PORTABLE-Urgent .) (03.14.21 @ 09:53) >  IMPRESSION:  Patchy left lower lung opacities may represent pneumonia.        < end of copied text >        COVID-19 PCR . (03.14.21 @ 08:57)   COVID-19 PCR: Detected:      Ferritin, Serum (03.14.21 @ 13:15)   Ferritin, Serum: 548 ng/mL     C-Reactive Protein, Serum (03.14.21 @ 13:15)   C-Reactive Protein, Serum: 124.5 mg/L         D-Dimer Assay, Quantitative (03.14.21 @ 13:15)   D-Dimer Assay, Quantitative: 385    Imaging studies(films) were independently reviewed.Findings as detailed in report above

## 2021-03-15 NOTE — CONSULT NOTE ADULT - SUBJECTIVE AND OBJECTIVE BOX
Fish Dillard MD  Interventional Cardiology / Endovascular Specialist  [place] Office : [address] [place] [address] [place] [address]  Tel:   [place] Office : [address] [facility] [place] [address]  Tel: [phone]  Cell : [phone]    70 year old Persian/Jhonny-speaking male with a history of CAD s/p CABG and 2 stents, Type 2 DM, HTN, BPH s/p TURP and Parkinson's disease presenting with weakness. Patient normally walks with a walker but over the past few days he has needed assistance to walk around his house. He has also had poor appetite during this time. Patient's son who lives with him tested positive for COVID on 3/9 without showing any symptoms and was due to travel for work.Patient denies fevers, chills, SOB, chest pain, or abdominal pain. He does endorse having chronic constipation.     ED vitals: Temp 98.3 HR 68 /51 RR 17 O2 99% on room air. COVID PCR returned positive. Labs remarkable for Na 124, K+ 3.3, Cl 83. CXR showed patchy opacities L>R. Patient was given 1L NS, azithromycin, and CTX.     PAST MEDICAL & SURGICAL HISTORY:  BPH (benign prostatic hyperplasia)    GERD (gastroesophageal reflux disease)    HLD (hyperlipidemia)    Hypertension    CAD (coronary artery disease)  CABG x2 2012, Stents x2 2014    Diabetes mellitus    Presence of stent in coronary artery  x2 2014    History of coronary artery bypass graft  X2 in March of 2012    Bladder neck obstruction  3 surgeries for BPH, most recent 2012        SOCIAL HISTORY: Substance Use (street drugs): ( x ) never used  (  ) other:    FAMILY HISTORY:  Family history of diabetes mellitus type II (Sibling)        REVIEW OF SYSTEMS:  CONSTITUTIONAL: No fever, weight loss, or fatigue  EYES: No eye pain, visual disturbances, or discharge  ENMT:  No difficulty hearing, tinnitus, vertigo; No sinus or throat pain  BREASTS: No pain, masses, or nipple discharge  GASTROINTESTINAL: No abdominal or epigastric pain. No nausea, vomiting, or hematemesis; No diarrhea or constipation. No melena or hematochezia.  GENITOURINARY: No dysuria, frequency, hematuria, or incontinence  NEUROLOGICAL: No headaches, memory loss, loss of strength, numbness, or tremors  ENDOCRINE: No heat or cold intolerance; No hair loss  MUSCULOSKELETAL: No joint pain or swelling; No muscle, back, or extremity pain  PSYCHIATRIC: No depression, anxiety, mood swings, or difficulty sleeping  HEME/LYMPH: No easy bruising, or bleeding gums  All others negative    MEDICATIONS:  aspirin enteric coated 81 milliGRAM(s) Oral daily  enoxaparin Injectable 40 milliGRAM(s) SubCutaneous daily  metoprolol tartrate 25 milliGRAM(s) Oral two times a day  remdesivir  IVPB   IV Intermittent   remdesivir  IVPB 100 milliGRAM(s) IV Intermittent every 24 hours  carbidopa/levodopa  10/100 2 Tablet(s) Oral <User Schedule>  melatonin 3 milliGRAM(s) Oral at bedtime  traZODone 50 milliGRAM(s) Oral at bedtime  polyethylene glycol 3350 17 Gram(s) Oral two times a day  senna 2 Tablet(s) Oral at bedtime  dexAMETHasone     Tablet 6 milliGRAM(s) Oral daily  dextrose 40% Gel 15 Gram(s) Oral once  dextrose 50% Injectable 25 Gram(s) IV Push once  dextrose 50% Injectable 12.5 Gram(s) IV Push once  dextrose 50% Injectable 25 Gram(s) IV Push once  glucagon  Injectable 1 milliGRAM(s) IntraMuscular once  insulin glargine Injectable (LANTUS) 30 Unit(s) SubCutaneous at bedtime  insulin lispro (ADMELOG) corrective regimen sliding scale   SubCutaneous three times a day before meals  insulin lispro (ADMELOG) corrective regimen sliding scale   SubCutaneous at bedtime  insulin lispro Injectable (ADMELOG) 3 Unit(s) SubCutaneous three times a day before meals  simvastatin 20 milliGRAM(s) Oral at bedtime  ciprofloxacin  0.3% Ophthalmic Solution 1 Drop(s) Left EYE four times a day  dextrose 5%. 1000 milliLiter(s) IV Continuous <Continuous>  dextrose 5%. 1000 milliLiter(s) IV Continuous <Continuous>  ketorolac 0.5% Ophthalmic Solution 1 Drop(s) Left EYE two times a day  prednisoLONE acetate 1% Suspension 1 Drop(s) Left EYE four times a day  sodium chloride 0.9%. 1000 milliLiter(s) IV Continuous <Continuous>      FAMILY HISTORY:  Family history of diabetes mellitus type II (Sibling)          Allergies    No Known Allergies    Intolerances    	      PHYSICAL EXAM:  T(C): 36.3 (03-15-21 @ 10:20), Max: 39.4 (03-14-21 @ 22:03)  HR: 49 (03-15-21 @ 10:20) (49 - 84)  BP: 108/46 (03-15-21 @ 10:20) (108/46 - 144/56)  RR: 20 (03-15-21 @ 10:20) (18 - 20)  SpO2: 100% (03-15-21 @ 10:20) (94% - 100%)  Wt(kg): --  I&O's Summary          LABS:	 	    CARDIAC MARKERS:      Appearance: No acute distress; well appearing  Eyes:  EOMI, pink conjunctiva  HENT: Normal oral mucosa  Cardiovascular: RRR, S1, S2, no murmurs, rubs, or gallops; no edema; no JVD  Respiratory: Clear to auscultation bilaterally  Gastrointestinal: soft, non-tender, non-distended with normal bowel sounds  Musculoskeletal: No clubbing; no joint deformity   Neurologic: Non-focal  Lymphatic: No lymphadenopathy  Psychiatry: AAOx3, mood & affect appropriate  Skin: No rashes                          11.5   3.67  )-----------( 141      ( 15 Mar 2021 06:30 )             34.7     03-15    127<L>  |  91<L>  |  14  ----------------------------<  67<L>  3.3<L>   |  24  |  0.93    Ca    7.7<L>      15 Mar 2021 06:30  Phos  2.1     03-15  Mg     1.6     03-15    TPro  6.1  /  Alb  3.2<L>  /  TBili  0.3  /  DBili  x   /  AST  54<H>  /  ALT  <5  /  AlkPhos  63  03-15    proBNP:   Lipid Profile:   HgA1c:   TSH: Thyroid Stimulating Hormone, Serum: 0.96 uIU/mL (03-15 @ 06:30)      Consultant(s) Notes Reviewed:  [x ] YES  [ ] NO    Care Discussed with Consultants/Other Providers [ x] YES  [ ] NO    Imaging Personally Reviewed independently:  [x] YES  [ ] NO    All labs, radiologic studies, vitals, orders and medications list reviewed. Patient is seen and examined at bedside. Case discussed with medical team.    ASSESSMENT/PLAN:

## 2021-03-15 NOTE — CONSULT NOTE ADULT - ASSESSMENT
70 year old Mongolian/Jhonny-speaking male with a history of CAD s/p CABG and 2 stents, Type 2 DM, HTN, BPH s/p TURP and Parkinson's disease presenting with weakness. Patient normally walks with a walker but over the past few days he has needed assistance to walk around his house. He has also had poor appetite during this time. Patient's son who lives with him tested positive for COVID on 3/9 without showing any symptoms and was due to travel for work. This morning, patient found his wife unresponsive at home and EMS was called- patient had ROSC s/p cardiac arrest and is currently hospitalized at [facility].   COVID positive  elevated inflammatory markers'clinical picture not c/w Pna      A) COVID  Monitor clinically.  Monitor Oxygenation  O2 supplementation.  Remdesivir - UPTO 5 days course depending on course.  Monitor CBC ,LFTs and Creatinine daily.  Ferritin,CRP and D dimer q 48 hrs.  Dexamethasone if hypoxia.  Anticoagulation per protocol.  Treatment options are limited-this as well as limitations of data discussed with pt.  Monitor for any bacterial superinfection/complications.      B_ Dyspnea  likely from above  plan as above    Will tailor plan for ID issues  per course,results.Will defer to primary team on management of other issues.  Assessment, plan and recommendations as detailed above were discussed with the medical/primary  team.  Will Follow.  Beeper [phone] [facility] [id number].   Wknd/afterhours/No response-[phone] or Fellow on call

## 2021-03-15 NOTE — PROGRESS NOTE ADULT - PROBLEM SELECTOR PLAN 1
COVID PCR positive on 3/14, patient with no respiratory symptoms, saturating well on room air  -Given high risk of progression to severe disease, will start Remdesivir if AST/ALT less than 5x ULN  -Not a candidate for Decadron at this time  -Monitor Cr and AST/ALT while on Remdesivir  id f/u  -Monitor O2 saturation  -Trend inflammatory markers- procalcitonin, D-dimer, LDH, CRP, ferritin

## 2021-03-15 NOTE — CHART NOTE - NSCHARTNOTEFT_GEN_A_CORE
70 year old Sami/Jhonny-speaking male with a history of CAD s/p CABG and 2 stents, Type 2 DM, HTN, BPH s/p TURP and Parkinson's disease presenting with weakness. Endocrine consulted for T2DM with hypoglycemia. Serum BG 67 this AM after receiving Lantus 30 units last night. On     Recommend cutting Lantus dose in half to 15 units QHS. Continue Admelog 3 units pre-meal with low correction scales.     DW team    Full consult tomorrow    Zakiya Schumacher DO, Endocrine Fellow   Pager [phone]. from 9-5PM. After hours and on weekends please call [phone].

## 2021-03-16 PROBLEM — G20 PARKINSONISM: Status: ACTIVE | Noted: 2020-01-01

## 2021-03-16 PROBLEM — K59.00 CONSTIPATION: Status: ACTIVE | Noted: 2021-01-01

## 2021-03-16 NOTE — PROGRESS NOTE ADULT - ASSESSMENT
70 year old Bengali/Jhonny-speaking male with a history of CAD s/p CABG and 2 stents, Type 2 DM, HTN, BPH s/p TURP and Parkinson's disease presenting with weakness. Patient normally walks with a walker but over the past few days he has needed assistance to walk around his house. He has also had poor appetite during this time. Patient's son who lives with him tested positive for COVID on 3/9 without showing any symptoms and was due to travel for work. This morning, patient found his wife unresponsive at home and EMS was called- patient had ROSC s/p cardiac arrest and is currently hospitalized at [facility].   COVID positive  elevated inflammatory markers'clinical picture not c/w Pna      A) COVID  Monitor clinically.  Monitor Oxygenation  O2 supplementation.  Remdesivir - UPTO 5 days course depending on course.Can stop earlier if being Dced  Monitor CBC ,LFTs and Creatinine daily.  Ferritin,CRP and D dimer q 48 hrs.  Dexamethasone if hypoxia.  Anticoagulation per protocol.  Treatment options are limited-this as well as limitations of data discussed with pt.  Monitor for any bacterial superinfection/complications.      B_ Dyspnea  likely from above  plan as above    Will tailor plan for ID issues  per course,results.Will defer to primary team on management of other issues.  Assessment, plan and recommendations as detailed above were discussed with the medical/primary  team.  I am away tomorrow.My Colleagues in ID service will cover -please call ID fellow on call using Web-exchange if any issues or questions.

## 2021-03-16 NOTE — PROGRESS NOTE ADULT - ASSESSMENT
Assessment and Plan     70 year old Bulgarian/Jhonny-speaking male with a history of CAD s/p CABG and 2 stents, Type 2 DM, HTN, BPH s/p TURP and Parkinson's disease presenting with weakness    EKG: SR no ischemic changes     1) CAD s/p CABG and PCI   - c/w asa, zocor and metoprolol  -denies CP  -echo from 10/2020 with normal LV function    2) COVID  -on steroids and remdesivir   -t/t per pulm and ID     3) HTN  - c/w metoprolol    4) Parkinsons  - f/u neuro     5) DVT prophylaxis  -lovenox subq

## 2021-03-16 NOTE — DISCHARGE NOTE PROVIDER - CARE PROVIDER_API CALL
Nik Mays  INTERNAL MEDICINE  [id number] [place] [address]  Phone: [phone]  Fax: [phone]  Follow Up Time:

## 2021-03-16 NOTE — CONSULT NOTE ADULT - SUBJECTIVE AND OBJECTIVE BOX
HPI:  70 year old Estonian/Jhonny-speaking male with a history of CAD s/p CABG and 2 stents, Type 2 DM, HTN, BPH s/p TURP and Parkinson's disease presenting with weakness. Patient normally walks with a walker but over the past few days he has needed assistance to walk around his house. He has also had poor appetite during this time. Patient's son who lives with him tested positive for COVID on 3/9 without showing any symptoms and was due to travel for work. This morning, patient found his wife unresponsive at home and EMS was called- patient had ROSC s/p cardiac arrest and is currently hospitalized at [facility]. Patient denies fevers, chills, SOB, chest pain, or abdominal pain. He does endorse having chronic constipation.     ED vitals: Temp 98.3 HR 68 /51 RR 17 O2 99% on room air. COVID PCR returned positive. Labs remarkable for Na 124, K+ 3.3, Cl 83. CXR showed patchy opacities L>R. Patient was given 1L NS, azithromycin, and CTX.  (14 Mar 2021 14:07)      PAST MEDICAL & SURGICAL HISTORY:  BPH (benign prostatic hyperplasia)    GERD (gastroesophageal reflux disease)    HLD (hyperlipidemia)    Hypertension    CAD (coronary artery disease)  CABG x2 2012, Stents x2 2014    Diabetes mellitus    Presence of stent in coronary artery  x2 2014    History of coronary artery bypass graft  X2 in March of 2012    Bladder neck obstruction  3 surgeries for BPH, most recent 2012        FAMILY HISTORY:  Family history of diabetes mellitus type II (Sibling)        Social History:    Outpatient Medications:    MEDICATIONS  (STANDING):  aspirin enteric coated 81 milliGRAM(s) Oral daily  carbidopa/levodopa  10/100 2 Tablet(s) Oral <User Schedule>  ciprofloxacin  0.3% Ophthalmic Solution 1 Drop(s) Left EYE four times a day  dexAMETHasone     Tablet 6 milliGRAM(s) Oral daily  dextrose 40% Gel 15 Gram(s) Oral once  dextrose 5%. 1000 milliLiter(s) (50 mL/Hr) IV Continuous <Continuous>  dextrose 5%. 1000 milliLiter(s) (100 mL/Hr) IV Continuous <Continuous>  dextrose 50% Injectable 25 Gram(s) IV Push once  dextrose 50% Injectable 12.5 Gram(s) IV Push once  dextrose 50% Injectable 25 Gram(s) IV Push once  enoxaparin Injectable 40 milliGRAM(s) SubCutaneous daily  glucagon  Injectable 1 milliGRAM(s) IntraMuscular once  insulin glargine Injectable (LANTUS) 15 Unit(s) SubCutaneous at bedtime  insulin lispro (ADMELOG) corrective regimen sliding scale   SubCutaneous three times a day before meals  insulin lispro (ADMELOG) corrective regimen sliding scale   SubCutaneous at bedtime  insulin lispro Injectable (ADMELOG) 3 Unit(s) SubCutaneous three times a day before meals  ketorolac 0.5% Ophthalmic Solution 1 Drop(s) Left EYE two times a day  magnesium sulfate  IVPB 1 Gram(s) IV Intermittent once  melatonin 3 milliGRAM(s) Oral at bedtime  metoprolol tartrate 25 milliGRAM(s) Oral two times a day  polyethylene glycol 3350 17 Gram(s) Oral two times a day  prednisoLONE acetate 1% Suspension 1 Drop(s) Left EYE four times a day  remdesivir  IVPB   IV Intermittent   remdesivir  IVPB 100 milliGRAM(s) IV Intermittent every 24 hours  senna 2 Tablet(s) Oral at bedtime  simvastatin 20 milliGRAM(s) Oral at bedtime  sodium chloride 0.9%. 1000 milliLiter(s) (75 mL/Hr) IV Continuous <Continuous>  traZODone 50 milliGRAM(s) Oral at bedtime    MEDICATIONS  (PRN):      Allergies    No Known Allergies    Intolerances      Review of Systems:  Constitutional: No fever  Eyes: No blurry vision  Neuro: No tremors  HEENT: No pain  Cardiovascular: No chest pain, palpitations  Respiratory: No SOB, no cough  GI: No nausea, vomiting, abdominal pain  : No dysuria  Skin: no rash  Psych: no depression  Endocrine: no polyuria, polydipsia  Hem/lymph: no swelling  Osteoporosis: no fractures    ALL OTHER SYSTEMS REVIEWED AND NEGATIVE    UNABLE TO OBTAIN    PHYSICAL EXAM:  VITALS: T(C): 36.7 (03-16-21 @ 02:00)  T(F): 98 (03-16-21 @ 02:00), Max: 98.1 (03-15-21 @ 17:45)  HR: 64 (03-16-21 @ 05:56) (59 - 64)  BP: 122/51 (03-16-21 @ 05:56) (112/40 - 143/60)  RR:  (17 - 20)  SpO2:  (93% - 100%)  Wt(kg): --  GENERAL: NAD, well-groomed, well-developed  EYES: No proptosis, no lid lag, anicteric  HEENT:  Atraumatic, Normocephalic, moist mucous membranes  THYROID: Normal size, no palpable nodules  RESPIRATORY: Clear to auscultation bilaterally; No rales, rhonchi, wheezing, or rubs  CARDIOVASCULAR: Regular rate and rhythm; No murmurs; no peripheral edema  GI: Soft, nontender, non distended, normal bowel sounds  SKIN: Dry, intact, No rashes or lesions  MUSCULOSKELETAL: Full range of motion, normal strength  NEURO: sensation intact, extraocular movements intact, no tremor, normal reflexes  PSYCH: Alert and oriented x 3, normal affect, normal mood  CUSHING'S SIGNS: no striae    POCT Blood Glucose.: 242 mg/dL (03-16-21 @ 09:13)  POCT Blood Glucose.: 171 mg/dL (03-15-21 @ 21:25)  POCT Blood Glucose.: 137 mg/dL (03-15-21 @ 17:52)  POCT Blood Glucose.: 197 mg/dL (03-15-21 @ 12:19)  POCT Blood Glucose.: 80 mg/dL (03-15-21 @ 08:04)  POCT Blood Glucose.: 218 mg/dL (03-14-21 @ 22:21)  POCT Blood Glucose.: 290 mg/dL (03-14-21 @ 18:56)  POCT Blood Glucose.: 224 mg/dL (03-14-21 @ 08:00)                            12.0   4.96  )-----------( 167      ( 16 Mar 2021 07:23 )             36.4       03-16    130<L>  |  96<L>  |  18  ----------------------------<  251<H>  5.1   |  21<L>  |  0.94    EGFR if : 94  EGFR if non : 81    Ca    8.1<L>      03-16  Mg     1.5     03-15  Phos  1.9     03-15    TPro  6.1  /  Alb  3.2<L>  /  TBili  0.3  /  DBili  x   /  AST  54<H>  /  ALT  <5  /  AlkPhos  63  03-15      Thyroid Function Tests:  03-15 @ 06:30 TSH 0.96 FreeT4 -- T3 -- Anti TPO -- Anti Thyroglobulin Ab -- TSI --              Radiology:                  HPI:  70 year old English/Jhonny-speaking male with a history of CAD s/p CABG and 2 stents, Type 2 DM, HTN, BPH s/p TURP and Parkinson's disease presenting with weakness. Patient normally walks with a walker but over the past few days he has needed assistance to walk around his house. He has also had poor appetite during this time. Patient's son who lives with him tested positive for COVID on 3/9 without showing any symptoms and was due to travel for work. This morning, patient found his wife unresponsive at home and EMS was called- patient had ROSC s/p cardiac arrest and is currently hospitalized at [facility]. Patient denies fevers, chills, SOB, chest pain, or abdominal pain. He does endorse having chronic constipation.     ED vitals: Temp 98.3 HR 68 /51 RR 17 O2 99% on room air. COVID PCR returned positive. Labs remarkable for Na 124, K+ 3.3, Cl 83. CXR showed patchy opacities L>R. Patient was given 1L NS, azithromycin, and CTX.  (14 Mar 2021 14:07)    Endocrine History: 70 yr old M with CAD s/p CABG, T2DM uncontrolled A1C 10.3% here with COVID. Endocrine consulted for steroid exacerbated hyperglycemia as pt was started on dexamethasone. Patient was diagnosed with DM several years ago. Has not seen an endocrinologist in a while. He takes Basaglar 40 Units HS and Admelog 20 units twice a day before meals. He has difficulty swallowing so eats pureed foods some of which have high sugar content such as bananas. Has blurry vision. Follows with optho regularly and recently had cataract surgery.             PAST MEDICAL & SURGICAL HISTORY:  BPH (benign prostatic hyperplasia)    GERD (gastroesophageal reflux disease)    HLD (hyperlipidemia)    Hypertension    CAD (coronary artery disease)  CABG x2 2012, Stents x2 2014    Diabetes mellitus    Presence of stent in coronary artery  x2 2014    History of coronary artery bypass graft  X2 in March of 2012    Bladder neck obstruction  3 surgeries for BPH, most recent 2012        FAMILY HISTORY:  Family history of diabetes mellitus type II (Sibling)        Social History: No ETOH abuse, No illicit drug use    Outpatient Medications:  · 	polyethylene glycol 3350 oral powder for reconstitution: 17 gram(s) orally once a day (at bedtime)  · 	metoprolol tartrate 25 mg oral tablet: 1 tab(s) orally 2 times a day  · 	melatonin 3 mg oral tablet: 1 tab(s) orally once a day (at bedtime)  · 	aspirin 81 mg oral delayed release tablet: 1 tab(s) orally once a day  · 	Admelog SoloStar 100 units/mL injectable solution: 20 unit(s) injectable twice daily before meals  · 	Basaglar KwikPen 100 units/mL subcutaneous solution: 40 unit(s) subcutaneous once a day (at bedtime)  · 	traZODone 50 mg oral tablet: 1 tab(s) orally once a day (at bedtime)  · 	simvastatin 20 mg oral tablet: 1 tab(s) orally once a day (at bedtime)  · 	hydroCHLOROthiazide 25 mg oral tablet: 1 tab(s) orally once a day  · 	carbidopa-levodopa 10 mg-100 mg oral tablet: 2 tab(s) orally 2 times a day  · 	prednisoLONE acetate 1% ophthalmic suspension: 1 drop(s) in the left eye 4 times a day  · 	ketorolac 0.5% ophthalmic solution: 1 drop(s) in the left eye 2 times a day  · 	ciprofloxacin 0.3% ophthalmic solution: 1 drop(s) in the left eye 4 times a day    MEDICATIONS  (STANDING):  aspirin enteric coated 81 milliGRAM(s) Oral daily  carbidopa/levodopa  10/100 2 Tablet(s) Oral <User Schedule>  ciprofloxacin  0.3% Ophthalmic Solution 1 Drop(s) Left EYE four times a day  dexAMETHasone     Tablet 6 milliGRAM(s) Oral daily  dextrose 40% Gel 15 Gram(s) Oral once  dextrose 5%. 1000 milliLiter(s) (50 mL/Hr) IV Continuous <Continuous>  dextrose 5%. 1000 milliLiter(s) (100 mL/Hr) IV Continuous <Continuous>  dextrose 50% Injectable 25 Gram(s) IV Push once  dextrose 50% Injectable 12.5 Gram(s) IV Push once  dextrose 50% Injectable 25 Gram(s) IV Push once  enoxaparin Injectable 40 milliGRAM(s) SubCutaneous daily  glucagon  Injectable 1 milliGRAM(s) IntraMuscular once  insulin glargine Injectable (LANTUS) 15 Unit(s) SubCutaneous at bedtime  insulin lispro (ADMELOG) corrective regimen sliding scale   SubCutaneous three times a day before meals  insulin lispro (ADMELOG) corrective regimen sliding scale   SubCutaneous at bedtime  insulin lispro Injectable (ADMELOG) 3 Unit(s) SubCutaneous three times a day before meals  ketorolac 0.5% Ophthalmic Solution 1 Drop(s) Left EYE two times a day  magnesium sulfate  IVPB 1 Gram(s) IV Intermittent once  melatonin 3 milliGRAM(s) Oral at bedtime  metoprolol tartrate 25 milliGRAM(s) Oral two times a day  polyethylene glycol 3350 17 Gram(s) Oral two times a day  prednisoLONE acetate 1% Suspension 1 Drop(s) Left EYE four times a day  remdesivir  IVPB   IV Intermittent   remdesivir  IVPB 100 milliGRAM(s) IV Intermittent every 24 hours  senna 2 Tablet(s) Oral at bedtime  simvastatin 20 milliGRAM(s) Oral at bedtime  sodium chloride 0.9%. 1000 milliLiter(s) (75 mL/Hr) IV Continuous <Continuous>  traZODone 50 milliGRAM(s) Oral at bedtime    MEDICATIONS  (PRN):      Allergies    No Known Allergies    Intolerances      Review of Systems:  Constitutional: No fever  Eyes: + blurry vision  Neuro: No tremors  HEENT: No pain  Cardiovascular: No chest pain, palpitations  Respiratory: No SOB, no cough  GI: No nausea, vomiting, abdominal pain  : No dysuria  Skin: no rash  Psych: no depression  Endocrine: no polyuria, polydipsia      ALL OTHER SYSTEMS REVIEWED AND NEGATIVE        PHYSICAL EXAM:  VITALS: T(C): 36.7 (03-16-21 @ 02:00)  T(F): 98 (03-16-21 @ 02:00), Max: 98.1 (03-15-21 @ 17:45)  HR: 64 (03-16-21 @ 05:56) (59 - 64)  BP: 122/51 (03-16-21 @ 05:56) (112/40 - 143/60)  RR:  (17 - 20)  SpO2:  (93% - 100%)  Wt(kg): --  GENERAL: NAD  EYES: No proptosis, no lid lag, anicteric  HEENT:  Atraumatic, Normocephalic, moist mucous membranes  RESPIRATORY: Clear to auscultation bilaterally; No rales, rhonchi, wheezing, or rubs  CARDIOVASCULAR: Regular rate and rhythm  GI: Soft, nontender, non distended, normal bowel sounds  SKIN: Dry, intact, No rashes or lesions  PSYCH: Alert and oriented x 3, normal affect, normal mood  CUSHING'S SIGNS: no striae    POCT Blood Glucose.: 242 mg/dL (03-16-21 @ 09:13)  POCT Blood Glucose.: 171 mg/dL (03-15-21 @ 21:25)  POCT Blood Glucose.: 137 mg/dL (03-15-21 @ 17:52)  POCT Blood Glucose.: 197 mg/dL (03-15-21 @ 12:19)  POCT Blood Glucose.: 80 mg/dL (03-15-21 @ 08:04)  POCT Blood Glucose.: 218 mg/dL (03-14-21 @ 22:21)  POCT Blood Glucose.: 290 mg/dL (03-14-21 @ 18:56)  POCT Blood Glucose.: 224 mg/dL (03-14-21 @ 08:00)                            12.0   4.96  )-----------( 167      ( 16 Mar 2021 07:23 )             36.4       03-16    130<L>  |  96<L>  |  18  ----------------------------<  251<H>  5.1   |  21<L>  |  0.94    EGFR if : 94  EGFR if non : 81    Ca    8.1<L>      03-16  Mg     1.5     03-15  Phos  1.9     03-15    TPro  6.1  /  Alb  3.2<L>  /  TBili  0.3  /  DBili  x   /  AST  54<H>  /  ALT  <5  /  AlkPhos  63  03-15      Thyroid Function Tests:  03-15 @ 06:30 TSH 0.96 FreeT4 -- T3 -- Anti TPO -- Anti Thyroglobulin Ab -- TSI --

## 2021-03-16 NOTE — DISCHARGE NOTE NURSING/CASE MANAGEMENT/SOCIAL WORK - NSDCFUADDAPPT_GEN_ALL_CORE_FT
Follow up with your primary care physician for further monitoring in 1-2 weeks. Please call to arrange appointment.

## 2021-03-16 NOTE — DISCHARGE NOTE PROVIDER - HOSPITAL COURSE
70 year old Estonian/Jhonny-speaking male with a history of CAD s/p CABG and 2 stents, Type 2 DM, HTN, BPH s/p TURP and Parkinson's disease presenting with weakness, found to be COVID positive with hyponatremia.    COVID-19.    - COVID PCR positive on 3/14  - Chest Xray: Patchy left lower lung opacities may represent pneumonia.  - Patient with no respiratory symptoms, saturating well on room air  - Started on Remdesivir   - Not a candidate for Decadron  - Oxygen saturation monitored, supplemental oxygen not needed   - Inflammatory markers trended - procalcitonin, D-dimer, LDH, CRP, ferritin.     Hyponatremia.  - Na 124 on admission, likely 2/2 poor PO intake in setting of COVID  -Urine sodium <20, urine osmolality 353 suggestive of hypovolemic hyponatremia   -Started on NS @75cc   -Goal correction of 8-10 mEq Na within 24h  - HCTZ held   - Pt to follow up with PCP for further monitoring of sodium level as outpatient.      Parkinson disease.    - home carbidopa-levodopa dose continued  - Pt to follow up with PCP for further management.     Coronary artery disease involving native coronary artery of native heart without angina pectoris.   - ASA, metoprolol, simvastatin continued  - EKG with no ischemic changes.     Type 2 diabetes mellitus without complication, with long-term current use of insulin.   - A1c 10.3  - On Lantus 40 units qHS, Admelog 20 units before breakfast (patient only eats 2 meals per day)  - Lantus 30 units qHS continued while inpatient   - Pt to follow up with PCP for further monitoring of blood sugars as outpatient.     Essential hypertension.  - BP well controlled  - HCTZ held due to hyponatremia.  - Pt to follow up with PCP for further monitoring of blood pressure as outpatient.     Hypokalemia.    - 3.3 on admission  -Repleted with 40mEq K+  -Pt to follow up with PCP for further monitoring of potassium as outpatient.     Prophylactic measure.   DVT ppx: Lovenox  Diet: DASH, consistent carb      On_________, discussed with __________, patient is medically cleared and optimized for discharge today. All medications were reviewed with attending, and sent to mutually agreed upon pharmacy.   70 year old Serbian/Jhonny-speaking male with a history of CAD s/p CABG and 2 stents, Type 2 DM, HTN, BPH s/p TURP and Parkinson's disease presenting with weakness, found to be COVID positive with hyponatremia.    COVID-19.    - COVID PCR positive on 3/14  - Chest Xray: Patchy left lower lung opacities may represent pneumonia.  - Patient with no respiratory symptoms, saturating well on room air  - Started on Remdesivir   - Not a candidate for Decadron  - Oxygen saturation monitored, supplemental oxygen not needed   - Inflammatory markers trended - procalcitonin, D-dimer, LDH, CRP, ferritin.     Hyponatremia.  - Na 124 on admission, likely 2/2 poor PO intake in setting of COVID  -Urine sodium <20, urine osmolality 353 suggestive of hypovolemic hyponatremia   -Started on NS @75cc   -Goal correction of 8-10 mEq Na within 24h  - HCTZ held   - Pt to follow up with PCP for further monitoring of sodium level as outpatient.      Parkinson disease.    - home carbidopa-levodopa dose continued  - Pt to follow up with PCP for further management.     Coronary artery disease involving native coronary artery of native heart without angina pectoris.   - ASA, metoprolol, simvastatin continued  - EKG with no ischemic changes.     Type 2 diabetes mellitus without complication, with long-term current use of insulin.   - A1c 10.3  - On Lantus 40 units qHS, Admelog 20 units before breakfast (patient only eats 2 meals per day)  - Lantus 30 units qHS continued while inpatient   - Pt to follow up with PCP for further monitoring of blood sugars as outpatient.     Essential hypertension.  - BP well controlled  - HCTZ held due to hyponatremia.  - Pt to follow up with PCP for further monitoring of blood pressure as outpatient.     Hypokalemia.    - 3.3 on admission  -Repleted with 40mEq K+  -Pt to follow up with PCP for further monitoring of potassium as outpatient.     Prophylactic measure.   DVT ppx: Lovenox  Diet: DASH, consistent carb      On 3/16/2021, discussed with Dr. Bryant, patient is medically cleared and optimized for discharge today. All medications were reviewed with attending, and sent to mutually agreed upon pharmacy.

## 2021-03-16 NOTE — DISCHARGE NOTE NURSING/CASE MANAGEMENT/SOCIAL WORK - PATIENT PORTAL LINK FT
You can access the FollowMyHealth Patient Portal offered by [facility] by registering at the following website: [email or web address]. By joining [employer]’s FollowMyHealth portal, you will also be able to view your health information using other applications (apps) compatible with our system.

## 2021-03-16 NOTE — DISCHARGE NOTE NURSING/CASE MANAGEMENT/SOCIAL WORK - NSSCNAMETXT_GEN_ALL_CORE
[facility] [phone] Nurse to visit on the day following discharge. Other appropriate services to be arranged thereafter. Please contact the home care agency at the above phone number if you have not heard from them by approximately 12 noon on the day after your hospital discharge.

## 2021-03-16 NOTE — CONSULT NOTE ADULT - CONSULT REQUESTED DATE/TIME
14-Mar-2021 17:20
15-Mar-2021 16:40
16-Mar-2021 10:46
16-Mar-2021 10:55
15-Mar-2021
15-Mar-2021 14:26

## 2021-03-16 NOTE — DISCHARGE NOTE PROVIDER - NSDCFUADDINST_GEN_ALL_CORE_FT
Please hold hydrochlorothiazide unless told otherwise by PCP after follow up.     Decrease basaglar to 20 units before bed  Decrease admelog to 3 units before meals.

## 2021-03-16 NOTE — DISCHARGE NOTE PROVIDER - NPI NUMBER (FOR SYSADMIN USE ONLY) :
End-Point And Post-Procedure Care: The procedure continued until mild purpura was noted.  Immediately following the ciclafate, spf 50 and ice applied. Post care reviewed with patient. Indication: Vessels Pre-Procedure Care: Prior to the procedure the patient and all present had protective eyewear in place and a warning sign was placed on the door. \\n\\nPigment mask was also used Consent: Prior to the procedure consent obtained, risks reviewed including but not limited to crusting, scabbing, blistering, scarring, darker or lighter pigmentary change, incidental hair removal, bruising, and/or incomplete removal. Ipl Type: Max G Detail Level: Zone Pulse Duration (Include Units): 10 Post-Care Instructions: I reviewed with the patient in detail post-care instructions. Patient should stay away from the sun and wear sun protection until treated areas are fully healed. Eye Protection: wavelength-specific goggles Pulse Energy (Include Units): 46 Total Pulses (Location 1): 1 Treatment Number: 6 [[id number]]

## 2021-03-16 NOTE — DISCHARGE NOTE PROVIDER - NSFOLLOWUPCLINICS_GEN_ALL_ED_FT
[facility] Endocrinology  Endocrinology  [address] [place] [address]  Phone: [phone]  Fax:   Follow Up Time:

## 2021-03-16 NOTE — DISCHARGE NOTE PROVIDER - NSDCMRMEDTOKEN_GEN_ALL_CORE_FT
Admelog SoloStar 100 units/mL injectable solution: 20 unit(s) injectable once a day (in the morning)  aspirin 81 mg oral delayed release tablet: 1 tab(s) orally once a day  Basaglar KwikPen 100 units/mL subcutaneous solution: 40 unit(s) subcutaneous once a day (at bedtime)  carbidopa-levodopa 10 mg-100 mg oral tablet: 2 tab(s) orally 2 times a day  ciprofloxacin 0.3% ophthalmic solution: 1 drop(s) in the left eye 4 times a day  hydroCHLOROthiazide 25 mg oral tablet: 1 tab(s) orally once a day  ketorolac 0.5% ophthalmic solution: 1 drop(s) in the left eye 2 times a day  melatonin 3 mg oral tablet: 1 tab(s) orally once a day (at bedtime)  metoprolol tartrate 25 mg oral tablet: 1 tab(s) orally 2 times a day  polyethylene glycol 3350 oral powder for reconstitution: 17 gram(s) orally once a day (at bedtime)  prednisoLONE acetate 1% ophthalmic suspension: 1 drop(s) in the left eye 4 times a day  simvastatin 20 mg oral tablet: 1 tab(s) orally once a day (at bedtime)  traZODone 50 mg oral tablet: 1 tab(s) orally once a day (at bedtime)   aspirin 81 mg oral delayed release tablet: 1 tab(s) orally once a day  Basaglar KwikPen 100 units/mL subcutaneous solution: 40 unit(s) subcutaneous once a day (at bedtime)  carbidopa-levodopa 10 mg-100 mg oral tablet: 2 tab(s) orally 2 times a day  ciprofloxacin 0.3% ophthalmic solution: 1 drop(s) in the left eye 4 times a day  ketorolac 0.5% ophthalmic solution: 1 drop(s) in the left eye 2 times a day  melatonin 3 mg oral tablet: 1 tab(s) orally once a day (at bedtime)  metoprolol tartrate 25 mg oral tablet: 1 tab(s) orally 2 times a day  polyethylene glycol 3350 oral powder for reconstitution: 17 gram(s) orally once a day (at bedtime)  prednisoLONE acetate 1% ophthalmic suspension: 1 drop(s) in the left eye 4 times a day  simvastatin 20 mg oral tablet: 1 tab(s) orally once a day (at bedtime)  traZODone 50 mg oral tablet: 1 tab(s) orally once a day (at bedtime)   Admelog SoloStar 100 units/mL injectable solution: 3 unit(s) injectable 3 times a day (before meals)   aspirin 81 mg oral delayed release tablet: 1 tab(s) orally once a day  Basaglar KwikPen 100 units/mL subcutaneous solution: 20 unit(s) subcutaneous once a day (at bedtime)  carbidopa-levodopa 10 mg-100 mg oral tablet: 2 tab(s) orally 2 times a day  ciprofloxacin 0.3% ophthalmic solution: 1 drop(s) in the left eye 4 times a day  ketorolac 0.5% ophthalmic solution: 1 drop(s) in the left eye 2 times a day  melatonin 3 mg oral tablet: 1 tab(s) orally once a day (at bedtime)  metoprolol tartrate 25 mg oral tablet: 1 tab(s) orally 2 times a day  polyethylene glycol 3350 oral powder for reconstitution: 17 gram(s) orally once a day (at bedtime)  prednisoLONE acetate 1% ophthalmic suspension: 1 drop(s) in the left eye 4 times a day  simvastatin 20 mg oral tablet: 1 tab(s) orally once a day (at bedtime)  traZODone 50 mg oral tablet: 1 tab(s) orally once a day (at bedtime)

## 2021-03-16 NOTE — CONSULT NOTE ADULT - SUBJECTIVE AND OBJECTIVE BOX
[facility] NEPHROLOGY- CONSULTATION NOTE    70y Male with history of below presents with weakness found to have COVID-19. Nephrology consulted for hyponatremia.    Patient poor historian and denies any prior history of hyponatremia. Patient denies any nausea, vomiting or diarrhea PTA. Patient denies poor PO intake. Patient admits to excessive water intake (6 bottle of unknown size daily) and is on HCTZ for h/o HTN as an outpatient. Serum Na improving after patient started on NS on admission.    REVIEW OF SYSTEMS:  Gen: no changes in weight, + weakness  HEENT: no rhinorrhea  Neck: no sore throat  Cards: no chest pain  Resp: no dyspnea  GI: no nausea or vomiting or diarrhea  : no dysuria or hematuria  Vascular: no LE edema  Derm: no rashes  Neuro: no numbness/tingling    No Known Allergies      Home Medications Reviewed  Hospital Medications:   MEDICATIONS  (STANDING):  aspirin enteric coated 81 milliGRAM(s) Oral daily  carbidopa/levodopa  10/100 2 Tablet(s) Oral <User Schedule>  ciprofloxacin  0.3% Ophthalmic Solution 1 Drop(s) Left EYE four times a day  dexAMETHasone     Tablet 6 milliGRAM(s) Oral daily  dextrose 40% Gel 15 Gram(s) Oral once  dextrose 5%. 1000 milliLiter(s) (50 mL/Hr) IV Continuous <Continuous>  dextrose 5%. 1000 milliLiter(s) (100 mL/Hr) IV Continuous <Continuous>  dextrose 50% Injectable 25 Gram(s) IV Push once  dextrose 50% Injectable 12.5 Gram(s) IV Push once  dextrose 50% Injectable 25 Gram(s) IV Push once  enoxaparin Injectable 40 milliGRAM(s) SubCutaneous daily  glucagon  Injectable 1 milliGRAM(s) IntraMuscular once  insulin glargine Injectable (LANTUS) 15 Unit(s) SubCutaneous at bedtime  insulin lispro (ADMELOG) corrective regimen sliding scale   SubCutaneous three times a day before meals  insulin lispro (ADMELOG) corrective regimen sliding scale   SubCutaneous at bedtime  insulin lispro Injectable (ADMELOG) 3 Unit(s) SubCutaneous three times a day before meals  ketorolac 0.5% Ophthalmic Solution 1 Drop(s) Left EYE two times a day  magnesium sulfate  IVPB 1 Gram(s) IV Intermittent once  melatonin 3 milliGRAM(s) Oral at bedtime  metoprolol tartrate 25 milliGRAM(s) Oral two times a day  polyethylene glycol 3350 17 Gram(s) Oral two times a day  prednisoLONE acetate 1% Suspension 1 Drop(s) Left EYE four times a day  remdesivir  IVPB   IV Intermittent   remdesivir  IVPB 100 milliGRAM(s) IV Intermittent every 24 hours  senna 2 Tablet(s) Oral at bedtime  simvastatin 20 milliGRAM(s) Oral at bedtime  sodium chloride 0.9%. 1000 milliLiter(s) (75 mL/Hr) IV Continuous <Continuous>  traZODone 50 milliGRAM(s) Oral at bedtime      PAST MEDICAL & SURGICAL HISTORY:  BPH (benign prostatic hyperplasia)    GERD (gastroesophageal reflux disease)    HLD (hyperlipidemia)    Hypertension    CAD (coronary artery disease)  CABG x2 , Stents x2     Diabetes mellitus    Presence of stent in coronary artery  x2     History of coronary artery bypass graft  X2 in 2012    Bladder neck obstruction  3 surgeries for BPH, most recent         FAMILY HISTORY:  Family history of diabetes mellitus type II (Sibling)        SOCIAL HISTORY:  Denies toxic substance use     VITALS:  T(F): 97.4 (21 @ 10:30), Max: 98.1 (03-15-21 @ 17:45)  HR: 62 (21 @ 10:30)  BP: 102/50 (21 @ 10:30)  RR: 18 (21 @ 10:30)  SpO2: 94% (21 @ 10:30)  Wt(kg): --        PHYSICAL EXAM:  Gen: NAD, calm  HEENT: MMM  Neck: no JVD  Cards: RRR, +S1/S2, no M/G/R  Resp: CTA B/L  GI: soft, NT/ND, NABS  : no CVA tenderness  Vascular: no LE edema B/L  Derm: no rashes  Neuro: non-focal      LABS:      130<L>  |  96<L>  |  18  ----------------------------<  251<H>  5.1   |  21<L>  |  0.94    Ca    8.1<L>      16 Mar 2021 07:23  Phos  1.9     03-15  Mg     1.5     03-15    TPro  6.1  /  Alb  3.2<L>  /  TBili  0.3  /  DBili      /  AST  54<H>  /  ALT  <5  /  AlkPhos  63  03-15    Creatinine Trend: 0.94 <--, 0.74 <--, 0.71 <--, 0.93 <--, 0.94 <--, 1.18 <--, 1.21 <--                        12.0   4.96  )-----------( 167      ( 16 Mar 2021 07:23 )             36.4     Urine Studies:  Urinalysis Basic - ( 14 Mar 2021 12:49 )    Color: Yellow / Appearance: Clear / S.013 / pH:   Gluc:  / Ketone: Trace  / Bili: Negative / Urobili: <2 mg/dL   Blood:  / Protein: 30 mg/dL / Nitrite: Negative   Leuk Esterase: Negative / RBC: 4 /HPF / WBC 5 /HPF   Sq Epi:  / Non Sq Epi: 0 /HPF / Bacteria: Negative      Sodium, Random Urine: <20 mmol/L ( @ 13:15)  Osmolality, Random Urine: 353 mosm/kg ( @ 13:15)      RADIOLOGY & ADDITIONAL STUDIES:    < from: Xray Chest 1 View- PORTABLE-Urgent (Xray Chest 1 View- PORTABLE-Urgent .) (21 @ 09:53) >  IMPRESSION:  Patchy left lower lung opacities may represent pneumonia.    < end of copied text >

## 2021-03-16 NOTE — CONSULT NOTE ADULT - PROBLEM SELECTOR RECOMMENDATION 9
While inpatient CHO diet and FS AC and HS  Goal Glucose 100-180  Recommend increasing Lantus to 20 Units HS and Admelog 3 Units TIDAC plus low scale before meals and at bedtime  Patient will be discharged on basal/bolus  Will need appt at [address] [place] [address] prior to discharge While inpatient CHO diet and FS AC and HS  Goal Glucose 100-180  Recommend increasing Lantus to 20 Units HS and Admelog 3 Units TIDAC plus low scale before meals and at bedtime  Patient will be discharged on basal/bolus  Can f/u at [facility] [phone]

## 2021-03-16 NOTE — CONSULT NOTE ADULT - ASSESSMENT
70 yr old M with CAD s/p CABG, T2DM uncontrolled A1C 10.3% here with COVID. Endocrine consulted for steroid exacerbated hyperglycemia in 300s. High risk pt with high level decision making.

## 2021-03-16 NOTE — PROGRESS NOTE ADULT - SUBJECTIVE AND OBJECTIVE BOX
Fish Dillard MD  Interventional Cardiology / Endovascular Specialist  [place] Office : [address] [place] [address] [place] [address]  Tel:   [place] Office : [address] [facility] [place] [address]  Tel: [phone]  Cell : [phone]    Subjective/Overnight events: Patient lying in bed comfortably. No acute distress. Denies chest pain, SOB or palpitations  	  MEDICATIONS:  aspirin enteric coated 81 milliGRAM(s) Oral daily  enoxaparin Injectable 40 milliGRAM(s) SubCutaneous daily  metoprolol tartrate 25 milliGRAM(s) Oral two times a day    remdesivir  IVPB   IV Intermittent   remdesivir  IVPB 100 milliGRAM(s) IV Intermittent every 24 hours      carbidopa/levodopa  10/100 2 Tablet(s) Oral <User Schedule>  melatonin 3 milliGRAM(s) Oral at bedtime  traZODone 50 milliGRAM(s) Oral at bedtime    polyethylene glycol 3350 17 Gram(s) Oral two times a day  senna 2 Tablet(s) Oral at bedtime    dexAMETHasone     Tablet 6 milliGRAM(s) Oral daily  dextrose 40% Gel 15 Gram(s) Oral once  dextrose 50% Injectable 25 Gram(s) IV Push once  dextrose 50% Injectable 12.5 Gram(s) IV Push once  dextrose 50% Injectable 25 Gram(s) IV Push once  glucagon  Injectable 1 milliGRAM(s) IntraMuscular once  insulin glargine Injectable (LANTUS) 15 Unit(s) SubCutaneous at bedtime  insulin lispro (ADMELOG) corrective regimen sliding scale   SubCutaneous three times a day before meals  insulin lispro (ADMELOG) corrective regimen sliding scale   SubCutaneous at bedtime  insulin lispro Injectable (ADMELOG) 3 Unit(s) SubCutaneous three times a day before meals  simvastatin 20 milliGRAM(s) Oral at bedtime    ciprofloxacin  0.3% Ophthalmic Solution 1 Drop(s) Left EYE four times a day  dextrose 5%. 1000 milliLiter(s) IV Continuous <Continuous>  dextrose 5%. 1000 milliLiter(s) IV Continuous <Continuous>  ketorolac 0.5% Ophthalmic Solution 1 Drop(s) Left EYE two times a day  prednisoLONE acetate 1% Suspension 1 Drop(s) Left EYE four times a day  sodium chloride 0.9%. 1000 milliLiter(s) IV Continuous <Continuous>      PAST MEDICAL/SURGICAL HISTORY  PAST MEDICAL & SURGICAL HISTORY:  BPH (benign prostatic hyperplasia)    GERD (gastroesophageal reflux disease)    HLD (hyperlipidemia)    Hypertension    CAD (coronary artery disease)  CABG x2 2012, Stents x2 2014    Diabetes mellitus    Presence of stent in coronary artery  x2 2014    History of coronary artery bypass graft  X2 in March of 2012    Bladder neck obstruction  3 surgeries for BPH, most recent 2012        SOCIAL HISTORY: Substance Use (street drugs): ( x ) never used  (  ) other:    FAMILY HISTORY:  Family history of diabetes mellitus type II (Sibling)        REVIEW OF SYSTEMS:  CONSTITUTIONAL: No fever, weight loss, or fatigue  EYES: No eye pain, visual disturbances, or discharge  ENMT:  No difficulty hearing, tinnitus, vertigo; No sinus or throat pain  BREASTS: No pain, masses, or nipple discharge  GASTROINTESTINAL: No abdominal or epigastric pain. No nausea, vomiting, or hematemesis; No diarrhea or constipation. No melena or hematochezia.  GENITOURINARY: No dysuria, frequency, hematuria, or incontinence  NEUROLOGICAL: No headaches, memory loss, loss of strength, numbness, or tremors  ENDOCRINE: No heat or cold intolerance; No hair loss  MUSCULOSKELETAL: No joint pain or swelling; No muscle, back, or extremity pain  PSYCHIATRIC: No depression, anxiety, mood swings, or difficulty sleeping  HEME/LYMPH: No easy bruising, or bleeding gums  All others negative    PHYSICAL EXAM:  T(C): 36.3 (03-16-21 @ 10:30), Max: 36.7 (03-15-21 @ 17:45)  HR: 62 (03-16-21 @ 10:30) (59 - 64)  BP: 102/50 (03-16-21 @ 10:30) (102/50 - 143/60)  RR: 18 (03-16-21 @ 10:30) (17 - 20)  SpO2: 94% (03-16-21 @ 10:30) (93% - 100%)  Wt(kg): --  I&O's Summary        GENERAL: NAD  EYES: EOMI, PERRLA, conjunctiva and sclera clear  ENMT: No tonsillar erythema, exudates, or enlargement  Cardiovascular: Normal S1 S2, No JVD, No murmurs, No edema  Respiratory: Lungs clear to auscultation	  Gastrointestinal:  Soft, Non-tender, + BS	  Extremities: No edema                                      12.0   4.96  )-----------( 167      ( 16 Mar 2021 07:23 )             36.4     03-16    130<L>  |  96<L>  |  18  ----------------------------<  251<H>  5.1   |  21<L>  |  0.94    Ca    8.1<L>      16 Mar 2021 07:23  Phos  1.9     03-15  Mg     1.5     03-15    TPro  6.1  /  Alb  3.2<L>  /  TBili  0.3  /  DBili  x   /  AST  54<H>  /  ALT  <5  /  AlkPhos  63  03-15    proBNP:   Lipid Profile:   HgA1c:   TSH:     Consultant(s) Notes Reviewed:  [x ] YES  [ ] NO    Care Discussed with Consultants/Other Providers [ x] YES  [ ] NO    Imaging Personally Reviewed independently:  [x] YES  [ ] NO    All labs, radiologic studies, vitals, orders and medications list reviewed. Patient is seen and examined at bedside. Case discussed with medical team.              
Patient is a 70y old  Male who presents with a chief complaint of Weakness (16 Mar 2021 13:26)    Being followed by ID for COVID,weakness    Interval history:feels better  on room air intermittently   No acute events      ROS:  No cough,SOB,CP  No N/V/D./abd pain  No other complaints      Antimicrobials:    remdesivir  IVPB   IV Intermittent   remdesivir  IVPB 100 milliGRAM(s) IV Intermittent every 24 hours    Other medications reviewed    Vital Signs Last 24 Hrs  T(C): 36.3 (03-16-21 @ 10:30), Max: 36.7 (03-15-21 @ 17:45)  T(F): 97.4 (03-16-21 @ 10:30), Max: 98.1 (03-15-21 @ 17:45)  HR: 62 (03-16-21 @ 10:30) (59 - 64)  BP: 102/50 (03-16-21 @ 10:30) (102/50 - 143/60)  BP(mean): --  RR: 18 (03-16-21 @ 10:30) (17 - 20)  SpO2: 94% (03-16-21 @ 10:30) (93% - 100%)    Physical Exam:        HEENT PERRLA EOMI    No oral exudate or erythema    Chest Good AE,CTA    CVS RRR S1 S2 WNl No murmur or rub or gallop    Abd soft BS normal No tenderness no masses    IV site no erythema tenderness or discharge    CNS AAO X 3 no focal    Lab Data:                          12.0   4.96  )-----------( 167      ( 16 Mar 2021 07:23 )             36.4       03-16    130<L>  |  96<L>  |  18  ----------------------------<  251<H>  5.1   |  21<L>  |  0.94    Ca    8.1<L>      16 Mar 2021 07:23  Phos  1.9     03-15  Mg     1.5     03-15    TPro  6.1  /  Alb  3.2<L>  /  TBili  0.3  /  DBili  x   /  AST  54<H>  /  ALT  <5  /  AlkPhos  63  03-15        Culture - Blood (collected 14 Mar 2021 14:09)  Source: .Blood Blood-Venous  Preliminary Report (15 Mar 2021 15:02):    No growth to date.    Culture - Blood (collected 14 Mar 2021 14:09)  Source: .Blood Blood-Peripheral  Preliminary Report (15 Mar 2021 15:02):    No growth to date.        < from: Xray Chest 1 View- PORTABLE-Urgent (Xray Chest 1 View- PORTABLE-Urgent .) (03.14.21 @ 09:53) >    IMPRESSION:  Patchy left lower lung opacities may represent pneumonia.      < end of copied text >                
PULMONARY PROGRESS NOTE    SHAHZAD FIELD  MRN-[id number]    Patient is a 70y old  Male who presents with a chief complaint of Weakness (16 Mar 2021 11:39)      HPI:  on room air  eating breakfast  denies coug/h pain       ROS:   -    ACTIVE MEDICATION LIST:  MEDICATIONS  (STANDING):  aspirin enteric coated 81 milliGRAM(s) Oral daily  carbidopa/levodopa  10/100 2 Tablet(s) Oral <User Schedule>  ciprofloxacin  0.3% Ophthalmic Solution 1 Drop(s) Left EYE four times a day  dexAMETHasone     Tablet 6 milliGRAM(s) Oral daily  dextrose 40% Gel 15 Gram(s) Oral once  dextrose 5%. 1000 milliLiter(s) (50 mL/Hr) IV Continuous <Continuous>  dextrose 5%. 1000 milliLiter(s) (100 mL/Hr) IV Continuous <Continuous>  dextrose 50% Injectable 25 Gram(s) IV Push once  dextrose 50% Injectable 12.5 Gram(s) IV Push once  dextrose 50% Injectable 25 Gram(s) IV Push once  enoxaparin Injectable 40 milliGRAM(s) SubCutaneous daily  glucagon  Injectable 1 milliGRAM(s) IntraMuscular once  insulin glargine Injectable (LANTUS) 15 Unit(s) SubCutaneous at bedtime  insulin lispro (ADMELOG) corrective regimen sliding scale   SubCutaneous three times a day before meals  insulin lispro (ADMELOG) corrective regimen sliding scale   SubCutaneous at bedtime  insulin lispro Injectable (ADMELOG) 3 Unit(s) SubCutaneous three times a day before meals  ketorolac 0.5% Ophthalmic Solution 1 Drop(s) Left EYE two times a day  melatonin 3 milliGRAM(s) Oral at bedtime  metoprolol tartrate 25 milliGRAM(s) Oral two times a day  polyethylene glycol 3350 17 Gram(s) Oral two times a day  prednisoLONE acetate 1% Suspension 1 Drop(s) Left EYE four times a day  remdesivir  IVPB   IV Intermittent   remdesivir  IVPB 100 milliGRAM(s) IV Intermittent every 24 hours  senna 2 Tablet(s) Oral at bedtime  simvastatin 20 milliGRAM(s) Oral at bedtime  sodium chloride 0.9%. 1000 milliLiter(s) (75 mL/Hr) IV Continuous <Continuous>  traZODone 50 milliGRAM(s) Oral at bedtime    MEDICATIONS  (PRN):      EXAM:  Vital Signs Last 24 Hrs  T(C): 36.3 (16 Mar 2021 10:30), Max: 36.7 (15 Mar 2021 17:45)  T(F): 97.4 (16 Mar 2021 10:30), Max: 98.1 (15 Mar 2021 17:45)  HR: 62 (16 Mar 2021 10:30) (59 - 64)  BP: 102/50 (16 Mar 2021 10:30) (102/50 - 143/60)  BP(mean): --  RR: 18 (16 Mar 2021 10:30) (17 - 20)  SpO2: 94% (16 Mar 2021 10:30) (93% - 100%)    GENERAL: The patient is awake and alert in no apparent distress.     LUNGS:   respirations unlabored    HEART: Regular rate                           12.0   4.96  )-----------( 167      ( 16 Mar 2021 07:23 )             36.4       03-16    130<L>  |  96<L>  |  18  ----------------------------<  251<H>  5.1   |  21<L>  |  0.94    Ca    8.1<L>      16 Mar 2021 07:23  Phos  1.9     03-15  Mg     1.5     03-15    TPro  6.1  /  Alb  3.2<L>  /  TBili  0.3  /  DBili  x   /  AST  54<H>  /  ALT  <5  /  AlkPhos  63  03-15     kali< from: Xray Chest 1 View- PORTABLE-Urgent (Xray Chest 1 View- PORTABLE-Urgent .) (03.14.21 @ 09:53) >    EXAM:  XR CHEST PORTABLE URGENT 1V        PROCEDURE DATE:  Mar 14 2021         INTERPRETATION:  CLINICAL INFORMATION: Weakness.    TECHNIQUE: One view of the chest.    COMPARISON: Radiograph chest 9/30/2020.    FINDINGS:  LINES/TUBES: None    LUNGS: Patchy left lower lung opacities.    PLEURA: No pleural effusion or pneumothorax.    HEART AND MEDIASTINUM: Status post median sternotomy Heart size is poorly assessed..    SKELETON: Unremarkable skeletal structures.    IMPRESSION:  Patchy left lower lung opacities may represent pneumonia.            TEODORO KINGSTON M.D., RADIOLOGY RESIDENT  This document has been electronically signed.  MADY DE JESUS MD; Attending Radiologist  This document has been electronically signed. Mar 14 2021 10:00AM    < end of copied text >    PROBLEM LIST:  70y Male with HEALTH ISSUES - PROBLEM Dx:  Prophylactic measure  Prophylactic measure    Hypokalemia  Hypokalemia    Essential hypertension  Essential hypertension    Type 2 diabetes mellitus without complication, with long-term current use of insulin  Type 2 diabetes mellitus without complication, with long-term current use of insulin    Coronary artery disease involving native coronary artery of native heart without angina pectoris  Coronary artery disease involving native coronary artery of native heart without angina pectoris    Parkinson disease  Parkinson disease    Hyponatremia  Hyponatremia    COVID-19  COVID-19         RECS:  on Remdesivir  on room air- would stop dexamethasone   for now continue lovenox daily  trend markers  spoke to daughter- 165lb- entered to the chart        Please call with any questions.    Yani Tiwari DO  [facility] Pulmonary/Sleep Medicine  [phone]  
    SUBJECTIVE / OVERNIGHT EVENTS: pt seen and examined       MEDICATIONS  (STANDING):  aspirin enteric coated 81 milliGRAM(s) Oral daily  carbidopa/levodopa  10/100 2 Tablet(s) Oral <User Schedule>  ciprofloxacin  0.3% Ophthalmic Solution 1 Drop(s) Left EYE four times a day  dexAMETHasone     Tablet 6 milliGRAM(s) Oral daily  dextrose 40% Gel 15 Gram(s) Oral once  dextrose 5%. 1000 milliLiter(s) (50 mL/Hr) IV Continuous <Continuous>  dextrose 5%. 1000 milliLiter(s) (100 mL/Hr) IV Continuous <Continuous>  dextrose 50% Injectable 25 Gram(s) IV Push once  dextrose 50% Injectable 12.5 Gram(s) IV Push once  dextrose 50% Injectable 25 Gram(s) IV Push once  enoxaparin Injectable 40 milliGRAM(s) SubCutaneous daily  glucagon  Injectable 1 milliGRAM(s) IntraMuscular once  insulin glargine Injectable (LANTUS) 15 Unit(s) SubCutaneous at bedtime  insulin lispro (ADMELOG) corrective regimen sliding scale   SubCutaneous three times a day before meals  insulin lispro (ADMELOG) corrective regimen sliding scale   SubCutaneous at bedtime  insulin lispro Injectable (ADMELOG) 3 Unit(s) SubCutaneous three times a day before meals  ketorolac 0.5% Ophthalmic Solution 1 Drop(s) Left EYE two times a day  melatonin 3 milliGRAM(s) Oral at bedtime  metoprolol tartrate 25 milliGRAM(s) Oral two times a day  polyethylene glycol 3350 17 Gram(s) Oral two times a day  prednisoLONE acetate 1% Suspension 1 Drop(s) Left EYE four times a day  remdesivir  IVPB   IV Intermittent   remdesivir  IVPB 100 milliGRAM(s) IV Intermittent every 24 hours  senna 2 Tablet(s) Oral at bedtime  simvastatin 20 milliGRAM(s) Oral at bedtime  sodium chloride 0.9%. 1000 milliLiter(s) (75 mL/Hr) IV Continuous <Continuous>  traZODone 50 milliGRAM(s) Oral at bedtime    MEDICATIONS  (PRN):    Vital Signs Last 24 Hrs  T(C): 36.4 (15 Mar 2021 22:09), Max: 38.6 (15 Mar 2021 00:24)  T(F): 97.5 (15 Mar 2021 22:09), Max: 101.4 (15 Mar 2021 00:24)  HR: 62 (15 Mar 2021 22:09) (49 - 63)  BP: 143/60 (15 Mar 2021 22:09) (108/46 - 143/60)  BP(mean): --  RR: 19 (15 Mar 2021 22:09) (18 - 20)  SpO2: 93% (15 Mar 2021 22:09) (93% - 100%)    CAPILLARY BLOOD GLUCOSE      POCT Blood Glucose.: 171 mg/dL (15 Mar 2021 21:25)  POCT Blood Glucose.: 137 mg/dL (15 Mar 2021 17:52)  POCT Blood Glucose.: 197 mg/dL (15 Mar 2021 12:19)  POCT Blood Glucose.: 80 mg/dL (15 Mar 2021 08:04)    I&O's Summary      Constitutional: No fever, fatigue  Skin: No rash.  Eyes: No recent vision problems or eye pain.  ENT: No congestion, ear pain, or sore throat.  Cardiovascular: No chest pain or palpation.  Respiratory: No cough, shortness of breath, congestion, or wheezing.  Gastrointestinal: No abdominal pain, nausea, vomiting, or diarrhea.  Genitourinary: No dysuria.  Musculoskeletal: No joint swelling.  Neurologic: No headache.    PHYSICAL EXAM:  GENERAL: NAD  EYES: EOMI, PERRLA  NECK: Supple, No JVD  CHEST/LUNG: dec breath sounds at bases  HEART:  S1 , S2 +  ABDOMEN: soft , bs+  EXTREMITIES:  no edema  NEUROLOGY:alert awake      LABS:                        11.5   3.67  )-----------( 141      ( 15 Mar 2021 06:30 )             34.7     03-15    128<L>  |  96<L>  |  15  ----------------------------<  155<H>  TNP   |  19<L>  |  0.71    Ca    8.0<L>      15 Mar 2021 19:24  Phos  2.1     03-15  Mg     1.6     03-15    TPro  6.1  /  Alb  3.2<L>  /  TBili  0.3  /  DBili  x   /  AST  54<H>  /  ALT  <5  /  AlkPhos  63  03-15    PT/INR - ( 14 Mar 2021 13:15 )   PT: 12.1 sec;   INR: 1.05 ratio               Urinalysis Basic - ( 14 Mar 2021 12:49 )    Color: Yellow / Appearance: Clear / S.013 / pH: x  Gluc: x / Ketone: Trace  / Bili: Negative / Urobili: <2 mg/dL   Blood: x / Protein: 30 mg/dL / Nitrite: Negative   Leuk Esterase: Negative / RBC: 4 /HPF / WBC 5 /HPF   Sq Epi: x / Non Sq Epi: 0 /HPF / Bacteria: Negative        RADIOLOGY & ADDITIONAL TESTS:    Imaging Personally Reviewed:    Consultant(s) Notes Reviewed:      Care Discussed with Consultants/Other Providers:

## 2021-03-16 NOTE — CONSULT NOTE ADULT - ASSESSMENT
70y Male with history of HTN on HCTZ presents with weakness found to have COVID-19. Nephrology consulted for hyponatremia.    1) Hyponatremia: likely secondary to hypovolemia and HCTZ use given urine study results. Serum Na improving with IVF. Continue with IVF and repeat urine studies. Discontinue dietary sodium intake and start Glucerna to increase osmolar intake. Monitor serum Na.    2) HTN: BP low normal. Holding HCTZ. Monitor BP.    3) Hypophosphatemia: Repleted on 3/15. Repeat phosphorus.     4) COVID-19: As per primary team.

## 2021-03-16 NOTE — DISCHARGE NOTE PROVIDER - NSDCFUSCHEDAPPT_GEN_ALL_CORE_FT
SHAHZAD FIELD ; 03/25/2021 ; NPP Neuro [address] [facility]  SHAHZAD FIELD ; 03/25/2021 ; NPP Gastro [address] [facility]

## 2021-03-16 NOTE — DISCHARGE NOTE PROVIDER - NSDCFUADDAPPT_GEN_ALL_CORE_FT
Follow up with your primary care physician for further monitoring in 1-2 weeks. Please call to arrange appointment.     Follow up with your primary care physician for further monitoring in 1-2 weeks. Please call to arrange appointment.    Follow up with endocrinology within 1-2 weeks of discharge.  Follow up with nephrology within 1-2 weeks of discharge.

## 2021-03-16 NOTE — CONSULT NOTE ADULT - ATTENDING COMMENTS
[facility] NEPHROLOGY  Mahesh Lomax M.D.  Perez Armstrong D.O.  Gricelda Choe M.D.  Juliet Rogers, MSN, ANP-C    Telephone: [phone]  Facsimile: [phone]    [address] [place] [address]
Ivelisse Gutierrez (pager [phone])  On evenings and weekends, please call [phone] or page endocrine fellow on call.   Please note that this patient may be followed by different provider tomorrow. If no answer, contact endocrine fellow on call.
I performed a history and physical examination of the patient and discussed the management of the patient with the resident. I reviewed the resident's note and agree with the documented findings and plan of care with the following additions/exceptions.    DOS 3/15/21    reports feeling ok.   on exam had some slowness in replies c/w parkinsonism. fully alert. oriented to march 2021 but was off on date by 1. limbs symmetric  rec to keep home regimen of sinemet  cont care per primary team re: covid, hyponatremia  f/u with Dr. Tsai as outpatient  pls reconsult with further questions

## 2021-03-17 PROBLEM — N40.0 BENIGN PROSTATIC HYPERPLASIA WITHOUT LOWER URINARY TRACT SYMPTOMS: Chronic | Status: ACTIVE | Noted: 2021-01-01

## 2021-03-17 NOTE — PATIENT PROFILE ADULT - VISION (WITH CORRECTIVE LENSES IF THE PATIENT USUALLY WEARS THEM):
as per pt daughter, the pt recently had cataract surgery. he was doing well for one week and half but now it is unknown since pt contracted covid./Normal vision: sees adequately in most situations; can see medication labels, newsprint

## 2021-03-17 NOTE — ED ADULT NURSE NOTE - CHIEF COMPLAINT QUOTE
pt Czech speaking arriving from home by EMS for hypoxia/SOB. Pt covid+, discharged 3/16 from [facility]. O2 sat 86% RA at home as per EMS. Arriving on nonrebreather 10L with O2 sat 99%. RR labored. PMH DM, HTN, Parkinsons. Daughter Nneka  [phone].

## 2021-03-17 NOTE — ED PROVIDER NOTE - CLINICAL SUMMARY MEDICAL DECISION MAKING FREE TEXT BOX
Pt is a 71 y/o M PMHx CABG, coronary stents x 2, DM, parkinson's disease p/w wheezing, shortness of breath today.  -- symptoms likely 2/2 to known diagnosis of COVID-19; no respiratory distress presently -- labs, cxr, decadron

## 2021-03-17 NOTE — H&P ADULT - NSHPPHYSICALEXAM_GEN_ALL_CORE
Vital Signs Last 24 Hrs  T(C): 36.4 (17 Mar 2021 10:38), Max: 36.7 (16 Mar 2021 17:00)  T(F): 97.5 (17 Mar 2021 10:38), Max: 98 (16 Mar 2021 17:00)  HR: 68 (17 Mar 2021 14:25) (58 - 68)  BP: 158/41 (17 Mar 2021 14:25) (120/54 - 158/41)  BP(mean): --  RR: 22 (17 Mar 2021 14:25) (18 - 24)  SpO2: 98% (17 Mar 2021 14:25) (93% - 99%)  CAPILLARY BLOOD GLUCOSE      POCT Blood Glucose.: 335 mg/dL (16 Mar 2021 17:44)    I&O's Summary      PHYSICAL EXAM:  GENERAL: in resp distress, labored breathing,, well developed  HEAD:  Atraumatic, Normocephalic  EYES: EOMI, conjunctiva and sclera clear  NECK: Supple, No JVD  CHEST/LUNG: tachypneic, labored breathing, b/l crackles  HEART: Regular rate and rhythm; No murmurs, rubs, or gallops  ABDOMEN: Soft, Nontender, Nondistended; Bowel sounds present  EXTREMITIES:  2+ Peripheral Pulses, No clubbing, cyanosis, or edema  PSYCH: AAOx3  NEUROLOGY: non-focal  SKIN: No rashes or lesions

## 2021-03-17 NOTE — ED ADULT NURSE NOTE - OBJECTIVE STATEMENT
71 y/o male arrives to E.D. rm 25 c/o SOB. Pt dc'd from [facility] last night after recent covid-19 dx. A&Ox4, ambulatory at baseline, Macedonian-speaking (daughter at bedside for translation), MENDOZA noted, pt received on 10L NRB, pt has difficulty speaking in complete sentences, neg accessory muscle use, neg cyanosis, neg diaphoresis, denies chest pain, denies n/v/d. Daughter states that pt's pulse ox at home decreased to 60s-70s after pt walked to bathroom today. Pt pending U/S IV line. Will continue to monitor.

## 2021-03-17 NOTE — H&P ADULT - NSHPLABSRESULTS_GEN_ALL_CORE
LABS:                        11.9   12.75 )-----------( 222      ( 17 Mar 2021 13:10 )             35.7     03-17    131<L>  |  96<L>  |  24<H>  ----------------------------<  438<H>  4.8   |  22  |  1.00    Ca    8.5      17 Mar 2021 13:10  Phos  1.9     03-15  Mg     1.5     03-15    TPro  6.7  /  Alb  3.6  /  TBili  0.5  /  DBili  x   /  AST  61<H>  /  ALT  29  /  AlkPhos  110  03-17 03-17 @ 13:10 ESR -- / CRP 63.2        VBG 03-17 @ 13:10  pH: 7.41/pCO2: 40 /pO2: <24/HCO3: 24/lactate: 1.9      EKG: NSR, no ischemic ST/T change    RADIOLOGY & ADDITIONAL TESTS:  < from: Xray Chest 1 View-PORTABLE IMMEDIATE (03.17.21 @ 12:01) >    IMPRESSION: Patchy airspace opacities in the bilateral lungs, consistent with progression of COVID pneumonia.

## 2021-03-17 NOTE — ED PROVIDER NOTE - OBJECTIVE STATEMENT
Pt is a 69 y/o M PMHx CABG, coronary stents x 2, DM, parkinson's disease p/w wheezing, shortness of breath today.  Pt was recently admitted for generalized weakness and hyponatremia.  Pt was also found to be + for COVID-19. Pt was discharged yesterday evening.  This morning, pt developed wheezing and shortness of breath.  Pt was found to have a SpO2 at 70s-80s.  Pt's wheezing and SOB improved significantly after starting nonrebreather.  Pt w/o fevers, chills, chest pain, cough, headache, abdominal pain, nausea, vomiting.  Pt's daughter Nneka at bedside assisting in history.

## 2021-03-17 NOTE — ED PROVIDER NOTE - PMH
BPH (benign prostatic hyperplasia)    CAD (coronary artery disease)  CABG x2 2012, Stents x2 2014  Diabetes mellitus    GERD (gastroesophageal reflux disease)    HLD (hyperlipidemia)    Hypertension

## 2021-03-17 NOTE — H&P ADULT - PROBLEM SELECTOR PLAN 2
- c/w asa, zocor and metoprolol  -denies CP  -echo from 10/2020 with normal LV function  -pt is known to Dr. Pratt as outpt

## 2021-03-17 NOTE — ED PROVIDER NOTE - ATTENDING CONTRIBUTION TO CARE
Attending note:   After face to face evaluation of this patient, I concur with above noted hx, pe, and care plan for this patient.  Khan: 70 yof with recent diagnosis of covid. Pt's wife had cardiac arrest and found to have covid. Pt was admitted for few days in the hospital and discharged, still without sxs. Pt was noted by daughter to have wheezing, difficulty breathing and low oxygen. Pt arrived in ED with NRB at 100% but belly breathing, mild to mod resp distress. rales right worse than left, not tachy, abd non tender, no edema. Pt has resp distress but able to oxygenate on NRB, will observe closely, observe and give steroids. I spoke with daughter about goals of care and she said she will discuss with patient as soon as he is more comfortable.

## 2021-03-17 NOTE — H&P ADULT - HISTORY OF PRESENT ILLNESS
Patient is a 70 year old Italian/Jhonny-speaking male with a history of CAD s/p CABG and 2 stents, Type 2 DM, HTN, BPH s/p TURP and Parkinson's disease, who was recently admitted on 3/14 for weakness, covid, hypoxia, started on remdesivir/decadron, discharged home yesterday (3/16). However, at home, pt decompensated with increasing sob/labored breathing, o2 sat dropped from 88% to 66% within 1/2 hour per daughter, gasping for air. Pt's son called 911 and brought him back to the hospital. Pt denies chest pain/abd pain/n/v/diarrhea. He reports he was sent home without oxygen.     In ED, he was put on 100% nonrebreather for labored breathing. CXR showed b./l opacities c/w covid progression.

## 2021-03-17 NOTE — ED PROVIDER NOTE - PROGRESS NOTE DETAILS
STEVEN RICHARDSON:  Case discussed with Dr. Bryant who requests admission to Dr. Jose Mays.  Dr. Bryant states she will endorse patient to Dr. Mays.  ED ACP text paged at this time.

## 2021-03-17 NOTE — H&P ADULT - ASSESSMENT
70 year old Vietnamese/Jhonny-speaking male with a history of CAD s/p CABG and 2 stents, Type 2 DM, HTN, BPH s/p TURP and Parkinson's disease, who was recently admitted on 3/14 for weakness, covid, hypoxia, started on remdesivir/decadron, discharged home yesterday (3/16). However, at home, pt decompensated with increasing sob/labored breathing, o2 sat dropped from 88% to 66%, now on NRB

## 2021-03-17 NOTE — H&P ADULT - PROBLEM SELECTOR PLAN 1
-covid progression with acute hypoxic respiratory failure  -now on NRB, if decompensates, try high flow nasal cannula   -consider pulmonary consult tomorrow  -remdesivir/decadron   -titrate o2 sats to 92-96%  -trend inflammatory markers ferritin, d-dimer, crp q72h   -on admission, d-dimer 462, pct 0.21, ferritin 2250, crp 63.2  -code status: full code -covid progression with acute hypoxic respiratory failure  -now on NRB, if decompensates, try high flow nasal cannula   - pulmonary consult Dr. Tiwari tomorrow  -remdesivir/decadron   -titrate o2 sats to 92-96%  -if uprising d-dimer, consider CTPA to r/o PE  -trend inflammatory markers ferritin, d-dimer, crp q72h   -on admission, d-dimer 462, pct 0.21, ferritin 2250, crp 63.2  -code status: full code

## 2021-03-17 NOTE — H&P ADULT - NSHPREVIEWOFSYSTEMS_GEN_ALL_CORE
CONSTITUTIONAL: No fever, weight loss, + generalized weakness, fatigue  EYES: No eye pain, visual disturbances, or discharge  ENMT:  No difficulty hearing, tinnitus, vertigo; No sinus or throat pain  NECK: No pain or stiffness  BREASTS: No pain, masses, or nipple discharge  RESPIRATORY: +cough, no wheezing, chills or hemoptysis; + shortness of breath  CARDIOVASCULAR: No chest pain, palpitations, dizziness, or leg swelling  GASTROINTESTINAL: No abdominal or epigastric pain. No nausea, vomiting, or hematemesis; No diarrhea or constipation. No melena or hematochezia.  GENITOURINARY: No dysuria, frequency, hematuria, or incontinence  NEUROLOGICAL: No headaches, memory loss, loss of strength, numbness, or tremors  SKIN: No itching, burning, rashes, or lesions   LYMPH NODES: No enlarged glands  ENDOCRINE: No heat or cold intolerance; No hair loss  MUSCULOSKELETAL: No muscle or back pain  PSYCHIATRIC: No depression, anxiety, mood swings, or difficulty sleeping  HEME/LYMPH: No easy bruising, or bleeding gums  ALLERGY AND IMMUNOLOGIC: No hives or eczema

## 2021-03-17 NOTE — ED ADULT TRIAGE NOTE - CHIEF COMPLAINT QUOTE
pt arriving from home by EMS covid+, discharged 3/16 from [facility]. Pt brought in for O2 sat 86% RA and SOB. Arriving on nonrebreather 10L with O2 sat 99%. RR labored. PMH DM, HTN, Parkinsons. Daughter Nneka can be reached at [phone]. pt Estonian speaking arriving from home by EMS for hypoxia/SOB. Pt covid+, discharged 3/16 from [facility]. O2 sat 86% RA at home as per EMS. Arriving on nonrebreather 10L with O2 sat 99%. RR labored. PMH DM, HTN, Parkinsons. Daughter Nneka  [phone].

## 2021-03-18 NOTE — CONSULT NOTE ADULT - ASSESSMENT
70y Male with history of HTN on HCTZ presents with weakness found to have COVID-19. Nephrology consulted for hyponatremia.    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 Hyponatremia resolved. Discontinue dietary sodium restriction and start Glucerna to increase osmolar intake. Monitor serum Na.    2) HTN: BP controlled. Keep off of HCTZ. Monitor BP.    3) Azotemia: Due to steroids.    4) COVID-19: As per primary team.

## 2021-03-18 NOTE — PROGRESS NOTE ADULT - SUBJECTIVE AND OBJECTIVE BOX
DATE OF SERVICE: 03/18/2021 Patient was seen and examined ,interim events noted.Consultant notes ,Labs,Telemetry reviewed by me    PRESENTING CC:Dyspnea    HPI and HOSPITAL COURSE: HPI:  Patient is a 70 year old Armenian/Jhonny-speaking male with a history of CAD s/p CABG and 2 stents, Type 2 DM, HTN, BPH s/p TURP and Parkinson's disease, who was recently admitted on 3/14 for weakness, covid, hypoxia, started on remdesivir/decadron, discharged home yesterday (3/16). However, at home, pt decompensated with increasing sob/labored breathing, o2 sat dropped from 88% to 66% within 1/2 hour per daughter, gasping for air. Pt's son called 911 and brought him back to the hospital. Pt denies chest pain/abd pain/n/v/diarrhea. He reports he was sent home without oxygen.     In ED, he was put on 100% nonrebreather for labored breathing. CXR showed b./l opacities c/w covid progression.      INTERIM EVENTS:      PMH -reviewed admission note, no change since admission  Heart Failure: Acute [ ] Chronic [ ] Acute on Chronic [ ] Diastolic [ ] Systolic [ ] Combined Systolic and Diastolic[ ]  ZELALEM[ ]  ATN[ ]  CKD I [ ] CKDII [ ] CKD III [ ] CKD IV [ ] CKD V [ ] ESRD[ ]  HTN[ ] CVA[ ] DM[ ] COPD[ ] COVID[ ] AF[ ]  PPM[ ] ICD[ ]    MEDICATIONS  (STANDING):  aspirin enteric coated 81 milliGRAM(s) Oral daily  carbidopa/levodopa  10/100 2 Tablet(s) Oral <User Schedule>  dexAMETHasone  Injectable 6 milliGRAM(s) IV Push daily  dextrose 40% Gel 15 Gram(s) Oral once  dextrose 5%. 1000 milliLiter(s) (50 mL/Hr) IV Continuous <Continuous>  dextrose 5%. 1000 milliLiter(s) (100 mL/Hr) IV Continuous <Continuous>  dextrose 50% Injectable 25 Gram(s) IV Push once  dextrose 50% Injectable 12.5 Gram(s) IV Push once  dextrose 50% Injectable 25 Gram(s) IV Push once  enoxaparin Injectable 40 milliGRAM(s) SubCutaneous daily  glucagon  Injectable 1 milliGRAM(s) IntraMuscular once  insulin glargine Injectable (LANTUS) 30 Unit(s) SubCutaneous at bedtime  insulin lispro (ADMELOG) corrective regimen sliding scale   SubCutaneous Before meals and at bedtime  insulin lispro Injectable (ADMELOG) 6 Unit(s) SubCutaneous three times a day before meals  ketorolac 0.5% Ophthalmic Solution 1 Drop(s) Left EYE two times a day  metoprolol tartrate 25 milliGRAM(s) Oral two times a day  polyethylene glycol 3350 17 Gram(s) Oral daily  prednisoLONE acetate 1% Suspension 1 Drop(s) Left EYE four times a day  remdesivir  IVPB   IV Intermittent   simvastatin 20 milliGRAM(s) Oral at bedtime    MEDICATIONS  (PRN):            REVIEW OF SYSTEMS:  Constitutional: [ ] fever, [ ]weight loss,  [ ]fatigue  Eyes: [ ] visual changes  Respiratory: [ ]shortness of breath;  [ ] cough, [ ]wheezing, [ ]chills, [ ]hemoptysis  Cardiovascular: [ ] chest pain, [ ]palpitations, [ ]dizziness,  [ ]leg swelling[ ]orthopnea[ ]PND  Gastrointestinal: [ ] abdominal pain, [ ]nausea, [ ]vomiting,  [ ]diarrhea [ ]Constipation [ ]Melena  Genitourinary: [ ] dysuria, [ ] hematuria [ ]Da Silva  Neurologic: [ ] headaches [ ] tremors[ ]weakness [ ]Paralysis Right[ ] Left[ ]  Skin: [ ] itching, [ ]burning, [ ] rashes  Endocrine: [ ] heat or cold intolerance  Musculoskeletal: [ ] joint pain or swelling; [ ] muscle, back, or extremity pain  Psychiatric: [ ] depression, [ ]anxiety, [ ]mood swings, or [ ]difficulty sleeping  Hematologic: [ ] easy bruising, [ ] bleeding gums    [x] All remaining systems negative except as per above.   [ ]Unable to obtain.    Vital Signs Last 24 Hrs  T(C): 36.1 (18 Mar 2021 07:48), Max: 36.7 (17 Mar 2021 16:57)  T(F): 96.9 (18 Mar 2021 07:48), Max: 98 (17 Mar 2021 16:57)  HR: 67 (18 Mar 2021 06:41) (66 - 68)  BP: 154/67 (18 Mar 2021 06:41) (130/44 - 158/41)  BP(mean): --  RR: 18 (18 Mar 2021 06:41) (18 - 24)  SpO2: 99% (18 Mar 2021 06:41) (98% - 100%)  I&O's Summary    17 Mar 2021 07:01  -  18 Mar 2021 07:00  --------------------------------------------------------  IN: 355 mL / OUT: 1100 mL / NET: -745 mL        PHYSICAL EXAM:  General: No acute distress BMI-  HEENT: EOMI, PERRL  Neck: Supple, [ ] JVD  Lungs: Equal air entry bilaterally; [ ] rales [ ] wheezing [ ] rhonchi  Heart: Regular rate and rhythm; [ ] murmur   /6 [ ] systolic [ ] diastolic [ ] radiation[ ] rubs [ ]  gallops  Abdomen: Nontender, bowel sounds present  Extremities: No clubbing, cyanosis, [ ] edema [ ]Pulses  equal and intact  Nervous system:  Alert & Oriented X3, no focal deficits  Psychiatric: Normal affect  Skin: No rashes or lesions    LABS:  03-18    138  |  103  |  28<H>  ----------------------------<  409<H>  4.9   |  21<L>  |  0.87    Ca    8.5      18 Mar 2021 06:22  Mg     2.1     03-18    TPro  6.4  /  Alb  3.4  /  TBili  0.4  /  DBili  x   /  AST  94<H>  /  ALT  26  /  AlkPhos  127<H>  03-18    Creatinine Trend: 0.87<--, 1.00<--, 0.86<--, 0.94<--, 0.74<--, 0.71<--                        11.5   12.24 )-----------( 222      ( 18 Mar 2021 06:22 )             34.5         Cardiac Enzymes:           RADIOLOGY:    ECG [my interpretation]:    TELEMETRY:Reviewed monitor tracings-    ECHO:    STRESS TEST:    CATHETERIZATION:      IMPRESSION AND PLAN:

## 2021-03-18 NOTE — CONSULT NOTE ADULT - SUBJECTIVE AND OBJECTIVE BOX
HPI:  Patient is a 70 year old Macedonian/Jhonny-speaking male with a history of CAD s/p CABG and 2 stents, Type 2 DM, HTN, BPH s/p TURP and Parkinson's disease, who was recently admitted on 3/14 for weakness, covid, hypoxia, started on remdesivir/decadron, discharged home on 3/16. However, at home, pt decompensated with increasing sob/labored breathing, o2 sat dropped from 88% to 66% within 1/2 hour per daughter, gasping for air (he reports he was sent home without oxygen). Pt's son called 911 and brought him back to the hospital.       Endocrine history:  Endocrine consult requested for uncontrolled DM2. Patient was seen by our team on last admission earlier this week. History of DM2 for many years. Follows with PCP only. Patient reports taking Basaglar 40 Units HS and Admelog 20 units only before breakfast. He has difficulty swallowing so eats pureed foods some of which have high sugar content such as bananas. Does reports blurry vision, follows with optho regularly and recently had cataract surgery. Denies neuropathy. Does have history of CAD s/p stents and CABG. No known history of CVA. Family history of DM in siblings.   Patient got readmitted with COVID and now again on high dose steroids.         PAST MEDICAL & SURGICAL HISTORY:  BPH (benign prostatic hyperplasia)  GERD (gastroesophageal reflux disease)  HLD (hyperlipidemia)  Hypertension  CAD (coronary artery disease)  CABG x2 2012, Stents x2 2014  Diabetes mellitus  Presence of stent in coronary artery x2 2014  History of coronary artery bypass graft X2 in March of 2012  Bladder neck obstruction 3 surgeries for BPH, most recent 2012        FAMILY HISTORY:  Family history of diabetes mellitus type II (Sibling)      Social History:  No tobacco, alcohol or illicit drug use reported      Outpatient Medications:  Basaglar 40 units sq qhs  Admelog 20 units before breakfast only      MEDICATIONS  (STANDING):  aspirin enteric coated 81 milliGRAM(s) Oral daily  carbidopa/levodopa  10/100 2 Tablet(s) Oral <User Schedule>  dexAMETHasone  Injectable 6 milliGRAM(s) IV Push daily  dextrose 40% Gel 15 Gram(s) Oral once  dextrose 5%. 1000 milliLiter(s) (50 mL/Hr) IV Continuous <Continuous>  dextrose 5%. 1000 milliLiter(s) (100 mL/Hr) IV Continuous <Continuous>  dextrose 50% Injectable 25 Gram(s) IV Push once  dextrose 50% Injectable 12.5 Gram(s) IV Push once  dextrose 50% Injectable 25 Gram(s) IV Push once  enoxaparin Injectable 40 milliGRAM(s) SubCutaneous daily  glucagon  Injectable 1 milliGRAM(s) IntraMuscular once  insulin glargine Injectable (LANTUS) 40 Unit(s) SubCutaneous at bedtime  insulin lispro (ADMELOG) corrective regimen sliding scale   SubCutaneous at bedtime  insulin lispro (ADMELOG) corrective regimen sliding scale   SubCutaneous three times a day before meals  insulin lispro Injectable (ADMELOG) 6 Unit(s) SubCutaneous three times a day before meals  ketorolac 0.5% Ophthalmic Solution 1 Drop(s) Left EYE two times a day  metoprolol tartrate 25 milliGRAM(s) Oral two times a day  polyethylene glycol 3350 17 Gram(s) Oral daily  prednisoLONE acetate 1% Suspension 1 Drop(s) Left EYE four times a day  remdesivir  IVPB   IV Intermittent   simvastatin 20 milliGRAM(s) Oral at bedtime    MEDICATIONS  (PRN):      Allergies  No Known Allergies      Review of Systems:  Constitutional: No fever, good appetite/po intake  Eyes: No blurry vision, no diplopia  Neuro: No tremors, no neuropathy   HEENT: No pain  Cardiovascular: No chest pain, no palpitations  Respiratory: + SOB, no cough  GI: No nausea, vomiting  Skin: no rash  Endocrine: no polyuria, no polydipsia  Hem/lymph: no swelling  ALL OTHER SYSTEMS REVIEWED AND NEGATIVE        PHYSICAL EXAM:  VITALS: T(C): 36.1 (03-18-21 @ 13:37)  T(F): 97 (03-18-21 @ 13:37), Max: 98 (03-17-21 @ 16:57)  HR: 61 (03-18-21 @ 13:37) (61 - 67)  BP: 127/51 (03-18-21 @ 13:37) (127/51 - 154/67)  RR:  (17 - 20)  SpO2:  (96% - 100%)  Wt(kg): --  GENERAL: NAD, well-groomed, well-developed  EYES: No proptosis, anicteric  HEENT:  Atraumatic, Normocephalic  RESPIRATORY: not in any acute respiratory distress currently, on supplemental oxygen via NRB mask  CARDIOVASCULAR: Regular rate; no peripheral edema  GI: Soft, non distended  SKIN: Dry, intact  NEURO: awake, alert, oriented x 3  PSYCH: reactive affect, euthymic mood      POCT Blood Glucose.: 281 mg/dL (03-18-21 @ 13:04)  POCT Blood Glucose.: 339 mg/dL (03-18-21 @ 09:04)  POCT Blood Glucose.: 382 mg/dL (03-18-21 @ 01:39)  POCT Blood Glucose.: 439 mg/dL (03-17-21 @ 21:48)  POCT Blood Glucose.: 335 mg/dL (03-16-21 @ 17:44)  POCT Blood Glucose.: 307 mg/dL (03-16-21 @ 13:14)  POCT Blood Glucose.: 242 mg/dL (03-16-21 @ 09:13)  POCT Blood Glucose.: 171 mg/dL (03-15-21 @ 21:25)  POCT Blood Glucose.: 137 mg/dL (03-15-21 @ 17:52)                            11.5   12.24 )-----------( 222      ( 18 Mar 2021 06:22 )             34.5       03-18    138  |  103  |  28<H>  ----------------------------<  409<H>  4.9   |  21<L>  |  0.87    EGFR if : 100  EGFR if non : 86    Ca    8.5      03-18  Mg     2.1     03-18  Phos  1.9     03-15    TPro  6.4  /  Alb  3.4  /  TBili  0.4  /  DBili  x   /  AST  94<H>  /  ALT  26  /  AlkPhos  127<H>  03-18    Thyroid Function Tests:  03-15 @ 06:30 TSH 0.96     A1C with Estimated Average Glucose Result: 10.3 % (03.14.21 @ 08:57)

## 2021-03-18 NOTE — PROGRESS NOTE ADULT - ASSESSMENT
70 year old English/Jhonny-speaking male with a history of CAD s/p CABG and 2 stents, Type 2 DM, HTN, BPH s/p TURP and Parkinson's disease presenting with weakness. Patient normally walks with a walker but over the past few days he has needed assistance to walk around his house. He has also had poor appetite during this time. Patient's son who lives with him tested positive for COVID on 3/9 without showing any symptoms and was due to travel for work. This morning, patient found his wife unresponsive at home and EMS was called- patient had ROSC s/p cardiac arrest and is currently hospitalized at [facility].   COVID positive  elevated inflammatory markers'clinical picture not c/w Pna  He was ADced and came back again(had recd 2 days of RDV)       A) COVID  Monitor clinically.  Monitor Oxygenation  O2 supplementation.  Remdesivir - UPTO 5 days total   Monitor CBC ,LFTs and Creatinine daily.  Ferritin,CRP and D dimer q 48 hrs.  Dexamethasone as  hypoxia.  Anticoagulation per protocol.  Treatment options are limited-this as well as limitations of data discussed with pt.  Monitor for any bacterial superinfection/complications.      B_ Dyspnea  likely from above  plan as above    Will tailor plan for ID issues  per course,results.Will defer to primary team on management of other issues.  Assessment, plan and recommendations as detailed above were discussed with the medical/primary  team.  Will Follow.  Beeper [phone] [facility] [id number].   Wknd/afterhours/No response-[phone] or Fellow on call

## 2021-03-18 NOTE — CONSULT NOTE ADULT - PROBLEM SELECTOR RECOMMENDATION 9
A1c 10.3% indicating poorly controlled DM2.  Home regimen as per patient: Basaglar 40 units sq qhs and Admelog 20 units before breakfast only  - BG goal inpatient is 100-180 mg/dl, currently elevated in 300s.  - Recommend to A1c 10.3% indicating poorly controlled DM2.  Home regimen as per patient: Basaglar 40 units sq qhs and Admelog 20 units before breakfast only  - BG goal inpatient is 100-180 mg/dl, currently elevated in 300s.  - Continue Lantus 40 units sq qhs   - Increase Admelog to 8 units sq ac TID (hold if NPO)  - Moderate correctional scale sq ac and hs  - Monitor BG ac and hs  - Consistent carb diet  - RD consult    Discharge plan: Patient will be discharged on basal / bolus insulin regimen (doses TBD), please call endocrine team prior to discharge to finalize recs. Patient will benefit from outpatient endocrine follow up on discharge.

## 2021-03-18 NOTE — PROVIDER CONTACT NOTE (OTHER) - ASSESSMENT
Pt temp 96.8 F rectally , then 96.9 F rectally. Hot packs and warm blanket placed on pt. pt visibly shaking but complained it was too hot when hot packs and warm blanket was placed. no vomiting, no diarrhea.

## 2021-03-18 NOTE — CONSULT NOTE ADULT - SUBJECTIVE AND OBJECTIVE BOX
[facility] NEPHROLOGY- CONSULTATION NOTE    70y Male with history of recently diagnosed with COVID-19 presents with hypoxia. Nephrology consulted for hyponatremia.    Patient with hyponatremia on prior admission which had improved with NS. Patient had been on HCTZ as an outpatient which had been held on discharge. Patient however returns for hypoxia noted to be hyponatremic on admission with improvement this morning. No IVF given in ER.    REVIEW OF SYSTEMS:  Gen: no changes in weight, + weakness  HEENT: no rhinorrhea  Neck: no sore throat  Cards: no chest pain  Resp: + dyspnea  GI: no nausea or vomiting or diarrhea  : no dysuria or hematuria  Vascular: no LE edema  Derm: no rashes  Neuro: no numbness/tingling    No Known Allergies      Home Medications Reviewed  Hospital Medications:   MEDICATIONS  (STANDING):  aspirin enteric coated 81 milliGRAM(s) Oral daily  carbidopa/levodopa  10/100 2 Tablet(s) Oral <User Schedule>  ciprofloxacin  0.3% Ophthalmic Solution 1 Drop(s) Left EYE four times a day  dexAMETHasone     Tablet 6 milliGRAM(s) Oral daily  dextrose 40% Gel 15 Gram(s) Oral once  dextrose 5%. 1000 milliLiter(s) (50 mL/Hr) IV Continuous <Continuous>  dextrose 5%. 1000 milliLiter(s) (100 mL/Hr) IV Continuous <Continuous>  dextrose 50% Injectable 25 Gram(s) IV Push once  dextrose 50% Injectable 12.5 Gram(s) IV Push once  dextrose 50% Injectable 25 Gram(s) IV Push once  enoxaparin Injectable 40 milliGRAM(s) SubCutaneous daily  glucagon  Injectable 1 milliGRAM(s) IntraMuscular once  insulin glargine Injectable (LANTUS) 15 Unit(s) SubCutaneous at bedtime  insulin lispro (ADMELOG) corrective regimen sliding scale   SubCutaneous three times a day before meals  insulin lispro (ADMELOG) corrective regimen sliding scale   SubCutaneous at bedtime  insulin lispro Injectable (ADMELOG) 3 Unit(s) SubCutaneous three times a day before meals  ketorolac 0.5% Ophthalmic Solution 1 Drop(s) Left EYE two times a day  magnesium sulfate  IVPB 1 Gram(s) IV Intermittent once  melatonin 3 milliGRAM(s) Oral at bedtime  metoprolol tartrate 25 milliGRAM(s) Oral two times a day  polyethylene glycol 3350 17 Gram(s) Oral two times a day  prednisoLONE acetate 1% Suspension 1 Drop(s) Left EYE four times a day  remdesivir  IVPB   IV Intermittent   remdesivir  IVPB 100 milliGRAM(s) IV Intermittent every 24 hours  senna 2 Tablet(s) Oral at bedtime  simvastatin 20 milliGRAM(s) Oral at bedtime  sodium chloride 0.9%. 1000 milliLiter(s) (75 mL/Hr) IV Continuous <Continuous>  traZODone 50 milliGRAM(s) Oral at bedtime      PAST MEDICAL & SURGICAL HISTORY:  BPH (benign prostatic hyperplasia)    GERD (gastroesophageal reflux disease)    HLD (hyperlipidemia)    Hypertension    CAD (coronary artery disease)  CABG x2 , Stents x2     Diabetes mellitus    Presence of stent in coronary artery  x2     History of coronary artery bypass graft  X2 in 2012    Bladder neck obstruction  3 surgeries for BPH, most recent         FAMILY HISTORY:  Family history of diabetes mellitus type II (Sibling)        SOCIAL HISTORY:  Denies toxic substance use       VITALS:  T(F): 97 (21 @ 13:37), Max: 98 (21 @ 16:57)  HR: 61 (21 @ 13:37)  BP: 127/51 (21 @ 13:37)  RR: 17 (21 @ 13:37)  SpO2: 96% (21 @ 13:37)  Wt(kg): --     @ 07:01  -   @ 07:00  --------------------------------------------------------  IN: 355 mL / OUT: 1100 mL / NET: -745 mL        Weight (kg): 79.7 ( @ 22:20)      PHYSICAL EXAM:  Gen: NAD, calm on NRB  HEENT: MMM  Neck: no JVD  Cards: RRR, +S1/S2, no M/G/R  Resp: course BS B/L  GI: soft, NT/ND, NABS  : no CVA tenderness  Vascular: no LE edema B/L  Derm: no rashes  Neuro: non-focal        LABS:      138  |  103  |  28<H>  ----------------------------<  409<H>  4.9   |  21<L>  |  0.87    Ca    8.5      18 Mar 2021 06:22  Mg     2.1         TPro  6.4  /  Alb  3.4  /  TBili  0.4  /  DBili      /  AST  94<H>  /  ALT  26  /  AlkPhos  127<H>      Creatinine Trend: 0.87 <--, 1.00 <--, 0.86 <--, 0.94 <--, 0.74 <--, 0.71 <--, 0.93 <--, 0.94 <--, 1.18 <--, 1.21 <--                        11.5   12.24 )-----------( 222      ( 18 Mar 2021 06:22 )             34.5     Urine Studies:  Urinalysis Basic - ( 14 Mar 2021 12:49 )    Color: Yellow / Appearance: Clear / S.013 / pH:   Gluc:  / Ketone: Trace  / Bili: Negative / Urobili: <2 mg/dL   Blood:  / Protein: 30 mg/dL / Nitrite: Negative   Leuk Esterase: Negative / RBC: 4 /HPF / WBC 5 /HPF   Sq Epi:  / Non Sq Epi: 0 /HPF / Bacteria: Negative      Osmolality, Random Urine: 566 mosm/kg ( @ 16:40)  Sodium, Random Urine: 29 mmol/L ( @ 16:40)  Sodium, Random Urine: <20 mmol/L ( @ 13:15)  Osmolality, Random Urine: 353 mosm/kg ( @ 13:15)        < from: Xray Chest 1 View-PORTABLE IMMEDIATE (21 @ 12:01) >  IMPRESSION: Patchy airspace opacities in the bilateral lungs, consistent with progression of COVID pneumonia.    < end of copied text >

## 2021-03-18 NOTE — CONSULT NOTE ADULT - SUBJECTIVE AND OBJECTIVE BOX
PULMONARY CONSULT NOTE      SHAHZAD FIELD  MRN-[id number]    Patient is a 70y old  Male who presents with a chief complaint of acute respiratory failure d/t covid (18 Mar 2021 16:00)      HISTORY OF PRESENT ILLNESS:  69 yo history of CAD s/p CABG and 2 stents, Type 2 DM, HTN, BPH s/p TURP and Parkinson's disease, who was recently admitted on 3/14 for weakness, covid, hypoxia, started on remdesivir/decadron, discharged home yesterday (3/16), readmitted for hypoxia at home  now on NRB - when speaking in yefri/stefany- denies any cough, any discomfort/ any distress     Allergies    No Known Allergies    Intolerances        PAST MEDICAL & SURGICAL HISTORY:  BPH (benign prostatic hyperplasia)    GERD (gastroesophageal reflux disease)    HLD (hyperlipidemia)    Hypertension    CAD (coronary artery disease)  CABG x2 2012, Stents x2 2014    Diabetes mellitus    Presence of stent in coronary artery  x2 2014    History of coronary artery bypass graft  X2 in March of 2012    Bladder neck obstruction  3 surgeries for BPH, most recent 2012            FAMILY HISTORY:  Family history of diabetes mellitus type II (Sibling)      Prescriptions:  Admelog SoloStar 100 units/mL injectable solution: 3 unit(s) injectable 3 times a day (before meals)  (17 Mar 2021 16:21)  aspirin 81 mg oral delayed release tablet: 1 tab(s) orally once a day (17 Mar 2021 16:21)  melatonin 3 mg oral tablet: 1 tab(s) orally once a day (at bedtime) (17 Mar 2021 16:21)  metoprolol tartrate 25 mg oral tablet: 1 tab(s) orally 2 times a day (17 Mar 2021 16:21)  polyethylene glycol 3350 oral powder for reconstitution: 17 gram(s) orally once a day (at bedtime) (17 Mar 2021 16:21)  traZODone 50 mg oral tablet: 1 tab(s) orally once a day (at bedtime) (17 Mar 2021 16:21)      SOCIAL HISTORY denies  Smoking History:     REVIEW OF SYSTEMS:  per HPI      Vital Signs Last 24 Hrs  T(C): 36.3 (18 Mar 2021 16:28), Max: 36.6 (17 Mar 2021 21:23)  T(F): 97.4 (18 Mar 2021 16:28), Max: 97.9 (17 Mar 2021 21:23)  HR: 61 (18 Mar 2021 13:37) (61 - 67)  BP: 127/51 (18 Mar 2021 13:37) (127/51 - 154/67)  BP(mean): --  RR: 17 (18 Mar 2021 13:37) (17 - 19)  SpO2: 96% (18 Mar 2021 13:37) (96% - 100%)    PHYSICAL EXAMINATION:  awake/alert  no resp distress      MEDICATIONS  (STANDING):  aspirin enteric coated 81 milliGRAM(s) Oral daily  carbidopa/levodopa  10/100 2 Tablet(s) Oral <User Schedule>  dexAMETHasone  Injectable 6 milliGRAM(s) IV Push daily  dextrose 40% Gel 15 Gram(s) Oral once  dextrose 5%. 1000 milliLiter(s) (50 mL/Hr) IV Continuous <Continuous>  dextrose 5%. 1000 milliLiter(s) (100 mL/Hr) IV Continuous <Continuous>  dextrose 50% Injectable 25 Gram(s) IV Push once  dextrose 50% Injectable 12.5 Gram(s) IV Push once  dextrose 50% Injectable 25 Gram(s) IV Push once  enoxaparin Injectable 40 milliGRAM(s) SubCutaneous daily  glucagon  Injectable 1 milliGRAM(s) IntraMuscular once  insulin glargine Injectable (LANTUS) 40 Unit(s) SubCutaneous at bedtime  insulin lispro (ADMELOG) corrective regimen sliding scale   SubCutaneous at bedtime  insulin lispro (ADMELOG) corrective regimen sliding scale   SubCutaneous three times a day before meals  insulin lispro Injectable (ADMELOG) 8 Unit(s) SubCutaneous three times a day before meals  ketorolac 0.5% Ophthalmic Solution 1 Drop(s) Left EYE two times a day  metoprolol tartrate 25 milliGRAM(s) Oral two times a day  polyethylene glycol 3350 17 Gram(s) Oral daily  prednisoLONE acetate 1% Suspension 1 Drop(s) Left EYE four times a day  remdesivir  IVPB   IV Intermittent   simvastatin 20 milliGRAM(s) Oral at bedtime      MEDICATIONS  (PRN):        LABS:   CBC Full  -  ( 18 Mar 2021 06:22 )  WBC Count : 12.24 K/uL  RBC Count : 4.27 M/uL  Hemoglobin : 11.5 g/dL  Hematocrit : 34.5 %  Platelet Count - Automated : 222 K/uL  Mean Cell Volume : 80.8 fL  Mean Cell Hemoglobin : 26.9 pg  Mean Cell Hemoglobin Concentration : 33.3 gm/dL  Auto Neutrophil # : 11.11 K/uL  Auto Lymphocyte # : 0.54 K/uL  Auto Monocyte # : 0.50 K/uL  Auto Eosinophil # : 0.00 K/uL  Auto Basophil # : 0.00 K/uL  Auto Neutrophil % : 90.8 %  Auto Lymphocyte % : 4.4 %  Auto Monocyte % : 4.1 %  Auto Eosinophil % : 0.0 %  Auto Basophil % : 0.0 %      03-18    138  |  103  |  28<H>  ----------------------------<  409<H>  4.9   |  21<L>  |  0.87    Ca    8.5      18 Mar 2021 06:22  Mg     2.1     03-18    TPro  6.4  /  Alb  3.4  /  TBili  0.4  /  DBili  x   /  AST  94<H>  /  ALT  26  /  AlkPhos  127<H>  03-18                RADIOLOGY & ADDITIONAL STUDIES:  < from: Xray Chest 1 View-PORTABLE IMMEDIATE (03.17.21 @ 12:01) >    EXAM:  XR CHEST PORTABLE IMMED 1V        PROCEDURE DATE:  Mar 17 2021         INTERPRETATION:  Indication: Shortness of breath, cough, fever.    TECHNIQUE: Single portable view of the chest.    COMPARISON: 3/14/2021    FINDINGS: The cardiac silhouette is normal in size. Patient status post median sternotomy. There are patchy airspace opacities in the peripheral aspects of the bilateral lungs, slightly worsened.    IMPRESSION: Patchy airspace opacities in the bilateral lungs, consistent with progression of COVID pneumonia.              MADY DE JESUS MD; Attending Radiologist  This document has been electronically signed. Mar 17 2021 12:06PM    < end of copied text >    ECHO: eo< from: Transthoracic Echocardiogram (10.01.20 @ 18:39) >    Pericardium/PleuraNormal pericardium with no pericardial  effusion.  ------------------------------------------------------------------------  CONCLUSIONS:  1. Calcified trileaflet aortic valve with normal opening.  Minimal aortic regurgitation.  2. Increased relative wall thickness with normal left  ventricular mass index, consistent with concentric left  ventricular remodeling.  3. Normal left ventricular systolic function. No segmental  wall motion abnormalities.  4. Normal right ventricular size and function.  ------------------------------------------------------------------------  Confirmed on10/2/2020 - 10:00:16 by Reinier Pearson,    < end of copied text >      ASSESSMENT:  69 yo male readmittedf or hypoxic resp failure    PLAN:  acceptable pulse ox on NRB  suggest trial of 6-8L  continue remdesivir/ dex  lovenox daily  trend markers    COVID precautions      Thank you for allowing me to participate in the care of this patient.  Please feel free to call me for any questions/concerns.      Yani Tiwari DO  [facility] Pulmonary/Sleep Medicine  [phone]

## 2021-03-18 NOTE — PROVIDER CONTACT NOTE (OTHER) - ACTION/TREATMENT ORDERED:
Bedtime sliding scale and AC HS sliding scale to be ordered. Administer lantus and bed time sliding scale.
Draw set of blood cultures, get urine for urine test that will be ordered. monitor temperature for now.

## 2021-03-18 NOTE — PROGRESS NOTE ADULT - SUBJECTIVE AND OBJECTIVE BOX
Patient is a 70y old  Male who presents with a chief complaint of acute respiratory failure d/t covid (18 Mar 2021 08:02)    Being followed by ID for COVID    Interval history:was Dced day before-then hypoxic and readmitted  Now on nrbm  some cough  denies any sputum  no CP  No acute events      ROS:    No N/V/D./abd pain  No other complaints      Antimicrobials:    remdesivir  IVPB   IV Intermittent     Other medications reviewed  MEDICATIONS  (STANDING):  aspirin enteric coated 81 milliGRAM(s) Oral daily  carbidopa/levodopa  10/100 2 Tablet(s) Oral <User Schedule>  dexAMETHasone  Injectable 6 milliGRAM(s) IV Push daily  dextrose 40% Gel 15 Gram(s) Oral once  dextrose 5%. 1000 milliLiter(s) (50 mL/Hr) IV Continuous <Continuous>  dextrose 5%. 1000 milliLiter(s) (100 mL/Hr) IV Continuous <Continuous>  dextrose 50% Injectable 25 Gram(s) IV Push once  dextrose 50% Injectable 12.5 Gram(s) IV Push once  dextrose 50% Injectable 25 Gram(s) IV Push once  enoxaparin Injectable 40 milliGRAM(s) SubCutaneous daily  glucagon  Injectable 1 milliGRAM(s) IntraMuscular once  insulin glargine Injectable (LANTUS) 40 Unit(s) SubCutaneous at bedtime  insulin lispro (ADMELOG) corrective regimen sliding scale   SubCutaneous at bedtime  insulin lispro (ADMELOG) corrective regimen sliding scale   SubCutaneous three times a day before meals  insulin lispro Injectable (ADMELOG) 6 Unit(s) SubCutaneous three times a day before meals  ketorolac 0.5% Ophthalmic Solution 1 Drop(s) Left EYE two times a day  metoprolol tartrate 25 milliGRAM(s) Oral two times a day  polyethylene glycol 3350 17 Gram(s) Oral daily  prednisoLONE acetate 1% Suspension 1 Drop(s) Left EYE four times a day  remdesivir  IVPB   IV Intermittent   simvastatin 20 milliGRAM(s) Oral at bedtime      Vital Signs Last 24 Hrs  T(C): 36.1 (03-18-21 @ 13:37), Max: 36.7 (03-17-21 @ 16:57)  T(F): 97 (03-18-21 @ 13:37), Max: 98 (03-17-21 @ 16:57)  HR: 61 (03-18-21 @ 13:37) (61 - 67)  BP: 127/51 (03-18-21 @ 13:37) (127/51 - 154/67)  BP(mean): --  RR: 17 (03-18-21 @ 13:37) (17 - 20)  SpO2: 96% (03-18-21 @ 13:37) (96% - 100%)    Physical Exam:        HEENT PERRLA EOMI    No oral exudate or erythema    Chest Good AE,Coarse BS     CVS RRR S1 S2 WNl No murmur or rub or gallop    Abd soft BS normal No tenderness no masses    IV site no erythema tenderness or discharge    CNS AAO X 3 no focal    Lab Data:                          11.5   12.24 )-----------( 222      ( 18 Mar 2021 06:22 )             34.5     WBC Count: 12.24 (03-18-21 @ 06:22)  WBC Count: 12.75 (03-17-21 @ 13:10)  WBC Count: 4.96 (03-16-21 @ 07:23)  WBC Count: 3.67 (03-15-21 @ 06:30)  WBC Count: 3.90 (03-14-21 @ 08:57)    03-18    138  |  103  |  28<H>  ----------------------------<  409<H>  4.9   |  21<L>  |  0.87    Ca    8.5      18 Mar 2021 06:22  Mg     2.1     03-18    TPro  6.4  /  Alb  3.4  /  TBili  0.4  /  DBili  x   /  AST  94<H>  /  ALT  26  /  AlkPhos  127<H>  03-18        Culture - Blood (collected 14 Mar 2021 14:09)  Source: .Blood Blood-Venous  Preliminary Report (15 Mar 2021 15:02):    No growth to date.    Culture - Blood (collected 14 Mar 2021 14:09)  Source: .Blood Blood-Peripheral  Preliminary Report (15 Mar 2021 15:02):    No growth to date.      < from: Xray Chest 1 View-PORTABLE IMMEDIATE (03.17.21 @ 12:01) >  IMPRESSION: Patchy airspace opacities in the bilateral lungs, consistent with progression of COVID pneumonia.        < end of copied text >      Ferritin, Serum (03.17.21 @ 13:10)   Ferritin, Serum: 2250 ng/mL   Ferritin, Serum (03.14.21 @ 13:15)   Ferritin, Serum: 548 ng/mL D-Dimer Assay, Quantitative (03.17.21 @ 13:10)   D-Dimer Assay, Quantitative: 462    C-Reactive Protein, Serum (03.17.21 @ 13:10)   C-Reactive Protein, Serum: 63.2 mg/L   C-Reactive Protein, Serum (03.14.21 @ 13:15)   C-Reactive Protein, Serum: 124.5 mg/L

## 2021-03-18 NOTE — CONSULT NOTE ADULT - ASSESSMENT
70 year old Nepali/Jhonny-speaking male with a history of DM2 (uncontrolled A1c 10.3%, on basal/bolus insulin at home), HTN, CAD s/p CABG and stents, BPH s/p TURP and Parkinson's disease, now readmitted with COVID and started on high dose Decadron. Endocrine team consulted for management of DM2.

## 2021-03-18 NOTE — CONSULT NOTE ADULT - PROBLEM SELECTOR RECOMMENDATION 2
Currently on high dose steroids as per COVID protocol  Once steroids doses are reduced or discontinued , insulin requirements should go lower.  Please inform endocrine team of any changes in steroid plan or if patient is going home with steroids.

## 2021-03-19 NOTE — PROGRESS NOTE ADULT - REASON FOR ADMISSION
11/30/20 0726   Patient Assessment/Suction   Level of Consciousness (AVPU) alert   Respiratory Effort Moderate   Expansion/Accessory Muscles/Retractions abdominal muscle use   LLL Breath Sounds diminished;wheezes, expiratory   RUL Breath Sounds equal bilaterally;clear   PRE-TX-O2   O2 Device (Oxygen Therapy) nasal cannula   Flow (L/min) 4   SpO2 95 %   Pulse Oximetry Type Intermittent   Pulse (!) 122   Resp 20   Aerosol Therapy   $ Aerosol Therapy Charges Aerosol Treatment   Respiratory Treatment Status (SVN) given   Treatment Route (SVN) mask;oxygen   Patient Position (SVN) HOB elevated   Post Treatment Assessment (SVN) breath sounds unchanged   Signs of Intolerance (SVN) none   Breath Sounds Post-Respiratory Treatment   Throughout All Fields Post-Treatment All Fields   Throughout All Fields Post-Treatment no change   Post-treatment Heart Rate (beats/min) 120   Post-treatment Resp Rate (breaths/min) 22      acute respiratory failure d/t covid

## 2021-03-19 NOTE — PROGRESS NOTE ADULT - ATTENDING COMMENTS
Uncontrolled DM2 HbA1c 10.3% with COVID on dexamethasone with steroid exacerbated hyperglycemia. Agree with insulin regimen as outlined above. DC on basal bolus (Basaglar and Admelog) regimen TBD. Will follow.    José Miguel Trujillo MD  Division of Endocrinology  Pager: [phone]    If after 6PM or before 9AM, or on weekends/holidays, please call endocrine answering service for assistance ([phone]).  For nonurgent matters email [email or web address] for assistance.

## 2021-03-19 NOTE — PROGRESS NOTE ADULT - ASSESSMENT
70 year old Malay/Jhonny-speaking male with a history of DM2 (uncontrolled A1c 10.3%, on basal/bolus insulin at home), HTN, CAD s/p CABG and stents, BPH s/p TURP and Parkinson's disease, now readmitted with COVID and started on high dose Decadron. Endocrine team consulted for management of DM2.        Problem/Recommendation - 1:  Problem: Type 2 diabetes mellitus with other specified complication, with long-term current use of insulin.   Recommendation: A1c 10.3% indicating poorly controlled DM2.  Home regimen as per patient: Basaglar 40 units sq qhs and Admelog 20 units before breakfast only  - BG goal inpatient is 100-180 mg/dl, currently elevated in 300s.  - Continue Lantus 40 units sq qhs   - Decrease Admelog to  units sq ac TID (hold if NPO)  - Moderate correctional scale sq ac and hs  - Monitor BG ac and hs  - Consistent carb diet  - RD consult    Discharge plan: Patient will be discharged on basal / bolus insulin regimen (doses TBD), please call endocrine team prior to discharge to finalize recs. Patient will benefit from outpatient endocrine follow up on discharge.     Problem/Recommendation - 2:  ·  Problem: Steroid-induced hyperglycemia.    Recommendation: Currently on high dose steroids as per COVID protocol  Once steroids doses are reduced or discontinued , insulin requirements should go lower.  Please inform endocrine team of any changes in steroid plan or if patient is going home with steroids.      Problem/Recommendation - 3:  ·  Problem: Hypertension, unspecified type.    Recommendation: BP goal <130/80, management as per primary team.      Problem/Recommendation - 4:  ·  Problem: Hyperlipidemia, unspecified hyperlipidemia type.    Recommendation: Continue with statin, monitor lipid profile as outpatient, goal LDL is <70.    70 year old Thai/Jhonny-speaking male with a history of DM2 (uncontrolled A1c 10.3%, on basal/bolus insulin at home), HTN, CAD s/p CABG and stents, BPH s/p TURP and Parkinson's disease, now readmitted with COVID and started on high dose Decadron. Endocrine team consulted for management of DM2.        Problem/Recommendation - 1:  Problem: Type 2 diabetes mellitus with other specified complication, with long-term current use of insulin.   Recommendation: A1c 10.3% indicating poorly controlled DM2.  Home regimen as per patient: Basaglar 40 units sq qhs and Admelog 20 units before breakfast only  - BG goal inpatient is 100-180 mg/dl, currently elevated in 300s.  - Continue Lantus 40 units sq qhs   - Decrease Admelog to 6 units sq ac TID (hold if NPO)  - Moderate correctional scale sq ac and hs  - Monitor BG ac and hs  - Consistent carb diet  - RD consult    Discharge plan: Patient will be discharged on basal / bolus insulin regimen (doses TBD), please call endocrine team prior to discharge to finalize recs. Patient will benefit from outpatient endocrine follow up on discharge.     Problem/Recommendation - 2:  ·  Problem: Steroid-induced hyperglycemia.    Recommendation: Currently on high dose steroids as per COVID protocol  Once steroids doses are reduced or discontinued , insulin requirements should go lower.  Please inform endocrine team of any changes in steroid plan or if patient is going home with steroids.      Problem/Recommendation - 3:  ·  Problem: Hypertension, unspecified type.    Recommendation: BP goal <130/80, management as per primary team.      Problem/Recommendation - 4:  ·  Problem: Hyperlipidemia, unspecified hyperlipidemia type.    Recommendation: Continue with statin, monitor lipid profile as outpatient, goal LDL is <70.

## 2021-03-19 NOTE — PROGRESS NOTE ADULT - SUBJECTIVE AND OBJECTIVE BOX
[facility] NEPHROLOGY- PROGRESS NOTE    70y Male with history of HTN on HCTZ presents with weakness found to have COVID-19. Nephrology consulted for hyponatremia.    REVIEW OF SYSTEMS:  Gen: no changes in weight  Cards: no chest pain  Resp: + dyspnea  GI: no nausea or vomiting or diarrhea  Vascular: no LE edema    No Known Allergies      Hospital Medications: Medications reviewed    VITALS:  T(F): 96.8 (21 @ 10:52), Max: 98 (21 @ 02:40)  HR: 90 (21 @ 10:52)  BP: 123/55 (21 @ 10:52)  RR: 18 (21 @ 10:52)  SpO2: 97% (21 @ 10:52)  Wt(kg): --  Height (cm): 170.2 ( @ 10:38), 170.2 ( @ 07:55)  Weight (kg): 79.7 ( @ 22:20), 74.843 ( @ 13:02)  BMI (kg/m2): 27.5 ( @ 22:20), 25.8 ( @ 10:38), 25.8 ( @ 13:02)  BSA (m2): 1.91 ( @ 22:20), 1.86 ( @ 10:38), 1.86 ( @ 13:02)     @ 07:01  -   @ 07:00  --------------------------------------------------------  IN: 60 mL / OUT: 200 mL / NET: -140 mL     @ 07:01  -   @ 12:52  --------------------------------------------------------  IN: 60 mL / OUT: 650 mL / NET: -590 mL        PHYSICAL EXAM:    Gen: Mild distress on NRB  Cards: RRR, +S1/S2, no M/G/R  Resp: course BS B/L  GI: soft, NT/ND, NABS  Vascular: no LE edema B/L      LABS:      140  |  106  |  28<H>  ----------------------------<  159<H>  4.2   |  24  |  0.75    Ca    8.1<L>      19 Mar 2021 07:53  Phos  3.0       Mg     1.9         TPro  5.6<L>  /  Alb  3.1<L>  /  TBili  0.4  /  DBili      /  AST  117<H>  /  ALT  15  /  AlkPhos  138<H>      Creatinine Trend: 0.75 <--, 0.87 <--, 1.00 <--, 0.86 <--, 0.94 <--, 0.74 <--, 0.71 <--, 0.93 <--, 0.94 <--, 1.18 <--, 1.21 <--                        10.5   15.10 )-----------( 242      ( 19 Mar 2021 07:33 )             30.8     Urine Studies:  Urinalysis Basic - ( 18 Mar 2021 17:32 )    Color: Yellow / Appearance: Clear / S.031 / pH:   Gluc:  / Ketone: Negative  / Bili: Negative / Urobili: <2 mg/dL   Blood:  / Protein: 30 mg/dL / Nitrite: Negative   Leuk Esterase: Negative / RBC: 1 /HPF / WBC 1 /HPF   Sq Epi:  / Non Sq Epi: 1 /HPF / Bacteria: Negative      Osmolality, Random Urine: 566 mosm/kg ( @ 16:40)  Sodium, Random Urine: 29 mmol/L ( @ 16:40)  Sodium, Random Urine: <20 mmol/L ( @ 13:15)  Osmolality, Random Urine: 353 mosm/kg ( @ 13:15)      RADIOLOGY & ADDITIONAL STUDIES:

## 2021-03-19 NOTE — PROGRESS NOTE ADULT - ASSESSMENT
70 year old Wolof/Jhonny-speaking male with a history of CAD s/p CABG and 2 stents, Type 2 DM, HTN, BPH s/p TURP and Parkinson's disease presenting with weakness. Patient normally walks with a walker but over the past few days he has needed assistance to walk around his house. He has also had poor appetite during this time. Patient's son who lives with him tested positive for COVID on 3/9 without showing any symptoms and was due to travel for work. This morning, patient found his wife unresponsive at home and EMS was called- patient had ROSC s/p cardiac arrest and is currently hospitalized at [facility].   COVID positive, elevated inflammatory markers'clinical picture not c/w Pna  He was Dced and came back again(had recd 2 days of RDV)      -O2 supplementation.  Remdesivir - UPTO 5 days+ decadron  - Lovenox   Treatment options are limited-this as well as limitations of data discussed with pt.  Monitor for any bacterial superinfection/complications.

## 2021-03-19 NOTE — PROGRESS NOTE ADULT - SUBJECTIVE AND OBJECTIVE BOX
Patient is a 70y old  Male who presents with a chief complaint of acute respiratory failure d/t covid (19 Mar 2021 12:51)    Being followed by ID for COVID     Interval history:still on NRBM   some cough   No acute events      ROS:    No N/V/D./abd pain  No other complaints      Antimicrobials:    remdesivir  IVPB   IV Intermittent   remdesivir  IVPB 100 milliGRAM(s) IV Intermittent every 24 hours    Other medications reviewed  MEDICATIONS  (STANDING):  aspirin enteric coated 81 milliGRAM(s) Oral daily  carbidopa/levodopa  10/100 2 Tablet(s) Oral <User Schedule>  dexAMETHasone  Injectable 6 milliGRAM(s) IV Push daily  dextrose 40% Gel 15 Gram(s) Oral once  dextrose 5%. 1000 milliLiter(s) (50 mL/Hr) IV Continuous <Continuous>  dextrose 5%. 1000 milliLiter(s) (100 mL/Hr) IV Continuous <Continuous>  dextrose 50% Injectable 25 Gram(s) IV Push once  dextrose 50% Injectable 12.5 Gram(s) IV Push once  dextrose 50% Injectable 25 Gram(s) IV Push once  enoxaparin Injectable 80 milliGRAM(s) SubCutaneous two times a day  glucagon  Injectable 1 milliGRAM(s) IntraMuscular once  insulin glargine Injectable (LANTUS) 40 Unit(s) SubCutaneous at bedtime  insulin lispro (ADMELOG) corrective regimen sliding scale   SubCutaneous three times a day before meals  insulin lispro (ADMELOG) corrective regimen sliding scale   SubCutaneous at bedtime  insulin lispro Injectable (ADMELOG) 8 Unit(s) SubCutaneous three times a day before meals  ketorolac 0.5% Ophthalmic Solution 1 Drop(s) Left EYE two times a day  metoprolol tartrate 25 milliGRAM(s) Oral two times a day  polyethylene glycol 3350 17 Gram(s) Oral daily  prednisoLONE acetate 1% Suspension 1 Drop(s) Left EYE four times a day  remdesivir  IVPB   IV Intermittent   remdesivir  IVPB 100 milliGRAM(s) IV Intermittent every 24 hours  simvastatin 20 milliGRAM(s) Oral at bedtime      Vital Signs Last 24 Hrs  T(C): 36 (03-19-21 @ 10:52), Max: 36.7 (03-19-21 @ 02:40)  T(F): 96.8 (03-19-21 @ 10:52), Max: 98 (03-19-21 @ 02:40)  HR: 90 (03-19-21 @ 10:52) (72 - 90)  BP: 123/55 (03-19-21 @ 10:52) (123/55 - 139/53)  BP(mean): --  RR: 18 (03-19-21 @ 10:52) (18 - 20)  SpO2: 97% (03-19-21 @ 10:52) (95% - 97%)    Physical Exam:    HEENT PERRLA EOMI    No oral exudate or erythema    Chest Good AE,Coarse BS     CVS RRR S1 S2 WNl No murmur or rub or gallop    Abd soft BS normal No tenderness no masses    IV site no erythema tenderness or discharge    CNS AAO X 3 no focal    Lab Data:                          10.5   15.10 )-----------( 242      ( 19 Mar 2021 07:33 )             30.8       03-19    140  |  106  |  28<H>  ----------------------------<  159<H>  4.2   |  24  |  0.75    Ca    8.1<L>      19 Mar 2021 07:53  Phos  3.0     03-19  Mg     1.9     03-19    TPro  5.6<L>  /  Alb  3.1<L>  /  TBili  0.4  /  DBili  x   /  AST  117<H>  /  ALT  15  /  AlkPhos  138<H>  03-19        Culture - Blood (collected 14 Mar 2021 14:09)  Source: .Blood Blood-Venous  Preliminary Report (15 Mar 2021 15:02):    No growth to date.    Culture - Blood (collected 14 Mar 2021 14:09)  Source: .Blood Blood-Peripheral  Preliminary Report (15 Mar 2021 15:02):    No growth to date.        < from: Xray Chest 1 View-PORTABLE IMMEDIATE (03.17.21 @ 12:01) >  IMPRESSION: Patchy airspace opacities in the bilateral lungs, consistent with progression of COVID pneumonia.        < end of copied text >

## 2021-03-19 NOTE — PROGRESS NOTE ADULT - SUBJECTIVE AND OBJECTIVE BOX
SUBJECTIVE / OVERNIGHT EVENTS:  pt seen and examined      MEDICATIONS  (STANDING):  aspirin enteric coated 81 milliGRAM(s) Oral daily  carbidopa/levodopa  10/100 2 Tablet(s) Oral <User Schedule>  dexAMETHasone  Injectable 6 milliGRAM(s) IV Push daily  dextrose 40% Gel 15 Gram(s) Oral once  dextrose 5%. 1000 milliLiter(s) (50 mL/Hr) IV Continuous <Continuous>  dextrose 5%. 1000 milliLiter(s) (100 mL/Hr) IV Continuous <Continuous>  dextrose 50% Injectable 25 Gram(s) IV Push once  dextrose 50% Injectable 12.5 Gram(s) IV Push once  dextrose 50% Injectable 25 Gram(s) IV Push once  enoxaparin Injectable 80 milliGRAM(s) SubCutaneous two times a day  glucagon  Injectable 1 milliGRAM(s) IntraMuscular once  insulin glargine Injectable (LANTUS) 40 Unit(s) SubCutaneous at bedtime  insulin lispro (ADMELOG) corrective regimen sliding scale   SubCutaneous three times a day before meals  insulin lispro (ADMELOG) corrective regimen sliding scale   SubCutaneous at bedtime  insulin lispro Injectable (ADMELOG) 6 Unit(s) SubCutaneous three times a day before meals  ketorolac 0.5% Ophthalmic Solution 1 Drop(s) Left EYE two times a day  metoprolol tartrate 25 milliGRAM(s) Oral two times a day  polyethylene glycol 3350 17 Gram(s) Oral daily  prednisoLONE acetate 1% Suspension 1 Drop(s) Left EYE four times a day  remdesivir  IVPB   IV Intermittent   remdesivir  IVPB 100 milliGRAM(s) IV Intermittent every 24 hours  simvastatin 20 milliGRAM(s) Oral at bedtime    MEDICATIONS  (PRN):    Vital Signs Last 24 Hrs  T(C): 36.3 (19 Mar 2021 17:39), Max: 36.7 (19 Mar 2021 02:40)  T(F): 97.4 (19 Mar 2021 17:39), Max: 98 (19 Mar 2021 02:40)  HR: 94 (19 Mar 2021 17:39) (72 - 94)  BP: 152/50 (19 Mar 2021 17:39) (123/55 - 152/50)  BP(mean): --  RR: 19 (19 Mar 2021 17:39) (18 - 20)  SpO2: 95% (19 Mar 2021 17:39) (95% - 97%)    CAPILLARY BLOOD GLUCOSE      POCT Blood Glucose.: 131 mg/dL (19 Mar 2021 17:33)  POCT Blood Glucose.: 99 mg/dL (19 Mar 2021 11:57)  POCT Blood Glucose.: 184 mg/dL (19 Mar 2021 08:56)  POCT Blood Glucose.: 278 mg/dL (18 Mar 2021 21:06)    I&O's Summary    18 Mar 2021 07:01  -  19 Mar 2021 07:00  --------------------------------------------------------  IN: 60 mL / OUT: 200 mL / NET: -140 mL    19 Mar 2021 07:01  -  19 Mar 2021 19:16  --------------------------------------------------------  IN: 60 mL / OUT: 650 mL / NET: -590 mL      CHEST/LUNG: dec breath sounds at bases  HEART:  S1 , S2 +  ABDOMEN: soft , bs+  EXTREMITIES:  trace edema+  NEUROLOGY:alert awake      LABS:                        10.5   15.10 )-----------( 242      ( 19 Mar 2021 07:33 )             30.8     03-19    140  |  106  |  28<H>  ----------------------------<  159<H>  4.2   |  24  |  0.75    Ca    8.1<L>      19 Mar 2021 07:53  Phos  3.0     -  Mg     1.9         TPro  5.6<L>  /  Alb  3.1<L>  /  TBili  0.4  /  DBili  x   /  AST  117<H>  /  ALT  15  /  AlkPhos  138<H>  19          Urinalysis Basic - ( 18 Mar 2021 17:32 )    Color: Yellow / Appearance: Clear / S.031 / pH: x  Gluc: x / Ketone: Negative  / Bili: Negative / Urobili: <2 mg/dL   Blood: x / Protein: 30 mg/dL / Nitrite: Negative   Leuk Esterase: Negative / RBC: 1 /HPF / WBC 1 /HPF   Sq Epi: x / Non Sq Epi: 1 /HPF / Bacteria: Negative        RADIOLOGY & ADDITIONAL TESTS:    Imaging Personally Reviewed:    Consultant(s) Notes Reviewed:      Care Discussed with Consultants/Other Providers:

## 2021-03-19 NOTE — PROGRESS NOTE ADULT - ASSESSMENT
70 year old [place]/Jhonny-speaking male with a history of CAD s/p CABG and 2 stents, Type 2 DM, HTN, BPH s/p TURP and Parkinson's disease presenting with weakness. Patient normally walks with a walker but over the past few days he has needed assistance to walk around his house. He has also had poor appetite during this time. Patient's son who lives with him tested positive for COVID on 3/9 without showing any symptoms and was due to travel for work. This morning, patient found his wife unresponsive at home and EMS was called- patient had ROSC s/p cardiac arrest and is currently hospitalized at [facility].   COVID positive  elevated inflammatory markers'clinical picture not c/w Pna  He was Dced and came back again(had recd 2 days of RDV)       A) COVID  Monitor clinically.  Monitor Oxygenation  O2 supplementation.  Remdesivir - UPTO 5 days  Monitor CBC ,LFTs and Creatinine daily.  Ferritin,CRP and D dimer q 48 hrs.  Dexamethasone as  hypoxia.  Anticoagulation per protocol.  Treatment options are limited-this as well as limitations of data discussed with pt.  Monitor for any bacterial superinfection/complications.      B_ Dyspnea  likely from above  plan as above    Will tailor plan for ID issues  per course,results.Will defer to primary team on management of other issues.  Assessment, plan and recommendations as detailed above were discussed with the medical/primary  team.  ID service  will cover patient over weekend.Please call ID fellow on call if issues or questions.

## 2021-03-19 NOTE — PROGRESS NOTE ADULT - SUBJECTIVE AND OBJECTIVE BOX
PULMONARY PROGRESS NOTE    SHAHZAD FIELD  MRN-[id number]    Patient is a 70y old  Male who presents with a chief complaint of acute respiratory failure d/t covid (19 Mar 2021 14:00)      HPI:  on NRB      ROS:   -    ACTIVE MEDICATION LIST:  MEDICATIONS  (STANDING):  aspirin enteric coated 81 milliGRAM(s) Oral daily  carbidopa/levodopa  10/100 2 Tablet(s) Oral <User Schedule>  dexAMETHasone  Injectable 6 milliGRAM(s) IV Push daily  dextrose 40% Gel 15 Gram(s) Oral once  dextrose 5%. 1000 milliLiter(s) (50 mL/Hr) IV Continuous <Continuous>  dextrose 5%. 1000 milliLiter(s) (100 mL/Hr) IV Continuous <Continuous>  dextrose 50% Injectable 25 Gram(s) IV Push once  dextrose 50% Injectable 12.5 Gram(s) IV Push once  dextrose 50% Injectable 25 Gram(s) IV Push once  enoxaparin Injectable 80 milliGRAM(s) SubCutaneous two times a day  glucagon  Injectable 1 milliGRAM(s) IntraMuscular once  insulin glargine Injectable (LANTUS) 40 Unit(s) SubCutaneous at bedtime  insulin lispro (ADMELOG) corrective regimen sliding scale   SubCutaneous three times a day before meals  insulin lispro (ADMELOG) corrective regimen sliding scale   SubCutaneous at bedtime  insulin lispro Injectable (ADMELOG) 6 Unit(s) SubCutaneous three times a day before meals  ketorolac 0.5% Ophthalmic Solution 1 Drop(s) Left EYE two times a day  metoprolol tartrate 25 milliGRAM(s) Oral two times a day  polyethylene glycol 3350 17 Gram(s) Oral daily  prednisoLONE acetate 1% Suspension 1 Drop(s) Left EYE four times a day  remdesivir  IVPB   IV Intermittent   remdesivir  IVPB 100 milliGRAM(s) IV Intermittent every 24 hours  simvastatin 20 milliGRAM(s) Oral at bedtime    MEDICATIONS  (PRN):      EXAM:  Vital Signs Last 24 Hrs  T(C): 36 (19 Mar 2021 10:52), Max: 36.7 (19 Mar 2021 02:40)  T(F): 96.8 (19 Mar 2021 10:52), Max: 98 (19 Mar 2021 02:40)  HR: 90 (19 Mar 2021 10:52) (72 - 90)  BP: 123/55 (19 Mar 2021 10:52) (123/55 - 139/53)  BP(mean): --  RR: 18 (19 Mar 2021 10:52) (18 - 20)  SpO2: 97% (19 Mar 2021 10:52) (95% - 97%)    GENERAL: The patient is awake  he remainsw/o complaints  not labored                            10.5   15.10 )-----------( 242      ( 19 Mar 2021 07:33 )             30.8       03-19    140  |  106  |  28<H>  ----------------------------<  159<H>  4.2   |  24  |  0.75    Ca    8.1<L>      19 Mar 2021 07:53  Phos  3.0     03-19  Mg     1.9     03-19    TPro  5.6<L>  /  Alb  3.1<L>  /  TBili  0.4  /  DBili  x   /  AST  117<H>  /  ALT  15  /  AlkPhos  138<H>  03-19     rad< from: US Duplex Venous Lower Ext Complete, Bilateral (03.19.21 @ 10:58) >    EXAM:  US DPLX LWR EXT VEINS COMPL BI        PROCEDURE DATE:  Mar 19 2021         INTERPRETATION:  CLINICAL INFORMATION: Elevated d-dimer. Coronavirus infection.    COMPARISON: None available.    TECHNIQUE: Duplex sonography of the BILATERAL LOWER extremity veins with color and spectral Doppler, with and without compression.    FINDINGS:    RIGHT:  Normal compressibility of the RIGHT common femoral, femoral and popliteal veins.  Doppler examination shows normal spontaneous and phasic flow.  No RIGHT calf vein thrombosis is detected.    LEFT:  Normal compressibility of the LEFT common femoral, femoral and popliteal veins.  Doppler examination shows normal spontaneous and phasic flow.  No LEFT calf vein thrombosis is detected.    IMPRESSION:  No evidence of deep venous thrombosis in either lower extremity.              JALEN KUHN MD; Attending Radiologist  This document has been electronically signed. Mar 19 2021 11:28AM    < end of copied text >      PROBLEM LIST:  70y Male with HEALTH ISSUES - PROBLEM Dx:  Hyperlipidemia, unspecified hyperlipidemia type  Hyperlipidemia, unspecified hyperlipidemia type    Hypertension, unspecified type  Hypertension, unspecified type    Steroid-induced hyperglycemia  Steroid-induced hyperglycemia    Type 2 diabetes mellitus with other specified complication, with long-term current use of insulin  Type 2 diabetes mellitus with other specified complication, with long-term current use of insulin    Prophylactic measure  Prophylactic measure    Parkinson disease  Parkinson disease    Diabetes mellitus  Diabetes mellitus    CAD (coronary artery disease)  CAD (coronary artery disease)    COVID-19  COVID-19              RECS:  high dimer  will empirically start treatment dose lovenox  dopplers negative  CTA pending  taper down NRB as tolerated  redmesidivir/ dexamethasone IVP    COVID precautions       Please call with any questions.    Yani Tiwari,   [facility] Pulmonary/Sleep Medicine  [phone]

## 2021-03-19 NOTE — PROGRESS NOTE ADULT - ASSESSMENT
70y Male with history of HTN on HCTZ presents with weakness found to have COVID-19. Nephrology consulted for hyponatremia.    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 Hyponatremia resolved. Continue with Glucerna to increase osmolar intake. Monitor serum Na.    2) HTN: BP controlled. Keep off of HCTZ. Monitor BP.    3) Azotemia: Due to steroids.    4) COVID-19: As per primary team.    Will sign off. Please call with questions.

## 2021-03-19 NOTE — PROGRESS NOTE ADULT - SUBJECTIVE AND OBJECTIVE BOX
Follow-up on: DM2, Steroid-induced hyperglycemia     Subjective:     MEDICATIONS  (STANDING):  aspirin enteric coated 81 milliGRAM(s) Oral daily  carbidopa/levodopa  10/100 2 Tablet(s) Oral <User Schedule>  dexAMETHasone  Injectable 6 milliGRAM(s) IV Push daily  dextrose 40% Gel 15 Gram(s) Oral once  dextrose 5%. 1000 milliLiter(s) (50 mL/Hr) IV Continuous <Continuous>  dextrose 5%. 1000 milliLiter(s) (100 mL/Hr) IV Continuous <Continuous>  dextrose 50% Injectable 25 Gram(s) IV Push once  dextrose 50% Injectable 12.5 Gram(s) IV Push once  dextrose 50% Injectable 25 Gram(s) IV Push once  enoxaparin Injectable 40 milliGRAM(s) SubCutaneous at bedtime  glucagon  Injectable 1 milliGRAM(s) IntraMuscular once  insulin glargine Injectable (LANTUS) 40 Unit(s) SubCutaneous at bedtime  insulin lispro (ADMELOG) corrective regimen sliding scale   SubCutaneous three times a day before meals  insulin lispro (ADMELOG) corrective regimen sliding scale   SubCutaneous at bedtime  insulin lispro Injectable (ADMELOG) 8 Unit(s) SubCutaneous three times a day before meals  ketorolac 0.5% Ophthalmic Solution 1 Drop(s) Left EYE two times a day  metoprolol tartrate 25 milliGRAM(s) Oral two times a day  polyethylene glycol 3350 17 Gram(s) Oral daily  prednisoLONE acetate 1% Suspension 1 Drop(s) Left EYE four times a day  remdesivir  IVPB   IV Intermittent   remdesivir  IVPB 100 milliGRAM(s) IV Intermittent every 24 hours  simvastatin 20 milliGRAM(s) Oral at bedtime    MEDICATIONS  (PRN):      PHYSICAL EXAM:  VITALS: T(C): 36 (03-19-21 @ 10:52)  T(F): 96.8 (03-19-21 @ 10:52), Max: 98 (03-19-21 @ 02:40)  HR: 90 (03-19-21 @ 10:52) (61 - 90)  BP: 123/55 (03-19-21 @ 10:52) (123/55 - 139/53)  RR:  (17 - 20)  SpO2:  (95% - 97%)  Wt(kg): --  GENERAL: NAD, well-groomed, well-developed  EYES: No proptosis, no injection  HEENT:  Atraumatic, Normocephalic  Neuro: awake, alert  Psych: reactive affect, euthymic mood      POCT Blood Glucose.: 99 mg/dL (03-19-21 @ 11:57)  POCT Blood Glucose.: 184 mg/dL (03-19-21 @ 08:56)  POCT Blood Glucose.: 278 mg/dL (03-18-21 @ 21:06)  POCT Blood Glucose.: 109 mg/dL (03-18-21 @ 17:23)  POCT Blood Glucose.: 281 mg/dL (03-18-21 @ 13:04)  POCT Blood Glucose.: 339 mg/dL (03-18-21 @ 09:04)  POCT Blood Glucose.: 382 mg/dL (03-18-21 @ 01:39)  POCT Blood Glucose.: 439 mg/dL (03-17-21 @ 21:48)  POCT Blood Glucose.: 335 mg/dL (03-16-21 @ 17:44)  POCT Blood Glucose.: 307 mg/dL (03-16-21 @ 13:14)    03-19    140  |  106  |  28<H>  ----------------------------<  159<H>  4.2   |  24  |  0.75    EGFR if : 106  EGFR if non : 92    Ca    8.1<L>      03-19  Mg     1.9     03-19  Phos  3.0     03-19    TPro  5.6<L>  /  Alb  3.1<L>  /  TBili  0.4  /  DBili  x   /  AST  117<H>  /  ALT  15  /  AlkPhos  138<H>  03-19      Thyroid Function Tests:  03-15 @ 06:30 TSH 0.96       A1C with Estimated Average Glucose Result: 10.3 % (03.14.21 @ 08:57)                         Follow-up on: DM2, Steroid-induced hyperglycemia     Subjective: Patient seen at bedside, remains on NRB mask. BG ranging from 90s-180s, per staff patient only eating half of his meals.     MEDICATIONS  (STANDING):  aspirin enteric coated 81 milliGRAM(s) Oral daily  carbidopa/levodopa  10/100 2 Tablet(s) Oral <User Schedule>  dexAMETHasone  Injectable 6 milliGRAM(s) IV Push daily  dextrose 40% Gel 15 Gram(s) Oral once  dextrose 5%. 1000 milliLiter(s) (50 mL/Hr) IV Continuous <Continuous>  dextrose 5%. 1000 milliLiter(s) (100 mL/Hr) IV Continuous <Continuous>  dextrose 50% Injectable 25 Gram(s) IV Push once  dextrose 50% Injectable 12.5 Gram(s) IV Push once  dextrose 50% Injectable 25 Gram(s) IV Push once  enoxaparin Injectable 40 milliGRAM(s) SubCutaneous at bedtime  glucagon  Injectable 1 milliGRAM(s) IntraMuscular once  insulin glargine Injectable (LANTUS) 40 Unit(s) SubCutaneous at bedtime  insulin lispro (ADMELOG) corrective regimen sliding scale   SubCutaneous three times a day before meals  insulin lispro (ADMELOG) corrective regimen sliding scale   SubCutaneous at bedtime  insulin lispro Injectable (ADMELOG) 8 Unit(s) SubCutaneous three times a day before meals  ketorolac 0.5% Ophthalmic Solution 1 Drop(s) Left EYE two times a day  metoprolol tartrate 25 milliGRAM(s) Oral two times a day  polyethylene glycol 3350 17 Gram(s) Oral daily  prednisoLONE acetate 1% Suspension 1 Drop(s) Left EYE four times a day  remdesivir  IVPB   IV Intermittent   remdesivir  IVPB 100 milliGRAM(s) IV Intermittent every 24 hours  simvastatin 20 milliGRAM(s) Oral at bedtime    MEDICATIONS  (PRN):      PHYSICAL EXAM:  VITALS: T(C): 36 (03-19-21 @ 10:52)  T(F): 96.8 (03-19-21 @ 10:52), Max: 98 (03-19-21 @ 02:40)  HR: 90 (03-19-21 @ 10:52) (61 - 90)  BP: 123/55 (03-19-21 @ 10:52) (123/55 - 139/53)  RR:  (17 - 20)  SpO2:  (95% - 97%)  Wt(kg): --  GENERAL: NAD, well-groomed, well-developed  EYES: No proptosis, no injection  HEENT:  Atraumatic, Normocephalic  Respiratory: on supplemental oxygen via NRB mask  Neuro: awake, alert  Psych: reactive affect, euthymic mood      POCT Blood Glucose.: 99 mg/dL (03-19-21 @ 11:57)  POCT Blood Glucose.: 184 mg/dL (03-19-21 @ 08:56)  POCT Blood Glucose.: 278 mg/dL (03-18-21 @ 21:06)  POCT Blood Glucose.: 109 mg/dL (03-18-21 @ 17:23)  POCT Blood Glucose.: 281 mg/dL (03-18-21 @ 13:04)  POCT Blood Glucose.: 339 mg/dL (03-18-21 @ 09:04)  POCT Blood Glucose.: 382 mg/dL (03-18-21 @ 01:39)  POCT Blood Glucose.: 439 mg/dL (03-17-21 @ 21:48)  POCT Blood Glucose.: 335 mg/dL (03-16-21 @ 17:44)  POCT Blood Glucose.: 307 mg/dL (03-16-21 @ 13:14)    03-19    140  |  106  |  28<H>  ----------------------------<  159<H>  4.2   |  24  |  0.75    EGFR if : 106  EGFR if non : 92    Ca    8.1<L>      03-19  Mg     1.9     03-19  Phos  3.0     03-19    TPro  5.6<L>  /  Alb  3.1<L>  /  TBili  0.4  /  DBili  x   /  AST  117<H>  /  ALT  15  /  AlkPhos  138<H>  03-19      Thyroid Function Tests:  03-15 @ 06:30 TSH 0.96       A1C with Estimated Average Glucose Result: 10.3 % (03.14.21 @ 08:57)

## 2021-03-19 NOTE — PROGRESS NOTE ADULT - ATTENDING COMMENTS
[facility] NEPHROLOGY  Mahesh Lomax M.D.  Perez Armstrong D.O.  Gricelda Choe M.D.  Juliet Rogers, MSN, ANP-C    Telephone: [phone]  Facsimile: [phone]    [address] [place] [address]

## 2021-03-20 NOTE — PROCEDURE NOTE - NSTRACHPOSTINTU_RESP_A_CORE
Breath sounds bilateral/Breath sounds equal/Chest excursion noted/Chest X-Ray/Positive end tidal Co2 noted

## 2021-03-20 NOTE — RAPID RESPONSE TEAM SUMMARY - NSSITUATIONBACKGROUNDRRT_GEN_ALL_CORE
70 year old [place]/Jhonny-speaking male with a history of CAD s/p CABG and 2 stents, Type 2 DM, HTN, BPH s/p TURP and Parkinson's disease presenting with weakness c/b respiratory failure 2/2 to COVID19    Prior to RRT patient initially switched to NRB and then trialed on HFNC due to desaturation. Continue to desaturated and placed on BIPAP and MICU consulted. RRT called for tachypnea and hypoxia to 60s. Patient tachypneic 50s to 60s pulling large volumes on BIPAP and appeared to be anxious/agitated. Febrile to 101F. BP 120s to 130s. HR 90s to 110s with SpO2 70s-80s initially with improvement to low 90s. MICU at bedside given tachypnea patient intubated for airway protection. Started on Levophed and Propofol and transported to [facility]. RRT concluded.  70 year old Telugu/Jhonny-speaking male with a history of CAD s/p CABG and 2 stents, Type 2 DM, HTN, BPH s/p TURP and Parkinson's disease presenting with weakness c/b respiratory failure 2/2 to COVID19    Prior to RRT patient initially switched to NRB and then trialed on HFNC due to desaturation. Continue to desaturated and placed on BIPAP and MICU consulted. RRT called for tachypnea and hypoxia to 60s. Patient tachypneic 50s to 60s pulling large volumes on BIPAP and appeared to be anxious/agitated. Febrile to 101F. BP 120s to 130s. HR 90s to 110s with SpO2 70s-80s initially with improvement to low 90s. MICU at bedside given tachypnea patient intubated for airway protection. Started on Levophed and Propofol and transported to [facility]. Saturations improved to high 90s. RRT concluded.

## 2021-03-20 NOTE — CHART NOTE - NSCHARTNOTEFT_GEN_A_CORE
Patient noted to be desatting to 70s on NC while eating. Changed to 15L NRB which he was previously on, however O2 sat remained 70s-80s. Pt was trailed on HFNC with initial success to 93% HFNC. However, PA notified by tele as O2 sat again dropping to 60s-70s. Patient seen at the bedside and noted to be mildly agitated, and pulling out HFNC. Pt was responsive to name but would not follow commands/did not know where he was. Lungs w/ considerable rales throughout, similar to AM exam. STAT CXR ordered. MD made aware. MICU team consulted for further recommendation on possible intubation or BiPAP use. HCP Nneka made aware of declining status and potential intubation and stated "do what is necessary." Patient noted to be desatting to 70s on NC while eating. Changed to 15L NRB which he was previously on, however O2 sat remained 70s-80s. Pt was trailed on HFNC with initial success to 93% HFNC. However, PA notified by tele as O2 sat again dropping to 60s-70s. Patient seen at the bedside and noted to be mildly agitated, and pulling out HFNC. Pt was responsive to name but would not follow commands/did not know where he was. Lungs w/ considerable rales throughout, similar to AM exam. STAT CXR ordered. MD made aware. MICU team consulted for further recommendation on possible intubation or BiPAP use. HCP Nneka made aware of declining status and potential intubation and stated "do what is necessary." While waiting for MICU consult, patient placed on BiPAP. Patient noted to be desatting to 70s on NC while eating. Changed to 15L NRB which he was previously on, however O2 sat remained 70s-80s. Pt was trailed on HFNC with initial success to 93% HFNC. However, PA notified by tele as O2 sat again dropping to low-mid 70s. Patient seen at the bedside and noted to be mildly agitated, and pulling out HFNC. Pt was responsive to name but would not follow commands/did not know where he was. Lungs w/ considerable rales throughout, similar to AM exam, however now pt noted to have increased work of breathing. Temporally 99.5F. BP and HR stable. STAT CXR ordered. MD made aware. MICU team consulted for further recommendation on possible intubation. HCP Nneka made aware of declining status and potential intubation and stated "do what is necessary." While waiting for MICU consult, patient placed on BiPAP. While respiratory was in the room transitioning to BiPAP PA/RN staffing made aware of potential disconnection of HFNC tubing while patient was on it. Pt successfully placed on BiPAP and satting 95-97%.    While on BiPAP patient was noted to continue to mouth breath with increased work of breathing. Rapid response called. On arrival of rapid response team, T 101 rectally, /94, HR 97, 93% on BiPAP, . Decision was made to intubate patient for airway protection and transfer level of care to MICU. HCP/daughter Nneka and MD again made aware of change in level of care. Patient noted to be desatting to 70s on NC while eating. Changed to 15L NRB which he was previously on, however O2 sat remained 70s-80s. Pt was trailed on HFNC with initial success to 93% HFNC. However, PA notified by tele as O2 sat again dropping to low-mid 70s. Patient seen at the bedside and noted to be mildly agitated, and pulling out HFNC. Pt was responsive to name but would not follow commands/did not know where he was. Lungs w/ considerable rales throughout, similar to AM exam, however now pt noted to have increased work of breathing. Temporally 99.5F. BP and HR stable. STAT CXR ordered. MD made aware. MICU team consulted for further recommendation on possible intubation. HCP Nneka made aware of declining status and potential intubation and stated "do what is necessary." While waiting for MICU consult, patient placed on BiPAP. While respiratory was in the room transitioning to BiPAP PA/RN staffing made aware of potential disconnection of HFNC tubing while patient was on it. Pt successfully placed on BiPAP and satting 95-97%.    While on BiPAP patient was noted to continue to mouth breath with increased work of breathing. MICU team not yet arrived w/ declining patient status and rapid response called. On arrival of rapid response team, T 101 rectally, /94, HR 97, 93% on BiPAP, . Decision was made to intubate patient for airway protection and transfer level of care to MICU. HCP/daughter Nneka and MD again made aware of change in level of care.

## 2021-03-20 NOTE — CHART NOTE - NSCHARTNOTEFT_GEN_A_CORE
70 year old Georgian/Jhonny-speaking male with a history of DM2 (uncontrolled A1c 10.3%, on basal/bolus insulin at home), HTN, CAD s/p CABG and stents, BPH s/p TURP and Parkinson's disease, now readmitted with COVID and started on high dose Decadron. Endocrine team consulted for management of DM2.     BG, insulin administration and chart reviewed  Patient with severe hypoglycemia this AM to 34 mg/dl, resolved with D50 IVP. Most recent  mg/dl  Home dosing of basal insulin is 40 units, which patient received last night  Now acutely ill with COVID, low appetite per team. On Dexamethasone 6 mg daily until 3/23   Lantus adjusted by endocrine fellow to 20 units SQ qHS for tonight - agree given acute illness and overall glucose trend  Recommend to adjust Admelog to 3/3/3 before meals (Hold if NPO/not eating meal) - noted patient has only received 2 doses of pre-meal in last 2 days. Ok to continue to hold, if he is not able to tolerate PO   Continue Admelog moderate dose correctional scales before meals and bedtime  Consistent carbohydrate diet - notify endocrine if patient is made NPO  Check BG before meals and bedtime  Keep hypoglycemia protocol active   Will follow     CAPILLARY BLOOD GLUCOSE    POCT Blood Glucose.: 166 mg/dL (20 Mar 2021 09:17)  POCT Blood Glucose.: 37 mg/dL (20 Mar 2021 08:44)  POCT Blood Glucose.: 34 mg/dL (20 Mar 2021 08:43)  POCT Blood Glucose.: 105 mg/dL (19 Mar 2021 21:46)  POCT Blood Glucose.: 131 mg/dL (19 Mar 2021 17:33)  POCT Blood Glucose.: 99 mg/dL (19 Mar 2021 11:57)    03-20    144  |  109<H>  |  30<H>  ----------------------------<  35<LL>  3.4<L>   |  24  |  0.91    Ca    8.2<L>      20 Mar 2021 06:21  Phos  3.0     03-20  Mg     1.9     03-20    TPro  5.7<L>  /  Alb  2.9<L>  /  TBili  0.5  /  DBili  <0.2  /  AST  62<H>  /  ALT  29  /  AlkPhos  149<H>  03-20    MEDICATIONS  (STANDING):  aspirin enteric coated 81 milliGRAM(s) Oral daily  carbidopa/levodopa  10/100 2 Tablet(s) Oral <User Schedule>  dexAMETHasone  Injectable 6 milliGRAM(s) IV Push daily  dextrose 40% Gel 15 Gram(s) Oral once  dextrose 5%. 1000 milliLiter(s) (50 mL/Hr) IV Continuous <Continuous>  dextrose 5%. 1000 milliLiter(s) (100 mL/Hr) IV Continuous <Continuous>  dextrose 50% Injectable 25 Gram(s) IV Push once  dextrose 50% Injectable 12.5 Gram(s) IV Push once  dextrose 50% Injectable 25 Gram(s) IV Push once  enoxaparin Injectable 80 milliGRAM(s) SubCutaneous two times a day  glucagon  Injectable 1 milliGRAM(s) IntraMuscular once  insulin glargine Injectable (LANTUS) 20 Unit(s) SubCutaneous at bedtime  insulin lispro (ADMELOG) corrective regimen sliding scale   SubCutaneous three times a day before meals  insulin lispro (ADMELOG) corrective regimen sliding scale   SubCutaneous at bedtime  insulin lispro Injectable (ADMELOG) 6 Unit(s) SubCutaneous three times a day before meals  ketorolac 0.5% Ophthalmic Solution 1 Drop(s) Left EYE two times a day  metoprolol tartrate 25 milliGRAM(s) Oral two times a day  polyethylene glycol 3350 17 Gram(s) Oral daily  potassium chloride    Tablet ER 20 milliEquivalent(s) Oral once  prednisoLONE acetate 1% Suspension 1 Drop(s) Left EYE four times a day  remdesivir  IVPB   IV Intermittent   remdesivir  IVPB 100 milliGRAM(s) IV Intermittent every 24 hours  simvastatin 20 milliGRAM(s) Oral at bedtime    A1C with Estimated Average Glucose Result: 10.3 % (03-14-21 @ 08:57)  A1C with Estimated Average Glucose: 9.7 % (10-01-20 @ 06:30)    Diet, Consistent Carbohydrate/No Snacks:   Supplement Feeding Modality:  Oral  Glucerna Shake Cans or Servings Per Day:  1       Frequency:  Two Times a day (03-18-21 @ 15:09)    Isa Benito  Nurse Practitioner  Division of Endocrinology & Diabetes  In house pager #[phone]/long range pager #[phone]    If before 9AM or after 6PM, or on weekends/holidays, please call endocrine answering service for assistance ([phone]).  For nonurgent matters email [email or web address] for assistance.

## 2021-03-20 NOTE — CHART NOTE - NSCHARTNOTEFT_GEN_A_CORE
PA notified after finger stick 34 --> 37. Insulin regimen recently adjusted per Endocrine due to hyperglycemia 2/2 steroid usage. At this time, vital signs otherwise stable, neurologically in tact, speaking with family on the phone. Per ISS, 1 amp 50 administered w/ recovery of BS to 166. Pt encouraged to eat (had not eaten anything prior to this) and additional 6U AM insulin held. Will continue to monitor. PA notified after finger stick 34 --> 37. Insulin regimen recently adjusted per Endocrine due to hyperglycemia 2/2 steroid usage. At this time, vital signs otherwise stable, neurologically in tact, speaking with family on the phone. Per ISS, 1 amp 50 administered w/ recovery of BS to 166. Pt encouraged to eat (had not eaten anything prior to this) and additional 6U AM insulin held. Will continue to monitor.    Update 11:15AM: Endocrine notified fo hypoglycemia and lack of appetite. Instructed to decrease Lantus to 20U at bedtime and per fellow will follow up with attendings in regards to adjustment of lispro pre meal coverage (currently at 6) PA notified after finger stick 34 --> 37. Insulin regimen recently adjusted per Endocrine due to hyperglycemia 2/2 steroid usage. At this time, vital signs otherwise stable, neurologically in tact, speaking with family on the phone. Per ISS, 1 amp 50 administered w/ recovery of BS to 166. Pt encouraged to eat (had not eaten anything prior to this) and additional 6U AM insulin held. Will continue to monitor.    Update 11:15AM: Endocrine notified fo hypoglycemia and lack of appetite. Instructed to decrease Lantus to 20U at bedtime and pre meal lispro to 3U w/ instruction to hold if hypoglycemic or patient not eating

## 2021-03-20 NOTE — CHART NOTE - NSCHARTNOTEFT_GEN_A_CORE
HPI / INTERVAL HISTORY:  HPI:  Patient is a 70 year old German/Jhonny-speaking male with a history of CAD s/p CABG and 2 stents, Type 2 DM, HTN, BPH s/p TURP and Parkinson's disease, who was recently admitted on 3/14 for weakness, covid, hypoxia, started on remdesivir/decadron, discharged home yesterday (3/16). However, at home, pt decompensated with increasing sob/labored breathing, o2 sat dropped from 88% to 66% within 1/2 hour per daughter, gasping for air. Pt's son called 911 and brought him back to the hospital. Pt denies chest pain/abd pain/n/v/diarrhea. He reports he was sent home without oxygen.     In ED, he was put on 100% nonrebreather for labored breathing. CXR showed b./l opacities c/w covid progression.  (17 Mar 2021 16:14)      Pt admitted on 3/17 for COVID. Was started on decadron and remdesivir. Noted to have elevated D-dimer and started empirically on therapeutic lovenox. CTA 3/19 negative for PE. Dopplers negative. Had been satting well until 3/20 started to desat. Switched to NRB and then trialed on HFNC. Continue to desaturate and placed on BIPAP. RRT called on 3/20 for tachypnea and hypoxia to 60s. Patient tachypneic 50s to 60s pulling large volumes on BIPAP and appeared to be anxious/agitated. Febrile to 101F. BP 120s to 130s. HR 90s to 110s with SpO2 70s-80s initially with improvement to low 90s. MICU at bedside given tachypnea patient intubated for airway protection. Started on Levophed and Propofol and transported to [facility].     REVIEW OF SYSTEMS: unable to obtain     PMH/PSH:  PAST MEDICAL & SURGICAL HISTORY:  BPH (benign prostatic hyperplasia)    GERD (gastroesophageal reflux disease)    HLD (hyperlipidemia)    Hypertension    CAD (coronary artery disease)  CABG x2 , Stents x2     Diabetes mellitus    Presence of stent in coronary artery  x2     History of coronary artery bypass graft  X2 in 2012    Bladder neck obstruction  3 surgeries for BPH, most recent         FAMILY HISTORY:  FAMILY HISTORY:  Family history of diabetes mellitus type II (Sibling)        SOCIAL HISTORY:  Smoking: [  ] Never Smoked  [  ] Former Smoker (# packs x # years), quit   [  ] Current Smoker (# packs x # years)  Substance Use: [  ] None; [   ] Yes:  EtOH Use: [   ] None; [   ] Rare; [   ] Social; [   ] Frequent:   Marital Status: [  ] Single  [  ]   [  ]   [  ]   Dependents:  Occupation:  Barriers to treatment:   Advance Directives:     HOME MEDICATIONS:  Home Medications:  Basaglar KwikPen 100 units/mL subcutaneous solution: 20 unit(s) subcutaneous once a day (at bedtime) (17 Mar 2021 16:21)  carbidopa-levodopa 10 mg-100 mg oral tablet: 2 tab(s) orally 2 times a day (17 Mar 2021 16:21)  ciprofloxacin 0.3% ophthalmic solution: 1 drop(s) in the left eye 4 times a day (17 Mar 2021 16:21)  ketorolac 0.5% ophthalmic solution: 1 drop(s) in the left eye 2 times a day (17 Mar 2021 16:21)  prednisoLONE acetate 1% ophthalmic suspension: 1 drop(s) in the left eye 4 times a day (17 Mar 2021 16:21)  simvastatin 20 mg oral tablet: 1 tab(s) orally once a day (at bedtime) (17 Mar 2021 16:21)      ALLERGIES:  Allergies    No Known Allergies    Intolerances            OBJECTIVE:  ICU Vital Signs Last 24 Hrs  T(C): 36.3 (20 Mar 2021 14:37), Max: 37 (19 Mar 2021 21:08)  T(F): 97.4 (20 Mar 2021 14:37), Max: 98.6 (19 Mar 2021 21:08)  HR: 97 (20 Mar 2021 14:37) (80 - 97)  BP: 138/59 (20 Mar 2021 14:37) (138/59 - 155/68)  BP(mean): --  ABP: --  ABP(mean): --  RR: 23 (20 Mar 2021 15:05) (18 - 23)  SpO2: 94% (20 Mar 2021 15:05) (94% - 95%)        03-19 @ 07:01  -  03-20 @ 07:00  --------------------------------------------------------  IN: 110 mL / OUT: 850 mL / NET: -740 mL      CAPILLARY BLOOD GLUCOSE      POCT Blood Glucose.: 109 mg/dL (20 Mar 2021 15:22)      PHYSICAL EXAM  GENERAL: intubated, sedated   RESP: mechanical bs bilaterally   CVS: S1/S2 present, normal rate and rhythm; no murmurs, gallops or rubs appreciated  ABD: Soft, non-tender, non-distended, no masses appreciated; bowel sound normal at all 4 quadrants  EXT: Grossly normal ROM; 2+ pedal pulses bilaterally, no BLE edema  SKIN: Warm, dry, and appropriate color for skin tone; No new rashes    LINES:       HOSPITAL MEDICATIONS:  MEDICATIONS  (STANDING):  aspirin enteric coated 81 milliGRAM(s) Oral daily  carbidopa/levodopa  10/100 2 Tablet(s) Oral <User Schedule>  chlorhexidine 4% Liquid 1 Application(s) Topical <User Schedule>  dexAMETHasone  Injectable 6 milliGRAM(s) IV Push daily  dextrose 40% Gel 15 Gram(s) Oral once  dextrose 5%. 1000 milliLiter(s) (50 mL/Hr) IV Continuous <Continuous>  dextrose 5%. 1000 milliLiter(s) (100 mL/Hr) IV Continuous <Continuous>  dextrose 50% Injectable 25 Gram(s) IV Push once  dextrose 50% Injectable 12.5 Gram(s) IV Push once  dextrose 50% Injectable 25 Gram(s) IV Push once  enoxaparin Injectable 80 milliGRAM(s) SubCutaneous two times a day  fentaNYL    Injectable 100 MICROGram(s) IV Push once  glucagon  Injectable 1 milliGRAM(s) IntraMuscular once  insulin glargine Injectable (LANTUS) 20 Unit(s) SubCutaneous at bedtime  insulin lispro (ADMELOG) corrective regimen sliding scale   SubCutaneous three times a day before meals  insulin lispro (ADMELOG) corrective regimen sliding scale   SubCutaneous at bedtime  insulin lispro Injectable (ADMELOG) 3 Unit(s) SubCutaneous three times a day before meals  ketorolac 0.5% Ophthalmic Solution 1 Drop(s) Left EYE two times a day  metoprolol tartrate 25 milliGRAM(s) Oral two times a day  polyethylene glycol 3350 17 Gram(s) Oral daily  prednisoLONE acetate 1% Suspension 1 Drop(s) Left EYE four times a day  remdesivir  IVPB   IV Intermittent   remdesivir  IVPB 100 milliGRAM(s) IV Intermittent every 24 hours  simvastatin 20 milliGRAM(s) Oral at bedtime    MEDICATIONS  (PRN):        LABS:                        10.7   15.67 )-----------( 250      ( 20 Mar 2021 06:21 )             32.0     Hgb Trend: 10.7<--, 10.5<--, 11.5<--, 11.9<--, 12.0<--  03-20    144  |  109<H>  |  30<H>  ----------------------------<  35<LL>  3.4<L>   |  24  |  0.91    Ca    8.2<L>      20 Mar 2021 06:21  Phos  3.0     0320  Mg     1.9     20    TPro  5.7<L>  /  Alb  2.9<L>  /  TBili  0.5  /  DBili  <0.2  /  AST  62<H>  /  ALT  29  /  AlkPhos  149<H>  20    Creatinine Trend: 0.91<--, 0.75<--, 0.87<--, 1.00<--, 0.86<--, 0.94<--    Urinalysis Basic - ( 18 Mar 2021 17:32 )    Color: Yellow / Appearance: Clear / S.031 / pH: x  Gluc: x / Ketone: Negative  / Bili: Negative / Urobili: <2 mg/dL   Blood: x / Protein: 30 mg/dL / Nitrite: Negative   Leuk Esterase: Negative / RBC: 1 /HPF / WBC 1 /HPF   Sq Epi: x / Non Sq Epi: 1 /HPF / Bacteria: Negative            MICROBIOLOGY:     Culture - Blood (collected 18 Mar 2021 16:31)  Source: .Blood Blood-Peripheral  Preliminary Report (19 Mar 2021 17:02):    No growth to date.    Culture - Blood (collected 18 Mar 2021 16:31)  Source: .Blood Blood  Preliminary Report (19 Mar 2021 17:02):    No growth to date.        RADIOLOGY & ADDITIONAL TESTS: Reviewed.    ASSESSMENT/PLAN:  70 year old German/Jhonny-speaking male with a history of CAD s/p CABG and 2 stents, Type 2 DM, HTN, BPH s/p TURP and Parkinson's disease, who was recently admitted on 3/14 for weakness, covid, hypoxia, started on remdesivir/decadron, discharged home yesterday (3/16). Readmitted on 3/17, intubated on 3/20.       #Neuro  -baseline AAOx3, has Parkinsons Disease  -sedated on prop and fent     #CV  -on levo 0.15, suspect vasoplegic shock  -needs TTE   -hx of CAD s/p CABG. cw ASA 81     #Resp  -needs post-intubation ABG, CXR once lines   -on / currently     #Renal  -renal function intact  -trend BMP and strict I/O    #GI  -start NPO with TF once OGT in place  -protonix daily   -monitor LFTs on remdesivir     #Heme  -DVT and CTA negative for clots  -stop therapeutic lovenox   -start HSQ    #ID  -leukocytosis, new from prior   -send sputum cx, BCx, UA     -start empiric PNA coverage with vanc and cefepime   -cw remdesivir for 5 days 3/17- and decadron 3/17- for 10 days     #Endo  -on insulin  -NPH     #Ethics  -full code HPI / INTERVAL HISTORY:  HPI:  Patient is a 70 year old Macedonian/Jhonny-speaking male with a history of CAD s/p CABG and 2 stents, Type 2 DM, HTN, BPH s/p TURP and Parkinson's disease, who was recently admitted on 3/14 for weakness, covid, hypoxia, started on remdesivir/decadron, discharged home yesterday (3/16). However, at home, pt decompensated with increasing sob/labored breathing, o2 sat dropped from 88% to 66% within 1/2 hour per daughter, gasping for air. Pt's son called 911 and brought him back to the hospital. Pt denies chest pain/abd pain/n/v/diarrhea. He reports he was sent home without oxygen.     In ED, he was put on 100% nonrebreather for labored breathing. CXR showed b./l opacities c/w covid progression.  (17 Mar 2021 16:14)      Pt admitted on 3/17 for COVID. Was started on decadron and remdesivir. Noted to have elevated D-dimer and started empirically on therapeutic lovenox. CTA 3/19 negative for PE. Dopplers negative. Had been satting well until 3/20 started to desat. Switched to NRB and then trialed on HFNC. Continue to desaturate and placed on BIPAP. RRT called on 3/20 for tachypnea and hypoxia to 60s. Patient tachypneic 50s to 60s pulling large volumes on BIPAP and appeared to be anxious/agitated. Febrile to 101F. BP 120s to 130s. HR 90s to 110s with SpO2 70s-80s initially with improvement to low 90s. MICU at bedside given tachypnea patient intubated for airway protection. Started on Levophed and Propofol and transported to [facility].     REVIEW OF SYSTEMS: unable to obtain     PMH/PSH:  PAST MEDICAL & SURGICAL HISTORY:  BPH (benign prostatic hyperplasia)    GERD (gastroesophageal reflux disease)    HLD (hyperlipidemia)    Hypertension    CAD (coronary artery disease)  CABG x2 , Stents x2     Diabetes mellitus    Presence of stent in coronary artery  x2     History of coronary artery bypass graft  X2 in 2012    Bladder neck obstruction  3 surgeries for BPH, most recent         FAMILY HISTORY:  FAMILY HISTORY:  Family history of diabetes mellitus type II (Sibling)        SOCIAL HISTORY:  Smoking: [  ] Never Smoked  [  ] Former Smoker (# packs x # years), quit   [  ] Current Smoker (# packs x # years)  Substance Use: [  ] None; [   ] Yes:  EtOH Use: [   ] None; [   ] Rare; [   ] Social; [   ] Frequent:   Marital Status: [  ] Single  [  ]   [  ]   [  ]   Dependents:  Occupation:  Barriers to treatment:   Advance Directives:     HOME MEDICATIONS:  Home Medications:  Basaglar KwikPen 100 units/mL subcutaneous solution: 20 unit(s) subcutaneous once a day (at bedtime) (17 Mar 2021 16:21)  carbidopa-levodopa 10 mg-100 mg oral tablet: 2 tab(s) orally 2 times a day (17 Mar 2021 16:21)  ciprofloxacin 0.3% ophthalmic solution: 1 drop(s) in the left eye 4 times a day (17 Mar 2021 16:21)  ketorolac 0.5% ophthalmic solution: 1 drop(s) in the left eye 2 times a day (17 Mar 2021 16:21)  prednisoLONE acetate 1% ophthalmic suspension: 1 drop(s) in the left eye 4 times a day (17 Mar 2021 16:21)  simvastatin 20 mg oral tablet: 1 tab(s) orally once a day (at bedtime) (17 Mar 2021 16:21)      ALLERGIES:  Allergies    No Known Allergies    Intolerances            OBJECTIVE:  ICU Vital Signs Last 24 Hrs  T(C): 36.3 (20 Mar 2021 14:37), Max: 37 (19 Mar 2021 21:08)  T(F): 97.4 (20 Mar 2021 14:37), Max: 98.6 (19 Mar 2021 21:08)  HR: 97 (20 Mar 2021 14:37) (80 - 97)  BP: 138/59 (20 Mar 2021 14:37) (138/59 - 155/68)  BP(mean): --  ABP: --  ABP(mean): --  RR: 23 (20 Mar 2021 15:05) (18 - 23)  SpO2: 94% (20 Mar 2021 15:05) (94% - 95%)        03-19 @ 07:01  -  03-20 @ 07:00  --------------------------------------------------------  IN: 110 mL / OUT: 850 mL / NET: -740 mL      CAPILLARY BLOOD GLUCOSE      POCT Blood Glucose.: 109 mg/dL (20 Mar 2021 15:22)      PHYSICAL EXAM  GENERAL: intubated, sedated   RESP: mechanical bs bilaterally   CVS: S1/S2 present, normal rate and rhythm; no murmurs, gallops or rubs appreciated  ABD: Soft, non-tender, non-distended, no masses appreciated; bowel sound normal at all 4 quadrants  EXT: Grossly normal ROM; 2+ pedal pulses bilaterally, no BLE edema  SKIN: Warm, dry, and appropriate color for skin tone; No new rashes    LINES:       HOSPITAL MEDICATIONS:  MEDICATIONS  (STANDING):  aspirin enteric coated 81 milliGRAM(s) Oral daily  carbidopa/levodopa  10/100 2 Tablet(s) Oral <User Schedule>  chlorhexidine 4% Liquid 1 Application(s) Topical <User Schedule>  dexAMETHasone  Injectable 6 milliGRAM(s) IV Push daily  dextrose 40% Gel 15 Gram(s) Oral once  dextrose 5%. 1000 milliLiter(s) (50 mL/Hr) IV Continuous <Continuous>  dextrose 5%. 1000 milliLiter(s) (100 mL/Hr) IV Continuous <Continuous>  dextrose 50% Injectable 25 Gram(s) IV Push once  dextrose 50% Injectable 12.5 Gram(s) IV Push once  dextrose 50% Injectable 25 Gram(s) IV Push once  enoxaparin Injectable 80 milliGRAM(s) SubCutaneous two times a day  fentaNYL    Injectable 100 MICROGram(s) IV Push once  glucagon  Injectable 1 milliGRAM(s) IntraMuscular once  insulin glargine Injectable (LANTUS) 20 Unit(s) SubCutaneous at bedtime  insulin lispro (ADMELOG) corrective regimen sliding scale   SubCutaneous three times a day before meals  insulin lispro (ADMELOG) corrective regimen sliding scale   SubCutaneous at bedtime  insulin lispro Injectable (ADMELOG) 3 Unit(s) SubCutaneous three times a day before meals  ketorolac 0.5% Ophthalmic Solution 1 Drop(s) Left EYE two times a day  metoprolol tartrate 25 milliGRAM(s) Oral two times a day  polyethylene glycol 3350 17 Gram(s) Oral daily  prednisoLONE acetate 1% Suspension 1 Drop(s) Left EYE four times a day  remdesivir  IVPB   IV Intermittent   remdesivir  IVPB 100 milliGRAM(s) IV Intermittent every 24 hours  simvastatin 20 milliGRAM(s) Oral at bedtime    MEDICATIONS  (PRN):        LABS:                        10.7   15.67 )-----------( 250      ( 20 Mar 2021 06:21 )             32.0     Hgb Trend: 10.7<--, 10.5<--, 11.5<--, 11.9<--, 12.0<--  03-20    144  |  109<H>  |  30<H>  ----------------------------<  35<LL>  3.4<L>   |  24  |  0.91    Ca    8.2<L>      20 Mar 2021 06:21  Phos  3.0     0320  Mg     1.9     20    TPro  5.7<L>  /  Alb  2.9<L>  /  TBili  0.5  /  DBili  <0.2  /  AST  62<H>  /  ALT  29  /  AlkPhos  149<H>  20    Creatinine Trend: 0.91<--, 0.75<--, 0.87<--, 1.00<--, 0.86<--, 0.94<--    Urinalysis Basic - ( 18 Mar 2021 17:32 )    Color: Yellow / Appearance: Clear / S.031 / pH: x  Gluc: x / Ketone: Negative  / Bili: Negative / Urobili: <2 mg/dL   Blood: x / Protein: 30 mg/dL / Nitrite: Negative   Leuk Esterase: Negative / RBC: 1 /HPF / WBC 1 /HPF   Sq Epi: x / Non Sq Epi: 1 /HPF / Bacteria: Negative            MICROBIOLOGY:     Culture - Blood (collected 18 Mar 2021 16:31)  Source: .Blood Blood-Peripheral  Preliminary Report (19 Mar 2021 17:02):    No growth to date.    Culture - Blood (collected 18 Mar 2021 16:31)  Source: .Blood Blood  Preliminary Report (19 Mar 2021 17:02):    No growth to date.        RADIOLOGY & ADDITIONAL TESTS: Reviewed.    ASSESSMENT/PLAN:  70 year old Macedonian/Jhonny-speaking male with a history of CAD s/p CABG and 2 stents, Type 2 DM, HTN, BPH s/p TURP and Parkinson's disease, who was recently admitted on 3/14 for weakness, covid, hypoxia, started on remdesivir/decadron, discharged home yesterday (3/16). Readmitted on 3/17, intubated on 3/20.     #Neuro  -baseline AAOx3, has Parkinsons Disease  -sedated on prop and fent     #CV  -on levo 0.15, suspect vasoplegic shock  -needs TTE   -hx of CAD s/p CABG. cw ASA 81     #Resp  COVID PNA   -needs post-intubation ABG, CXR once lines placed    -on / currently     #Renal  -renal function intact  -trend BMP and strict I/O    #GI  -start NPO with TF once OGT in place  -protonix daily   -monitor LFTs on remdesivir     #Heme  -DVT and CTA negative for clots  -stop therapeutic lovenox   -start HSQ    #ID  -leukocytosis, new from prior. febrile on 3/20 to 101.    -send sputum cx, BCx, UA     -start empiric PNA coverage with vanc and cefepime   -cw remdesivir for 5 days 3/17- and decadron 3/17- for 10 days     #Endo  -on insulin   -convert NPH once start TF, noted to be hypoglycemic earlier     #Ethics  -full code HPI / INTERVAL HISTORY:  HPI:  Patient is a 70 year old Amharic/Jhonny-speaking male with a history of CAD s/p CABG and 2 stents, Type 2 DM, HTN, BPH s/p TURP and Parkinson's disease, who was recently admitted on 3/14 for weakness, covid, hypoxia, started on remdesivir/decadron, discharged home yesterday (3/16). However, at home, pt decompensated with increasing sob/labored breathing, o2 sat dropped from 88% to 66% within 1/2 hour per daughter, gasping for air. Pt's son called 911 and brought him back to the hospital. Pt denies chest pain/abd pain/n/v/diarrhea. He reports he was sent home without oxygen.     In ED, he was put on 100% nonrebreather for labored breathing. CXR showed b./l opacities c/w covid progression.  (17 Mar 2021 16:14)      Pt admitted on 3/17 for COVID. Was started on decadron and remdesivir. Noted to have elevated D-dimer and started empirically on therapeutic lovenox. CTA 3/19 negative for PE. Dopplers negative. Had been satting well until 3/20 started to desat. Switched to NRB and then trialed on HFNC. Continue to desaturate and placed on BIPAP. RRT called on 3/20 for tachypnea and hypoxia to 60s. Patient tachypneic 50s to 60s pulling large volumes on BIPAP and appeared to be anxious/agitated. Febrile to 101F. BP 120s to 130s. HR 90s to 110s with SpO2 70s-80s initially with improvement to low 90s. MICU at bedside given tachypnea patient intubated for airway protection. Started on Levophed and Propofol and transported to [facility].     REVIEW OF SYSTEMS: unable to obtain     PMH/PSH:  PAST MEDICAL & SURGICAL HISTORY:  BPH (benign prostatic hyperplasia)    GERD (gastroesophageal reflux disease)    HLD (hyperlipidemia)    Hypertension    CAD (coronary artery disease)  CABG x2 , Stents x2     Diabetes mellitus    Presence of stent in coronary artery  x2     History of coronary artery bypass graft  X2 in 2012    Bladder neck obstruction  3 surgeries for BPH, most recent         FAMILY HISTORY:  FAMILY HISTORY:  Family history of diabetes mellitus type II (Sibling)        SOCIAL HISTORY:  Smoking: [  ] Never Smoked  [  ] Former Smoker (# packs x # years), quit   [  ] Current Smoker (# packs x # years)  Substance Use: [  ] None; [   ] Yes:  EtOH Use: [   ] None; [   ] Rare; [   ] Social; [   ] Frequent:   Marital Status: [  ] Single  [  ]   [  ]   [  ]   Dependents:  Occupation:  Barriers to treatment:   Advance Directives:     HOME MEDICATIONS:  Home Medications:  Basaglar KwikPen 100 units/mL subcutaneous solution: 20 unit(s) subcutaneous once a day (at bedtime) (17 Mar 2021 16:21)  carbidopa-levodopa 10 mg-100 mg oral tablet: 2 tab(s) orally 2 times a day (17 Mar 2021 16:21)  ciprofloxacin 0.3% ophthalmic solution: 1 drop(s) in the left eye 4 times a day (17 Mar 2021 16:21)  ketorolac 0.5% ophthalmic solution: 1 drop(s) in the left eye 2 times a day (17 Mar 2021 16:21)  prednisoLONE acetate 1% ophthalmic suspension: 1 drop(s) in the left eye 4 times a day (17 Mar 2021 16:21)  simvastatin 20 mg oral tablet: 1 tab(s) orally once a day (at bedtime) (17 Mar 2021 16:21)      ALLERGIES:  Allergies    No Known Allergies    Intolerances            OBJECTIVE:  ICU Vital Signs Last 24 Hrs  T(C): 36.3 (20 Mar 2021 14:37), Max: 37 (19 Mar 2021 21:08)  T(F): 97.4 (20 Mar 2021 14:37), Max: 98.6 (19 Mar 2021 21:08)  HR: 97 (20 Mar 2021 14:37) (80 - 97)  BP: 138/59 (20 Mar 2021 14:37) (138/59 - 155/68)  BP(mean): --  ABP: --  ABP(mean): --  RR: 23 (20 Mar 2021 15:05) (18 - 23)  SpO2: 94% (20 Mar 2021 15:05) (94% - 95%)        03-19 @ 07:01  -  03-20 @ 07:00  --------------------------------------------------------  IN: 110 mL / OUT: 850 mL / NET: -740 mL      CAPILLARY BLOOD GLUCOSE      POCT Blood Glucose.: 109 mg/dL (20 Mar 2021 15:22)      PHYSICAL EXAM  GENERAL: intubated, sedated   RESP: mechanical bs bilaterally   CVS: S1/S2 present, normal rate and rhythm; no murmurs, gallops or rubs appreciated  ABD: Soft, non-tender, non-distended, no masses appreciated; bowel sound normal at all 4 quadrants  EXT: Grossly normal ROM; 2+ pedal pulses bilaterally, no BLE edema  SKIN: Warm, dry, and appropriate color for skin tone; No new rashes    LINES:       HOSPITAL MEDICATIONS:  MEDICATIONS  (STANDING):  aspirin enteric coated 81 milliGRAM(s) Oral daily  carbidopa/levodopa  10/100 2 Tablet(s) Oral <User Schedule>  chlorhexidine 4% Liquid 1 Application(s) Topical <User Schedule>  dexAMETHasone  Injectable 6 milliGRAM(s) IV Push daily  dextrose 40% Gel 15 Gram(s) Oral once  dextrose 5%. 1000 milliLiter(s) (50 mL/Hr) IV Continuous <Continuous>  dextrose 5%. 1000 milliLiter(s) (100 mL/Hr) IV Continuous <Continuous>  dextrose 50% Injectable 25 Gram(s) IV Push once  dextrose 50% Injectable 12.5 Gram(s) IV Push once  dextrose 50% Injectable 25 Gram(s) IV Push once  enoxaparin Injectable 80 milliGRAM(s) SubCutaneous two times a day  fentaNYL    Injectable 100 MICROGram(s) IV Push once  glucagon  Injectable 1 milliGRAM(s) IntraMuscular once  insulin glargine Injectable (LANTUS) 20 Unit(s) SubCutaneous at bedtime  insulin lispro (ADMELOG) corrective regimen sliding scale   SubCutaneous three times a day before meals  insulin lispro (ADMELOG) corrective regimen sliding scale   SubCutaneous at bedtime  insulin lispro Injectable (ADMELOG) 3 Unit(s) SubCutaneous three times a day before meals  ketorolac 0.5% Ophthalmic Solution 1 Drop(s) Left EYE two times a day  metoprolol tartrate 25 milliGRAM(s) Oral two times a day  polyethylene glycol 3350 17 Gram(s) Oral daily  prednisoLONE acetate 1% Suspension 1 Drop(s) Left EYE four times a day  remdesivir  IVPB   IV Intermittent   remdesivir  IVPB 100 milliGRAM(s) IV Intermittent every 24 hours  simvastatin 20 milliGRAM(s) Oral at bedtime    MEDICATIONS  (PRN):        LABS:                        10.7   15.67 )-----------( 250      ( 20 Mar 2021 06:21 )             32.0     Hgb Trend: 10.7<--, 10.5<--, 11.5<--, 11.9<--, 12.0<--  03-20    144  |  109<H>  |  30<H>  ----------------------------<  35<LL>  3.4<L>   |  24  |  0.91    Ca    8.2<L>      20 Mar 2021 06:21  Phos  3.0     0320  Mg     1.9     20    TPro  5.7<L>  /  Alb  2.9<L>  /  TBili  0.5  /  DBili  <0.2  /  AST  62<H>  /  ALT  29  /  AlkPhos  149<H>  20    Creatinine Trend: 0.91<--, 0.75<--, 0.87<--, 1.00<--, 0.86<--, 0.94<--    Urinalysis Basic - ( 18 Mar 2021 17:32 )    Color: Yellow / Appearance: Clear / S.031 / pH: x  Gluc: x / Ketone: Negative  / Bili: Negative / Urobili: <2 mg/dL   Blood: x / Protein: 30 mg/dL / Nitrite: Negative   Leuk Esterase: Negative / RBC: 1 /HPF / WBC 1 /HPF   Sq Epi: x / Non Sq Epi: 1 /HPF / Bacteria: Negative            MICROBIOLOGY:     Culture - Blood (collected 18 Mar 2021 16:31)  Source: .Blood Blood-Peripheral  Preliminary Report (19 Mar 2021 17:02):    No growth to date.    Culture - Blood (collected 18 Mar 2021 16:31)  Source: .Blood Blood  Preliminary Report (19 Mar 2021 17:02):    No growth to date.        RADIOLOGY & ADDITIONAL TESTS: Reviewed.    ASSESSMENT/PLAN:  70 year old Amharic/Jhonny-speaking male with a history of CAD s/p CABG and 2 stents, Type 2 DM, HTN, BPH s/p TURP and Parkinson's disease, who was recently admitted on 3/14 for weakness, covid, hypoxia, started on remdesivir/decadron, discharged home yesterday (3/16). Readmitted on 3/17, intubated on 3/20.     #Neuro  -baseline AAOx3, has Parkinsons Disease  -sedated on prop and fent     #CV  -on levo 0.15, suspect vasoplegic shock  -needs TTE   -hx of CAD s/p CABG. cw ASA 81     #Resp  COVID PNA   -needs post-intubation ABG, CXR once lines placed    -on / currently     #Renal  -renal function intact  -trend BMP and strict I/O    #GI  -start NPO with TF once OGT in place  -protonix daily   -monitor LFTs on remdesivir     #Heme  -DVT and CTA negative for clots  -stop therapeutic lovenox   -start HSQ    #ID  -leukocytosis, new from prior. febrile on 3/20 to 101.    -send sputum cx, BCx, UA     -start empiric PNA coverage with vanc and cefepime   -cw remdesivir for 5 days 3/17- and decadron 3/17- for 10 days     #Endo  -on insulin   -convert NPH once start TF, noted to be hypoglycemic earlier     #Ethics  -full code      Critical Care Attestation:    I have personally provided 45 minutes of critical care time excluding time spent on separate procedures.    As above. 70 year old male here with acute hypoxemic respiratory failure. Clinically worsening since admission. Required intubation today. Now on vasopressors for sepsis with septic shock. Continue lung protective ventilation. Start empiric abx. Check cultures. Continue dexamethasone, remdesivir. Remainder of plan as above. Prognosis guarded.

## 2021-03-21 NOTE — PROGRESS NOTE ADULT - SUBJECTIVE AND OBJECTIVE BOX
SHAHZAD FIELD  MRN-[id number]  Patient is a 70y old  Male who presents with a chief complaint of acute respiratory failure d/t covid (19 Mar 2021 16:56)    HPI:  Patient is a 70 year old Yoruba/Jhonny-speaking male with a history of CAD s/p CABG and 2 stents, Type 2 DM, HTN, BPH s/p TURP and Parkinson's disease, who was recently admitted on 3/14 for weakness, covid, hypoxia, started on remdesivir/decadron, discharged home yesterday (3/16). However, at home, pt decompensated with increasing sob/labored breathing, o2 sat dropped from 88% to 66% within 1/2 hour per daughter, gasping for air. Pt's son called 911 and brought him back to the hospital. Pt denies chest pain/abd pain/n/v/diarrhea. He reports he was sent home without oxygen.     In ED, he was put on 100% nonrebreather for labored breathing. CXR showed b./l opacities c/w covid progression.  (17 Mar 2021 16:14)      yesterday/overnight: intubated for hypoxic respiratory failure despite escalating non invasive ventilation, transferred to [facility] ICU. Patient became acutely hypotensive, not responsive to escalating pressor doses with attempt for feeding tube placement requiring push of epi x1. Patient stabilized on propofol, fentanyl, nimbex, levo, vaso. P/F ratio 74 and was subsequently proned around 20:00. ABGs improved greatly on less fio2.    REVIEW OF SYSTEMS:  deferred, intubated    Physical Exam:  Vital Signs Last 24 Hrs  T(C): 36.2 (21 Mar 2021 08:00), Max: 37.6 (20 Mar 2021 16:15)  T(F): 97.1 (21 Mar 2021 08:00), Max: 99.6 (20 Mar 2021 16:15)  HR: 49 (21 Mar 2021 08:00) (49 - 98)  BP: 140/64 (20 Mar 2021 16:15) (138/59 - 140/64)  BP(mean): 83 (20 Mar 2021 16:15) (83 - 83)  RR: 30 (21 Mar 2021 08:00) (18 - 30)  SpO2: 100% (21 Mar 2021 08:00) (93% - 100%)    Gen:  intubated, sedated/paralyzed  RES : course breath sounds B/L    CVS: S1,S2. sinus bradycardia  Abd: Soft, non-distended. Bowel sounds present.  Ext: trace edema B/L LE    ============================I/O===========================   I&O's Detail    20 Mar 2021 07:01  -  21 Mar 2021 07:00  --------------------------------------------------------  IN:    Cisatracurium: 162.2 mL    FentaNYL: 192 mL    IV PiggyBack: 550 mL    Lactated Ringers Bolus: 1000 mL    Norepinephrine: 54.5 mL    Propofol: 254 mL    Sodium Chloride 0.9% Bolus: 1000 mL    Vasopressin: 26.4 mL  Total IN: 3239.1 mL    OUT:    Ureteral Catheter (mL): 1180 mL  Total OUT: 1180 mL    Total NET: 2059.1 mL        ============================ LABS =========================                        8.9    8.40  )-----------( 199      ( 21 Mar 2021 04:33 )             27.7     03-21    140  |  109<H>  |  28<H>  ----------------------------<  199<H>  4.8   |  21<L>  |  0.89    Ca    7.8<L>      21 Mar 2021 04:33  Phos  3.3     03-21  Mg     1.9     03-21    TPro  4.9<L>  /  Alb  2.2<L>  /  TBili  0.6  /  DBili  x   /  AST  38  /  ALT  36  /  AlkPhos  120  03-21    LIVER FUNCTIONS - ( 21 Mar 2021 04:33 )  Alb: 2.2 g/dL / Pro: 4.9 g/dL / ALK PHOS: 120 U/L / ALT: 36 U/L / AST: 38 U/L / GGT: x           PT/INR - ( 21 Mar 2021 04:33 )   PT: 17.6 sec;   INR: 1.56 ratio         PTT - ( 20 Mar 2021 18:18 )  PTT:36.6 sec  ABG - ( 21 Mar 2021 04:33 )  pH, Arterial: 7.39  pH, Blood: x     /  pCO2: 38    /  pO2: 119   / HCO3: 23    / Base Excess: -1.8  /  SaO2: 98.2                ======================Micro/Rad/Cardio=================  Culture: Reviewed   CXR: Reviewed  Echo:Reviewed  ======================================================  PAST MEDICAL & SURGICAL HISTORY:  BPH (benign prostatic hyperplasia)    GERD (gastroesophageal reflux disease)    HLD (hyperlipidemia)    Hypertension    CAD (coronary artery disease)  CABG x2 2012, Stents x2 2014    Diabetes mellitus    Presence of stent in coronary artery  x2 2014    History of coronary artery bypass graft  X2 in March of 2012    Bladder neck obstruction  3 surgeries for BPH, most recent 2012      ====================ASSESMENT ==============  COVID  ARDS  Hypoxic Respiratory Failure    ====================== NEUROLOGY=====================  continue heavy sedation and paralytic for ventilator synchrony while in prone position     ==================== RESPIRATORY======================  proned last night around 20:00  will supine around 13:00 today  open lung ventilation   decrease FiO2 as tolerated     ====================CARDIOVASCULAR==================  has been off levo  on small dose vaso, will wean to off as tolerated  if need pressor will use levo  keep map > 65    ===================HEMATOLOGIC/ONC ===================  VTE ppx with SQH    ===================== RENAL =========================  stable renal function  was volume resuscitated first 24 hours after intubation   no need for diuresis yet  Da Silva for monitoring urine output    ==================== GASTROINTESTINAL===================  needs feeding tube once turned supine  start TF once feeding tube placement confirmed   GI PPX with protonix iv  bowel regimen with miralax    =======================    ENDOCRINE  =====================  ISS [id number] for blood glucose control     ========================INFECTIOUS DISEASE================  continue remdesivir  continue decadron   continue broad spectrum antibiotics - vanco, cefepime   de-escalate if cultures are come back negative     I spent 30 minutes of critical care on this patient

## 2021-03-21 NOTE — PROGRESS NOTE ADULT - SUBJECTIVE AND OBJECTIVE BOX
Chief Complaint: DM 2, +COVID, with hyperglycemia on Dexamethasone    History: Patient seen in ICU - Surge B, intubated/sedated  Patient with hypoglycemia yesterday in setting of low PO intake/respiratory distress. RRT was called for hypoxia, patient was intubated and transferred to ICU  Per primary team at bedside, tube feeding to be started today. Glucerna at 10 ml/hr, goal 40 ml/hr x 24h  No Lantus on board, currently on Admelog low dose correctional scale q6h, most recent  mg/dl   Remains on Dexamethasone 6 mg daily until 3/25      MEDICATIONS  (STANDING):  albumin human  5% IVPB 250 milliLiter(s) IV Intermittent once  aspirin enteric coated 81 milliGRAM(s) Oral daily  carbidopa/levodopa  10/100 2 Tablet(s) Oral <User Schedule>  cefepime   IVPB 2000 milliGRAM(s) IV Intermittent every 12 hours  chlorhexidine 0.12% Liquid 15 milliLiter(s) Oral Mucosa every 12 hours  chlorhexidine 4% Liquid 1 Application(s) Topical <User Schedule>  cisatracurium Infusion 3 MICROgram(s)/kG/Min (14.3 mL/Hr) IV Continuous <Continuous>  dexAMETHasone  Injectable 6 milliGRAM(s) IV Push daily  dextrose 40% Gel 15 Gram(s) Oral once  dextrose 5%. 1000 milliLiter(s) (50 mL/Hr) IV Continuous <Continuous>  dextrose 5%. 1000 milliLiter(s) (100 mL/Hr) IV Continuous <Continuous>  dextrose 50% Injectable 25 Gram(s) IV Push once  dextrose 50% Injectable 12.5 Gram(s) IV Push once  dextrose 50% Injectable 25 Gram(s) IV Push once  fentaNYL   Infusion. 3 MICROgram(s)/kG/Hr (23.9 mL/Hr) IV Continuous <Continuous>  glucagon  Injectable 1 milliGRAM(s) IntraMuscular once  heparin   Injectable 5000 Unit(s) SubCutaneous every 8 hours  insulin lispro (ADMELOG) corrective regimen sliding scale   SubCutaneous every 6 hours  ketorolac 0.5% Ophthalmic Solution 1 Drop(s) Left EYE two times a day  norepinephrine Infusion 0.1 MICROgram(s)/kG/Min (14.9 mL/Hr) IV Continuous <Continuous>  pantoprazole  Injectable 40 milliGRAM(s) IV Push daily  polyethylene glycol 3350 17 Gram(s) Oral daily  prednisoLONE acetate 1% Suspension 1 Drop(s) Left EYE four times a day  propofol Infusion 50 MICROgram(s)/kG/Min (23.9 mL/Hr) IV Continuous <Continuous>  remdesivir  IVPB   IV Intermittent   remdesivir  IVPB 100 milliGRAM(s) IV Intermittent every 24 hours  simvastatin 20 milliGRAM(s) Oral at bedtime  vancomycin  IVPB      vancomycin  IVPB 1000 milliGRAM(s) IV Intermittent every 12 hours  vasopressin Infusion 0.04 Unit(s)/Min (2.4 mL/Hr) IV Continuous <Continuous>    MEDICATIONS  (PRN):  sodium chloride 0.9% lock flush 10 milliLiter(s) IV Push every 1 hour PRN Pre/post blood products, medications, blood draw, and to maintain line patency    No Known Allergies    Review of Systems:  UNABLE TO OBTAIN    PHYSICAL EXAM:  VITALS: T(C): 36.1 (03-21-21 @ 12:00)  T(F): 96.9 (03-21-21 @ 12:00), Max: 98.2 (03-21-21 @ 00:00)  HR: 40 (03-21-21 @ 16:00) (40 - 98)  BP: --  RR:  (25 - 30)  SpO2:  (93% - 100%)  Wt(kg): --  GENERAL: critically ill, intubated/sedated   RESPIRATORY: on vent  CARDIOVASCULAR: SB on telemetry monitor  NEURO: unable to assess    CAPILLARY BLOOD GLUCOSE    POCT Blood Glucose.: 284 mg/dL (21 Mar 2021 11:30)  POCT Blood Glucose.: 222 mg/dL (21 Mar 2021 05:37)  POCT Blood Glucose.: 218 mg/dL (21 Mar 2021 00:02)      03-21    140  |  109<H>  |  28<H>  ----------------------------<  199<H>  4.8   |  21<L>  |  0.89    EGFR if : 99  EGFR if non : 85    Ca    7.8<L>      03-21  Mg     1.9     03-21  Phos  3.3     03-21    TPro  4.9<L>  /  Alb  2.2<L>  /  TBili  0.6  /  DBili  x   /  AST  38  /  ALT  36  /  AlkPhos  120  03-21      Thyroid Function Tests:  03-15 @ 06:30 TSH 0.96 FreeT4 -- T3 -- Anti TPO -- Anti Thyroglobulin Ab -- TSI --      A1C with Estimated Average Glucose Result: 10.3 % (03-14-21 @ 08:57)  A1C with Estimated Average Glucose: 9.7 % (10-01-20 @ 06:30)    Diet, NPO with Tube Feed:   Tube Feeding Modality: Orogastric  Glucerna 1.2 Ignacio (GLUCERNARTH)  Total Volume for 24 Hours (mL): 960  Continuous  Starting Tube Feed Rate mL per Hour: 10  Increase Tube Feed Rate by (mL): 10     Every 4 hours  Until Goal Tube Feed Rate (mL per Hour): 40  Tube Feed Duration (in Hours): 24  Tube Feed Start Time: 15:00 (03-21-21 @ 14:02)       Chief Complaint: DM 2, +COVID, with hyperglycemia on Dexamethasone    History: Patient seen in ICU - Surge B, intubated/sedated  Patient with severe hypoglycemia yesterday AM to 30s in setting of low PO intake/respiratory distress. Resolved with D50 IVP to 166 mg/dl  In afternoon, RRT was called for hypoxia, patient was intubated and transferred to ICU  Per primary team at bedside today, tube feeding to be started. Glucerna at 10 ml/hr, goal 40 ml/hr x 24h  No Lantus on board, currently on Admelog low dose correctional scale q6h, most recent  mg/dl   Remains on Dexamethasone 6 mg daily until 3/25      MEDICATIONS  (STANDING):  albumin human  5% IVPB 250 milliLiter(s) IV Intermittent once  aspirin enteric coated 81 milliGRAM(s) Oral daily  carbidopa/levodopa  10/100 2 Tablet(s) Oral <User Schedule>  cefepime   IVPB 2000 milliGRAM(s) IV Intermittent every 12 hours  chlorhexidine 0.12% Liquid 15 milliLiter(s) Oral Mucosa every 12 hours  chlorhexidine 4% Liquid 1 Application(s) Topical <User Schedule>  cisatracurium Infusion 3 MICROgram(s)/kG/Min (14.3 mL/Hr) IV Continuous <Continuous>  dexAMETHasone  Injectable 6 milliGRAM(s) IV Push daily  dextrose 40% Gel 15 Gram(s) Oral once  dextrose 5%. 1000 milliLiter(s) (50 mL/Hr) IV Continuous <Continuous>  dextrose 5%. 1000 milliLiter(s) (100 mL/Hr) IV Continuous <Continuous>  dextrose 50% Injectable 25 Gram(s) IV Push once  dextrose 50% Injectable 12.5 Gram(s) IV Push once  dextrose 50% Injectable 25 Gram(s) IV Push once  fentaNYL   Infusion. 3 MICROgram(s)/kG/Hr (23.9 mL/Hr) IV Continuous <Continuous>  glucagon  Injectable 1 milliGRAM(s) IntraMuscular once  heparin   Injectable 5000 Unit(s) SubCutaneous every 8 hours  insulin lispro (ADMELOG) corrective regimen sliding scale   SubCutaneous every 6 hours  ketorolac 0.5% Ophthalmic Solution 1 Drop(s) Left EYE two times a day  norepinephrine Infusion 0.1 MICROgram(s)/kG/Min (14.9 mL/Hr) IV Continuous <Continuous>  pantoprazole  Injectable 40 milliGRAM(s) IV Push daily  polyethylene glycol 3350 17 Gram(s) Oral daily  prednisoLONE acetate 1% Suspension 1 Drop(s) Left EYE four times a day  propofol Infusion 50 MICROgram(s)/kG/Min (23.9 mL/Hr) IV Continuous <Continuous>  remdesivir  IVPB   IV Intermittent   remdesivir  IVPB 100 milliGRAM(s) IV Intermittent every 24 hours  simvastatin 20 milliGRAM(s) Oral at bedtime  vancomycin  IVPB      vancomycin  IVPB 1000 milliGRAM(s) IV Intermittent every 12 hours  vasopressin Infusion 0.04 Unit(s)/Min (2.4 mL/Hr) IV Continuous <Continuous>    MEDICATIONS  (PRN):  sodium chloride 0.9% lock flush 10 milliLiter(s) IV Push every 1 hour PRN Pre/post blood products, medications, blood draw, and to maintain line patency    No Known Allergies    Review of Systems:  UNABLE TO OBTAIN    PHYSICAL EXAM:  VITALS: T(C): 36.1 (03-21-21 @ 12:00)  T(F): 96.9 (03-21-21 @ 12:00), Max: 98.2 (03-21-21 @ 00:00)  HR: 40 (03-21-21 @ 16:00) (40 - 98)  BP: --  RR:  (25 - 30)  SpO2:  (93% - 100%)  Wt(kg): --  GENERAL: critically ill, intubated/sedated   RESPIRATORY: on vent  CARDIOVASCULAR: SB on telemetry monitor  NEURO: unable to assess    CAPILLARY BLOOD GLUCOSE    POCT Blood Glucose.: 284 mg/dL (21 Mar 2021 11:30)  POCT Blood Glucose.: 222 mg/dL (21 Mar 2021 05:37)  POCT Blood Glucose.: 218 mg/dL (21 Mar 2021 00:02)      03-21    140  |  109<H>  |  28<H>  ----------------------------<  199<H>  4.8   |  21<L>  |  0.89    EGFR if : 99  EGFR if non : 85    Ca    7.8<L>      03-21  Mg     1.9     03-21  Phos  3.3     03-21    TPro  4.9<L>  /  Alb  2.2<L>  /  TBili  0.6  /  DBili  x   /  AST  38  /  ALT  36  /  AlkPhos  120  03-21      Thyroid Function Tests:  03-15 @ 06:30 TSH 0.96 FreeT4 -- T3 -- Anti TPO -- Anti Thyroglobulin Ab -- TSI --      A1C with Estimated Average Glucose Result: 10.3 % (03-14-21 @ 08:57)  A1C with Estimated Average Glucose: 9.7 % (10-01-20 @ 06:30)    Diet, NPO with Tube Feed:   Tube Feeding Modality: Orogastric  Glucerna 1.2 Ignacio (GLUCERNARTH)  Total Volume for 24 Hours (mL): 960  Continuous  Starting Tube Feed Rate mL per Hour: 10  Increase Tube Feed Rate by (mL): 10     Every 4 hours  Until Goal Tube Feed Rate (mL per Hour): 40  Tube Feed Duration (in Hours): 24  Tube Feed Start Time: 15:00 (03-21-21 @ 14:02)

## 2021-03-21 NOTE — CHART NOTE - NSCHARTNOTEFT_GEN_A_CORE
Overnight events 3/20/2021   COVID pneumonia, intubated 3/20/2021, transferred to [facility] Unit   At 5.30 pm while NG was attempted patient became acutely hypotensive, epi 1 mg IVP given   On levo/vaso started to keep MAP > 65  On ACVC 30/400/+12/100% ABG 7.39/37/74 P/F ratio 74, CXR ETT in proper position, b/l patchy infiltrates   Patient was paralyzed with nimbex drip and proned at 8 pm  ABG 7.35/42/84 FiO2 70-decreased to 60 then 55%   ABG 7.39/38/119, will decrease FiO2 to 45%, then PEEP +10   When placed supine, will need NGT placement and trickle feeding   GI ppx   DVT ppx   Prognosis is guarded

## 2021-03-21 NOTE — PROGRESS NOTE ADULT - ATTENDING COMMENTS
70 year old male here with acute hypoxemic respiratory failure. Clinically worsening since admission and ultimately required intubation 3/20. He was on vasopressors for sepsis with septic shock, which are now off. Remains sedated and on a paralytic agent for ventilator dyssynchrony. Was also proned last night for refractory hypoxemia with improved ABG. He was made supine during rounds today. Plateau pressure is 25 on PEEP 12. Will continue lung protective ventilation. Continue empiric abx. Follow up cultures. Remains on dexamethasone and Remdesivir. Remainder of plan as above. Prognosis guarded.

## 2021-03-21 NOTE — PROGRESS NOTE ADULT - ASSESSMENT
70 year old Thai/Jhonny-speaking male with a history of DM2 (uncontrolled A1c 10.3%, on basal/bolus insulin at home), HTN, CAD s/p CABG and stents, BPH s/p TURP and Parkinson's disease, now readmitted with COVID and started on high dose Decadron. Endocrine team consulted for management of DM2.     1. Type 2 diabetes mellitus with other specified complication, with long-term current use of insulin.   A1c 10.3% indicating poorly controlled DM2. Home regimen reported as Basaglar 40 units qHS and Admelog 20 units before breakfast only    While inpatient:  Currently in ICU, target glucose 140-180 mg/dl  To be started on continuous tube feeding per primary team, Glucerna @ 10 ml/hr, goal 40 ml/hr x 24h  When able, please have RD evaluate tube feeding formula (currently ordered as Glucerna 1.2, however Glucerna 1.5 is on formulary)  Recommend to start NPH 5 units q6h (Hold if tube feeds are held)  Increase Admelog to MODERATE dose correctional scale q6h  Check BG q6h  Keep hypoglycemia protocol active     2. Steroid-induced hyperglycemia.    On Dexamethasone 6 mg daily until 3/25  Insulin regimen as above   Please inform endocrine team of any changes in steroid plan    3. Hypertension, unspecified type.    In ICU, requiring pressors, defer management to primary team    4. Hyperlipidemia, unspecified hyperlipidemia type.    Continue with statin if no contraindications, monitor lipid profile as outpatient, goal LDL is <70.     Isa Benito  Nurse Practitioner  Division of Endocrinology & Diabetes  In house pager #[phone]/long range pager #[phone]    If before 9AM or after 6PM, or on weekends/holidays, please call endocrine answering service for assistance ([phone]).  For nonurgent matters email [email or web address] for assistance.

## 2021-03-22 NOTE — DIETITIAN INITIAL EVALUATION ADULT. - ENTERAL
**When prone, consider continuing tube feeds at previously tolerated rate and aim to advance to goal rate to ensure patient is meeting nutrition needs during critical illness. Can hold feeds 1 hour prior to changing position and then resume as tolerated. IF signs of intolerance occur, can lower to trickle rate. Can consider promotility agent as needed.

## 2021-03-22 NOTE — DIETITIAN INITIAL EVALUATION ADULT. - PERTINENT MEDS FT
albumin human  5% IVPB  aspirin enteric coated  carbidopa/levodopa  10/100  cefepime   IVPB  cisatracurium Infusion  dexAMETHasone  Injectable  fentaNYL   Infusion.  glucagon  Injectable  heparin   Injectable  insulin lispro (ADMELOG) corrective regimen sliding scale  insulin NPH human recombinant  norepinephrine Infusion  pantoprazole  Injectable  polyethylene glycol 3350  propofol Infusion  simvastatin  vancomycin  IVPB

## 2021-03-22 NOTE — PROGRESS NOTE ADULT - ASSESSMENT
70 year old Frisian/Jhonny-speaking male with a history of CAD s/p CABG and 2 stents, Type 2 DM, HTN, BPH s/p TURP and Parkinson's disease presenting with weakness. Patient normally walks with a walker but over the past few days he has needed assistance to walk around his house. He has also had poor appetite during this time. Patient's son who lives with him tested positive for COVID on 3/9 without showing any symptoms and was due to travel for work. This morning, patient found his wife unresponsive at home and EMS was called- patient had ROSC s/p cardiac arrest and is currently hospitalized at [facility].   COVID positive  elevated inflammatory markers'clinical picture not c/w Pna  Now resp failure  Intubated   On prone ventilation    rec:  a) Resp failure  ? Fibrosis due to COVID  less likely superimposed bacterial pna  Follow Cx  Follow clinically  for now continue BS coverage  Supportive acre per ICU    B) GPC bacteremia  ? CNS contaminant  ? pathogen  repeat blood Cx  Follow ID sensi  Continue empiric abx as above    Will tailor plan for ID issues  per course,results.Will defer to primary team on management of other issues.  Assessment, plan and recommendations as detailed above were discussed with the MICU  team.  Will Follow.  Beeper [phone] [facility] [id number].   Wknd/afterhours/No response-[phone] or Fellow on call

## 2021-03-22 NOTE — DIETITIAN INITIAL EVALUATION ADULT. - ALTERNATE MEANS OF NUTRITION
1. Recommend Glucerna 1.5 @ 10mL and advance 10mL q4hrs to goal rate of 30mL/hr x24 hours + No Carb Prosource 4x daily [provides 720mL total volume, 1080Kcal, 119g protein and 546mL free water daily]. With propofol (~380Kcal), patient will receive 1460Kcal (18Kcal/kg admit weight) and 119 g protein (1.5g/kg admit weight).

## 2021-03-22 NOTE — DIETITIAN INITIAL EVALUATION ADULT. - OTHER INFO
Per chart review patient with medical history of CAD s/p CABG and 2 stents, Type 2 DM, HTN, BPH s/p TURP and Parkinson's disease, who was recently admitted on 3/14 for weakness, covid, hypoxia, started on remdesivir/decadron. S/p RRT 3/20 and patient intubated. Sedated on Fentanyl, Per chart review patient with medical history of CAD s/p CABG and 2 stents, Type 2 DM, HTN, BPH s/p TURP and Parkinson's disease, who was recently admitted on 3/14 for weakness, covid, hypoxia, started on remdesivir/decadron. S/p RRT 3/20 and patient intubated. Sedated on Fentanyl, Propofol. Propofol @ 14.4mL/hr at this time, will provide 380Kcal if to continue at this rate x24 hours. Initiated on feeds of Glucerna 1.5 @ 10mL/hr. Ordered for Glucerna 1.2 however, formula not in-house - Glucerna 1.5 running. Current tube feeding order at goal rate of 40mL/hr will provide excessive energy and suboptimal protein given propofol infusion. Patient prone since overnight per RN. Per EMR, plan for supine at 16:00. No BM noted. Bowel regimen: Miralax. Noted hyperglycemia - may  be in setting of steroid therapy. Ordered for SSI. HbA1c 10.3%.

## 2021-03-22 NOTE — CHART NOTE - NSCHARTNOTEFT_GEN_A_CORE
Night Attending Update/Rounding Note    Unprone at 1630, will recheck gas again tonight to see if need to prone again.  Currently Fi02 was weaned down to 70%, PPlat 25 and DP 17.  Don't anticipate prone if heading in current direction.

## 2021-03-22 NOTE — DIETITIAN INITIAL EVALUATION ADULT. - PERTINENT LABORATORY DATA
03-22 Na 136 mmol/L Glu 307 mg/dL<H> K+ 4.2 mmol/L Cr 0.87 mg/dL BUN 31 mg/dL<H> Phos 2.5 mg/dL Alb 2.2 g/dL<L>  Hgb 9.1 g/dL<L> Hct 28.3 %<L> Glucose, Serum: 307 mg/dL <H>  HbA1c 10.3%  24Hr FS:337 mg/dL  295 mg/dL  287 mg/dL

## 2021-03-22 NOTE — DIETITIAN INITIAL EVALUATION ADULT. - ORAL INTAKE PTA/DIET HISTORY
Unable to obtain subjective information regarding PO intake/diet and weight history prior to admission at this time.

## 2021-03-22 NOTE — CHART NOTE - NSCHARTNOTEFT_GEN_A_CORE
Diabetes follow up   Please refer to last progress note for full plan of care   Chart reviewed   Patient on continuous enteral feeding   Glucose above target of 140-180 mg/dL   Would increase NPH to 10 units every 6 hours   C/W Admelog MODERATE correctional scale every 6 hours   Endocrine following      CAPILLARY BLOOD GLUCOSE  POCT Blood Glucose.: 295 mg/dL (22 Mar 2021 05:13)  POCT Blood Glucose.: 287 mg/dL (21 Mar 2021 17:18)  POCT Blood Glucose.: 284 mg/dL (21 Mar 2021 11:30)    03-22    136  |  105  |  31<H>  ----------------------------<  307<H>  4.2   |  21<L>  |  0.87    Ca    7.8<L>      22 Mar 2021 01:24  Phos  2.5     03-22  Mg     2.2     03-22    TPro  5.3<L>  /  Alb  2.2<L>  /  TBili  0.5  /  DBili  x   /  AST  31  /  ALT  35  /  AlkPhos  104  03-22      MEDICATIONS  (STANDING):  albumin human  5% IVPB 250 milliLiter(s) IV Intermittent once  aspirin enteric coated 81 milliGRAM(s) Oral daily  carbidopa/levodopa  10/100 2 Tablet(s) Oral <User Schedule>  cefepime   IVPB 2000 milliGRAM(s) IV Intermittent every 12 hours  chlorhexidine 0.12% Liquid 15 milliLiter(s) Oral Mucosa every 12 hours  chlorhexidine 4% Liquid 1 Application(s) Topical <User Schedule>  cisatracurium Infusion 3 MICROgram(s)/kG/Min (14.3 mL/Hr) IV Continuous <Continuous>  dexAMETHasone  Injectable 6 milliGRAM(s) IV Push daily  dextrose 40% Gel 15 Gram(s) Oral once  dextrose 5%. 1000 milliLiter(s) (50 mL/Hr) IV Continuous <Continuous>  dextrose 5%. 1000 milliLiter(s) (100 mL/Hr) IV Continuous <Continuous>  dextrose 50% Injectable 12.5 Gram(s) IV Push once  dextrose 50% Injectable 25 Gram(s) IV Push once  dextrose 50% Injectable 25 Gram(s) IV Push once  fentaNYL   Infusion. 3 MICROgram(s)/kG/Hr (23.9 mL/Hr) IV Continuous <Continuous>  glucagon  Injectable 1 milliGRAM(s) IntraMuscular once  heparin   Injectable 5000 Unit(s) SubCutaneous every 8 hours  insulin lispro (ADMELOG) corrective regimen sliding scale   SubCutaneous every 6 hours  insulin NPH human recombinant 5 Unit(s) SubCutaneous every 6 hours  ketorolac 0.5% Ophthalmic Solution 1 Drop(s) Left EYE two times a day  midazolam Infusion 0.02 mG/kG/Hr (1.59 mL/Hr) IV Continuous <Continuous>  norepinephrine Infusion 0.1 MICROgram(s)/kG/Min (14.9 mL/Hr) IV Continuous <Continuous>  pantoprazole  Injectable 40 milliGRAM(s) IV Push daily  polyethylene glycol 3350 17 Gram(s) Oral daily  prednisoLONE acetate 1% Suspension 1 Drop(s) Left EYE four times a day  propofol Infusion 50 MICROgram(s)/kG/Min (23.9 mL/Hr) IV Continuous <Continuous>  simvastatin 20 milliGRAM(s) Oral at bedtime  vancomycin  IVPB      vancomycin  IVPB 1000 milliGRAM(s) IV Intermittent every 12 hours    Diet, NPO with Tube Feed:   Tube Feeding Modality: Orogastric  Glucerna 1.2 Ignacio (GLUCERNARTH)  Total Volume for 24 Hours (mL): 960  Continuous  Starting Tube Feed Rate {mL per Hour}: 10  Increase Tube Feed Rate by (mL): 10     Every 4 hours  Until Goal Tube Feed Rate (mL per Hour): 40  Tube Feed Duration (in Hours): 24  Tube Feed Start Time: 15:00 (03-21-21 @ 14:02) [Active]

## 2021-03-22 NOTE — PROGRESS NOTE ADULT - SUBJECTIVE AND OBJECTIVE BOX
Patient is a 70y old  Male who presents with a chief complaint of acute respiratory failure d/t covid (22 Mar 2021 12:13)    Being followed by ID for resp failure  COVID    Interval history:weekend events noted  Intubated  in ICU  prone ventilation  on BS abx      ROS:  No ROS obtainable       Antimicrobials:    cefepime   IVPB 2000 milliGRAM(s) IV Intermittent every 12 hours  vancomycin  IVPB      vancomycin  IVPB 1000 milliGRAM(s) IV Intermittent every 12 hours    Other medications reviewed    Vital Signs Last 24 Hrs  T(C): 33.7 (03-22-21 @ 13:10), Max: 36.6 (03-21-21 @ 20:17)  T(F): 92.6 (03-22-21 @ 13:10), Max: 97.9 (03-21-21 @ 20:17)  HR: 41 (03-22-21 @ 13:10) (40 - 70)  BP: 158/61 (03-22-21 @ 05:00) (158/61 - 158/61)  BP(mean): 94 (03-22-21 @ 05:00) (94 - 94)  RR: 30 (03-22-21 @ 13:10) (25 - 30)  SpO2: 98% (03-22-21 @ 13:10) (92% - 100%)    Physical Exam:        ETT  RIJ TLC no erythema or tendernes    sedated  prone     Chest Good AE,distant BS     CVS S1 S2 limited exam     Abd soft BS normal limited exam     IV site no erythema tenderness or discharge    CNS sedated     Lab Data:                          9.1    8.09  )-----------( 215      ( 22 Mar 2021 01:24 )             28.3       03-22    136  |  105  |  31<H>  ----------------------------<  307<H>  4.2   |  21<L>  |  0.87    Ca    7.8<L>      22 Mar 2021 01:24  Phos  2.5     03-22  Mg     2.2     03-22    TPro  5.3<L>  /  Alb  2.2<L>  /  TBili  0.5  /  DBili  x   /  AST  31  /  ALT  35  /  AlkPhos  104  03-22        Culture - Blood (collected 20 Mar 2021 21:04)  Source: .Blood Blood  Preliminary Report (21 Mar 2021 22:01):    No growth to date.    Culture - Blood (collected 20 Mar 2021 21:04)  Source: .Blood Blood  Gram Stain (22 Mar 2021 11:21):    Growth in anaerobic bottle: Gram Positive Cocci in Clusters  Preliminary Report (22 Mar 2021 11:22):    Growth in anaerobic bottle: Gram Positive Cocci in Clusters      Culture - Blood (collected 18 Mar 2021 16:31)  Source: .Blood Blood-Peripheral  Preliminary Report (19 Mar 2021 17:02):    No growth to date.    Culture - Blood (collected 18 Mar 2021 16:31)  Source: .Blood Blood  Preliminary Report (19 Mar 2021 17:02):    No growth to date.    < from: Xray Chest 1 View- PORTABLE-Urgent (Xray Chest 1 View- PORTABLE-Urgent .) (03.21.21 @ 15:16) >  IMPRESSION:  Enteric tube tip and side-port possibly in proximal stomach or near the GE junction. Suggest advancing the tube. Discussed with Dr. Corbett with read back at 10:05 AM on March 22, 2021 by Dr. Rivera.    ET tube and no IJ line as above.    No significant change in diffuse bilateral patchy airspace opacities consistent with multifocal Covid pneumonia and/or ARDS.      < end of copied text >

## 2021-03-22 NOTE — PROGRESS NOTE ADULT - ATTENDING COMMENTS
ARF with hypoxia- improved overnite. no change on rounds as was just supinated. reevalaute P/F to decide on reproning. +fluid balance. given lasix    hypotension- septic shock. low dose levo. wean as tolerated    leukocytosis- + bl cx. GPCClusters. await ID. cont empiric abx now day 2-3. afeb    anemia- stable. normocytic.

## 2021-03-22 NOTE — DIETITIAN INITIAL EVALUATION ADULT. - WEIGHT (LBS)
Patient presented with intermittent chest pain x 1 week. Otherwise on presentation afebrile, HD stable, neurovascular intact. EKG non-ischemic. Obtained labs which were grossly unremarkable including negative troponin. CXR negative as well. Patient not hypoxic or tachycardic and no risk factors for PE. Patient asymptomatic at time of presentation and during ED course. However, given intermittent nature of chest pain and lack of prior cardiac work up, will place in obs for further ACS rule out, likely CCTA. Patient agrees with plan. 175.7

## 2021-03-23 NOTE — PROGRESS NOTE ADULT - SUBJECTIVE AND OBJECTIVE BOX
Patient is a 70y old  Male who presents with a chief complaint of acute respiratory failure d/t covid (23 Mar 2021 08:51)    Being followed by ID for COVID,resp failure     Interval history:continues on vent  sedated   on pressors         ROS:  No ROS obtainable       Antimicrobials:    cefepime   IVPB 2000 milliGRAM(s) IV Intermittent every 12 hours    Other medications reviewed  MEDICATIONS  (STANDING):  albumin human  5% IVPB 250 milliLiter(s) IV Intermittent once  aspirin enteric coated 81 milliGRAM(s) Oral daily  carbidopa/levodopa  10/100 2 Tablet(s) Oral <User Schedule>  cefepime   IVPB 2000 milliGRAM(s) IV Intermittent every 12 hours  chlorhexidine 0.12% Liquid 15 milliLiter(s) Oral Mucosa every 12 hours  chlorhexidine 4% Liquid 1 Application(s) Topical <User Schedule>  cisatracurium Infusion 3 MICROgram(s)/kG/Min (14.3 mL/Hr) IV Continuous <Continuous>  dexAMETHasone  Injectable 6 milliGRAM(s) IV Push daily  dextrose 40% Gel 15 Gram(s) Oral once  dextrose 5%. 1000 milliLiter(s) (50 mL/Hr) IV Continuous <Continuous>  dextrose 5%. 1000 milliLiter(s) (100 mL/Hr) IV Continuous <Continuous>  dextrose 50% Injectable 25 Gram(s) IV Push once  dextrose 50% Injectable 12.5 Gram(s) IV Push once  dextrose 50% Injectable 25 Gram(s) IV Push once  fentaNYL   Infusion. 3 MICROgram(s)/kG/Hr (23.9 mL/Hr) IV Continuous <Continuous>  glucagon  Injectable 1 milliGRAM(s) IntraMuscular once  heparin   Injectable 5000 Unit(s) SubCutaneous every 8 hours  insulin regular Infusion 8 Unit(s)/Hr (8 mL/Hr) IV Continuous <Continuous>  ketorolac 0.5% Ophthalmic Solution 1 Drop(s) Left EYE two times a day  norepinephrine Infusion 0.1 MICROgram(s)/kG/Min (14.9 mL/Hr) IV Continuous <Continuous>  pantoprazole  Injectable 40 milliGRAM(s) IV Push daily  polyethylene glycol 3350 17 Gram(s) Oral daily  prednisoLONE acetate 1% Suspension 1 Drop(s) Left EYE four times a day  propofol Infusion 30 MICROgram(s)/kG/Min (14.3 mL/Hr) IV Continuous <Continuous>  simvastatin 20 milliGRAM(s) Oral at bedtime      Vital Signs Last 24 Hrs  T(C): 37.1 (03-23-21 @ 08:00), Max: 37.1 (03-23-21 @ 08:00)  T(F): 98.8 (03-23-21 @ 08:00), Max: 98.8 (03-23-21 @ 08:00)  HR: 54 (03-23-21 @ 12:00) (41 - 59)  BP: --  BP(mean): --  RR: 28 (03-23-21 @ 12:00) (28 - 39)  SpO2: 98% (03-23-21 @ 12:00) (90% - 100%)    Physical Exam:      ETT  RIJ TLC no erythema or tendernes    sedated      Chest Good AE,distant BS     CVS S1 S2 limited exam     Abd soft BS normal no tenderness or masses    IV site no erythema tenderness or discharge    CNS sedated     Lab Data:                          9.1    12.86 )-----------( 268      ( 23 Mar 2021 03:27 )             28.0     WBC Count: 12.86 (03-23-21 @ 03:27)  WBC Count: 8.09 (03-22-21 @ 01:24)  WBC Count: 8.40 (03-21-21 @ 04:33)  WBC Count: 9.28 (03-20-21 @ 18:18)  WBC Count: 15.67 (03-20-21 @ 06:21)  WBC Count: 15.10 (03-19-21 @ 07:33)  WBC Count: 12.24 (03-18-21 @ 06:22)  WBC Count: 12.75 (03-17-21 @ 13:10)    03-23    138  |  107  |  39<H>  ----------------------------<  380<H>  4.8   |  20<L>  |  1.15    Ca    7.7<L>      23 Mar 2021 03:27  Phos  2.6     03-23  Mg     2.2     03-23    TPro  5.3<L>  /  Alb  2.0<L>  /  TBili  0.3  /  DBili  <0.2  /  AST  16  /  ALT  27  /  AlkPhos  100  03-23        Culture - Blood (collected 20 Mar 2021 21:04)  Source: .Blood Blood  Preliminary Report (21 Mar 2021 22:01):    No growth to date.    Culture - Blood (collected 20 Mar 2021 17:50)  Source: .Blood Blood  Gram Stain (22 Mar 2021 11:21):    Growth in anaerobic bottle: Gram Positive Cocci in Clusters  Final Report (23 Mar 2021 09:41):    Growth in anaerobic bottle: Staphylococcus epidermidis    Coag Negative Staphylococcus    Single set isolate, possible contaminant. Contact    Microbiology if susceptibility testing clinically    indicated.       -  Staphylococcus epidermidis: Detec      Method Type: PCR    Culture - Blood (collected 18 Mar 2021 16:31)  Source: .Blood Blood-Peripheral  Preliminary Report (19 Mar 2021 17:02):    No growth to date.    Culture - Blood (collected 18 Mar 2021 16:31)  Source: .Blood Blood  Preliminary Report (19 Mar 2021 17:02):    No growth to date.        < from: Xray Chest 1 View- PORTABLE-Urgent (Xray Chest 1 View- PORTABLE-Urgent .) (03.21.21 @ 15:16) >  IMPRESSION:  Enteric tube tip and side-port possibly in proximal stomach or near the GE junction. Suggest advancing the tube. Discussed with Dr. Corbett with read back at 10:05 AM on March 22, 2021 by Dr. Rivera.    ET tube and no IJ line as above.    No significant change in diffuse bilateral patchy airspace opacities consistent with multifocal Covid pneumonia and/or ARDS.        < end of copied text >

## 2021-03-23 NOTE — PROGRESS NOTE ADULT - ASSESSMENT
====================ASSESMENT ==============  COVID  ARDS  Hypoxic Respiratory Failure  DM2    ====================== NEUROLOGY=====================  continue sedation and paralytic for ventilator synchrony while in prone position     ==================== RESPIRATORY======================  proned overnight at midnight  ABG improved  will supine around 16:00  today  open lung ventilation   decrease FiO2 as tolerated     ====================CARDIOVASCULAR==================  sinus bradycardia  no change after propofol switched to versed  bradycardia not affecting BP  has been off levo  if need pressor will use levo  keep map > 65    ===================HEMATOLOGIC/ONC ===================  VTE ppx with SQH    ===================== RENAL =========================  stable renal function  oliguric, small dose lasix to keep goal net even   Da Silva for monitoring urine output    ==================== GASTROINTESTINAL===================  trickle feeds while proned  GI PPX with protonix iv  bowel regimen with miralax    =======================    ENDOCRINE  =====================  ISS [id number] for blood glucose control   NPH increased as per endo     ========================INFECTIOUS DISEASE================  s/p remdesivir  continue decadron   continue broad spectrum antibiotics - vanco, cefepime   1/2 BC from 3/20 growing gram positive cocci in clusters  f/u speciation    ====================ASSESMENT ==============  COVID  ARDS  Hypoxic Respiratory Failure  DM2    ====================== NEUROLOGY=====================  continue sedation and paralytic for ventilator synchrony while in prone position     ==================== RESPIRATORY======================  proned overnight at 1 AM  ABG improved  open lung ventilation   decrease FiO2 as tolerated     ====================CARDIOVASCULAR==================  sinus bradycardia improved   no active issues  keep map > 65    ===================HEMATOLOGIC/ONC ===================  VTE ppx with SQH    ===================== RENAL =========================  stable renal function  Da Silva for monitoring urine output    ==================== GASTROINTESTINAL===================  trickle feeds while proned  GI PPX with protonix iv  bowel regimen with miralax    =======================    ENDOCRINE  =====================  endocrine following  c/w insulin gtt     ========================INFECTIOUS DISEASE================  s/p remdesivir  continue decadron (3/17 - 3/23)   continue broad spectrum antibiotics - vanco, cefepime   3/20 blood cx - staph epidermidis - likely contaminant   f/u repeat blood cx

## 2021-03-23 NOTE — PROGRESS NOTE ADULT - SUBJECTIVE AND OBJECTIVE BOX
COVERING RESIDENT: JOVI LAYTON (PGY-1) | [id number]    INTERVAL HPI/OVERNIGHT EVENTS:    SUBJECTIVE: Patient seen and examined at bedside. Unable to assess ROS as pt is intubated + sedated.  OBJECTIVE:    VITAL SIGNS:  ICU Vital Signs Last 24 Hrs  T(C): 37.1 (23 Mar 2021 08:00), Max: 37.1 (23 Mar 2021 08:00)  T(F): 98.8 (23 Mar 2021 08:00), Max: 98.8 (23 Mar 2021 08:00)  HR: 56 (23 Mar 2021 08:32) (41 - 525)  BP: --  BP(mean): --  ABP: 102/55 (23 Mar 2021 08:00) (83/43 - 153/87)  ABP(mean): 63 (23 Mar 2021 08:00) (50 - 91)  RR: 30 (23 Mar 2021 08:00) (28 - 39)  SpO2: 98% (23 Mar 2021 08:32) (90% - 100%)    Mode: AC/ CMV (Assist Control/ Continuous Mandatory Ventilation), RR (machine): 28, TV (machine): 400, FiO2: 50, PEEP: 8, ITime: 0.71, MAP: 14, PIP: 28    03-22 @ 07:01 - 03-23 @ 07:00  --------------------------------------------------------  IN: 1437.3 mL / OUT: 1705 mL / NET: -267.7 mL    03-23 @ 07:01 - 03-23 @ 08:52  --------------------------------------------------------  IN: 63.7 mL / OUT: 75 mL / NET: -11.3 mL      CAPILLARY BLOOD GLUCOSE      POCT Blood Glucose.: 247 mg/dL (23 Mar 2021 08:38)      PHYSICAL EXAM:    Gen:  intubated, sedated/paralyzed, proned   RES : course breath sounds B/L    CVS: S1,S2. sinus bradycardia  Abd: not examined in prone position   Ext: trace edema B/L LE    MEDICATIONS:  MEDICATIONS  (STANDING):  albumin human  5% IVPB 250 milliLiter(s) IV Intermittent once  aspirin enteric coated 81 milliGRAM(s) Oral daily  carbidopa/levodopa  10/100 2 Tablet(s) Oral <User Schedule>  cefepime   IVPB 2000 milliGRAM(s) IV Intermittent every 12 hours  chlorhexidine 0.12% Liquid 15 milliLiter(s) Oral Mucosa every 12 hours  chlorhexidine 4% Liquid 1 Application(s) Topical <User Schedule>  cisatracurium Infusion 3 MICROgram(s)/kG/Min (14.3 mL/Hr) IV Continuous <Continuous>  dexAMETHasone  Injectable 6 milliGRAM(s) IV Push daily  dextrose 40% Gel 15 Gram(s) Oral once  dextrose 5%. 1000 milliLiter(s) (50 mL/Hr) IV Continuous <Continuous>  dextrose 5%. 1000 milliLiter(s) (100 mL/Hr) IV Continuous <Continuous>  dextrose 50% Injectable 25 Gram(s) IV Push once  dextrose 50% Injectable 12.5 Gram(s) IV Push once  dextrose 50% Injectable 25 Gram(s) IV Push once  fentaNYL   Infusion. 3 MICROgram(s)/kG/Hr (23.9 mL/Hr) IV Continuous <Continuous>  glucagon  Injectable 1 milliGRAM(s) IntraMuscular once  heparin   Injectable 5000 Unit(s) SubCutaneous every 8 hours  insulin regular Infusion 8 Unit(s)/Hr (8 mL/Hr) IV Continuous <Continuous>  ketorolac 0.5% Ophthalmic Solution 1 Drop(s) Left EYE two times a day  norepinephrine Infusion 0.1 MICROgram(s)/kG/Min (14.9 mL/Hr) IV Continuous <Continuous>  pantoprazole  Injectable 40 milliGRAM(s) IV Push daily  polyethylene glycol 3350 17 Gram(s) Oral daily  prednisoLONE acetate 1% Suspension 1 Drop(s) Left EYE four times a day  propofol Infusion 30 MICROgram(s)/kG/Min (14.3 mL/Hr) IV Continuous <Continuous>  simvastatin 20 milliGRAM(s) Oral at bedtime    MEDICATIONS  (PRN):  sodium chloride 0.9% lock flush 10 milliLiter(s) IV Push every 1 hour PRN Pre/post blood products, medications, blood draw, and to maintain line patency      ALLERGIES:  Allergies    No Known Allergies    Intolerances        LABS:                        9.1    12.86 )-----------( 268      ( 23 Mar 2021 03:27 )             28.0     03-23    138  |  107  |  39<H>  ----------------------------<  380<H>  4.8   |  20<L>  |  1.15    Ca    7.7<L>      23 Mar 2021 03:27  Phos  2.6     03-23  Mg     2.2     03-23    TPro  5.3<L>  /  Alb  2.0<L>  /  TBili  0.3  /  DBili  <0.2  /  AST  16  /  ALT  27  /  AlkPhos  100  03-23    PTT - ( 22 Mar 2021 01:24 )  PTT:34.9 sec      RADIOLOGY & ADDITIONAL TESTS: Reviewed. COVERING RESIDENT: JOVI LAYTON (PGY-1) | [id number]    INTERVAL HPI/OVERNIGHT EVENTS: P/F ratio in 90s, for which patient was proned at 1 AM.    SUBJECTIVE: Patient seen and examined at bedside. Unable to assess ROS as pt is intubated + sedated.    OBJECTIVE:    VITAL SIGNS:  ICU Vital Signs Last 24 Hrs  T(C): 37.1 (23 Mar 2021 08:00), Max: 37.1 (23 Mar 2021 08:00)  T(F): 98.8 (23 Mar 2021 08:00), Max: 98.8 (23 Mar 2021 08:00)  HR: 56 (23 Mar 2021 08:32) (41 - 525)  BP: --  BP(mean): --  ABP: 102/55 (23 Mar 2021 08:00) (83/43 - 153/87)  ABP(mean): 63 (23 Mar 2021 08:00) (50 - 91)  RR: 30 (23 Mar 2021 08:00) (28 - 39)  SpO2: 98% (23 Mar 2021 08:32) (90% - 100%)    Mode: AC/ CMV (Assist Control/ Continuous Mandatory Ventilation), RR (machine): 28, TV (machine): 400, FiO2: 50, PEEP: 8, ITime: 0.71, MAP: 14, PIP: 28    03-22 @ 07:01  -  03-23 @ 07:00  --------------------------------------------------------  IN: 1437.3 mL / OUT: 1705 mL / NET: -267.7 mL    03-23 @ 07:01  -  03-23 @ 08:52  --------------------------------------------------------  IN: 63.7 mL / OUT: 75 mL / NET: -11.3 mL      CAPILLARY BLOOD GLUCOSE      POCT Blood Glucose.: 247 mg/dL (23 Mar 2021 08:38)      PHYSICAL EXAM:    Gen:  intubated, sedated/paralyzed, proned   RES : coarse breath sounds B/L    CVS: S1,S2. sinus bradycardia  Abd: not examined in prone position   Ext: trace edema B/L LE    MEDICATIONS:  MEDICATIONS  (STANDING):  albumin human  5% IVPB 250 milliLiter(s) IV Intermittent once  aspirin enteric coated 81 milliGRAM(s) Oral daily  carbidopa/levodopa  10/100 2 Tablet(s) Oral <User Schedule>  cefepime   IVPB 2000 milliGRAM(s) IV Intermittent every 12 hours  chlorhexidine 0.12% Liquid 15 milliLiter(s) Oral Mucosa every 12 hours  chlorhexidine 4% Liquid 1 Application(s) Topical <User Schedule>  cisatracurium Infusion 3 MICROgram(s)/kG/Min (14.3 mL/Hr) IV Continuous <Continuous>  dexAMETHasone  Injectable 6 milliGRAM(s) IV Push daily  dextrose 40% Gel 15 Gram(s) Oral once  dextrose 5%. 1000 milliLiter(s) (50 mL/Hr) IV Continuous <Continuous>  dextrose 5%. 1000 milliLiter(s) (100 mL/Hr) IV Continuous <Continuous>  dextrose 50% Injectable 25 Gram(s) IV Push once  dextrose 50% Injectable 12.5 Gram(s) IV Push once  dextrose 50% Injectable 25 Gram(s) IV Push once  fentaNYL   Infusion. 3 MICROgram(s)/kG/Hr (23.9 mL/Hr) IV Continuous <Continuous>  glucagon  Injectable 1 milliGRAM(s) IntraMuscular once  heparin   Injectable 5000 Unit(s) SubCutaneous every 8 hours  insulin regular Infusion 8 Unit(s)/Hr (8 mL/Hr) IV Continuous <Continuous>  ketorolac 0.5% Ophthalmic Solution 1 Drop(s) Left EYE two times a day  norepinephrine Infusion 0.1 MICROgram(s)/kG/Min (14.9 mL/Hr) IV Continuous <Continuous>  pantoprazole  Injectable 40 milliGRAM(s) IV Push daily  polyethylene glycol 3350 17 Gram(s) Oral daily  prednisoLONE acetate 1% Suspension 1 Drop(s) Left EYE four times a day  propofol Infusion 30 MICROgram(s)/kG/Min (14.3 mL/Hr) IV Continuous <Continuous>  simvastatin 20 milliGRAM(s) Oral at bedtime    MEDICATIONS  (PRN):  sodium chloride 0.9% lock flush 10 milliLiter(s) IV Push every 1 hour PRN Pre/post blood products, medications, blood draw, and to maintain line patency      ALLERGIES:  Allergies    No Known Allergies    Intolerances        LABS:                        9.1    12.86 )-----------( 268      ( 23 Mar 2021 03:27 )             28.0     03-23    138  |  107  |  39<H>  ----------------------------<  380<H>  4.8   |  20<L>  |  1.15    Ca    7.7<L>      23 Mar 2021 03:27  Phos  2.6     03-23  Mg     2.2     03-23    TPro  5.3<L>  /  Alb  2.0<L>  /  TBili  0.3  /  DBili  <0.2  /  AST  16  /  ALT  27  /  AlkPhos  100  03-23    PTT - ( 22 Mar 2021 01:24 )  PTT:34.9 sec      RADIOLOGY & ADDITIONAL TESTS: Reviewed.

## 2021-03-23 NOTE — PROGRESS NOTE ADULT - ASSESSMENT
70 year old [place]ic/Jhon[place]-speaking male with a history of CAD s/p CABG and 2 stents, Type 2 DM, HTN, BPH s/p TURP and Parkinson's disease presenting with weakness. Patient normally walks with a walker but over the past few days he has needed assistance to walk around his house. He has also had poor appetite during this time. Patient's son who lives with him tested positive for COVID on 3/9 without showing any symptoms and was due to travel for work. This morning, patient found his wife unresponsive at home and EMS was called- patient had ROSC s/p cardiac arrest and is currently hospitalized at [facility].   COVID positive  elevated inflammatory markers'clinical picture not c/w Pna  Now resp failure  Intubated   CNS low grade bacteremia    rec:  a) Resp failure  ? Fibrosis due to COVID  less likely superimposed bacterial pna  Follow Cx  Follow clinically  for now continue BS coverage  Supportive care per ICU    B) GPC bacteremia  likely  CNS contaminant  Follow repeat blood Cx  Follow ID sensi  Continue empiric abx as above    Will tailor plan for ID issues  per course,results.Will defer to primary team on management of other issues.  Assessment, plan and recommendations as detailed above were discussed with the MICU  team.  Will Follow.  Beeper [phone] [facility] [id number].   Wknd/afterhours/No response-[phone] or Fellow on call

## 2021-03-24 NOTE — PROGRESS NOTE ADULT - SUBJECTIVE AND OBJECTIVE BOX
Patient is a 70y old  Male who presents with a chief complaint of acute respiratory failure d/t covid (24 Mar 2021 09:04)    Being followed by ID for resp failure     Interval history:continues on vent  sedated  abx dced today     No acute events      ROS:  No ROS obtainable       Antimicrobials:      Other medications reviewed    Vital Signs Last 24 Hrs  T(C): 37.6 (03-24-21 @ 08:00), Max: 37.6 (03-24-21 @ 08:00)  T(F): 99.6 (03-24-21 @ 08:00), Max: 99.6 (03-24-21 @ 08:00)  HR: 58 (03-24-21 @ 13:00) (41 - 76)  BP: --  BP(mean): --  RR: 28 (03-24-21 @ 13:00) (28 - 30)  SpO2: 99% (03-24-21 @ 13:00) (91% - 100%)    Physical Exam:    ETT  RIJ TLC no erythema or tendernes    sedated      Chest Good AE,distant BS     CVS S1 S2 limited exam     Abd soft BS normal no tenderness or masses    IV site no erythema tenderness or discharge    CNS sedated        Lab Data:                          8.4    10.67 )-----------( 247      ( 24 Mar 2021 04:57 )             25.3     WBC Count: 10.67 (03-24-21 @ 04:57)  WBC Count: 12.86 (03-23-21 @ 03:27)  WBC Count: 8.09 (03-22-21 @ 01:24)  WBC Count: 8.40 (03-21-21 @ 04:33)  WBC Count: 9.28 (03-20-21 @ 18:18)  WBC Count: 15.67 (03-20-21 @ 06:21)  WBC Count: 15.10 (03-19-21 @ 07:33)  WBC Count: 12.24 (03-18-21 @ 06:22)    03-24    139  |  108<H>  |  50<H>  ----------------------------<  144<H>  4.8   |  21<L>  |  1.23    Ca    7.7<L>      24 Mar 2021 04:57  Phos  3.3     03-24  Mg     2.4     03-24    TPro  5.0<L>  /  Alb  1.9<L>  /  TBili  0.3  /  DBili  x   /  AST  57<H>  /  ALT  28  /  AlkPhos  79  03-24        Culture - Blood (collected 22 Mar 2021 18:22)  Source: .Blood Blood  Preliminary Report (23 Mar 2021 19:02):    No growth to date.    Culture - Blood (collected 20 Mar 2021 21:04)  Source: .Blood Blood  Preliminary Report (21 Mar 2021 22:01):    No growth to date.    Culture - Blood (collected 20 Mar 2021 17:50)  Source: .Blood Blood  Gram Stain (22 Mar 2021 11:21):    Growth in anaerobic bottle: Gram Positive Cocci in Clusters  Final Report (23 Mar 2021 09:41):    Growth in anaerobic bottle: Staphylococcus epidermidis    Coag Negative Staphylococcus    Single set isolate, possible contaminant. Contact    Microbiology if susceptibility testing clinically    indicated.    ***Blood Panel PCR results on this specimen are available    approximately 3 hours after the Gram stain result.***    Gram stain, PCR, and/or culture results may not always    correspond due to difference in methodologies.    ************************************************************    This PCR assay was performed by multiplex PCR. This    Assay tests for 66 bacterial and resistance gene targets.    Please refer to the [facility] test directory    at [email or web address] for details.  Organism: Blood Culture PCR (23 Mar 2021 09:41)  Organism: Blood Culture PCR (23 Mar 2021 09:41)      -  Staphylococcus epidermidis: Detec      Method Type: PCR      < from: Xray Chest 1 View- PORTABLE-Urgent (Xray Chest 1 View- PORTABLE-Urgent .) (03.21.21 @ 15:16) >  IMPRESSION:  Enteric tube tip and side-port possibly in proximal stomach or near the GE junction. Suggest advancing the tube. Discussed with Dr. Corbett with read back at 10:05 AM on March 22, 2021 by Dr. Rivera.    ET tube and no IJ line as above.    No significant change in diffuse bilateral patchy airspace opacities consistent with multifocal Covid pneumonia and/or ARDS.              < end of copied text >

## 2021-03-24 NOTE — PROGRESS NOTE ADULT - ASSESSMENT
70 year old Romansh/Jhonny-speaking male with a history of CAD s/p CABG and 2 stents, Type 2 DM, HTN, BPH s/p TURP and Parkinson's disease presenting with weakness. Patient normally walks with a walker but over the past few days he has needed assistance to walk around his house. He has also had poor appetite during this time. Patient's son who lives with him tested positive for COVID on 3/9 without showing any symptoms and was due to travel for work. This morning, patient found his wife unresponsive at home and EMS was called- patient had ROSC s/p cardiac arrest and is currently hospitalized at [facility].   COVID positive  elevated inflammatory markers'clinical picture not c/w Pna  Now resp failure  Intubated   CNS low grade bacteremia    rec:  a) Resp failure  ? Fibrosis due to COVID  less likely superimposed bacterial pna  Follow Cx  Follow clinically  s/p 5 days of BS coverage with cefepime   No other s/s infection   continue Supportive care per ICU  Monitor for s/s any bacterial superinfection    B) GPC bacteremia  likely  CNS contaminant  repeat blood Cx negative  follow clinically and repeat Cx     Will defer to primary team on management of other issues.  Assessment, plan and recommendations as detailed above were discussed with the MICU  team.  prognosis guarded  ID will follow as needed,please call if any questions or issues.

## 2021-03-24 NOTE — PROGRESS NOTE ADULT - ASSESSMENT
HPI:  Patient is a 70 year old Indonesian/Jhonny-speaking male with a history of CAD s/p CABG and 2 stents, Type 2 DM, HTN, BPH s/p TURP and Parkinson's disease, who was recently admitted on 3/14 for weakness, covid, hypoxia, started on remdesivir/decadron, discharged home on 3/16. However, at home, pt decompensated with increasing sob/labored breathing, o2 sat dropped from 88% to 66% within 1/2 hour per daughter, gasping for air. Pt's son called 911 and brought him back to the hospital. Patient got admitted for ARDS related to covid pneumonia. Patient got intubated on 3/17    PLAN    Neuro    -sedated, on fentanyl/ propofol    Resp    - On vent, mode volume AC 28, . PEEP 7, FIO2 50% (Fio2 changed from 60 to 50%).  - Proned on 3/23/21    Cardio    -  levophed drip on hold , with MAP goal >65mmHg  -Sinus bradycardia improved    GI    - Tube feeding with Glucerna 1.5   -On protonix   - Bowel regimen with miralax        - Da Silva  - Strict intake and output monitoring    Endo  -fingerstick blood glucose monitoring (fingerstick blood glucose 150-175mg/dl)  -ISS  - NPH dose 10units Q6h    Hem/onc    DVT Prophylaxis    ID  - CEFEPIME d/bianca  s/p remdesivir      GOC- Full code HPI:  Patient is a 70 year old Hebrew/Jhonny-speaking male with a history of CAD s/p CABG and 2 stents, Type 2 DM, HTN, BPH s/p TURP and Parkinson's disease, who was recently admitted on 3/14 for weakness, covid, hypoxia, started on remdesivir/decadron, discharged home on 3/16. However, at home, pt decompensated with increasing sob/labored breathing, o2 sat dropped from 88% to 66% within 1/2 hour per daughter, gasping for air. Pt's son called 911 and brought him back to the hospital. Patient got admitted for ARDS related to covid pneumonia. Patient got intubated on 3/17    PLAN    Neuro    -sedated, on fentanyl/ propofol    Resp    - On vent, mode volume AC 28, . PEEP 7, FIO2 50% (Fio2 changed from 60 to 50%).  - Proned on 3/23/21    Cardio    -  levophed drip on hold , with MAP goal >65mmHg  -Sinus bradycardia improved    GI    - Tube feeding with Glucerna 1.5   -On protonix   - Bowel regimen with miralax        - Da Silva  - Strict intake and output monitoring    Endo  -fingerstick blood glucose monitoring (fingerstick blood glucose 150-175mg/dl)  -ISS  - NPH dose 10units Q6h    ID  - CEFEPIME d/bianca  s/p remdesivir  -3/22 blood cultures NGTD    update given to patient's daughter Nneka BUTLER- Full code

## 2021-03-24 NOTE — PROGRESS NOTE ADULT - SUBJECTIVE AND OBJECTIVE BOX
Critical care progress note  Patient seen and examined at bedside. Critical care progress note  Patient seen and examined at bedside. No acute events overnight.  Critical care progress note  Patient seen and examined at bedside. No acute events overnight.     Allergies    No Known Allergies    Intolerances    PAST MEDICAL & SURGICAL HISTORY:  BPH (benign prostatic hyperplasia)    GERD (gastroesophageal reflux disease)    HLD (hyperlipidemia)    Hypertension    CAD (coronary artery disease)  CABG x2 2012, Stents x2 2014    Diabetes mellitus    Presence of stent in coronary artery  x2 2014    History of coronary artery bypass graft  X2 in March of 2012    Bladder neck obstruction  3 surgeries for BPH, most recent 2012    MEDICATIONS  (STANDING):  aspirin enteric coated 81 milliGRAM(s) Oral daily  carbidopa/levodopa  10/100 2 Tablet(s) Oral <User Schedule>  chlorhexidine 0.12% Liquid 15 milliLiter(s) Oral Mucosa every 12 hours  chlorhexidine 4% Liquid 1 Application(s) Topical <User Schedule>  dexAMETHasone  Injectable 6 milliGRAM(s) IV Push daily  dextrose 40% Gel 15 Gram(s) Oral once  dextrose 5%. 1000 milliLiter(s) (50 mL/Hr) IV Continuous <Continuous>  dextrose 5%. 1000 milliLiter(s) (100 mL/Hr) IV Continuous <Continuous>  dextrose 50% Injectable 25 Gram(s) IV Push once  dextrose 50% Injectable 12.5 Gram(s) IV Push once  dextrose 50% Injectable 25 Gram(s) IV Push once  fentaNYL   Infusion..... 0.5 MICROgram(s)/kG/Hr (0.8 mL/Hr) IV Continuous <Continuous>  glucagon  Injectable 1 milliGRAM(s) IntraMuscular once  heparin   Injectable 5000 Unit(s) SubCutaneous every 8 hours  insulin lispro (ADMELOG) corrective regimen sliding scale   SubCutaneous every 6 hours  insulin NPH human recombinant 10 Unit(s) SubCutaneous every 6 hours  ketorolac 0.5% Ophthalmic Solution 1 Drop(s) Left EYE two times a day  norepinephrine Infusion 0.1 MICROgram(s)/kG/Min (14.9 mL/Hr) IV Continuous <Continuous>  pantoprazole  Injectable 40 milliGRAM(s) IV Push daily  polyethylene glycol 3350 17 Gram(s) Oral daily  prednisoLONE acetate 1% Suspension 1 Drop(s) Left EYE four times a day  propofol Infusion 30 MICROgram(s)/kG/Min (14.3 mL/Hr) IV Continuous <Continuous>  simvastatin 20 milliGRAM(s) Oral at bedtime    MEDICATIONS  (PRN):  sodium chloride 0.9% lock flush 10 milliLiter(s) IV Push every 1 hour PRN Pre/post blood products, medications, blood draw, and to maintain line patency    LABS:                        8.4    10.67 )-----------( 247      ( 24 Mar 2021 04:57 )             25.3     03-24    139  |  108<H>  |  50<H>  ----------------------------<  144<H>  4.8   |  21<L>  |  1.23    Ca    7.7<L>      24 Mar 2021 04:57  Phos  3.3     03-24  Mg     2.4     03-24    TPro  5.0<L>  /  Alb  1.9<L>  /  TBili  0.3  /  DBili  x   /  AST  57<H>  /  ALT  28  /  AlkPhos  79  03-24    HIT ab -- 03-20 @ 18:18  HIT ab EIA --  D Dimer -[id number]  HIT ab -- 03-19 @ 07:33  HIT ab EIA --  D Dimer -[id number]  HIT ab -- 03-17 @ 13:10  HIT ab EIA --  D Dimer -[id number]                      CULTURES: (if applicable)    Culture - Blood (collected 03-22-21 @ 18:22)  Source: .Blood Blood  Preliminary Report (03-23-21 @ 19:02):    No growth to date.    Culture - Blood (collected 03-20-21 @ 21:04)  Source: .Blood Blood  Preliminary Report (03-21-21 @ 22:01):    No growth to date.    Culture - Blood (collected 03-20-21 @ 17:50)  Source: .Blood Blood  Gram Stain (03-22-21 @ 11:21):    Growth in anaerobic bottle: Gram Positive Cocci in Clusters  Final Report (03-23-21 @ 09:41):    Growth in anaerobic bottle: Staphylococcus epidermidis    Coag Negative Staphylococcus    Single set isolate, possible contaminant. Contact    Microbiology if susceptibility testing clinically    indicated.    ***Blood Panel PCR results on this specimen are available    approximately 3 hours after the Gram stain result.***    Gram stain, PCR, and/or culture results may not always    correspond due to difference in methodologies.    ************************************************************    This PCR assay was performed by multiplex PCR. This    Assay tests for 66 bacterial and resistance gene targets.    Please refer to the [facility] test directory    at [email or web address] for details.  Organism: Blood Culture PCR (03-23-21 @ 09:41)  Organism: Blood Culture PCR (03-23-21 @ 09:41)      -  Staphylococcus epidermidis: Detec      Method Type: PCR    Culture - Blood (collected 03-18-21 @ 13:38)  Source: .Blood Blood-Peripheral  Final Report (03-23-21 @ 17:00):    No Growth Final    Culture - Blood (collected 03-18-21 @ 13:38)  Source: .Blood Blood  Final Report (03-23-21 @ 17:00):    No Growth Final    Culture - Blood (collected 03-14-21 @ 12:22)  Source: .Blood Blood-Venous  Final Report (03-19-21 @ 15:01):    No Growth Final    Culture - Blood (collected 03-14-21 @ 12:22)  Source: .Blood Blood-Peripheral  Final Report (03-19-21 @ 15:01):    No Growth Final          ABG - ( 24 Mar 2021 08:09 )  pH, Arterial: 7.38  pH, Blood: x     /  pCO2: 38    /  pO2: 71    / HCO3: 22    / Base Excess: -2.9  /  SaO2: 93.9              CAPILLARY BLOOD GLUCOSE      POCT Blood Glucose.: 195 mg/dL (24 Mar 2021 11:01)      RADIOLOGY  CXR:      CT:    ECHO:      VITALS:  T(C): 37.6 (03-24-21 @ 08:00), Max: 37.6 (03-24-21 @ 08:00)  T(F): 99.6 (03-24-21 @ 08:00), Max: 99.6 (03-24-21 @ 08:00)  HR: 62 (03-24-21 @ 12:00) (41 - 76)  BP: --  BP(mean): --  ABP: 112/49 (03-24-21 @ 12:00) (75/34 - 136/55)  ABP(mean): 70 (03-24-21 @ 12:00) (47 - 83)  RR: 29 (03-24-21 @ 12:00) (28 - 30)  SpO2: 99% (03-24-21 @ 12:00) (91% - 100%)  CVP(mm Hg): --  CVP(cm H2O): --    Ins and Outs     03-23-21 @ 07:01  -  03-24-21 @ 07:00  --------------------------------------------------------  IN: 1154.2 mL / OUT: 965 mL / NET: 189.2 mL    03-24-21 @ 07:01  -  03-24-21 @ 12:06  --------------------------------------------------------  IN: 134.4 mL / OUT: 300 mL / NET: -165.6 mL            Device: Avea, Mode: AC/ CMV (Assist Control/ Continuous Mandatory Ventilation), RR (machine): 28, RR (patient): 28, TV (machine): 400, TV (patient): 350, FiO2: 40, PEEP: 7, ITime: 0.74, MAP: 16, PIP: 32    PHYSICAL EXAMINATION  General, neuro- sedated, on vent    HEENT- (-) conjunctival erythema  RESP- B/L air entry, On vent, vent settings- Mode AC, RR-28 , Fio2 - 50 , PEEP-7  Cardio- CV- NL S1, NL S2, regular rate, regular rhythm, (-) murmurs, pulse rate and rhythm regular.(-) JVD  GI- NL abdominal inspection, soft, non-tender to palpation, (-) rebound/ guarding/rigidity.  on tube feeds via OG tube  - (+) ruffin catheter  skin- warm, dry  EXT- (-) cyanosis, trace edema

## 2021-03-25 NOTE — PROGRESS NOTE ADULT - ATTENDING COMMENTS
ARF- doing better. P/F ~ 200. RR dec. flow restricted triggered breaths> changed to PCV. looks comfortable. try lightening up sedation. dec RR as tolerated  DM- inc NPH for glucoses > 200  afeb off abx  tolerating TF

## 2021-03-25 NOTE — PROGRESS NOTE ADULT - SUBJECTIVE AND OBJECTIVE BOX
Chief Complaint: DM 2, COVID+, hyperglycemia    History: Patient seen at bedside in ICU - Surge B  Intubated/sedated   Remains on Glucerna 1.5 ignacio at goal rate of 40 ml/hr x 24h  Most recent glucose 297 mg/dl  Last dose of Dexamethasone 6 mg this AM 3/25    MEDICATIONS  (STANDING):  aspirin enteric coated 81 milliGRAM(s) Oral daily  carbidopa/levodopa  10/100 2 Tablet(s) Oral <User Schedule>  chlorhexidine 0.12% Liquid 15 milliLiter(s) Oral Mucosa every 12 hours  chlorhexidine 4% Liquid 1 Application(s) Topical <User Schedule>  dextrose 40% Gel 15 Gram(s) Oral once  dextrose 5%. 1000 milliLiter(s) (50 mL/Hr) IV Continuous <Continuous>  dextrose 5%. 1000 milliLiter(s) (100 mL/Hr) IV Continuous <Continuous>  dextrose 50% Injectable 25 Gram(s) IV Push once  dextrose 50% Injectable 12.5 Gram(s) IV Push once  dextrose 50% Injectable 25 Gram(s) IV Push once  fentaNYL   Infusion..... 0.5 MICROgram(s)/kG/Hr (0.8 mL/Hr) IV Continuous <Continuous>  glucagon  Injectable 1 milliGRAM(s) IntraMuscular once  heparin   Injectable 5000 Unit(s) SubCutaneous every 8 hours  insulin lispro (ADMELOG) corrective regimen sliding scale   SubCutaneous every 6 hours  insulin NPH human recombinant 12 Unit(s) SubCutaneous every 6 hours  ketorolac 0.5% Ophthalmic Solution 1 Drop(s) Left EYE two times a day  norepinephrine Infusion 0.1 MICROgram(s)/kG/Min (14.9 mL/Hr) IV Continuous <Continuous>  pantoprazole  Injectable 40 milliGRAM(s) IV Push daily  polyethylene glycol 3350 17 Gram(s) Oral daily  prednisoLONE acetate 1% Suspension 1 Drop(s) Left EYE four times a day  propofol Infusion 30 MICROgram(s)/kG/Min (14.3 mL/Hr) IV Continuous <Continuous>  simvastatin 20 milliGRAM(s) Oral at bedtime    MEDICATIONS  (PRN):  sodium chloride 0.9% lock flush 10 milliLiter(s) IV Push every 1 hour PRN Pre/post blood products, medications, blood draw, and to maintain line patency    No Known Allergies    Review of Systems:  UNABLE TO OBTAIN    PHYSICAL EXAM:  VITALS: T(C): 37.2 (03-25-21 @ 12:00)  T(F): 98.9 (03-25-21 @ 12:00), Max: 99.1 (03-25-21 @ 08:00)  HR: 56 (03-25-21 @ 16:00) (56 - 71)  BP: --  RR:  (18 - 28)  SpO2:  (94% - 100%)  Wt(kg): --  GENERAL: critically ill, intubated/sedated   HEENT:  Atraumatic, Normocephalic  RESPIRATORY: on vent  GI: non distended  NEURO: unable to assess    CAPILLARY BLOOD GLUCOSE    POCT Blood Glucose.: 297 mg/dL (25 Mar 2021 11:18)  POCT Blood Glucose.: 168 mg/dL (25 Mar 2021 04:52)  POCT Blood Glucose.: 171 mg/dL (25 Mar 2021 01:03)  POCT Blood Glucose.: 295 mg/dL (24 Mar 2021 17:22)      03-25    138  |  108<H>  |  50<H>  ----------------------------<  177<H>  4.3   |  22  |  1.11    EGFR if : 76  EGFR if non : 66    Ca    7.8<L>      03-25  Mg     2.6     03-25  Phos  3.3     03-24    TPro  5.1<L>  /  Alb  2.2<L>  /  TBili  0.3  /  DBili  x   /  AST  32  /  ALT  11  /  AlkPhos  77  03-25      Thyroid Function Tests:  03-15 @ 06:30 TSH 0.96 FreeT4 -- T3 -- Anti TPO -- Anti Thyroglobulin Ab -- TSI --      A1C with Estimated Average Glucose Result: 10.3 % (03-14-21 @ 08:57)  A1C with Estimated Average Glucose: 9.7 % (10-01-20 @ 06:30)    Diet, NPO with Tube Feed:   Tube Feeding Modality: Orogastric  Glucerna 1.5 Ignacio ([id number])  Total Volume for 24 Hours (mL): 960  Continuous  Starting Tube Feed Rate mL per Hour: 10  Increase Tube Feed Rate by (mL): 10     Every 4 hours  Until Goal Tube Feed Rate (mL per Hour): 40  Tube Feed Duration (in Hours): 24  Tube Feed Start Time: 15:45  No Carb Prosource (1pkg = 15gms Protein)     Qty per Day:  4 (03-24-21 @ 10:24)

## 2021-03-25 NOTE — PROGRESS NOTE ADULT - ASSESSMENT
" Home Care Instructions                                                                                                                Name:Andrea Huffman  Medical Record Number:[id number]  CSN: [id number]    YOB: 1977   Age: 39 y.o.  Sex: male  HT:1.88 m (6' 2\") WT: 124.3 kg (274 lb 0.5 oz)          Admit Date: 6/9/2017     Discharge Date:   Today's Date: 6/9/2017  Attending Doctor:  Randy Jang M.D.                  Allergies:  Cat hair extract and Pollen extract                Discharge Instructions         ACTIVITY: Rest and take it easy for the first 24 hours.  A responsible adult is recommended to remain with you during that time.  It is normal to feel sleepy.  We encourage you to not do anything that requires balance, judgment or coordination.    MILD FLU-LIKE SYMPTOMS ARE NORMAL. YOU MAY EXPERIENCE GENERALIZED MUSCLE ACHES, THROAT IRRITATION, HEADACHE AND/OR SOME NAUSEA.    FOR 24 HOURS DO NOT:  Drive, operate machinery or run household appliances.  Drink beer or alcoholic beverages.   Make important decisions or sign legal documents.    SPECIAL INSTRUCTIONS:  Move fingers in sling, Keep operative upper extremity elevated.   Keep sling on for 2 days, then start range of motion   Okay for dressing to have drainage from shoulder scope  Call office Monday morning to start physical therapy    DIET: To avoid nausea, slowly advance diet as tolerated, avoiding spicy or greasy foods for the first day.  Add more substantial food to your diet according to your physician's instructions. INCREASE FLUIDS AND FIBER TO AVOID CONSTIPATION.    SURGICAL DRESSING/BATHING: Keep dressing clean and dry. May remove dressing and place bandaids on post op day 3 (Monday).     FOLLOW-UP APPOINTMENT:  A follow-up appointment should be arranged with your doctor; call to schedule.    You should CALL YOUR PHYSICIAN if you develop:  Fever greater than 101 degrees F.  Pain not relieved by medication, or persistent nausea " 70 year old Tamazight/Jhonny-speaking male with a history of DM2 (uncontrolled A1c 10.3%, on basal/bolus insulin at home), HTN, CAD s/p CABG and stents, BPH s/p TURP and Parkinson's disease, now readmitted with COVID and started on high dose Decadron. Endocrine team consulted for management of DM2.     1. Type 2 diabetes mellitus with other specified complication, with long-term current use of insulin.   A1c 10.3% indicating poorly controlled DM2. Home regimen reported as Basaglar 40 units qHS and Admelog 20 units before breakfast only    While inpatient:  Currently in ICU, target glucose 140-180 mg/dl  On continuous tube feeding, Glucerna 1.5 elin @ goal 40 ml/hr x 24h  NPH just increased to 12 units q6h (Hold if tube feeds are held)  Agree with change, please allow at least 4 doses of this prior to increasing  Note that patient may have decreasing insulin requirements over next 24-72h due to last dose of steroid today  Continue Admelog MODERATE dose correctional scale q6h  Check BG q6h  Keep hypoglycemia protocol active     2. Steroid-induced hyperglycemia.    Last dose Dexamethasone 6 mg daily TODAY 3/25  Insulin regimen as above   Please inform endocrine team of any changes in steroid plan    3. Hypertension, unspecified type.    In ICU, requiring pressors, defer management to primary team    4. Hyperlipidemia, unspecified hyperlipidemia type.    Continue with statin if no contraindications, monitor lipid profile as outpatient, goal LDL is <70.     Isa Benito  Nurse Practitioner  Division of Endocrinology & Diabetes  In house pager #[phone]/long range pager #[phone]    If before 9AM or after 6PM, or on weekends/holidays, please call endocrine answering service for assistance ([phone]).  For nonurgent matters email [email or web address] for assistance.  or vomiting.  Excessive bleeding (blood soaking through dressing) or unexpected drainage from the wound.  Extreme redness or swelling around the incision site, drainage of pus or foul smelling drainage.  Inability to urinate or empty your bladder within 8 hours - return to ER.  Problems with breathing or chest pain - 911.    You should call 911 if you develop problems with breathing or chest pain.  If you are unable to contact your doctor or surgical center, you should go to the nearest emergency room or urgent care center. Dr. Jang's telephone #: [phone].     If any questions arise, call your doctor.  If your doctor is not available, please feel free to call the Surgical Center at [phone].  The Center is open Monday through Friday from 7AM to 7PM.  You can also call the [facility] HOTLINE open 24 hours/day, 7 days/week and speak to a nurse at [phone], or toll free at [phone].    A registered nurse may call you a few days after your surgery to see how you are doing after your procedure.    MEDICATIONS: Resume taking daily medication.  Take prescribed pain medication with food.  If no medication is prescribed, you may take non-aspirin pain medication if needed.  PAIN MEDICATION CAN BE VERY CONSTIPATING.  Take a stool softener or laxative such as senokot, pericolace, or milk of magnesia if needed.    Prescription given for Pre-operatively.  Last pain medication given at none given.    If your physician has prescribed pain medication that includes Acetaminophen (Tylenol), do not take additional Acetaminophen (Tylenol) while taking the prescribed medication.    Peripheral Nerve Block Discharge Instructions from Same Day Surgery and Inpatient :    What to Expect - Upper Extremity  · You may experience numbness and weakness in shoulder, arm and hand  on the same side as your surgery  · This is normal. For some people, this may be an unpleasant sensation. Be very careful with your numb limb  · Ask for  "help when you need it  Shoulder Surgery Side Effects  · In addition to numbness and weakness you may experience other symptoms  · Other nerves that are close to those nerves injected can also be affected by local anesthesia  · You may experience a hoarseness in your voice  · Your breathing may feel different  · You may also notice drooping of your eyelid, pupil constriction, and decreased sweating, on the side of your surgery  · All of these side effects are normal and will resolve when the local anesthetic wears off   Prevent Injury  · Protect the limb like a baby  · Beware of exposing your limb to extreme heat or cold or trauma  · The limb may be injured without you noticing because it is numb  · Keep the limb elevated whenever possible  · Do not sleep on the limb  · Change the position of the limb regularly  · Avoid putting pressure on your surgical limb  Pain Control  · The initial block on the day of surgery will make your extremity feel \"numb\"  · Any consecutive injection including prior to discharge from the hospital will make your extremity feel \"numb\"  · You may feel an aching or burning when the local anesthesia starts to wear off  · Take pain pills as prescribed by your surgeon  · Call your surgeon or anesthesiologist if you do not have adequate pain control  ·   Depression / Suicide Risk    As you are discharged from this [facility] facility, it is important to learn how to keep safe from harming yourself.    Recognize the warning signs:  · Abrupt changes in personality, positive or negative- including increase in energy   · Giving away possessions  · Change in eating patterns- significant weight changes-  positive or negative  · Change in sleeping patterns- unable to sleep or sleeping all the time   · Unwillingness or inability to communicate  · Depression  · Unusual sadness, discouragement and loneliness  · Talk of wanting to die  · Neglect of personal appearance   · Rebelliousness- reckless " behavior  · Withdrawal from people/activities they love  · Confusion- inability to concentrate     If you or a loved one observes any of these behaviors or has concerns about self-harm, here's what you can do:  · Talk about it- your feelings and reasons for harming yourself  · Remove any means that you might use to hurt yourself (examples: pills, rope, extension cords, firearm)  · Get professional help from the community (Mental Health, Substance Abuse, psychological counseling)  · Do not be alone:Call your Safe Contact- someone whom you trust who will be there for you.  · Call your local CRISIS HOTLINE 225-1030 or 464-452-4925  · Call your local Children's Mobile Crisis Response Team [place] (737) 784-8773 or www.[employer]  · Call the toll free National Suicide Prevention Hotlines   · National Suicide Prevention Lifeline 412-245-CYUQ (1565)  · [employer] Line Network 800-SUICIDE (584-6119)    Peripheral Nerve Block Discharge Instructions from Same Day Surgery and Inpatient :    What to Expect - Upper Extremity  · You may experience numbness and weakness in shoulder, arm and hand  on the same side as your surgery  · This is normal. For some people, this may be an unpleasant sensation. Be very careful with your numb limb  · Ask for help when you need it  Shoulder Surgery Side Effects  · In addition to numbness and weakness you may experience other symptoms  · Other nerves that are close to those nerves injected can also be affected by local anesthesia  · You may experience a hoarseness in your voice  · Your breathing may feel different  · You may also notice drooping of your eyelid, pupil constriction, and decreased sweating, on the side of your surgery  · All of these side effects are normal and will resolve when the local anesthetic wears off   Prevent Injury  · Protect the limb like a baby  · Beware of exposing your limb to extreme heat or cold or trauma  · The limb may be injured without you  "noticing because it is numb  · Keep the limb elevated whenever possible  · Do not sleep on the limb  · Change the position of the limb regularly  · Avoid putting pressure on your surgical limb  Pain Control  · The initial block on the day of surgery will make your extremity feel \"numb\"  · Any consecutive injection including prior to discharge from the hospital will make your extremity feel \"numb\"  · You may feel an aching or burning when the local anesthesia starts to wear off  · Take pain pills as prescribed by your surgeon  · Call your surgeon or anesthesiologist if you do not have adequate pain control         Medication List      ASK your doctor about these medications        Instructions    Morning Afternoon Evening Bedtime    NON SPECIFIED        Gut balance- 8 ml. 1/2 hr before meals                        NON SPECIFIED        Cardio vascular oil 3 caps twice a week                        NON SPECIFIED        Medium triglycerides- 1 tbs. mornings                        NON SPECIFIED        Glucose balance- 2 caps twice a day                        NON SPECIFIED        Vit d immulsion- 4 drops morning                        VITAMIN B COMPLEX PO        Take  by mouth every day.                                Medication Information     Above and/or attached are the medications your physician expects you to take upon discharge. Review all of your home medications and newly ordered medications with your doctor and/or pharmacist. Follow medication instructions as directed by your doctor and/or pharmacist. Please keep your medication list with you and share with your physician. Update the information when medications are discontinued, doses are changed, or new medications (including over-the-counter products) are added; and carry medication information at all times in the event of emergency situations.        Resources     Quit Smoking / Tobacco Use:    I understand the use of any tobacco products increases my " chance of suffering from future heart disease or stroke and could cause other illnesses which may shorten my life. Quitting the use of tobacco products is the single most important thing I can do to improve my health. For further information on smoking / tobacco cessation call a Toll Free Quit Line at 1-854.531.1926 (*[employer] [place]) or 1-216.293.2926 (American Lung Association) or you can access the web based program at www.lungusa.org.    [place] Tobacco Users Help Line:  (626) 567-3219       Toll Free: 1-948.557.7815  Quit Tobacco Program [facility] (987)919-9178    Crisis Hotline:    [place] Crisis Hotline:  1-387-OEKWMIE or 1-821.381.6690    [place] Crisis Hotline:    1-667.267.4173 or 850-382-8729    Discharge Survey:   Thank you for choosing [facility]. We hope we did everything we could to make your hospital stay a pleasant one. You may be receiving a survey and we would appreciate your time and participation in answering the questions. Your input is very valuable to us in our efforts to improve our service to our patients and their families.            Signatures     My signature on this form indicates that:    1. I acknowledge receipt and understanding of these Home Care Instruction.  2. My questions regarding this information have been answered to my satisfaction.  3. I have formulated a plan with my discharge nurse to obtain my prescribed medications for home.    __________________________________      __________________________________                   Patient Signature                                 Guardian/Responsible Adult Signature      __________________________________                 __________       ________                       Nurse Signature                                               Date                 Time         70 year old Khmer/Jhonny-speaking male with a history of DM2 (uncontrolled A1c 10.3%, on basal/bolus insulin at home), HTN, CAD s/p CABG and stents, BPH s/p TURP and Parkinson's disease, now readmitted with COVID and started on high dose Decadron. Endocrine team consulted for management of DM2.   Patient is high risk with high level decision making, requiring ICU level of care    1. Type 2 diabetes mellitus with other specified complication, with long-term current use of insulin.   A1c 10.3% indicating poorly controlled DM2. Home regimen reported as Basaglar 40 units qHS and Admelog 20 units before breakfast only    While inpatient:  Currently in ICU, target glucose 140-180 mg/dl  On continuous tube feeding, Glucerna 1.5 elin @ goal 40 ml/hr x 24h  NPH just increased to 12 units q6h (Hold if tube feeds are held)  Agree with change, please allow at least 4 doses of this prior to increasing  Note that patient may have decreasing insulin requirements over next 24-72h due to last dose of steroid today  Continue Admelog MODERATE dose correctional scale q6h  Check BG q6h  Keep hypoglycemia protocol active     2. Steroid-induced hyperglycemia.    Last dose Dexamethasone 6 mg daily TODAY 3/25  Insulin regimen as above   Please inform endocrine team of any changes in steroid plan    3. Hypertension, unspecified type.    In ICU, requiring pressors, defer management to primary team    4. Hyperlipidemia, unspecified hyperlipidemia type.    Continue with statin if no contraindications, monitor lipid profile as outpatient, goal LDL is <70.     Isa Benito  Nurse Practitioner  Division of Endocrinology & Diabetes  In house pager #[phone]/long range pager #[phone]    If before 9AM or after 6PM, or on weekends/holidays, please call endocrine answering service for assistance ([phone]).  For nonurgent matters email [email or web address] for assistance.

## 2021-03-25 NOTE — PROGRESS NOTE ADULT - ASSESSMENT
70 year old Danish/Jhonny-speaking male with a history of CAD s/p CABG and 2 stents, Type 2 DM, HTN, BPH s/p TURP and Parkinson's disease, who was recently admitted on 3/14 for weakness, covid, hypoxia, started on remdesivir/decadron, discharged home on 3/16. However, at home, pt decompensated with increasing sob/labored breathing, o2 sat dropped from 88% to 66% within 1/2 hour per daughter, gasping for air. Pt's son called 911 and brought him back to the hospital. Patient got admitted for ARDS related to covid pneumonia. Patient got intubated on 3/17    Neuro  -sedated, on fentanyl/ propofol  -Will wean today as able    Resp  -Doing well resp wise, AC/VC changed to 18/400/7/40% and will rpt ABG in 1h, may trial CPAP today  -Last proned on 3/23/21    Cardio  -Previous sinus peggy, improved  -Off all pressors and maintaining MAPs    GI  -Glucerna 40cc/hr  -Protonix PPI  -Bowel regimen with miralax, senna  -Simvastatin for HLD      - Da Silva  - Strict intake and output monitoring    Endo  -FSGq6h  -ISS  -Inc NPH to 12Uq6h    ID  -Off abx, continue to monitor   s/p remdesivir  -3/22 blood cultures NGTD    GOC- Full code 70 year old Italian/Jhonny-speaking male with a history of CAD s/p CABG and 2 stents, Type 2 DM, HTN, BPH s/p TURP and Parkinson's disease, who was recently admitted on 3/14 for weakness, covid, hypoxia, started on remdesivir/decadron, discharged home on 3/16. However, at home, pt decompensated with increasing sob/labored breathing, o2 sat dropped from 88% to 66% within 1/2 hour per daughter, gasping for air. Pt's son called 911 and brought him back to the hospital. Patient got admitted for ARDS related to covid pneumonia. Patient got intubated on 3/17    Neuro  -sedated, on fentanyl/ propofol  -Will wean today as able    Resp  -Doing well resp wise, AC/VC changed to 18/400/7/40% and will rpt ABG in 1h, may trial CPAP today  -Last proned on 3/23/21    Cardio  -Previous sinus peggy, improved  -Off all pressors and maintaining MAPs    GI  -Glucerna 40cc/hr  -Protonix PPI  -Bowel regimen with miralax, senna  -Simvastatin for HLD      - Da Silva  - Strict intake and output monitoring    Endo  -FSGq6h  -ISS  -Inc NPH to 12Uq6h    ID  -Off abx, continue to monitor   s/p remdesivir  -3/22 blood cultures NGTD    Heme  -SQH 5k q8h  -ASA 81mg    GOC- Full code

## 2021-03-25 NOTE — CHART NOTE - NSCHARTNOTEFT_GEN_A_CORE
overnight events:  His wife passed away in our ICU.  He is doing well, no fever, off pressors  UO 1300  ABG at 0500 38/77/7.40 on AC 28/400/50%/5

## 2021-03-25 NOTE — PROGRESS NOTE ADULT - SUBJECTIVE AND OBJECTIVE BOX
HPI: 70 year old Croatian/Jhonny-speaking male with a history of CAD s/p CABG and 2 stents, Type 2 DM, HTN, BPH s/p TURP and Parkinson's disease, who was recently admitted on 3/14 for weakness, covid, hypoxia, started on remdesivir/decadron, discharged home on 3/16. However, at home, pt decompensated with increasing sob/labored breathing, o2 sat dropped from 88% to 66% within 1/2 hour per daughter, gasping for air. Pt's son called 911 and brought him back to the hospital. Patient got admitted for ARDS related to covid pneumonia. Patient got intubated on 3/17    INTERVAL OVERNIGHT EVENTS:  No significant events overnight.     Allergies:  No Known Allergies    SUBJECTIVE:    Review of Systems: Unable to obtain as patient is intubated & sedated    OBJECTIVE:  Vitals:  ICU Vital Signs Last 24 Hrs  T(C): 37.3 (25 Mar 2021 08:00), Max: 37.3 (25 Mar 2021 08:00)  T(F): 99.1 (25 Mar 2021 08:00), Max: 99.1 (25 Mar 2021 08:00)  HR: 65 (25 Mar 2021 11:00) (56 - 71)  BP: --  BP(mean): --  ABP: 137/56 (25 Mar 2021 11:00) (11/42 - 154/55)  ABP(mean): 91 (25 Mar 2021 10:00) (61 - 93)  RR: 18 (25 Mar 2021 11:00) (18 - 28)  SpO2: 94% (25 Mar 2021 11:00) (94% - 100%)    Physical Exam:   Constitutional: NAD  HEENT: NC  Respiratory: Intubated  Cardiovascular: Regular rate  Gastrointestinal: Soft, ND  Extremities: No LE edema   Neurological: Intubated, sedated     Vent Settings:  Mode: AC/ CMV (Assist Control/ Continuous Mandatory Ventilation)  RR (machine): 28  TV (machine): 400  FiO2: 40  PEEP: 7  ITime: 0.7  MAP: 14  PIP: 28    ABG - ( 25 Mar 2021 05:09 )  pH, Arterial: 7.40  pH, Blood: x     /  pCO2: 38    /  pO2: 77    / HCO3: 23    / Base Excess: -1.2  /  SaO2: 96.0              I&O:  I&O's Detail    24 Mar 2021 07:01  -  25 Mar 2021 07:00  --------------------------------------------------------  IN:    FentaNYL: 48 mL    FentaNYL: 72 mL    Glucerna 1.5: 760 mL    Propofol: 302.4 mL  Total IN: 1182.4 mL    OUT:    Norepinephrine: 0 mL    Ureteral Catheter (mL): 1295 mL  Total OUT: 1295 mL    Total NET: -112.6 mL      25 Mar 2021 07:01  -  25 Mar 2021 12:13  --------------------------------------------------------  IN:    FentaNYL: 24 mL    Glucerna 1.5: 200 mL    Propofol: 57.6 mL  Total IN: 281.6 mL    OUT:    Ureteral Catheter (mL): 300 mL  Total OUT: 300 mL    Total NET: -18.4 mL        Labs:                        8.2    12.94 )-----------( 250      ( 25 Mar 2021 03:42 )             25.3     03-25    138  |  108<H>  |  50<H>  ----------------------------<  177<H>  4.3   |  22  |  1.11    Ca    7.8<L>      25 Mar 2021 03:42  Phos  3.3     03-24  Mg     2.6     03-25    TPro  5.1<L>  /  Alb  2.2<L>  /  TBili  0.3  /  DBili  x   /  AST  32  /  ALT  11  /  AlkPhos  77  03-25    LIVER FUNCTIONS - ( 25 Mar 2021 03:42 )  Alb: 2.2 g/dL / Pro: 5.1 g/dL / ALK PHOS: 77 U/L / ALT: 11 U/L / AST: 32 U/L / GGT: x                   POCT Blood Glucose.: 297 mg/dL <H> (03-25-21 @ 11:18)  POCT Blood Glucose.: 168 mg/dL <H> (03-25-21 @ 04:52)  POCT Blood Glucose.: 171 mg/dL <H> (03-25-21 @ 01:03)  POCT Blood Glucose.: 295 mg/dL <H> (03-24-21 @ 17:22)    Micro:    Culture - Blood (collected 03-22-21 @ 18:22)  Source: .Blood Blood  Preliminary Report (03-23-21 @ 19:02):    No growth to date.    Culture - Blood (collected 03-20-21 @ 21:04)  Source: .Blood Blood  Preliminary Report (03-21-21 @ 22:01):    No growth to date.    Culture - Blood (collected 03-20-21 @ 17:50)  Source: .Blood Blood  Gram Stain (03-22-21 @ 11:21):    Growth in anaerobic bottle: Gram Positive Cocci in Clusters  Final Report (03-23-21 @ 09:41):    Growth in anaerobic bottle: Staphylococcus epidermidis    Coag Negative Staphylococcus    Single set isolate, possible contaminant. Contact    Microbiology if susceptibility testing clinically    indicated.    ***Blood Panel PCR results on this specimen are available    approximately 3 hours after the Gram stain result.***    Gram stain, PCR, and/or culture results may not always    correspond due to difference in methodologies.    ************************************************************    This PCR assay was performed by multiplex PCR. This    Assay tests for 66 bacterial and resistance gene targets.    Please refer to the [facility] test directory    at [email or web address] for details.  Organism: Blood Culture PCR (03-23-21 @ 09:41)  Organism: Blood Culture PCR (03-23-21 @ 09:41)      -  Staphylococcus epidermidis: Detec      Method Type: PCR    Culture - Blood (collected 03-18-21 @ 13:38)  Source: .Blood Blood-Peripheral  Final Report (03-23-21 @ 17:00):    No Growth Final    Culture - Blood (collected 03-18-21 @ 13:38)  Source: .Blood Blood  Final Report (03-23-21 @ 17:00):    No Growth Final      Medications:  MEDICATIONS  (STANDING):  aspirin enteric coated 81 milliGRAM(s) Oral daily  carbidopa/levodopa  10/100 2 Tablet(s) Oral <User Schedule>  chlorhexidine 0.12% Liquid 15 milliLiter(s) Oral Mucosa every 12 hours  chlorhexidine 4% Liquid 1 Application(s) Topical <User Schedule>  dextrose 40% Gel 15 Gram(s) Oral once  dextrose 5%. 1000 milliLiter(s) (50 mL/Hr) IV Continuous <Continuous>  dextrose 5%. 1000 milliLiter(s) (100 mL/Hr) IV Continuous <Continuous>  dextrose 50% Injectable 25 Gram(s) IV Push once  dextrose 50% Injectable 12.5 Gram(s) IV Push once  dextrose 50% Injectable 25 Gram(s) IV Push once  fentaNYL   Infusion..... 0.5 MICROgram(s)/kG/Hr (0.8 mL/Hr) IV Continuous <Continuous>  glucagon  Injectable 1 milliGRAM(s) IntraMuscular once  heparin   Injectable 5000 Unit(s) SubCutaneous every 8 hours  insulin lispro (ADMELOG) corrective regimen sliding scale   SubCutaneous every 6 hours  insulin NPH human recombinant 12 Unit(s) SubCutaneous every 6 hours  ketorolac 0.5% Ophthalmic Solution 1 Drop(s) Left EYE two times a day  norepinephrine Infusion 0.1 MICROgram(s)/kG/Min (14.9 mL/Hr) IV Continuous <Continuous>  pantoprazole  Injectable 40 milliGRAM(s) IV Push daily  polyethylene glycol 3350 17 Gram(s) Oral daily  prednisoLONE acetate 1% Suspension 1 Drop(s) Left EYE four times a day  propofol Infusion 30 MICROgram(s)/kG/Min (14.3 mL/Hr) IV Continuous <Continuous>  simvastatin 20 milliGRAM(s) Oral at bedtime    MEDICATIONS  (PRN):  sodium chloride 0.9% lock flush 10 milliLiter(s) IV Push every 1 hour PRN Pre/post blood products, medications, blood draw, and to maintain line patency

## 2021-03-26 NOTE — PROGRESS NOTE ADULT - ASSESSMENT
70 year old Danish/Jhonny-speaking male with a history of CAD s/p CABG and 2 stents, Type 2 DM, HTN, BPH s/p TURP and Parkinson's disease, who was recently admitted on 3/14 for weakness, covid, hypoxia, started on remdesivir/decadron, discharged home on 3/16. However, at home, pt decompensated with increasing sob/labored breathing, o2 sat dropped from 88% to 66% within 1/2 hour per daughter, gasping for air. Pt's son called 911 and brought him back to the hospital. Patient admitted for ARDS 2/2 covid pneumonia, s/p intubation 3/17.    Neuro  -sedated, on fentanyl/ propofol  -Will wean today as able    Resp  -Doing well resp wise however showing evidence of paradoxical breathing  -Current vent settings RR 12/PEEP 5/FIO2 40%     ENT   CT neck showing significant swelling of R mandible - differential includes parotitis vs dependent swelling in the setting of recent proning  ENT consulted; recommends no acute intervention as there is no respiratory compromise at this time     Cardio  no active issues  -Off all pressors and maintaining MAPs    GI  -Glucerna 40cc/hr  -Protonix PPI  -Bowel regimen with miralax, senna  -Simvastatin for HLD      - Da Silva  - Strict intake and output monitoring    Endo  -FSGq6h  -ISS  -Inc NPH to 16Uq6h    ID  -Off abx, continue to monitor   -s/p remdesivir, dexamethasone   -3/22 blood cultures NGTD    Heme  -SQH 5k q8h  -ASA 81mg    GOC- Full code

## 2021-03-26 NOTE — CHART NOTE - NSCHARTNOTEFT_GEN_A_CORE
Surge B PA Note    Pt is hypoxic, tachypnic, and tachycardic. + fever.     Vital Signs Last 24 Hrs  T(C): 37.4 (27 Mar 2021 01:00), Max: 38.3 (26 Mar 2021 20:00)  T(F): 99.4 (27 Mar 2021 01:00), Max: 101 (26 Mar 2021 20:00)  HR: 101 (27 Mar 2021 01:00) (63 - 112)  BP: 101/50 (26 Mar 2021 21:31) (78/44 - 143/84)  BP(mean): --  RR: 28 (27 Mar 2021 01:00) (12 - 38)  SpO2: 94% (27 Mar 2021 00:00) (88% - 100%)    - pt required Ambu bagging Oxygen support, -- unable to ventilate, >> required rocuronium, mech vent was changed to VC mode,   -- Oxygen saturation is 92 % on VC_ 28/400/9/100, ABG- 7.47/31/50/24/84,   -- CT/scan with severe parotitis and severe facial cellulitis.  -- pt was restarted on Levophed,   - discussed and called official ID consultation__>   - as per discussion, blood cxs x 2 sent, increased Unasyn to 3 gr q 6 hrs and started on Vancomycin__ f/u official ID consultation   -- LA_   Blood Gas Arterial, Lactate (03.26.21 @ 22:31)   Blood Gas Arterial, Lactate: 3.2:  -- 500 cc bolus,   -- no RV strain on POCUS, IVC is not collapsable on POCUS, __ cont pressors support,   -- attending was present at the bedside   -- called and updated daughter Nneka on pt's status,

## 2021-03-26 NOTE — CONSULT NOTE ADULT - SUBJECTIVE AND OBJECTIVE BOX
HPI: 70y Male with COVID necessitating intubation with right cheek swelling over the past few days. ENT consulted for evaluation. Primary team said they noted right cheek ecchymosis after proning patient and this has been slowly getting a little larger. No compression of airway or concerning features. White count has remained stable. No evidence of purulence in the mouth. Pt not on anticoagulants at this moment. Underwent CT neck and wet read shows hematoma in right cheek that is not involving the airway.     Allergies    No Known Allergies    Intolerances        PAST MEDICAL & SURGICAL HISTORY:  BPH (benign prostatic hyperplasia)    GERD (gastroesophageal reflux disease)    HLD (hyperlipidemia)    Hypertension    CAD (coronary artery disease)  CABG x2 2012, Stents x2 2014    Diabetes mellitus    Presence of stent in coronary artery  x2 2014    History of coronary artery bypass graft  X2 in March of 2012    Bladder neck obstruction  3 surgeries for BPH, most recent 2012            Hematologic/Anticoagulation:  aspirin enteric coated 81 milliGRAM(s) Oral daily      Pain medications/Neuro:  carbidopa/levodopa  10/100 2 Tablet(s) Oral <User Schedule>  fentaNYL   Infusion..... 0.5 MICROgram(s)/kG/Hr IV Continuous <Continuous>  propofol Infusion 30 MICROgram(s)/kG/Min IV Continuous <Continuous>      IV fluids:  dextrose 5%. 1000 milliLiter(s) IV Continuous <Continuous>  dextrose 5%. 1000 milliLiter(s) IV Continuous <Continuous>      Endocrine Medications:   dextrose 40% Gel 15 Gram(s) Oral once  dextrose 50% Injectable 25 Gram(s) IV Push once  dextrose 50% Injectable 12.5 Gram(s) IV Push once  dextrose 50% Injectable 25 Gram(s) IV Push once  glucagon  Injectable 1 milliGRAM(s) IntraMuscular once  insulin lispro (ADMELOG) corrective regimen sliding scale   SubCutaneous every 6 hours  insulin NPH human recombinant 16 Unit(s) SubCutaneous every 6 hours  simvastatin 20 milliGRAM(s) Oral at bedtime      All other standing medications:   chlorhexidine 0.12% Liquid 15 milliLiter(s) Oral Mucosa every 12 hours  chlorhexidine 4% Liquid 1 Application(s) Topical <User Schedule>  ketorolac 0.5% Ophthalmic Solution 1 Drop(s) Left EYE two times a day  norepinephrine Infusion 0.1 MICROgram(s)/kG/Min IV Continuous <Continuous>  pantoprazole  Injectable 40 milliGRAM(s) IV Push daily  polyethylene glycol 3350 17 Gram(s) Oral daily  prednisoLONE acetate 1% Suspension 1 Drop(s) Left EYE four times a day      All other PRN medications:      Vital Signs Last 24 Hrs  T(C): 36.8 (26 Mar 2021 04:00), Max: 37.3 (25 Mar 2021 08:00)  T(F): 98.3 (26 Mar 2021 04:00), Max: 99.1 (25 Mar 2021 08:00)  HR: 63 (26 Mar 2021 05:00) (56 - 85)  BP: 143/84 (26 Mar 2021 04:00) (138/65 - 143/84)  BP(mean): --  RR: 12 (25 Mar 2021 23:00) (12 - 28)  SpO2: 99% (26 Mar 2021 05:00) (94% - 100%)    LABS:  CBC-    03-26    139  |  106  |  49<H>  ----------------------------<  246<H>  4.5   |  23  |  0.92    Ca    8.0<L>      26 Mar 2021 02:00  Phos  2.9     03-26  Mg     2.6     03-26    TPro  x   /  Alb  x   /  TBili  x   /  DBili  <0.2  /  AST  x   /  ALT  x   /  AlkPhos  x   03-26    Coagulation Studies-    Endocrine Panel-  --  --  8.0 mg/dL  --  --  7.8 mg/dL        PHYSICAL EXAM:    ENT EXAM-   Constitutional: Intubated and sedated   Head:  normocephalic, atraumatic.   Face: Right cheek with swelling and overlying ecchymosis  OC/OP: Floor of mouth, buccal mucosa, lips, hard palate, soft palate, uvula, posterior pharyngeal wall normal.  Mucosa moist. No purulence noted from duct on expression of cheek   Neck:  Trachea midline.  Thyroid, parotid and submandibular glands normal.  Lymph:  No cervical adenopathy.  Facial Plastics:     Assessment/Plan:  70y Male with COVID ARDS with cheek hematoma possibly secondary to proning without airway involvement    - No acute ENT intervention  - Apply warm compresses to the area and massage the area as frequently as possible  - Call ENT for anty questions or concerns             [facility] at [phone] with any questions/concerns.

## 2021-03-26 NOTE — PROGRESS NOTE ADULT - SUBJECTIVE AND OBJECTIVE BOX
COVERING RESIDENT: JOVI LAYTON (PGY-2) | [phone] / [phone]    INTERVAL HPI/OVERNIGHT EVENTS: Patient was noted to have swelling of R mandible, for which CT was performed showing significant swelling. ENT was consulted and recommended no acute intervention as the swelling did not involve the airway.    SUBJECTIVE: Patient seen and examined at bedside.     Unable to assess ROS as pt is intubated + sedated.    OBJECTIVE:    VITAL SIGNS:  ICU Vital Signs Last 24 Hrs  T(C): 36.8 (26 Mar 2021 04:00), Max: 37.2 (25 Mar 2021 12:00)  T(F): 98.3 (26 Mar 2021 04:00), Max: 98.9 (25 Mar 2021 12:00)  HR: 76 (26 Mar 2021 07:41) (56 - 85)  BP: 143/84 (26 Mar 2021 04:00) (138/65 - 143/84)  BP(mean): --  ABP: 142/54 (26 Mar 2021 07:00) (110/55 - 143/55)  ABP(mean): 72 (26 Mar 2021 01:00) (70 - 85)  RR: 19 (26 Mar 2021 07:00) (12 - 26)  SpO2: 98% (26 Mar 2021 07:41) (94% - 100%)    Mode: CPAP with PS, FiO2: 50, PEEP: 8, PS: 12, MAP: 12, PIP: 21    03-25 @ 07:01  -  03-26 @ 07:00  --------------------------------------------------------  IN: 1334 mL / OUT: 1910 mL / NET: -576 mL      CAPILLARY BLOOD GLUCOSE      POCT Blood Glucose.: 175 mg/dL (26 Mar 2021 05:47)      PHYSICAL EXAM:    Constitutional: NAD  HEENT: significant swelling near R mandible   Respiratory: Intubated  Cardiovascular: Regular rate  Gastrointestinal: Soft, ND  Extremities: No LE edema   Neurological: Intubated, sedated     MEDICATIONS:  MEDICATIONS  (STANDING):  aspirin  chewable 81 milliGRAM(s) Oral daily  carbidopa/levodopa  10/100 2 Tablet(s) Oral <User Schedule>  chlorhexidine 0.12% Liquid 15 milliLiter(s) Oral Mucosa every 12 hours  chlorhexidine 4% Liquid 1 Application(s) Topical <User Schedule>  dextrose 40% Gel 15 Gram(s) Oral once  dextrose 5%. 1000 milliLiter(s) (50 mL/Hr) IV Continuous <Continuous>  dextrose 5%. 1000 milliLiter(s) (100 mL/Hr) IV Continuous <Continuous>  dextrose 50% Injectable 25 Gram(s) IV Push once  dextrose 50% Injectable 12.5 Gram(s) IV Push once  dextrose 50% Injectable 25 Gram(s) IV Push once  fentaNYL   Infusion..... 0.5 MICROgram(s)/kG/Hr (0.8 mL/Hr) IV Continuous <Continuous>  glucagon  Injectable 1 milliGRAM(s) IntraMuscular once  heparin   Injectable 5000 Unit(s) SubCutaneous every 8 hours  insulin lispro (ADMELOG) corrective regimen sliding scale   SubCutaneous every 6 hours  insulin NPH human recombinant 16 Unit(s) SubCutaneous every 6 hours  ketorolac 0.5% Ophthalmic Solution 1 Drop(s) Left EYE two times a day  LORazepam   Injectable 1 milliGRAM(s) IV Push every 3 hours  norepinephrine Infusion 0.1 MICROgram(s)/kG/Min (14.9 mL/Hr) IV Continuous <Continuous>  pantoprazole  Injectable 40 milliGRAM(s) IV Push daily  polyethylene glycol 3350 17 Gram(s) Oral daily  prednisoLONE acetate 1% Suspension 1 Drop(s) Left EYE four times a day  propofol Infusion 30 MICROgram(s)/kG/Min (14.3 mL/Hr) IV Continuous <Continuous>  senna Syrup 10 milliLiter(s) Oral daily  simvastatin 20 milliGRAM(s) Oral at bedtime    MEDICATIONS  (PRN):  sodium chloride 0.9% lock flush 10 milliLiter(s) IV Push every 1 hour PRN Pre/post blood products, medications, blood draw, and to maintain line patency      ALLERGIES:  Allergies    No Known Allergies    Intolerances        LABS:                        8.8    15.85 )-----------( 266      ( 26 Mar 2021 02:00 )             27.1     03-26    139  |  106  |  49<H>  ----------------------------<  246<H>  4.5   |  23  |  0.92    Ca    8.0<L>      26 Mar 2021 02:00  Phos  2.9     03-26  Mg     2.6     03-26    TPro  x   /  Alb  x   /  TBili  x   /  DBili  <0.2  /  AST  x   /  ALT  x   /  AlkPhos  x   03-26    PT/INR - ( 26 Mar 2021 02:00 )   PT: 11.8 sec;   INR: 1.03 ratio               RADIOLOGY & ADDITIONAL TESTS: Reviewed.

## 2021-03-26 NOTE — CHART NOTE - NSCHARTNOTEFT_GEN_A_CORE
Surge B PA Note   -- pt with R sided neck swelling, with strong suspicion for neck hematoma,     Vital Signs Last 24 Hrs  T(C): 36.4 (26 Mar 2021 00:00), Max: 37.3 (25 Mar 2021 08:00)  T(F): 97.5 (26 Mar 2021 00:00), Max: 99.1 (25 Mar 2021 08:00)  HR: 68 (26 Mar 2021 01:00) (56 - 85)  BP: --  BP(mean): --  RR: 12 (25 Mar 2021 23:00) (12 - 28)  SpO2: 100% (26 Mar 2021 01:00) (94% - 100%)    A/P  called and discussed with ENT on call, Dr Herzog Surge B PA Note   -- pt with R sided neck swelling, with strong suspicion for neck hematoma,     Vital Signs Last 24 Hrs  T(C): 36.4 (26 Mar 2021 00:00), Max: 37.3 (25 Mar 2021 08:00)  T(F): 97.5 (26 Mar 2021 00:00), Max: 99.1 (25 Mar 2021 08:00)  HR: 68 (26 Mar 2021 01:00) (56 - 85)  BP: --  BP(mean): --  RR: 12 (25 Mar 2021 23:00) (12 - 28)  SpO2: 100% (26 Mar 2021 01:00) (94% - 100%)    A/P  called and discussed with ENT on call, Dr Herzog__ as per discussion, they will evaluate pt, but recommended to obtain CT/neck first, __ ordered,   -- discussed with radiology on call, __ CT of the soft tissues( neck) was protocoled,   -- called and obtained consent from daughter, witnessed   -- pt is hemodynamically stabled, airways protected, pt remains intubated,   -- continue monitoring Surge B PA Note   -- pt with R sided neck swelling, with strong suspicion for neck hematoma,     Vital Signs Last 24 Hrs  T(C): 36.4 (26 Mar 2021 00:00), Max: 37.3 (25 Mar 2021 08:00)  T(F): 97.5 (26 Mar 2021 00:00), Max: 99.1 (25 Mar 2021 08:00)  HR: 68 (26 Mar 2021 01:00) (56 - 85)  BP: --  BP(mean): --  RR: 12 (25 Mar 2021 23:00) (12 - 28)  SpO2: 100% (26 Mar 2021 01:00) (94% - 100%)    A/P  -- with strong suspicion for R sided neck hematoma,-- pt was on therapeutic a/c with Lovenox, now on Heparin SC__ which was held until CT is performed,   called and discussed with ENT on call, Dr Herzog__ as per discussion, they will evaluate pt, but recommended to obtain CT/neck first, __ ordered,   -- discussed with radiology on call, __ CT of the soft tissues( neck) was protocoled,   -- called and obtained consent from daughter, witnessed   -- pt is hemodynamically stabled, airways protected, pt remains intubated,   -- continue monitoring

## 2021-03-26 NOTE — PROGRESS NOTE ADULT - ATTENDING COMMENTS
weaned to PSV overnite. mod paradoxical resp. placed on VC but flow inadequate. did well on PCV. retry PSV in am. wbc stable.

## 2021-03-26 NOTE — CHART NOTE - NSCHARTNOTEFT_GEN_A_CORE
Nutrition Follow-Up Note   Reason for follow-up: Critical care length of stay follow- up. Medical record reviewed.     Clinical Course history: Per chart review patient with medical history of CAD s/p CABG and 2 stents, Type 2 DM, HTN, BPH s/p TURP and Parkinson's disease, who was recently admitted on 3/14 for weakness, covid, hypoxia, started on remdesivir/decadron. S/p RRT 3/20 and patient intubated. Sedated on Fentanyl.     Diet Order: Diet, NPO with Tube Feed:   Tube Feeding Modality: Orogastric  Glucerna 1.5 Ignacio (GLUCERNA1.5RTH)  Total Volume for 24 Hours (mL): 960  Continuous  Starting Tube Feed Rate {mL per Hour}: 10  Increase Tube Feed Rate by (mL): 10     Every 4 hours  Until Goal Tube Feed Rate (mL per Hour): 40  Tube Feed Duration (in Hours): 24  Tube Feed Start Time: 15:45  No Carb Prosource (1pkg = 15gms Protein)     Qty per Day:  4 (03-24-21 @ 10:24)    CURRENT EN ORDER PROVIDES:  Total Volume: 960mL/day  Energy: 1440Kcal/day  Protein: 139g protein/day  Free Water: 729mL/day    Nutrition Related Information: - Tube feeding intake: 3/22-23: 0mL, 3/23-24: 0mL, 3/24-25: 760mL and 3/25-26: 880mL.  - Feeds running at 40mL/hr during visit.   - No tube feeding intolerances reported.   - Off propofol so no longer receiving additional Kcal from infusion - would increase goal rate of feeding to better meet nutrition needs.   - Noted hyperglycemia - ordered for SSI and NPH 16 units q6hrs. Consider modification in insulin regimen as medically appropriate to maintain glycemic control.     GI: - No BM. Noted constipation.  - Bowel regimen: Senna, Miralax. Consider more aggressive regimen if constipation persists.   - No vomiting noted.     Anthropometric Measurements:   Height (cm): 170.2 (03-17-21 @ 10:38)  Weight (kg): 79.8 (3/26), 79.7 (3/17), 74.8 (3/16)  BMI (kg/m2): 27.5 (3/17)  IBW: 67.1kg +/-10%    Medications: MEDICATIONS  (STANDING):  aspirin  chewable 81 milliGRAM(s) Oral daily  carbidopa/levodopa  10/100 2 Tablet(s) Oral <User Schedule>  fentaNYL   Infusion..... 0.5 MICROgram(s)/kG/Hr (0.8 mL/Hr) IV Continuous heparin   Injectable 5000 Unit(s) SubCutaneous every 8 hours  insulin lispro (ADMELOG) corrective regimen sliding scale   SubCutaneous every 6 hours  insulin NPH human recombinant 16 Unit(s) SubCutaneous every 6 hours  LORazepam   Injectable 1 milliGRAM(s) IV Push every 3 hours  norepinephrine Infusion 0.1 MICROgram(s)/kG/Min (14.9 mL/Hr) IV Continuous <Continuous>  pantoprazole  Injectable 40 milliGRAM(s) IV Push daily  polyethylene glycol 3350 17 Gram(s) Oral daily  prednisoLONE acetate 1% Suspension 1 Drop(s) Left EYE four times a day  propofol Infusion 30 MICROgram(s)/kG/Min (14.3 mL/Hr) IV Continuous <Continuous>  senna Syrup 10 milliLiter(s) Oral daily  simvastatin 20 milliGRAM(s) Oral at bedtime    Labs: 03-26 @ 02:00: Sodium 139, Potassium 4.5, Calcium 8.0<L>, Magnesium 2.6, Phosphorus 2.9, BUN 49<H>, Creatinine 0.92, Glucose 246<H>, Alk Phos 85, ALT/SGPT 5, AST/SGOT 33, Albumin 2.4<L>, Prealbumin --, Total Bilirubin 0.4, Hemoglobin 8.8<L>, Hematocrit 27.1<L>, Ferritin 524<H>, C-Reactive Protein 67.1<H>, Creatine Kinase <<27>    Triglycerides, Serum: 229 mg/dL (03-26-21 @ 02:06)    POCT Blood Glucose.: 172 mg/dL (03-26-21 @ 11:41)  POCT Blood Glucose.: 175 mg/dL (03-26-21 @ 05:47)  POCT Blood Glucose.: 258 mg/dL (03-25-21 @ 23:46)  POCT Blood Glucose.: 284 mg/dL (03-25-21 @ 17:23)    Skin: No pressure ulcers/DTI noted in flowsheets.  Edema: 1+ generalized, 3+ left/right hand    Estimated Nutrition Needs: calculated using admit weight 79.7kg for energy and protein needs.  Estimated Energy Needs (15-20Kcal/kg): 1195-1594Kcal/day  Estimated Protein Needs (1.5g/kg): 120g protein/day    Previous Nutrition Diagnosis: Inadequate protein energy intake.   Diagnosis is not applicable    Nutrition Interventions/Recommendations:   1. Recommend Glucerna 1.5 @ 45mL/hr x24 hours + No Carb Prosource 2x daily [provides 1080mL total volume, 1620KCal, 119g protein and 820mL free water daily] (20Kcal/kg and ~1.5g/kg).  2. Additional free water per MD discretion.   3. Monitor glucose levels and electrolytes.   4. Bowel regimen to alleviate constipation.  5. Obtain daily weight.     Monitoring and Evaluation:   Monitor nutrition provision, tolerance to diet, weights, labs, skin integrity and GI function.   RD remains available, please consult as needed. Will follow up per protocol.  Heather Vázquez, MS, RD, CDN, CNSC Pager #[phone]

## 2021-03-27 NOTE — PROGRESS NOTE ADULT - ASSESSMENT
70 year old Yi/Jhonny-speaking male with a history of CAD s/p CABG and 2 stents, Type 2 DM, HTN, BPH s/p TURP and Parkinson's disease, who was recently admitted on 3/14 for weakness, covid, hypoxia, started on remdesivir/decadron, discharged home on 3/16. However, at home, pt decompensated with increasing sob/labored breathing, o2 sat dropped from 88% to 66% within 1/2 hour per daughter, gasping for air. Pt's son called 911 and brought him back to the hospital. Patient admitted for ARDS 2/2 covid pneumonia, s/p intubation 3/17, hospital course c/b parotitis.     Neuro  -sedated, on fentanyl + propofol  -Will wean today as able    Resp  acute hypoxic respiratory failure 2/2 COVID PNA vs septic shock   pt with increasing oxygen requirements overnight - previously doing well on FIO2 40   -Current vent settings RR 30/PEEP 9/FIO2 80%   -wean vent settings as tolerated     ENT   ?parotitis vs masseter myositis   CT neck showing significant swelling of R mandible - differential includes parotitis vs masseter myositis vs dependent swelling in the setting of recent proning  ENT consulted; recommends no acute intervention as there is no respiratory compromise at this time   OMFS consulted today; f/u recommendations   -c/w abx as below      Cardio  vasoplegic shock vs septic shock  pt requiring pressor support overnight - pressor requirement currently stable   -c/w levo gtt to maintain map > 65  -wean as tolerated     GI  -Glucerna 40cc/hr  -Protonix PPI  -Bowel regimen with miralax, senna  -Simvastatin for HLD      - Da Silva  - Strict intake and output monitoring    Endo  euglycemic   -FSGq6h  -ISS  -NPH 10Uq6h    ID  COVID   -s/p remdesivir, dexamethasone   -3/21 blood cx:  MSSE in 1/4 bottles - likely contaminant   -3/22 blood cultures NGTD    ?parotitis vs masseter myositis   CT neck: showing parotitis vs masseter myositis   ID following  c/w unasyn (3/26 -->)   c/w vancomycin (3/26 -->)   f/u vanc trough - target level 15-20  f/u repeat blood cx    Heme  -SQH 5k q8h  -ASA 81mg    GOC- Full code

## 2021-03-27 NOTE — PROGRESS NOTE ADULT - SUBJECTIVE AND OBJECTIVE BOX
ENT Progress Note    Pt seen and examined at bedside. CT suggests severe R parotitis with overlying cellulitis without abscess. Primary team unable to use warm compresses as pt remains febrile. Continues on unasyn. No evidence of purulence in the mouth. Last fever 11pm last night.     Objective  ICU Vital Signs Last 24 Hrs  T(C): 36.3 (27 Mar 2021 12:00), Max: 38.3 (26 Mar 2021 20:00)  T(F): 97.3 (27 Mar 2021 12:00), Max: 101 (26 Mar 2021 20:00)  HR: 66 (27 Mar 2021 15:00) (61 - 112)  BP: 101/50 (26 Mar 2021 21:31) (78/44 - 101/50)  BP(mean): --  ABP: 124/46 (27 Mar 2021 15:00) (79/41 - 161/66)  ABP(mean): 68 (27 Mar 2021 12:00) (65 - 68)  RR: 30 (27 Mar 2021 15:00) (22 - 38)  SpO2: 100% (27 Mar 2021 15:00) (88% - 100%)      ENT EXAM-   Constitutional: Intubated and sedated   Head:  normocephalic, atraumatic.   Face: Right cheek with swelling and overlying ecchymosis  OC/OP: ETT in place. No purulence noted from duct on expression of cheek   Neck:  Trachea midline.  Thyroid, parotid and submandibular glands normal.  Lymph:  No cervical adenopathy.    70y Male with COVID ARDS with R parotitis and overlying cellulitis    - Continue IV antibiotics  - Consider broadening vs ID consult if pt remains febrile  - Apply warm compresses (when not febrile) to the area and massage the area as frequently as possible  - Will continue to follow  - Call ENT for any questions or concerns

## 2021-03-27 NOTE — PROGRESS NOTE ADULT - ASSESSMENT
70 year old Kiswahili/Jhonny-speaking male with a history of CAD s/p CABG and 2 stents, Type 2 DM, HTN, BPH s/p TURP and Parkinson's disease presenting with weakness. Patient normally walks with a walker but over the past few days he has needed assistance to walk around his house. He has also had poor appetite during this time. Patient's son who lives with him tested positive for COVID on 3/9 without showing any symptoms and was due to travel for work. This morning, patient found his wife unresponsive at home and EMS was called- patient had ROSC s/p cardiac arrest and is currently hospitalized at [facility].   COVID positive  elevated inflammatory markers'clinical picture not c/w Pna  Now resp failure  Intubated   CNS low grade bacteremia  =========  - RRT called on 3/20 for tachypnea and hypoxia to 60s. Patient tachypneic 50s to 60s pulling large volumes on BIPAP and appeared to be anxious/agitated. Febrile to 101F. BP 120s to 130s. HR 90s to 110s with SpO2 70s-80s initially with improvement to low 90s. MICU at bedside given tachypnea patient intubated for airway protection. Started on Levophed and Propofol and transported to [facility].  - 3/20 Lt IJV TLC and Lt radial A-line    Covid-19 medications and Antimicrobials in this admission:  Dexamethasone 3/15-3/25  Remdesivir 3/14-3/15, 3/18-3/22  Unasyn 3/26->  Vanc 3/20-3/22, 3/26->  Cefepime 3/20-3/24    RECOMMENDATIONS:  #PENDING RECS. PLEASE WAIT FOR FINAL RECS AFTER DISCUSSION WITH ATTENDING#  #Facial Cellulitis  -----------  #Resp failure  ? Fibrosis due to COVID  less likely superimposed bacterial pna  Follow Cx  Follow clinically  s/p 5 days of BS coverage with cefepime   No other s/s infection   continue Supportive care per ICU  Monitor for s/s any bacterial superinfection  -------  #GPC bacteremia  likely  CNS contaminant  repeat blood Cx negative  follow clinically and repeat Cx     JOSE Pierce MD  Fellow, Infectious Diseases  Pager: [phone]  If no response, after 5pm and on weekends: Call [phone] 70/M Greenlandic/Jhonny-speaking with PMH CAD s/p CABG and 2 stents, T2DM, HTN, BPH s/p TURP, Parkinson's disease  H/o COVID19 on 3/9 w/o symptoms. Admitted [facility] 3/15-3/16  Readmitted 3/17 with worsening SOB. BIBEMS on NRB  Hosp course c/b RRT 3/20 for tachypnea and hypoxia to 60s. S/p intubation and Lt IJV TLC and Lt radial A-line 3/20.  ID recalled for concern for Rt cheek cellulitis/abscess  =========    COVID-19 with concern for Rt facial cellulitis  - warmth, erythema noted over rt face. Febrile Tmax 101, with new increase in pressors  - would continue abx therapy (pending cx) and f/u with ENT     Covid-19 medications and Antimicrobials in this admission:  Dexamethasone 3/15-3/25  Remdesivir 3/14-3/15, 3/18-3/22  Unasyn 3/26->  Vanc 3/20-3/22, 3/26->  Cefepime 3/20-3/24    RECOMMENDATIONS:  #Concern for rt facial Cellulitis  - continue Unasyn 3g IV q6h  - continue Vancomycin 1g IV q12h  - Please check Vancomycin trough before 4th sequential dose. Target trough levels 15-20  - f/u ENT  -----------  #COVID19 and Resp failure  - ? Fibrosis due to COVID. Less likely superimposed bacterial pna  - s/p Remdesivir and Dexa as above  - Continue supplemental O2 and supportive care per primary team  - Continue Contact and Airborne Isolation precautions  - anticoagulation as per COVID-19 protocol  ----------  #GPC bacteremia 3/20  - likely contaminant  - f/u repeat blood cx    Recs conveyed to primary team    JOSE Pierce, MD  Fellow, Infectious Diseases  Pager: [phone]  If no response, after 5pm and on weekends: Call [phone]

## 2021-03-27 NOTE — PROGRESS NOTE ADULT - SUBJECTIVE AND OBJECTIVE BOX
COVERING RESIDENT: JOVI LAYTON (PGY-2) | [phone] / [phone]    INTERVAL HPI/OVERNIGHT EVENTS: Patient hypoxic overnight, for which FIO2 was increased to 100%. Pt febrile TMax 38.3, for which vancomycin was added. Patient hypotensive requiring pressor support.    SUBJECTIVE: Patient seen and examined at bedside.     Unable to assess ROS as pt is intubated + sedated.    OBJECTIVE:    VITAL SIGNS:  ICU Vital Signs Last 24 Hrs  T(C): 37.1 (27 Mar 2021 08:00), Max: 38.3 (26 Mar 2021 20:00)  T(F): 98.8 (27 Mar 2021 08:00), Max: 101 (26 Mar 2021 20:00)  HR: 65 (27 Mar 2021 11:00) (65 - 112)  BP: 101/50 (26 Mar 2021 21:31) (78/44 - 101/50)  BP(mean): --  ABP: 155/62 (27 Mar 2021 11:00) (79/41 - 172/52)  ABP(mean): 66 (26 Mar 2021 22:00) (65 - 66)  RR: 30 (27 Mar 2021 11:00) (16 - 38)  SpO2: 100% (27 Mar 2021 11:00) (88% - 100%)    Mode: AC/ CMV (Assist Control/ Continuous Mandatory Ventilation), RR (machine): 30, TV (machine): 400, FiO2: 100, PEEP: 9, ITime: 0.7, MAP: 16, PC: 15, PIP: 30    03-26 @ 07:01 - 03-27 @ 07:00  --------------------------------------------------------  IN: 2866.4 mL / OUT: 1135 mL / NET: 1731.4 mL    03-27 @ 07:01 - 03-27 @ 11:49  --------------------------------------------------------  IN: 0 mL / OUT: 275 mL / NET: -275 mL      CAPILLARY BLOOD GLUCOSE      POCT Blood Glucose.: 349 mg/dL (27 Mar 2021 11:28)      PHYSICAL EXAM:    Constitutional: NAD  HEENT: significant swelling near R mandible, without fluctuance, overlying ecchymoses   Respiratory: Intubated, coarse breath sounds b/l   Cardiovascular: Regular rate  Gastrointestinal: Soft, ND  Extremities: No LE edema   Neurological: Intubated, sedated     MEDICATIONS:  MEDICATIONS  (STANDING):  ampicillin/sulbactam  IVPB 3 Gram(s) IV Intermittent every 6 hours  aspirin  chewable 81 milliGRAM(s) Oral daily  carbidopa/levodopa  10/100 2 Tablet(s) Oral <User Schedule>  chlorhexidine 4% Liquid 1 Application(s) Topical <User Schedule>  dextrose 40% Gel 15 Gram(s) Oral once  dextrose 5%. 1000 milliLiter(s) (50 mL/Hr) IV Continuous <Continuous>  dextrose 5%. 1000 milliLiter(s) (100 mL/Hr) IV Continuous <Continuous>  dextrose 50% Injectable 25 Gram(s) IV Push once  dextrose 50% Injectable 12.5 Gram(s) IV Push once  dextrose 50% Injectable 25 Gram(s) IV Push once  fentaNYL   Infusion..... 0.5 MICROgram(s)/kG/Hr (0.8 mL/Hr) IV Continuous <Continuous>  glucagon  Injectable 1 milliGRAM(s) IntraMuscular once  heparin   Injectable 5000 Unit(s) SubCutaneous every 8 hours  insulin lispro (ADMELOG) corrective regimen sliding scale   SubCutaneous every 6 hours  insulin NPH human recombinant 10 Unit(s) SubCutaneous every 6 hours  ketorolac 0.5% Ophthalmic Solution 1 Drop(s) Left EYE two times a day  norepinephrine Infusion 0.1 MICROgram(s)/kG/Min (14.9 mL/Hr) IV Continuous <Continuous>  pantoprazole  Injectable 40 milliGRAM(s) IV Push daily  polyethylene glycol 3350 17 Gram(s) Oral daily  prednisoLONE acetate 1% Suspension 1 Drop(s) Left EYE four times a day  propofol Infusion 30 MICROgram(s)/kG/Min (14.3 mL/Hr) IV Continuous <Continuous>  senna Syrup 10 milliLiter(s) Oral daily  simvastatin 20 milliGRAM(s) Oral at bedtime  vancomycin  IVPB 1000 milliGRAM(s) IV Intermittent every 12 hours  vancomycin  IVPB        MEDICATIONS  (PRN):  acetaminophen    Suspension .. 650 milliGRAM(s) Enteral Tube every 6 hours PRN Temp greater or equal to 38C (100.4F)  sodium chloride 0.9% lock flush 10 milliLiter(s) IV Push every 1 hour PRN Pre/post blood products, medications, blood draw, and to maintain line patency      ALLERGIES:  Allergies    No Known Allergies    Intolerances        LABS:                        8.9    31.54 )-----------( 290      ( 27 Mar 2021 03:58 )             28.2     03-27    140  |  106  |  47<H>  ----------------------------<  159<H>  4.1   |  21<L>  |  1.12    Ca    8.0<L>      27 Mar 2021 03:58  Phos  4.1     03-27  Mg     2.5     03-27    TPro  5.8<L>  /  Alb  2.2<L>  /  TBili  0.8  /  DBili  x   /  AST  23  /  ALT  9   /  AlkPhos  90  03-27    PT/INR - ( 27 Mar 2021 03:58 )   PT: 14.0 sec;   INR: 1.23 ratio               RADIOLOGY & ADDITIONAL TESTS: Reviewed.

## 2021-03-27 NOTE — CHART NOTE - NSCHARTNOTEFT_GEN_A_CORE
Diabetes follow up   Please refer to last progress note for full plan of care   Chart reviewed   Glucose above target of 150-180 mg/dL   Would increase NPH further to 14 units every 6 hours while on continuous enteral feeding, HOLD if feeding is held   C/W Admelog MODERATE correctional scale every 6 hours   Endocrine following      CAPILLARY BLOOD GLUCOSE      POCT Blood Glucose.: 349 mg/dL (27 Mar 2021 11:28)  POCT Blood Glucose.: 249 mg/dL (27 Mar 2021 05:26)  POCT Blood Glucose.: 170 mg/dL (27 Mar 2021 01:35)  POCT Blood Glucose.: 136 mg/dL (27 Mar 2021 00:05)  POCT Blood Glucose.: 194 mg/dL (26 Mar 2021 17:52)      MEDICATIONS  (STANDING):  ampicillin/sulbactam  IVPB 3 Gram(s) IV Intermittent every 6 hours  aspirin  chewable 81 milliGRAM(s) Oral daily  carbidopa/levodopa  10/100 2 Tablet(s) Oral <User Schedule>  chlorhexidine 4% Liquid 1 Application(s) Topical <User Schedule>  dextrose 40% Gel 15 Gram(s) Oral once  dextrose 5%. 1000 milliLiter(s) (50 mL/Hr) IV Continuous <Continuous>  dextrose 5%. 1000 milliLiter(s) (100 mL/Hr) IV Continuous <Continuous>  dextrose 50% Injectable 25 Gram(s) IV Push once  dextrose 50% Injectable 12.5 Gram(s) IV Push once  dextrose 50% Injectable 25 Gram(s) IV Push once  fentaNYL   Infusion..... 0.5 MICROgram(s)/kG/Hr (0.8 mL/Hr) IV Continuous <Continuous>  glucagon  Injectable 1 milliGRAM(s) IntraMuscular once  heparin   Injectable 5000 Unit(s) SubCutaneous every 8 hours  insulin lispro (ADMELOG) corrective regimen sliding scale   SubCutaneous every 6 hours  insulin NPH human recombinant 12 Unit(s) SubCutaneous every 6 hours  ketorolac 0.5% Ophthalmic Solution 1 Drop(s) Left EYE two times a day  norepinephrine Infusion 0.1 MICROgram(s)/kG/Min (14.9 mL/Hr) IV Continuous <Continuous>  pantoprazole  Injectable 40 milliGRAM(s) IV Push daily  polyethylene glycol 3350 17 Gram(s) Oral daily  prednisoLONE acetate 1% Suspension 1 Drop(s) Left EYE four times a day  propofol Infusion 30 MICROgram(s)/kG/Min (14.3 mL/Hr) IV Continuous <Continuous>  senna Syrup 10 milliLiter(s) Oral daily  simvastatin 20 milliGRAM(s) Oral at bedtime  vancomycin  IVPB      vancomycin  IVPB 1000 milliGRAM(s) IV Intermittent every 12 hours      Diet, NPO with Tube Feed:   Tube Feeding Modality: Orogastric  Glucerna 1.5 Ignacio (GLUCERNA1.5RTH)  Total Volume for 24 Hours (mL): 960  Continuous  Starting Tube Feed Rate {mL per Hour}: 10  Increase Tube Feed Rate by (mL): 10     Every 4 hours  Until Goal Tube Feed Rate (mL per Hour): 40  Tube Feed Duration (in Hours): 24  Tube Feed Start Time: 15:45  No Carb Prosource (1pkg = 15gms Protein)     Qty per Day:  4 (03-24-21 @ 10:24) [Active]

## 2021-03-27 NOTE — PROGRESS NOTE ADULT - SUBJECTIVE AND OBJECTIVE BOX
Follow Up:  ID following for COVID19    Interval History/ROS:Patient is a 70y old  Male who presents with a chief complaint of acute respiratory failure d/t covid (26 Mar 2021 10:31)      Allergies    No Known Allergies    Intolerances        ANTIMICROBIALS:  ampicillin/sulbactam  IVPB 3 every 6 hours  vancomycin  IVPB 1000 every 12 hours  vancomycin  IVPB        OTHER MEDS:  acetaminophen    Suspension .. 650 milliGRAM(s) Enteral Tube every 6 hours PRN  aspirin  chewable 81 milliGRAM(s) Oral daily  carbidopa/levodopa  10/100 2 Tablet(s) Oral <User Schedule>  chlorhexidine 4% Liquid 1 Application(s) Topical <User Schedule>  dexMEDEtomidine Infusion 0.5 MICROgram(s)/kG/Hr IV Continuous <Continuous>  dextrose 40% Gel 15 Gram(s) Oral once  dextrose 5%. 1000 milliLiter(s) IV Continuous <Continuous>  dextrose 5%. 1000 milliLiter(s) IV Continuous <Continuous>  dextrose 50% Injectable 25 Gram(s) IV Push once  dextrose 50% Injectable 12.5 Gram(s) IV Push once  dextrose 50% Injectable 25 Gram(s) IV Push once  fentaNYL   Infusion..... 0.5 MICROgram(s)/kG/Hr IV Continuous <Continuous>  glucagon  Injectable 1 milliGRAM(s) IntraMuscular once  heparin   Injectable 5000 Unit(s) SubCutaneous every 8 hours  insulin lispro (ADMELOG) corrective regimen sliding scale   SubCutaneous every 6 hours  insulin NPH human recombinant 10 Unit(s) SubCutaneous every 6 hours  ketorolac 0.5% Ophthalmic Solution 1 Drop(s) Left EYE two times a day  norepinephrine Infusion 0.1 MICROgram(s)/kG/Min IV Continuous <Continuous>  pantoprazole  Injectable 40 milliGRAM(s) IV Push daily  polyethylene glycol 3350 17 Gram(s) Oral daily  prednisoLONE acetate 1% Suspension 1 Drop(s) Left EYE four times a day  propofol Infusion 30 MICROgram(s)/kG/Min IV Continuous <Continuous>  senna Syrup 10 milliLiter(s) Oral daily  simvastatin 20 milliGRAM(s) Oral at bedtime  sodium chloride 0.9% lock flush 10 milliLiter(s) IV Push every 1 hour PRN      Vital Signs Last 24 Hrs  T(C): 37.4 (27 Mar 2021 06:00), Max: 38.3 (26 Mar 2021 20:00)  T(F): 99.4 (27 Mar 2021 06:00), Max: 101 (26 Mar 2021 20:00)  HR: 74 (27 Mar 2021 07:04) (65 - 112)  BP: 101/50 (26 Mar 2021 21:31) (78/44 - 101/50)  BP(mean): --  RR: 30 (27 Mar 2021 07:00) (12 - 38)  SpO2: 99% (27 Mar 2021 07:04) (88% - 100%)    EXAM:  Constitutional: Not in acute distress  Eyes: No icterus. Pupils b/l reactive to light.  Oral cavity: Clear, no lesions  Neck: No neck vein distension noted  RS: Chest clear to auscultation bilaterally. No wheeze/rhonchi/crepitations.  CVS: S1, S2 heard. Regular rate and rhythm. No murmurs/rubs/gallops.  Abdomen: Soft. No guarding/rigidity/tenderness.  : No acute abnormalities  Extremities: Warm. No pedal edema  Skin: No lesions noted  Vascular: No evidence of phlebitis  Neuro: Alert, oriented to time/place/person                        8.9    31.54 )-----------( 290      ( 27 Mar 2021 03:58 )             28.2       03-27    140  |  106  |  47<H>  ----------------------------<  159<H>  4.1   |  21<L>  |  1.12    Ca    8.0<L>      27 Mar 2021 03:58  Phos  4.1     03-27  Mg     2.5     03-27    TPro  5.8<L>  /  Alb  2.2<L>  /  TBili  0.8  /  DBili  x   /  AST  23  /  ALT  9   /  AlkPhos  90  03-27          MICROBIOLOGY:Culture Results:   No growth to date. (03-22 @ 18:22)  Culture Results:   Growth in anaerobic bottle: Staphylococcus epidermidis  Coag Negative Staphylococcus  Single set isolate, possible contaminant. Contact  Microbiology if susceptibility testing clinically  indicated.  ***Blood Panel PCR results on this specimen are available  approximately 3 hours after the Gram stain result.***  Gram stain, PCR, and/or culture results may not always  correspond due to difference in methodologies.  ************************************************************  This PCR assay was performed by multiplex PCR. This  Assay tests for 66 bacterial and resistance gene targets.  Please refer to the [facility] test directory  at [email or web address] for details. (03-20 @ 17:50)  Culture Results:   No Growth Final (03-20 @ 17:50)      RADIOLOGY:  < from: CT Neck Soft Tissue w/ IV Cont (03.26.21 @ 03:50) >  IMPRESSION:  -Findings suggestive of severe right parotiditis and/or masseter myositis with extensive associated cellulitis involving the entirety the right face. Fluidtracking in the subcutaneous tissues from the right temporal scalp to the right clavicle, but with no rim enhancement to suggest abscess formation at this time. Differential includes dependent edema in this patient with history of proning.  < end of copied text >    OTHER INVESTIGATIONS: Follow Up:  ID following for COVID19    Interval History/ROS:Patient is a 70y old  Male who presents with a chief complaint of acute respiratory failure d/t covid (26 Mar 2021 10:31)    Allergies    No Known Allergies    Intolerances    ANTIMICROBIALS:  ampicillin/sulbactam  IVPB 3 every 6 hours  vancomycin  IVPB 1000 every 12 hours  vancomycin  IVPB      OTHER MEDS:  acetaminophen    Suspension .. 650 milliGRAM(s) Enteral Tube every 6 hours PRN  aspirin  chewable 81 milliGRAM(s) Oral daily  carbidopa/levodopa  10/100 2 Tablet(s) Oral <User Schedule>  chlorhexidine 4% Liquid 1 Application(s) Topical <User Schedule>  dexMEDEtomidine Infusion 0.5 MICROgram(s)/kG/Hr IV Continuous <Continuous>  dextrose 40% Gel 15 Gram(s) Oral once  dextrose 5%. 1000 milliLiter(s) IV Continuous <Continuous>  dextrose 5%. 1000 milliLiter(s) IV Continuous <Continuous>  dextrose 50% Injectable 25 Gram(s) IV Push once  dextrose 50% Injectable 12.5 Gram(s) IV Push once  dextrose 50% Injectable 25 Gram(s) IV Push once  fentaNYL   Infusion..... 0.5 MICROgram(s)/kG/Hr IV Continuous <Continuous>  glucagon  Injectable 1 milliGRAM(s) IntraMuscular once  heparin   Injectable 5000 Unit(s) SubCutaneous every 8 hours  insulin lispro (ADMELOG) corrective regimen sliding scale   SubCutaneous every 6 hours  insulin NPH human recombinant 10 Unit(s) SubCutaneous every 6 hours  ketorolac 0.5% Ophthalmic Solution 1 Drop(s) Left EYE two times a day  norepinephrine Infusion 0.1 MICROgram(s)/kG/Min IV Continuous <Continuous>  pantoprazole  Injectable 40 milliGRAM(s) IV Push daily  polyethylene glycol 3350 17 Gram(s) Oral daily  prednisoLONE acetate 1% Suspension 1 Drop(s) Left EYE four times a day  propofol Infusion 30 MICROgram(s)/kG/Min IV Continuous <Continuous>  senna Syrup 10 milliLiter(s) Oral daily  simvastatin 20 milliGRAM(s) Oral at bedtime  sodium chloride 0.9% lock flush 10 milliLiter(s) IV Push every 1 hour PRN    Vital Signs Last 24 Hrs  T(C): 37.4 (27 Mar 2021 06:00), Max: 38.3 (26 Mar 2021 20:00)  T(F): 99.4 (27 Mar 2021 06:00), Max: 101 (26 Mar 2021 20:00)  HR: 74 (27 Mar 2021 07:04) (65 - 112)  BP: 101/50 (26 Mar 2021 21:31) (78/44 - 101/50)  RR: 30 (27 Mar 2021 07:00) (12 - 38)  SpO2: 99% (27 Mar 2021 07:04) (88% - 100%)    EXAM:  Constitutional: Not in acute distress  Eyes: No icterus. Pupils b/l reactive to light.  Oral cavity: Clear, no lesions  Neck: No neck vein distension noted  RS: Chest clear to auscultation bilaterally. No wheeze/rhonchi/crepitations.  CVS: S1, S2 heard. Regular rate and rhythm. No murmurs/rubs/gallops.  Abdomen: Soft. No guarding/rigidity/tenderness.  : No acute abnormalities  Extremities: Warm. No pedal edema  Skin: No lesions noted  Vascular: No evidence of phlebitis  Neuro: Alert, oriented to time/place/person    Labs:                        8.9    31.54 )-----------( 290      ( 27 Mar 2021 03:58 )             28.2     03-27    140  |  106  |  47<H>  ----------------------------<  159<H>  4.1   |  21<L>  |  1.12    Ca    8.0<L>      27 Mar 2021 03:58  Phos  4.1     03-27  Mg     2.5     03-27    TPro  5.8<L>  /  Alb  2.2<L>  /  TBili  0.8  /  DBili  x   /  AST  23  /  ALT  9   /  AlkPhos  90  03-27    MICROBIOLOGY:Culture Results:   No growth to date. (03-22 @ 18:22)  Culture Results:   Growth in anaerobic bottle: Staphylococcus epidermidis  Coag Negative Staphylococcus  Single set isolate, possible contaminant. Contact  Microbiology if susceptibility testing clinically  indicated.  ***Blood Panel PCR results on this specimen are available  approximately 3 hours after the Gram stain result.***  Gram stain, PCR, and/or culture results may not always  correspond due to difference in methodologies.  ************************************************************  This PCR assay was performed by multiplex PCR. This  Assay tests for 66 bacterial and resistance gene targets.  Please refer to the [facility] test directory  at [email or web address] for details. (03-20 @ 17:50)  Culture Results:   No Growth Final (03-20 @ 17:50)    RADIOLOGY:  < from: CT Neck Soft Tissue w/ IV Cont (03.26.21 @ 03:50) >  IMPRESSION:  -Findings suggestive of severe right parotiditis and/or masseter myositis with extensive associated cellulitis involving the entirety the right face. Fluidtracking in the subcutaneous tissues from the right temporal scalp to the right clavicle, but with no rim enhancement to suggest abscess formation at this time. Differential includes dependent edema in this patient with history of proning.  < end of copied text > Follow Up:  ID following for COVID19    Interval History/ROS:Patient is a 70y old  Male who presents with a chief complaint of acute respiratory failure d/t covid (26 Mar 2021 10:31)  - ID recalled for concern for Rt cheek parotitis, sepsis  - patient sedated and intubated. ROS could not be obtained  - per discussion with patient's RN - no loose stools, no skin breakdown, brownish-white secretions from E-T tube, stable pressors    Allergies    No Known Allergies    Intolerances    ANTIMICROBIALS:  ampicillin/sulbactam  IVPB 3 every 6 hours  vancomycin  IVPB 1000 every 12 hours  vancomycin  IVPB      OTHER MEDS:  acetaminophen    Suspension .. 650 milliGRAM(s) Enteral Tube every 6 hours PRN  aspirin  chewable 81 milliGRAM(s) Oral daily  carbidopa/levodopa  10/100 2 Tablet(s) Oral <User Schedule>  chlorhexidine 4% Liquid 1 Application(s) Topical <User Schedule>  dexMEDEtomidine Infusion 0.5 MICROgram(s)/kG/Hr IV Continuous <Continuous>  dextrose 40% Gel 15 Gram(s) Oral once  dextrose 5%. 1000 milliLiter(s) IV Continuous <Continuous>  dextrose 5%. 1000 milliLiter(s) IV Continuous <Continuous>  dextrose 50% Injectable 25 Gram(s) IV Push once  dextrose 50% Injectable 12.5 Gram(s) IV Push once  dextrose 50% Injectable 25 Gram(s) IV Push once  fentaNYL   Infusion..... 0.5 MICROgram(s)/kG/Hr IV Continuous <Continuous>  glucagon  Injectable 1 milliGRAM(s) IntraMuscular once  heparin   Injectable 5000 Unit(s) SubCutaneous every 8 hours  insulin lispro (ADMELOG) corrective regimen sliding scale   SubCutaneous every 6 hours  insulin NPH human recombinant 10 Unit(s) SubCutaneous every 6 hours  ketorolac 0.5% Ophthalmic Solution 1 Drop(s) Left EYE two times a day  norepinephrine Infusion 0.1 MICROgram(s)/kG/Min IV Continuous <Continuous>  pantoprazole  Injectable 40 milliGRAM(s) IV Push daily  polyethylene glycol 3350 17 Gram(s) Oral daily  prednisoLONE acetate 1% Suspension 1 Drop(s) Left EYE four times a day  propofol Infusion 30 MICROgram(s)/kG/Min IV Continuous <Continuous>  senna Syrup 10 milliLiter(s) Oral daily  simvastatin 20 milliGRAM(s) Oral at bedtime  sodium chloride 0.9% lock flush 10 milliLiter(s) IV Push every 1 hour PRN    Vital Signs Last 24 Hrs  T(C): 37.4 (27 Mar 2021 06:00), Max: 38.3 (26 Mar 2021 20:00)  T(F): 99.4 (27 Mar 2021 06:00), Max: 101 (26 Mar 2021 20:00)  HR: 74 (27 Mar 2021 07:04) (65 - 112)  BP: 101/50 (26 Mar 2021 21:31) (78/44 - 101/50)  RR: 30 (27 Mar 2021 07:00) (12 - 38)  SpO2: 99% (27 Mar 2021 07:04) (88% - 100%)    EXAM:  Constitutional: Sedated and intubated. On FiO2 100%  Neck: No neck vein distension noted  RS: Coarse basilar breath sounds with wheeze? bilaterally.   CVS: S1, S2 heard. Regular rate and rhythm. No murmurs/rubs/gallops.  Abdomen: Soft. No guarding/rigidity/tenderness.  : Da Silva +  Extremities: Warm. No pedal edema  Skin: No lesions noted  Vascular: No evidence of phlebitis. LUE IJV TLC and Rt Radial A-line noted  Neuro:  Sedated and intubated. On FiO2 100%    Labs:                        8.9    31.54 )-----------( 290      ( 27 Mar 2021 03:58 )             28.2     03-27    140  |  106  |  47<H>  ----------------------------<  159<H>  4.1   |  21<L>  |  1.12    Ca    8.0<L>      27 Mar 2021 03:58  Phos  4.1     03-27  Mg     2.5     03-27    TPro  5.8<L>  /  Alb  2.2<L>  /  TBili  0.8  /  DBili  x   /  AST  23  /  ALT  9   /  AlkPhos  90  03-27    MICROBIOLOGY:Culture Results:   No growth to date. (03-22 @ 18:22)  Culture Results:   Growth in anaerobic bottle: Staphylococcus epidermidis  Coag Negative Staphylococcus  Single set isolate, possible contaminant. Contact  Microbiology if susceptibility testing clinically  indicated.  ***Blood Panel PCR results on this specimen are available  approximately 3 hours after the Gram stain result.***  Gram stain, PCR, and/or culture results may not always  correspond due to difference in methodologies.  ************************************************************  This PCR assay was performed by multiplex PCR. This  Assay tests for 66 bacterial and resistance gene targets.  Please refer to the [facility] test directory  at [email or web address] for details. (03-20 @ 17:50)  Culture Results:   No Growth Final (03-20 @ 17:50)    RADIOLOGY:  < from: CT Neck Soft Tissue w/ IV Cont (03.26.21 @ 03:50) >  IMPRESSION:  -Findings suggestive of severe right parotiditis and/or masseter myositis with extensive associated cellulitis involving the entirety the right face. Fluidtracking in the subcutaneous tissues from the right temporal scalp to the right clavicle, but with no rim enhancement to suggest abscess formation at this time. Differential includes dependent edema in this patient with history of proning.  < end of copied text >

## 2021-03-27 NOTE — PROGRESS NOTE ADULT - ATTENDING COMMENTS
I personally saw and examined the patient on multidisciplinary MICU rounds. Discussed in detail with house staff. Please see my attestation below for specifics of care.    70M with CAD s/p CABG and PCI, DM2, HTN, BPH s/p TURP, PD, admitted 3/14 with for weakness and COVID-19. Started on decadron and remdesivir and discharged 3/16. Returned 3/17 with worsening respiratory failure. Intubated 3/17.    Neuro: Sedation with propofol, analgesia with fentanyl. D/c Precedex with clinical decline overnight and hypotension.  CV: Circulatory shock, continue NorEpi, likely distributive/septic in setting of worsening leukocytosis. Add vasopressin if requirements increasing.  Pulm: Acute hypoxic respiratory failure from COVID-19. FiO2 requirements quickly increased overnight. CXR with ongoing infiltrates c/w COVID. POCUS without signs of acute R heart failure to suspect PE. No PTX. Will wean O2 and consider proning if ABG doesn't look better.  GI: Tube feeds with Glucerna. PPI.  Renal: ZELALEM, unclear if ATN from shock/sepsis/COVID. Also got CT contrast but unlikely to show this quickly. Unlikely pre-renal as he is +1300 cc.  Heme: Anemia, no indication to transfuse. ASA and SC heparin in setting of critical illness, COVID, CAD.  ID: Facial cellulitis / parotitis / ? odontogenic with sepsis, Unasyn and Vanco per ID. Bedside U/S without fluid collection. No warmth or fluctuance to suggest spread to scalp or neck. FOM soft so doubt Watson's angina. ENT and OMFS consulted. COVID, s/p remdesivir, Dex.  Endo: DM with hyperglycemia, continue NPH & lispro SSI.  Prophylaxis: SC heparin, PPI, chlorhexidine.  Code Status: Full.  Lines: L IJ 3/20, L radial 3/20, Da Silva -- all necessary.    Critical Care time = 35 minutes.

## 2021-03-27 NOTE — PROGRESS NOTE ADULT - ATTENDING COMMENTS
70 year old [place]ive[place]/Jhonny-speaking male with a history of CAD s/p CABG and 2 stents, Type 2 DM, HTN, BPH s/p TURP and Parkinson's disease presenting with weakness  COVID on 3/9 without showing any symptoms and was due to travel for work  This morning, patient found his wife unresponsive at home and EMS was called- patient had ROSC s/p cardiac arrest and is currently hospitalized at   Now resp failure  Intubated   CNS low grade bacteremia  Team now concerned for R chin/facial swelling concerning for possible cellulitis  CT with R parotidis, masseter myositis, cellulitis  Overall,    a) Resp failure  ? Fibrosis due to COVID  Follow Cx  Follow clinically  s/p 5 days of BS coverage with cefepime    B) GPC bacteremia  likely  CNS contaminant  repeat blood Cx negative  follow clinically and repeat Cx   Presently on Vanco    C) Facial cellulitis/myositis?  - Note CT findings  - Vanco 1g q 12 (monitor levels)  - Unasyn 3g q 6  - Monitor site, trend WBC  - F/U ENT    Lenin Santos MD  Pager [phone]  After 5pm and on weekends call [phone]

## 2021-03-28 NOTE — PROGRESS NOTE ADULT - SUBJECTIVE AND OBJECTIVE BOX
INTERVAL HPI/OVERNIGHT EVENTS: Temp to 100.2. NPH increased to 20q6    SUBJECTIVE: Pt sedated, intubated    OBJECTIVE:    VITAL SIGNS:  ICU Vital Signs Last 24 Hrs  T(C): 37.2 (28 Mar 2021 11:55), Max: 37.9 (28 Mar 2021 03:00)  T(F): 99 (28 Mar 2021 11:55), Max: 100.2 (28 Mar 2021 03:00)  HR: 80 (28 Mar 2021 11:55) (63 - 99)  BP: --  BP(mean): --  ABP: 109/43 (28 Mar 2021 11:55) (101/50 - 144/55)  ABP(mean): 65 (28 Mar 2021 11:55) (58 - 74)  RR: 30 (28 Mar 2021 11:55) (30 - 32)  SpO2: 97% (28 Mar 2021 11:55) (95% - 100%)    Mode: AC/ CMV (Assist Control/ Continuous Mandatory Ventilation), RR (machine): 30, TV (machine): 400, FiO2: 40, PEEP: 6, ITime: 0.71, MAP: 13, PIP: 28    03-27 @ 07:01  -  03-28 @ 07:00  --------------------------------------------------------  IN: 2208 mL / OUT: 1365 mL / NET: 843 mL    03-28 @ 07:01 - 03-28 @ 12:01  --------------------------------------------------------  IN: 273 mL / OUT: 330 mL / NET: -57 mL      CAPILLARY BLOOD GLUCOSE      POCT Blood Glucose.: 217 mg/dL (28 Mar 2021 11:16)      PHYSICAL EXAM:    General: NAD  HEENT: NC/AT; PERRL, clear conjunctiva  Neck: supple  Respiratory: CTA b/l  Cardiovascular: +S1/S2; RRR  Abdomen: soft, NT/ND; +BS x4  Extremities: WWP, 2+ peripheral pulses b/l; no LE edema  Skin: normal color and turgor; no rash  Neurological:    MEDICATIONS:  MEDICATIONS  (STANDING):  ampicillin/sulbactam  IVPB 3 Gram(s) IV Intermittent every 6 hours  aspirin  chewable 81 milliGRAM(s) Oral daily  carbidopa/levodopa  10/100 2 Tablet(s) Oral <User Schedule>  chlorhexidine 4% Liquid 1 Application(s) Topical <User Schedule>  dextrose 40% Gel 15 Gram(s) Oral once  dextrose 5%. 1000 milliLiter(s) (50 mL/Hr) IV Continuous <Continuous>  dextrose 5%. 1000 milliLiter(s) (100 mL/Hr) IV Continuous <Continuous>  dextrose 50% Injectable 25 Gram(s) IV Push once  dextrose 50% Injectable 12.5 Gram(s) IV Push once  dextrose 50% Injectable 25 Gram(s) IV Push once  fentaNYL   Infusion..... 0.5 MICROgram(s)/kG/Hr (0.8 mL/Hr) IV Continuous <Continuous>  glucagon  Injectable 1 milliGRAM(s) IntraMuscular once  heparin   Injectable 5000 Unit(s) SubCutaneous every 8 hours  insulin lispro (ADMELOG) corrective regimen sliding scale   SubCutaneous every 6 hours  insulin NPH human recombinant 20 Unit(s) SubCutaneous every 6 hours  ketorolac 0.5% Ophthalmic Solution 1 Drop(s) Left EYE two times a day  norepinephrine Infusion 0.1 MICROgram(s)/kG/Min (14.9 mL/Hr) IV Continuous <Continuous>  pantoprazole  Injectable 40 milliGRAM(s) IV Push daily  polyethylene glycol 3350 17 Gram(s) Oral daily  prednisoLONE acetate 1% Suspension 1 Drop(s) Left EYE four times a day  propofol Infusion 30 MICROgram(s)/kG/Min (14.3 mL/Hr) IV Continuous <Continuous>  senna Syrup 10 milliLiter(s) Oral daily  simvastatin 20 milliGRAM(s) Oral at bedtime  vancomycin  IVPB 1000 milliGRAM(s) IV Intermittent every 12 hours  vancomycin  IVPB        MEDICATIONS  (PRN):  acetaminophen    Suspension .. 650 milliGRAM(s) Enteral Tube every 6 hours PRN Temp greater or equal to 38C (100.4F)  sodium chloride 0.9% lock flush 10 milliLiter(s) IV Push every 1 hour PRN Pre/post blood products, medications, blood draw, and to maintain line patency      ALLERGIES:  Allergies    No Known Allergies    Intolerances        LABS:                        7.1    20.32 )-----------( 231      ( 28 Mar 2021 03:11 )             22.0     03-28    141  |  107  |  46<H>  ----------------------------<  268<H>  3.5   |  23  |  1.17    Ca    7.8<L>      28 Mar 2021 03:11  Phos  2.4     03-28  Mg     2.6     03-28    TPro  5.3<L>  /  Alb  1.8<L>  /  TBili  0.4  /  DBili  x   /  AST  13  /  ALT  5   /  AlkPhos  89  03-28    PT/INR - ( 28 Mar 2021 03:11 )   PT: 14.2 sec;   INR: 1.26 ratio         PTT - ( 28 Mar 2021 03:11 )  PTT:50.1 sec      RADIOLOGY & ADDITIONAL TESTS: Reviewed.

## 2021-03-28 NOTE — PROGRESS NOTE ADULT - ASSESSMENT
70 year old [place]ian/Jhonny-speaking male with a history of CAD s/p CABG and 2 stents, Type 2 DM, HTN, BPH s/p TURP and Parkinson's disease, who was recently admitted on 3/14 for weakness, covid, hypoxia, started on remdesivir/decadron, discharged home on 3/16. However, at home, pt decompensated with increasing sob/labored breathing, o2 sat dropped from 88% to 66% within 1/2 hour per daughter, gasping for air. Pt's son called 911 and brought him back to the hospital. Patient admitted for ARDS 2/2 covid pneumonia, s/p intubation 3/17, hospital course c/b parotitis.     Neuro  -sedated, on fentanyl + propofol  -Will wean today as able    Resp  acute hypoxic respiratory failure 2/2 COVID PNA vs septic shock   pt with increasing oxygen requirements overnight - previously doing well on FIO2 40   -Current vent settings RR 30/PEEP 9/FIO2 80%   -wean vent settings as tolerated     ENT   ?parotitis vs masseter myositis   CT neck showing significant swelling of R mandible - differential includes parotitis vs masseter myositis vs dependent swelling in the setting of recent proning  ENT consulted; recommends no acute intervention as there is no respiratory compromise at this time   OMFS consulted yesterday; f/u recommendations   -c/w abx as below      Cardio  vasoplegic shock vs septic shock  pt requiring pressor support overnight - pressor requirement currently stable   -c/w levo gtt to maintain map > 65  -wean as tolerated     GI  -Glucerna 40cc/hr  -Protonix PPI  -Bowel regimen with miralax, senna  -Simvastatin for HLD      - Da Silva  - Strict intake and output monitoring    Endo  euglycemic   -FSGq6h  -ISS  -NPH 10Uq6h    ID  COVID   -s/p remdesivir, dexamethasone   -3/21 blood cx:  MSSE in 1/4 bottles - likely contaminant   -3/22 blood cultures NGTD    ?parotitis vs masseter myositis   CT neck: showing parotitis vs masseter myositis   ID following  c/w unasyn (3/26 -->)   c/w vancomycin (3/26 -->)   f/u vanc trough - target level 15-20  f/u repeat blood cx    Heme  -SQH 5k q8h  -ASA 81mg  - CBC dropped to 7.1/22.0 in AM labs. Suspect dilution will get rpt CBC in PM     GOC- Full code

## 2021-03-28 NOTE — PROGRESS NOTE ADULT - SUBJECTIVE AND OBJECTIVE BOX
Pt seen and examined at bedside. CT suggests severe R parotitis with overlying cellulitis without abscess. Primary team unable to use warm compresses as pt remains febrile. Continues on unasyn. No evidence of purulence in the mouth.   Afebrile overnight    T(F): 99.3 (28 Mar 2021 16:40), Max: 100.2 (28 Mar 2021 03:00)  HR: 84 (28 Mar 2021 17:30) (64 - 99)  ABP: 120/41 (28 Mar 2021 17:30) (101/50 - 164/54)  RR: 30 (28 Mar 2021 17:30) (30 - 32)  SpO2: 96% (28 Mar 2021 17:30) (95% - 100%)    ENT EXAM-   Constitutional: Intubated and sedated   Head:  normocephalic, atraumatic.   Face: Right cheek with swelling and overlying ecchymosis  OC/OP: ETT in place. No purulence noted from duct on expression of cheek   Neck:  Trachea midline.  Thyroid, parotid and submandibular glands normal.  Lymph:  No cervical adenopathy.    70y Male with COVID ARDS with R parotitis and overlying cellulitis 3/26    - Continue IV abx, unasyn  - May apply baci to cellulitic area of right face  - Consider broadening abx vs ID consult if pt remains febrile  - Apply warm compresses (when not febrile) to the area and massage the area as frequently as possible  - Will continue to follow  - Call/page with questions   Pt seen and examined at bedside. CT suggests severe R parotitis with overlying cellulitis without abscess. Primary team unable to use warm compresses as pt remains febrile. Continues on unasyn. No evidence of purulence in the mouth.   Afebrile overnight    T(F): 99.3 (28 Mar 2021 16:40), Max: 100.2 (28 Mar 2021 03:00)  HR: 84 (28 Mar 2021 17:30) (64 - 99)  ABP: 120/41 (28 Mar 2021 17:30) (101/50 - 164/54)  RR: 30 (28 Mar 2021 17:30) (30 - 32)  SpO2: 96% (28 Mar 2021 17:30) (95% - 100%)    ENT EXAM-   Constitutional: Intubated and sedated   Head:  normocephalic, atraumatic.   Face: Right cheek with swelling and overlying ecchymosis  OC/OP: ETT in place. No purulence noted from duct on expression of cheek   Neck:  Trachea midline.  Thyroid, parotid and submandibular glands normal.  Lymph:  No cervical adenopathy.    70y Male with COVID ARDS with R parotitis and overlying cellulitis 3/26    - Continue IV abx, unasyn  - May apply baci to cellulitic area of right face  - Consider broadening abx vs ID consult if pt remains febrile  - Apply warm compresses (when not febrile) to the area and massage the area as frequently as possible  - Signing off, re-page with questions

## 2021-03-28 NOTE — PROGRESS NOTE ADULT - ATTENDING COMMENTS
I personally saw and examined the patient on multidisciplinary MICU rounds. Discussed in detail with house staff. Please see my attestation below for specifics of care.    70M with CAD s/p CABG and PCI, DM2, HTN, BPH s/p TURP, PD, admitted 3/14 with for weakness and COVID-19. Started on decadron and remdesivir and discharged 3/16. Returned 3/17 with worsening respiratory failure. Intubated 3/17.    Neuro: Sedation with propofol, analgesia with fentanyl. Since vent requirements improved, wean Propofol for SBT. Precedex if needed for agitation.  CV: Circulatory shock, continue low-dose NorEpi for MAP goal > 65, likely distributive/septic with sedative effects too.  Pulm: Acute hypoxic respiratory failure from COVID-19. Decompensated yesterday likely from mucous plug, but back to FiO2 .4 and PEEP 6 today. Will  wean sedation for PSV/SBT.  GI: Tube feeds with Glucerna. PPI.  Renal: ZELALEM, stable, suspect ATN from shock/sepsis/COVID. UOP is acceptable. No RRT need at this time.  Heme: Anemia, worsening -- ? dilutional. No evidence of active bleeding. Check afternoon CBC. Continue ASA and SC heparin in setting of critical illness, COVID, CAD pending repeat CBC.  ID: Facial cellulitis / parotitis / ? odontogenic infection with sepsis, continue Unasyn & Vanco per ID. WBCs coming down today. ENT and OMFS (? odontogenic source) consulted. COVID, s/p remdesivir, Dex.  Endo: DM with hyperglycemia, continue NPH & lispro SSI. NPH was increased to 20u q6h overnight for hyperglycemia.  Prophylaxis: SC heparin, PPI, chlorhexidine.  Code Status: Full.  Lines: L IJ 3/20, L radial 3/20, Da Silva -- all necessary.    Critical Care time = 35 minutes.

## 2021-03-29 NOTE — PROGRESS NOTE ADULT - SUBJECTIVE AND OBJECTIVE BOX
MD DORIS Aviles [facility]  Pager: [phone] / DORIS [phone]     INTERVAL HPI/OVERNIGHT EVENTS: NPH decreased to 10q6    SUBJECTIVE: Pt sedated, intubated    ROS: unable to obtain    OBJECTIVE:    VITAL SIGNS:  ICU Vital Signs Last 24 Hrs  T(C): 37.6 (29 Mar 2021 07:00), Max: 37.7 (29 Mar 2021 05:00)  T(F): 99.7 (29 Mar 2021 07:00), Max: 99.9 (29 Mar 2021 05:00)  HR: 106 (29 Mar 2021 07:52) (72 - 120)  BP: --  BP(mean): --  ABP: 115/54 (29 Mar 2021 07:00) (98/47 - 180/65)  ABP(mean): 61 (28 Mar 2021 21:30) (58 - 88)  RR: 30 (29 Mar 2021 07:00) (30 - 32)  SpO2: 99% (29 Mar 2021 07:52) (96% - 100%)    Mode: AC/ CMV (Assist Control/ Continuous Mandatory Ventilation), RR (machine): 30, TV (machine): 400, FiO2: 40, PEEP: 6, ITime: 0.88, MAP: 14, PIP: 27    03-28 @ 07:01 - 03-29 @ 07:00  --------------------------------------------------------  IN: 1989.8 mL / OUT: 2325 mL / NET: -335.2 mL    03-29 @ 07:01 - 03-29 @ 07:58  --------------------------------------------------------  IN: 57.8 mL / OUT: 75 mL / NET: -17.2 mL      CAPILLARY BLOOD GLUCOSE      POCT Blood Glucose.: 135 mg/dL (29 Mar 2021 04:57)      PHYSICAL EXAM:    General: NAD  HEENT: NC/AT; PERRL, clear conjunctiva  Neck: supple  Respiratory: CTA b/l  Cardiovascular: +S1/S2; RRR  Abdomen: soft, NT/ND; +BS x4  Extremities: WWP, 2+ peripheral pulses b/l; no LE edema  Skin: normal color and turgor; no rash  Neurological:    MEDICATIONS:  MEDICATIONS  (STANDING):  acetylcysteine 10%  Inhalation 5 milliLiter(s) Inhalation three times a day  ampicillin/sulbactam  IVPB 3 Gram(s) IV Intermittent every 6 hours  aspirin  chewable 81 milliGRAM(s) Oral daily  carbidopa/levodopa  10/100 2 Tablet(s) Oral <User Schedule>  chlorhexidine 4% Liquid 1 Application(s) Topical <User Schedule>  dextrose 40% Gel 15 Gram(s) Oral once  dextrose 5%. 1000 milliLiter(s) (50 mL/Hr) IV Continuous <Continuous>  dextrose 5%. 1000 milliLiter(s) (100 mL/Hr) IV Continuous <Continuous>  dextrose 50% Injectable 25 Gram(s) IV Push once  dextrose 50% Injectable 12.5 Gram(s) IV Push once  dextrose 50% Injectable 25 Gram(s) IV Push once  fentaNYL   Infusion..... 0.5 MICROgram(s)/kG/Hr (0.8 mL/Hr) IV Continuous <Continuous>  glucagon  Injectable 1 milliGRAM(s) IntraMuscular once  insulin lispro (ADMELOG) corrective regimen sliding scale   SubCutaneous every 6 hours  insulin NPH human recombinant 10 Unit(s) SubCutaneous every 6 hours  ketorolac 0.5% Ophthalmic Solution 1 Drop(s) Left EYE two times a day  norepinephrine Infusion 0.1 MICROgram(s)/kG/Min (14.9 mL/Hr) IV Continuous <Continuous>  pantoprazole  Injectable 40 milliGRAM(s) IV Push every 12 hours  polyethylene glycol 3350 17 Gram(s) Oral daily  prednisoLONE acetate 1% Suspension 1 Drop(s) Left EYE four times a day  propofol Infusion 30 MICROgram(s)/kG/Min (14.3 mL/Hr) IV Continuous <Continuous>  senna Syrup 10 milliLiter(s) Oral daily  simvastatin 20 milliGRAM(s) Oral at bedtime    MEDICATIONS  (PRN):  acetaminophen    Suspension .. 650 milliGRAM(s) Enteral Tube every 6 hours PRN Temp greater or equal to 38C (100.4F)  sodium chloride 0.9% lock flush 10 milliLiter(s) IV Push every 1 hour PRN Pre/post blood products, medications, blood draw, and to maintain line patency      ALLERGIES:  Allergies    No Known Allergies    Intolerances        LABS:                        8.5    17.89 )-----------( 240      ( 29 Mar 2021 01:44 )             25.9     03-29    141  |  107  |  54<H>  ----------------------------<  134<H>  3.7   |  23  |  1.70<H>    Ca    7.9<L>      29 Mar 2021 01:44  Phos  2.4     03-28  Mg     2.6     03-28    TPro  5.9<L>  /  Alb  2.1<L>  /  TBili  0.8  /  DBili  x   /  AST  19  /  ALT  7   /  AlkPhos  151<H>  03-29    PT/INR - ( 29 Mar 2021 01:44 )   PT: 12.4 sec;   INR: 1.08 ratio         PTT - ( 29 Mar 2021 01:44 )  PTT:36.7 sec      RADIOLOGY & ADDITIONAL TESTS: Reviewed. MD DORIS Aviles [facility]  Pager: [phone] / DORIS [phone]     INTERVAL HPI / OVERNIGHT EVENTS: NPH decreased to 10q6 from 20q6. Lasix 40 IV overnight with good urine output. Went into afib, received amio bolus. 1 unit pRBCs overnight with Hb 6.9 --> 8.5.     SUBJECTIVE: Pt sedated, intubated    ROS: unable to obtain    OBJECTIVE:    VITAL SIGNS:  ICU Vital Signs Last 24 Hrs  T(C): 37.6 (29 Mar 2021 07:00), Max: 37.7 (29 Mar 2021 05:00)  T(F): 99.7 (29 Mar 2021 07:00), Max: 99.9 (29 Mar 2021 05:00)  HR: 106 (29 Mar 2021 07:52) (72 - 120)  BP: --  BP(mean): --  ABP: 115/54 (29 Mar 2021 07:00) (98/47 - 180/65)  ABP(mean): 61 (28 Mar 2021 21:30) (58 - 88)  RR: 30 (29 Mar 2021 07:00) (30 - 32)  SpO2: 99% (29 Mar 2021 07:52) (96% - 100%)    Mode: AC/ CMV (Assist Control/ Continuous Mandatory Ventilation), RR (machine): 30, TV (machine): 400, FiO2: 40, PEEP: 6, ITime: 0.88, MAP: 14, PIP: 27    03-28 @ 07:01 - 03-29 @ 07:00  --------------------------------------------------------  IN: 1989.8 mL / OUT: 2325 mL / NET: -335.2 mL    03-29 @ 07:01 - 03-29 @ 07:58  --------------------------------------------------------  IN: 57.8 mL / OUT: 75 mL / NET: -17.2 mL      CAPILLARY BLOOD GLUCOSE      POCT Blood Glucose.: 135 mg/dL (29 Mar 2021 04:57)      PHYSICAL EXAM:    General: intubated, sedated   HEENT: R cheek with swelling and overlying ecchymosis   Neck: supple  Respiratory: CTA b/l   Cardiovascular: tachycardic, irregular, no murmurs   Abdomen: soft, NT/ND; +BS x4  Extremities: WWP, 2+ peripheral pulses b/l; no LE edema  Skin: no rash   Neurological: sedated   Lines: L IJ (3/20 - ), L radial A line (3/20 - )     MEDICATIONS:  MEDICATIONS  (STANDING):  acetylcysteine 10%  Inhalation 5 milliLiter(s) Inhalation three times a day  ampicillin/sulbactam  IVPB 3 Gram(s) IV Intermittent every 6 hours  aspirin  chewable 81 milliGRAM(s) Oral daily  carbidopa/levodopa  10/100 2 Tablet(s) Oral <User Schedule>  chlorhexidine 4% Liquid 1 Application(s) Topical <User Schedule>  dextrose 40% Gel 15 Gram(s) Oral once  dextrose 5%. 1000 milliLiter(s) (50 mL/Hr) IV Continuous <Continuous>  dextrose 5%. 1000 milliLiter(s) (100 mL/Hr) IV Continuous <Continuous>  dextrose 50% Injectable 25 Gram(s) IV Push once  dextrose 50% Injectable 12.5 Gram(s) IV Push once  dextrose 50% Injectable 25 Gram(s) IV Push once  fentaNYL   Infusion..... 0.5 MICROgram(s)/kG/Hr (0.8 mL/Hr) IV Continuous <Continuous>  glucagon  Injectable 1 milliGRAM(s) IntraMuscular once  insulin lispro (ADMELOG) corrective regimen sliding scale   SubCutaneous every 6 hours  insulin NPH human recombinant 10 Unit(s) SubCutaneous every 6 hours  ketorolac 0.5% Ophthalmic Solution 1 Drop(s) Left EYE two times a day  norepinephrine Infusion 0.1 MICROgram(s)/kG/Min (14.9 mL/Hr) IV Continuous <Continuous>  pantoprazole  Injectable 40 milliGRAM(s) IV Push every 12 hours  polyethylene glycol 3350 17 Gram(s) Oral daily  prednisoLONE acetate 1% Suspension 1 Drop(s) Left EYE four times a day  propofol Infusion 30 MICROgram(s)/kG/Min (14.3 mL/Hr) IV Continuous <Continuous>  senna Syrup 10 milliLiter(s) Oral daily  simvastatin 20 milliGRAM(s) Oral at bedtime    MEDICATIONS  (PRN):  acetaminophen    Suspension .. 650 milliGRAM(s) Enteral Tube every 6 hours PRN Temp greater or equal to 38C (100.4F)  sodium chloride 0.9% lock flush 10 milliLiter(s) IV Push every 1 hour PRN Pre/post blood products, medications, blood draw, and to maintain line patency      ALLERGIES:  Allergies    No Known Allergies    Intolerances        LABS:                        8.5    17.89 )-----------( 240      ( 29 Mar 2021 01:44 )             25.9     03-29    141  |  107  |  54<H>  ----------------------------<  134<H>  3.7   |  23  |  1.70<H>    Ca    7.9<L>      29 Mar 2021 01:44  Phos  2.4     03-28  Mg     2.6     03-28    TPro  5.9<L>  /  Alb  2.1<L>  /  TBili  0.8  /  DBili  x   /  AST  19  /  ALT  7   /  AlkPhos  151<H>  03-29    PT/INR - ( 29 Mar 2021 01:44 )   PT: 12.4 sec;   INR: 1.08 ratio         PTT - ( 29 Mar 2021 01:44 )  PTT:36.7 sec      RADIOLOGY & ADDITIONAL TESTS: Reviewed.

## 2021-03-29 NOTE — PROGRESS NOTE ADULT - SUBJECTIVE AND OBJECTIVE BOX
Chief Complaint: DM 2, COVID+, hyperglycemia    History: Patient seen at bedside in ICU - Surge B  Remains intubated/sedated  On Glucerna 1.5 ignacio running at goal rate of 40 ml/hr x 24h  Most recent  mg/dl   Off steroids    MEDICATIONS  (STANDING):  acetylcysteine 10%  Inhalation 5 milliLiter(s) Inhalation three times a day  aMIOdarone Infusion 1 mG/Min (33.3 mL/Hr) IV Continuous <Continuous>  aMIOdarone Infusion 0.5 mG/Min (16.7 mL/Hr) IV Continuous <Continuous>  aspirin  chewable 81 milliGRAM(s) Oral daily  carbidopa/levodopa  10/100 2 Tablet(s) Oral <User Schedule>  chlorhexidine 4% Liquid 1 Application(s) Topical <User Schedule>  dexMEDEtomidine Infusion 0.2 MICROgram(s)/kG/Hr (3.99 mL/Hr) IV Continuous <Continuous>  dextrose 40% Gel 15 Gram(s) Oral once  dextrose 5%. 1000 milliLiter(s) (50 mL/Hr) IV Continuous <Continuous>  dextrose 5%. 1000 milliLiter(s) (100 mL/Hr) IV Continuous <Continuous>  dextrose 50% Injectable 25 Gram(s) IV Push once  dextrose 50% Injectable 12.5 Gram(s) IV Push once  dextrose 50% Injectable 25 Gram(s) IV Push once  fentaNYL   Infusion..... 0.5 MICROgram(s)/kG/Hr (0.8 mL/Hr) IV Continuous <Continuous>  glucagon  Injectable 1 milliGRAM(s) IntraMuscular once  HYDROmorphone  Injectable 1 milliGRAM(s) IV Push once  insulin lispro (ADMELOG) corrective regimen sliding scale   SubCutaneous every 6 hours  insulin NPH human recombinant 10 Unit(s) SubCutaneous every 6 hours  ketorolac 0.5% Ophthalmic Solution 1 Drop(s) Left EYE two times a day  norepinephrine Infusion 0.1 MICROgram(s)/kG/Min (14.9 mL/Hr) IV Continuous <Continuous>  pantoprazole  Injectable 40 milliGRAM(s) IV Push every 12 hours  piperacillin/tazobactam IVPB.. 3.375 Gram(s) IV Intermittent every 8 hours  polyethylene glycol 3350 17 Gram(s) Oral daily  prednisoLONE acetate 1% Suspension 1 Drop(s) Left EYE four times a day  propofol Infusion 30 MICROgram(s)/kG/Min (14.3 mL/Hr) IV Continuous <Continuous>  senna Syrup 10 milliLiter(s) Oral daily  simvastatin 20 milliGRAM(s) Oral at bedtime    MEDICATIONS  (PRN):  acetaminophen    Suspension .. 650 milliGRAM(s) Enteral Tube every 6 hours PRN Temp greater or equal to 38C (100.4F)  sodium chloride 0.9% lock flush 10 milliLiter(s) IV Push every 1 hour PRN Pre/post blood products, medications, blood draw, and to maintain line patency    No Known Allergies    Review of Systems:  UNABLE TO OBTAIN    PHYSICAL EXAM:  VITALS: T(C): 37.4 (03-29-21 @ 14:00)  T(F): 99.3 (03-29-21 @ 14:00), Max: 99.9 (03-29-21 @ 05:00)  HR: 104 (03-29-21 @ 15:24) (72 - 126)  BP: --  RR:  (27 - 37)  SpO2:  (96% - 100%)  Wt(kg): --  GENERAL: critically ill, intubated/sedated   HEENT:  Atraumatic, Normocephalic  RESPIRATORY: on vent  GI: non distended  NEURO: unable to assess    CAPILLARY BLOOD GLUCOSE    POCT Blood Glucose.: 204 mg/dL (29 Mar 2021 11:26)  POCT Blood Glucose.: 135 mg/dL (29 Mar 2021 04:57)  POCT Blood Glucose.: 138 mg/dL (28 Mar 2021 23:44)  POCT Blood Glucose.: 139 mg/dL (28 Mar 2021 22:33)  POCT Blood Glucose.: 183 mg/dL (28 Mar 2021 17:16)      03-29    141  |  107  |  54<H>  ----------------------------<  134<H>  3.7   |  23  |  1.70<H>    EGFR if : 46<L>  EGFR if non : 39<L>    Ca    7.9<L>      03-29  Mg     2.6     03-28  Phos  2.4     03-28    TPro  5.9<L>  /  Alb  2.1<L>  /  TBili  0.8  /  DBili  x   /  AST  19  /  ALT  7   /  AlkPhos  151<H>  03-29      Thyroid Function Tests:  03-15 @ 06:30 TSH 0.96 FreeT4 -- T3 -- Anti TPO -- Anti Thyroglobulin Ab -- TSI --      A1C with Estimated Average Glucose Result: 10.3 % (03-14-21 @ 08:57)  A1C with Estimated Average Glucose: 9.7 % (10-01-20 @ 06:30)    Diet, NPO with Tube Feed:   Tube Feeding Modality: Orogastric  Glucerna 1.5 Ignacio (GLUCERNA1.5RTH)  Total Volume for 24 Hours (mL): 960  Continuous  Starting Tube Feed Rate mL per Hour: 10  Increase Tube Feed Rate by (mL): 10     Every 4 hours  Until Goal Tube Feed Rate (mL per Hour): 40  Tube Feed Duration (in Hours): 24  Tube Feed Start Time: 15:45  No Carb Prosource (1pkg = 15gms Protein)     Qty per Day:  4 (03-24-21 @ 10:24)

## 2021-03-29 NOTE — PROGRESS NOTE ADULT - ASSESSMENT
70 year old Bengali/Jhonny-speaking male with a history of CAD s/p CABG and 2 stents, Type 2 DM, HTN, BPH s/p TURP and Parkinson's disease, who was recently admitted on 3/14 for weakness, covid, hypoxia, started on remdesivir/decadron, discharged home on 3/16. However, at home, pt decompensated with increasing sob/labored breathing, o2 sat dropped from 88% to 66% within 1/2 hour per daughter, gasping for air. Pt's son called 911 and brought him back to the hospital. Patient admitted for ARDS 2/2 covid pneumonia, s/p intubation 3/17, hospital course c/b parotitis.     Neuro  -sedated, on fentanyl + propofol  -Will wean today as able    Resp  acute hypoxic respiratory failure 2/2 COVID PNA vs septic shock   pt with increasing oxygen requirements overnight - previously doing well on FIO2 40   -Current vent settings RR 30/PEEP 9/FIO2 80%   -wean vent settings as tolerated     ENT   ?parotitis vs masseter myositis   CT neck showing significant swelling of R mandible - differential includes parotitis vs masseter myositis vs dependent swelling in the setting of recent proning  ENT consulted; recommends no acute intervention as there is no respiratory compromise at this time   OMFS consulted yesterday; f/u recommendations   -c/w abx as below      Cardio  vasoplegic shock vs septic shock  pt requiring pressor support overnight - pressor requirement currently stable   -c/w levo gtt to maintain map > 65  -wean as tolerated     GI  -Glucerna 40cc/hr  -Protonix PPI  -Bowel regimen with miralax, senna  -Simvastatin for HLD      - Da Silva  - Strict intake and output monitoring    Endo  euglycemic   -FSGq6h  -ISS  -NPH 10Uq6h    ID  COVID   -s/p remdesivir, dexamethasone   -3/21 blood cx:  MSSE in 1/4 bottles - likely contaminant   -3/22 blood cultures NGTD    ?parotitis vs masseter myositis   CT neck: showing parotitis vs masseter myositis   ID following  c/w unasyn (3/26 -->)   c/w vancomycin (3/26 -->)   f/u vanc trough - target level 15-20  f/u repeat blood cx    Heme  -SQH 5k q8h  -ASA 81mg  - CBC dropped to 7.1/22.0 in AM labs. Suspect dilution will get rpt CBC in PM     GOC- Full code   70 year old Bengali/Jhonny-speaking male with a history of CAD s/p CABG and 2 stents, Type 2 DM, HTN, BPH s/p TURP and Parkinson's disease, who was recently admitted 3/14 with for weakness and COVID-19. Started on decadron and remdesivir and discharged 3/16. Returned 3/17 with worsening respiratory failure. Intubated 3/17. Course c/b parotitis and afib.     Neuro  - Sedated, on fentanyl + propofol  - Will wean today as able, hold propofol to assess mental status and do SBT  - Precedex PRN for agitation     Resp  acute hypoxic respiratory failure 2/2 COVID PNA vs septic shock   Oxygen requirements improving   -Current vent settings /RR 28/PEEP 6/FIO2 40%   -wean vent settings as tolerated, SBT today     ENT   ?parotitis vs masseter myositis   CT neck showing significant swelling of R mandible - differential includes parotitis vs masseter myositis vs dependent swelling in the setting of recent proning  ENT consulted; signed off, c/w ABx   OMFS consulted today, f/u recs   -c/w abx as below      Cardio  vasoplegic shock vs septic shock  -c/w levo gtt to maintain map > 65  -wean as tolerated     Atrial fibrillation   - F/u EKG   - Will do amiodarone load and c/w amio gtt   - Monitor HR   - F/u TTE     GI  -Glucerna 40cc/hr  -Protonix PPI  -Bowel regimen with miralax, senna - last BM 3/29   -Simvastatin for HLD    Renal/  - ZELALEM worsening today with SCr 1.7, likely ATN from shock/sepsis/COVID  - S/p lasix 40 IV 3/28   - Da Silva  - Strict intake and output monitoring    Endo  euglycemic   -FSGq6h  -ISS  -NPH 10Uq6h    ID  COVID   -s/p remdesivir, dexamethasone   -3/21 blood cx:  MSSE in 1/4 bottles - likely contaminant   -3/22 blood cultures NGTD    ?parotitis vs masseter myositis   CT neck: showing parotitis vs masseter myositis   ID following  Switch from unasyn (3/26 - 3/29) to zosyn (3/29 - ) given Pseudomonas in sputum cx    c/w vancomycin (3/26 -->)   f/u vanc trough - target level 15-20, vanc level 19 3/29, will hold dose given ZELALEM and recheck level 3/30   f/u repeat blood cx 3/27 - NGTD     Heme  - Anemia ?dilutional, no active bleeding   -holding SQH 5k q8h given drop in Hb 3/28   -ASA 81mg  - S/p 1 unit pRBCs 3/28 with adequate response   - Recheck CBC in PM and restart heparin SQ if stable   - If Hb downtrends again, consider CT abdomen/pelvis r/o RP bleed     GOC- Full code    Lines - L IJ 3/20, L radial 3/20

## 2021-03-29 NOTE — PROGRESS NOTE ADULT - ATTENDING COMMENTS
I personally saw and examined the patient on multidisciplinary MICU rounds. Discussed in detail with house staff. Please see my attestation below for specifics of care.    70M with CAD s/p CABG and PCI, DM2, HTN, BPH s/p TURP, PD, admitted 3/14 with for weakness and COVID-19. Started on decadron and remdesivir and discharged 3/16. Returned 3/17 with worsening respiratory failure. Intubated 3/17.    Neuro: Sedation with propofol, analgesia with fentanyl. Wean Propofol for awakening trial today. Precedex if needed for agitation.    CV: Circulatory shock, continue low-dose NorEpi for MAP goal > 65, likely distributive/septic with sedative effects too. Also with new AF RVR overnight, will check EKG and start amiodarone infusion.    Pulm: Acute hypoxic respiratory failure from COVID-19. On FiO2 .4 today so will  wean sedation for PSV/SBT.    GI: Tube feeds with Glucerna. PPI.    Renal: ZELALEM, suspect ATN from shock/sepsis/COVID. +3L since admit so doubt pre-renal. UOP is acceptable. Holding lasix today as IVC looks small.    Heme: Anemia, over corrected with only 1u PRBC last night. No evidence of active bleeding. Check afternoon CBC -- if worsening anemia may need CT to r/o RPB. Continue ASA and SC heparin in setting of critical illness, COVID, CAD. May need full-dose A/C if AF continues.    ID: Facial cellulitis / parotitis / ? odontogenic infection with sepsis, continue antibiotics. Changed Unasyn to Pip-tazo today given pseudomas in LRC. Hold vanco given ZELALEM and high level. WCC improving. ENT and OMFS following for ? parotitis, facial cellulitis. COVID, s/p remdesivir, Dex.    Endo: DM with hyperglycemia, continue NPH & lispro SSI. NPH was decreased for hypoglycemia.    Prophylaxis: SC heparin, PPI, chlorhexidine.    Code Status: Full.    Lines: L IJ 3/20, L radial 3/20, Da Silva -- all necessary.    Critical Care time = 35 minutes.

## 2021-03-29 NOTE — PROGRESS NOTE ADULT - ASSESSMENT
70 year old Hebrew/Jhonny-speaking male with a history of DM2 (uncontrolled A1c 10.3%, on basal/bolus insulin at home), HTN, CAD s/p CABG and stents, BPH s/p TURP and Parkinson's disease, now readmitted with COVID and started on high dose Decadron. Endocrine team consulted for management of DM2.   Patient is high risk with high level decision making, requiring ICU level of care    1. Type 2 diabetes mellitus with other specified complication, with long-term current use of insulin.   A1c 10.3% indicating poorly controlled DM2. Home regimen reported as Basaglar 40 units qHS and Admelog 20 units before breakfast only    While inpatient:  Currently in ICU, target glucose 140-180 mg/dl  On continuous tube feeding, Glucerna 1.5 elin @ goal 40 ml/hr x 24h  Now off steroids  Recommend to continue NPH 10 units q6h (Hold if tube feeds are held)   Continue Admelog MODERATE dose correctional scale q6h  Check BG q6h  Keep hypoglycemia protocol active     2. Steroid-induced hyperglycemia.    Last dose Dexamethasone 6 mg daily 3/25  Insulin regimen as above   Please inform endocrine team of any changes in steroid plan    3. Hypertension, unspecified type.    In ICU requiring pressors, defer management to primary team    4. Hyperlipidemia, unspecified hyperlipidemia type.    Continue with statin if no contraindications, monitor lipid profile as outpatient, goal LDL is <70.     Isa Benito  Nurse Practitioner  Division of Endocrinology & Diabetes  In house pager #[phone]/long range pager #[phone]    If before 9AM or after 6PM, or on weekends/holidays, please call endocrine answering service for assistance ([phone]).  For nonurgent matters email [email or web address] for assistance.

## 2021-03-29 NOTE — PROGRESS NOTE ADULT - ASSESSMENT
70 year old Romanian/Jhonny-speaking male with a history of CAD s/p CABG and 2 stents, Type 2 DM, HTN, BPH s/p TURP and Parkinson's disease presenting with weakness  COVID on 3/9 without showing any symptoms and was due to travel for work  This morning, patient found his wife unresponsive at home and EMS was called- patient had ROSC s/p cardiac arrest and is currently hospitalized at   Now resp failure  Intubated   CNS low grade bacteremia  Team now concerned for R chin/facial swelling concerning for possible cellulitis  CT with R parotidis, masseter myositis, cellulitis  Sputum culture with Pseud fluorescens, colonizer? Team broadened to Zosyn  Overall,    A) Resp failure  ? Fibrosis due to COVID  Follow clinically    B) Positive BCX, CoNS  Suspect contam, repeat BCXs NGTD  F/U pending cultures    C) Facial cellulitis/myositis?  - Note CT findings  - Vanco by level (check daily levels, redose 1g when <15)--note rising Cr, monitor Cr  - Zosyn 3.375g q 8  - Monitor site, trend WBC  - F/U ENT    D) Positive culture finding, pseudomonas in sputum  - Likely colonizer, but covered with Zosyn for now  - Monitor for signs pna    E) ZELALEM  - Monitor, if worsening, may need to switch abx agents to avoid Vanco/Zosyn combination    Lenin Santos MD  Pager [phone]  After 5pm and on weekends call [phone]

## 2021-03-29 NOTE — CONSULT NOTE ADULT - SUBJECTIVE AND OBJECTIVE BOX
70 year old Tajik/Jhonny-speaking male with a history of CAD s/p CABG and 2 stents, Type 2 DM, HTN, BPH s/p TURP and Parkinson's disease, who was recently admitted on 3/14 for weakness, covid, hypoxia, started on remdesivir/decadron, readmitted with respiratory distress 2/2 COVID (3/17), intubated, started on remdesivir, course c/b septic shock, ZELALEM, and A-fib. Pt     PAST MEDICAL & SURGICAL HISTORY:  BPH (benign prostatic hyperplasia)    GERD (gastroesophageal reflux disease)    HLD (hyperlipidemia)    Hypertension    CAD (coronary artery disease)  CABG x2 2012, Stents x2 2014    Diabetes mellitus    Presence of stent in coronary artery  x2 2014    History of coronary artery bypass graft  X2 in March of 2012    Bladder neck obstruction  3 surgeries for BPH, most recent 2012      MEDICATIONS  (STANDING):  acetylcysteine 10%  Inhalation 5 milliLiter(s) Inhalation three times a day  aMIOdarone Infusion 1 mG/Min (33.3 mL/Hr) IV Continuous <Continuous>  aMIOdarone Infusion 0.5 mG/Min (16.7 mL/Hr) IV Continuous <Continuous>  aspirin  chewable 81 milliGRAM(s) Oral daily  carbidopa/levodopa  10/100 2 Tablet(s) Oral <User Schedule>  chlorhexidine 4% Liquid 1 Application(s) Topical <User Schedule>  dextrose 40% Gel 15 Gram(s) Oral once  dextrose 5%. 1000 milliLiter(s) (50 mL/Hr) IV Continuous <Continuous>  dextrose 5%. 1000 milliLiter(s) (100 mL/Hr) IV Continuous <Continuous>  dextrose 50% Injectable 25 Gram(s) IV Push once  dextrose 50% Injectable 12.5 Gram(s) IV Push once  dextrose 50% Injectable 25 Gram(s) IV Push once  fentaNYL   Infusion..... 0.5 MICROgram(s)/kG/Hr (0.8 mL/Hr) IV Continuous <Continuous>  glucagon  Injectable 1 milliGRAM(s) IntraMuscular once  insulin lispro (ADMELOG) corrective regimen sliding scale   SubCutaneous every 6 hours  insulin NPH human recombinant 10 Unit(s) SubCutaneous every 6 hours  ketorolac 0.5% Ophthalmic Solution 1 Drop(s) Left EYE two times a day  norepinephrine Infusion 0.1 MICROgram(s)/kG/Min (14.9 mL/Hr) IV Continuous <Continuous>  pantoprazole  Injectable 40 milliGRAM(s) IV Push every 12 hours  piperacillin/tazobactam IVPB.. 3.375 Gram(s) IV Intermittent every 8 hours  polyethylene glycol 3350 17 Gram(s) Oral daily  prednisoLONE acetate 1% Suspension 1 Drop(s) Left EYE four times a day  propofol Infusion 30 MICROgram(s)/kG/Min (14.3 mL/Hr) IV Continuous <Continuous>  senna Syrup 10 milliLiter(s) Oral daily  simvastatin 20 milliGRAM(s) Oral at bedtime    MEDICATIONS  (PRN):  acetaminophen    Suspension .. 650 milliGRAM(s) Enteral Tube every 6 hours PRN Temp greater or equal to 38C (100.4F)  sodium chloride 0.9% lock flush 10 milliLiter(s) IV Push every 1 hour PRN Pre/post blood products, medications, blood draw, and to maintain line patency    Allergies    No Known Allergies    Intolerances      FAMILY HISTORY:  Family history of diabetes mellitus type II (Sibling)      *SOCIAL HISTORY: Denies ETOH use, Tobacco, drugs    * Review of Systems: (-) fever, chills,  (-) LOC,  (-) Nausea/vomiting/headache.   (-), SOB, cough.  (-) palpitations. (-) blurred vision/double vision.  (-) dysphagia, dyspnea.  (-) paresthesia.     Vital Signs Last 24 Hrs  T(C): 37.3 (29 Mar 2021 12:00), Max: 37.7 (29 Mar 2021 05:00)  T(F): 99.1 (29 Mar 2021 12:00), Max: 99.9 (29 Mar 2021 05:00)  HR: 113 (29 Mar 2021 12:00) (72 - 126)  BP: --  BP(mean): --  RR: 27 (29 Mar 2021 12:00) (27 - 37)  SpO2: 99% (29 Mar 2021 12:00) (96% - 100%)    Physical Exam:  Gen: AAox3, NAD, NC/AT  Head: (   ) Lacerations/abrasions/hematomas. (   ) edema/erythema/tenderness to palpation. (   ) asymmetry. (   ) signs of scalp infection  Eyes: EOMI, PERRL, visual acuity <<intact>>, (   ) diplopia,  (   ) supra/infra orbital rims intact, (   ) subconjunctival heme, (   ) telecanthus, (   ) exophthalmos  Ears: Gross hearing intact, <<No>> heme appreciated, Condylar head palpated  Nose: (   )crepitis/septal hematoma/asymmetry.  (   ) Lacerations/abrasions/hematomas.  Malar: (   ) malar depression, (   ) CN V-2 paresthesia  Throat: (   ) LAD, supple, FROM, (   ) lesions  Extraoral/Intraoral Exam: TIN: (   ) , Dentition grossly intact, (   ) carious dentition, (   ) occlusion stable and reproducible, (   ) lacerations/abrasions/hematomas, (   ) mandibular step deformity, (   ) CN V-3 paresthesia, (   ) signs of infection, (   ) edema/erythema/tenderness to palpation. FOM soft, NT. (   ) mobility of maxilla/crepitis. TMJ: (   ) clicking/popping  CN II-XII intact    LABS:                        8.5    17.89 )-----------( 240      ( 29 Mar 2021 01:44 )             25.9     03-29    141  |  107  |  54<H>  ----------------------------<  134<H>  3.7   |  23  |  1.70<H>    Ca    7.9<L>      29 Mar 2021 01:44  Phos  2.4     03-28  Mg     2.6     03-28    TPro  5.9<L>  /  Alb  2.1<L>  /  TBili  0.8  /  DBili  x   /  AST  19  /  ALT  7   /  AlkPhos  151<H>  03-29      PT/INR - ( 29 Mar 2021 01:44 )   PT: 12.4 sec;   INR: 1.08 ratio         PTT - ( 29 Mar 2021 01:44 )  PTT:36.7 sec    Culture Results:   No growth to date. (03-27 @ 02:57)  Culture Results:   No growth to date. (03-27 @ 02:57)       70 year old Armenian/Jhonny-speaking male with a history of CAD s/p CABG and 2 stents, Type 2 DM, HTN, BPH s/p TURP and Parkinson's disease, who was recently admitted on 3/14 for weakness, covid, hypoxia, started on remdesivir/decadron, readmitted with respiratory distress 2/2 COVID (3/17), intubated, started on remdesivir, course c/b septic shock, ZELALEM, and A-fib.  Pt developed right facial edema 3 days ago, diagnosed as parotidis, on course of Unasyn ENT signed off regarding management of parotidis. OMFS consulted for any additional recs regarding patient's parotidis.     PAST MEDICAL & SURGICAL HISTORY:  BPH (benign prostatic hyperplasia)    GERD (gastroesophageal reflux disease)    HLD (hyperlipidemia)    Hypertension    CAD (coronary artery disease)  CABG x2 2012, Stents x2 2014    Diabetes mellitus    Presence of stent in coronary artery  x2 2014    History of coronary artery bypass graft  X2 in March of 2012    Bladder neck obstruction  3 surgeries for BPH, most recent 2012      MEDICATIONS  (STANDING):  acetylcysteine 10%  Inhalation 5 milliLiter(s) Inhalation three times a day  aMIOdarone Infusion 1 mG/Min (33.3 mL/Hr) IV Continuous <Continuous>  aMIOdarone Infusion 0.5 mG/Min (16.7 mL/Hr) IV Continuous <Continuous>  aspirin  chewable 81 milliGRAM(s) Oral daily  carbidopa/levodopa  10/100 2 Tablet(s) Oral <User Schedule>  chlorhexidine 4% Liquid 1 Application(s) Topical <User Schedule>  dextrose 40% Gel 15 Gram(s) Oral once  dextrose 5%. 1000 milliLiter(s) (50 mL/Hr) IV Continuous <Continuous>  dextrose 5%. 1000 milliLiter(s) (100 mL/Hr) IV Continuous <Continuous>  dextrose 50% Injectable 25 Gram(s) IV Push once  dextrose 50% Injectable 12.5 Gram(s) IV Push once  dextrose 50% Injectable 25 Gram(s) IV Push once  fentaNYL   Infusion..... 0.5 MICROgram(s)/kG/Hr (0.8 mL/Hr) IV Continuous <Continuous>  glucagon  Injectable 1 milliGRAM(s) IntraMuscular once  insulin lispro (ADMELOG) corrective regimen sliding scale   SubCutaneous every 6 hours  insulin NPH human recombinant 10 Unit(s) SubCutaneous every 6 hours  ketorolac 0.5% Ophthalmic Solution 1 Drop(s) Left EYE two times a day  norepinephrine Infusion 0.1 MICROgram(s)/kG/Min (14.9 mL/Hr) IV Continuous <Continuous>  pantoprazole  Injectable 40 milliGRAM(s) IV Push every 12 hours  piperacillin/tazobactam IVPB.. 3.375 Gram(s) IV Intermittent every 8 hours  polyethylene glycol 3350 17 Gram(s) Oral daily  prednisoLONE acetate 1% Suspension 1 Drop(s) Left EYE four times a day  propofol Infusion 30 MICROgram(s)/kG/Min (14.3 mL/Hr) IV Continuous <Continuous>  senna Syrup 10 milliLiter(s) Oral daily  simvastatin 20 milliGRAM(s) Oral at bedtime    MEDICATIONS  (PRN):  acetaminophen    Suspension .. 650 milliGRAM(s) Enteral Tube every 6 hours PRN Temp greater or equal to 38C (100.4F)  sodium chloride 0.9% lock flush 10 milliLiter(s) IV Push every 1 hour PRN Pre/post blood products, medications, blood draw, and to maintain line patency    Allergies    No Known Allergies    Intolerances      FAMILY HISTORY:  Family history of diabetes mellitus type II (Sibling)      *SOCIAL HISTORY: Denies ETOH use, Tobacco, drugs    * Review of Systems: (-) fever, chills,  (-) LOC,  (-) Nausea/vomiting/headache.   (-), SOB, cough.  (-) palpitations. (-) blurred vision/double vision.  (-) dysphagia, dyspnea.  (-) paresthesia.     Vital Signs Last 24 Hrs  T(C): 37.3 (29 Mar 2021 12:00), Max: 37.7 (29 Mar 2021 05:00)  T(F): 99.1 (29 Mar 2021 12:00), Max: 99.9 (29 Mar 2021 05:00)  HR: 113 (29 Mar 2021 12:00) (72 - 126)  BP: --  BP(mean): --  RR: 27 (29 Mar 2021 12:00) (27 - 37)  SpO2: 99% (29 Mar 2021 12:00) (96% - 100%)    Physical Exam:  Gen: AAOx3, intubated and sedated  EOE: intubated, 40% FIO2, right cheek skin ecchymosis, with some scattered excoriations, right lower facial edema overlying masseter and parotid, mildly firm, does not extend below inferior border of mandible, trachea midline,   IOE: right buccal mucosa hyperkeratotic lesions, firm edema on bimanual palpation of right masseter, clear saliva expressed from stensons duct, (-) purulence,  FOM soft, non elevated,       LABS:                        8.5    17.89 )-----------( 240      ( 29 Mar 2021 01:44 )             25.9     03-29    141  |  107  |  54<H>  ----------------------------<  134<H>  3.7   |  23  |  1.70<H>    Ca    7.9<L>      29 Mar 2021 01:44  Phos  2.4     03-28  Mg     2.6     03-28    TPro  5.9<L>  /  Alb  2.1<L>  /  TBili  0.8  /  DBili  x   /  AST  19  /  ALT  7   /  AlkPhos  151<H>  03-29      PT/INR - ( 29 Mar 2021 01:44 )   PT: 12.4 sec;   INR: 1.08 ratio         PTT - ( 29 Mar 2021 01:44 )  PTT:36.7 sec    Culture Results:   No growth to date. (03-27 @ 02:57)  Culture Results:   No growth to date. (03-27 @ 02:57)    CT max facial: -Findings suggestive of severe right parotiditis and/or masseter myositis with extensive associated cellulitis involving the entirety the right face. Fluid tracking in the subcutaneous tissues from the right temporal scalp to the right clavicle, but with no rim enhancement to suggest abscess formation at this time. Differential includes dependent edema in this patient with history of proning.    A/P: 70 year old  male with a history of CAD s/p CABG and 2 stents, Type 2 DM, HTN, BPH s/p TURP and Parkinson's disease, who was recently readmitted for respiratory failure 2/2 Covid, intubated, now with right facial swelling along masseter/parotid, likely compression injury  - patient was prone with right face down for several days. The right facial erythema/ edema along masseter is likely 2/2 to compression injury/edema from prone positioning, no signs of acute infection  - c/w abx coverage   - rec wound care consult/ evaluation for management of right midfacial skin excoriations  - care per primary team

## 2021-03-29 NOTE — PROGRESS NOTE ADULT - SUBJECTIVE AND OBJECTIVE BOX
CC: F/U for SSTI    Saw to patient. Intubated, ill appearing in surge B.    Allergies  No Known Allergies    ANTIMICROBIALS:  piperacillin/tazobactam IVPB.. 3.375 every 8 hours    PE:    Vital Signs Last 24 Hrs  T(C): 37.3 (29 Mar 2021 13:00), Max: 37.7 (29 Mar 2021 05:00)  T(F): 99.1 (29 Mar 2021 13:00), Max: 99.9 (29 Mar 2021 05:00)  HR: 102 (29 Mar 2021 13:00) (72 - 126)  RR: 35 (29 Mar 2021 13:00) (27 - 37)  SpO2: 98% (29 Mar 2021 13:00) (96% - 100%)    Gen: AOx0, ill appearing; R sided lower chin lesion appears stable but with some swelling  CV: S1+S2 normal, tachycardic  Resp: Intubated  Abd: Soft, nontender, +BS  Ext: No LE edema, no wounds    LABS:                        8.5    17.89 )-----------( 240      ( 29 Mar 2021 01:44 )             25.9     03-29    141  |  107  |  54<H>  ----------------------------<  134<H>  3.7   |  23  |  1.70<H>    Ca    7.9<L>      29 Mar 2021 01:44  Phos  2.4     03-28  Mg     2.6     03-28    TPro  5.9<L>  /  Alb  2.1<L>  /  TBili  0.8  /  DBili  x   /  AST  19  /  ALT  7   /  AlkPhos  151<H>  03-29    MICROBIOLOGY:  Vancomycin Level, Trough: 19.3 ug/mL (03-29-21 @ 06:21)    .Blood Blood-Peripheral  03-27-21   No growth to date.      .Sputum Sputum  03-26-21   Moderate Pseudomonas fluorescens group  Normal Respiratory Fallon present  --    No polymorphonuclear leukocytes per low power field  No Squamous epithelial cells per low power field  Rare Yeast like cells per oil power field  Rare Gram positive cocci in pairs per oil power field  Rare Gram Positive Rods per oil power field  Rare Gram Negative Rods per oil power field    .Blood Blood  03-22-21   No Growth Final     .Blood Blood  03-20-21   Growth in anaerobic bottle: Staphylococcus epidermidis  Coag Negative Staphylococcus  Single set isolate, possible contaminant. Contact  Microbiology if susceptibility testing clinically  indicated.  ***Blood Panel PCR results on this specimen are available  approximately 3 hours after the Gram stain result.***  Gram stain, PCR, and/or culture results may not always  correspond due to difference in methodologies.  ************************************************************  This PCR assay was performed by multiplex PCR. This  Assay tests for 66 bacterial and resistance gene targets.  Please refer to the [employer] test directory  at [email or web address] for details.  --  Blood Culture PCR    .Blood Blood  03-18-21   No Growth Final      (otherwise reviewed)    RADIOLOGY:    3/26 XR:    IMPRESSION:  Endotracheal tube tip at clavicular level.  Enteric tube extends below diaphragm into abdomen.  Left IJ central line present with tip in SVC region.    Redemonstrated prominent ill-defined increased markings and fluffy opacities in both mid and lower lung right greater than left. No gross pleural effusions and no pneumothorax.    Stable sternal wires mediastinal clips and cardiac and mediastinal silhouettes.

## 2021-03-30 NOTE — CHART NOTE - NSCHARTNOTEFT_GEN_A_CORE
70 year old Malay/Jhonny-speaking male with a history of DM2 (uncontrolled A1c 10.3%, on basal/bolus insulin at home), HTN, CAD s/p CABG and stents, BPH s/p TURP and Parkinson's disease, now readmitted with COVID and started on high dose Decadron. Endocrine team consulted for management of DM2.   Patient is high risk with high level decision making, requiring ICU level of care    CAPILLARY BLOOD GLUCOSE    POCT Blood Glucose.: 200 mg/dL (30 Mar 2021 10:58)  POCT Blood Glucose.: 265 mg/dL (30 Mar 2021 06:02)  POCT Blood Glucose.: 311 mg/dL (29 Mar 2021 23:34)  POCT Blood Glucose.: 250 mg/dL (29 Mar 2021 16:59)      1. Type 2 diabetes mellitus with other specified complication, with long-term current use of insulin.   A1c 10.3% indicating poorly controlled DM2. Home regimen reported as Basaglar 40 units qHS and Admelog 20 units before breakfast only    While inpatient:  Currently in ICU, target glucose 140-180 mg/dl  On continuous tube feeding, Glucerna 1.5 elin @ goal 40 ml/hr x 24h  Now off steroids  Agree with increased NPH to 15 units q6h (Hold if tube feeds are held)   Continue Admelog MODERATE dose correctional scale q6h  Check BG q6h  Keep hypoglycemia protocol active     2. Steroid-induced hyperglycemia.    Last dose Dexamethasone 6 mg daily 3/25  Insulin regimen as above   Please inform endocrine team of any changes in steroid plan    3. Hypertension, unspecified type.    In ICU requiring pressors, defer management to primary team    4. Hyperlipidemia, unspecified hyperlipidemia type.    Continue with statin if no contraindications, monitor lipid profile as outpatient, goal LDL is <70.

## 2021-03-30 NOTE — CHART NOTE - NSCHARTNOTEFT_GEN_A_CORE
Nutrition Follow-Up Note   Reason for follow-up: Critical care length of stay follow- up. Medical record reviewed.    Clinical Course history: Per chart review patient with medical history of CAD s/p CABG and 2 stents, Type 2 DM, HTN, BPH s/p TURP and Parkinson's disease, who was recently admitted on 3/14 for weakness, covid, hypoxia, started on remdesivir/decadron. S/p RRT 3/20 and patient intubated. Sedated on Fentanyl and Precedex. Weaned off propofol.     Diet Order: Diet, NPO with Tube Feed:   Tube Feeding Modality: Orogastric  Glucerna 1.5 Ignacio (GLUCERNA1.5RTH)  Total Volume for 24 Hours (mL): 960  Continuous  Starting Tube Feed Rate {mL per Hour}: 10  Increase Tube Feed Rate by (mL): 10     Every 4 hours  Until Goal Tube Feed Rate (mL per Hour): 40  Tube Feed Duration (in Hours): 24  Tube Feed Start Time: 15:45  No Carb Prosource (1pkg = 15gms Protein)     Qty per Day:  4 (03-24-21 @ 10:24)    CURRENT EN ORDER PROVIDES:  Total Volume: 960mL/day  Energy: 1440Kcal/day  Protein: 139g protein/day  Free Water: 729mL/day    Nutrition Related Information: - Tube feeding intake: 3/25-26: 880mL, 3/26-27: 880mL, 3/27-28: 880mL and 3/28-29: 985mL, 3/29-30: 960mL.   *Meeting % nutrition needs x5 days.   - Feeds running at goal rate of 40mL/hr during time of visit.  - No tube feeding intolerances reported by RN.   - Noted hyperglycemia - ordered for SSI and NPH 15 units q6hrs. Consider modification in insulin regimen as clinically appropriate given hyperglycemia.   - Phosphorous slightly elevated - Phosphorous 4.6 today.   - Given patient weaned off propofol, no longer receiving additional Kcal from infusion - would increase goal rate of feeds slightly to increase Kcal provision.     GI: - Last BM 3/30 - loose  - Bowel regimen: Senna, Miralax. Consider holding if loose stools persist.  - No vomiting reported.    Anthropometric Measurements:   Height (cm): 170.2 (03-17-21 @ 10:38)  Weight (kg): 82.5 (3/29), 79.8 (3/26), 79.7 (3/17), 74.8 (3/16)  *Noted weight changes, possibly due to scale discrepancies.  BMI (kg/m2): 27.5 (3/17)  IBW: 67.1kg +/-10%.    Medications: MEDICATIONS  (STANDING):  aspirin  chewable 81 milliGRAM(s) Oral daily  carbidopa/levodopa  10/100 2 Tablet(s) Oral <User Schedule>  dexMEDEtomidine Infusion 0.2 MICROgram(s)/kG/Hr (3.99 mL/Hr) IV Continuous <Continuous>  fentaNYL   Infusion..... 0.5 MICROgram(s)/kG/Hr (0.8 mL/Hr) IV Continuous <Continuous>  heparin   Injectable 5000 Unit(s) SubCutaneous every 8 hours  insulin lispro (ADMELOG) corrective regimen sliding scale   SubCutaneous every 6 hours  insulin NPH human recombinant 15 Unit(s) SubCutaneous every 6 hours  methadone    Tablet 10 milliGRAM(s) Oral every 8 hours  pantoprazole  Injectable 40 milliGRAM(s) IV Push every 12 hours  piperacillin/tazobactam IVPB.. 3.375 Gram(s) IV Intermittent every 8 hours  polyethylene glycol 3350 17 Gram(s) Oral daily  prednisoLONE acetate 1% Suspension 1 Drop(s) Left EYE four times a day  senna Syrup 10 milliLiter(s) Oral daily  simvastatin 20 milliGRAM(s) Oral at bedtime    Labs: 03-30 @ 02:43: Sodium 141, Potassium 3.9, Calcium 7.9<L>, Magnesium 2.8<H>, Phosphorus 4.6<H>, BUN 68<H>, Creatinine 2.04<H>, Glucose 302<H>, Alk Phos 197<H>, ALT/SGPT 9, AST/SGOT 40, Albumin 2.1<L>, Prealbumin --, Total Bilirubin 0.6, Hemoglobin 8.1<L>, Hematocrit 24.7<L>, Ferritin --, C-Reactive Protein --, Creatine Kinase <<27>    Triglycerides, Serum: 229 mg/dL (03-26-21 @ 02:06)    POCT Blood Glucose.: 200 mg/dL (03-30-21 @ 10:58)  POCT Blood Glucose.: 265 mg/dL (03-30-21 @ 06:02)  POCT Blood Glucose.: 311 mg/dL (03-29-21 @ 23:34)  POCT Blood Glucose.: 250 mg/dL (03-29-21 @ 16:59)    Skin: No pressure ulcers/DTI noted in flowsheets.   Edema: 1+ generalized    Estimated Nutrition Needs: calculated using admit weight 79.7kg for energy and protein needs.  Estimated Energy Needs (15-20Kcal/kg): 1195-1594Kcal/day  Estimated Protein Needs (1.5g/kg): 120g protein/day    New Nutrition Diagnosis:  Altered nutrition-related labs related to physiological causes As evidenced by hyperglycemia.     Nutrition Interventions/Recommendations:   1. Recommend Glucerna 1.5 @ 45mL/hr x24 hours + No Carb Prosource 2x daily [provides 1080mL total volume, 1620KCal, 119g protein and 820mL free water daily] (20Kcal/kg and ~1.5g/kg).  2. Additional free water per MD discretion.   3. Monitor glucose levels and electrolytes.   4. Consider modification in insulin regimen as clinically appropriate given hyperglycemia.  5. Bowel regimen as needed. Hold for diarrhea.   6. Obtain daily weight.     Monitoring and Evaluation:   Monitor nutrition provision, tolerance to diet, weights, labs, skin integrity and GI function.   RD remains available, please consult as needed. Will follow up per protocol.  Heather Vázquez, MS, RD, CDN, [facility] Pager #[phone]

## 2021-03-30 NOTE — PROGRESS NOTE ADULT - ASSESSMENT
70 year old Occitan/Jhonny-speaking male with a history of CAD s/p CABG and 2 stents, Type 2 DM, HTN, BPH s/p TURP and Parkinson's disease, who was recently admitted 3/14 with for weakness and COVID-19. Started on decadron and remdesivir and discharged 3/16. Returned 3/17 with worsening respiratory failure. Intubated 3/17. Course c/b parotitis and afib.     Neuro  - Sedated, on fentanyl + propofol  - Will wean today as able, hold propofol to assess mental status and do SBT  - Precedex PRN for agitation     Resp  acute hypoxic respiratory failure 2/2 COVID PNA vs septic shock   Oxygen requirements improving   -Current vent settings /RR 28/PEEP 6/FIO2 40%   -wean vent settings as tolerated, SBT today     ENT   ?parotitis vs masseter myositis   CT neck showing significant swelling of R mandible - differential includes parotitis vs masseter myositis vs dependent swelling in the setting of recent proning  ENT consulted; signed off, c/w ABx   OMFS consulted today, f/u recs   -c/w abx as below      Cardio  vasoplegic shock vs septic shock  -c/w levo gtt to maintain map > 65  -wean as tolerated     Atrial fibrillation   - F/u EKG   - Will do amiodarone load and c/w amio gtt   - Monitor HR   - F/u TTE     GI  -Glucerna 40cc/hr  -Protonix PPI  -Bowel regimen with miralax, senna - last BM 3/29   -Simvastatin for HLD    Renal/  - ZELALEM worsening today with SCr 1.7, likely ATN from shock/sepsis/COVID  - S/p lasix 40 IV 3/28   - Da Silva  - Strict intake and output monitoring    Endo  euglycemic   -FSGq6h  -ISS  -NPH 10Uq6h    ID  COVID   -s/p remdesivir, dexamethasone   -3/21 blood cx:  MSSE in 1/4 bottles - likely contaminant   -3/22 blood cultures NGTD    ?parotitis vs masseter myositis   CT neck: showing parotitis vs masseter myositis   ID following  Switch from unasyn (3/26 - 3/29) to zosyn (3/29 - ) given Pseudomonas in sputum cx    c/w vancomycin (3/26 -->)   f/u vanc trough - target level 15-20, vanc level 19 3/29, will hold dose given ZELALEM and recheck level 3/30   f/u repeat blood cx 3/27 - NGTD     Heme  - Anemia ?dilutional, no active bleeding   -holding SQH 5k q8h given drop in Hb 3/28   -ASA 81mg  - S/p 1 unit pRBCs 3/28 with adequate response   - Recheck CBC in PM and restart heparin SQ if stable   - If Hb downtrends again, consider CT abdomen/pelvis r/o RP bleed     GOC- Full code    Lines - L IJ 3/20, L radial 3/20 70 year old Uzbek/Jhonny-speaking male with a history of CAD s/p CABG and 2 stents, Type 2 DM, HTN, BPH s/p TURP and Parkinson's disease, who was recently admitted 3/14 with for weakness and COVID-19. Started on decadron and remdesivir and discharged 3/16. Returned 3/17 with worsening respiratory failure. Intubated 3/17. Course c/b ?parotitis, Pseudomonas PNA, and new onset atrial fibrillation now in sinus rhythm.     Neuro  - Sedated, on fentanyl + precedex, wean off propofol 3/29 with precedex added  - Weaned fentanyl overnight, pt opening eyes, but not tracking or following commands   - Add methadone 10 q8 in an attempt to wean fentanyl and do SBT, monitor QTc     Resp  - Acute hypoxic respiratory failure 2/2 COVID PNA   - Oxygen requirements stable   - Current vent settings /RR 28/PEEP 6/FIO2 40%   - Wean vent settings as tolerated, SBT today     ENT   ?parotitis vs masseter myositis   CT neck showing significant swelling of R mandible - differential includes parotitis vs. masseter myositis vs. dependent swelling in the setting of recent proning  ENT consulted; signed off, c/w ABx   OMFS consulted, no intervention, recommend wound care consult and c/w ABx   -c/w abx as below      Cardio  - Not on pressors     Atrial fibrillation now sinus bradycardia   - S/p amiodarone load and gtt 3/29, now sinus   - D/c amiodarone   - Monitor HR  - F/u TTE     GI  - Glucerna 40cc/hr  - Protonix PPI  - Bowel regimen with miralax, senna - last BM 3/29   - Simvastatin for HLD    Renal/  - ZELALEM worsening today with SCr 2.04, baseline normal  - Likely contrast-induced nephropathy given CT imaging done 3/26, also could be in the setting of COVID/sepsis   - S/p lasix 40 IV 3/28   - Da Silva  - Strict intake and output monitoring, making good urine     Endo  poorly controlled over the last 24 hours   - A1c 10.3   -FSGq6h  - Will increase from NPH 10Uq6h to 15U q6h with moderate ISS   - Endo recs appreciated     ID  COVID   -s/p remdesivir, dexamethasone   -3/21 blood cx:  MSSE in 1/4 bottles - likely contaminant   -3/22 blood cultures NGTD    ?parotitis vs masseter myositis   CT neck: showing parotitis vs masseter myositis   ID following  Switch from unasyn (3/26 - 3/29) to zosyn (3/29 - ) given Pseudomonas in sputum cx    c/w vancomycin (3/26 -->)   f/u vanc level 3/30 and dose when <15, last received 1 g 3/28    f/u repeat blood cx 3/27 - NGTD   If febrile again, low threshold to pan culture    Pseudomonas PNA  - Sputum cx growing Pseudomonas sensitive to zosyn  - C/w zosyn (3/29 - ) to complete 7 day course     Heme  - Anemia stable, no active bleeding    - c/w heparin SQ and ASA 81 daily   - S/p 1 unit pRBCs 3/28 for Hb drop, now stable   - If Hb downtrends again, consider CT abdomen/pelvis r/o RP bleed     GOC- Full code    Lines - L IJ 3/20, L radial 3/20 70 year old Danish/Jhonny-speaking male with a history of CAD s/p CABG and 2 stents, Type 2 DM, HTN, BPH s/p TURP and Parkinson's disease, who was recently admitted 3/14 with for weakness and COVID-19. Started on decadron and remdesivir and discharged 3/16. Returned 3/17 with worsening respiratory failure. Intubated 3/17. Course c/b ?parotitis, Pseudomonas PNA, and new onset atrial fibrillation now in sinus rhythm.     Neuro  - Sedated, on fentanyl + precedex, weaned off propofol 3/29 with precedex added  - Weaned fentanyl overnight, pt opening eyes, but not tracking or following commands   - Add methadone 10 q8 in an attempt to wean fentanyl and do SBT, monitor QTc     Resp  - Acute hypoxic respiratory failure 2/2 COVID PNA   - Oxygen requirements stable   - Current vent settings /RR 28/PEEP 6/FIO2 40%   - Wean vent settings as tolerated, SBT today     ENT   ?parotitis vs masseter myositis   CT neck showing significant swelling of R mandible - differential includes parotitis vs. masseter myositis vs. dependent swelling in the setting of recent proning  ENT consulted; signed off, c/w ABx   OMFS consulted, no intervention, recommend wound care consult and c/w ABx   -c/w abx as below      Cardio  - Not on pressors     Atrial fibrillation now sinus bradycardia   - S/p amiodarone load and gtt 3/29, now sinus   - D/c amiodarone   - Monitor HR  - F/u TTE     GI  - Glucerna 40cc/hr  - Protonix PPI  - Bowel regimen with miralax, senna - last BM 3/29   - Simvastatin for HLD    Renal/  - ZELALEM worsening today with SCr 2.04, baseline normal  - Likely contrast-induced nephropathy given CT imaging done 3/26, also could be in the setting of COVID/sepsis   - S/p lasix 40 IV 3/28   - Da Silva  - Strict intake and output monitoring, making good urine     Endo  poorly controlled over the last 24 hours   - A1c 10.3   -FSGq6h  - Will increase from NPH 10Uq6h to 15U q6h with moderate ISS   - Endo recs appreciated     ID  COVID   -s/p remdesivir, dexamethasone   -3/21 blood cx:  MSSE in 1/4 bottles - likely contaminant   -3/22 blood cultures NGTD    ?parotitis vs masseter myositis   CT neck: showing parotitis vs masseter myositis   ID following  Switch from unasyn (3/26 - 3/29) to zosyn (3/29 - ) given Pseudomonas in sputum cx    c/w vancomycin (3/26 -->)   f/u vanc level 3/30 and dose when <15, last received 1 g 3/28    f/u repeat blood cx 3/27 - NGTD   If febrile again, low threshold to pan culture    Pseudomonas PNA  - Sputum cx growing Pseudomonas sensitive to zosyn  - C/w zosyn (3/29 - ) to complete 7 day course     Heme  - Anemia stable, no active bleeding    - c/w heparin SQ and ASA 81 daily   - S/p 1 unit pRBCs 3/28 for Hb drop, now stable   - If Hb downtrends again, consider CT abdomen/pelvis r/o RP bleed     GOC- Full code    Lines - L IJ 3/20, L radial 3/20

## 2021-03-30 NOTE — PROGRESS NOTE ADULT - ATTENDING COMMENTS
off propofol   intubated on 40% +6  levophed  amio drip HR 50s sinus  on precedex   fent 1.5    covid pna   acute resp failure  pseudomonal pna   ? parotidis   ZELALEM  new onset afib - now resolved   h/o parkinsonism, CABG, CAD, DM, HTN   # cVS- cont levo as per MAP <65  d/c amio drip  # resp lower vent settings, waking up slowly as tolerated   # neuro cont precedex follow HR methadone to wean fent was started   # ID changing abx as per ID- meropenem  # Renal- net +, follow creatinine and uop  # tube feeds  # cont care in ICU

## 2021-03-30 NOTE — PROGRESS NOTE ADULT - SUBJECTIVE AND OBJECTIVE BOX
CC: F/U for [facility]    Saw patient. Intubated. Ill appearing. Remains in surge B. Fevers overnight.    Allergies  No Known Allergies    ANTIMICROBIALS:  piperacillin/tazobactam IVPB.. 3.375 every 8 hours    PE:    Vital Signs Last 24 Hrs  T(C): 37.2 (30 Mar 2021 12:00), Max: 38.2 (29 Mar 2021 19:36)  T(F): 99 (30 Mar 2021 12:00), Max: 100.8 (29 Mar 2021 19:36)  HR: 52 (30 Mar 2021 12:00) (51 - 110)  RR: 28 (30 Mar 2021 12:00) (28 - 41)  SpO2: 99% (30 Mar 2021 12:00) (96% - 100%)    Gen: AOx0, sedated ill appearing, R sided chin/facial lesion improved but still present  CV: S1+S2 normal, nontachycardic  Resp: Intubated  Abd: Soft, nontender, +BS, OGT  Ext: No LE edema, no wounds    LABS:                        8.1    14.76 )-----------( 244      ( 30 Mar 2021 02:43 )             24.7     03-30    141  |  105  |  68<H>  ----------------------------<  302<H>  3.9   |  22  |  2.04<H>    Ca    7.9<L>      30 Mar 2021 02:43  Phos  4.6     03-30  Mg     2.8     03-30    TPro  6.1  /  Alb  2.1<L>  /  TBili  0.6  /  DBili  x   /  AST  40  /  ALT  9   /  AlkPhos  197<H>  03-30    MICROBIOLOGY:    Blood Blood-Peripheral  03-27-21   No growth to date.     .Sputum Sputum  03-26-21   Moderate Pseudomonas fluorescens group  Normal Respiratory Fallon present  --  Pseudomonas fluorescens group    .Blood Blood  03-22-21   No Growth Final     .Blood Blood  03-20-21   Growth in anaerobic bottle: Staphylococcus epidermidis  Coag Negative Staphylococcus  Single set isolate, possible contaminant. Contact  Microbiology if susceptibility testing clinically  indicated.  ***Blood Panel PCR results on this specimen are available  approximately 3 hours after the Gram stain result.***  Gram stain, PCR, and/or culture results may not always  correspond due to difference in methodologies.  ************************************************************  This PCR assay was performed by multiplex PCR. This  Assay tests for 66 bacterial and resistance gene targets.  Please refer to the [employer] test directory  at [email or web address] for details.  --  Blood Culture PCR    (otherwise reviewed)    RADIOLOGY:    3/26 XR:    IMPRESSION:  Endotracheal tube tip at clavicular level.  Enteric tube extends below diaphragm into abdomen.  Left IJ central line present with tip in SVC region.    Redemonstrated prominent ill-defined increased markings and fluffy opacities in both mid and lower lung right greater than left. No gross pleural effusions and no pneumothorax.    Stable sternal wires mediastinal clips and cardiac and mediastinal silhouettes.

## 2021-03-30 NOTE — PROGRESS NOTE ADULT - SUBJECTIVE AND OBJECTIVE BOX
MD DORIS [facility]  Pager: [phone] / DORIS [phone]     INTERVAL HPI / OVERNIGHT EVENTS: NPH decreased to 10q6 from 20q6. Lasix 40 IV overnight with good urine output. Went into afib, received amio bolus. 1 unit pRBCs overnight with Hb 6.9 --> 8.5.     SUBJECTIVE: Pt sedated, intubated    ROS: unable to obtain    OBJECTIVE:    VITAL SIGNS:  ICU Vital Signs Last 24 Hrs  T(C): 37.3 (30 Mar 2021 08:00), Max: 38.2 (29 Mar 2021 19:36)  T(F): 99.1 (30 Mar 2021 08:00), Max: 100.8 (29 Mar 2021 19:36)  HR: 51 (30 Mar 2021 08:00) (51 - 126)  BP: --  BP(mean): --  ABP: 104/39 (30 Mar 2021 08:00) (103/41 - 153/55)  ABP(mean): 59 (30 Mar 2021 08:00) (59 - 85)  RR: 28 (30 Mar 2021 08:00) (27 - 41)  SpO2: 99% (30 Mar 2021 08:00) (96% - 100%)    Mode: IMV (Intermittent Mandatory Ventilation), RR (machine): 28, TV (machine): 400, FiO2: 40, PEEP: 6, ITime: 0.98, MAP: 17, PIP: 40    03-29 @ 07:01  -  03-30 @ 07:00  --------------------------------------------------------  IN: 1719.6 mL / OUT: 1845 mL / NET: -125.4 mL    03-30 @ 07:01  -  03-30 @ 08:08  --------------------------------------------------------  IN: 70.2 mL / OUT: 30 mL / NET: 40.2 mL      CAPILLARY BLOOD GLUCOSE      POCT Blood Glucose.: 265 mg/dL (30 Mar 2021 06:02)      PHYSICAL EXAM:    General: intubated, sedated   HEENT: R cheek with swelling and overlying ecchymosis   Neck: supple  Respiratory: CTA b/l   Cardiovascular: tachycardic, irregular, no murmurs   Abdomen: soft, NT/ND; +BS x4  Extremities: WWP, 2+ peripheral pulses b/l; no LE edema  Skin: no rash   Neurological: sedated   Lines: L IJ (3/20 - ), L radial A line (3/20 - )     MEDICATIONS:  MEDICATIONS  (STANDING):  acetylcysteine 10%  Inhalation 5 milliLiter(s) Inhalation three times a day  aMIOdarone Infusion 1 mG/Min (33.3 mL/Hr) IV Continuous <Continuous>  aMIOdarone Infusion 0.5 mG/Min (16.7 mL/Hr) IV Continuous <Continuous>  aspirin  chewable 81 milliGRAM(s) Oral daily  carbidopa/levodopa  10/100 2 Tablet(s) Oral <User Schedule>  chlorhexidine 4% Liquid 1 Application(s) Topical <User Schedule>  dexMEDEtomidine Infusion 0.2 MICROgram(s)/kG/Hr (3.99 mL/Hr) IV Continuous <Continuous>  dextrose 40% Gel 15 Gram(s) Oral once  dextrose 5%. 1000 milliLiter(s) (50 mL/Hr) IV Continuous <Continuous>  dextrose 5%. 1000 milliLiter(s) (100 mL/Hr) IV Continuous <Continuous>  dextrose 50% Injectable 25 Gram(s) IV Push once  dextrose 50% Injectable 12.5 Gram(s) IV Push once  dextrose 50% Injectable 25 Gram(s) IV Push once  fentaNYL   Infusion..... 0.5 MICROgram(s)/kG/Hr (0.8 mL/Hr) IV Continuous <Continuous>  glucagon  Injectable 1 milliGRAM(s) IntraMuscular once  heparin   Injectable 5000 Unit(s) SubCutaneous every 8 hours  insulin lispro (ADMELOG) corrective regimen sliding scale   SubCutaneous every 6 hours  insulin NPH human recombinant 15 Unit(s) SubCutaneous every 6 hours  ketorolac 0.5% Ophthalmic Solution 1 Drop(s) Left EYE two times a day  norepinephrine Infusion 0.1 MICROgram(s)/kG/Min (14.9 mL/Hr) IV Continuous <Continuous>  pantoprazole  Injectable 40 milliGRAM(s) IV Push every 12 hours  piperacillin/tazobactam IVPB.. 3.375 Gram(s) IV Intermittent every 8 hours  polyethylene glycol 3350 17 Gram(s) Oral daily  prednisoLONE acetate 1% Suspension 1 Drop(s) Left EYE four times a day  propofol Infusion 30 MICROgram(s)/kG/Min (14.3 mL/Hr) IV Continuous <Continuous>  senna Syrup 10 milliLiter(s) Oral daily  simvastatin 20 milliGRAM(s) Oral at bedtime    MEDICATIONS  (PRN):  acetaminophen    Suspension .. 650 milliGRAM(s) Enteral Tube every 6 hours PRN Temp greater or equal to 38C (100.4F)  sodium chloride 0.9% lock flush 10 milliLiter(s) IV Push every 1 hour PRN Pre/post blood products, medications, blood draw, and to maintain line patency      ALLERGIES:  Allergies    No Known Allergies    Intolerances        LABS:                        8.1    14.76 )-----------( 244      ( 30 Mar 2021 02:43 )             24.7     03-30    141  |  105  |  68<H>  ----------------------------<  302<H>  3.9   |  22  |  2.04<H>    Ca    7.9<L>      30 Mar 2021 02:43  Phos  4.6     03-30  Mg     2.8     03-30    TPro  6.1  /  Alb  2.1<L>  /  TBili  0.6  /  DBili  x   /  AST  40  /  ALT  9   /  AlkPhos  197<H>  03-30    PT/INR - ( 29 Mar 2021 01:44 )   PT: 12.4 sec;   INR: 1.08 ratio         PTT - ( 29 Mar 2021 01:44 )  PTT:36.7 sec      RADIOLOGY & ADDITIONAL TESTS: Reviewed. MD DORIS [facility]  Pager: [phone] / DORIS [phone]     INTERVAL HPI / OVERNIGHT EVENTS: Overnight attempted to wean fentanyl to assess mental status. Pt opened eyes, but did not track or follow commands. Increased NPH to 15 units q6.     SUBJECTIVE: Pt sedated, intubated    ROS: unable to obtain    OBJECTIVE:    VITAL SIGNS:  ICU Vital Signs Last 24 Hrs  T(C): 37.3 (30 Mar 2021 08:00), Max: 38.2 (29 Mar 2021 19:36)  T(F): 99.1 (30 Mar 2021 08:00), Max: 100.8 (29 Mar 2021 19:36)  HR: 51 (30 Mar 2021 08:00) (51 - 126)  BP: --  BP(mean): --  ABP: 104/39 (30 Mar 2021 08:00) (103/41 - 153/55)  ABP(mean): 59 (30 Mar 2021 08:00) (59 - 85)  RR: 28 (30 Mar 2021 08:00) (27 - 41)  SpO2: 99% (30 Mar 2021 08:00) (96% - 100%)    Mode: IMV (Intermittent Mandatory Ventilation), RR (machine): 28, TV (machine): 400, FiO2: 40, PEEP: 6, ITime: 0.98, MAP: 17, PIP: 40    03-29 @ 07:01  -  03-30 @ 07:00  --------------------------------------------------------  IN: 1719.6 mL / OUT: 1845 mL / NET: -125.4 mL    03-30 @ 07:01  -  03-30 @ 08:08  --------------------------------------------------------  IN: 70.2 mL / OUT: 30 mL / NET: 40.2 mL      CAPILLARY BLOOD GLUCOSE      POCT Blood Glucose.: 265 mg/dL (30 Mar 2021 06:02)    PHYSICAL EXAM:    General: intubated, sedated   HEENT: R cheek with swelling and overlying ecchymosis   Neck: supple  Respiratory: CTA b/l   Cardiovascular: bradycardic, regular, no murmurs   Abdomen: soft, NT/ND; +BS x4  Extremities: WWP, 2+ peripheral pulses b/l; no LE edema  Skin: no rash   Neurological: sedated   Lines: L IJ (3/20 - ), L radial A line (3/20 - )     MEDICATIONS:  MEDICATIONS  (STANDING):  acetylcysteine 10%  Inhalation 5 milliLiter(s) Inhalation three times a day  aMIOdarone Infusion 1 mG/Min (33.3 mL/Hr) IV Continuous <Continuous>  aMIOdarone Infusion 0.5 mG/Min (16.7 mL/Hr) IV Continuous <Continuous>  aspirin  chewable 81 milliGRAM(s) Oral daily  carbidopa/levodopa  10/100 2 Tablet(s) Oral <User Schedule>  chlorhexidine 4% Liquid 1 Application(s) Topical <User Schedule>  dexMEDEtomidine Infusion 0.2 MICROgram(s)/kG/Hr (3.99 mL/Hr) IV Continuous <Continuous>  dextrose 40% Gel 15 Gram(s) Oral once  dextrose 5%. 1000 milliLiter(s) (50 mL/Hr) IV Continuous <Continuous>  dextrose 5%. 1000 milliLiter(s) (100 mL/Hr) IV Continuous <Continuous>  dextrose 50% Injectable 25 Gram(s) IV Push once  dextrose 50% Injectable 12.5 Gram(s) IV Push once  dextrose 50% Injectable 25 Gram(s) IV Push once  fentaNYL   Infusion..... 0.5 MICROgram(s)/kG/Hr (0.8 mL/Hr) IV Continuous <Continuous>  glucagon  Injectable 1 milliGRAM(s) IntraMuscular once  heparin   Injectable 5000 Unit(s) SubCutaneous every 8 hours  insulin lispro (ADMELOG) corrective regimen sliding scale   SubCutaneous every 6 hours  insulin NPH human recombinant 15 Unit(s) SubCutaneous every 6 hours  ketorolac 0.5% Ophthalmic Solution 1 Drop(s) Left EYE two times a day  norepinephrine Infusion 0.1 MICROgram(s)/kG/Min (14.9 mL/Hr) IV Continuous <Continuous>  pantoprazole  Injectable 40 milliGRAM(s) IV Push every 12 hours  piperacillin/tazobactam IVPB.. 3.375 Gram(s) IV Intermittent every 8 hours  polyethylene glycol 3350 17 Gram(s) Oral daily  prednisoLONE acetate 1% Suspension 1 Drop(s) Left EYE four times a day  propofol Infusion 30 MICROgram(s)/kG/Min (14.3 mL/Hr) IV Continuous <Continuous>  senna Syrup 10 milliLiter(s) Oral daily  simvastatin 20 milliGRAM(s) Oral at bedtime    MEDICATIONS  (PRN):  acetaminophen    Suspension .. 650 milliGRAM(s) Enteral Tube every 6 hours PRN Temp greater or equal to 38C (100.4F)  sodium chloride 0.9% lock flush 10 milliLiter(s) IV Push every 1 hour PRN Pre/post blood products, medications, blood draw, and to maintain line patency      ALLERGIES:  Allergies    No Known Allergies    Intolerances        LABS:                        8.1    14.76 )-----------( 244      ( 30 Mar 2021 02:43 )             24.7     03-30    141  |  105  |  68<H>  ----------------------------<  302<H>  3.9   |  22  |  2.04<H>    Ca    7.9<L>      30 Mar 2021 02:43  Phos  4.6     03-30  Mg     2.8     03-30    TPro  6.1  /  Alb  2.1<L>  /  TBili  0.6  /  DBili  x   /  AST  40  /  ALT  9   /  AlkPhos  197<H>  03-30    PT/INR - ( 29 Mar 2021 01:44 )   PT: 12.4 sec;   INR: 1.08 ratio         PTT - ( 29 Mar 2021 01:44 )  PTT:36.7 sec      RADIOLOGY & ADDITIONAL TESTS: Reviewed.

## 2021-03-30 NOTE — PROGRESS NOTE ADULT - ASSESSMENT
70 year old Ma[place]/Jhonny-speaking male with a history of CAD s/p CABG and 2 stents, Type 2 DM, HTN, BPH s/p TURP and Parkinson's disease presenting with weakness  COVID on 3/9 without showing any symptoms and was due to travel for work  This morning, patient found his wife unresponsive at home and EMS was called- patient had ROSC s/p cardiac arrest and is currently hospitalized at   Now resp failure  Intubated   CNS low grade bacteremia--contam?  R chin/facial swelling concerning for possible cellulitis  CT with R parotidis, masseter myositis, cellulitis  Sputum culture with Pseud fluorescens, colonizer? Team broadened to Zosyn  Overall,    A) Resp failure  ? Fibrosis due to COVID  Follow clinically    B) Positive BCX, CoNS  Suspect contam, repeat BCXs NGTD  Monitor BCXs    C) Facial cellulitis/myositis?  - Note CT findings  - Vanco by level (check daily levels, redose 1g when <15)--note rising Cr, monitor Cr  - Would start Meropenem 1g q 12  - DC Zosyn (concern for vanco/zosyn in setting ZELALEM)  - Monitor site, trend WBC  - F/U ENT    D) Positive culture finding, pseudomonas in sputum  - Likely colonizer, but covered with Camelia for now  - Monitor for signs pna    E) ZELALEM  - Abx change as above  - Trend Cr    Lenin Santos MD  Pager [phone]  After 5pm and on weekends call [phone]

## 2021-03-31 NOTE — PROGRESS NOTE ADULT - SUBJECTIVE AND OBJECTIVE BOX
CC: F/U for [facility]    Saw patient. Ill appearing. Not able to communicate with me.    Allergies  No Known Allergies    ANTIMICROBIALS:  meropenem  IVPB    meropenem  IVPB 1000 every 12 hours    PE:    Vital Signs Last 24 Hrs  T(C): 37 (31 Mar 2021 13:00), Max: 37.7 (30 Mar 2021 20:00)  T(F): 98.6 (31 Mar 2021 13:00), Max: 99.9 (30 Mar 2021 20:00)  HR: 72 (31 Mar 2021 13:00) (59 - 80)  RR: 36 (31 Mar 2021 13:00) (28 - 39)  SpO2: 99% (31 Mar 2021 13:00) (96% - 100%)    Gen: AOx0, ill appearing, chin lesion unchanged  CV: S1+S2 normal, nontachycardic  Resp: Clear bilat, no resp distress, no crackles/wheezes, intubated  Abd: Soft, nontender, +BS, OGT  Ext: No LE edema, no wounds    LABS:                        8.0    13.12 )-----------( 266      ( 31 Mar 2021 02:52 )             24.9     03-31    146<H>  |  109<H>  |  70<H>  ----------------------------<  124<H>  3.8   |  23  |  2.14<H>    Ca    8.2<L>      31 Mar 2021 02:52  Phos  4.1     03-31  Mg     2.8     03-31    TPro  6.0  /  Alb  2.1<L>  /  TBili  0.5  /  DBili  x   /  AST  34  /  ALT  6   /  AlkPhos  206<H>  03-31    MICROBIOLOGY:  Vancomycin Level, Random: 13.3 ug/mL (03-30-21 @ 13:23)    .Blood Blood-Peripheral  03-27-21   No growth to date.     .Sputum Sputum  03-26-21   Moderate Pseudomonas fluorescens group  Normal Respiratory Fallon present  --  Pseudomonas fluorescens group    .Blood Blood  03-22-21   No Growth Final    .Blood Blood  03-20-21   Growth in anaerobic bottle: Staphylococcus epidermidis  Coag Negative Staphylococcus  Single set isolate, possible contaminant. Contact  Microbiology if susceptibility testing clinically  indicated.  ***Blood Panel PCR results on this specimen are available  approximately 3 hours after the Gram stain result.***  Gram stain, PCR, and/or culture results may not always  correspond due to difference in methodologies.  ************************************************************  This PCR assay was performed by multiplex PCR. This  Assay tests for 66 bacterial and resistance gene targets.  Please refer to the [facility] test directory  at [email or web address] for details.  --  Blood Culture PCR    .Blood Blood  03-18-21   No Growth Final    .Blood Blood-Peripheral  03-14-21   No Growth Final    RADIOLOGY:    3/26 XR:    IMPRESSION:  Endotracheal tube tip at clavicular level.  Enteric tube extends below diaphragm into abdomen.  Left IJ central line present with tip in SVC region.    Redemonstrated prominent ill-defined increased markings and fluffy opacities in both mid and lower lung right greater than left. No gross pleural effusions and no pneumothorax.    Stable sternal wires mediastinal clips and cardiac and mediastinal silhouettes.

## 2021-03-31 NOTE — PROGRESS NOTE ADULT - SUBJECTIVE AND OBJECTIVE BOX
Chief Complaint: DM 2, COVID+, hyperglycemia    History: Patient seen at bedside in ICU - Surge B  Remains intubated/sedated  On Glucerna 1.5 ignacio running at goal rate of 45 ml/hr x 24h, increased rate ordered yesterday  Off steroids    MEDICATIONS  (STANDING):  acetylcysteine 10%  Inhalation 5 milliLiter(s) Inhalation three times a day  aMIOdarone Infusion 1 mG/Min (33.3 mL/Hr) IV Continuous <Continuous>  aMIOdarone Infusion 0.5 mG/Min (16.7 mL/Hr) IV Continuous <Continuous>  aspirin  chewable 81 milliGRAM(s) Oral daily  carbidopa/levodopa  10/100 2 Tablet(s) Oral <User Schedule>  chlorhexidine 4% Liquid 1 Application(s) Topical <User Schedule>  dexMEDEtomidine Infusion 0.2 MICROgram(s)/kG/Hr (3.99 mL/Hr) IV Continuous <Continuous>  dextrose 40% Gel 15 Gram(s) Oral once  dextrose 5%. 1000 milliLiter(s) (50 mL/Hr) IV Continuous <Continuous>  dextrose 5%. 1000 milliLiter(s) (100 mL/Hr) IV Continuous <Continuous>  dextrose 50% Injectable 25 Gram(s) IV Push once  dextrose 50% Injectable 12.5 Gram(s) IV Push once  dextrose 50% Injectable 25 Gram(s) IV Push once  fentaNYL   Infusion..... 0.5 MICROgram(s)/kG/Hr (0.8 mL/Hr) IV Continuous <Continuous>  glucagon  Injectable 1 milliGRAM(s) IntraMuscular once  HYDROmorphone  Injectable 1 milliGRAM(s) IV Push once  insulin lispro (ADMELOG) corrective regimen sliding scale   SubCutaneous every 6 hours  insulin NPH human recombinant 10 Unit(s) SubCutaneous every 6 hours  ketorolac 0.5% Ophthalmic Solution 1 Drop(s) Left EYE two times a day  norepinephrine Infusion 0.1 MICROgram(s)/kG/Min (14.9 mL/Hr) IV Continuous <Continuous>  pantoprazole  Injectable 40 milliGRAM(s) IV Push every 12 hours  piperacillin/tazobactam IVPB.. 3.375 Gram(s) IV Intermittent every 8 hours  polyethylene glycol 3350 17 Gram(s) Oral daily  prednisoLONE acetate 1% Suspension 1 Drop(s) Left EYE four times a day  propofol Infusion 30 MICROgram(s)/kG/Min (14.3 mL/Hr) IV Continuous <Continuous>  senna Syrup 10 milliLiter(s) Oral daily  simvastatin 20 milliGRAM(s) Oral at bedtime    MEDICATIONS  (PRN):  acetaminophen    Suspension .. 650 milliGRAM(s) Enteral Tube every 6 hours PRN Temp greater or equal to 38C (100.4F)  sodium chloride 0.9% lock flush 10 milliLiter(s) IV Push every 1 hour PRN Pre/post blood products, medications, blood draw, and to maintain line patency    No Known Allergies    Review of Systems:  UNABLE TO OBTAIN    Vital Signs Last 24 Hrs  T(C): 37.1 (31 Mar 2021 15:00), Max: 37.7 (30 Mar 2021 20:00)  T(F): 98.8 (31 Mar 2021 15:00), Max: 99.9 (30 Mar 2021 20:00)  HR: 77 (31 Mar 2021 16:00) (59 - 80)  BP: --  BP(mean): --  RR: 33 (31 Mar 2021 16:00) (28 - 39)  SpO2: 98% (31 Mar 2021 16:00) (97% - 100%)  GENERAL: critically ill, intubated/sedated   HEENT:  Atraumatic, Normocephalic  RESPIRATORY: on vent  GI: non distended  NEURO: unable to assess    CAPILLARY BLOOD GLUCOSE      POCT Blood Glucose.: 96 mg/dL (31 Mar 2021 16:08)  POCT Blood Glucose.: 136 mg/dL (31 Mar 2021 11:19)  POCT Blood Glucose.: 118 mg/dL (31 Mar 2021 05:03)  POCT Blood Glucose.: 131 mg/dL (30 Mar 2021 23:24)  POCT Blood Glucose.: 148 mg/dL (30 Mar 2021 16:52)      POCT Blood Glucose.: 204 mg/dL (29 Mar 2021 11:26)  POCT Blood Glucose.: 135 mg/dL (29 Mar 2021 04:57)  POCT Blood Glucose.: 138 mg/dL (28 Mar 2021 23:44)  POCT Blood Glucose.: 139 mg/dL (28 Mar 2021 22:33)  POCT Blood Glucose.: 183 mg/dL (28 Mar 2021 17:16)      03-31    146<H>  |  109<H>  |  70<H>  ----------------------------<  124<H>  3.8   |  23  |  2.14<H>    Ca    8.2<L>      31 Mar 2021 02:52  Phos  4.1     03-31  Mg     2.8     03-31    TPro  6.0  /  Alb  2.1<L>  /  TBili  0.5  /  DBili  x   /  AST  34  /  ALT  6   /  AlkPhos  206<H>  03-31        Thyroid Function Tests:  03-15 @ 06:30 TSH 0.96 FreeT4 -- T3 -- Anti TPO -- Anti Thyroglobulin Ab -- TSI --      A1C with Estimated Average Glucose Result: 10.3 % (03-14-21 @ 08:57)  A1C with Estimated Average Glucose: 9.7 % (10-01-20 @ 06:30)    Diet, NPO with Tube Feed:   Tube Feeding Modality: Orogastric  Glucerna 1.5 Ignacio (GLUCERNA1.5RTH)  Total Volume for 24 Hours (mL): 1080  Continuous  Starting Tube Feed Rate mL per Hour: 10  Increase Tube Feed Rate by (mL): 10     Every 4 hours  Until Goal Tube Feed Rate (mL per Hour): 45  Tube Feed Duration (in Hours): 24  Tube Feed Start Time: 15:45  No Carb Prosource (1pkg = 15gms Protein)     Qty per Day:  2 (03-30-21 @ 13:24) [Active]

## 2021-03-31 NOTE — ADVANCED PRACTICE NURSE CONSULT - ASSESSMENT
General: Patient intubated via ETT tube, OJ tube in place, sedated on multiple gtts. Patient bedbound, bowel management system in place, indwelling urinary catheter. Facial swelling on right side. Skin warm, dry with increased moisture in intertriginous folds, adequate skin turgor, scattered areas of hyperpigmentation and hypopigmentation, scattered areas of ecchymosis on bilateral upper extremities. Blanchable erythema on bilateral heels. Warm feet. Bilateral fungal great toes. Scrotal edema.     Moisture associated dermatitis to bilateral groin with suspected candidiasis presenting with deep erythema with purple hue and dry crusted periphery. Increased moisture noted.     Right side of mouth extending to face- mixed etiology COVID skin manifestation, acute skin failure, and pressure related injury measuring 2cmx2.5cmx0.3cm. Irregular borders. Wound unable to be fully visualized due to beard. Tissue type presenting with 70% red, moist granular, 20% mucosa, 10% dried serous drainage unable to be lifted while cleansing. Small serous drainage, no odor. No purulence noted. Periwound skin with edema otherwise intact (full assessment obstructed by beard). No induration, no erythema. Goals of care: Maintain moist wound bed, protect periwound skin, Provide microbial stewardship. Of note: Patient currently on IV anbx for parotidis.     Patient continues to be critically ill- at high risk for skin breakdown. On assessment patient on a low air loss surface, heel offloading boots in place, Z-flow positioning pillow utilized, moisture management in place with use of one incontinence pad. Turning and positioning as tolerated.

## 2021-03-31 NOTE — PROGRESS NOTE ADULT - ATTENDING COMMENTS
overnight with some hypertension  in sinus rhythm  remains off propofol   intubated on 40% +6-->+5  offp pressors  amio drip remained off  was on precedex weaned off this am to eval mental status  opens eyes , blinks to threat, non responsive to pain     covid pna   acute resp failure  pseudomonal pna   ? parotidis   ZELALEM  new onset afib - now resolved   h/o parkinsonism, CABG, CAD, DM, HTN     # [facility]- off pressors , now hypertensive- hydral 10 once now- cont po lopressor-   remains in NSR  # resp lower vent settings, waking up slowly as tolerated- weaned down peep   # neuro cont precedex prn for comfort, want RASS0 to -1 to assess mentation  may need CTH +/- EEG if not waking up by tomorrow   # ID changed abx as per ID- meropenem/vanco  # follow left cheek- appears similar to yesterday, ctm  # Renal- net +, follow creatinine and uop- net balanced goal- if uop continues at a high rate consider adding free water to ogt  # tube feeds  # cont care in ICU

## 2021-03-31 NOTE — PROGRESS NOTE ADULT - ASSESSMENT
70 year old Amharic/Jhonny-speaking male with a history of CAD s/p CABG and 2 stents, Type 2 DM, HTN, BPH s/p TURP and Parkinson's disease presenting with weakness  COVID on 3/9 without showing any symptoms and was due to travel for work  This morning, patient found his wife unresponsive at home and EMS was called- patient had ROSC s/p cardiac arrest and is currently hospitalized at   Now resp failure  Intubated   CNS low grade bacteremia--contam?  R chin/facial swelling concerning for possible cellulitis  CT with R parotidis, masseter myositis, cellulitis  Sputum culture with Pseud fluorescens, colonizer?  Overall,    A) Resp failure  ? Fibrosis due to COVID  Follow clinically    B) Positive BCX, CoNS  Suspect contam, repeat BCXs NGTD  Monitor BCXs    C) Facial cellulitis/myositis?  - Note CT findings  - Vanco by level (check daily levels, redose 1g when <15)--note rising Cr, monitor Cr)  - Meropenem 1g q 12  - Continue Vanco/Camelia for 10 day course then discontinue  - Monitor site for improvement; trend WBC  - F/U ENT    D) Positive culture finding, pseudomonas in sputum  - Likely colonizer, but cross treated with meropenem regardless  - Monitor for signs pna    E) ZELALEM  - Redose abx as needed based on Cr  - Trend Cr    Signing off. Please call with further questions or change in status.    Lenin Santos MD  Pager [phone]  After 5pm and on weekends call [phone]

## 2021-03-31 NOTE — ADVANCED PRACTICE NURSE CONSULT - RECOMMEDATIONS
Topical Recommendations:     Endotracheal tube- Inspect vulnerable skin/mucosa every 2 hours with repositioning of tube; provide mouth care with each tube repositioning. Change ETT conde q3days or prn.     Bowel management system- Cleanse aarti-rectal area with perineal spray when indicated. Apply liquid barrier film to aarti-rectal skin. Apply 4x4 silicone foam dressing without border and cut to mid dressing with a "Y" shape. Change dressing every and apply liquid barrier film every shift. Check balloon every shift and record volume.     Bilateral groin- Cleanse well with soap and water, pat dry. Apply antifungal powder, brush away excess. Apply Interdry textile sheeting, under intertriginous folds leaving 2 inches exposed at ends to wick, remove to wash & dry affected area, then replace. Individual sheeting may be used for up to 5 days unless soiled.     Scrotum- Elevate scrotum.    Right side of mouth extending to face- Cleanse with NS, pat dry. Apply Liquid barrier film to periwound skin (allow to dry). Apply a small amount of Medihoney gel to base of wound, cover with gauze. Change daily.     Continue low air loss bed therapy, continue or initiate seat cushion, heel elevation, turn & reposition q2h, continue moisture management with barrier creams & single breathable pad, continue measures to decrease friction/shear.   Plan discussed with patient. Patient educated on topical wound therapy/NPWT/Vac therapy and on dietary recommendations (high protein, low sugar diet) to optimize wound healing. Questions answers.     Please contact Wound Care Service Line if we can be of further assistance (ext [phone]).

## 2021-03-31 NOTE — PROGRESS NOTE ADULT - SUBJECTIVE AND OBJECTIVE BOX
SHAHZAD FIELD  MRN-[id number]  Patient is a 70y old  Male who presents with a chief complaint of acute respiratory failure d/t covid (19 Mar 2021 16:56)    HPI:  Patient is a 70 year old Kazakh/Jhonny-speaking male with a history of CAD s/p CABG and 2 stents, Type 2 DM, HTN, BPH s/p TURP and Parkinson's disease, who was recently admitted on 3/14 for weakness, covid, hypoxia, started on remdesivir/decadron, discharged home yesterday (3/16). However, at home, pt decompensated with increasing sob/labored breathing, o2 sat dropped from 88% to 66% within 1/2 hour per daughter, gasping for air. Pt's son called 911 and brought him back to the hospital. Pt denies chest pain/abd pain/n/v/diarrhea. He reports he was sent home without oxygen.       overnight: continued sedation wean     REVIEW OF SYSTEMS:  deferred, intubated    Physical Exam:  Vital Signs Last 24 Hrs  T(C): 36.2 (21 Mar 2021 08:00), Max: 37.6 (20 Mar 2021 16:15)  T(F): 97.1 (21 Mar 2021 08:00), Max: 99.6 (20 Mar 2021 16:15)  HR: 49 (21 Mar 2021 08:00) (49 - 98)  BP: 140/64 (20 Mar 2021 16:15) (138/59 - 140/64)  BP(mean): 83 (20 Mar 2021 16:15) (83 - 83)  RR: 30 (21 Mar 2021 08:00) (18 - 30)  SpO2: 100% (21 Mar 2021 08:00) (93% - 100%)    Gen:  intubated, off sedation  neuro: pupils equal and reactive to light, blinks to threat, positive gag and corneal reflexes, breathing over on vent. No spontaneous movements of limbs, no response to noxious stimuli   RES : course breath sounds B/L    CVS: S1,S2. sinus bradycardia  Abd: soft, non distended, non tender, hypoactive bowel sounds   Ext: pitting edema B/L LE, edema B/L distal UE    ============================I/O===========================   I&O's Detail    20 Mar 2021 07:01  -  21 Mar 2021 07:00  --------------------------------------------------------  IN:    Cisatracurium: 162.2 mL    FentaNYL: 192 mL    IV PiggyBack: 550 mL    Lactated Ringers Bolus: 1000 mL    Norepinephrine: 54.5 mL    Propofol: 254 mL    Sodium Chloride 0.9% Bolus: 1000 mL    Vasopressin: 26.4 mL  Total IN: 3239.1 mL    OUT:    Ureteral Catheter (mL): 1180 mL  Total OUT: 1180 mL    Total NET: 2059.1 mL        ============================ LABS =========================                        8.9    8.40  )-----------( 199      ( 21 Mar 2021 04:33 )             27.7     03-21    140  |  109<H>  |  28<H>  ----------------------------<  199<H>  4.8   |  21<L>  |  0.89    Ca    7.8<L>      21 Mar 2021 04:33  Phos  3.3     03-21  Mg     1.9     03-21    TPro  4.9<L>  /  Alb  2.2<L>  /  TBili  0.6  /  DBili  x   /  AST  38  /  ALT  36  /  AlkPhos  120  03-21    LIVER FUNCTIONS - ( 21 Mar 2021 04:33 )  Alb: 2.2 g/dL / Pro: 4.9 g/dL / ALK PHOS: 120 U/L / ALT: 36 U/L / AST: 38 U/L / GGT: x           PT/INR - ( 21 Mar 2021 04:33 )   PT: 17.6 sec;   INR: 1.56 ratio         PTT - ( 20 Mar 2021 18:18 )  PTT:36.6 sec  ABG - ( 21 Mar 2021 04:33 )  pH, Arterial: 7.39  pH, Blood: x     /  pCO2: 38    /  pO2: 119   / HCO3: 23    / Base Excess: -1.8  /  SaO2: 98.2                ======================Micro/Rad/Cardio=================  Culture: Reviewed   CXR: Reviewed  Echo:Reviewed  ======================================================  PAST MEDICAL & SURGICAL HISTORY:  BPH (benign prostatic hyperplasia)    GERD (gastroesophageal reflux disease)    HLD (hyperlipidemia)    Hypertension    CAD (coronary artery disease)  CABG x2 2012, Stents x2 2014    Diabetes mellitus    Presence of stent in coronary artery  x2 2014    History of coronary artery bypass graft  X2 in March of 2012    Bladder neck obstruction  3 surgeries for BPH, most recent 2012      ====================ASSESMENT ==============  COVID  ARDS  Hypoxic Respiratory Failure  SEPSIS  PNA  DM2  Encephalopathic   ZELALEM    ====================== NEUROLOGY=====================  Off propofol 3/29  Off fentanyl 3/30  Came off precedex this morning for 2 hours for neuro check  had to restart precedex for worsening tachypnea and vent dyssynchrony   Continue to monitor neuro status off heavy sedation, methadone dosing held  possible CTH tomorrow     ==================== RESPIRATORY======================  intubated 3/20  low FiO2 requirements with good P/F maintained in supine position   Decreased PEEP from 6 to 5  monitor ABGs  continuous pulse oximetry     ====================CARDIOVASCULAR==================  had recent h/o PAF, converted to NSR with amio  maintaining NSR off amio, on lopressor  hypertensive, IV hydralazine PRN  continuous BP monitoring with Natali    ===================HEMATOLOGIC/ONC ===================  VTE ppx with SQH  last PRBC 3/28, no transfusion needed as of now  continue to monitor H/H    ===================== RENAL =========================  ZELALEM 2/2 contrast load 3/26? vs COVID/SEPSIS  non oliguric, no diuretics, creatinine peaked yesterday and is currently downtrending   Da Silva for monitoring urine output    ==================== GASTROINTESTINAL===================  continue goal rate TFs  GI PPX with protonix iv QD  bowel regimen with miralax, senna    =======================    ENDOCRINE  =====================  continue NPH and ISS [id number] for blood glucose control   blood glucose better controlled last 24hrs   f/u endo     ========================INFECTIOUS DISEASE================  s/p remdesivir, decadron   1/2 BC from 3/20 CNS likely contaminant   CT neck 3/26 c/f paratidits and or masseter myositis with cellulitis vs facial edema from proning   Sputum from 3/26 growing Pseudomonas fluorescens   continue meropenem and vanco by level  f/u ID

## 2021-03-31 NOTE — PROGRESS NOTE ADULT - ASSESSMENT
70 year old Amharic/Jhonny-speaking male with a history of DM2 (uncontrolled A1c 10.3%, on basal/bolus insulin at home), HTN, CAD s/p CABG and stents, BPH s/p TURP and Parkinson's disease, now readmitted with COVID and started on high dose Decadron. Endocrine team consulted for management of DM2.   Patient is high risk with high level decision making, requiring ICU level of care    1. Type 2 diabetes mellitus with other specified complication, with long-term current use of insulin.   A1c 10.3% indicating poorly controlled DM2. Home regimen reported as Basaglar 40 units qHS and Admelog 20 units before breakfast only    While inpatient:  Currently in ICU, target glucose 140-180 mg/dl  On continuous tube feeding, Glucerna 1.5 elin @ goal 40 ml/hr x 24h  Now off steroids  Would reduce NPH to 13 units q6h (Hold if tube feeds are held) as BG's are below target range   Continue Admelog MODERATE dose correctional scale q6h  Check BG q6h  Keep hypoglycemia protocol active     2. Steroid-induced hyperglycemia.    Last dose Dexamethasone 6 mg daily 3/25  Insulin regimen as above   Steroid effects likely worn off    3. Hypertension, unspecified type.    Now off pressors, defer management to primary team    4. Hyperlipidemia, unspecified hyperlipidemia type.    Continue with statin if no contraindications, monitor lipid profile as outpatient, goal LDL is <70.       Danna Kelly  Nurse Practitioner  Division of Endocrinology & Diabetes  Pager # [phone]      If after 6PM or before 9AM, or on weekends/holidays, please call endocrine answering service for assistance ([phone]).  For nonurgent matters email [email or web address] for assistance.

## 2021-04-01 NOTE — PROGRESS NOTE ADULT - SUBJECTIVE AND OBJECTIVE BOX
Vangie Strange MD  Internal Medicine, PGY2  [phone]    MICU Progress Note    Interval Events: Nonpurposeful movements on precedex overnight. Consider head CT. Increased precedex for tachypnea.     Meds administered:  prednisoLONE acetate 1% Suspension: 1 Drop(s) Left EYE (04-01 @ 06:46)  chlorhexidine 4% Liquid: 1 Application(s) Topical (04-01 @ 06:46)  insulin lispro (ADMELOG) corrective regimen sliding scale: 2 Unit(s) SubCutaneous (04-01 @ 06:44)  petrolatum Ophthalmic Ointment: 1 Application(s) Both EYES (04-01 @ 06:43)  ketorolac 0.5% Ophthalmic Solution: 1 Drop(s) Left EYE (04-01 @ 06:43)  insulin NPH human recombinant: 13 Unit(s) SubCutaneous (04-01 @ 06:43)  methadone    Tablet: 10 milliGRAM(s) Oral (04-01 @ 06:42)  meropenem  IVPB: 100 mL/Hr IV Intermittent (04-01 @ 06:42)  heparin   Injectable: 5000 Unit(s) SubCutaneous (04-01 @ 06:42)  clonazePAM  Tablet: 1 milliGRAM(s) Oral (04-01 @ 06:42)  methadone    Tablet: 10 milliGRAM(s) Oral (03-31 @ 23:18)  prednisoLONE acetate 1% Suspension: 1 Drop(s) Left EYE (03-31 @ 23:15)  insulin lispro (ADMELOG) corrective regimen sliding scale: 4 Unit(s) SubCutaneous (03-31 @ 23:13)  insulin NPH human recombinant: 13 Unit(s) SubCutaneous (03-31 @ 23:13)  simvastatin: 20 milliGRAM(s) Oral (03-31 @ 21:30)  heparin   Injectable: 5000 Unit(s) SubCutaneous (03-31 @ 21:30)  clonazePAM  Tablet: 1 milliGRAM(s) Oral (03-31 @ 21:30)  carbidopa/levodopa  10/100: 2 Tablet(s) Oral (03-31 @ 21:22)  midazolam Injectable: 4 milliGRAM(s) IV Push (03-31 @ 21:20)  methadone    Tablet: 20 milliGRAM(s) Oral (03-31 @ 21:20)  fentaNYL    Injectable: 100 MICROGram(s) IV Push (03-31 @ 20:25)        REVIEW OF SYSTEMS:  [ ] Unable to assess ROS because ______  CONSTITUTIONAL: No fever, chills, night sweats, or fatigue  EYES: No eye pain, visual disturbances, or discharge  ENMT:  No difficulty hearing, tinnitus, vertigo; No sinus or throat pain  NECK: No pain or stiffness  BREASTS: No pain, masses, or nipple discharge  RESPIRATORY: No cough, wheezing, or hemoptysis; No shortness of breath  CARDIOVASCULAR: No chest pain, palpitations, dizziness, or leg swelling  GASTROINTESTINAL: No abdominal or epigastric pain. No nausea, vomiting, or hematemesis; No diarrhea or constipation. No melena or hematochezia.  GENITOURINARY: No dysuria, frequency, hematuria, or incontinence  NEUROLOGICAL: No headaches, memory loss, loss of strength, numbness, or tremors  SKIN: No itching, burning, rashes, or lesions   LYMPH NODES: No enlarged glands  ENDOCRINE: No heat or cold intolerance; No hair loss  MUSCULOSKELETAL: No joint pain or swelling; No muscle, back, or extremity pain  PSYCHIATRIC: No depression, anxiety, mood swings, or difficulty sleeping  HEME/LYMPH: No easy bruising, or bleeding gums  ALLERGY AND IMMUNOLOGIC: No hives or eczema    OBJECTIVE:  Vents:  Mode: AC/ CMV (Assist Control/ Continuous Mandatory Ventilation), RR (machine): 28, TV (machine): 400, FiO2: 35, PEEP: 5, ITime: 0.67, MAP: 11, PIP: 32    03-31 @ 07:01  -  04-01 @ 07:00  --------------------------------------------------------  IN: 1523.1 mL / OUT: 3175 mL / NET: -1651.9 mL      CAPILLARY BLOOD GLUCOSE      POCT Blood Glucose.: 174 mg/dL (01 Apr 2021 05:42)      Drips:    Lines:    VITALS:  T(F): 97.3 (04-01-21 @ 06:00), Max: 98.8 (03-31-21 @ 09:00)  HR: 63 (04-01-21 @ 06:00) (54 - 80)  BP: --  BP(mean): --  ABP: 169/56 (04-01-21 @ 06:00) (116/44 - 189/78)  ABP(mean): 96 (04-01-21 @ 06:00) (71 - 119)  RR: 28 (04-01-21 @ 06:00) (19 - 39)  SpO2: 99% (04-01-21 @ 06:00) (96% - 100%)    PHYSICAL EXAM:  GENERAL: NAD, lying in bed comfortably  HEAD:  Atraumatic, Normocephalic  EYES: EOMI, PERRLA, conjunctiva and sclera clear  ENT: Moist mucous membranes  NECK: Supple, No JVD  CHEST/LUNG: Clear to auscultation bilaterally; No rales, rhonchi, wheezing, or rubs. Unlabored respirations  HEART: Regular rate and rhythm; No murmurs, rubs, or gallops  ABDOMEN: Bowel sounds present; Soft, Nontender, Nondistended. No hepatomegaly  EXTREMITIES:  2+ Peripheral Pulses, brisk capillary refill. No clubbing, cyanosis, or edema  NERVOUS SYSTEM:  Alert & Oriented X3, speech clear. No deficits   MSK: FROM all 4 extremities, full and equal strength  SKIN: No rashes or lesions    HOSPITAL MEDICATIONS:  Standing Meds:  aspirin  chewable 81 milliGRAM(s) Oral daily  carbidopa/levodopa  10/100 2 Tablet(s) Oral <User Schedule>  chlorhexidine 4% Liquid 1 Application(s) Topical <User Schedule>  clonazePAM  Tablet 1 milliGRAM(s) Oral two times a day  dexMEDEtomidine Infusion 0.2 MICROgram(s)/kG/Hr IV Continuous <Continuous>  dextrose 40% Gel 15 Gram(s) Oral once  dextrose 5%. 1000 milliLiter(s) IV Continuous <Continuous>  dextrose 5%. 1000 milliLiter(s) IV Continuous <Continuous>  dextrose 50% Injectable 25 Gram(s) IV Push once  dextrose 50% Injectable 12.5 Gram(s) IV Push once  dextrose 50% Injectable 25 Gram(s) IV Push once  glucagon  Injectable 1 milliGRAM(s) IntraMuscular once  heparin   Injectable 5000 Unit(s) SubCutaneous every 8 hours  insulin lispro (ADMELOG) corrective regimen sliding scale   SubCutaneous every 6 hours  insulin NPH human recombinant 13 Unit(s) SubCutaneous every 6 hours  ketorolac 0.5% Ophthalmic Solution 1 Drop(s) Left EYE two times a day  meropenem  IVPB      meropenem  IVPB 1000 milliGRAM(s) IV Intermittent every 12 hours  methadone    Tablet 10 milliGRAM(s) Oral every 8 hours  metoprolol tartrate 25 milliGRAM(s) Enteral Tube two times a day  pantoprazole  Injectable 40 milliGRAM(s) IV Push daily  petrolatum Ophthalmic Ointment 1 Application(s) Both EYES two times a day  polyethylene glycol 3350 17 Gram(s) Oral daily  prednisoLONE acetate 1% Suspension 1 Drop(s) Left EYE four times a day  senna Syrup 10 milliLiter(s) Oral daily  simvastatin 20 milliGRAM(s) Oral at bedtime      PRN Meds:  acetaminophen    Suspension .. 650 milliGRAM(s) Enteral Tube every 6 hours PRN  sodium chloride 0.9% lock flush 10 milliLiter(s) IV Push every 1 hour PRN      LABS:  CBC 04-01-21 @ 06:33                        7.9    11.55 )-----------( 279                   24.7       Hgb trend: 7.9 <-- , 8.0 <-- , 8.1 <-- , 8.5 <--   WBC trend: 11.55 <-- , 13.12 <-- , 14.76 <-- , 18.67 <--     CMP 04-01-21 @ 06:33    147<H>  |  114<H>  |  70<H>  ----------------------------<  164<H>  4.2   |  24  |  1.90<H>    Ca    7.9<L>      04-01-21 @ 06:33  Phos  3.6     04-01  Mg     2.8     04-01    TPro  5.7<L>  /  Alb  2.1<L>  /  TBili  0.3  /  DBili  x   /  AST  18  /  ALT  10  /  AlkPhos  170<H>  04-01      Serum Cr trend: 1.90 <-- , 2.14 <-- , 2.26 <-- , 2.04 <--   PT/INR - ( 01 Apr 2021 06:33 )   PT: 12.0 sec;   INR: 1.05 ratio         PTT - ( 01 Apr 2021 06:33 )  PTT:33.4 sec    Arterial Blood Gas:  03-31 @ 16:52  7.47/34/74/25/95.2/0.9  ABG lactate: --  Arterial Blood Gas:  03-31 @ 02:52  7.40/40/78/25/94.8/0.3  ABG lactate: --        MICROBIOLOGY:       RADIOLOGY:  [ ] Reviewed and interpreted by me    EKG:   Vangie Strange MD  Internal Medicine, PGY2  [phone]    MICU Progress Note    Interval Events: Nonpurposeful movements on precedex overnight. Consider head CT. Increased precedex for tachypnea.     Meds administered:  prednisoLONE acetate 1% Suspension: 1 Drop(s) Left EYE (04-01 @ 06:46)  chlorhexidine 4% Liquid: 1 Application(s) Topical (04-01 @ 06:46)  insulin lispro (ADMELOG) corrective regimen sliding scale: 2 Unit(s) SubCutaneous (04-01 @ 06:44)  petrolatum Ophthalmic Ointment: 1 Application(s) Both EYES (04-01 @ 06:43)  ketorolac 0.5% Ophthalmic Solution: 1 Drop(s) Left EYE (04-01 @ 06:43)  insulin NPH human recombinant: 13 Unit(s) SubCutaneous (04-01 @ 06:43)  methadone    Tablet: 10 milliGRAM(s) Oral (04-01 @ 06:42)  meropenem  IVPB: 100 mL/Hr IV Intermittent (04-01 @ 06:42)  heparin   Injectable: 5000 Unit(s) SubCutaneous (04-01 @ 06:42)  clonazePAM  Tablet: 1 milliGRAM(s) Oral (04-01 @ 06:42)  methadone    Tablet: 10 milliGRAM(s) Oral (03-31 @ 23:18)  prednisoLONE acetate 1% Suspension: 1 Drop(s) Left EYE (03-31 @ 23:15)  insulin lispro (ADMELOG) corrective regimen sliding scale: 4 Unit(s) SubCutaneous (03-31 @ 23:13)  insulin NPH human recombinant: 13 Unit(s) SubCutaneous (03-31 @ 23:13)  simvastatin: 20 milliGRAM(s) Oral (03-31 @ 21:30)  heparin   Injectable: 5000 Unit(s) SubCutaneous (03-31 @ 21:30)  clonazePAM  Tablet: 1 milliGRAM(s) Oral (03-31 @ 21:30)  carbidopa/levodopa  10/100: 2 Tablet(s) Oral (03-31 @ 21:22)  midazolam Injectable: 4 milliGRAM(s) IV Push (03-31 @ 21:20)  methadone    Tablet: 20 milliGRAM(s) Oral (03-31 @ 21:20)  fentaNYL    Injectable: 100 MICROGram(s) IV Push (03-31 @ 20:25)        REVIEW OF SYSTEMS:  [ ] Unable to assess ROS because ______  CONSTITUTIONAL: No fever, chills, night sweats, or fatigue  EYES: No eye pain, visual disturbances, or discharge  ENMT:  No difficulty hearing, tinnitus, vertigo; No sinus or throat pain  NECK: No pain or stiffness  BREASTS: No pain, masses, or nipple discharge  RESPIRATORY: No cough, wheezing, or hemoptysis; No shortness of breath  CARDIOVASCULAR: No chest pain, palpitations, dizziness, or leg swelling  GASTROINTESTINAL: No abdominal or epigastric pain. No nausea, vomiting, or hematemesis; No diarrhea or constipation. No melena or hematochezia.  GENITOURINARY: No dysuria, frequency, hematuria, or incontinence  NEUROLOGICAL: No headaches, memory loss, loss of strength, numbness, or tremors  SKIN: No itching, burning, rashes, or lesions   LYMPH NODES: No enlarged glands  ENDOCRINE: No heat or cold intolerance; No hair loss  MUSCULOSKELETAL: No joint pain or swelling; No muscle, back, or extremity pain  PSYCHIATRIC: No depression, anxiety, mood swings, or difficulty sleeping  HEME/LYMPH: No easy bruising, or bleeding gums  ALLERGY AND IMMUNOLOGIC: No hives or eczema    OBJECTIVE:  Vents:  Mode: AC/ CMV (Assist Control/ Continuous Mandatory Ventilation), RR (machine): 28, TV (machine): 400, FiO2: 35, PEEP: 5, ITime: 0.67, MAP: 11, PIP: 32    03-31 @ 07:01  -  04-01 @ 07:00  --------------------------------------------------------  IN: 1523.1 mL / OUT: 3175 mL / NET: -1651.9 mL      CAPILLARY BLOOD GLUCOSE      POCT Blood Glucose.: 174 mg/dL (01 Apr 2021 05:42)      Drips:    Lines:    VITALS:  T(F): 97.3 (04-01-21 @ 06:00), Max: 98.8 (03-31-21 @ 09:00)  HR: 63 (04-01-21 @ 06:00) (54 - 80)  BP: --  BP(mean): --  ABP: 169/56 (04-01-21 @ 06:00) (116/44 - 189/78)  ABP(mean): 96 (04-01-21 @ 06:00) (71 - 119)  RR: 28 (04-01-21 @ 06:00) (19 - 39)  SpO2: 99% (04-01-21 @ 06:00) (96% - 100%)    PHYSICAL EXAM:  Gen:  intubated, off sedation  neuro: pupils equal and reactive to light, blinks to threat, positive gag and corneal reflexes. No spontaneous movements of limbs, no response to noxious stimuli   RES : course breath sounds B/L    CVS: S1,S2. sinus bradycardia  Abd: soft, non distended, non tender, hypoactive bowel sounds   Ext: pitting edema B/L LE, edema B/L distal UE    HOSPITAL MEDICATIONS:  Standing Meds:  aspirin  chewable 81 milliGRAM(s) Oral daily  carbidopa/levodopa  10/100 2 Tablet(s) Oral <User Schedule>  chlorhexidine 4% Liquid 1 Application(s) Topical <User Schedule>  clonazePAM  Tablet 1 milliGRAM(s) Oral two times a day  dexMEDEtomidine Infusion 0.2 MICROgram(s)/kG/Hr IV Continuous <Continuous>  dextrose 40% Gel 15 Gram(s) Oral once  dextrose 5%. 1000 milliLiter(s) IV Continuous <Continuous>  dextrose 5%. 1000 milliLiter(s) IV Continuous <Continuous>  dextrose 50% Injectable 25 Gram(s) IV Push once  dextrose 50% Injectable 12.5 Gram(s) IV Push once  dextrose 50% Injectable 25 Gram(s) IV Push once  glucagon  Injectable 1 milliGRAM(s) IntraMuscular once  heparin   Injectable 5000 Unit(s) SubCutaneous every 8 hours  insulin lispro (ADMELOG) corrective regimen sliding scale   SubCutaneous every 6 hours  insulin NPH human recombinant 13 Unit(s) SubCutaneous every 6 hours  ketorolac 0.5% Ophthalmic Solution 1 Drop(s) Left EYE two times a day  meropenem  IVPB      meropenem  IVPB 1000 milliGRAM(s) IV Intermittent every 12 hours  methadone    Tablet 10 milliGRAM(s) Oral every 8 hours  metoprolol tartrate 25 milliGRAM(s) Enteral Tube two times a day  pantoprazole  Injectable 40 milliGRAM(s) IV Push daily  petrolatum Ophthalmic Ointment 1 Application(s) Both EYES two times a day  polyethylene glycol 3350 17 Gram(s) Oral daily  prednisoLONE acetate 1% Suspension 1 Drop(s) Left EYE four times a day  senna Syrup 10 milliLiter(s) Oral daily  simvastatin 20 milliGRAM(s) Oral at bedtime      PRN Meds:  acetaminophen    Suspension .. 650 milliGRAM(s) Enteral Tube every 6 hours PRN  sodium chloride 0.9% lock flush 10 milliLiter(s) IV Push every 1 hour PRN      LABS:  CBC 04-01-21 @ 06:33                        7.9    11.55 )-----------( 279                   24.7       Hgb trend: 7.9 <-- , 8.0 <-- , 8.1 <-- , 8.5 <--   WBC trend: 11.55 <-- , 13.12 <-- , 14.76 <-- , 18.67 <--     CMP 04-01-21 @ 06:33    147<H>  |  114<H>  |  70<H>  ----------------------------<  164<H>  4.2   |  24  |  1.90<H>    Ca    7.9<L>      04-01-21 @ 06:33  Phos  3.6     04-01  Mg     2.8     04-01    TPro  5.7<L>  /  Alb  2.1<L>  /  TBili  0.3  /  DBili  x   /  AST  18  /  ALT  10  /  AlkPhos  170<H>  04-01      Serum Cr trend: 1.90 <-- , 2.14 <-- , 2.26 <-- , 2.04 <--   PT/INR - ( 01 Apr 2021 06:33 )   PT: 12.0 sec;   INR: 1.05 ratio         PTT - ( 01 Apr 2021 06:33 )  PTT:33.4 sec    Arterial Blood Gas:  03-31 @ 16:52  7.47/34/74/25/95.2/0.9  ABG lactate: --  Arterial Blood Gas:  03-31 @ 02:52  7.40/40/78/25/94.8/0.3  ABG lactate: --        MICROBIOLOGY:       RADIOLOGY:  [ ] Reviewed and interpreted by me    EKG:

## 2021-04-01 NOTE — PROGRESS NOTE ADULT - ATTENDING COMMENTS
Remains off propofol   intubated on 40%-->35% +6-->+5  HTNsive at times  was on precedex weaned off again this am to eval mental status  opens eyes , blinks to threat, non responsive to pain - as was yesterday    covid pna   acute resp failure  pseudomonal pna   ? parotidis   ZELALEM  new onset afib - now resolved   h/o parkinsonism, CABG, CAD, DM, HTN     # CVS- off pressors , now hypertensive-  cont po lopressor-  consider adding hydral OGT if still HTNsive- bp tend to be labile with sedation,  remains in NSR  # resp lower vent settings, waking up slowly as tolerated- weaned down peep   # neuro cont off precedex stop long acting BZD, want RASS0 to -1 to assess mentation- methadone has helped wean off some of the sedatives  will order CTH today then +/- EEG if not waking up    check ammonia level   check TFTs, B12, folate levels  neuro eval  # ID changed abx as per ID- meropenem cont , vanco course completed  # follow left cheek- appears similar to yesterday, ctm- more solid collection on POCUS  # Renal- net +, follow creatinine and uop- net balanced goal-   # tube feeds  # cont care in ICU

## 2021-04-01 NOTE — PROGRESS NOTE ADULT - ASSESSMENT
70 year old Icelandic/Jhonny-speaking male with a history of CAD s/p CABG and 2 stents, Type 2 DM, HTN, BPH s/p TURP and Parkinson's disease, who was recently admitted 3/14 with for weakness and COVID-19. Started on decadron and remdesivir and discharged 3/16. Returned 3/17 with worsening respiratory failure. Intubated 3/17. Course c/b ?parotitis, Pseudomonas PNA, and new onset atrial fibrillation now in sinus rhythm.     ====================== NEUROLOGY=====================  Off propofol 3/29  Off fentanyl 3/30  had to restart precedex for worsening tachypnea and vent dyssynchrony   Continue to monitor neuro status off heavy sedation, methadone dosing held  CTH    ==================== RESPIRATORY======================  intubated 3/20  low FiO2 requirements with good P/F maintained in supine position   Decreased PEEP from 6 to 5  monitor ABGs  continuous pulse oximetry     ====================CARDIOVASCULAR==================  had recent h/o PAF, converted to NSR with amio  maintaining NSR off amio, on lopressor  hypertensive, IV hydralazine PRN  continuous BP monitoring with [place]i    ===================HEMATOLOGIC/ONC ===================  VTE ppx with SQH  last PRBC 3/28, no transfusion needed as of now  continue to monitor H/H    ===================== RENAL =========================  ZELALEM 2/2 contrast load 3/26? vs COVID/SEPSIS  non oliguric, no diuretics, creatinine peaked yesterday and is currently downtrending   [place] for monitoring urine output    ==================== GASTROINTESTINAL===================  continue goal rate TFs  GI PPX with protonix iv QD  bowel regimen with miralax, senna    =======================    ENDOCRINE  =====================  continue NPH and ISS [id number] for blood glucose control   blood glucose better controlled last 24hrs   f/u endo     ========================INFECTIOUS DISEASE================  s/p remdesivir, decadron   1/2 BC from 3/20 CNS likely contaminant   CT neck 3/26 c/f paratidits and or masseter myositis with cellulitis vs facial edema from proning   Sputum from 3/26 growing Pseudomonas fluorescens   continue meropenem and vanco by level  f/u ID   70 year old Hebrew/Jhonny-speaking male with a history of CAD s/p CABG and 2 stents, Type 2 DM, HTN, BPH s/p TURP and Parkinson's disease, who was recently admitted 3/14 with for weakness and COVID-19. Started on decadron and remdesivir and discharged 3/16. Returned 3/17 with worsening respiratory failure. Intubated 3/17. Course c/b ?parotitis, Pseudomonas PNA, and new onset atrial fibrillation now in sinus rhythm.     PLAN  Neuro  -Off precedex this AM to assess mental status. May have required multiple PRNs for sedation overnight.  -CTH today. Can consider EEG if unrevealing.  -Klonopin added yesterday, will stop  -C/w methadone    Cardiovascular  -had recent h/o PAF, converted to NSR with amio  -maintaining NSR off amio, on lopressor  -hypertensive, IV hydralazine PRN. Consider adding standing coverage.  -continuous BP monitoring with [place]    Pulmonary  -intubated 3/20  -C/w current vent settings  -monitor ABGs    GI  -C/w TFs  -PPI  -Bowel regimen    Renal  ZELALEM 2/2 contrast load 3/26? vs COVID/SEPSIS  -non oliguric, no diuretics, creatinine peaked and is currently downtrending   -[place] for monitoring urine output    ID  COVID  -s/p remdesivir, decadron     Neck swelling  -CT neck 3/26 c/f parotitis and or masseter myositis with cellulitis vs facial edema from proning   -Sputum from 3/26 growing Pseudomonas fluorescens   -C/w santos (3/30 - ) for planned 10d course per ID  -S/p vanco 10d course, will stop  -f/u ID recs    Endo  -C/w NPH 13u, Mod SSI  -Endo following    Heme  -No acute issues    Ppx  -DVT: SQH    Lines/Tubes  -RRA [place] 3/20  -[facility] TLC 3/20  -[place] 3/20    GOC  -Full code   70 year old Polish/Jhonny-speaking male with a history of CAD s/p CABG and 2 stents, Type 2 DM, HTN, BPH s/p TURP and Parkinson's disease, who was recently admitted 3/14 with for weakness and COVID-19. Started on decadron and remdesivir and discharged 3/16. Returned 3/17 with worsening respiratory failure. Intubated 3/17. Course c/b ?parotitis, Pseudomonas PNA, and new onset atrial fibrillation now in sinus rhythm.     PLAN  Neuro  -Off precedex this AM to assess mental status. May have required multiple PRNs for sedation overnight.  -CTH today. Can consider EEG if unrevealing.  -F/u ammonia level  -Klonopin added yesterday, will stop  -C/w methadone    Cardiovascular  -had recent h/o PAF, converted to NSR with amio  -maintaining NSR off amio, on lopressor  -hypertensive, IV hydralazine PRN. Consider adding standing coverage.  -continuous BP monitoring with [place]    Pulmonary  -intubated 3/20  -C/w current vent settings  -monitor ABGs    GI  -C/w TFs  -PPI  -Bowel regimen    Renal  ZELALEM 2/2 contrast load 3/26? vs COVID/SEPSIS  -non oliguric, no diuretics, creatinine peaked and is currently downtrending   -[place] for monitoring urine output    ID  COVID  -s/p remdesivir, decadron     Neck swelling  -CT neck 3/26 c/f parotitis and or masseter myositis with cellulitis vs facial edema from proning   -Sputum from 3/26 growing Pseudomonas fluorescens   -C/w santos (3/30 - ) for planned 10d course per ID  -S/p vanco 10d course, will stop  -f/u ID recs    Endo  -C/w NPH 13u, Mod SSI  -Endo following    Heme  -No acute issues    Ppx  -DVT: SQH    Lines/Tubes  -RRA [place] 3/20  -[facility] TLC 3/20  -[place] 3/20    GOC  -Full code   70 year old Indonesian/Jhonny-speaking male with a history of CAD s/p CABG and 2 stents, Type 2 DM, HTN, BPH s/p TURP and Parkinson's disease, who was recently admitted 3/14 with for weakness and COVID-19. Started on decadron and remdesivir and discharged 3/16. Returned 3/17 with worsening respiratory failure. Intubated 3/17. Course c/b ?parotitis, Pseudomonas PNA, and new onset atrial fibrillation now in sinus rhythm.     PLAN  Neuro  Mental status remains poor  -Off precedex this AM to assess mental status. May have required multiple PRNs for sedation overnight.  -CTH today. Can consider EEG if unrevealing.  -F/u ammonia level  -Klonopin added yesterday, will stop  -C/w methadone    Cardiovascular  -had recent h/o PAF, converted to NSR with amio  -maintaining NSR off amio, on lopressor  -hypertensive, IV hydralazine PRN. Consider adding standing coverage.  -continuous BP monitoring with Natali    Pulmonary  -intubated 3/20  -C/w current vent settings  -Unable to wean from vent given poor mental status  -monitor ABGs    GI  -C/w TFs  -PPI  -Bowel regimen    Renal  ZELALEM 2/2 contrast load 3/26? vs COVID/SEPSIS  -non oliguric, no diuretics, creatinine peaked and is currently downtrending   -[place] for monitoring urine output    ID  COVID  -s/p remdesivir, decadron     Neck swelling  -CT neck 3/26 c/f parotitis and or masseter myositis with cellulitis vs facial edema from proning   -Sputum from 3/26 growing Pseudomonas fluorescens   -C/w santos (3/30 - ) for planned 10d course per ID  -S/p vanco 10d course, will stop  -f/u ID recs    Endo  -C/w NPH 13u, Mod SSI  -Endo following    Heme  -No acute issues    Ppx  -DVT: SQH    Lines/Tubes  -RRA [place] 3/20  -[facility] TLC 3/20  -[place] 3/20    GOC  -Full code

## 2021-04-02 NOTE — PROGRESS NOTE ADULT - SUBJECTIVE AND OBJECTIVE BOX
[facility]  Pager: [phone] / DORIS [phone]     Interval Events: Nonpurposeful movements on precedex overnight. Consider head CT. Increased precedex for tachypnea.     ROS: unable to obtain     OBJECTIVE:    VITAL SIGNS:  ICU Vital Signs Last 24 Hrs  T(C): 36.7 (02 Apr 2021 08:03), Max: 37.1 (01 Apr 2021 20:00)  T(F): 98.1 (02 Apr 2021 08:03), Max: 98.8 (01 Apr 2021 20:00)  HR: 68 (02 Apr 2021 08:03) (53 - 86)  BP: --  BP(mean): --  ABP: 169/66 (02 Apr 2021 08:03) (100/40 - 181/55)  ABP(mean): 90 (02 Apr 2021 08:03) (58 - 106)  RR: 31 (02 Apr 2021 08:03) (22 - 40)  SpO2: 100% (02 Apr 2021 08:03) (95% - 100%)    Mode: AC/ CMV (Assist Control/ Continuous Mandatory Ventilation), RR (machine): 28, TV (machine): 400, FiO2: 80, PEEP: 5, MAP: 11, PIP: 22    04-01 @ 07:01  -  04-02 @ 07:00  --------------------------------------------------------  IN: 1227 mL / OUT: 1725 mL / NET: -498 mL      CAPILLARY BLOOD GLUCOSE      POCT Blood Glucose.: 99 mg/dL (02 Apr 2021 05:03)      PHYSICAL EXAM:    Gen:  intubated  neuro: pupils equal and reactive to light, blinks to threat, positive gag and corneal reflexes. No spontaneous movements of limbs, no response to noxious stimuli   RES : course breath sounds B/L    CVS: S1,S2. sinus bradycardia  Abd: soft, non distended, non tender, hypoactive bowel sounds   Ext: pitting edema B/L LE, edema B/L distal UE    MEDICATIONS:  MEDICATIONS  (STANDING):  aspirin  chewable 81 milliGRAM(s) Oral daily  carbidopa/levodopa  10/100 2 Tablet(s) Oral <User Schedule>  chlorhexidine 4% Liquid 1 Application(s) Topical <User Schedule>  dexMEDEtomidine Infusion 0.2 MICROgram(s)/kG/Hr (3.99 mL/Hr) IV Continuous <Continuous>  dextrose 40% Gel 15 Gram(s) Oral once  dextrose 5%. 1000 milliLiter(s) (50 mL/Hr) IV Continuous <Continuous>  dextrose 5%. 1000 milliLiter(s) (100 mL/Hr) IV Continuous <Continuous>  dextrose 50% Injectable 25 Gram(s) IV Push once  dextrose 50% Injectable 12.5 Gram(s) IV Push once  dextrose 50% Injectable 25 Gram(s) IV Push once  fentaNYL    Injectable 50 MICROGram(s) IV Push once  glucagon  Injectable 1 milliGRAM(s) IntraMuscular once  heparin   Injectable 5000 Unit(s) SubCutaneous every 8 hours  insulin lispro (ADMELOG) corrective regimen sliding scale   SubCutaneous every 6 hours  insulin NPH human recombinant 13 Unit(s) SubCutaneous every 6 hours  ketorolac 0.5% Ophthalmic Solution 1 Drop(s) Left EYE two times a day  meropenem  IVPB      meropenem  IVPB 1000 milliGRAM(s) IV Intermittent every 12 hours  methadone    Tablet 10 milliGRAM(s) Oral every 8 hours  metoclopramide Injectable 5 milliGRAM(s) IV Push every 6 hours  metoprolol tartrate 25 milliGRAM(s) Enteral Tube two times a day  pantoprazole  Injectable 40 milliGRAM(s) IV Push daily  petrolatum Ophthalmic Ointment 1 Application(s) Both EYES two times a day  polyethylene glycol 3350 17 Gram(s) Oral daily  prednisoLONE acetate 1% Suspension 1 Drop(s) Left EYE four times a day  senna Syrup 10 milliLiter(s) Oral daily  simvastatin 20 milliGRAM(s) Oral at bedtime    MEDICATIONS  (PRN):  acetaminophen    Suspension .. 650 milliGRAM(s) Enteral Tube every 6 hours PRN Temp greater or equal to 38C (100.4F)  sodium chloride 0.9% lock flush 10 milliLiter(s) IV Push every 1 hour PRN Pre/post blood products, medications, blood draw, and to maintain line patency      ALLERGIES:  Allergies    No Known Allergies    Intolerances        LABS:                        7.6    9.21  )-----------( 294      ( 02 Apr 2021 04:01 )             24.5     04-02    150<H>  |  118<H>  |  69<H>  ----------------------------<  98  4.5   |  22  |  1.94<H>    Ca    8.0<L>      02 Apr 2021 04:01  Phos  4.5     04-02  Mg     3.0     04-02    TPro  5.7<L>  /  Alb  1.8<L>  /  TBili  0.3  /  DBili  x   /  AST  16  /  ALT  <5  /  AlkPhos  140<H>  04-02    PT/INR - ( 01 Apr 2021 06:33 )   PT: 12.0 sec;   INR: 1.05 ratio         PTT - ( 01 Apr 2021 06:33 )  PTT:33.4 sec      RADIOLOGY & ADDITIONAL TESTS: Reviewed. [facility]  Pager: [phone] / DORIS [phone]     Interval Events: Nonpurposeful movements on precedex overnight. ON briefly desaturated to the 50s/60s and was found to have a mucus plug. Placed back on vent from CPAP, %.     ROS: unable to obtain     OBJECTIVE:    VITAL SIGNS:  ICU Vital Signs Last 24 Hrs  T(C): 36.7 (02 Apr 2021 08:03), Max: 37.1 (01 Apr 2021 20:00)  T(F): 98.1 (02 Apr 2021 08:03), Max: 98.8 (01 Apr 2021 20:00)  HR: 68 (02 Apr 2021 08:03) (53 - 86)  BP: --  BP(mean): --  ABP: 169/66 (02 Apr 2021 08:03) (100/40 - 181/55)  ABP(mean): 90 (02 Apr 2021 08:03) (58 - 106)  RR: 31 (02 Apr 2021 08:03) (22 - 40)  SpO2: 100% (02 Apr 2021 08:03) (95% - 100%)    Mode: AC/ CMV (Assist Control/ Continuous Mandatory Ventilation), RR (machine): 28, TV (machine): 400, FiO2: 80, PEEP: 5, MAP: 11, PIP: 22    04-01 @ 07:01  -  04-02 @ 07:00  --------------------------------------------------------  IN: 1227 mL / OUT: 1725 mL / NET: -498 mL      CAPILLARY BLOOD GLUCOSE      POCT Blood Glucose.: 99 mg/dL (02 Apr 2021 05:03)      PHYSICAL EXAM:    Gen:  intubated  neuro: pupils equal and reactive to light, blinks to threat, positive gag and corneal reflexes. Squeezed right hand on command   HEENT: R cheek swelling   RES : course breath sounds B/L    CVS: S1,S2. sinus bradycardia  Abd: soft, non distended, non tender, hypoactive bowel sounds   Ext: pitting edema B/L LE, edema B/L distal UE    MEDICATIONS:  MEDICATIONS  (STANDING):  aspirin  chewable 81 milliGRAM(s) Oral daily  carbidopa/levodopa  10/100 2 Tablet(s) Oral <User Schedule>  chlorhexidine 4% Liquid 1 Application(s) Topical <User Schedule>  dexMEDEtomidine Infusion 0.2 MICROgram(s)/kG/Hr (3.99 mL/Hr) IV Continuous <Continuous>  dextrose 40% Gel 15 Gram(s) Oral once  dextrose 5%. 1000 milliLiter(s) (50 mL/Hr) IV Continuous <Continuous>  dextrose 5%. 1000 milliLiter(s) (100 mL/Hr) IV Continuous <Continuous>  dextrose 50% Injectable 25 Gram(s) IV Push once  dextrose 50% Injectable 12.5 Gram(s) IV Push once  dextrose 50% Injectable 25 Gram(s) IV Push once  fentaNYL    Injectable 50 MICROGram(s) IV Push once  glucagon  Injectable 1 milliGRAM(s) IntraMuscular once  heparin   Injectable 5000 Unit(s) SubCutaneous every 8 hours  insulin lispro (ADMELOG) corrective regimen sliding scale   SubCutaneous every 6 hours  insulin NPH human recombinant 13 Unit(s) SubCutaneous every 6 hours  ketorolac 0.5% Ophthalmic Solution 1 Drop(s) Left EYE two times a day  meropenem  IVPB      meropenem  IVPB 1000 milliGRAM(s) IV Intermittent every 12 hours  methadone    Tablet 10 milliGRAM(s) Oral every 8 hours  metoclopramide Injectable 5 milliGRAM(s) IV Push every 6 hours  metoprolol tartrate 25 milliGRAM(s) Enteral Tube two times a day  pantoprazole  Injectable 40 milliGRAM(s) IV Push daily  petrolatum Ophthalmic Ointment 1 Application(s) Both EYES two times a day  polyethylene glycol 3350 17 Gram(s) Oral daily  prednisoLONE acetate 1% Suspension 1 Drop(s) Left EYE four times a day  senna Syrup 10 milliLiter(s) Oral daily  simvastatin 20 milliGRAM(s) Oral at bedtime    MEDICATIONS  (PRN):  acetaminophen    Suspension .. 650 milliGRAM(s) Enteral Tube every 6 hours PRN Temp greater or equal to 38C (100.4F)  sodium chloride 0.9% lock flush 10 milliLiter(s) IV Push every 1 hour PRN Pre/post blood products, medications, blood draw, and to maintain line patency      ALLERGIES:  Allergies    No Known Allergies    Intolerances        LABS:                        7.6    9.21  )-----------( 294      ( 02 Apr 2021 04:01 )             24.5     04-02    150<H>  |  118<H>  |  69<H>  ----------------------------<  98  4.5   |  22  |  1.94<H>    Ca    8.0<L>      02 Apr 2021 04:01  Phos  4.5     04-02  Mg     3.0     04-02    TPro  5.7<L>  /  Alb  1.8<L>  /  TBili  0.3  /  DBili  x   /  AST  16  /  ALT  <5  /  AlkPhos  140<H>  04-02    PT/INR - ( 01 Apr 2021 06:33 )   PT: 12.0 sec;   INR: 1.05 ratio         PTT - ( 01 Apr 2021 06:33 )  PTT:33.4 sec      RADIOLOGY & ADDITIONAL TESTS: Reviewed.

## 2021-04-02 NOTE — EEG REPORT - NS EEG TEXT BOX
[facility]  Comprehensive Epilepsy Center  Report of Routine EEG with Video    [facility]: [address], [place] [address], Phone [phone]  [facility]: [address] [place] [address], Phone [phone]  [place] Office: [address], [address], [place] [address] Phone [phone]    [facility]: [address] [place] [address], Phone [phone]  [place] Office: [address] [place] [address], Phone [phone]    Patient Name: SHAHZAD FIELD  Age: 70 year  : 1950  Patient ID: -, MRN #: -, Location: [place] [id number]  Referring Physician: chris zuluaga  EEG #: [id number]    Study Date: 2021     Technical Information:					  On Instrument: -  Placement and Labeling of Electrodes:  The EEG was performed utilizing 20 channels referential EEG connections (coronal over temporal over parasagittal montage) using all standard 10-20 electrode placements.  Recording was at a sampling rate of 256 samples per second per channel.  Time synchronized digital video recording was done simultaneously with EEG recording.  A low light infrared camera was used for low light recording.  Josemanuel and seizure detection algorithms were utilized.    History:   ROUTINE EEG AT BEDSIDE [facility] 44  70 YEAR OLD MALE  COR: INTABATED-VENT/ AWAKE  / DROWSY  P/W:CARS-COV-2  PMH:BPH,GERD,CAD,HLD,HTN,DM  HV: NOT COMPLETED DUE TO AGE  PHOTIC: NOT COMPLETED  A&OX0PT INTUBATED  CONCERN FOR SEIZURES    Pertinent Medication  Heparin  Aspirin  HUMULIN  AdmeLOG  MEROPENEM  Hibiclens  Protonix  Lopressor  PRECEDEX  Senna  Tylenol  Sodium Chloride  Miralax  SINEMET  ACULAR  PREDNISOLONE  Zocor    Study Interpretation:  Findings: The background was continuous, spontaneously variable and reactive.   No posterior dominant rhythm seen.  Background predominantly consisted of theta, delta and faster activities.    Focal Slowing:   None were present.    Sleep Background:  Drowsiness and stage II sleep transients were not recorded.    Other Non-Epileptiform Findings:  None were present.    Interictal Epileptiform Activity:   None were present.    Events:  Clinical events: None recorded.  Seizures: None recorded.    Activation Procedures:   Hyperventilation was not performed.    Photic stimulation was not performed.     Artifacts:  Intermittent myogenic and movement artifacts were noted.      EEG Summary / Classification:  Abnormal EEG   - Moderate generalized slowing.    EEG Impression / Clinical Correlate:  Abnormal EEG study.  Moderate nonspecific diffuse or multifocal cerebral dysfunction.   No epileptiform pattern or seizure seen.    Rodo Roblero MD  Attending Physician, [facility] [place]

## 2021-04-02 NOTE — PROGRESS NOTE ADULT - ASSESSMENT
70y Male with history of HTN on HCTZ presents with weakness found to have COVID-19. Nephrology consulted for hyponatremia.    1) ZELALEM: Non-oliguric ZELALEM likely due to DEMIAN given rise in Scr approximately 48 hours after CT with IV contrast on 3/26. Urine sodium low which is often seen in DEMIAN. Scr appears to have plateaued with acceptable electrolytes and good UO. Continue with supportive care. Avoid nephrotoxins.     2) HTN: BP controlled. Rate control as per ICU. Monitor BP.    3) Hypernatremia: Due to intravascular depletion and third spacing from hypoalbuminemia. Free water deficit 3.4L for which agree with starting free water 350 ml Q6 hours to replace close to half of deficit. Monitor serum Na.    4) COVID-19: As per primary team.

## 2021-04-02 NOTE — PROGRESS NOTE ADULT - SUBJECTIVE AND OBJECTIVE BOX
[facility] NEPHROLOGY- PROGRESS NOTE    70y Male with history of HTN on HCTZ presents with weakness found to have COVID-19. Nephrology consulted for hyponatremia.    Patient with resolution of hyponatremia for which nephrology had signed off on 3/19. Patient subsequently with non-oliguric ZELALEM.    REVIEW OF SYSTEMS: Unable to obtain as patient intubated.    No Known Allergies      Hospital Medications: Medications reviewed      VITALS:  T(F): 98.7 (04-02-21 @ 12:00), Max: 98.8 (04-01-21 @ 20:00)  HR: 71 (04-02-21 @ 12:00)  BP: --  RR: 34 (04-02-21 @ 12:00)  SpO2: 99% (04-02-21 @ 12:00)  Wt(kg): --    04-01 @ 07:01  -  04-02 @ 07:00  --------------------------------------------------------  IN: 1227 mL / OUT: 1725 mL / NET: -498 mL    04-02 @ 07:01  -  04-02 @ 12:48  --------------------------------------------------------  IN: 530 mL / OUT: 255 mL / NET: 275 mL        PHYSICAL EXAM:    Gen: Intubated and sedated  Cards: RRR, +S1/S2, no M/G/R  Resp: CTA B/L  GI: soft, NT/ND, NABS  Vascular: 1+ LE edema B/L      LABS:  04-02    150<H>  |  118<H>  |  69<H>  ----------------------------<  98  4.5   |  22  |  1.94<H>    Ca    8.0<L>      02 Apr 2021 04:01  Phos  4.5     04-02  Mg     3.0     04-02    TPro  5.7<L>  /  Alb  1.8<L>  /  TBili  0.3  /  DBili      /  AST  16  /  ALT  <5  /  AlkPhos  140<H>  04-02    Creatinine Trend: 1.94 <--, 1.90 <--, 2.14 <--, 2.26 <--, 2.04 <--, 1.70 <--, 1.49 <--, 1.17 <--, 1.12 <--                        7.6    9.21  )-----------( 294      ( 02 Apr 2021 04:01 )             24.5     Urine Studies:    Sodium, Random Urine: 21 mmol/L (03-28 @ 19:15)  Potassium, Random Urine: 33.6 mmol/L (03-28 @ 19:15)  Chloride, Random Urine: <20 mmol/L (03-28 @ 19:15)

## 2021-04-02 NOTE — PROGRESS NOTE ADULT - ATTENDING COMMENTS
desaturation overnight replaced on full support, possible plug removed, briefly on 100%  this am down to 30%  we placed on PS 12/5- sats remain >95%  HTNsive at times- bp did respond to 50mcg fent this am  weaning precedex   opens eyes , blinks to threat, non responsive to pain - squeezed his right hand for fellow, no reaction to left    covid pna   acute resp failure  pseudomonal pna   ? parotidis   hypoactive delirium   ZELALEM  new onset afib - now resolved   h/o parkinsonism, CABG, CAD, DM, HTN     # CVS- off pressors , intermittently hypertensive-  cont po lopressor-  ? pain related,  remains in NSR  # resp sycnhs well with PSV- cont as tolerated    # neuro cont off precedex  RASS goal 0 to -1 to assess mentation- methadone has helped wean off some of the sedatives  atill pending CTH today then +/- EEG   wnl- ammonia level   THS slightly incr, check FT4   consider neuro eval pending above  # ID changed abx as per ID- meropenem cont4/10 , vanco course completed  # follow left cheek- appears similar to yesterday, ctm-   # Renal- net +, follow creatinine and uop- net balanced goal-   # tube feeds  # cont care in ICU

## 2021-04-02 NOTE — PROGRESS NOTE ADULT - ASSESSMENT
70 year old Turkish/Jhonny-speaking male with a history of CAD s/p CABG and 2 stents, Type 2 DM, HTN, BPH s/p TURP and Parkinson's disease, who was recently admitted 3/14 with for weakness and COVID-19. Started on decadron and remdesivir and discharged 3/16. Returned 3/17 with worsening respiratory failure. Intubated 3/17. Course c/b ?parotitis, Pseudomonas PNA, and new onset atrial fibrillation now in sinus rhythm.     PLAN  Neuro  Mental status remains poor  -Off precedex this AM to assess mental status. May have required multiple PRNs for sedation overnight.  -CTH today. Can consider EEG if unrevealing.  -F/u ammonia level  -Klonopin added yesterday, will stop  -C/w methadone    Cardiovascular  -had recent h/o PAF, converted to NSR with amio  -maintaining NSR off amio, on lopressor  -hypertensive, IV hydralazine PRN. Consider adding standing coverage.  -continuous BP monitoring with Natali    Pulmonary  -intubated 3/20  -C/w current vent settings  -Unable to wean from vent given poor mental status  -monitor ABGs    GI  -C/w TFs  -PPI  -Bowel regimen    Renal  ZELALEM 2/2 contrast load 3/26? vs COVID/SEPSIS  -non oliguric, no diuretics, creatinine peaked and is currently downtrending   -Da Silva for monitoring urine output    ID  COVID  -s/p remdesivir, decadron     Neck swelling  -CT neck 3/26 c/f parotitis and or masseter myositis with cellulitis vs facial edema from proning   -Sputum from 3/26 growing Pseudomonas fluorescens   -C/w santos (3/30 - ) for planned 10d course per ID  -S/p vanco 10d course, will stop  -f/u ID recs    Endo  -C/w NPH 13u, Mod SSI  -Endo following    Heme  -No acute issues    Ppx  -DVT: SQH    Lines/Tubes  -RRA Natali 3/20  -[facility] TLC 3/20  -Da Silva 3/20    GOC  -Full code   70 year old Yi/Jhonny-speaking male with a history of CAD s/p CABG and 2 stents, Type 2 DM, HTN, BPH s/p TURP and Parkinson's disease, who was recently admitted 3/14 with for weakness and COVID-19. Started on decadron and remdesivir and discharged 3/16. Returned 3/17 with worsening respiratory failure. Intubated 3/17. Course c/b ?parotitis, Pseudomonas PNA, and new onset atrial fibrillation now in sinus rhythm.     PLAN  Neuro  Mental status remains poor, did squeeze R hand on command today   - Wean precedex gtt to assess mental status. Fentanyl 25 PRN for dyssynchrony/agitation    - CTH today. Can consider EEG if unrevealing.  - Ammonia level, B12, and folate WNL   - C/w methadone    Cardiovascular  - had recent h/o PAF, converted to NSR with amio  - maintaining NSR off amio, c/w lopressor 25 BID   - Hypertensive, IV hydralazine PRN. Consider adding standing coverage.  - Continuous BP monitoring with A-line    Pulmonary  - Intubated 3/20  - C/w current vent settings  - Unable to wean from vent given poor mental status  - Monitor ABGs    GI  - C/w TFs  - PPI  - Bowel regimen    Renal  ZELALEM 2/2 contrast load 3/26? vs COVID/SEPSIS  - Non oliguric, no diuretics, creatinine peaked and is currently downtrending   - Da Silva for monitoring urine output    ID  COVID  - S/p remdesivir, decadron     Neck swelling  - CT neck 3/26 c/f parotitis and or masseter myositis with cellulitis vs facial edema from proning   - Sputum from 3/26 growing Pseudomonas fluorescens   - C/w santos (3/30 - ) for planned 10d course per ID  - S/p vanco 10d course, will stop  - F/u ID recs    Endo  - C/w NPH 13u, Mod SSI  - Endo following    Heme  - No acute issues    Ppx  - DVT: SQH    Lines/Tubes  - RRA Natali 3/20  - [facility] TLC 3/20  - Da Silva 3/20     GOC  - Full code   70 year old Syriac/Jhonny-speaking male with a history of CAD s/p CABG and 2 stents, Type 2 DM, HTN, BPH s/p TURP and Parkinson's disease, who was recently admitted 3/14 with for weakness and COVID-19. Started on decadron and remdesivir and discharged 3/16. Returned 3/17 with worsening respiratory failure. Intubated 3/17. Course c/b ?parotitis, Pseudomonas PNA, and new onset atrial fibrillation now in sinus rhythm.     PLAN  Neuro  Mental status remains poor, did squeeze R hand on command today   - Wean precedex gtt to assess mental status. Fentanyl 25 PRN for dyssynchrony/agitation    - CTH today. Can consider EEG if unrevealing.  - Ammonia level, B12, and folate WNL   - C/w methadone    Cardiovascular  - had recent h/o PAF, converted to NSR with amio  - maintaining NSR off amio, c/w lopressor 25 BID   - Hypertensive, IV hydralazine PRN. Consider adding standing coverage.  - Continuous BP monitoring with A-line    Pulmonary  - Intubated 3/20  - C/w current vent settings  - Unable to wean from vent given poor mental status  - Monitor ABGs    GI  - C/w TFs  - PPI  - Bowel regimen    Renal  ZELALEM 2/2 contrast load 3/26? vs COVID/SEPSIS  - Non oliguric, no diuretics, creatinine peaked and is currently downtrending   - Da Silva for monitoring urine output    Hypernatremia  - Starting free water 350 q6  - Monitor Na     ID  COVID  - S/p remdesivir, decadron     Neck swelling  - CT neck 3/26 c/f parotitis and or masseter myositis with cellulitis vs facial edema from proning   - Sputum from 3/26 growing Pseudomonas fluorescens   - C/w santos (3/30 - ) for planned 10d course per ID, to finish 4/8   - S/p vanco 10d course, will stop  - F/u ID recs    Endo  - C/w NPH 13u, Mod SSI  - Endo following    Heme  - No acute issues    Ppx  - DVT: SQH    Lines/Tubes  - RRA Natali 3/20  - [facility] TLC 3/20  - Da Silva 3/20     GO  - Full code   70 year old Greenlandic/Jhonny-speaking male with a history of CAD s/p CABG and 2 stents, Type 2 DM, HTN, BPH s/p TURP and Parkinson's disease, who was recently admitted 3/14 with for weakness and COVID-19. Started on decadron and remdesivir and discharged 3/16. Returned 3/17 with worsening respiratory failure. Intubated 3/17. Course c/b ?parotitis, Pseudomonas PNA, and new onset atrial fibrillation now in sinus rhythm.     PLAN  Neuro  Mental status remains poor, did squeeze R hand on command today   - Wean precedex gtt to assess mental status. Fentanyl 25 PRN for dyssynchrony/agitation    - CTH today. Can consider EEG if unrevealing.  - Ammonia level, B12, and folate WNL   - C/w methadone    Cardiovascular  - had recent h/o PAF, converted to NSR with amio  - maintaining NSR off amio, c/w lopressor 25 BID   - Hypertensive, IV hydralazine PRN. Consider adding standing coverage.  - Continuous BP monitoring with A-line    Pulmonary  - Intubated 3/20  - C/w current vent settings  - Unable to wean from vent given poor mental status  - Monitor ABGs    GI  - C/w TFs  - PPI  - Bowel regimen    Renal  ZELALEM 2/2 contrast load 3/26? vs COVID/SEPSIS  - Non oliguric, no diuretics, creatinine peaked and is currently downtrending   - Da Silva for monitoring urine output  - Goal even     Hypernatremia  - Starting free water 350 q6  - Monitor Na     ID  COVID  - S/p remdesivir, decadron     Neck swelling  - CT neck 3/26 c/f parotitis and or masseter myositis with cellulitis vs facial edema from proning   - Sputum from 3/26 growing Pseudomonas fluorescens   - C/w santos (3/30 - ) for planned 10d course per ID, to finish 4/8   - S/p vanco 10d course, will stop  - F/u ID recs    Endo  - C/w NPH 13u, Mod SSI  - Endo following    Heme  - No acute issues    Ppx  - DVT: SQH    Lines/Tubes  - RRA Natali 3/20  - [facility] TLC 3/20  - Da Silva 3/20     GOC  - Full code   70 year old Tamazight/Jhonny-speaking male with a history of CAD s/p CABG and 2 stents, Type 2 DM, HTN, BPH s/p TURP and Parkinson's disease, who was recently admitted 3/14 with for weakness and COVID-19. Started on decadron and remdesivir and discharged 3/16. Returned 3/17 with worsening respiratory failure. Intubated 3/17. Course c/b ?parotitis, Pseudomonas PNA, and new onset atrial fibrillation now in sinus rhythm.     PLAN  Neuro  Mental status remains poor, did squeeze R hand on command today   - Wean precedex gtt to assess mental status. Fentanyl 25 PRN for dyssynchrony/agitation    - CTH today. Can consider EEG if unrevealing.  - Ammonia level, B12, and folate WNL   - C/w methadone    Cardiovascular  - had recent h/o PAF, converted to NSR with amio  - maintaining NSR off amio, c/w lopressor 25 BID   - Hypertensive, IV hydralazine PRN. Consider adding standing coverage.  - Continuous BP monitoring with A-line    Pulmonary  - Intubated 3/20  - C/w current vent settings  - Unable to wean from vent given poor mental status  - Monitor ABGs    GI  - C/w TFs  - PPI  - Bowel regimen    Renal  ZELALEM 2/2 contrast load 3/26? vs COVID/SEPSIS  - Non oliguric, no diuretics, creatinine peaked and is currently downtrending   - Da Silva for monitoring urine output  - Goal even     Hypernatremia  - Starting free water 350 q6  - Monitor Na     ID  COVID  - S/p remdesivir, decadron     Neck swelling  - CT neck 3/26 c/f parotitis and or masseter myositis with cellulitis vs facial edema from proning   - Sputum from 3/26 growing Pseudomonas fluorescens   - C/w santos (3/30 - ) for planned 10d course per ID, to finish 4/8   - S/p vanco 10d course  - F/u ID recs    Endo  - C/w NPH 13u, Mod SSI  - Endo following    Heme  - No acute issues    Ppx  - DVT: SQH    Lines/Tubes  - RRA Natali 3/20  - [facility] TLC 3/20  - Da Silva 3/20     GOC  - Full code   70 year old Mongolian/Jhonny-speaking male with a history of CAD s/p CABG and 2 stents, Type 2 DM, HTN, BPH s/p TURP and Parkinson's disease, who was recently admitted 3/14 with for weakness and COVID-19. Started on decadron and remdesivir and discharged 3/16. Returned 3/17 with worsening respiratory failure. Intubated 3/17. Course c/b ?parotitis, Pseudomonas PNA, and new onset atrial fibrillation now in sinus rhythm.     PLAN  Neuro  Mental status remains poor, did squeeze R hand on command today   - Wean precedex gtt to assess mental status. Fentanyl 25 PRN for dyssynchrony/agitation    - CTH today. Can consider EEG if unrevealing.  - Ammonia level, B12, and folate WNL   - C/w methadone    Cardiovascular  - had recent h/o PAF, converted to NSR with amio  - maintaining NSR off amio, c/w lopressor 25 BID   - Hypertensive, IV hydralazine PRN. Consider adding standing coverage.  - Continuous BP monitoring with A-line    Pulmonary  - Intubated 3/20  - C/w current vent settings  - Unable to wean from vent given poor mental status  - Monitor ABGs    GI  - C/w TFs  - PPI  - Bowel regimen    Renal  ZELALEM 2/2 contrast load 3/26? vs COVID/SEPSIS  - Non oliguric, no diuretics, creatinine peaked and is currently downtrending   - Da Silva for monitoring urine output  - Goal even     Hypernatremia  - Starting free water 350 q6  - Monitor Na     ID  COVID  - S/p remdesivir, decadron     Neck swelling  - CT neck 3/26 c/f parotitis and or masseter myositis with cellulitis vs facial edema from proning   - Sputum from 3/26 growing Pseudomonas fluorescens   - C/w santos (3/30 - ) for planned 10d course per ID, to finish 4/8   - S/p vanco 10d course  - F/u ID recs    Endo  - C/w NPH 13u, Mod SSI  - Endo following  - TSH 4.25, likely subclinical, can repeat TFTs outpatient     Heme  - No acute issues    Ppx  - DVT: SQH    Lines/Tubes  - RRA Natali 3/20  - [facility] TLC 3/20  - Da Silva 3/20     GOC  - Full code

## 2021-04-03 NOTE — PROGRESS NOTE ADULT - SUBJECTIVE AND OBJECTIVE BOX
[facility]  Pager: [phone] / DORIS [phone]     Interval Events: Overnight placed back on vent.     ROS: unable to obtain     OBJECTIVE:    VITAL SIGNS:  ICU Vital Signs Last 24 Hrs  T(C): 37.4 (03 Apr 2021 06:00), Max: 37.4 (03 Apr 2021 06:00)  T(F): 99.3 (03 Apr 2021 06:00), Max: 99.3 (03 Apr 2021 06:00)  HR: 77 (03 Apr 2021 07:00) (56 - 78)  BP: --  BP(mean): --  ABP: 101/40 (03 Apr 2021 07:00) (101/40 - 169/66)  ABP(mean): 60 (03 Apr 2021 07:00) (60 - 90)  RR: 28 (03 Apr 2021 07:00) (27 - 34)  SpO2: 100% (03 Apr 2021 07:00) (98% - 100%)    Mode: AC/ CMV (Assist Control/ Continuous Mandatory Ventilation), RR (machine): 28, TV (machine): 400, FiO2: 30, PEEP: 5, MAP: 10, PIP: 28    04-02 @ 07:01  -  04-03 @ 07:00  --------------------------------------------------------  IN: 2520.6 mL / OUT: 2245 mL / NET: 275.6 mL      CAPILLARY BLOOD GLUCOSE      POCT Blood Glucose.: 181 mg/dL (03 Apr 2021 05:34)      PHYSICAL EXAM:    Gen: intubated  neuro: pupils equal and reactive to light, blinks to threat, positive gag and corneal reflexes. Squeezed right hand on command   HEENT: R cheek swelling   RES : course breath sounds B/L    CVS: S1,S2. sinus bradycardia  Abd: soft, non distended, non tender, hypoactive bowel sounds   Ext: pitting edema B/L LE, edema B/L distal UE    MEDICATIONS:  MEDICATIONS  (STANDING):  aspirin  chewable 81 milliGRAM(s) Oral daily  carbidopa/levodopa  10/100 2 Tablet(s) Oral <User Schedule>  chlorhexidine 4% Liquid 1 Application(s) Topical <User Schedule>  dexMEDEtomidine Infusion 0.2 MICROgram(s)/kG/Hr (3.99 mL/Hr) IV Continuous <Continuous>  dextrose 40% Gel 15 Gram(s) Oral once  dextrose 50% Injectable 25 Gram(s) IV Push once  dextrose 50% Injectable 12.5 Gram(s) IV Push once  dextrose 50% Injectable 25 Gram(s) IV Push once  glucagon  Injectable 1 milliGRAM(s) IntraMuscular once  heparin   Injectable 5000 Unit(s) SubCutaneous every 8 hours  insulin lispro (ADMELOG) corrective regimen sliding scale   SubCutaneous every 6 hours  insulin NPH human recombinant 13 Unit(s) SubCutaneous every 6 hours  ketorolac 0.5% Ophthalmic Solution 1 Drop(s) Left EYE two times a day  meropenem  IVPB      meropenem  IVPB 1000 milliGRAM(s) IV Intermittent every 12 hours  methadone    Tablet 10 milliGRAM(s) Oral every 8 hours  metoprolol tartrate 25 milliGRAM(s) Enteral Tube two times a day  pantoprazole  Injectable 40 milliGRAM(s) IV Push daily  petrolatum Ophthalmic Ointment 1 Application(s) Both EYES two times a day  polyethylene glycol 3350 17 Gram(s) Oral daily  prednisoLONE acetate 1% Suspension 1 Drop(s) Left EYE four times a day  senna Syrup 10 milliLiter(s) Oral daily  simvastatin 20 milliGRAM(s) Oral at bedtime    MEDICATIONS  (PRN):  acetaminophen    Suspension .. 650 milliGRAM(s) Enteral Tube every 6 hours PRN Temp greater or equal to 38C (100.4F)  sodium chloride 0.9% lock flush 10 milliLiter(s) IV Push every 1 hour PRN Pre/post blood products, medications, blood draw, and to maintain line patency      ALLERGIES:  Allergies    No Known Allergies    Intolerances        LABS:                        7.8    9.39  )-----------( 328      ( 03 Apr 2021 05:34 )             25.4     04-03    150<H>  |  118<H>  |  57<H>  ----------------------------<  167<H>  4.4   |  24  |  1.78<H>    Ca    8.0<L>      03 Apr 2021 05:34  Phos  3.7     04-03  Mg     2.7     04-03    TPro  5.7<L>  /  Alb  2.2<L>  /  TBili  0.3  /  DBili  x   /  AST  26  /  ALT  7   /  AlkPhos  143<H>  04-03          RADIOLOGY & ADDITIONAL TESTS: Reviewed. [facility]  Pager: [phone] / DORIS [phone]     Interval Events: Overnight placed back on vent. This AM pt opening eyes, but does not follow commands.     ROS: unable to obtain     OBJECTIVE:    VITAL SIGNS:  ICU Vital Signs Last 24 Hrs  T(C): 37.4 (03 Apr 2021 06:00), Max: 37.4 (03 Apr 2021 06:00)  T(F): 99.3 (03 Apr 2021 06:00), Max: 99.3 (03 Apr 2021 06:00)  HR: 77 (03 Apr 2021 07:00) (56 - 78)  BP: --  BP(mean): --  ABP: 101/40 (03 Apr 2021 07:00) (101/40 - 169/66)  ABP(mean): 60 (03 Apr 2021 07:00) (60 - 90)  RR: 28 (03 Apr 2021 07:00) (27 - 34)  SpO2: 100% (03 Apr 2021 07:00) (98% - 100%)    Mode: AC/ CMV (Assist Control/ Continuous Mandatory Ventilation), RR (machine): 28, TV (machine): 400, FiO2: 30, PEEP: 5, MAP: 10, PIP: 28    04-02 @ 07:01  -  04-03 @ 07:00  --------------------------------------------------------  IN: 2520.6 mL / OUT: 2245 mL / NET: 275.6 mL      CAPILLARY BLOOD GLUCOSE      POCT Blood Glucose.: 181 mg/dL (03 Apr 2021 05:34)      PHYSICAL EXAM:    Gen: intubated, off sedation   neuro: pupils equal and reactive to light, blinks to threat, positive gag and corneal reflexes. Not following commands   HEENT: R cheek swelling, improving   RES : course breath sounds B/L    CVS: S1,S2. sinus bradycardia  Abd: soft, non distended, non tender, hypoactive bowel sounds   Ext: pitting edema B/L LE, edema B/L distal UE    MEDICATIONS:  MEDICATIONS  (STANDING):  aspirin  chewable 81 milliGRAM(s) Oral daily  carbidopa/levodopa  10/100 2 Tablet(s) Oral <User Schedule>  chlorhexidine 4% Liquid 1 Application(s) Topical <User Schedule>  dexMEDEtomidine Infusion 0.2 MICROgram(s)/kG/Hr (3.99 mL/Hr) IV Continuous <Continuous>  dextrose 40% Gel 15 Gram(s) Oral once  dextrose 50% Injectable 25 Gram(s) IV Push once  dextrose 50% Injectable 12.5 Gram(s) IV Push once  dextrose 50% Injectable 25 Gram(s) IV Push once  glucagon  Injectable 1 milliGRAM(s) IntraMuscular once  heparin   Injectable 5000 Unit(s) SubCutaneous every 8 hours  insulin lispro (ADMELOG) corrective regimen sliding scale   SubCutaneous every 6 hours  insulin NPH human recombinant 13 Unit(s) SubCutaneous every 6 hours  ketorolac 0.5% Ophthalmic Solution 1 Drop(s) Left EYE two times a day  meropenem  IVPB      meropenem  IVPB 1000 milliGRAM(s) IV Intermittent every 12 hours  methadone    Tablet 10 milliGRAM(s) Oral every 8 hours  metoprolol tartrate 25 milliGRAM(s) Enteral Tube two times a day  pantoprazole  Injectable 40 milliGRAM(s) IV Push daily  petrolatum Ophthalmic Ointment 1 Application(s) Both EYES two times a day  polyethylene glycol 3350 17 Gram(s) Oral daily  prednisoLONE acetate 1% Suspension 1 Drop(s) Left EYE four times a day  senna Syrup 10 milliLiter(s) Oral daily  simvastatin 20 milliGRAM(s) Oral at bedtime    MEDICATIONS  (PRN):  acetaminophen    Suspension .. 650 milliGRAM(s) Enteral Tube every 6 hours PRN Temp greater or equal to 38C (100.4F)  sodium chloride 0.9% lock flush 10 milliLiter(s) IV Push every 1 hour PRN Pre/post blood products, medications, blood draw, and to maintain line patency      ALLERGIES:  Allergies    No Known Allergies    Intolerances        LABS:                        7.8    9.39  )-----------( 328      ( 03 Apr 2021 05:34 )             25.4     04-03    150<H>  |  118<H>  |  57<H>  ----------------------------<  167<H>  4.4   |  24  |  1.78<H>    Ca    8.0<L>      03 Apr 2021 05:34  Phos  3.7     04-03  Mg     2.7     04-03    TPro  5.7<L>  /  Alb  2.2<L>  /  TBili  0.3  /  DBili  x   /  AST  26  /  ALT  7   /  AlkPhos  143<H>  04-03          RADIOLOGY & ADDITIONAL TESTS: Reviewed.

## 2021-04-03 NOTE — PROGRESS NOTE ADULT - ATTENDING COMMENTS
70M with CAD s/p CABG and PCI, DM2, HTN, BPH s/p TURP, PD, admitted 3/14 with for weakness and COVID-19. Started on decadron and remdesivir and discharged 3/16. Returned 3/17 with worsening respiratory failure. Intubated 3/17. Course complicated by parotitis/facial swelling, pseduomonas PNA, AFib, encephalopathy.    Neuro: Encephalopathy, CT and EEG negative, likely r/t critical illness & metabolic derangements in addition to baseline PD. Continue carbidopa-levodopa. Hold Precedex that was on overnight -- would not resume unless clear indication. Can continue methadone for now, but would consider cutting dose tomorrow if doing okay off Precedex.     CV: Circulatory shock, resolved. AFib, now in NSR, continue metoprolol 25 mg BID -- can hold or decrease to 12.5 mg if too peggy.    Pulm: Acute hypoxic respiratory failure from COVID-19. Doing well on CPAP today, but mental status precludes safe extubation. Suspect that he will need a trach this week to facilitate recovery.    GI: Tube feeds with Glucerna. PPI.    Renal: ZELALEM on ? CKD. Non oliguric. Continue ruffin and avoidance of nephrotoxins, hypotension. Nephrology following. Also with hyperchloremic hypernatremia; will increase enteral FWF and check afternoon BMP to ensure sodium improving.    Heme: Anemia, no indication to transfuse today. No evidence of bleeding. Continue ASA and SC heparin in setting of critical illness, COVID, CAD. I believe that AF was limited to critical illness, so would not recommend full-dose AC at this time.    ID: Facial cellulitis / parotitis & pseudomonas HCAP -- continue Meropenem through 4/8/21. COVID, s/p remdesivir, Dex.    Endo: DM with hyperglycemia, continue NPH & lispro SSI.    Prophylaxis: SC heparin, PPI, chlorhexidine.    Code Status: Full.    Lines: L IJ 3/20, L radial 3/20, Ruffin. Will discontinue TLC today if we can obtain PIVs. Consider removing art line tomorrow if doing okay.

## 2021-04-03 NOTE — PROGRESS NOTE ADULT - SUBJECTIVE AND OBJECTIVE BOX
Full note to follow. [facility] NEPHROLOGY- PROGRESS NOTE    70y Male with history of HTN on HCTZ presents with weakness found to have COVID-19. Nephrology consulted for hyponatremia.    Patient with resolution of hyponatremia for which nephrology had signed off on 3/19. Patient subsequently with non-oliguric ZELALEM.    Sedation has been stopped, but pt has yet to gain consciousness.    REVIEW OF SYSTEMS: Unable to obtain as patient intubated.    No Known Allergies      Hospital Medications: Medications reviewed      ICU Vital Signs Last 24 Hrs  T(C): 36.8 (03 Apr 2021 16:00), Max: 37.6 (03 Apr 2021 08:00)  T(F): 98.3 (03 Apr 2021 16:00), Max: 99.6 (03 Apr 2021 08:00)  HR: 94 (03 Apr 2021 21:00) (54 - 94)  ABP: 162/45 (03 Apr 2021 21:00) (92/35 - 214/85)  ABP(mean): 80 (03 Apr 2021 21:00) (60 - 81)  RR: 31 (03 Apr 2021 21:00) (28 - 37)  SpO2: 98% (03 Apr 2021 21:00) (95% - 100%)        PHYSICAL EXAM:    Gen: Intubated and sedated  Cards: RRR, +S1/S2, no M/G/R  Resp: CTA B/L  GI: soft, NT/ND, NABS  Vascular: trace LE edema B/L, + UE edema/ mild anasarca        LABS:  04-03  149 <--, 150 <--, 150 <--, 147 <--, 146 <--, 142 <--, 141 <--  149<H>  |  115<H>  |  55<H>  ----------------------------<  160<H>  4.4   |  24  |  1.78<H>    Ca    8.0<L>      03 Apr 2021 18:53  Phos  3.7     04-03  Mg     2.7     04-03    TPro  5.7<L>  /  Alb  2.3<L>  /  TBili  0.4  /  DBili      /  AST  29  /  ALT  8   /  AlkPhos  163<H>  04-03    Creatinine Trend: 1.78 <--, 1.78 <--, 1.94 <--, 1.90 <--, 2.14 <--, 2.26 <--, 2.04 <--, 1.70 <--, 1.49 <--, 1.17 <--                        7.8    9.39  )-----------( 328      ( 03 Apr 2021 05:34 )             25.4     Urine Studies:    Sodium, Random Urine: 21 mmol/L (03-28 @ 19:15)  Potassium, Random Urine: 33.6 mmol/L (03-28 @ 19:15)  Chloride, Random Urine: <20 mmol/L (03-28 @ 19:15)

## 2021-04-03 NOTE — PROGRESS NOTE ADULT - ASSESSMENT
70y Male with history of HTN on HCTZ presents with weakness found to have COVID-19. Nephrology consulted for hyponatremia.    1) ZELALEM: Non-oliguric ZELALEM likely due to DEMIAN given rise in Scr approximately 48 hours after CT with IV contrast on 3/26. Urine sodium low which is often seen in DEMIAN. Scr improving now and pt has good UO. Continue with supportive care. Avoid nephrotoxins.     2) HTN: BP controlled. Rate control as per ICU. Monitor BP.    3) Hypernatremia: Due to intravascular depletion and third spacing from hypoalbuminemia. IMproving now. Monitor serum Na.    4) COVID-19: As per primary team.

## 2021-04-03 NOTE — PROGRESS NOTE ADULT - ASSESSMENT
70 year old Khmer/Jhonny-speaking male with a history of CAD s/p CABG and 2 stents, Type 2 DM, HTN, BPH s/p TURP and Parkinson's disease, who was recently admitted 3/14 with for weakness and COVID-19. Started on decadron and remdesivir and discharged 3/16. Returned 3/17 with worsening respiratory failure. Intubated 3/17. Course c/b ?parotitis, Pseudomonas PNA, and new onset atrial fibrillation now in sinus rhythm.     PLAN  Neuro  Mental status remains poor, did squeeze R hand on command today   - Wean precedex gtt to assess mental status. Fentanyl 25 PRN for dyssynchrony/agitation    - CTH today. Can consider EEG if unrevealing.  - Ammonia level, B12, and folate WNL   - C/w methadone    Cardiovascular  - had recent h/o PAF, converted to NSR with amio  - maintaining NSR off amio, c/w lopressor 25 BID   - Hypertensive, IV hydralazine PRN. Consider adding standing coverage.  - Continuous BP monitoring with A-line    Pulmonary  - Intubated 3/20  - C/w current vent settings  - Unable to wean from vent given poor mental status  - Monitor ABGs    GI  - C/w TFs  - PPI  - Bowel regimen    Renal  ZELALEM 2/2 contrast load 3/26? vs COVID/SEPSIS  - Non oliguric, no diuretics, creatinine peaked and is currently downtrending   - Da Silva for monitoring urine output  - Goal even     Hypernatremia  - Starting free water 350 q6  - Monitor Na     ID  COVID  - S/p remdesivir, decadron     Neck swelling  - CT neck 3/26 c/f parotitis and or masseter myositis with cellulitis vs facial edema from proning   - Sputum from 3/26 growing Pseudomonas fluorescens   - C/w santos (3/30 - ) for planned 10d course per ID, to finish 4/8   - S/p vanco 10d course  - F/u ID recs    Endo  - C/w NPH 13u, Mod SSI  - Endo following  - TSH 4.25, likely subclinical, can repeat TFTs outpatient     Heme  - No acute issues    Ppx  - DVT: SQH    Lines/Tubes  - RRA [place] 3/20  - [facility]J TLC 3/20  - Da Silva 3/20     GOC  - Full code   70 year old Italian/Jhonny-speaking male with a history of CAD s/p CABG and 2 stents, Type 2 DM, HTN, BPH s/p TURP and Parkinson's disease, who was recently admitted 3/14 with for weakness and COVID-19. Started on decadron and remdesivir and discharged 3/16. Returned 3/17 with worsening respiratory failure. Intubated 3/17. Course c/b ?parotitis, Pseudomonas PNA, and new onset atrial fibrillation now in sinus rhythm.     PLAN  Neuro  Mental status remains poor, not following commands  - Wean precedex gtt to further assess mental status. Fentanyl 25 PRN for dyssynchrony/agitation    - CTH negative for bleed. EEG negative for seizures, moderate diffuse dysfunction   - Ammonia level, B12, and folate WNL   - Correct hypernatremia as below   - C/w methadone 10 q8, plan to wean tomorrow   - Will discuss with daughter regarding English understanding     Cardiovascular  - had recent h/o PAF, converted to NSR with amio  - maintaining NSR off amio, c/w lopressor 25 BID   - Hypertensive, IV hydralazine PRN. Consider adding standing coverage.  - Continuous BP monitoring with A-line    Pulmonary  - Intubated 3/20  - C/w current vent settings, CPAP during the day   - Unable to wean from vent given poor mental status  - Monitor ABGs    GI  - C/w TFs  - PPI  - Bowel regimen    Renal  ZELALEM 2/2 contrast load 3/26? vs COVID/SEPSIS  - Non oliguric, no diuretics, creatinine peaked and is currently downtrending   - Da Silva for monitoring urine output  - Goal even     Hypernatremia  - Free water deficit 1.5 L, will increase free water to 250 q4   - Repeat BMP in PM to assess Na     ID  COVID  - S/p remdesivir, decadron     Neck swelling  - CT neck 3/26 c/f parotitis and or masseter myositis with cellulitis vs facial edema from proning   - Sputum from 3/26 growing Pseudomonas fluorescens   - C/w santos (3/30 - ) for planned 10d course per ID, to finish 4/8   - S/p vanco 10d course  - F/u ID recs    Endo  - C/w NPH 13u, Mod SSI  - Endo following  - TSH 4.25, T4 WNL, subclinical, can repeat TFTs in 4-6 weeks     Heme  - No acute issues    Ppx  - DVT: SQH    Lines/Tubes  - RRA [place] 3/20, plan to remove 4/4   - [facility] TLC 3/20, will obtain PIV access today and remove central line   - Da Silva 3/20     GOC  - Full code  - Will need to discuss trach with daughter  70 year old Arabic/Jhonny-speaking male with a history of CAD s/p CABG and 2 stents, Type 2 DM, HTN, BPH s/p TURP and Parkinson's disease, who was recently admitted 3/14 with for weakness and COVID-19. Started on decadron and remdesivir and discharged 3/16. Returned 3/17 with worsening respiratory failure. Intubated 3/17. Course c/b ?parotitis, Pseudomonas PNA, and new onset atrial fibrillation now in sinus rhythm.     PLAN  Neuro  Mental status remains poor, not following commands  - Wean precedex gtt to further assess mental status. Fentanyl 25 PRN for dyssynchrony/agitation    - CTH negative for bleed. EEG negative for seizures, moderate diffuse dysfunction   - Ammonia level, B12, and folate WNL   - Correct hypernatremia as below   - C/w methadone 10 q8, plan to wean tomorrow   - C/w sinemet for Parkinson's disease   - Will discuss with daughter regarding English understanding     Cardiovascular  - had recent h/o PAF, converted to NSR with amio  - maintaining NSR off amio, c/w lopressor 25 BID   - Hypertensive, IV hydralazine PRN. Consider adding standing coverage.  - Continuous BP monitoring with A-line    Pulmonary  - Intubated 3/20  - C/w current vent settings, CPAP during the day   - Unable to wean from vent given poor mental status  - Monitor ABGs    GI  - C/w TFs  - PPI  - Bowel regimen    Renal  ZELALEM 2/2 contrast load 3/26? vs COVID/SEPSIS  - Non oliguric, no diuretics, creatinine peaked and is currently downtrending   - Da Silva for monitoring urine output  - Goal even     Hypernatremia  - Free water deficit 1.5 L, will increase free water to 250 q4   - Repeat BMP in PM to assess Na     ID  COVID  - S/p remdesivir, decadron     Neck swelling  - CT neck 3/26 c/f parotitis and or masseter myositis with cellulitis vs facial edema from proning   - Sputum from 3/26 growing Pseudomonas fluorescens   - C/w santos (3/30 - ) for planned 10d course per ID, to finish 4/8   - S/p vanco 10d course  - F/u ID recs    Endo  - C/w NPH 13u, Mod SSI  - Endo following  - TSH 4.25, T4 WNL, subclinical, can repeat TFTs in 4-6 weeks     Heme  - No acute issues    Ppx  - DVT: SQH    Lines/Tubes  - RRA [place] 3/20, plan to remove 4/4   - [facility] TLC 3/20, will obtain PIV access today and remove central line   - Da Silva 3/20     GOC  - Full code  - Will need to discuss trach with daughter  70 year old Sinhala/Jhonny-speaking male with a history of CAD s/p CABG and 2 stents, Type 2 DM, HTN, BPH s/p TURP and Parkinson's disease, who was recently admitted 3/14 for weakness and COVID-19. Started on decadron and remdesivir and discharged 3/16. Returned 3/17 with worsening respiratory failure. Intubated 3/17. Course c/b ?parotitis, Pseudomonas PNA, and new onset atrial fibrillation now in sinus rhythm.     PLAN  Neuro  Mental status remains poor, not following commands  - Wean precedex gtt to further assess mental status. Fentanyl 25 PRN for dyssynchrony/agitation    - CTH negative for bleed. EEG negative for seizures, moderate diffuse dysfunction   - Ammonia level, B12, and folate WNL   - Correct hypernatremia as below   - C/w methadone 10 q8, plan to wean tomorrow   - C/w sinemet for Parkinson's disease   - Will discuss with daughter regarding English understanding     Cardiovascular  - had recent h/o PAF, converted to NSR with amio  - maintaining NSR off amio, c/w lopressor 25 BID   - Hypertensive, IV hydralazine PRN. Consider adding standing coverage.  - Continuous BP monitoring with A-line    Pulmonary  - Intubated 3/20  - C/w current vent settings, CPAP during the day   - Unable to wean from vent given poor mental status  - Monitor ABGs    GI  - C/w TFs  - PPI  - Bowel regimen    Renal  ZELALEM 2/2 contrast load 3/26? vs COVID/SEPSIS  - Non oliguric, no diuretics, creatinine peaked and is currently downtrending   - Da Silva for monitoring urine output  - Goal even     Hypernatremia  - Free water deficit 1.5 L, will increase free water to 250 q4   - Repeat BMP in PM to assess Na     ID  COVID  - S/p remdesivir, decadron     Neck swelling  - CT neck 3/26 c/f parotitis and or masseter myositis with cellulitis vs facial edema from proning   - Sputum from 3/26 growing Pseudomonas fluorescens   - C/w santos (3/30 - ) for planned 10d course per ID, to finish 4/8   - S/p vanco 10d course  - F/u ID recs    Endo  - C/w NPH 13u, Mod SSI  - Endo following  - TSH 4.25, T4 WNL, subclinical, can repeat TFTs in 4-6 weeks     Heme  - No acute issues    Ppx  - DVT: SQH    Lines/Tubes  - RRA [place] 3/20, plan to remove 4/4   - [facility]J TLC 3/20, will obtain PIV access today and remove central line   - Da Silva 3/20     GOC  - Full code  - Will need to discuss trach with daughter  70 year old Latvian/Jhonny-speaking male with a history of CAD s/p CABG and 2 stents, Type 2 DM, HTN, BPH s/p TURP and Parkinson's disease, who was recently admitted 3/14 for weakness and COVID-19. Started on decadron and remdesivir and discharged 3/16. Returned 3/17 with worsening respiratory failure. Intubated 3/17. Course c/b ?parotitis, Pseudomonas PNA, and new onset atrial fibrillation now in sinus rhythm.     PLAN  Neuro  Mental status remains poor, not following commands  - Wean precedex gtt to further assess mental status. Fentanyl 25 PRN for dyssynchrony/agitation    - CTH negative for bleed. EEG negative for seizures, moderate diffuse dysfunction   - Ammonia level, B12, and folate WNL   - Correct hypernatremia as below   - C/w methadone 10 q8, plan to wean tomorrow   - C/w sinemet for Parkinson's disease   - Will discuss with daughter regarding English understanding     Cardiovascular  - had recent h/o PAF, converted to NSR with amio  - maintaining NSR off amio, c/w lopressor 25 BID   - Continuous BP monitoring with A-line    Pulmonary  - Intubated 3/20  - C/w current vent settings, CPAP during the day   - Unable to wean from vent given poor mental status  - Monitor ABGs    GI  - C/w TFs  - PPI  - Bowel regimen    Renal  ZELALEM 2/2 contrast load 3/26? vs COVID/SEPSIS  - Non oliguric, no diuretics, creatinine peaked and is currently downtrending   - Da Silva for monitoring urine output  - Goal even     Hypernatremia  - Free water deficit 1.5 L, will increase free water to 250 q4   - Repeat BMP in PM to assess Na     ID  COVID  - S/p remdesivir, decadron     Neck swelling  - CT neck 3/26 c/f parotitis and or masseter myositis with cellulitis vs facial edema from proning   - Sputum from 3/26 growing Pseudomonas fluorescens   - C/w santos (3/30 - ) for planned 10d course per ID, to finish 4/8   - S/p vanco 10d course  - F/u ID recs    Endo  - C/w NPH 13u, Mod SSI  - Endo following  - TSH 4.25, T4 WNL, subclinical, can repeat TFTs in 4-6 weeks     Heme  - No acute issues    Ppx  - DVT: SQH    Lines/Tubes  - RRA [place] 3/20, plan to remove 4/4   - [facility] TLC 3/20, will obtain PIV access today and remove central line   - Da Silva 3/20     GOC  - Full code  - Will need to discuss trach with daughter  70 year old Macedonian/Jhonny-speaking male with a history of CAD s/p CABG and 2 stents, Type 2 DM, HTN, BPH s/p TURP and Parkinson's disease, who was recently admitted 3/14 for weakness and COVID-19. Started on decadron and remdesivir and discharged 3/16. Returned 3/17 with worsening respiratory failure. Intubated 3/17. Course c/b ?parotitis, Pseudomonas PNA, and new onset atrial fibrillation now in sinus rhythm.     PLAN  Neuro  Mental status remains poor, not following commands  - Wean precedex gtt to further assess mental status. Fentanyl 25 PRN for dyssynchrony/agitation    - CTH negative for bleed. EEG negative for seizures, moderate diffuse dysfunction   - Ammonia level, B12, and folate WNL   - Correct hypernatremia as below   - C/w methadone 10 q8, plan to wean tomorrow   - C/w sinemet for Parkinson's disease   - Will discuss with daughter regarding English understanding     Cardiovascular  - had recent h/o PAF, converted to NSR with amio  - maintaining NSR off amio, c/w lopressor 25 BID   - Continuous BP monitoring with A-line    Pulmonary  - Intubated 3/20  - C/w current vent settings, CPAP during the day   - Unable to wean from vent given poor mental status  - Monitor ABGs    GI  - C/w TFs  - PPI  - Bowel regimen    Renal  ZELALEM 2/2 contrast load 3/26? vs COVID/SEPSIS  - Non oliguric, no diuretics, creatinine peaked and is currently downtrending   - Da Silva for monitoring urine output  - Goal even     Hypernatremia  - Free water deficit 1.5 L, will increase free water to 250 q4   - Repeat BMP in PM to assess Na     ID  COVID  - S/p remdesivir, decadron     Neck swelling  - CT neck 3/26 c/f parotitis and or masseter myositis with cellulitis vs facial edema from proning   - Sputum from 3/26 growing Pseudomonas fluorescens   - C/w santos (3/30 - ) for planned 10d course per ID, to finish 4/8   - S/p vanco 10d course  - F/u ID recs    Endo  - C/w NPH 13u, Mod SSI  - Endo following  - TSH 4.25, T4 WNL, subclinical, can repeat TFTs in 4-6 weeks     Heme  - No acute issues    Ppx  - DVT: SQH    Lines/Tubes  - LRA [place] 3/20, plan to remove 4/4   - LIJ TLC 3/20, will obtain PIV access today and remove central line   - Da Silva 3/20     GOC  - Full code  - Will need to discuss trach with daughter

## 2021-04-04 NOTE — CONSULT NOTE ADULT - CONSULT REQUESTED DATE/TIME
16-Mar-2021 10:55
26-Mar-2021 05:51
18-Mar-2021 17:27
04-Apr-2021 16:30
18-Mar-2021 16:00
29-Mar-2021 12:05

## 2021-04-04 NOTE — CONSULT NOTE ADULT - ASSESSMENT
Patient is a 69 yo M with pmh of  history of CAD s/p CABG and 2 stents, Type 2 DM, HTN, BPH s/p TURP and Parkinson's disease on sinemet presented to [facility] for acute decompensation and dyspnea with his O2 sat dropping form 88% to 66% within 30 minutes. Patient was noted to be Covid positive. Pt had neck abscess as well which grew Pseudomonas patient completed a vancomycin regiment was started on Merem on 3/30. Patient was on precedex and multiple PRNS for sedation. Upon discontinuation of precedex, patient was not following commands. Patient had CT head w/o contrast which was negative and EEG, which was negative for seizures. Patient is noted to have PRN methadone and fentanyl. Patient was noted to be have an elevated WBC count Patient is a 71 yo M with pmh of  history of CAD s/p CABG and 2 stents, Type 2 DM, HTN, BPH s/p TURP and Parkinson's disease on sinemet presented to [facility] for acute decompensation and dyspnea with his O2 sat dropping form 88% to 66% within 30 minutes. Patient was noted to be Covid positive. Pt had neck abscess as well which grew Pseudomonas patient completed a vancomycin regiment was started on Merem on 3/30. Patient was on precedex and multiple PRNS for sedation. Upon discontinuation of precedex, patient was not following commands. Patient had CT head w/o contrast which was negative and EEG, which was negative for seizures. Patient is noted to have PRN methadone and fentanyl. Patient was noted to be have an elevated WBC count. Will need to do repeat EEG and Brain MRI w/o contrast. Correction of underlying infection should help with AMS.       Impression/Plan:   AMS 2/2 to Toxic metabolic Encephalopathy in the setting of active infection and sedatives   Recommendations:   Brain MRI w/o contrast   EEG   Correction of electrolytes   MIVFs   Weaning down on prn sedatives   Continue home medications   Defer management to primary team     Plan discussed with Dr. Dias, case to be seen with Dr. Ellis.

## 2021-04-04 NOTE — PROGRESS NOTE ADULT - ATTENDING COMMENTS
70M with CAD s/p CABG and PCI, DM2, HTN, BPH s/p TURP, PD, admitted 3/14 with for weakness and COVID-19. Started on decadron and remdesivir and discharged 3/16. Returned 3/17 with worsening respiratory failure. Intubated 3/17. Course complicated by parotitis/facial swelling, pseduomonas PNA, AFib, encephalopathy.    Neuro: Dense encephalopathy, CT and EEG negative, likely r/t critical illness & metabolic derangements in addition to baseline PD. Will stop methadone. Continue carbidopa-levodopa for PD -- ? dosing. Will ask for neurology consult for dense encephalopathy and underlying PD -- does he need more frequent dosing? Different meds?    CV: Circulatory shock, resolved. AFib, now in NSR, continue metoprolol 25 mg BID.    Pulm: Acute hypoxic respiratory failure from COVID-19. On A/C overnight, will place back on PSV again today. Mental status and duration of illness preclude safe extubation at this time. Suspect that he will need a trach this week to facilitate recovery.    GI: Tube feeds with Glucerna. PPI. Having BMs.    Renal: ZELALEM on ? CKD. Non oliguric. ? DEMIAN. Continue ruffin and avoidance of nephrotoxins, hypotension. Nephrology following. Hypernatremia, improving on FWF. Check afternoon sodium level and adjust FWF as indicated.     Heme: Anemia, no indication to transfuse today. No evidence of bleeding. Continue ASA and SC heparin in setting of critical illness, COVID, CAD.    ID: Facial cellulitis / parotitis & pseudomonas HCAP -- continue Meropenem through 4/8/21. COVID, s/p remdesivir & dex.    Endo: DM with hyperglycemia, continue NPH & lispro SSI.    Prophylaxis: SC heparin, PPI, chlorhexidine.    Code Status: Full.    Lines: L IJ 3/20, L radial 3/20, Ruffin. Placed PIV overnight, so discontinue TLC today.

## 2021-04-04 NOTE — PROGRESS NOTE ADULT - SUBJECTIVE AND OBJECTIVE BOX
Vangie Strange MD  Internal Medicine, PGY2  [phone]    MICU Progress Note    Interval Events: FiO2 increased from 30 to 40 based on ABG.     Meds administered:  metoprolol tartrate: 25 milliGRAM(s) Enteral Tube (04-04 @ 07:19)  acetaminophen    Suspension ..: 650 milliGRAM(s) Enteral Tube (04-04 @ 07:19)  chlorhexidine 4% Liquid: 1 Application(s) Topical (04-04 @ 07:00)  ketorolac 0.5% Ophthalmic Solution: 1 Drop(s) Left EYE (04-04 @ 06:59)  insulin lispro (ADMELOG) corrective regimen sliding scale: 4 Unit(s) SubCutaneous (04-04 @ 06:59)  insulin NPH human recombinant: 13 Unit(s) SubCutaneous (04-04 @ 06:32)  prednisoLONE acetate 1% Suspension: 1 Drop(s) Left EYE (04-04 @ 06:28)  petrolatum Ophthalmic Ointment: 1 Application(s) Both EYES (04-04 @ 06:28)  methadone    Tablet: 10 milliGRAM(s) Oral (04-04 @ 06:27)  meropenem  IVPB: 100 mL/Hr IV Intermittent (04-04 @ 06:27)  heparin   Injectable: 5000 Unit(s) SubCutaneous (04-04 @ 06:27)  insulin NPH human recombinant: 13 Unit(s) SubCutaneous (04-04 @ 00:57)  insulin lispro (ADMELOG) corrective regimen sliding scale: 4 Unit(s) SubCutaneous (04-04 @ 00:34)  fentaNYL    Injectable: 50 MICROGram(s) IV Push (04-04 @ 00:34)  carbidopa/levodopa  10/100: 2 Tablet(s) Oral (04-04 @ 00:33)  simvastatin: 20 milliGRAM(s) Oral (04-03 @ 23:18)  methadone    Tablet: 10 milliGRAM(s) Oral (04-03 @ 23:18)  heparin   Injectable: 5000 Unit(s) SubCutaneous (04-03 @ 23:18)        REVIEW OF SYSTEMS:  [ ] Unable to assess ROS because ______  CONSTITUTIONAL: No fever, chills, night sweats, or fatigue  EYES: No eye pain, visual disturbances, or discharge  ENMT:  No difficulty hearing, tinnitus, vertigo; No sinus or throat pain  NECK: No pain or stiffness  BREASTS: No pain, masses, or nipple discharge  RESPIRATORY: No cough, wheezing, or hemoptysis; No shortness of breath  CARDIOVASCULAR: No chest pain, palpitations, dizziness, or leg swelling  GASTROINTESTINAL: No abdominal or epigastric pain. No nausea, vomiting, or hematemesis; No diarrhea or constipation. No melena or hematochezia.  GENITOURINARY: No dysuria, frequency, hematuria, or incontinence  NEUROLOGICAL: No headaches, memory loss, loss of strength, numbness, or tremors  SKIN: No itching, burning, rashes, or lesions   LYMPH NODES: No enlarged glands  ENDOCRINE: No heat or cold intolerance; No hair loss  MUSCULOSKELETAL: No joint pain or swelling; No muscle, back, or extremity pain  PSYCHIATRIC: No depression, anxiety, mood swings, or difficulty sleeping  HEME/LYMPH: No easy bruising, or bleeding gums  ALLERGY AND IMMUNOLOGIC: No hives or eczema    OBJECTIVE:  Vents:  Mode: AC/ CMV (Assist Control/ Continuous Mandatory Ventilation), RR (machine): 28, TV (machine): 420, FiO2: 30, PEEP: 5, PS: 12, MAP: 12, PIP: 30    04-03 @ 07:01  -  04-04 @ 07:00  --------------------------------------------------------  IN: 1162.6 mL / OUT: 2060 mL / NET: -897.4 mL    04-04 @ 07:01  -  04-04 @ 08:08  --------------------------------------------------------  IN: 90 mL / OUT: 175 mL / NET: -85 mL      CAPILLARY BLOOD GLUCOSE      POCT Blood Glucose.: 208 mg/dL (03 Apr 2021 23:53)      Drips:    Lines:  See A/P    VITALS:  T(F): 101.7 (04-04-21 @ 06:00), Max: 101.7 (04-04-21 @ 06:00)  HR: 78 (04-04-21 @ 08:00) (56 - 110)  BP: --  BP(mean): --  ABP: 113/44 (04-04-21 @ 08:00) (100/36 - 214/85)  ABP(mean): 65 (04-04-21 @ 05:00) (61 - 81)  RR: 30 (04-04-21 @ 08:00) (28 - 37)  SpO2: 100% (04-04-21 @ 08:00) (95% - 100%)    PHYSICAL EXAM:  GENERAL: NAD, lying in bed comfortably  HEAD:  Atraumatic, Normocephalic  EYES: EOMI, PERRLA, conjunctiva and sclera clear  ENT: Moist mucous membranes  NECK: Supple, No JVD  CHEST/LUNG: Clear to auscultation bilaterally; No rales, rhonchi, wheezing, or rubs. Unlabored respirations  HEART: Regular rate and rhythm; No murmurs, rubs, or gallops  ABDOMEN: Bowel sounds present; Soft, Nontender, Nondistended. No hepatomegaly  EXTREMITIES:  2+ Peripheral Pulses, brisk capillary refill. No clubbing, cyanosis, or edema  NERVOUS SYSTEM:  Alert & Oriented X3, speech clear. No deficits   MSK: FROM all 4 extremities, full and equal strength  SKIN: No rashes or lesions    HOSPITAL MEDICATIONS:  Standing Meds:  aspirin  chewable 81 milliGRAM(s) Oral daily  carbidopa/levodopa  10/100 2 Tablet(s) Oral <User Schedule>  chlorhexidine 4% Liquid 1 Application(s) Topical <User Schedule>  dextrose 40% Gel 15 Gram(s) Oral once  dextrose 50% Injectable 25 Gram(s) IV Push once  dextrose 50% Injectable 12.5 Gram(s) IV Push once  dextrose 50% Injectable 25 Gram(s) IV Push once  glucagon  Injectable 1 milliGRAM(s) IntraMuscular once  heparin   Injectable 5000 Unit(s) SubCutaneous every 8 hours  insulin lispro (ADMELOG) corrective regimen sliding scale   SubCutaneous every 6 hours  insulin NPH human recombinant 13 Unit(s) SubCutaneous every 6 hours  ketorolac 0.5% Ophthalmic Solution 1 Drop(s) Left EYE two times a day  meropenem  IVPB      meropenem  IVPB 1000 milliGRAM(s) IV Intermittent every 12 hours  methadone    Tablet 10 milliGRAM(s) Oral every 8 hours  metoprolol tartrate 25 milliGRAM(s) Enteral Tube two times a day  pantoprazole  Injectable 40 milliGRAM(s) IV Push daily  petrolatum Ophthalmic Ointment 1 Application(s) Both EYES two times a day  polyethylene glycol 3350 17 Gram(s) Oral daily  prednisoLONE acetate 1% Suspension 1 Drop(s) Left EYE four times a day  senna Syrup 10 milliLiter(s) Oral daily  simvastatin 20 milliGRAM(s) Oral at bedtime      PRN Meds:  acetaminophen    Suspension .. 650 milliGRAM(s) Enteral Tube every 6 hours PRN  sodium chloride 0.9% lock flush 10 milliLiter(s) IV Push every 1 hour PRN      LABS:  CBC 04-04-21 @ 06:26                        7.7    15.29 )-----------( 357                   24.9       Hgb trend: 7.7 <-- , 7.8 <-- , 7.6 <--   WBC trend: 15.29 <-- , 9.39 <-- , 9.21 <--     CMP 04-04-21 @ 06:26    147<H>  |  114<H>  |  54<H>  ----------------------------<  221<H>  4.3   |  26  |  1.74<H>    Ca    7.8<L>      04-04-21 @ 06:26  Phos  3.9     04-04  Mg     2.8     04-04    TPro  5.8<L>  /  Alb  2.0<L>  /  TBili  0.3  /  DBili  x   /  AST  21  /  ALT  7   /  AlkPhos  147<H>  04-04      Serum Cr trend: 1.74 <-- , 1.78 <-- , 1.78 <-- , 1.94 <--   PT/INR - ( 04 Apr 2021 06:26 )   PT: 14.3 sec;   INR: 1.26 ratio         PTT - ( 04 Apr 2021 06:26 )  PTT:32.9 sec    Arterial Blood Gas:  04-04 @ 06:26  7.42/40/59/26/89.5/1.7  ABG lactate: --  Arterial Blood Gas:  04-03 @ 05:34  7.43/37/70/25/93.9/0.8  ABG lactate: --        MICROBIOLOGY:       RADIOLOGY:  [ ] Reviewed and interpreted by me    EKG:   Vangie Strange MD  Internal Medicine, PGY2  [phone]    MICU Progress Note    Interval Events: FiO2 increased from 30 to 40 based on ABG. Became hypotensive with IPV    Meds administered:  metoprolol tartrate: 25 milliGRAM(s) Enteral Tube (04-04 @ 07:19)  acetaminophen    Suspension ..: 650 milliGRAM(s) Enteral Tube (04-04 @ 07:19)  chlorhexidine 4% Liquid: 1 Application(s) Topical (04-04 @ 07:00)  ketorolac 0.5% Ophthalmic Solution: 1 Drop(s) Left EYE (04-04 @ 06:59)  insulin lispro (ADMELOG) corrective regimen sliding scale: 4 Unit(s) SubCutaneous (04-04 @ 06:59)  insulin NPH human recombinant: 13 Unit(s) SubCutaneous (04-04 @ 06:32)  prednisoLONE acetate 1% Suspension: 1 Drop(s) Left EYE (04-04 @ 06:28)  petrolatum Ophthalmic Ointment: 1 Application(s) Both EYES (04-04 @ 06:28)  methadone    Tablet: 10 milliGRAM(s) Oral (04-04 @ 06:27)  meropenem  IVPB: 100 mL/Hr IV Intermittent (04-04 @ 06:27)  heparin   Injectable: 5000 Unit(s) SubCutaneous (04-04 @ 06:27)  insulin NPH human recombinant: 13 Unit(s) SubCutaneous (04-04 @ 00:57)  insulin lispro (ADMELOG) corrective regimen sliding scale: 4 Unit(s) SubCutaneous (04-04 @ 00:34)  fentaNYL    Injectable: 50 MICROGram(s) IV Push (04-04 @ 00:34)  carbidopa/levodopa  10/100: 2 Tablet(s) Oral (04-04 @ 00:33)  simvastatin: 20 milliGRAM(s) Oral (04-03 @ 23:18)  methadone    Tablet: 10 milliGRAM(s) Oral (04-03 @ 23:18)  heparin   Injectable: 5000 Unit(s) SubCutaneous (04-03 @ 23:18)        REVIEW OF SYSTEMS:  [ ] Unable to assess ROS because ______  CONSTITUTIONAL: No fever, chills, night sweats, or fatigue  EYES: No eye pain, visual disturbances, or discharge  ENMT:  No difficulty hearing, tinnitus, vertigo; No sinus or throat pain  NECK: No pain or stiffness  BREASTS: No pain, masses, or nipple discharge  RESPIRATORY: No cough, wheezing, or hemoptysis; No shortness of breath  CARDIOVASCULAR: No chest pain, palpitations, dizziness, or leg swelling  GASTROINTESTINAL: No abdominal or epigastric pain. No nausea, vomiting, or hematemesis; No diarrhea or constipation. No melena or hematochezia.  GENITOURINARY: No dysuria, frequency, hematuria, or incontinence  NEUROLOGICAL: No headaches, memory loss, loss of strength, numbness, or tremors  SKIN: No itching, burning, rashes, or lesions   LYMPH NODES: No enlarged glands  ENDOCRINE: No heat or cold intolerance; No hair loss  MUSCULOSKELETAL: No joint pain or swelling; No muscle, back, or extremity pain  PSYCHIATRIC: No depression, anxiety, mood swings, or difficulty sleeping  HEME/LYMPH: No easy bruising, or bleeding gums  ALLERGY AND IMMUNOLOGIC: No hives or eczema    OBJECTIVE:  Vents:  Mode: AC/ CMV (Assist Control/ Continuous Mandatory Ventilation), RR (machine): 28, TV (machine): 420, FiO2: 30, PEEP: 5, PS: 12, MAP: 12, PIP: 30    04-03 @ 07:01  -  04-04 @ 07:00  --------------------------------------------------------  IN: 1162.6 mL / OUT: 2060 mL / NET: -897.4 mL    04-04 @ 07:01  -  04-04 @ 08:08  --------------------------------------------------------  IN: 90 mL / OUT: 175 mL / NET: -85 mL      CAPILLARY BLOOD GLUCOSE      POCT Blood Glucose.: 208 mg/dL (03 Apr 2021 23:53)      Drips:    Lines:  See A/P    VITALS:  T(F): 101.7 (04-04-21 @ 06:00), Max: 101.7 (04-04-21 @ 06:00)  HR: 78 (04-04-21 @ 08:00) (56 - 110)  BP: --  BP(mean): --  ABP: 113/44 (04-04-21 @ 08:00) (100/36 - 214/85)  ABP(mean): 65 (04-04-21 @ 05:00) (61 - 81)  RR: 30 (04-04-21 @ 08:00) (28 - 37)  SpO2: 100% (04-04-21 @ 08:00) (95% - 100%)    PHYSICAL EXAM:  Gen: intubated, off sedation   neuro: pupils equal and reactive to light, blinks to threat, positive gag and corneal reflexes. Not following commands   HEENT: R cheek swelling, improving   RES : course breath sounds B/L    CVS: S1,S2. sinus bradycardia  Abd: soft, non distended, non tender, hypoactive bowel sounds   Ext: pitting edema B/L LE, edema B/L distal UE    HOSPITAL MEDICATIONS:  Standing Meds:  aspirin  chewable 81 milliGRAM(s) Oral daily  carbidopa/levodopa  10/100 2 Tablet(s) Oral <User Schedule>  chlorhexidine 4% Liquid 1 Application(s) Topical <User Schedule>  dextrose 40% Gel 15 Gram(s) Oral once  dextrose 50% Injectable 25 Gram(s) IV Push once  dextrose 50% Injectable 12.5 Gram(s) IV Push once  dextrose 50% Injectable 25 Gram(s) IV Push once  glucagon  Injectable 1 milliGRAM(s) IntraMuscular once  heparin   Injectable 5000 Unit(s) SubCutaneous every 8 hours  insulin lispro (ADMELOG) corrective regimen sliding scale   SubCutaneous every 6 hours  insulin NPH human recombinant 13 Unit(s) SubCutaneous every 6 hours  ketorolac 0.5% Ophthalmic Solution 1 Drop(s) Left EYE two times a day  meropenem  IVPB      meropenem  IVPB 1000 milliGRAM(s) IV Intermittent every 12 hours  methadone    Tablet 10 milliGRAM(s) Oral every 8 hours  metoprolol tartrate 25 milliGRAM(s) Enteral Tube two times a day  pantoprazole  Injectable 40 milliGRAM(s) IV Push daily  petrolatum Ophthalmic Ointment 1 Application(s) Both EYES two times a day  polyethylene glycol 3350 17 Gram(s) Oral daily  prednisoLONE acetate 1% Suspension 1 Drop(s) Left EYE four times a day  senna Syrup 10 milliLiter(s) Oral daily  simvastatin 20 milliGRAM(s) Oral at bedtime      PRN Meds:  acetaminophen    Suspension .. 650 milliGRAM(s) Enteral Tube every 6 hours PRN  sodium chloride 0.9% lock flush 10 milliLiter(s) IV Push every 1 hour PRN      LABS:  CBC 04-04-21 @ 06:26                        7.7    15.29 )-----------( 357                   24.9       Hgb trend: 7.7 <-- , 7.8 <-- , 7.6 <--   WBC trend: 15.29 <-- , 9.39 <-- , 9.21 <--     CMP 04-04-21 @ 06:26    147<H>  |  114<H>  |  54<H>  ----------------------------<  221<H>  4.3   |  26  |  1.74<H>    Ca    7.8<L>      04-04-21 @ 06:26  Phos  3.9     04-04  Mg     2.8     04-04    TPro  5.8<L>  /  Alb  2.0<L>  /  TBili  0.3  /  DBili  x   /  AST  21  /  ALT  7   /  AlkPhos  147<H>  04-04      Serum Cr trend: 1.74 <-- , 1.78 <-- , 1.78 <-- , 1.94 <--   PT/INR - ( 04 Apr 2021 06:26 )   PT: 14.3 sec;   INR: 1.26 ratio         PTT - ( 04 Apr 2021 06:26 )  PTT:32.9 sec    Arterial Blood Gas:  04-04 @ 06:26  7.42/40/59/26/89.5/1.7  ABG lactate: --  Arterial Blood Gas:  04-03 @ 05:34  7.43/37/70/25/93.9/0.8  ABG lactate: --        MICROBIOLOGY:       RADIOLOGY:  [ ] Reviewed and interpreted by me    EKG:

## 2021-04-04 NOTE — PROGRESS NOTE ADULT - ASSESSMENT
70y Male with history of HTN on HCTZ presents with weakness found to have COVID-19. Nephrology consulted for hyponatremia.    1) ZELALEM: Non-oliguric ZELALEM likely due to DEMIAN given rise in Scr approximately 48 hours after CT with IV contrast on 3/26. Urine sodium low which is often seen in DEMIAN. Scr stable now and pt has good UO. Continue with supportive care. Avoid nephrotoxins.     2) HTN: BP controlled. Rate control as per ICU. Monitor BP.    3) Hypernatremia: Due to intravascular depletion and third spacing from hypoalbuminemia. IMproving now. Monitor serum Na.    4) COVID-19: As per primary team.

## 2021-04-04 NOTE — CONSULT NOTE ADULT - REASON FOR ADMISSION
acute respiratory failure d/t covid

## 2021-04-04 NOTE — PROGRESS NOTE ADULT - SUBJECTIVE AND OBJECTIVE BOX
[facility] NEPHROLOGY- PROGRESS NOTE    70y Male with history of HTN on HCTZ presents with weakness found to have COVID-19. Nephrology consulted for hyponatremia.    Patient with resolution of hyponatremia for which nephrology had signed off on 3/19. Patient subsequently with non-oliguric ZELALEM.    Sedation has been stopped, but pt has yet to gain consciousness.    REVIEW OF SYSTEMS: Unable to obtain as patient intubated.    No Known Allergies      Hospital Medications: Medications reviewed      ICU Vital Signs Last 24 Hrs  T(C): 37.6 (04 Apr 2021 16:00), Max: 38.7 (04 Apr 2021 06:00)  T(F): 99.7 (04 Apr 2021 16:00), Max: 101.7 (04 Apr 2021 06:00)  HR: 76 (04 Apr 2021 16:00) (71 - 110)  ABP: 162/62 (04 Apr 2021 16:00) (104/38 - 187/53)  ABP(mean): 65 (04 Apr 2021 05:00) (61 - 81)  RR: 34 (04 Apr 2021 16:00) (23 - 34)  SpO2: 100% (04 Apr 2021 16:00) (95% - 100%)      PHYSICAL EXAM:  Gen: Intubated and sedated  Cards: RRR, +S1/S2, no M/G/R  Resp: CTA B/L  GI: soft, NT/ND, NABS  Vascular: trace LE edema B/L, + UE edema/ mild anasarca    LABS:  04-04  147 <--, 149 <--, 150 <--, 150 <--, 147 <--, 146 <--, 142 <--  147<H>  |  114<H>  |  54<H>  ----------------------------<  221<H>  4.3   |  26  |  1.74<H>    Ca    7.8<L>      04 Apr 2021 06:26  Phos  3.9     04-04  Mg     2.8     04-04    TPro  5.8<L>  /  Alb  2.0<L>  /  TBili  0.3  /  DBili      /  AST  21  /  ALT  7   /  AlkPhos  147<H>  04-04    Creatinine Trend: 1.74 <--, 1.78 <--, 1.78 <--, 1.94 <--, 1.90 <--, 2.14 <--, 2.26 <--, 2.04 <--, 1.70 <--                        7.7    15.29 )-----------( 357      ( 04 Apr 2021 06:26 )             24.9     Urine Studies:    Sodium, Random Urine: 21 mmol/L (03-28 @ 19:15)  Potassium, Random Urine: 33.6 mmol/L (03-28 @ 19:15)  Chloride, Random Urine: <20 mmol/L (03-28 @ 19:15)

## 2021-04-04 NOTE — PROGRESS NOTE ADULT - ASSESSMENT
70 year old Irish/Jhonny-speaking male with a history of CAD s/p CABG and 2 stents, Type 2 DM, HTN, BPH s/p TURP and Parkinson's disease, who was recently admitted 3/14 for weakness and COVID-19. Started on decadron and remdesivir and discharged 3/16. Returned 3/17 with worsening respiratory failure. Intubated 3/17. Course c/b ?parotitis, Pseudomonas PNA, and new onset atrial fibrillation now in sinus rhythm.     PLAN  Neuro  Mental status remains poor, not following commands  - Wean precedex gtt to further assess mental status. Fentanyl 25 PRN for dyssynchrony/agitation    - CTH negative for bleed. EEG negative for seizures, moderate diffuse dysfunction   - Ammonia level, B12, and folate WNL   - Correct hypernatremia as below   - C/w methadone 10 q8, will wean  - C/w sinemet for Parkinson's disease   - Will discuss with daughter regarding English understanding     Cardiovascular  - had recent h/o PAF, converted to NSR with amio  - maintaining NSR off amio, c/w lopressor 25 BID   - Continuous BP monitoring with A-line    Pulmonary  - Intubated 3/20  - C/w current vent settings, CPAP during the day   - Unable to wean from vent given poor mental status  - Monitor ABGs    GI  - C/w TFs  - PPI  - Bowel regimen    Renal  ZELALEM 2/2 contrast load 3/26? vs COVID/SEPSIS  - Non oliguric, no diuretics, creatinine peaked and is currently downtrending   - Da Silva for monitoring urine output  - Goal even     Hypernatremia  - Free water deficit 1.5 L, will increase free water to 250 q4   - Repeat BMP in PM to assess Na     ID  COVID  - S/p remdesivir, decadron     Neck swelling  - CT neck 3/26 c/f parotitis and or masseter myositis with cellulitis vs facial edema from proning   - Sputum from 3/26 growing Pseudomonas fluorescens   - C/w santos (3/30 - ) for planned 10d course per ID, to finish 4/8   - S/p vanco 10d course  - F/u ID recs    Endo  - C/w NPH 13u, Mod SSI  - Endo following  - TSH 4.25, T4 WNL, subclinical, can repeat TFTs in 4-6 weeks     Heme  - No acute issues    Ppx  - DVT: SQH    Lines/Tubes  - LRA Natali 3/20, plan to remove 4/4   - [facility] TLC 3/20, will obtain PIV access today and remove central line   - Da Silva 3/20     GOC  - Full code  - Will need to discuss trach with daughter  70 year old Latvian/Jhonny-speaking male with a history of CAD s/p CABG and 2 stents, Type 2 DM, HTN, BPH s/p TURP and Parkinson's disease, who was recently admitted 3/14 for weakness and COVID-19. Started on decadron and remdesivir and discharged 3/16. Returned 3/17 with worsening respiratory failure. Intubated 3/17. Course c/b ?parotitis, Pseudomonas PNA, and new onset atrial fibrillation now in sinus rhythm.     PLAN  Neuro  Mental status remains poor, not following commands  - Neuro consult  - CTH negative for bleed. EEG negative for seizures, moderate diffuse dysfunction   - Ammonia level, B12, and folate WNL   - Correct hypernatremia as below   - Fentanyl 25 PRN for dyssynchrony/agitation    - Stop methadone  - C/w sinemet for Parkinson's disease   - Will discuss with daughter regarding English understanding     Cardiovascular  - had recent h/o PAF, converted to NSR with amio  - maintaining NSR off amio, c/w lopressor 25 BID   - Continuous BP monitoring with A-line    Pulmonary  - Intubated 3/20  - C/w current vent settings, CPAP during the day   - Unable to wean from vent given poor mental status  - Monitor ABGs    GI  - C/w TFs  - PPI  - Bowel regimen    Renal  ZELALEM 2/2 contrast load 3/26? vs COVID/SEPSIS  - Non oliguric, no diuretics, creatinine peaked and is currently downtrending   - Avinash for monitoring urine output  - Goal even     Hypernatremia  - Free water deficit 1.5 L, will increase free water to 250 q4   - Repeat BMP in PM to assess Na     ID  COVID  - S/p remdesivir, decadron     Neck swelling  - CT neck 3/26 c/f parotitis and or masseter myositis with cellulitis vs facial edema from proning   - Sputum from 3/26 growing Pseudomonas fluorescens   - C/w santos (3/30 - ) for planned 10d course per ID, to finish 4/8   - S/p vanco 10d course  - F/u ID recs    Endo  - C/w NPH 13u, Mod SSI  - Endo following  - TSH 4.25, T4 WNL, subclinical, can repeat TFTs in 4-6 weeks     Heme  - No acute issues    Ppx  - DVT: SQH    Lines/Tubes  - LRA Natali 3/20  - Das Ilva 3/20     [facility]  - Full code  - Will need to discuss trach with daughter

## 2021-04-04 NOTE — CONSULT NOTE ADULT - SUBJECTIVE AND OBJECTIVE BOX
MRN-[id number]  Patient is a 70y old  Male who presents with a chief complaint of Acute Respiratory Failure d/t COVID-19 (04 Apr 2021 08:08)    HPI:  Patient is a 69 yo M with pmh of  history of CAD s/p CABG and 2 stents, Type 2 DM, HTN, BPH s/p TURP and Parkinson's disease on sinemet presented to [facility] for acute decompensation and dyspnea with his O2 sat dropping form 88% to 66% within 30 minutes. Patient was noted to be Covid positive. Patient was intubated on arrival.  Patient was started on remdesivir, course was complicated with  septic shock, ZELALEM, and A-fib.  Pt had neck abscess as well which grew Pseudomonas patient completed a vancomycin regiment was started on Merem on 3/30.  Patient was on precedex and multiple PRNS for sedation. Upon discontinuation of precedex, patient was not following commands. Patient had CT head w/o contrast which was negative and EEG, which was negative for seizures. Patient is noted to have PRN methadone and fentanyl. Patient was noted to be have an elevated WBC count. ROS was unable to be obtained. Neurology was consulted for AMS.       PAST MEDICAL & SURGICAL HISTORY:  Diabetes mellitus    CAD (coronary artery disease)  CABG x2 2012, Stents x2 2014    Hypertension    HLD (hyperlipidemia)    GERD (gastroesophageal reflux disease)    BPH (benign prostatic hyperplasia)    Bladder neck obstruction  3 surgeries for BPH, most recent 2012    History of coronary artery bypass graft  X2 in March of 2012    Presence of stent in coronary artery  x2 2014      FAMILY HISTORY:  Family history of diabetes mellitus type II (Sibling)      Social Hx:  Nonsmoker, no drug or alcohol use    Home Medications:  Basaglar KwikPen 100 units/mL subcutaneous solution: 20 unit(s) subcutaneous once a day (at bedtime) (17 Mar 2021 16:21)  carbidopa-levodopa 10 mg-100 mg oral tablet: 2 tab(s) orally 2 times a day (17 Mar 2021 16:21)  ciprofloxacin 0.3% ophthalmic solution: 1 drop(s) in the left eye 4 times a day (17 Mar 2021 16:21)  ketorolac 0.5% ophthalmic solution: 1 drop(s) in the left eye 2 times a day (17 Mar 2021 16:21)  prednisoLONE acetate 1% ophthalmic suspension: 1 drop(s) in the left eye 4 times a day (17 Mar 2021 16:21)  simvastatin 20 mg oral tablet: 1 tab(s) orally once a day (at bedtime) (17 Mar 2021 16:21)    MEDICATIONS  (STANDING):  aspirin  chewable 81 milliGRAM(s) Oral daily  carbidopa/levodopa  10/100 2 Tablet(s) Oral <User Schedule>  chlorhexidine 4% Liquid 1 Application(s) Topical <User Schedule>  dextrose 40% Gel 15 Gram(s) Oral once  dextrose 50% Injectable 25 Gram(s) IV Push once  dextrose 50% Injectable 12.5 Gram(s) IV Push once  dextrose 50% Injectable 25 Gram(s) IV Push once  glucagon  Injectable 1 milliGRAM(s) IntraMuscular once  heparin   Injectable 5000 Unit(s) SubCutaneous every 8 hours  insulin lispro (ADMELOG) corrective regimen sliding scale   SubCutaneous every 6 hours  insulin NPH human recombinant 13 Unit(s) SubCutaneous every 6 hours  ketorolac 0.5% Ophthalmic Solution 1 Drop(s) Left EYE two times a day  meropenem  IVPB      meropenem  IVPB 1000 milliGRAM(s) IV Intermittent every 12 hours  metoprolol tartrate 25 milliGRAM(s) Enteral Tube two times a day  pantoprazole  Injectable 40 milliGRAM(s) IV Push daily  petrolatum Ophthalmic Ointment 1 Application(s) Both EYES two times a day  polyethylene glycol 3350 17 Gram(s) Oral daily  prednisoLONE acetate 1% Suspension 1 Drop(s) Left EYE four times a day  senna Syrup 10 milliLiter(s) Oral daily  simvastatin 20 milliGRAM(s) Oral at bedtime    MEDICATIONS  (PRN):  acetaminophen    Suspension .. 650 milliGRAM(s) Enteral Tube every 6 hours PRN Temp greater or equal to 38C (100.4F), Mild Pain (1 - 3)  fentaNYL    Injectable 50 MICROGram(s) IV Push every 6 hours PRN Agitation  sodium chloride 0.9% lock flush 10 milliLiter(s) IV Push every 1 hour PRN Pre/post blood products, medications, blood draw, and to maintain line patency    Allergies  No Known Allergies    Intolerances      REVIEW OF SYSTEMS: unable to assess.       Vital Signs Last 24 Hrs  T(C): 37 (04 Apr 2021 12:00), Max: 38.7 (04 Apr 2021 06:00)  T(F): 98.6 (04 Apr 2021 12:00), Max: 101.7 (04 Apr 2021 06:00)  HR: 77 (04 Apr 2021 15:00) (71 - 110)  BP: --  BP(mean): --  RR: 30 (04 Apr 2021 15:00) (23 - 34)  SpO2: 99% (04 Apr 2021 15:00) (95% - 100%)    GENERAL EXAM:  Constitutional: awake   HEENT: Pupils symmetric b/l. minimal reactive to light.       NEUROLOGICAL EXAM:  MS: Awake and intubated. Eyes open spontanously. Does not follow commands. no Spontaneous movement noted.   CN: Pupils symmetric b/l. minimally reactive to light b/l. OCR noted b/l. BTT noted b/l.   Motor No spontaneous movement noted.   Tone: normal.   Bulk: normal.   DTR 2+ symm. in biceps/ brachoradialis/ patellar b/l.    Sensation: minimal w/d to pain to noxious stimuli.        Labs:   cbc                      7.7    15.29 )-----------( 357      ( 04 Apr 2021 06:26 )             24.9     [id number]    147<H>  |  114<H>  |  54<H>  ----------------------------<  221<H>  4.3   |  26  |  1.74<H>    Ca    7.8<L>      04 Apr 2021 06:26  Phos  3.9     04-04  Mg     2.8     04-04    TPro  5.8<L>  /  Alb  2.0<L>  /  TBili  0.3  /  DBili  x   /  AST  21  /  ALT  7   /  AlkPhos  147<H>  04-04    CoagsPT/INR - ( 04 Apr 2021 06:26 )   PT: 14.3 sec;   INR: 1.26 ratio         PTT - ( 04 Apr 2021 06:26 )  PTT:32.9 sec  [id number] Chol -- LDL -- HDL -- Trig 152<H>, 03-26 Chol -- LDL -- HDL -- Trig 229<H>  A1C  CardiacMarkers    LFTsLIVER FUNCTIONS - ( 04 Apr 2021 06:26 )  Alb: 2.0 g/dL / Pro: 5.8 g/dL / ALK PHOS: 147 U/L / ALT: 7 U/L / AST: 21 U/L / GGT: x           Radiology:  Ct head w/o contrast: IMPRESSION: No acute intracranial hemorrhage, mass effect, or shift of the midline structures.     EEG: EEG Impression / Clinical Correlate:  Abnormal EEG study.  Moderate nonspecific diffuse or multifocal cerebral dysfunction.   No epileptiform pattern or seizure seen.

## 2021-04-04 NOTE — CONSULT NOTE ADULT - ATTENDING COMMENTS
[facility] NEPHROLOGY  Mahesh Lomax M.D.  Perez Armstrong D.O.  Gricelda Choe M.D.  Juliet Rogers, MSN, ANP-C    Telephone: [phone]  Facsimile: [phone]    [address] [place] [address]
69 yo M with pmh of  history of CAD s/p CABG and 2 stents, Type 2 DM, HTN, BPH s/p TURP and Parkinson's disease on sinemet presented to [facility] for acute decompensation and dyspnea intubated also neck abscess +Pseudomonas patient completed a vancomycin regiment was started on Merem on 3/30. Patient was on precedex and multiple PRNS for sedation. Upon discontinuation of precedex, patient was not following commands. Patient had CT head w/o contrast which was negative and EEG, which was negative for seizures. PE + blink reflex, + cough, pupilsminimally reactive  3- to 2mm right esophoria. no spontaneous movement no movement to pain TME vs ?Covid encephalopathy r/olesion    24 hr EEG  supportive care  Can get MRI when stable
Uncontrolled DM2 HbA1c 10.3% with COVID on dexamethasone with steroid exacerbated hyperglycemia. Agree with insulin regimen as outlined above. DC on basal bolus (Basaglar and Admelog) regimen TBD. Will follow.  Endocrine team consulted for uncontrolled diabetes. Patient is high risk with high level decision making due to uncontrolled diabetes which places patient at high risk for cardiovascular and cerebrovascular events. Patient with lability of glucose requiring close monitoring and insulin adjustments.    José Miguel Trujillo MD  Division of Endocrinology  Pager: [phone]    If after 6PM or before 9AM, or on weekends/holidays, please call endocrine answering service for assistance ([phone]).  For nonurgent matters email [email or web address] for assistance.

## 2021-04-05 NOTE — PROGRESS NOTE ADULT - SUBJECTIVE AND OBJECTIVE BOX
[facility] NEPHROLOGY- PROGRESS NOTE, Follow up     Patient is a 70y Male with history of HTN on HCTZ presents with weakness found to have COVID-19. Nephrology consulted for hyponatremia.    Patient with resolution of hyponatremia for which nephrology had signed off on 3/19. Patient subsequently with non-oliguric ZELALEM.    Sedation has been stopped, but pt has yet to gain consciousness.    REVIEW OF SYSTEMS: Unable to obtain as patient intubated.  Allergy:  No Known Allergies    Hospital Medications:   MEDICATIONS  (STANDING):  aspirin  chewable 81 milliGRAM(s) Oral daily  carbidopa/levodopa  10/100 2 Tablet(s) Oral <User Schedule>  chlorhexidine 4% Liquid 1 Application(s) Topical <User Schedule>  dexMEDEtomidine Infusion 0.05 MICROgram(s)/kG/Hr (1 mL/Hr) IV Continuous <Continuous>  dextrose 40% Gel 15 Gram(s) Oral once  dextrose 50% Injectable 25 Gram(s) IV Push once  dextrose 50% Injectable 12.5 Gram(s) IV Push once  dextrose 50% Injectable 25 Gram(s) IV Push once  glucagon  Injectable 1 milliGRAM(s) IntraMuscular once  heparin   Injectable 5000 Unit(s) SubCutaneous every 8 hours  immune   globulin 10% (GAMMAGARD) IVPB 25 Gram(s) IV Intermittent daily  insulin lispro (ADMELOG) corrective regimen sliding scale   SubCutaneous every 6 hours  insulin NPH human recombinant 5 Unit(s) SubCutaneous every 6 hours  ketorolac 0.5% Ophthalmic Solution 1 Drop(s) Left EYE two times a day  meropenem  IVPB      meropenem  IVPB 1000 milliGRAM(s) IV Intermittent every 12 hours  metoprolol tartrate 25 milliGRAM(s) Enteral Tube two times a day  pantoprazole  Injectable 40 milliGRAM(s) IV Push daily  petrolatum Ophthalmic Ointment 1 Application(s) Both EYES two times a day  polyethylene glycol 3350 17 Gram(s) Oral daily  prednisoLONE acetate 1% Suspension 1 Drop(s) Left EYE four times a day  senna Syrup 10 milliLiter(s) Oral daily  simvastatin 20 milliGRAM(s) Oral at bedtime        VITALS:  T(F): 99.3 (04-05-21 @ 08:00), Max: 100.3 (04-05-21 @ 04:00)  HR: 87 (04-05-21 @ 15:30)  BP: --  RR: 24 (04-05-21 @ 15:30)  SpO2: 100% (04-05-21 @ 15:30)  Wt(kg): --    04-04 @ 07:01  -  04-05 @ 07:00  --------------------------------------------------------  IN: 2390 mL / OUT: 1460 mL / NET: 930 mL    04-05 @ 07:01  -  04-05 @ 16:23  --------------------------------------------------------  IN: 1205 mL / OUT: 325 mL / NET: 880 mL        PHYSICAL EXAM:  Gen: Sedated   Cards: RRR, +S1/S2, no M/G/R  Resp: CTA B/L  GI: soft, NT/ND, NABS  Vascular: trace LE edema B/L, + UE edema/ mild anasarca      LABS:  04-05    148<H>  |  115<H>  |  53<H>  ----------------------------<  70  4.2   |  27  |  1.76<H>    Ca    7.9<L>      05 Apr 2021 03:47  Phos  3.7     04-05  Mg     2.8     04-05    TPro  5.9<L>  /  Alb  2.0<L>  /  TBili  0.2  /  DBili  x   /  AST  34  /  ALT  6   /  AlkPhos  125<H>  04-05    Creatinine Trend: 1.76 <--, 1.74 <--, 1.78 <--, 1.78 <--, 1.94 <--, 1.90 <--, 2.14 <--, 2.26 <--, 2.04 <--                        7.2    14.18 )-----------( 336      ( 05 Apr 2021 03:47 )             23.3     Urine Studies:      RADIOLOGY & ADDITIONAL STUDIES:

## 2021-04-05 NOTE — PROGRESS NOTE ADULT - SUBJECTIVE AND OBJECTIVE BOX
CHIEF COMPLAINT:    Interval Events:    HPI:  Patient is a 70 year old Amharic/Jhonny-speaking male with a history of CAD s/p CABG and 2 stents, Type 2 DM, HTN, BPH s/p TURP and Parkinson's disease, who was recently admitted on 3/14 for weakness, covid, hypoxia, started on remdesivir/decadron, discharged home yesterday (3/16). However, at home, pt decompensated with increasing sob/labored breathing, o2 sat dropped from 88% to 66% within 1/2 hour per daughter, gasping for air. Pt's son called 911 and brought him back to the hospital. Pt denies chest pain/abd pain/n/v/diarrhea. He reports he was sent home without oxygen.     In ED, he was put on 100% nonrebreather for labored breathing. CXR showed b./l opacities c/w covid progression.  (17 Mar 2021 16:14)      REVIEW OF SYSTEMS:      [ ] Unable to assess ROS because ____EET, no MS____    OBJECTIVE:  ICU Vital Signs Last 24 Hrs  T(C): 37.4 (05 Apr 2021 08:00), Max: 37.9 (05 Apr 2021 04:00)  T(F): 99.3 (05 Apr 2021 08:00), Max: 100.3 (05 Apr 2021 04:00)  HR: 86 (05 Apr 2021 10:48) (68 - 86)  BP: --  BP(mean): --  ABP: 181/63 (05 Apr 2021 10:30) (114/45 - 181/63)  ABP(mean): 104 (05 Apr 2021 10:30) (68 - 104)  RR: 31 (05 Apr 2021 10:30) (23 - 35)  SpO2: 98% (05 Apr 2021 10:48) (97% - 100%)    Mode: CPAP with PS, FiO2: 30, PEEP: 5, PS: 10, MAP: 9, PIP: 18    04-04 @ 07:01  -  04-05 @ 07:00  --------------------------------------------------------  IN: 2390 mL / OUT: 1460 mL / NET: 930 mL    04-05 @ 07:01  - 04-05 @ 11:03  --------------------------------------------------------  IN: 135 mL / OUT: 325 mL / NET: -190 mL      CAPILLARY BLOOD GLUCOSE      POCT Blood Glucose.: 157 mg/dL (05 Apr 2021 06:16)      Physical Exam:   GENERAL: intubated, no sedation   HEENT:  NC/AT  EYES: PERRLA, conjunctiva and sclera clear, ? disconjugate glaze  NECK: Supple, No JVD  CHEST/LUNG: diminished bilaterally; No wheezes, rales, or rhonchi  HEART: Regular rate and rhythm; No murmurs, rubs, or gallops  ABDOMEN: Soft, Nontender, Nondistended; Bowel sounds present  EXTREMITIES:  2+ Peripheral Pulses, No clubbing, cyanosis, or edema  PSYCH: unable as pt is sedated  NEUROLOGY: sedated  SKIN: No rashes or lesions      LINES:    HOSPITAL MEDICATIONS:  Standing Meds:  aspirin  chewable 81 milliGRAM(s) Oral daily  carbidopa/levodopa  10/100 2 Tablet(s) Oral <User Schedule>  chlorhexidine 4% Liquid 1 Application(s) Topical <User Schedule>  dexMEDEtomidine Infusion 0.05 MICROgram(s)/kG/Hr IV Continuous <Continuous>  dextrose 40% Gel 15 Gram(s) Oral once  dextrose 50% Injectable 25 Gram(s) IV Push once  dextrose 50% Injectable 12.5 Gram(s) IV Push once  dextrose 50% Injectable 25 Gram(s) IV Push once  glucagon  Injectable 1 milliGRAM(s) IntraMuscular once  heparin   Injectable 5000 Unit(s) SubCutaneous every 8 hours  insulin lispro (ADMELOG) corrective regimen sliding scale   SubCutaneous every 6 hours  insulin NPH human recombinant 13 Unit(s) SubCutaneous every 6 hours  ketorolac 0.5% Ophthalmic Solution 1 Drop(s) Left EYE two times a day  meropenem  IVPB 1000 milliGRAM(s) IV Intermittent every 12 hours  meropenem  IVPB      metoprolol tartrate 25 milliGRAM(s) Enteral Tube two times a day  pantoprazole  Injectable 40 milliGRAM(s) IV Push daily  petrolatum Ophthalmic Ointment 1 Application(s) Both EYES two times a day  polyethylene glycol 3350 17 Gram(s) Oral daily  prednisoLONE acetate 1% Suspension 1 Drop(s) Left EYE four times a day  senna Syrup 10 milliLiter(s) Oral daily  simvastatin 20 milliGRAM(s) Oral at bedtime      PRN Meds:  acetaminophen    Suspension .. 650 milliGRAM(s) Enteral Tube every 6 hours PRN  fentaNYL    Injectable 50 MICROGram(s) IV Push every 6 hours PRN  sodium chloride 0.9% lock flush 10 milliLiter(s) IV Push every 1 hour PRN      LABS:                        7.2    14.18 )-----------( 336      ( 05 Apr 2021 03:47 )             23.3     Hgb Trend: 7.2<--, 7.7<--, 7.8<--, 7.6<--, 7.9<--  04-05    148<H>  |  115<H>  |  53<H>  ----------------------------<  70  4.2   |  27  |  1.76<H>    Ca    7.9<L>      05 Apr 2021 03:47  Phos  3.7     04-05  Mg     2.8     04-05    TPro  5.9<L>  /  Alb  2.0<L>  /  TBili  0.2  /  DBili  x   /  AST  34  /  ALT  6   /  AlkPhos  125<H>  04-05    Creatinine Trend: 1.76<--, 1.74<--, 1.78<--, 1.78<--, 1.94<--, 1.90<--  PT/INR - ( 05 Apr 2021 03:47 )   PT: 14.3 sec;   INR: 1.27 ratio         PTT - ( 05 Apr 2021 03:47 )  PTT:33.6 sec    Arterial Blood Gas:  04-05 @ 03:47  7.41/43/67/27/92.9/2.6  ABG lactate: --  Arterial Blood Gas:  04-04 @ 08:44  7.43/38/82/25/96.3/0.9  ABG lactate: --  Arterial Blood Gas:  04-04 @ 06:26  7.42/40/59/26/89.5/1.7  ABG lactate: --        MICROBIOLOGY:     RADIOLOGY:  [ ] Reviewed and interpreted by me

## 2021-04-05 NOTE — CHART NOTE - NSCHARTNOTEFT_GEN_A_CORE
70 year old Portuguese/Jhonny-speaking male with a history of DM2 (uncontrolled A1c 10.3%, on basal/bolus insulin at home), HTN, CAD s/p CABG and stents, BPH s/p TURP and Parkinson's disease, now readmitted with COVID and started on high dose Decadron. Endocrine team consulted for management of DM2.   Patient is high risk with high level decision making, requiring ICU level of care    CAPILLARY BLOOD GLUCOSE      POCT Blood Glucose.: 83 mg/dL (05 Apr 2021 11:21)  POCT Blood Glucose.: 157 mg/dL (05 Apr 2021 06:16)  POCT Blood Glucose.: 44 mg/dL (05 Apr 2021 05:57)  POCT Blood Glucose.: 44 mg/dL (05 Apr 2021 05:56)  POCT Blood Glucose.: 174 mg/dL (04 Apr 2021 23:36)  POCT Blood Glucose.: 168 mg/dL (04 Apr 2021 16:52)      1. Type 2 diabetes mellitus with other specified complication, with long-term current use of insulin.   A1c 10.3% indicating poorly controlled DM2. Home regimen reported as Basaglar 40 units qHS and Admelog 20 units before breakfast only    While inpatient:  Currently in ICU, target glucose 140-180 mg/dl  patient with hypoglycemia overnight, with no interruptions in tube feeds as per ICU team  On continuous tube feeding, Glucerna 1.5 elin @ goal 40 ml/hr x 24h  Now off steroids  Would reduce NPH to 5 units q6h (Hold if tube feeds are held) If BG < 100, would hold NPH  Continue Admelog MODERATE dose correctional scale q6h  Check BG q6h  Keep hypoglycemia protocol active         Danna Kelly  Nurse Practitioner  Division of Endocrinology & Diabetes  Pager # [phone]      If after 6PM or before 9AM, or on weekends/holidays, please call endocrine answering service for assistance ([phone]).  For nonurgent matters email [email or web address] for assistance.

## 2021-04-05 NOTE — PROGRESS NOTE ADULT - ASSESSMENT
70 year old Slovak/Jhonny-speaking male with a history of CAD s/p CABG and 2 stents, Type 2 DM, HTN, BPH s/p TURP and Parkinson's disease, who was recently admitted 3/14 for weakness and COVID-19. Started on decadron and remdesivir and discharged 3/16. Returned 3/17 with worsening respiratory failure. Intubated 3/17. Course c/b ?parotitis, Pseudomonas PNA, and new onset atrial fibrillation now in sinus rhythm.     PLAN  Neuro  Mental status remains poor, not following commands  - Neuro consult- recommended 24 EEG, no MRI at this time, possible CTA head neck pending EEG review   - CTH negative for bleed 4/2. EEG negative for seizures 4/2, moderate diffuse dysfunction   - Ammonia level, B12, and folate WNL   - Correct hypernatremia as below with FW  - Fentanyl 25 PRN for dyssynchrony/agitation    - Stop methadone  - C/w sinemet for Parkinson's disease   - Will discuss with daughter regarding English understanding     Cardiovascular  - had recent h/o PAF, converted to NSR with amio  - maintaining NSR off amio, c/w lopressor 25 BID   - Continuous BP monitoring with A-line ( labile B/p 160-190 systolic, changes without intervention)    Pulmonary  - Intubated 3/20  - Change to CPAP trial, 10/50 30%, goal 28% by end of shift, previous vent settings AC 16/ 400/ 5/ 40%,   - trace but thick secretions    - Unable to wean from vent given poor mental status  - Monitor ABGs    GI  - C/w OG  TFs @ 45 hr   - PPI  - Bowel regimen, loose BM on overnight,     Renal  ZELALEM 2/2 contrast load 3/26? vs COVID/SEPSIS  - Non oliguric, no diuretics, creatinine peaked and is currently downtrending   - Avinash for monitoring urine output  - Goal even     Hypernatremia  - Free water deficit,  will increase free water to 400 q 4hrs   - Repeat BMP in PM to assess Na     ID  COVID  - S/p remdesivir, decadron     Neck swelling  - CT neck 3/26 c/f parotitis and or masseter myositis with cellulitis vs facial edema from proning   - Sputum from 3/26 growing Pseudomonas fluorescens   - C/w santos (3/30 - ) for planned 10d course per ID, to finish 4/8   - S/p vanco 10d course  - F/u ID recs    Endo  - C/w NPH 13u, Mod SSI  - OV BGL 44 mg/dl, given 25gm D50 --> 157 POCT  - Endo following  - TSH 4.25, T4 WNL, subclinical, can repeat TFTs in 4-6 weeks     Heme  - No acute issues    Ppx  - DVT: SQH    Lines/Tubes  - LRA Natali 3/20  - Avinash 3/20     GOC  - Full code  - Will need to discuss trach with daughter

## 2021-04-05 NOTE — PROGRESS NOTE ADULT - ASSESSMENT
70y Male with history of HTN on HCTZ presents with weakness found to have COVID-19. Nephrology consulted for hyponatremia.    1) ZELALEM: Non-oliguric ZELALEM likely due to DEMIAN given rise in Scr approximately 48 hours after CT with IV contrast on 3/26. Urine sodium low which is often seen in DEMIAN. Scr stable now and pt has good UO. Continue with supportive care. Avoid nephrotoxins.     2) HTN: BP controlled. Rate control as per ICU. Monitor BP.    3) Hypernatremia: Due to intravascular depletion and third spacing from hypoalbuminemia. Improving with free H2O. Monitor serum Na.    4) COVID-19: As per primary team.

## 2021-04-05 NOTE — CHART NOTE - NSCHARTNOTEFT_GEN_A_CORE
Nutrition Follow-Up Note   Reason for follow-up: Critical care length of stay follow- up. Medical record reviewed. Spoke to RN at bedside.    Clinical Course history: Per chart review patient with medical history of CAD s/p CABG and 2 stents, Type 2 DM, HTN, BPH s/p TURP and Parkinson's disease, who was recently admitted on 3/14 for weakness, covid, hypoxia, started on remdesivir/decadron. S/p RRT 3/20 and patient intubated. Sedated on Precedex.     Diet Order: Diet, NPO with Tube Feed:   Tube Feeding Modality: Orogastric  Glucerna 1.5 Ignacio (GLUCERNA1.5RTH)  Total Volume for 24 Hours (mL): 1080  Continuous  Starting Tube Feed Rate {mL per Hour}: 10  Increase Tube Feed Rate by (mL): 10     Every 4 hours  Until Goal Tube Feed Rate (mL per Hour): 45  Tube Feed Duration (in Hours): 24  Tube Feed Start Time: 15:45  No Carb Prosource (1pkg = 15gms Protein)     Qty per Day:  2 (03-30-21 @ 13:24)    CURRENT EN ORDER PROVIDES:  Total Volume: 1080mL/day  Energy: 1620Kcal/day  Protein: 119g protein/day  Free Water: 820mL/day    Nutrition Related Information: - Tube feeding intake: 3/30-31: 1000mL, 3/31-4/1: 1035mL, 4/1-2: 485mL, 4/2-3: 1015mL, 4/3-4: 585mL, 4/4-5: 540mL. *Pt met 44-54% nutrition needs 3 days, otherwise meeting >75% nutrition needs   - Feeds running at goal rate of 45mL/hr during visit.   - No tube feeding intolerances reported by RN.   - Of note patient with hypoglycemia (BG 44) - treated with D50 (BG increased to 157). RN reports feeds have been running continuously and were not held to her knowledge. Ordered for   - Noted hypernatremia (Na 148) - ordered for free water 400mL q4hrs.     GI: - Rectal tube output: 100mL 4/04.   - Bowel regimen: Senna, Miralax.   - No vomiting or abdominal distention noted.    Anthropometric Measurements:   Height (cm): 170.2 (03-17-21 @ 10:38)  Weight (kg): no new weight to assess, 82.5 (3/29), 79.8 (3/26), 79.7 (3/17), 74.8 (3/16)  *Noted weight changes, possibly due to scale discrepancies.  BMI (kg/m2): 27.5 (3/17)  IBW: 67.1kg +/-10%.    Medications: MEDICATIONS  (STANDING):  aspirin  chewable 81 milliGRAM(s) Oral daily  carbidopa/levodopa  10/100 2 Tablet(s) Oral <User Schedule>  dexMEDEtomidine Infusion 0.05 MICROgram(s)/kG/Hr (1 mL/Hr) IV Continuous <Continuous>  heparin   Injectable 5000 Unit(s) SubCutaneous every 8 hours  insulin lispro (ADMELOG) corrective regimen sliding scale   SubCutaneous every 6 hours  insulin NPH human recombinant 13 Unit(s) SubCutaneous every 6 hours  meropenem  IVPB      meropenem  IVPB 1000 milliGRAM(s) IV Intermittent every 12 hours  metoprolol tartrate 25 milliGRAM(s) Enteral Tube two times a day  pantoprazole  Injectable 40 milliGRAM(s) IV Push daily  petrolatum Ophthalmic Ointment 1 Application(s) Both EYES two times a day  polyethylene glycol 3350 17 Gram(s) Oral daily  prednisoLONE acetate 1% Suspension 1 Drop(s) Left EYE four times a day  senna Syrup 10 milliLiter(s) Oral daily  simvastatin 20 milliGRAM(s) Oral at bedtime    Labs: 04-05 @ 03:47: Sodium 148<H>, Potassium 4.2, Calcium 7.9<L>, Magnesium 2.8<H>, Phosphorus 3.7, BUN 53<H>, Creatinine 1.76<H>, Glucose 70, Alk Phos 125<H>, ALT/SGPT 6, AST/SGOT 34, Albumin 2.0<L>, Prealbumin --, Total Bilirubin 0.2, Hemoglobin 7.2<L>, Hematocrit 23.3<L>, Ferritin --, C-Reactive Protein --, Creatine Kinase <<27>    Triglycerides, Serum: 152 mg/dL (04-01-21 @ 06:33)  Triglycerides, Serum: 229 mg/dL (03-26-21 @ 02:06)    POCT Blood Glucose.: 83 mg/dL (04-05-21 @ 11:21)  POCT Blood Glucose.: 157 mg/dL (04-05-21 @ 06:16)  POCT Blood Glucose.: 44 mg/dL (04-05-21 @ 05:57)  POCT Blood Glucose.: 44 mg/dL (04-05-21 @ 05:56)  POCT Blood Glucose.: 174 mg/dL (04-04-21 @ 23:36)  POCT Blood Glucose.: 168 mg/dL (04-04-21 @ 16:52)    Skin: No pressure ulcers/DTI noted in flowsheets.  Edema: 1+ left/right arm    Estimated Nutrition Needs: calculated using admit weight 79.7kg for energy and protein needs.  Estimated Energy Needs (15-20Kcal/kg): 1195-1594Kcal/day  Estimated Protein Needs (1.5g/kg): 120g protein/day    Previous Nutrition Diagnosis:  Altered nutrition-related labs   Diagnosis is ongoing    Interventions/Recommendations:   1. Continue Glucerna 1.5 @ 45mL/hr x24 hours + No Carb Prosource 2x daily [provides 1080mL total volume, 1620KCal, 119g protein and 820mL free water daily] (20Kcal/kg and ~1.5g/kg).  2. Additional free water per physician discretion.   3. Monitor glucose levels and electrolytes.   4. Bowel regimen as needed. Hold for diarrhea.   5. Obtain daily weight.     Monitoring and Evaluation:   Monitor nutrition provision, tolerance to diet, weights, labs, skin integrity and GI function.   RD remains available, please consult as needed. Will follow up per protocol.  Heather Vázquez, MS, RD, CDN, [facility] Pager #[phone] Nutrition Follow-Up Note   Reason for follow-up: Critical care length of stay follow- up. Medical record reviewed. Spoke to RN at bedside.    Clinical Course history: Per chart review patient with medical history of CAD s/p CABG and 2 stents, Type 2 DM, HTN, BPH s/p TURP and Parkinson's disease, who was recently admitted on 3/14 for weakness, covid, hypoxia, started on remdesivir/decadron. S/p RRT 3/20 and patient intubated. Sedated on Precedex.     Diet Order: Diet, NPO with Tube Feed:   Tube Feeding Modality: Orogastric  Glucerna 1.5 Ignacio (GLUCERNA1.5RTH)  Total Volume for 24 Hours (mL): 1080  Continuous  Starting Tube Feed Rate {mL per Hour}: 10  Increase Tube Feed Rate by (mL): 10     Every 4 hours  Until Goal Tube Feed Rate (mL per Hour): 45  Tube Feed Duration (in Hours): 24  Tube Feed Start Time: 15:45  No Carb Prosource (1pkg = 15gms Protein)     Qty per Day:  2 (03-30-21 @ 13:24)    CURRENT EN ORDER PROVIDES:  Total Volume: 1080mL/day  Energy: 1620Kcal/day  Protein: 119g protein/day  Free Water: 820mL/day    Nutrition Related Information: - Tube feeding intake: 3/30-31: 1000mL, 3/31-4/1: 1035mL, 4/1-2: 485mL, 4/2-3: 1015mL, 4/3-4: 585mL, 4/4-5: 540mL. *Pt met 44-54% nutrition needs 3 days, otherwise meeting >75% nutrition needs   - Feeds running at goal rate of 45mL/hr during visit.   - No tube feeding intolerances reported by RN.   - Of note patient with hypoglycemia (BG 44) - treated with D50 (BG increased to 157). RN reports feeds have been running continuously and were not held to her knowledge.    - Noted hypernatremia (Na 148) - ordered for free water 400mL q4hrs.     GI: - Rectal tube output: 100mL 4/04.   - Bowel regimen: Senna, Miralax.   - No vomiting or abdominal distention noted.    Anthropometric Measurements:   Height (cm): 170.2 (03-17-21 @ 10:38)  Weight (kg): no new weight to assess, 82.5 (3/29), 79.8 (3/26), 79.7 (3/17), 74.8 (3/16)  *Noted weight changes, possibly due to scale discrepancies.  BMI (kg/m2): 27.5 (3/17)  IBW: 67.1kg +/-10%.    Medications: MEDICATIONS  (STANDING):  aspirin  chewable 81 milliGRAM(s) Oral daily  carbidopa/levodopa  10/100 2 Tablet(s) Oral <User Schedule>  dexMEDEtomidine Infusion 0.05 MICROgram(s)/kG/Hr (1 mL/Hr) IV Continuous <Continuous>  heparin   Injectable 5000 Unit(s) SubCutaneous every 8 hours  insulin lispro (ADMELOG) corrective regimen sliding scale   SubCutaneous every 6 hours  insulin NPH human recombinant 13 Unit(s) SubCutaneous every 6 hours  meropenem  IVPB      meropenem  IVPB 1000 milliGRAM(s) IV Intermittent every 12 hours  metoprolol tartrate 25 milliGRAM(s) Enteral Tube two times a day  pantoprazole  Injectable 40 milliGRAM(s) IV Push daily  petrolatum Ophthalmic Ointment 1 Application(s) Both EYES two times a day  polyethylene glycol 3350 17 Gram(s) Oral daily  prednisoLONE acetate 1% Suspension 1 Drop(s) Left EYE four times a day  senna Syrup 10 milliLiter(s) Oral daily  simvastatin 20 milliGRAM(s) Oral at bedtime    Labs: 04-05 @ 03:47: Sodium 148<H>, Potassium 4.2, Calcium 7.9<L>, Magnesium 2.8<H>, Phosphorus 3.7, BUN 53<H>, Creatinine 1.76<H>, Glucose 70, Alk Phos 125<H>, ALT/SGPT 6, AST/SGOT 34, Albumin 2.0<L>, Prealbumin --, Total Bilirubin 0.2, Hemoglobin 7.2<L>, Hematocrit 23.3<L>, Ferritin --, C-Reactive Protein --, Creatine Kinase <<27>    Triglycerides, Serum: 152 mg/dL (04-01-21 @ 06:33)  Triglycerides, Serum: 229 mg/dL (03-26-21 @ 02:06)    POCT Blood Glucose.: 83 mg/dL (04-05-21 @ 11:21)  POCT Blood Glucose.: 157 mg/dL (04-05-21 @ 06:16)  POCT Blood Glucose.: 44 mg/dL (04-05-21 @ 05:57)  POCT Blood Glucose.: 44 mg/dL (04-05-21 @ 05:56)  POCT Blood Glucose.: 174 mg/dL (04-04-21 @ 23:36)  POCT Blood Glucose.: 168 mg/dL (04-04-21 @ 16:52)    Skin: No pressure ulcers/DTI noted in flowsheets.  Edema: 1+ left/right arm    Estimated Nutrition Needs: calculated using admit weight 79.7kg for energy and protein needs.  Estimated Energy Needs (15-20Kcal/kg): 1195-1594Kcal/day  Estimated Protein Needs (1.5g/kg): 120g protein/day    Previous Nutrition Diagnosis:  Altered nutrition-related labs   Diagnosis is ongoing    Interventions/Recommendations:   1. Continue Glucerna 1.5 @ 45mL/hr x24 hours + No Carb Prosource 2x daily [provides 1080mL total volume, 1620KCal, 119g protein and 820mL free water daily] (20Kcal/kg and ~1.5g/kg).  2. Additional free water per physician discretion.   3. Monitor glucose levels and electrolytes.   4. Bowel regimen as needed. Hold for diarrhea.   5. Obtain daily weight.     Monitoring and Evaluation:   Monitor nutrition provision, tolerance to diet, weights, labs, skin integrity and GI function.   RD remains available, please consult as needed. Will follow up per protocol.  Heather Vázquez, MS, RD, CDN, [facility] Pager #[phone]

## 2021-04-05 NOTE — PROGRESS NOTE ADULT - ATTENDING COMMENTS
Patient seen and examined at bedside during rounds with team. Patients chart, labs and radiography were reviewed. Patient is profoundly somnlent on no sedation, non hypoxic on fio2 of only 30%. Patient underwent an eeg with diffuse swelling, negative head ct, mri ordered but not clincally viable. Stat sed rate and crp are markedly elevated, feeling is current encepathlopathic dx state is related to covid 19. Patient will be started on IVIG for 5 days in hopes for neurologic recovery. Otherwise patient with improivement in his neuro status will likley be extubated with no difficulty    31 mins of critical care time

## 2021-04-06 NOTE — PROGRESS NOTE ADULT - SUBJECTIVE AND OBJECTIVE BOX
Chief Complaint: DM 2, COVID+, hyperglycemia    History: Patient seen at bedside in ICU - Surge B  Remains intubated/sedated  On Glucerna 1.5 ignacio running at goal rate of 45 ml/hr x   Off steroids    MEDICATIONS  (STANDING):  acetylcysteine 10%  Inhalation 5 milliLiter(s) Inhalation three times a day  aMIOdarone Infusion 1 mG/Min (33.3 mL/Hr) IV Continuous <Continuous>  aMIOdarone Infusion 0.5 mG/Min (16.7 mL/Hr) IV Continuous <Continuous>  aspirin  chewable 81 milliGRAM(s) Oral daily  carbidopa/levodopa  10/100 2 Tablet(s) Oral <User Schedule>  chlorhexidine 4% Liquid 1 Application(s) Topical <User Schedule>  dexMEDEtomidine Infusion 0.2 MICROgram(s)/kG/Hr (3.99 mL/Hr) IV Continuous <Continuous>  dextrose 40% Gel 15 Gram(s) Oral once  dextrose 5%. 1000 milliLiter(s) (50 mL/Hr) IV Continuous <Continuous>  dextrose 5%. 1000 milliLiter(s) (100 mL/Hr) IV Continuous <Continuous>  dextrose 50% Injectable 25 Gram(s) IV Push once  dextrose 50% Injectable 12.5 Gram(s) IV Push once  dextrose 50% Injectable 25 Gram(s) IV Push once  fentaNYL   Infusion..... 0.5 MICROgram(s)/kG/Hr (0.8 mL/Hr) IV Continuous <Continuous>  glucagon  Injectable 1 milliGRAM(s) IntraMuscular once  HYDROmorphone  Injectable 1 milliGRAM(s) IV Push once  insulin lispro (ADMELOG) corrective regimen sliding scale   SubCutaneous every 6 hours  insulin NPH human recombinant 10 Unit(s) SubCutaneous every 6 hours  ketorolac 0.5% Ophthalmic Solution 1 Drop(s) Left EYE two times a day  norepinephrine Infusion 0.1 MICROgram(s)/kG/Min (14.9 mL/Hr) IV Continuous <Continuous>  pantoprazole  Injectable 40 milliGRAM(s) IV Push every 12 hours  piperacillin/tazobactam IVPB.. 3.375 Gram(s) IV Intermittent every 8 hours  polyethylene glycol 3350 17 Gram(s) Oral daily  prednisoLONE acetate 1% Suspension 1 Drop(s) Left EYE four times a day  propofol Infusion 30 MICROgram(s)/kG/Min (14.3 mL/Hr) IV Continuous <Continuous>  senna Syrup 10 milliLiter(s) Oral daily  simvastatin 20 milliGRAM(s) Oral at bedtime    MEDICATIONS  (PRN):  acetaminophen    Suspension .. 650 milliGRAM(s) Enteral Tube every 6 hours PRN Temp greater or equal to 38C (100.4F)  sodium chloride 0.9% lock flush 10 milliLiter(s) IV Push every 1 hour PRN Pre/post blood products, medications, blood draw, and to maintain line patency    No Known Allergies    Review of Systems:  UNABLE TO OBTAIN    Vital Signs Last 24 Hrs  T(C): 37.7 (06 Apr 2021 15:48), Max: 37.7 (06 Apr 2021 15:48)  T(F): 99.9 (06 Apr 2021 15:48), Max: 99.9 (06 Apr 2021 15:48)  HR: 66 (06 Apr 2021 17:00) (55 - 69)  BP: --  BP(mean): --  RR: 31 (06 Apr 2021 17:00) (20 - 32)  SpO2: 100% (06 Apr 2021 17:00) (98% - 100%)  GENERAL: critically ill, intubated/sedated   HEENT:  Atraumatic, Normocephalic  RESPIRATORY: on vent  GI: non distended  NEURO: unable to assess    CAPILLARY BLOOD GLUCOSE      POCT Blood Glucose.: 200 mg/dL (06 Apr 2021 17:48)  POCT Blood Glucose.: 188 mg/dL (06 Apr 2021 11:15)  POCT Blood Glucose.: 180 mg/dL (06 Apr 2021 05:55)  POCT Blood Glucose.: 267 mg/dL (06 Apr 2021 00:36)    POCT Blood Glucose.: 96 mg/dL (31 Mar 2021 16:08)  POCT Blood Glucose.: 136 mg/dL (31 Mar 2021 11:19)  POCT Blood Glucose.: 118 mg/dL (31 Mar 2021 05:03)  POCT Blood Glucose.: 131 mg/dL (30 Mar 2021 23:24)  POCT Blood Glucose.: 148 mg/dL (30 Mar 2021 16:52)      POCT Blood Glucose.: 204 mg/dL (29 Mar 2021 11:26)  POCT Blood Glucose.: 135 mg/dL (29 Mar 2021 04:57)  POCT Blood Glucose.: 138 mg/dL (28 Mar 2021 23:44)  POCT Blood Glucose.: 139 mg/dL (28 Mar 2021 22:33)  POCT Blood Glucose.: 183 mg/dL (28 Mar 2021 17:16)      04-06    143  |  110<H>  |  50<H>  ----------------------------<  178<H>  4.1   |  26  |  1.57<H>    Ca    7.6<L>      06 Apr 2021 04:47  Phos  3.3     04-06  Mg     2.6     04-06    TPro  6.3  /  Alb  1.7<L>  /  TBili  0.2  /  DBili  x   /  AST  30  /  ALT  10  /  AlkPhos  116  04-06          Thyroid Function Tests:  03-15 @ 06:30 TSH 0.96 FreeT4 -- T3 -- Anti TPO -- Anti Thyroglobulin Ab -- TSI --      A1C with Estimated Average Glucose Result: 10.3 % (03-14-21 @ 08:57)  A1C with Estimated Average Glucose: 9.7 % (10-01-20 @ 06:30)    Diet, NPO with Tube Feed:   Tube Feeding Modality: Orogastric  Glucerna 1.5 Ignacio (GLUCERNA1.5RTH)  Total Volume for 24 Hours (mL): 1080  Continuous  Starting Tube Feed Rate mL per Hour: 10  Increase Tube Feed Rate by (mL): 10     Every 4 hours  Until Goal Tube Feed Rate (mL per Hour): 45  Tube Feed Duration (in Hours): 24  Tube Feed Start Time: 15:45  No Carb Prosource (1pkg = 15gms Protein)     Qty per Day:  2 (03-30-21 @ 13:24) [Active]

## 2021-04-06 NOTE — PROGRESS NOTE ADULT - ASSESSMENT
70y Male with history of HTN on HCTZ presents with weakness found to have COVID-19. Nephrology consulted for hyponatremia.    1) ZELALEM: Non-oliguric ZELALEM likely due to DEMIAN given rise in Scr approximately 48 hours after CT with IV contrast on 3/26. Urine sodium low which is often seen in DEMIAN. Scr now improving with good UO. Continue with supportive care. Avoid nephrotoxins.     2) HTN: BP uncontrolled. Can start CCB as needed. Rate control as per ICU. Monitor BP.    3) Hypernatremia: Due to intravascular depletion and third spacing from hypoalbuminemia now resolved. Free water discontinued this morning. Agree with IV lasix to keep net negative. Monitor serum Na.    4) COVID-19: As per primary team.

## 2021-04-06 NOTE — PROGRESS NOTE ADULT - SUBJECTIVE AND OBJECTIVE BOX
Interval History - Patient seen and examined this am.             Vital Signs Last 24 Hrs  T(C): 37.1 (06 Apr 2021 08:30), Max: 38.2 (05 Apr 2021 16:00)  T(F): 98.8 (06 Apr 2021 08:30), Max: 100.7 (05 Apr 2021 16:00)  HR: 59 (06 Apr 2021 11:00) (55 - 107)  BP: --  BP(mean): --  RR: 27 (06 Apr 2021 11:00) (20 - 35)  SpO2: 100% (06 Apr 2021 11:00) (95% - 100%)    PHYSICAL EXAM:    General: Well developed; well nourished; in no acute distress  Head: Normocephalic; atraumatic  ENMT: No nasal discharge; airway clear  Neck: Supple; non tender; no masses  Breasts: Soft, nontender; no masses  Respiratory: No wheezes, rales or rhonchi  Cardiovascular: Regular rate and rhythm. S1 and S2 Normal; No murmurs, gallops or rubs  Gastrointestinal: Soft non-tender non-distended; Normal bowel sounds; No hepatosplenomegaly  Genitourinary: No costovertebral angle tenderness  Rectal: No masses or lesions  Extremities: Normal range of motion, No clubbing, cyanosis or edema  Vascular: Peripheral pulses palpable 2+ bilaterally  Skin: Warm and dry. No acute rash  Lymph Nodes: No acute cervical adenopathy  Psychiatric: Cooperative and appropriate      Neurological Exam:    MS:  intubated off sedation. Eyes open spontanously. Does not follow commands. no Spontaneous movement noted.   CN: Pupils symmetric b/l. minimally reactive to light b/l. esophoria no BTT  Motor No spontaneous movement noted.   Sensation: minimal w/d to pain to noxious stimuli.          Medications  acetaminophen    Suspension .. 650 milliGRAM(s) Enteral Tube every 6 hours PRN  aspirin  chewable 81 milliGRAM(s) Oral daily  carbidopa/levodopa  10/100 2 Tablet(s) Oral <User Schedule>  chlorhexidine 4% Liquid 1 Application(s) Topical <User Schedule>  dexMEDEtomidine Infusion 0.05 MICROgram(s)/kG/Hr IV Continuous <Continuous>  dextrose 40% Gel 15 Gram(s) Oral once  dextrose 50% Injectable 25 Gram(s) IV Push once  dextrose 50% Injectable 12.5 Gram(s) IV Push once  dextrose 50% Injectable 25 Gram(s) IV Push once  fentaNYL    Injectable 50 MICROGram(s) IV Push every 6 hours PRN  glucagon  Injectable 1 milliGRAM(s) IntraMuscular once  heparin   Injectable 5000 Unit(s) SubCutaneous every 8 hours  immune   globulin 10% (GAMMAGARD) IVPB 25 Gram(s) IV Intermittent daily  insulin lispro (ADMELOG) corrective regimen sliding scale   SubCutaneous every 6 hours  insulin NPH human recombinant 5 Unit(s) SubCutaneous every 6 hours  ketorolac 0.5% Ophthalmic Solution 1 Drop(s) Left EYE two times a day  meropenem  IVPB      meropenem  IVPB 1000 milliGRAM(s) IV Intermittent every 12 hours  metoprolol tartrate 25 milliGRAM(s) Enteral Tube two times a day  pantoprazole  Injectable 40 milliGRAM(s) IV Push daily  petrolatum Ophthalmic Ointment 1 Application(s) Both EYES two times a day  polyethylene glycol 3350 17 Gram(s) Oral daily  prednisoLONE acetate 1% Suspension 1 Drop(s) Left EYE four times a day  senna Syrup 10 milliLiter(s) Oral daily  simvastatin 20 milliGRAM(s) Oral at bedtime  sodium chloride 0.9% lock flush 10 milliLiter(s) IV Push every 1 hour PRN      Lab      Radiology

## 2021-04-06 NOTE — PROGRESS NOTE ADULT - SUBJECTIVE AND OBJECTIVE BOX
HPI: 70 year old Swedish/Jhonny-speaking male with a history of CAD s/p CABG and 2 stents, Type 2 DM, HTN, BPH s/p TURP and Parkinson's disease, who was recently admitted 3/14 for weakness and COVID-19. Started on decadron and remdesivir and discharged 3/16. Returned 3/17 with worsening respiratory failure. Intubated 3/17. Course c/b ?parotitis, Pseudomonas PNA, and new onset atrial fibrillation now in sinus rhythm s/p amio conversion.     INTERVAL OVERNIGHT EVENTS:  -    Allergies:  No Known Allergies    SUBJECTIVE:    Review of Systems: Unable to obtain as patient is intubated & sedated    OBJECTIVE:  Vitals:  ICU Vital Signs Last 24 Hrs  T(C): 37.1 (06 Apr 2021 08:30), Max: 38.2 (05 Apr 2021 16:00)  T(F): 98.8 (06 Apr 2021 08:30), Max: 100.7 (05 Apr 2021 16:00)  HR: 55 (06 Apr 2021 10:27) (55 - 107)  BP: --  BP(mean): --  ABP: 139/49 (06 Apr 2021 10:23) (121/48 - 170/70)  ABP(mean): 78 (06 Apr 2021 10:23) (78 - 93)  RR: 26 (06 Apr 2021 10:23) (20 - 35)  SpO2: 100% (06 Apr 2021 10:27) (95% - 100%)    Physical Exam:   Constitutional: NAD  HEENT: NC  Respiratory: Intubated  Cardiovascular: Regular rate  Gastrointestinal: Soft, ND  Extremities: No LE edema   Neurological: Intubated, sedated     Vent Settings:  Mode: AC/ CMV (Assist Control/ Continuous Mandatory Ventilation)  RR (machine): 14  TV (machine): 380  FiO2: 50  PEEP: 5  ITime: 0.97  MAP: 13  PIP: 35    ABG - ( 06 Apr 2021 04:47 )  pH, Arterial: 7.46  pH, Blood: x     /  pCO2: 37    /  pO2: 58    / HCO3: 27    / Base Excess: 2.8   /  SaO2: 91.4              I&O:  I&O's Detail    05 Apr 2021 07:01  -  06 Apr 2021 07:00  --------------------------------------------------------  IN:    Dexmedetomidine: 308 mL    Free Water: 2400 mL    Glucerna 1.5: 1080 mL    IV PiggyBack: 50 mL  Total IN: 3838 mL    OUT:    Rectal Tube (mL): 450 mL    Ureteral Catheter (mL): 2575 mL  Total OUT: 3025 mL    Total NET: 813 mL      06 Apr 2021 07:01  -  06 Apr 2021 10:35  --------------------------------------------------------  IN:    Dexmedetomidine: 47.7 mL    Free Water: 30 mL    Glucerna 1.5: 180 mL  Total IN: 257.7 mL    OUT:    Ureteral Catheter (mL): 625 mL  Total OUT: 625 mL    Total NET: -367.3 mL        Labs:                        7.3    10.14 )-----------( 370      ( 06 Apr 2021 04:47 )             23.3     04-06    143  |  110<H>  |  50<H>  ----------------------------<  178<H>  4.1   |  26  |  1.57<H>    Ca    7.6<L>      06 Apr 2021 04:47  Phos  3.3     04-06  Mg     2.6     04-06    TPro  6.3  /  Alb  1.7<L>  /  TBili  0.2  /  DBili  x   /  AST  30  /  ALT  10  /  AlkPhos  116  04-06    LIVER FUNCTIONS - ( 06 Apr 2021 04:47 )  Alb: 1.7 g/dL / Pro: 6.3 g/dL / ALK PHOS: 116 U/L / ALT: 10 U/L / AST: 30 U/L / GGT: x           PT/INR - ( 06 Apr 2021 04:47 )   PT: 13.0 sec;   INR: 1.14 ratio         PTT - ( 06 Apr 2021 04:47 )  PTT:34.1 sec        POCT Blood Glucose.: 180 mg/dL *H* (04-06-21 @ 05:55)  POCT Blood Glucose.: 267 mg/dL *H* (04-06-21 @ 00:36)  POCT Blood Glucose.: 145 mg/dL *H* (04-05-21 @ 17:28)  POCT Blood Glucose.: 83 mg/dL (04-05-21 @ 11:21)    Micro:    Medications:  MEDICATIONS  (STANDING):  aspirin  chewable 81 milliGRAM(s) Oral daily  carbidopa/levodopa  10/100 2 Tablet(s) Oral <User Schedule>  chlorhexidine 4% Liquid 1 Application(s) Topical <User Schedule>  dexMEDEtomidine Infusion 0.05 MICROgram(s)/kG/Hr (1 mL/Hr) IV Continuous <Continuous>  dextrose 40% Gel 15 Gram(s) Oral once  dextrose 50% Injectable 25 Gram(s) IV Push once  dextrose 50% Injectable 12.5 Gram(s) IV Push once  dextrose 50% Injectable 25 Gram(s) IV Push once  glucagon  Injectable 1 milliGRAM(s) IntraMuscular once  heparin   Injectable 5000 Unit(s) SubCutaneous every 8 hours  immune   globulin 10% (GAMMAGARD) IVPB 25 Gram(s) IV Intermittent daily  insulin lispro (ADMELOG) corrective regimen sliding scale   SubCutaneous every 6 hours  insulin NPH human recombinant 5 Unit(s) SubCutaneous every 6 hours  ketorolac 0.5% Ophthalmic Solution 1 Drop(s) Left EYE two times a day  meropenem  IVPB      meropenem  IVPB 1000 milliGRAM(s) IV Intermittent every 12 hours  metoprolol tartrate 25 milliGRAM(s) Enteral Tube two times a day  pantoprazole  Injectable 40 milliGRAM(s) IV Push daily  petrolatum Ophthalmic Ointment 1 Application(s) Both EYES two times a day  polyethylene glycol 3350 17 Gram(s) Oral daily  prednisoLONE acetate 1% Suspension 1 Drop(s) Left EYE four times a day  senna Syrup 10 milliLiter(s) Oral daily  simvastatin 20 milliGRAM(s) Oral at bedtime    MEDICATIONS  (PRN):  acetaminophen    Suspension .. 650 milliGRAM(s) Enteral Tube every 6 hours PRN Temp greater or equal to 38C (100.4F), Mild Pain (1 - 3)  fentaNYL    Injectable 50 MICROGram(s) IV Push every 6 hours PRN Agitation  sodium chloride 0.9% lock flush 10 milliLiter(s) IV Push every 1 hour PRN Pre/post blood products, medications, blood draw, and to maintain line patency   HPI: 70 year old Kinyarwanda/Jhonny-speaking male with a history of CAD s/p CABG and 2 stents, Type 2 DM, HTN, BPH s/p TURP and Parkinson's disease, who was recently admitted 3/14 for weakness and COVID-19. Started on decadron and remdesivir and discharged 3/16. Returned 3/17 with worsening respiratory failure. Intubated 3/17. Course c/b ?parotitis, Pseudomonas PNA, and new onset atrial fibrillation now in sinus rhythm s/p amio conversion.     INTERVAL EVENTS:  -Placed back on full support. ETT was pushed in another 2cm. Given lasix with appropriate UO. Given IVIG.     Allergies:  No Known Allergies    SUBJECTIVE:    Review of Systems: Unable to obtain as patient is intubated & sedated    OBJECTIVE:  Vitals:  ICU Vital Signs Last 24 Hrs  T(C): 37.1 (06 Apr 2021 08:30), Max: 38.2 (05 Apr 2021 16:00)  T(F): 98.8 (06 Apr 2021 08:30), Max: 100.7 (05 Apr 2021 16:00)  HR: 55 (06 Apr 2021 10:27) (55 - 107)  BP: --  BP(mean): --  ABP: 139/49 (06 Apr 2021 10:23) (121/48 - 170/70)  ABP(mean): 78 (06 Apr 2021 10:23) (78 - 93)  RR: 26 (06 Apr 2021 10:23) (20 - 35)  SpO2: 100% (06 Apr 2021 10:27) (95% - 100%)    Physical Exam:   Constitutional: NAD  HEENT: NC  Respiratory: Intubated  Cardiovascular: Regular rate  Gastrointestinal: Soft, ND  Extremities: No LE edema   Neurological: Intubated, sedated     Vent Settings:  Mode: AC/ CMV (Assist Control/ Continuous Mandatory Ventilation)  RR (machine): 14  TV (machine): 380  FiO2: 50  PEEP: 5  ITime: 0.97  MAP: 13  PIP: 35    ABG - ( 06 Apr 2021 04:47 )  pH, Arterial: 7.46  pH, Blood: x     /  pCO2: 37    /  pO2: 58    / HCO3: 27    / Base Excess: 2.8   /  SaO2: 91.4              I&O:  I&O's Detail    05 Apr 2021 07:01  -  06 Apr 2021 07:00  --------------------------------------------------------  IN:    Dexmedetomidine: 308 mL    Free Water: 2400 mL    Glucerna 1.5: 1080 mL    IV PiggyBack: 50 mL  Total IN: 3838 mL    OUT:    Rectal Tube (mL): 450 mL    Ureteral Catheter (mL): 2575 mL  Total OUT: 3025 mL    Total NET: 813 mL      06 Apr 2021 07:01  -  06 Apr 2021 10:35  --------------------------------------------------------  IN:    Dexmedetomidine: 47.7 mL    Free Water: 30 mL    Glucerna 1.5: 180 mL  Total IN: 257.7 mL    OUT:    Ureteral Catheter (mL): 625 mL  Total OUT: 625 mL    Total NET: -367.3 mL        Labs:                        7.3    10.14 )-----------( 370      ( 06 Apr 2021 04:47 )             23.3     04-06    143  |  110<H>  |  50<H>  ----------------------------<  178<H>  4.1   |  26  |  1.57<H>    Ca    7.6<L>      06 Apr 2021 04:47  Phos  3.3     04-06  Mg     2.6     04-06    TPro  6.3  /  Alb  1.7<L>  /  TBili  0.2  /  DBili  x   /  AST  30  /  ALT  10  /  AlkPhos  116  04-06    LIVER FUNCTIONS - ( 06 Apr 2021 04:47 )  Alb: 1.7 g/dL / Pro: 6.3 g/dL / ALK PHOS: 116 U/L / ALT: 10 U/L / AST: 30 U/L / GGT: x           PT/INR - ( 06 Apr 2021 04:47 )   PT: 13.0 sec;   INR: 1.14 ratio         PTT - ( 06 Apr 2021 04:47 )  PTT:34.1 sec        POCT Blood Glucose.: 180 mg/dL *H* (04-06-21 @ 05:55)  POCT Blood Glucose.: 267 mg/dL *H* (04-06-21 @ 00:36)  POCT Blood Glucose.: 145 mg/dL *H* (04-05-21 @ 17:28)  POCT Blood Glucose.: 83 mg/dL (04-05-21 @ 11:21)    Micro:    Medications:  MEDICATIONS  (STANDING):  aspirin  chewable 81 milliGRAM(s) Oral daily  carbidopa/levodopa  10/100 2 Tablet(s) Oral <User Schedule>  chlorhexidine 4% Liquid 1 Application(s) Topical <User Schedule>  dexMEDEtomidine Infusion 0.05 MICROgram(s)/kG/Hr (1 mL/Hr) IV Continuous <Continuous>  dextrose 40% Gel 15 Gram(s) Oral once  dextrose 50% Injectable 25 Gram(s) IV Push once  dextrose 50% Injectable 12.5 Gram(s) IV Push once  dextrose 50% Injectable 25 Gram(s) IV Push once  glucagon  Injectable 1 milliGRAM(s) IntraMuscular once  heparin   Injectable 5000 Unit(s) SubCutaneous every 8 hours  immune   globulin 10% (GAMMAGARD) IVPB 25 Gram(s) IV Intermittent daily  insulin lispro (ADMELOG) corrective regimen sliding scale   SubCutaneous every 6 hours  insulin NPH human recombinant 5 Unit(s) SubCutaneous every 6 hours  ketorolac 0.5% Ophthalmic Solution 1 Drop(s) Left EYE two times a day  meropenem  IVPB      meropenem  IVPB 1000 milliGRAM(s) IV Intermittent every 12 hours  metoprolol tartrate 25 milliGRAM(s) Enteral Tube two times a day  pantoprazole  Injectable 40 milliGRAM(s) IV Push daily  petrolatum Ophthalmic Ointment 1 Application(s) Both EYES two times a day  polyethylene glycol 3350 17 Gram(s) Oral daily  prednisoLONE acetate 1% Suspension 1 Drop(s) Left EYE four times a day  senna Syrup 10 milliLiter(s) Oral daily  simvastatin 20 milliGRAM(s) Oral at bedtime    MEDICATIONS  (PRN):  acetaminophen    Suspension .. 650 milliGRAM(s) Enteral Tube every 6 hours PRN Temp greater or equal to 38C (100.4F), Mild Pain (1 - 3)  fentaNYL    Injectable 50 MICROGram(s) IV Push every 6 hours PRN Agitation  sodium chloride 0.9% lock flush 10 milliLiter(s) IV Push every 1 hour PRN Pre/post blood products, medications, blood draw, and to maintain line patency

## 2021-04-06 NOTE — PROGRESS NOTE ADULT - ASSESSMENT
70 year old Belarusian/Jhonny-speaking male with a history of DM2 (uncontrolled A1c 10.3%, on basal/bolus insulin at home), HTN, CAD s/p CABG and stents, BPH s/p TURP and Parkinson's disease, now readmitted with COVID and started on high dose Decadron. Endocrine team consulted for management of DM2.   Patient is high risk with high level decision making, requiring ICU level of care    1. Type 2 diabetes mellitus with other specified complication, with long-term current use of insulin.   A1c 10.3% indicating poorly controlled DM2. Home regimen reported as Basaglar 40 units qHS and Admelog 20 units before breakfast only    While inpatient:  Currently in ICU, target glucose 140-180 mg/dl  On continuous tube feeding, Glucerna 1.5 elin @ goal 40 ml/hr x 24h  Now off steroids  Would continue NPH 5 units q6h (Hold if tube feeds are held, hold if FS < 100)  Continue Admelog MODERATE dose correctional scale q6h  Check BG q6h  Keep hypoglycemia protocol active     2. Steroid-induced hyperglycemia.    Last dose Dexamethasone 6 mg daily 3/25  Insulin regimen as above   Steroid effects likely worn off    3. Hypertension, unspecified type.    Now off pressors, defer management to primary team    4. Hyperlipidemia, unspecified hyperlipidemia type.    Continue with statin if no contraindications, monitor lipid profile as outpatient, goal LDL is <70.       Danna Kelly  Nurse Practitioner  Division of Endocrinology & Diabetes  Pager # [phone]      If after 6PM or before 9AM, or on weekends/holidays, please call endocrine answering service for assistance ([phone]).  For nonurgent matters email [email or web address] for assistance.

## 2021-04-06 NOTE — PROGRESS NOTE ADULT - ASSESSMENT
70 year old Nepali/Jhonny-speaking male with a history of CAD s/p CABG and 2 stents, Type 2 DM, HTN, BPH s/p TURP and Parkinson's disease, who was recently admitted 3/14 for weakness and COVID-19. Started on decadron and remdesivir and discharged 3/16. Returned 3/17 with worsening respiratory failure. Intubated 3/17. Course c/b ?parotitis, Pseudomonas PNA, and new onset atrial fibrillation now in sinus rhythm s/p amio conversion.     PLAN  Neuro  Mental status remains poor, not following commands  - Neuro consult- recommended 24 EEG, no MRI at this time, possible CTA head neck pending EEG review   - CTH negative for bleed 4/2. EEG negative for seizures 4/2, moderate diffuse dysfunction   - Ammonia level, B12, and folate WNL   - Correct hypernatremia as below with FW  - Fentanyl 25 PRN for dyssynchrony/agitation    - Stop methadone  - C/w sinemet for Parkinson's disease   - Will discuss with daughter regarding English understanding     Cardiovascular  - had recent h/o PAF, converted to NSR with amio  - maintaining NSR off amio, c/w lopressor 25 BID   - Continuous BP monitoring with A-line ( labile B/p 160-190 systolic, changes without intervention)    Pulmonary  - Intubated 3/20  - Change to CPAP trial, 10/50 30%, goal 28% by end of shift, previous vent settings AC 16/ 400/ 5/ 40%,   - trace but thick secretions    - Unable to wean from vent given poor mental status  - Monitor ABGs    GI  - C/w OG  TFs @ 45 hr   - PPI  - Bowel regimen, loose BM on overnight,     Renal  ZELALEM 2/2 contrast load 3/26? vs COVID/SEPSIS  - Non oliguric, no diuretics, creatinine peaked and is currently downtrending   - Da Silva for monitoring urine output  - Goal even     Hypernatremia  - Free water deficit,  will increase free water to 400 q 4hrs   - Repeat BMP in PM to assess Na     ID  COVID  - S/p remdesivir, decadron     Neck swelling  - CT neck 3/26 c/f parotitis and or masseter myositis with cellulitis vs facial edema from proning   - Sputum from 3/26 growing Pseudomonas fluorescens   - C/w santos (3/30 - ) for planned 10d course per ID, to finish 4/8   - S/p vanco 10d course  - F/u ID recs    Endo  - C/w NPH 13u, Mod SSI  - OV BGL 44 mg/dl, given 25gm D50 --> 157 POCT  - Endo following  - TSH 4.25, T4 WNL, subclinical, can repeat TFTs in 4-6 weeks     Heme  - No acute issues    Ppx  - DVT: SQH    Lines/Tubes  - LRA Natali 3/20  - Avinash 3/20     GOC  - Full code  - Will need to discuss trach with daughter  70 year old Kiswahili/Jhonny-speaking male with a history of CAD s/p CABG and 2 stents, Type 2 DM, HTN, BPH s/p TURP and Parkinson's disease, who was recently admitted 3/14 for weakness and COVID-19. Started on decadron and remdesivir and discharged 3/16. Returned 3/17 with worsening respiratory failure. Intubated 3/17. Course c/b ?parotitis, Pseudomonas PNA, and new onset atrial fibrillation now in sinus rhythm s/p amio conversion.     PLAN  Neuro  Mental status remains poor, not following commands  - Neuro consult- recommended 24 EEG, no MRI at this time, pending EEG   - CTH negative for bleed 4/2. EEG negative for seizures 4/2, moderate diffuse dysfunction but needs 24h EEG  - Ammonia level, B12, and folate WNL   - Fentanyl 25mcg IVP PRN for dyssynchrony/agitation    - Stop methadone  - C/w sinemet for Parkinson's disease   - IVIG x 5d (4/5-4/10) for possible COVID encephalopathy     Cardiovascular  - had recent h/o PAF, converted to NSR with amio  - maintaining NSR off amio, c/w lopressor 25 BID   - Continuous BP monitoring with A-line ( labile B/p 160-190 systolic, changes without intervention)    Pulmonary  - Intubated 3/20  - Continue full support with AC/VC 16/ 400/ 5/ 40% and trial CPAP when MS improves.   - Unable to wean from vent given poor mental status  - Will need tracheostomy if mental status does not improve post IVIG     GI  - C/w OG TFs @ 45 hr   - IV PPI  - Bowel regimen, loose BM on overnight    Renal  ZELALEM 2/2 contrast load 3/26? vs COVID/SEPSIS  - Non oliguric, no diuretics, creatinine peaked and is currently downtrending   - Da Silva for monitoring urine output  - IV lasix 40mg x 1   - HyperNa improved, will dc [facility]     ID  COVID  - S/p remdesivir, decadron     Neck swelling  - CT neck 3/26 c/f parotitis and or masseter myositis with cellulitis vs facial edema from proning   - Sputum from 3/26 growing Pseudomonas fluorescens   - C/w santos (3/30 - ) for planned 10d course per ID, to finish 4/8   - S/p vanco 10d course    Endo  - C/w NPH 5u, Mod SSI  - Endo following  - TSH 4.25, T4 WNL, subclinical (4/1), can repeat TFTs in 4-6 weeks     Heme  - SQH 5k q8h  - ASA 81mg     Lines/Tubes  - LRTORIN Hurst 3/20  - Avinash 3/20     [facility]  - Full code  - Will need to discuss trach with daughter

## 2021-04-06 NOTE — CHART NOTE - NSCHARTNOTEFT_GEN_A_CORE
Prelim EEG reviewed with Epilepsy fellow Dr. Saucedo. Patient had R sided LPD with interspaced generalized discharged.     Recommend:  - Load Keppra 1g IV x1 now, then start maintenance 500mg IV BID   - Continue vEEG

## 2021-04-06 NOTE — PROGRESS NOTE ADULT - ATTENDING COMMENTS
Patient seen in the icu, I have reviewed the note above and I agree with the providers documentation    1-resp failure  2-covid encepathlopathy  3-fluid overload  4-hypertension    -continue ivig  -diuresis  -limit sedation

## 2021-04-06 NOTE — PROGRESS NOTE ADULT - ASSESSMENT
71 yo M with pmh of  history of CAD s/p CABG and 2 stents, Type 2 DM, HTN, BPH s/p TURP and Parkinson's disease on sinemet presented to [facility] for acute decompensation and dyspnea intubated also neck abscess +Pseudomonas patient completed a vancomycin regiment was started on Merem on 3/30. Patient was on precedex and multiple PRNS for sedation. Upon discontinuation of precedex, patient was not following commands. Patient had CT head w/o contrast which was negative and EEG, which was negative for seizures. PE + blink reflex, + cough, pupils minimally reactive  3- to 2mm right esophoria. no spontaneous movement   TME vs ?Covid encephalopathy r/o lesion, stroke    Patient started empirically on IVIG trial  24 hr EEG, pending above may consider repeat CTH  supportive care  continue with Aspirin 81  Can get MRI when stable. Not clinically feasible at this time

## 2021-04-06 NOTE — PROGRESS NOTE ADULT - SUBJECTIVE AND OBJECTIVE BOX
[facility] NEPHROLOGY- PROGRESS NOTE, Follow up     Patient is a 70y Male with history of HTN on HCTZ presents with weakness found to have COVID-19. Nephrology consulted for hyponatremia.    Patient with resolution of hyponatremia for which nephrology had signed off on 3/19. Patient subsequently with non-oliguric ZELALEM.    REVIEW OF SYSTEMS: Unable to obtain as patient intubated.    No Known Allergies    Hospital Medications: Reviewed.      VITALS:  T(F): 99.1 (04-06-21 @ 12:07), Max: 100.7 (04-05-21 @ 16:00)  HR: 64 (04-06-21 @ 12:42)  BP: --  RR: 27 (04-06-21 @ 12:42)  SpO2: 100% (04-06-21 @ 12:42)  Wt(kg): --    04-05 @ 07:01  -  04-06 @ 07:00  --------------------------------------------------------  IN: 3838 mL / OUT: 3025 mL / NET: 813 mL    04-06 @ 07:01  -  04-06 @ 14:58  --------------------------------------------------------  IN: 314.6 mL / OUT: 900 mL / NET: -585.4 mL      PHYSICAL EXAM:  Gen: Sedated and intubated  Cards: RRR, +S1/S2, no M/G/R  Resp: CTA B/L  GI: soft, NT/ND, NABS  Vascular: trace LE edema B/L, + UE edema      LABS:  04-06    143  |  110<H>  |  50<H>  ----------------------------<  178<H>  4.1   |  26  |  1.57<H>    Ca    7.6<L>      06 Apr 2021 04:47  Phos  3.3     04-06  Mg     2.6     04-06    TPro  6.3  /  Alb  1.7<L>  /  TBili  0.2  /  DBili      /  AST  30  /  ALT  10  /  AlkPhos  116  04-06    Creatinine Trend: 1.57 <--, 1.76 <--, 1.74 <--, 1.78 <--, 1.78 <--, 1.94 <--, 1.90 <--, 2.14 <--                        7.3    10.14 )-----------( 370      ( 06 Apr 2021 04:47 )             23.3     Urine Studies:

## 2021-04-06 NOTE — CHART NOTE - NSCHARTNOTEFT_GEN_A_CORE
PRELIMINARY EEG REVIEW    EEG reviewed to 17:48  Date: 04-06-21    - frequent bifrontally predominant periodic 0.5 to 1 hz discharges with shifting right greater than left predominance occasionally with triphasic morphology    This Preliminary report is based on fellow review. Final report pending Completion of study tomorrow morning and following attending review.    Reading Room: [phone]  On Call Service After Hours: [phone]    Alessio Saucedo MD PGY-5  Epilepsy Fellow

## 2021-04-07 NOTE — PROGRESS NOTE ADULT - ATTENDING COMMENTS
Patient seen in the icu. I agree with the above provider documentation    1-resp failure  -non hypoxic  -requires vent because of mental status  2-ams  -seixure disorder versus covid enceph.  -continue ivig  -eeg  -neuro following

## 2021-04-07 NOTE — PROGRESS NOTE ADULT - ASSESSMENT
70y Male with history of HTN on HCTZ presents with weakness found to have COVID-19. Nephrology consulted for hyponatremia.    1) ZELALEM: Non-oliguric ZELALEM likely due to DEMIAN given rise in Scr approximately 48 hours after CT with IV contrast on 3/26. Scr now improving with good UO. Continue with supportive care. Urine sodium low which is often seen in DEMIAN. Avoid nephrotoxins.     2) HTN: BP uncontrolled. Can start CCB as needed. Rate control as per ICU. Monitor BP.    3) Hypernatremia: Due to intravascular depletion and third spacing from hypoalbuminemia now resolved off of free water. Can give additional lasix today to keep net negative (last dose on 4/6). Monitor serum Na.    4) COVID-19: As per primary team.      [facility] NEPHROLOGY  Mahesh Lomax M.D.  Perez Armstrong D.O.  Gricelda Choe M.D.  Juliet Rogers, MSN, ANP-C    Telephone: [phone]  Facsimile: [phone]    [address] [place] [address]

## 2021-04-07 NOTE — PROGRESS NOTE ADULT - SUBJECTIVE AND OBJECTIVE BOX
SHAHZAD FIELD  MRN-[id number]  Patient is a 70y old  Male who presents with a chief complaint of acute respiratory failure d/t covid (19 Mar 2021 16:56)    HPI:  Patient is a 70 year old French/Jhonny-speaking male with a history of CAD s/p CABG and 2 stents, Type 2 DM, HTN, BPH s/p TURP and Parkinson's disease, who was recently admitted on 3/14 for weakness, covid, hypoxia, started on remdesivir/decadron, discharged home yesterday (3/16). However, at home, pt decompensated with increasing sob/labored breathing, o2 sat dropped from 88% to 66% within 1/2 hour per daughter, gasping for air. Pt's son called 911 and brought him back to the hospital. Pt denies chest pain/abd pain/n/v/diarrhea. He reports he was sent home without oxygen.       overnight: ongoing eeg    REVIEW OF SYSTEMS:  deferred, intubated    Physical Exam:  Vital Signs Last 24 Hrs  T(C): 36.2 (21 Mar 2021 08:00), Max: 37.6 (20 Mar 2021 16:15)  T(F): 97.1 (21 Mar 2021 08:00), Max: 99.6 (20 Mar 2021 16:15)  HR: 49 (21 Mar 2021 08:00) (49 - 98)  BP: 140/64 (20 Mar 2021 16:15) (138/59 - 140/64)  BP(mean): 83 (20 Mar 2021 16:15) (83 - 83)  RR: 30 (21 Mar 2021 08:00) (18 - 30)  SpO2: 100% (21 Mar 2021 08:00) (93% - 100%)    Gen:  intubated, EEG in progress  RES : course breath sounds B/L    CVS: S1,S2. RRR  Abd: soft, non distended, non tender, hypoactive bowel sounds   Ext: pitting edema B/L LE, edema B/L distal UE    ============================I/O===========================   I&O's Detail    20 Mar 2021 07:01  -  21 Mar 2021 07:00  --------------------------------------------------------  IN:    Cisatracurium: 162.2 mL    FentaNYL: 192 mL    IV PiggyBack: 550 mL    Lactated Ringers Bolus: 1000 mL    Norepinephrine: 54.5 mL    Propofol: 254 mL    Sodium Chloride 0.9% Bolus: 1000 mL    Vasopressin: 26.4 mL  Total IN: 3239.1 mL    OUT:    Ureteral Catheter (mL): 1180 mL  Total OUT: 1180 mL    Total NET: 2059.1 mL        ============================ LABS =========================                        8.9    8.40  )-----------( 199      ( 21 Mar 2021 04:33 )             27.7     03-21    140  |  109<H>  |  28<H>  ----------------------------<  199<H>  4.8   |  21<L>  |  0.89    Ca    7.8<L>      21 Mar 2021 04:33  Phos  3.3     03-21  Mg     1.9     03-21    TPro  4.9<L>  /  Alb  2.2<L>  /  TBili  0.6  /  DBili  x   /  AST  38  /  ALT  36  /  AlkPhos  120  03-21    LIVER FUNCTIONS - ( 21 Mar 2021 04:33 )  Alb: 2.2 g/dL / Pro: 4.9 g/dL / ALK PHOS: 120 U/L / ALT: 36 U/L / AST: 38 U/L / GGT: x           PT/INR - ( 21 Mar 2021 04:33 )   PT: 17.6 sec;   INR: 1.56 ratio         PTT - ( 20 Mar 2021 18:18 )  PTT:36.6 sec  ABG - ( 21 Mar 2021 04:33 )  pH, Arterial: 7.39  pH, Blood: x     /  pCO2: 38    /  pO2: 119   / HCO3: 23    / Base Excess: -1.8  /  SaO2: 98.2          ======================Micro/Rad/Cardio=================  Culture: Reviewed   CXR: Reviewed  Echo:Reviewed  ======================================================  PAST MEDICAL & SURGICAL HISTORY:  BPH (benign prostatic hyperplasia)    GERD (gastroesophageal reflux disease)    HLD (hyperlipidemia)    Hypertension    CAD (coronary artery disease)  CABG x2 2012, Stents x2 2014    Diabetes mellitus    Presence of stent in coronary artery  x2 2014    History of coronary artery bypass graft  X2 in March of 2012    Bladder neck obstruction  3 surgeries for BPH, most recent 2012      ====================ASSESMENT ==============  COVID  ARDS  Hypoxic Respiratory Failure  SEPSIS  PNA  DM2  Encephalopathic   ZELALEM  Parotiditis     ====================== NEUROLOGY=====================  Off propofol 3/29  Off fentanyl 3/30  requires precedex for vent synchrony   Continue to monitor neuro status   CTH negative, EEG negative for active seizures but does show "GPDs with triphasic morphology can be seen in the presence of diffuse or multifocal non-specific cerebral dysfunction.  Although they can be shown to increase the risk for generalized seizure activity, they are likely secondary to underlying toxic/metabolic etiology"  F/U neuro regarding need for keppra     ==================== RESPIRATORY======================  intubated 3/20  low FiO2 requirement  monitor ABGs  continuous pulse oximetry     ====================CARDIOVASCULAR==================  maintaining NSR off amio, on lopressor  continuous BP monitoring with [place]    ===================HEMATOLOGIC/ONC ===================  VTE ppx with SQH  last PRBC this am  f/u H/H  no obvious sources of bleeding     ===================== RENAL =========================  resolving ZELALEM 2/2 contrast load 3/26? vs COVID/SEPSIS  non oliguric, prn lasix for goal net negative   [place] for monitoring urine output    ==================== GASTROINTESTINAL===================  continue TFs  GI PPX with protonix iv QD  bowel regimen with miralax, senna    =======================    ENDOCRINE  =====================  continue NPH and ISS [id number] for blood glucose control   f/u endo     ========================INFECTIOUS DISEASE================  s/p remdesivir, decadron   1/2 BC from 3/20 CNS likely contaminant   CT neck 3/26 c/f paratidits and or masseter myositis with cellulitis vs facial edema from proning   Sputum from 3/26 growing Pseudomonas fluorescens   tx with vanco  meropenem completes tomorrow   f/u ID           SHAHZAD FIELD  MRN-[id number]  Patient is a 70y old  Male who presents with a chief complaint of acute respiratory failure d/t covid (19 Mar 2021 16:56)    HPI:  Patient is a 70 year old Tam[place]/Jhonny-speaking male with a history of CAD s/p CABG and 2 stents, Type 2 DM, HTN, BPH s/p TURP and Parkinson's disease, who was recently admitted on 3/14 for weakness, covid, hypoxia, started on remdesivir/decadron, discharged home yesterday (3/16). However, at home, pt decompensated with increasing sob/labored breathing, o2 sat dropped from 88% to 66% within 1/2 hour per daughter, gasping for air. Pt's son called 911 and brought him back to the hospital. Pt denies chest pain/abd pain/n/v/diarrhea. He reports he was sent home without oxygen.       overnight: ongoing eeg    REVIEW OF SYSTEMS:  deferred, intubated    Physical Exam:  Vital Signs Last 24 Hrs  T(C): 36.2 (21 Mar 2021 08:00), Max: 37.6 (20 Mar 2021 16:15)  T(F): 97.1 (21 Mar 2021 08:00), Max: 99.6 (20 Mar 2021 16:15)  HR: 49 (21 Mar 2021 08:00) (49 - 98)  BP: 140/64 (20 Mar 2021 16:15) (138/59 - 140/64)  BP(mean): 83 (20 Mar 2021 16:15) (83 - 83)  RR: 30 (21 Mar 2021 08:00) (18 - 30)  SpO2: 100% (21 Mar 2021 08:00) (93% - 100%)    Gen:  intubated, EEG in progress  RES : course breath sounds B/L    CVS: S1,S2. RRR  Abd: soft, non distended, non tender, hypoactive bowel sounds   Ext: pitting edema B/L LE, edema B/L distal UE    ============================I/O===========================   I&O's Detail    20 Mar 2021 07:01  -  21 Mar 2021 07:00  --------------------------------------------------------  IN:    Cisatracurium: 162.2 mL    FentaNYL: 192 mL    IV PiggyBack: 550 mL    Lactated Ringers Bolus: 1000 mL    Norepinephrine: 54.5 mL    Propofol: 254 mL    Sodium Chloride 0.9% Bolus: 1000 mL    Vasopressin: 26.4 mL  Total IN: 3239.1 mL    OUT:    Ureteral Catheter (mL): 1180 mL  Total OUT: 1180 mL    Total NET: 2059.1 mL        ============================ LABS =========================                        8.9    8.40  )-----------( 199      ( 21 Mar 2021 04:33 )             27.7     03-21    140  |  109<H>  |  28<H>  ----------------------------<  199<H>  4.8   |  21<L>  |  0.89    Ca    7.8<L>      21 Mar 2021 04:33  Phos  3.3     03-21  Mg     1.9     03-21    TPro  4.9<L>  /  Alb  2.2<L>  /  TBili  0.6  /  DBili  x   /  AST  38  /  ALT  36  /  AlkPhos  120  03-21    LIVER FUNCTIONS - ( 21 Mar 2021 04:33 )  Alb: 2.2 g/dL / Pro: 4.9 g/dL / ALK PHOS: 120 U/L / ALT: 36 U/L / AST: 38 U/L / GGT: x           PT/INR - ( 21 Mar 2021 04:33 )   PT: 17.6 sec;   INR: 1.56 ratio         PTT - ( 20 Mar 2021 18:18 )  PTT:36.6 sec  ABG - ( 21 Mar 2021 04:33 )  pH, Arterial: 7.39  pH, Blood: x     /  pCO2: 38    /  pO2: 119   / HCO3: 23    / Base Excess: -1.8  /  SaO2: 98.2          ======================Micro/Rad/Cardio=================  Culture: Reviewed   CXR: Reviewed  Echo:Reviewed  ======================================================  PAST MEDICAL & SURGICAL HISTORY:  BPH (benign prostatic hyperplasia)    GERD (gastroesophageal reflux disease)    HLD (hyperlipidemia)    Hypertension    CAD (coronary artery disease)  CABG x2 2012, Stents x2 2014    Diabetes mellitus    Presence of stent in coronary artery  x2 2014    History of coronary artery bypass graft  X2 in March of 2012    Bladder neck obstruction  3 surgeries for BPH, most recent 2012      ====================ASSESMENT ==============  COVID  ARDS  Hypoxic Respiratory Failure  SEPSIS  PNA  DM2  Encephalopathic   ZELALEM  Parotiditis     ====================== NEUROLOGY=====================  remains encephalopathic Off propofol 3/29, Off fentanyl 3/30  requires precedex for vent synchrony   IVIG for 5days  Continue to monitor neuro status   CTH negative, EEG negative for active seizures but does show "GPDs with triphasic morphology can be seen in the presence of diffuse or multifocal non-specific cerebral dysfunction.  Although they can be shown to increase the risk for generalized seizure activity, they are likely secondary to underlying toxic/metabolic etiology"  F/U neuro regarding need for keppra     ==================== RESPIRATORY======================  intubated 3/20  low FiO2 requirement  monitor ABGs  continuous pulse oximetry     ====================CARDIOVASCULAR==================  maintaining NSR off amio, on lopressor  continuous BP monitoring with [place]    ===================HEMATOLOGIC/ONC ===================  VTE ppx with SQH  last PRBC this am  f/u H/H  no obvious sources of bleeding     ===================== RENAL =========================  resolving ZELALEM 2/2 contrast load 3/26? vs COVID/SEPSIS  non oliguric, prn lasix for goal net negative   Da Silva for monitoring urine output    ==================== GASTROINTESTINAL===================  continue TFs  GI PPX with protonix iv QD  bowel regimen with miralax, senna    =======================    ENDOCRINE  =====================  continue NPH and ISS [id number] for blood glucose control   f/u endo     ========================INFECTIOUS DISEASE================  s/p remdesivir, decadron   1/2 BC from 3/20 CNS likely contaminant   CT neck 3/26 c/f paratidits and or masseter myositis with cellulitis vs facial edema from proning   Sputum from 3/26 growing Pseudomonas fluorescens   tx with vanco  meropenem completes tomorrow   f/u ID

## 2021-04-07 NOTE — PROGRESS NOTE ADULT - SUBJECTIVE AND OBJECTIVE BOX
[facility] NEPHROLOGY- PROGRESS NOTE, Follow up     Patient is a 70y Male with history of HTN on HCTZ presents with weakness found to have COVID-19. Nephrology consulted for hyponatremia.    Patient with resolution of hyponatremia for which nephrology had signed off on 3/19. Patient subsequently with non-oliguric ZELALEM.    REVIEW OF SYSTEMS: Unable to obtain as patient intubated.    No Known Allergies    Hospital Medications: Reviewed.      VITALS:  T(F): 98.4 (04-07-21 @ 11:27), Max: 101.2 (04-07-21 @ 07:15)  HR: 59 (04-07-21 @ 12:20)  BP: --  RR: 23 (04-07-21 @ 12:20)  SpO2: 100% (04-07-21 @ 12:20)  Wt(kg): --    04-06 @ 07:01  -  04-07 @ 07:00  --------------------------------------------------------  IN: 1418.8 mL / OUT: 2350 mL / NET: -931.2 mL    04-07 @ 07:01  -  04-07 @ 12:29  --------------------------------------------------------  IN: 289.6 mL / OUT: 300 mL / NET: -10.4 mL      PHYSICAL EXAM:  Gen: Sedated and intubated  Cards: RRR, +S1/S2, no M/G/R  Resp: CTA B/L  GI: soft, NT/ND, NABS  Vascular: trace LE edema B/L, + UE edema      LABS:  04-07    145  |  113<H>  |  49<H>  ----------------------------<  199<H>  3.8   |  26  |  1.52<H>    Ca    7.5<L>      07 Apr 2021 03:15  Phos  3.1     04-07  Mg     2.3     04-07    TPro  6.6  /  Alb  1.6<L>  /  TBili  0.2  /  DBili      /  AST  51<H>  /  ALT  9   /  AlkPhos  101  04-07    Creatinine Trend: 1.52 <--, 1.57 <--, 1.76 <--, 1.74 <--, 1.78 <--, 1.78 <--, 1.94 <--, 1.90 <--                        7.1    7.63  )-----------( 402      ( 07 Apr 2021 03:15 )             22.3     Urine Studies:

## 2021-04-07 NOTE — EEG REPORT - NS EEG TEXT BOX
[facility]  Report of Continuous Video EEG    [facility]: [address], [place] [address], Phone [phone]  [facility]: [address] [place] [address], Phone [phone]  [place] Office: [address], [address], [place] [address] Phone [phone]    [facility]: [address] [place] [address], Phone [phone]  [place] [facility]: [address] [place] [address], Phone [phone]    Patient Name: SHAHZAD FIELD  Age: 70 year, : 1950  Patient ID: -, MRN #: -, C[place]: SR [address] [facility] 46  Referring Physician: alix velasquez  EEG #: [id number]    Study Time/Date: 2:47:23 PM on 2021  	  End Time/Date: 08:00 AM on 2021          			   Duration: 17H 13min    Study Information:    EEG Recording Technique:  The patient underwent continuous Video-EEG monitoring, using Telemetry System hardware on the XLTek Digital System. EEG and video data were stored on a computer hard drive with important events saved in digital archive files. The material was reviewed by a physician (electroencephalographer / epileptologist) on a daily basis. Josemanuel and seizure detection algorithms were utilized and reviewed. An EEG Technician attended to the patient, and was available throughout daytime work hours.  The epilepsy center neurologist was available in person or on call 24-hours per day.    EEG Placement and Labeling of Electrodes:  The EEG was performed utilizing 20 channel referential EEG connections (coronal over temporal over parasagittal montage) using all standard 10-20 electrode placements with additional electrodes placed in the inferior temporal region using the modified 10-10 montage electrode placements for elective admissions, or if deemed necessary. Recording was at a sampling rate of 256 samples per second per channel. Time synchronized digital video recording was done simultaneously with EEG recording. A low light infrared camera was used for low light recording.     History:   VEEG AT BEDSIDE SRGB 46  70 YEAR OLD MALE  COR: INTUBATED-VENT/ DROWSY  P/W: BPH  PMH:GERD,CAD,HTN,DM  HV: NOT COMPLETED DUE TO AGE  PHOTIC: NOT COMPLETED  A&OX  CONCERN FOR SEIZURES          Pertinent Medication  Ativan (Lorazepam)  Miralax  Senna  Lopressor  Protonix  PETROLATUM  Tylenol  Fentanyl  Precedex  GAMMAGARD  Lasix  Hibiclens  MEROPENEM  Zocor  AdmeLOG  HUMULIN  Aspirin  Heparin  ACULAR  SINEMET  Sodium Chloride    Interpretation:    Daily EEG Visual Analysis  Findings: The background was continuous, spontaneously variable and reactive.   No posterior dominant rhythm seen.  Background predominantly consisted of theta, delta and faster activities.    Focal Slowing:   None were present.    Sleep Background:  Drowsiness and stage II sleep transients were not recorded.    Other Findings:  Abundant generalized periodic discharges with triphasic morphology (GPDs) with shifting laterality.  No evolution.  Frequency of 0.5-1.5Hz.    Events:  Clinical events: None recorded.  Seizures: None recorded.    Activation Procedures:   Hyperventilation was not performed.    Photic stimulation was not performed.     Artifacts:  Intermittent myogenic and movement artifacts were noted.    EEG Summary / Classification:  Abnormal EEG   - GPDs with triphasic morphology.  - Moderate generalized slowing.    EEG Impression / Clinical Correlate:  Abnormal EEG study.  Moderate nonspecific diffuse or multifocal cerebral dysfunction.   GPDs with triphasic morphology can be seen in the presence of diffuse or multifocal non-specific cerebral dysfunction.  Although they can be shown to increase the risk for generalized seizure activity, they are likely secondary to underlying toxic/metabolic etiology in this case given their appearance.  No seizure seen.    Rodo Roblero MD  Attending Physician, [facility] [place]

## 2021-04-08 NOTE — CHART NOTE - NSCHARTNOTEFT_GEN_A_CORE
COVID ICU PA Note    pt is agitated, tachypnic, tachycardic, SBP_ 189>190's, T max-- 99.7,     ICU Vital Signs Last 24 Hrs  T(C): 37.3 (07 Apr 2021 15:00), Max: 38.4 (07 Apr 2021 07:15)  T(F): 99.1 (07 Apr 2021 15:00), Max: 101.2 (07 Apr 2021 07:15)  HR: 83 (07 Apr 2021 23:29) (56 - 83)  BP: --  BP(mean): --  ABP: 189/65 (07 Apr 2021 19:00) (146/50 - 189/65)  ABP(mean): 109 (07 Apr 2021 19:00) (77 - 109)  RR: 31 (07 Apr 2021 19:00) (23 - 31)  SpO2: 93% (07 Apr 2021 23:29) (93% - 100%)  --     Pt was medicated with Fentanyl 50 mcg without good effect, dose of Dilaudid 0.5 mg IVP given with good but short effect,   -- additional Fentanyl 50 mcg was given, but pt is still agitated, tachypnic to 41, Peak pressure 36, HR- 's, / COVID ICU PA Note    pt is agitated, tachypnic, tachycardic, SBP_ 189>190's, T max-- 99.7, SpO2 -- 97 % on 40 % FiO2,     ICU Vital Signs Last 24 Hrs  T(C): 37.3 (07 Apr 2021 15:00), Max: 38.4 (07 Apr 2021 07:15)  T(F): 99.1 (07 Apr 2021 15:00), Max: 101.2 (07 Apr 2021 07:15)  HR: 83 (07 Apr 2021 23:29) (56 - 83)  BP: --  BP(mean): --  ABP: 189/65 (07 Apr 2021 19:00) (146/50 - 189/65)  ABP(mean): 109 (07 Apr 2021 19:00) (77 - 109)  RR: 31 (07 Apr 2021 19:00) (23 - 31)  SpO2: 93% (07 Apr 2021 23:29) (93% - 100%)  --     -- CXR with improved opacities, L>R, no PMX, no definite consolidation, Temp slightly elevated, tiny amount of thick secretions,   Pt was medicated with Fentanyl 50 mcg without good effect, dose of Dilaudid 0.5 mg IVP given with some but short effect,   -- additional Fentanyl 50 mcg was given, but pt is still agitated, tachypnic to 41, Peak pressure 36, HR- 's, /110      EEG report __ Moderate nonspecific diffuse or multifocal cerebral dysfunction. GPDs with triphasic morphology can be seen in the presence of diffuse or multifocal non-specific cerebral dysfunction.  Although they can be shown to increase the risk for generalized seizure activity, they are likely secondary to underlying toxic/metabolic etiology in this case given their appearance. No seizure seen.    -- there is concern for acute seizures __ pt was medicated with Versed 4 mg __ with good effect __ SBP_- 110, HR- 60's, tachypnea resolved __ pt is breathing at the rate of 24-26, peak pressures are normal,   - -continue monitoring, follow up neurology recs,   -- discussed with attn, Dr Aparicio, at bedside,

## 2021-04-08 NOTE — CHART NOTE - NSCHARTNOTEFT_GEN_A_CORE
DORIS CASTELLANOS B ID FELLOW NOTE  =======  - Chart reviewed and discussed with primary team. Patient not examined at this time    Spoke with team. Pt completed course of meropenem for right cheek cellulitis today, was started on levaquin for PA pneumonia. Pseudomonas grew in sputum cx on 3/26, patient has been on antibiotics to which pseudomonas is susceptible since 3/29 (Zosyn). Levaquin currently ordered indefinitely. Recommended setting duration. Trials of Pseudmonas VAP showed similar response rates with shorter courses (7 days vs 10-15 days). Pt is already on day 11 of antibiotics, would consider discontinuation vs limiting course to 14 days.      This is not an official infectious diseases consultation.   Dennis Keller, PGY-5  Infectious Disease Fellow   Pager: [phone]  After 5pm/weekends: [phone]

## 2021-04-08 NOTE — PROGRESS NOTE ADULT - SUBJECTIVE AND OBJECTIVE BOX
[facility] NEPHROLOGY- PROGRESS NOTE, Follow up     Patient is a 70y Male with history of HTN on HCTZ presents with weakness found to have COVID-19. Nephrology consulted for hyponatremia.    Patient with resolution of hyponatremia for which nephrology had signed off on 3/19. Patient subsequently with non-oliguric ZELALEM.    REVIEW OF SYSTEMS: Unable to obtain as patient intubated.    No Known Allergies    Hospital Medications: Reviewed.      VITALS:  T(F): 97 (04-08-21 @ 12:00), Max: 99 (04-08-21 @ 00:00)  HR: 62 (04-08-21 @ 15:04)  BP: --  RR: 16 (04-08-21 @ 13:00)  SpO2: 98% (04-08-21 @ 15:04)  Wt(kg): --    04-07 @ 07:01  -  04-08 @ 07:00  --------------------------------------------------------  IN: 1553.8 mL / OUT: 1800 mL / NET: -246.2 mL    04-08 @ 07:01  -  04-08 @ 15:48  --------------------------------------------------------  IN: 225 mL / OUT: 400 mL / NET: -175 mL        PHYSICAL EXAM:  Gen: Intubated  Cards: RRR, +S1/S2, no M/G/R  Resp: CTA B/L  GI: soft, NT/ND, NABS  Vascular: trace LE edema B/L, + UE edema      LABS:  04-08    143  |  112<H>  |  48<H>  ----------------------------<  225<H>  3.9   |  24  |  1.39<H>    Ca    7.2<L>      08 Apr 2021 01:52  Phos  3.6     04-08  Mg     2.2     04-08    TPro  6.9  /  Alb  1.4<L>  /  TBili  0.2  /  DBili      /  AST  41<H>  /  ALT  30  /  AlkPhos  96  04-08    Creatinine Trend: 1.39 <--, 1.52 <--, 1.57 <--, 1.76 <--, 1.74 <--, 1.78 <--, 1.78 <--, 1.94 <--                        8.4    9.27  )-----------( 453      ( 08 Apr 2021 01:52 )             26.8     Urine Studies:          < from: Xray Chest 1 View- PORTABLE-Urgent (Xray Chest 1 View- PORTABLE-Urgent .) (04.07.21 @ 23:37) >  IMPRESSION:   No significant change..    < end of copied text >

## 2021-04-08 NOTE — PROGRESS NOTE ADULT - ASSESSMENT
70 year old Lao/Jhonny-speaking male with a history of CAD s/p CABG and 2 stents, Type 2 DM, HTN, BPH s/p TURP and Parkinson's disease, who was recently admitted 3/14 for weakness and COVID-19. Started on decadron and remdesivir and discharged 3/16. Returned 3/17 with worsening respiratory failure. Intubated 3/17. Course c/b ?parotitis, Pseudomonas PNA, and new onset atrial fibrillation now in sinus rhythm s/p amio conversion. S/p trial of IVIG for poss COVID encephalopathy from 4/5-4/8 without improvement and dced 1 day early. S/p vEEG on 4/6 without seizures but waveforms c/f poss development of one and so keppra loaded.     Neuro  Mental status remains poor, not following commands  - Neuro consult- on 24h+ EEG, no MRI at this time  - CTH negative for bleed 4/2  - Ammonia level, B12, and folate WNL   - Fentanyl 25mcg IVP PRN for dyssynchrony/agitation    - Stop methadone  - C/w sinemet for Parkinson's disease   - IVIG x 5d (4/5-4/9) for possible COVID encephalopathy, stopped a day early due to no effect      Cardiovascular  - had recent h/o PAF, converted to NSR with amio  - maintaining NSR off amio, c/w lopressor 25 BID   - Continuous BP monitoring with A-line ( labile B/p 160-190 systolic, changes without intervention)    Pulmonary  - Intubated 3/20  - Continue full support with AC/VC 16/ 400/ 5/ 40% and trial CPAP when MS improves.   - Unable to wean from vent given poor mental status  - Will begin tracheostomy planning, will d/w family     GI  - C/w glucerna TFs @ 45 hr   - IV PPI daily   - Bowel regimen, loose BM on overnight    Renal  ZELALEM 2/2 contrast load 3/26? vs COVID/SEPSIS  - Non oliguric, creatinine peaked and is currently downtrending   - Da Silva for monitoring urine output  - HyperNa improved, now off FWF     ID  COVID  - S/p remdesivir, decadron     Neck swelling  - CT neck 3/26 c/f parotitis and or masseter myositis with cellulitis vs facial edema from proning   - Sputum from 3/26 growing Pseudomonas fluorescens   - S/p santos (3/30-4/8) 10d course per ID, s/p vanco 10d course  - Now off abx    Endo  - C/w NPH 5u, Mod SSI  - Endo following  - TSH 4.25, T4 WNL, subclinical (4/1), can repeat TFTs in 4-6 weeks     Heme  - SQH 5k q8h  - ASA 81mg     Lines/Tubes  - LRA Natali 3/20  - Avinash 3/20     GO  - Full code  - Will need to discuss trach with daughter  70 year old Swedish/Jhonny-speaking male with a history of CAD s/p CABG and 2 stents, Type 2 DM, HTN, BPH s/p TURP and Parkinson's disease, who was recently admitted 3/14 for weakness and COVID-19. Started on decadron and remdesivir and discharged 3/16. Returned 3/17 with worsening respiratory failure. Intubated 3/17. Course c/b ?parotitis, Pseudomonas PNA, and new onset atrial fibrillation now in sinus rhythm s/p amio conversion. S/p trial of IVIG for poss COVID encephalopathy from 4/5-4/8 without improvement and dced 1 day early. S/p vEEG on 4/6 without seizures but waveforms c/f poss development of one and so keppra loaded.     Neuro  Mental status remains poor, not following commands  - Neuro consult- on 24h+ EEG, no MRI at this time  - CTH negative for bleed 4/2  - Ammonia level, B12, and folate WNL   - Fentanyl 25mcg IVP PRN for dyssynchrony/agitation    - Stop methadone  - C/w sinemet for Parkinson's disease   - IVIG x 5d (4/5-4/9) for possible COVID encephalopathy, stopped a day early due to no effect      Cardiovascular  - had recent h/o PAF, converted to NSR with amio  - maintaining NSR off amio, c/w lopressor 25 BID   - Continuous BP monitoring with A-line ( labile B/p 160-190 systolic, changes without intervention)    Pulmonary  - Intubated 3/20  - Continue full support with AC/VC 16/ 400/ 5/ 40% and trial CPAP when MS improves.   - Unable to wean from vent given poor mental status  - Will begin tracheostomy planning, will d/w family     GI  - C/w glucerna TFs @ 45 hr   - IV PPI daily   - Bowel regimen, loose BM on overnight    Renal  ZELALEM 2/2 contrast load 3/26? vs COVID/SEPSIS  - Non oliguric, creatinine peaked and is currently downtrending   - Da Silva for monitoring urine output  - HyperNa improved, now off FWF     ID  COVID  - S/p remdesivir, decadron     Neck swelling  - CT neck 3/26 c/f parotitis and or masseter myositis with cellulitis vs facial edema from proning   - Sputum from 3/26 growing Pseudomonas fluorescens   - S/p santos (3/30-4/8) 10d course per ID, s/p vanco 10d course  - Switched to renally dosed levaquin 4/8-    Endo  - C/w NPH 5u, Mod SSI  - Endo following  - TSH 4.25, T4 WNL, subclinical (4/1), can repeat TFTs in 4-6 weeks     Heme  - SQH 5k q8h  - ASA 81mg   - LE DVT study negative 4/7    Lines/Tubes  - LRA Natali 3/20  - Da Silva 3/20     GO  - Full code  - Will need to discuss trach with daughter  70 year old Italian/Jhonny-speaking male with a history of CAD s/p CABG and 2 stents, Type 2 DM, HTN, BPH s/p TURP and Parkinson's disease, who was recently admitted 3/14 for weakness and COVID-19. Started on decadron and remdesivir and discharged 3/16. Returned 3/17 with worsening respiratory failure. Intubated 3/17. Course c/b ?parotitis, Pseudomonas PNA, and new onset atrial fibrillation now in sinus rhythm s/p amio conversion. S/p trial of IVIG for poss COVID encephalopathy from 4/5-4/8 without improvement and dced 1 day early. S/p vEEG on 4/6 without seizures but waveforms c/f poss development of one and so keppra loaded.     Neuro  Mental status remains poor, not following commands  - Neuro consult- on 24h+ EEG, no MRI at this time  - CTH negative for bleed 4/2  - Ammonia level, B12, and folate WNL   - Fentanyl 25mcg IVP PRN for dyssynchrony/agitation    - Stop methadone  - C/w sinemet for Parkinson's disease   - IVIG x 5d (4/5-4/9) for possible COVID encephalopathy, stopped a day early due to no effect      Cardiovascular  - had recent h/o PAF, converted to NSR with amio  - maintaining NSR off amio, c/w lopressor 25 BID   - Continuous BP monitoring with A-line ( labile B/p 160-190 systolic, changes without intervention)    Pulmonary  - Intubated 3/20  - Continue full support with AC/VC 16/ 400/ 5/ 40% and trial CPAP when MS improves.   - Unable to wean from vent given poor mental status  - Will begin tracheostomy planning, will d/w family     GI  - C/w glucerna TFs @ 45 hr   - IV PPI daily   - Bowel regimen, loose BM on overnight    Renal  ZELALEM 2/2 contrast load 3/26? vs COVID/SEPSIS  - Non oliguric, creatinine peaked and is currently downtrending   - Da Silva for monitoring urine output  - HyperNa improved, now off FWF     ID  COVID  - S/p remdesivir, decadron     Neck swelling  - CT neck 3/26 c/f parotitis and or masseter myositis with cellulitis vs facial edema from proning   - Sputum from 3/26 growing Pseudomonas fluorescens   - S/p santos (3/30-4/8) 10d course per ID, s/p vanco 10d course  - Switched to renally dosed levaquin 4/8- (ID rec only levaquin x 4 more days (to completed 14 day course between santos and levaquin for sputum pseudomonas)     Endo  - C/w NPH 5u, Mod SSI  - Endo following  - TSH 4.25, T4 WNL, subclinical (4/1), can repeat TFTs in 4-6 weeks     Heme  - SQH 5k q8h  - ASA 81mg   - LE DVT study negative 4/7    Lines/Tubes  - LRA Natali 3/20  - Da Silva 3/20     GOC  - Full code  - Will need to discuss trach with daughter

## 2021-04-08 NOTE — EEG REPORT - NS EEG TEXT BOX
[facility]  Comprehensive Epilepsy Center  Report of Continuous Video EEG    [facility]: [address], [place] [address], Phone [phone]  [facility]: [address] [place], [place] [address] [address], Phone [phone]  [place] [facility]: [address], [address], [place] [address] Phone [phone]    [facility]: [address] [place] [address], Phone [phone]  [place] [facility]: [address] [place] [address], Phone [phone]    Patient Name: SHAHZAD FIELD  Age: 70 year, : 1950  Patient ID: -, MRN #: -, Cormier: SR 46 [facility] 46  Referring Physician: alix velasquez    Study Time/Date: 8AM on 2021  	  End Time/Date: 08:00 AM on 2021          			   Duration: 24H     Study Information:    EEG Recording Technique:  The patient underwent continuous Video-EEG monitoring, using Telemetry System hardware on the XLTek Digital System. EEG and video data were stored on a computer hard drive with important events saved in digital archive files. The material was reviewed by a physician (electroencephalographer / epileptologist) on a daily basis. Josemanuel and seizure detection algorithms were utilized and reviewed. An EEG Technician attended to the patient, and was available throughout daytime work hours.  The epilepsy center neurologist was available in person or on call 24-hours per day.    EEG Placement and Labeling of Electrodes:  The EEG was performed utilizing 20 channel referential EEG connections (coronal over temporal over parasagittal montage) using all standard 10-20 electrode placements with additional electrodes placed in the inferior temporal region using the modified 10-10 montage electrode placements for elective admissions, or if deemed necessary. Recording was at a sampling rate of 256 samples per second per channel. Time synchronized digital video recording was done simultaneously with EEG recording. A low light infrared camera was used for low light recording.     History:   VEEG AT BEDSIDE SRGB 46  70 YEAR OLD MALE  COR: INTUBATED-VENT/ DROWSY  P/W: BPH  PMH:GERD,CAD,HTN,DM  HV: NOT COMPLETED DUE TO AGE  PHOTIC: NOT COMPLETED  A&OX  CONCERN FOR SEIZURES      Interpretation:    Daily EEG Visual Analysis  Findings: The background was continuous, spontaneously variable and reactive.   No posterior dominant rhythm seen.  Background predominantly consisted of theta, delta and faster activities.    Focal Slowing:   None were present.    Sleep Background:  Drowsiness and stage II sleep transients were not recorded.    Other Findings:  Abundant generalized periodic discharges with triphasic morphology (GPDs) with shifting laterality.  No evolution.  Frequency of 0.5-1.5Hz.    Events:  Clinical events: None recorded.  Seizures: None recorded.    Activation Procedures:   Hyperventilation was not performed.    Photic stimulation was not performed.     Artifacts:  Intermittent myogenic and movement artifacts were noted.    EEG Summary / Classification:  Abnormal EEG   - GPDs with triphasic morphology.  - Moderate generalized slowing.    EEG Impression / Clinical Correlate:  Abnormal EEG study.  Moderate nonspecific diffuse or multifocal cerebral dysfunction.   GPDs with triphasic morphology can be seen in the presence of diffuse or multifocal non-specific cerebral dysfunction.  Although they can be shown to increase the risk for generalized seizure activity, they are likely secondary to underlying toxic/metabolic etiology in this case given their appearance.  No seizure seen.    Rodo Roblero MD  Attending Physician, [facility]

## 2021-04-08 NOTE — PROGRESS NOTE ADULT - SUBJECTIVE AND OBJECTIVE BOX
HPI: 70 year old Ukrainian/Jhonny-speaking male with a history of CAD s/p CABG and 2 stents, Type 2 DM, HTN, BPH s/p TURP and Parkinson's disease, who was recently admitted 3/14 for weakness and COVID-19. Started on decadron and remdesivir and discharged 3/16. Returned 3/17 with worsening respiratory failure. Intubated 3/17. Course c/b ?parotitis, Pseudomonas PNA, and new onset atrial fibrillation now in sinus rhythm s/p amio conversion. S/p trial of IVIG for poss COVID encephalopathy from 4/5-4/8 without improvement and dced 1 day early. S/p vEEG on 4/6 without seizures but waveforms c/f poss development of one and so keppra loaded.     INTERVAL OVERNIGHT EVENTS:  -Had an episode overnight where patient was awake, hypertensive, tachycardic. Was given dilaudid, fentanyl x 2 without improvement, ?seizures > given versed 4mg IVP x 1 with improvement. However, EEG report this AM without any seizure activity.     Allergies:  No Known Allergies    SUBJECTIVE:    Review of Systems: Unable to obtain as patient is intubated & sedated    OBJECTIVE:  Vitals:  ICU Vital Signs Last 24 Hrs  T(C): 36.7 (08 Apr 2021 09:00), Max: 37.3 (07 Apr 2021 15:00)  T(F): 98 (08 Apr 2021 09:00), Max: 99.1 (07 Apr 2021 15:00)  HR: 66 (08 Apr 2021 10:00) (56 - 83)  BP: --  BP(mean): --  ABP: 144/74 (08 Apr 2021 10:00) (135/52 - 189/65)  ABP(mean): 98 (08 Apr 2021 10:00) (75 - 109)  RR: 16 (08 Apr 2021 10:00) (16 - 38)  SpO2: 94% (08 Apr 2021 10:00) (93% - 100%)    Physical Exam:   Constitutional: NAD  HEENT: NC  Respiratory: Intubated  Cardiovascular: Regular rate  Gastrointestinal: Soft, ND  Extremities: No LE edema   Neurological: Intubated, sedated     Vent Settings:  Mode: AC/ CMV (Assist Control/ Continuous Mandatory Ventilation)  RR (machine): 16  TV (machine): 400  FiO2: 35  PEEP: 5  ITime: 0.8  MAP: 13  PIP: 31    ABG - ( 08 Apr 2021 01:52 )  pH, Arterial: 7.46  pH, Blood: x     /  pCO2: 34    /  pO2: 57    / HCO3: 25    / Base Excess: 0.9   /  SaO2: 88.8              I&O:  I&O's Detail    07 Apr 2021 07:01  -  08 Apr 2021 07:00  --------------------------------------------------------  IN:    Dexmedetomidine: 568.8 mL    Free Water: 60 mL    Glucerna 1.5: 675 mL    IV PiggyBack: 250 mL  Total IN: 1553.8 mL    OUT:    Rectal Tube (mL): 400 mL    Ureteral Catheter (mL): 1400 mL  Total OUT: 1800 mL    Total NET: -246.2 mL        Labs:                        8.4    9.27  )-----------( 453      ( 08 Apr 2021 01:52 )             26.8     04-08    143  |  112<H>  |  48<H>  ----------------------------<  225<H>  3.9   |  24  |  1.39<H>    Ca    7.2<L>      08 Apr 2021 01:52  Phos  3.6     04-08  Mg     2.2     04-08    TPro  6.9  /  Alb  1.4<L>  /  TBili  0.2  /  DBili  x   /  AST  41<H>  /  ALT  30  /  AlkPhos  96  04-08    LIVER FUNCTIONS - ( 08 Apr 2021 01:52 )  Alb: 1.4 g/dL / Pro: 6.9 g/dL / ALK PHOS: 96 U/L / ALT: 30 U/L / AST: 41 U/L / GGT: x           PT/INR - ( 08 Apr 2021 01:52 )   PT: 13.5 sec;   INR: 1.19 ratio         PTT - ( 08 Apr 2021 01:52 )  PTT:33.4 sec        POCT Blood Glucose.: 235 mg/dL *H* (04-08-21 @ 10:42)  POCT Blood Glucose.: 228 mg/dL *H* (04-08-21 @ 05:25)  POCT Blood Glucose.: 167 mg/dL *H* (04-07-21 @ 17:15)    Micro:    Medications:  MEDICATIONS  (STANDING):  aspirin  chewable 81 milliGRAM(s) Oral daily  carbidopa/levodopa  10/100 2 Tablet(s) Oral <User Schedule>  chlorhexidine 4% Liquid 1 Application(s) Topical <User Schedule>  dexMEDEtomidine Infusion 0.05 MICROgram(s)/kG/Hr (1 mL/Hr) IV Continuous <Continuous>  dextrose 40% Gel 15 Gram(s) Oral once  dextrose 50% Injectable 25 Gram(s) IV Push once  dextrose 50% Injectable 12.5 Gram(s) IV Push once  dextrose 50% Injectable 25 Gram(s) IV Push once  glucagon  Injectable 1 milliGRAM(s) IntraMuscular once  heparin   Injectable 5000 Unit(s) SubCutaneous every 8 hours  hydrALAZINE 50 milliGRAM(s) Oral every 8 hours  insulin lispro (ADMELOG) corrective regimen sliding scale   SubCutaneous every 6 hours  insulin NPH human recombinant 5 Unit(s) SubCutaneous every 6 hours  ketorolac 0.5% Ophthalmic Solution 1 Drop(s) Left EYE two times a day  levETIRAcetam  IVPB 1000 milliGRAM(s) IV Intermittent every 12 hours  levoFLOXacin IVPB 750 milliGRAM(s) IV Intermittent every 48 hours  metoprolol tartrate 25 milliGRAM(s) Enteral Tube two times a day  pantoprazole  Injectable 40 milliGRAM(s) IV Push daily  petrolatum Ophthalmic Ointment 1 Application(s) Both EYES two times a day  polyethylene glycol 3350 17 Gram(s) Oral daily  potassium chloride   Powder 10 milliEquivalent(s) Enteral Tube once  prednisoLONE acetate 1% Suspension 1 Drop(s) Left EYE four times a day  senna Syrup 10 milliLiter(s) Oral daily  simvastatin 20 milliGRAM(s) Oral at bedtime    MEDICATIONS  (PRN):  acetaminophen    Suspension .. 650 milliGRAM(s) Enteral Tube every 6 hours PRN Temp greater or equal to 38C (100.4F), Mild Pain (1 - 3)  fentaNYL    Injectable 50 MICROGram(s) IV Push every 6 hours PRN Agitation  sodium chloride 0.9% lock flush 10 milliLiter(s) IV Push every 1 hour PRN Pre/post blood products, medications, blood draw, and to maintain line patency

## 2021-04-08 NOTE — PROGRESS NOTE ADULT - ATTENDING COMMENTS
Patient seen in the icu. I agree with the above provider documentation    1-resp failure  -non hypoxic  -requires vent because of mental status  2-ams  -seixure disorder versus covid enceph.  -will discontniue ivig no improvement contributing to volume overload  -eeg  -neuro following .

## 2021-04-08 NOTE — PROGRESS NOTE ADULT - ASSESSMENT
70y Male with history of HTN on HCTZ presents with weakness found to have COVID-19. Nephrology consulted for hyponatremia.    1) ZELALEM: Non-oliguric ZELALEM likely due to DEMIAN. Scr now improving with good UO. Continue with supportive care. Urine sodium low which is often seen in DEMIAN. Avoid nephrotoxins.     2) HTN: BP improving after addition of hydralazine 50 mg TID. Rate control as per ICU. Monitor BP.    3) Hypernatremia: Resolved with patient now off of free water. Can give additional lasix today to keep net negative (last dose on 4/6). Monitor serum Na.    4) COVID-19: As per primary team.      [facility] NEPHROLOGY  Mahesh Lomax M.D.  Perez Armstrong D.O.  Gricelda Choe M.D.  Juliet Rogers, MSN, ANP-C    Telephone: [phone]  Facsimile: [phone]    [address] [place] [address]

## 2021-04-08 NOTE — CHART NOTE - NSCHARTNOTEFT_GEN_A_CORE
70 year old Moh[place]/Jhonny-speaking male with a history of DM2 (uncontrolled A1c 10.3%, on basal/bolus insulin at home), HTN, CAD s/p CABG and stents, BPH s/p TURP and Parkinson's disease, now readmitted with COVID and started on high dose Decadron. Endocrine team consulted for management of DM2. Last dose Dexamethasone 3/25/21    BG, insulin administration and chart reviewed  Overall glucose levels have been within/close to goal, noted with hyperglycemia this AM however midnight NPH dose was held. Now resumed, patient received NPH 5 units this AM at 0600 and 4 units Admelog correction. Will followup  Remains ordered for continuous tube feeding - Glucerna 1.5 elin @ 45 ml/hr x 24h  For now, recommend to continue NPH 5 units q6h (Hold if TF is held, hold if FS less than 100 mg/dl).   Continue Admelog MODERATE dose correctional scale q6h  Check BG q6h  Contact endocrine with questions/concerns     CAPILLARY BLOOD GLUCOSE    POCT Blood Glucose.: 228 mg/dL (08 Apr 2021 05:25)  POCT Blood Glucose.: 167 mg/dL (07 Apr 2021 17:15)  POCT Blood Glucose.: 113 mg/dL (07 Apr 2021 11:07)    04-08    143  |  112<H>  |  48<H>  ----------------------------<  225<H>  3.9   |  24  |  1.39<H>    Ca    7.2<L>      08 Apr 2021 01:52  Phos  3.6     04-08  Mg     2.2     04-08    TPro  6.9  /  Alb  1.4<L>  /  TBili  0.2  /  DBili  x   /  AST  41<H>  /  ALT  30  /  AlkPhos  96  04-08    MEDICATIONS  (STANDING):  aspirin  chewable 81 milliGRAM(s) Oral daily  carbidopa/levodopa  10/100 2 Tablet(s) Oral <User Schedule>  chlorhexidine 4% Liquid 1 Application(s) Topical <User Schedule>  dexMEDEtomidine Infusion 0.05 MICROgram(s)/kG/Hr (1 mL/Hr) IV Continuous <Continuous>  dextrose 40% Gel 15 Gram(s) Oral once  dextrose 50% Injectable 25 Gram(s) IV Push once  dextrose 50% Injectable 12.5 Gram(s) IV Push once  dextrose 50% Injectable 25 Gram(s) IV Push once  glucagon  Injectable 1 milliGRAM(s) IntraMuscular once  heparin   Injectable 5000 Unit(s) SubCutaneous every 8 hours  hydrALAZINE 50 milliGRAM(s) Oral every 8 hours  insulin lispro (ADMELOG) corrective regimen sliding scale   SubCutaneous every 6 hours  insulin NPH human recombinant 5 Unit(s) SubCutaneous every 6 hours  ketorolac 0.5% Ophthalmic Solution 1 Drop(s) Left EYE two times a day  levETIRAcetam  IVPB 1000 milliGRAM(s) IV Intermittent every 12 hours  levoFLOXacin IVPB 750 milliGRAM(s) IV Intermittent every 48 hours  metoprolol tartrate 25 milliGRAM(s) Enteral Tube two times a day  pantoprazole  Injectable 40 milliGRAM(s) IV Push daily  petrolatum Ophthalmic Ointment 1 Application(s) Both EYES two times a day  polyethylene glycol 3350 17 Gram(s) Oral daily  potassium chloride   Powder 10 milliEquivalent(s) Enteral Tube once  prednisoLONE acetate 1% Suspension 1 Drop(s) Left EYE four times a day  senna Syrup 10 milliLiter(s) Oral daily  simvastatin 20 milliGRAM(s) Oral at bedtime    Diet, NPO with Tube Feed:   Tube Feeding Modality: Orogastric  Glucerna 1.5 Elin (GLUCERNA1.5RTH)  Total Volume for 24 Hours (mL): 1080  Continuous  Starting Tube Feed Rate {mL per Hour}: 10  Increase Tube Feed Rate by (mL): 10     Every 4 hours  Until Goal Tube Feed Rate (mL per Hour): 45  Tube Feed Duration (in Hours): 24  Tube Feed Start Time: 15:45  No Carb Prosource (1pkg = 15gms Protein)     Qty per Day:  2 (03-30-21 @ 13:24)    A1C with Estimated Average Glucose Result: 10.3 % (03-14-21 @ 08:57)  A1C with Estimated Average Glucose: 9.7 % (10-01-20 @ 06:30)    Isa Benito  Nurse Practitioner  Division of Endocrinology & Diabetes  In house pager #[phone]/long range pager #[phone]    If before 9AM or after 6PM, or on weekends/holidays, please call endocrine answering service for assistance ([phone]).  For nonurgent matters email [email or web address] for assistance.

## 2021-04-09 NOTE — PROGRESS NOTE ADULT - SUBJECTIVE AND OBJECTIVE BOX
[facility] NEPHROLOGY- PROGRESS NOTE, Follow up     Patient is a 70y Male with history of HTN on HCTZ presents with weakness found to have COVID-19. Nephrology consulted for hyponatremia.    Patient with resolution of hyponatremia for which nephrology had signed off on 3/19. Patient subsequently with non-oliguric ZELALEM.    REVIEW OF SYSTEMS: Unable to obtain as patient intubated.    No Known Allergies    Hospital Medications: Reviewed.      VITALS:  T(F): 98.6 (04-09-21 @ 11:00), Max: 100.1 (04-08-21 @ 17:00)  HR: 57 (04-09-21 @ 11:00)  BP: --  RR: 24 (04-09-21 @ 11:00)  SpO2: 99% (04-09-21 @ 11:00)  Wt(kg): --    04-08 @ 07:01  -  04-09 @ 07:00  --------------------------------------------------------  IN: 1506.3 mL / OUT: 1705 mL / NET: -198.7 mL    04-09 @ 07:01  -  04-09 @ 14:29  --------------------------------------------------------  IN: 186.6 mL / OUT: 350 mL / NET: -163.4 mL      PHYSICAL EXAM:  Gen: Intubated  Cards: RRR, +S1/S2, no M/G/R  Resp: CTA B/L  GI: soft, NT/ND, NABS  Vascular: trace LE edema B/L, + UE edema        LABS:  04-09    148<H>  |  117<H>  |  44<H>  ----------------------------<  294<H>  4.0   |  25  |  1.35<H>    Ca    7.5<L>      09 Apr 2021 02:47  Phos  3.1     04-09  Mg     2.4     04-09    TPro  6.8  /  Alb  1.6<L>  /  TBili  0.2  /  DBili      /  AST  27  /  ALT  29  /  AlkPhos  96  04-09    Creatinine Trend: 1.35 <--, 1.39 <--, 1.52 <--, 1.57 <--, 1.76 <--, 1.74 <--, 1.78 <--, 1.78 <--                        8.4    7.88  )-----------( 520      ( 09 Apr 2021 02:47 )             27.2     Urine Studies:

## 2021-04-09 NOTE — PROGRESS NOTE ADULT - ASSESSMENT
70y Male with history of HTN on HCTZ presents with weakness found to have COVID-19. Nephrology consulted for hyponatremia.    1) ZELALEM: Non-oliguric ZELALEM likely due to DEMIAN. Scr now improving with good UO. Continue with supportive care. Urine sodium low which is often seen in DEMIAN. Avoid nephrotoxins.     2) HTN: BP uncontrolled for which hydralazine increased to 75 mg TID. Rate control as per ICU. Monitor BP.    3) Hypernatremia: Mild with patient started on free water 250 ml Q6 hours this morning. Monitor serum Na.    4) COVID-19: As per primary team.      [facility] NEPHROLOGY  Mahesh Lomax M.D.  Perez Armstrong D.O.  Gricelda Choe M.D.  Juliet Rogers, MSN, ANP-C    Telephone: [phone]  Facsimile: [phone]    [address] [place] [address]

## 2021-04-09 NOTE — PROGRESS NOTE ADULT - ASSESSMENT
70 year old Serbian/Jhonny-speaking male with a history of CAD s/p CABG and 2 stents, Type 2 DM, HTN, BPH s/p TURP and Parkinson's disease, who was recently admitted 3/14 for weakness and COVID-19. Started on decadron and remdesivir and discharged 3/16. Returned 3/17 with worsening respiratory failure. Intubated 3/17. Course c/b ?parotitis, Pseudomonas PNA, and new onset atrial fibrillation now in sinus rhythm s/p amio conversion. S/p trial of IVIG for poss COVID encephalopathy from 4/5-4/8 without improvement and dced 1 day early. S/p vEEG on 4/6 without seizures but waveforms c/f poss development of one and so keppra loaded.     Neuro  Mental status remains poor, not following commands  - Neuro consult- on 24h+ EEG, no MRI at this time  - CTH negative for bleed 4/2  - Ammonia level, B12, and folate WNL   - Fentanyl 25mcg IVP PRN for dyssynchrony/agitation    - Stop methadone  - C/w sinemet for Parkinson's disease   - IVIG x 5d (4/5-4/9) for possible COVID encephalopathy, stopped a day early due to no effect      Cardiovascular  - had recent h/o PAF, converted to NSR with amio  - maintaining NSR off amio, c/w lopressor 25 BID   - Continuous BP monitoring with A-line ( labile B/p 160-190 systolic, changes without intervention)    Pulmonary  - Intubated 3/20  - Continue full support with AC/VC 16/ 400/ 5/ 40% and trial CPAP when MS improves.   - Unable to wean from vent given poor mental status  - Will begin tracheostomy planning, will d/w family     GI  - C/w glucerna TFs @ 45 hr   - IV PPI daily   - Bowel regimen, loose BM on overnight    Renal  ZELALEM 2/2 contrast load 3/26? vs COVID/SEPSIS  - Non oliguric, creatinine peaked and is currently downtrending   - Da Silva for monitoring urine output  - HyperNa improved, now off FWF     ID  COVID  - S/p remdesivir, decadron     Neck swelling  - CT neck 3/26 c/f parotitis and or masseter myositis with cellulitis vs facial edema from proning   - Sputum from 3/26 growing Pseudomonas fluorescens   - S/p santos (3/30-4/8) 10d course per ID, s/p vanco 10d course  - Switched to renally dosed levaquin 4/8- (ID rec only levaquin x 4 more days (to completed 14 day course between santos and levaquin for sputum pseudomonas)     Endo  - C/w NPH 5u, Mod SSI  - Endo following  - TSH 4.25, T4 WNL, subclinical (4/1), can repeat TFTs in 4-6 weeks     Heme  - SQH 5k q8h  - ASA 81mg   - LE DVT study negative 4/7    Lines/Tubes  - LRA Natali 3/20  - Da Silva 3/20     GOC  - Full code  ======================= DISPOSITION  =====================  [X ] Critical   [ ] Guarded    [ ] Stable    [X ] Maintain in COVID ICU  [ ] Downgrade to Medicine   [ ] Discharge Home      [facility]  ICU     70 year old [place]/Jhonny-speaking male with a history of CAD s/p CABG and 2 stents, Type 2 DM, HTN, BPH s/p TURP and Parkinson's disease, who was recently admitted 3/14 for weakness and COVID-19. Started on decadron and remdesivir and discharged 3/16. Returned 3/17 with worsening respiratory failure. Intubated 3/17. Course c/b ?parotitis, Pseudomonas PNA, and new onset atrial fibrillation now in sinus rhythm s/p amio conversion. S/p trial of IVIG for poss COVID encephalopathy from 4/5-4/8 without improvement and dced 1 day early. S/p vEEG on 4/6 without seizures but waveforms c/f poss development of one and so keppra loaded.     Neuro  Mental status remains poor, not following commands  - Neuro consult- on 24h+ EEG, no MRI at this time  - CTH negative for bleed 4/2  - Ammonia level, B12, and folate WNL   - Fentanyl 25mcg IVP PRN for dyssynchrony/agitation    - Stop methadone  - C/w sinemet for Parkinson's disease   - IVIG x 5d (4/5-4/9) for possible COVID encephalopathy, stopped a day early due to no effect      Cardiovascular  - had recent h/o PAF, converted to NSR with amio  - maintaining NSR off amio, c/w lopressor 25 BID     Pulmonary  - Intubated 3/20  - Continue full support with AC/VC 16/400/5/40%, SBTs  - Unable to wean from vent given poor mental status  - Pending Tracheostomy today    GI  - NPO for Trach  - IV PPI daily   - Bowel regimen    Renal  ZELALEM 2/2 contrast load 3/26? vs COVID/SEPSIS  - Non oliguric, creatinine peaked and is currently downtrending   - Da Silva for monitoring urine output  - HyperNa worsening, restarted on free water     ID  COVID  - S/p remdesivir, decadron     Neck swelling  - CT neck 3/26 c/f parotitis and or masseter myositis with cellulitis vs facial edema from proning   - Sputum from 3/26 growing Pseudomonas fluorescens   - S/p santos (3/30-4/8) 10d course per ID, s/p vanco 10d course  - Switched to renally dosed levaquin 4/8- (ID rec only levaquin x 4 more days (to completed 14 day course between santos and levaquin for sputum pseudomonas)     Endo  - C/w NPH 8u, Mod SSI  - Endo following  - TSH 4.25, T4 WNL, subclinical (4/1), can repeat TFTs in 4-6 weeks     Heme  - SQH 5k q8h  - ASA 81mg   - LE DVT study negative 4/7    GO  - Full code  ======================= DISPOSITION  =====================  [X ] Critical   [ ] Guarded    [ ] Stable    [X ] Maintain in COVID ICU  [ ] Downgrade to Medicine   [ ] Discharge Home      [facility]  ICU

## 2021-04-09 NOTE — PROGRESS NOTE ADULT - SUBJECTIVE AND OBJECTIVE BOX
SHAHZAD ANN  MRN-[id number]  Patient is a 70y old  Male who presents with a chief complaint of acute respiratory failure d/t covid (08 Apr 2021 15:47)    HPI:  Patient is a 70 year old Persian/Jhonny-speaking male with a history of CAD s/p CABG and 2 stents, Type 2 DM, HTN, BPH s/p TURP and Parkinson's disease, who was recently admitted on 3/14 for weakness, covid, hypoxia, started on remdesivir/decadron, discharged home yesterday (3/16). However, at home, pt decompensated with increasing sob/labored breathing, o2 sat dropped from 88% to 66% within 1/2 hour per daughter, gasping for air. Pt's son called 911 and brought him back to the hospital. Pt denies chest pain/abd pain/n/v/diarrhea. He reports he was sent home without oxygen.     REVIEW OF SYSTEMS:      ICU Vital Signs Last 24 Hrs  T(C): 37.2 (09 Apr 2021 04:00), Max: 37.8 (08 Apr 2021 16:00)  T(F): 98.9 (09 Apr 2021 04:00), Max: 100.1 (08 Apr 2021 17:00)  HR: 57 (09 Apr 2021 06:00) (53 - 67)  ABP: 162/55 (09 Apr 2021 06:00) (140/49 - 168/58)  ABP(mean): 95 (09 Apr 2021 01:00) (72 - 109)  RR: 34 (09 Apr 2021 06:00) (16 - 34)  SpO2: 97% (09 Apr 2021 06:00) (94% - 99%)    Mode: CPAP with PS, FiO2: 30, PEEP: 5    I&O's Summary    08 Apr 2021 07:01  -  09 Apr 2021 07:00  --------------------------------------------------------  IN: 1506.3 mL / OUT: 1705 mL / NET: -198.7 mL    CAPILLARY BLOOD GLUCOSE  POCT Blood Glucose.: 288 mg/dL (08 Apr 2021 17:09)    PHYSICAL EXAM:      ============================I/O===========================   I&O's Detail    08 Apr 2021 07:01  -  09 Apr 2021 07:00  --------------------------------------------------------  IN:    Dexmedetomidine: 576.3 mL    Free Water: 160 mL    Glucerna 1.5: 720 mL    IV PiggyBack: 50 mL  Total IN: 1506.3 mL    OUT:    Rectal Tube (mL): 100 mL    Ureteral Catheter (mL): 1605 mL  Total OUT: 1705 mL    Total NET: -198.7 mL  ============================ LABS =========================                        8.4    7.88  )-----------( 520      ( 09 Apr 2021 02:47 )             27.2     04-09    148<H>  |  117<H>  |  44<H>  ----------------------------<  294<H>  4.0   |  25  |  1.35<H>    Ca    7.5<L>      09 Apr 2021 02:47  Phos  3.1     04-09  Mg     2.4     04-09    TPro  6.8  /  Alb  1.6<L>  /  TBili  0.2  /  DBili  x   /  AST  27  /  ALT  29  /  AlkPhos  96  04-09    LIVER FUNCTIONS - ( 09 Apr 2021 02:47 )  Alb: 1.6 g/dL / Pro: 6.8 g/dL / ALK PHOS: 96 U/L / ALT: 29 U/L / AST: 27 U/L / GGT: x           PT/INR - ( 09 Apr 2021 02:47 )   PT: 13.7 sec;   INR: 1.21 ratio         PTT - ( 09 Apr 2021 02:47 )  PTT:30.0 sec  ABG - ( 09 Apr 2021 02:47 )  pH, Arterial: 7.43  pH, Blood: x     /  pCO2: 39    /  pO2: 71    / HCO3: 26    / Base Excess: 1.2   /  SaO2: 94.8    Blood Gas Arterial, Lactate: 1.8 mmol/L (04-09-21 @ 02:47)  Blood Gas Arterial, Lactate: 1.7 mmol/L (04-08-21 @ 01:52)  Blood Gas Arterial, Lactate: 1.7 mmol/L (04-07-21 @ 03:15)  =====================Micro/Rad/Cardio=================  Telemetry: Reviewed   EKG: Reviewed  CXR: Reviewed  Culture: Reviewed   Echo: Transthoracic Echocardiogram:   Patient name: SHAHZAD ANN  YOB: 1950   Age: 70 (M)   MR#: [id number]  Study Date: 10/1/2020  Location: [id number]: Elizabeth Esteves [facility]  Study quality: Technically good  Referring Physician: Damien Reina MD  Blood Pressure: 167/75 mmHg  Height: 170 cm  Weight: 75 kg  BSA: 1.9 m2  ------------------------------------------------------------------------  PROCEDURE: Transthoracic echocardiogram with 2-D, M-Mode  and complete spectral and color flow Doppler.  INDICATION: Chest pain, unspecified (R07.9)  ------------------------------------------------------------------------  DIMENSIONS:  Dimensions:     Normal Values:  LA:     3.9 cm    2.0 - 4.0 cm  Ao:     3.2 cm    2.0 - 3.8 cm  SEPTUM: 0.9 cm    0.6 - 1.2 cm  PWT:   1.1 cm    0.6 - 1.1 cm  LVIDd:  4.6 cm    3.0 - 5.6 cm  LVIDs:  3.0 cm    1.8 - 4.0 cm  Derived Variables:  LVMI: 85 g/m2  RWT: 0.47  Fractional short: 35 %  Ejection Fraction (Teicholtz): 64 %  ------------------------------------------------------------------------  OBSERVATIONS:  Mitral Valve: Normal mitral valve.  Aortic Root: Normal aortic root.  Aortic Valve: Calcified trileaflet aortic valve with normal  opening. Minimal aortic regurgitation.  Left Atrium: Normal left atrium.  LA volumeindex = 32  cc/m2.  Left Ventricle: Normal left ventricular systolic function.  No segmental wall motion abnormalities. Increased relative  wall thickness with normal left ventricular mass index,  consistent with concentric left ventricular remodeling.  (DT:241 ms).  Right Heart: Normal right atrium. Normal right ventricular  size and function. Normal tricuspid valve. Normal pulmonic  valve.  Pericardium/PleuraNormal pericardium with no pericardial  effusion.  ------------------------------------------------------------------------  CONCLUSIONS:  1. Calcified trileaflet aortic valve with normal opening.  Minimal aortic regurgitation.  2. Increased relative wall thickness with normal left  ventricular mass index, consistent with concentric left  ventricular remodeling.  3. Normal left ventricular systolic function. No segmental  wall motion abnormalities.  4. Normal right ventricular size and function.  ------------------------------------------------------------------------  Confirmed on10/2/2020 - 10:00:16 by Reinier Pearson M.D. RPVI  ------------------------------------------------------------------------ (10-01-20 @ 18:39)  Transthoracic Echocardiogram:   Patient name: SHAHZAD ANN  YOB: 1950   Age: 67 (M)   MR#: [id number]  Study Date: 10/9/2017  Location: 428Sonographer: EMI Soto  Study quality: Technically Fair  Referring Physician: Shahzad Ann MD  Blood Pressure: 137/79 mmHg  Height: 168 cm  Weight: 77 kg  BSA: 1.9 m2  ------------------------------------------------------------------------  PROCEDURE: Transthoracic echocardiogram with 2-D, M-Mode  and complete spectral and color flow Doppler.  INDICATION: Chestpain, unspecified (R07.9)  ------------------------------------------------------------------------  DIMENSIONS:  Dimensions:     Normal Values:  LA:     3.4 cm    2.0 - 4.0 cm  Ao:     3.3 cm    2.0 - 3.8 cm  SEPTUM: 0.6 cm    0.6 - 1.2 cm  PWT:    0.7 cm    0.6 - 1.1 cm  LVIDd:  4.8 cm    3.0 - 5.6 cm  LVIDs:  3.2 cm    1.8 - 4.0 cm  Derived Variables:  LVMI: 52 g/m2  RWT: 0.29  Fractional short: 33 %  Ejection Fraction (Teicholtz): 62 %  ------------------------------------------------------------------------  OBSERVATIONS:  Mitral Valve: Mitral annular calcification, otherwise  normal mitral valve. Minimal mitral regurgitation.  Aortic Root: Normal aortic root.  Aortic Valve: Calcified trileaflet aortic valve with normal  opening.  Left Atrium: Normal left atrium.  Left Ventricle: Endocardium not well visualized; grossly  normal left ventricular systolic function. Normal left  ventricular internal dimensions and wall thicknesses.  Right Heart: Normal right atrium. The right ventricle is  not well visualized; grossly normal right ventricular  systolic function. Normal tricuspid valve. Minimal  tricuspid regurgitation. Normal pulmonic valve.  Pericardium/PleuraNormal pericardium with no pericardial  effusion.  ------------------------------------------------------------------------  CONCLUSIONS:  1. Mitral annular calcification, otherwise normal mitral  valve. Minimal mitral regurgitation.  2. Normal left ventricular internal dimensions and wall  thicknesses.  3. Endocardium notwell visualized; grossly normal left  ventricular systolic function.  4. The right ventricle is not well visualized; grossly  normal right ventricular systolic function.  --======================================================  PAST MEDICAL & SURGICAL HISTORY:  Diabetes mellitus    CAD (coronary artery disease)  CABG x2 2012, Stents x2 2014    Hypertension    HLD (hyperlipidemia)    GERD (gastroesophageal reflux disease)    BPH (benign prostatic hyperplasia)    Bladder neck obstruction  3 surgeries for BPH, most recent 2012    History of coronary artery bypass graft  X2 in March of 2012    Presence of stent in coronary artery  x2 2014 SHAHZAD ANN  MRN-[id number]  Patient is a 70y old  Male who presents with a chief complaint of acute respiratory failure d/t covid (08 Apr 2021 15:47)    HPI: 70 year old Faroese/Jhonny-speaking male with a history of CAD s/p CABG and 2 stents, Type 2 DM, HTN, BPH s/p TURP and Parkinson's disease, who was recently admitted on 3/14 for weakness, covid, hypoxia, started on remdesivir/decadron, discharged home yesterday (3/16). However, at home, pt decompensated with increasing sob/labored breathing, o2 sat dropped from 88% to 66% within 1/2 hour per daughter, gasping for air. Pt's son called 911 and brought him back to the hospital. Pt denies chest pain/abd pain/n/v/diarrhea. He reports he was sent home without oxygen.     REVIEW OF SYSTEMS:  - Unable to obtain, intubated/sedated    Mode: CPAP with PS, FiO2: 30, PEEP: 5    I&O's Summary    08 Apr 2021 07:01  -  09 Apr 2021 07:00  --------------------------------------------------------  IN: 1506.3 mL / OUT: 1705 mL / NET: -198.7 mL    CAPILLARY BLOOD GLUCOSE  POCT Blood Glucose.: 288 mg/dL (08 Apr 2021 17:09)    PHYSICAL EXAM:    ============================I/O===========================   I&O's Detail    08 Apr 2021 07:01  -  09 Apr 2021 07:00  --------------------------------------------------------  IN:    Dexmedetomidine: 576.3 mL    Free Water: 160 mL    Glucerna 1.5: 720 mL    IV PiggyBack: 50 mL  Total IN: 1506.3 mL    OUT:    Rectal Tube (mL): 100 mL    Ureteral Catheter (mL): 1605 mL  Total OUT: 1705 mL    Total NET: -198.7 mL  ============================ LABS =========================                   8.4    7.88  )-----------( 520      ( 09 Apr 2021 02:47 )             27.2     04-09    148<H>  |  117<H>  |  44<H>  ----------------------------<  294<H>  4.0   |  25  |  1.35<H>    Ca    7.5<L>      09 Apr 2021 02:47  Phos  3.1     04-09  Mg     2.4     04-09    TPro  6.8  /  Alb  1.6<L>  /  TBili  0.2  /  DBili  x   /  AST  27  /  ALT  29  /  AlkPhos  96  04-09    LIVER FUNCTIONS - ( 09 Apr 2021 02:47 )  Alb: 1.6 g/dL / Pro: 6.8 g/dL / ALK PHOS: 96 U/L / ALT: 29 U/L / AST: 27 U/L / GGT: x           PT/INR - ( 09 Apr 2021 02:47 )   PT: 13.7 sec;   INR: 1.21 ratio    PTT - ( 09 Apr 2021 02:47 )  PTT:30.0 sec  ABG - ( 09 Apr 2021 02:47 )  pH, Arterial: 7.43  pH, Blood: x     /  pCO2: 39    /  pO2: 71    / HCO3: 26    / Base Excess: 1.2   /  SaO2: 94.8      Blood Gas Arterial, Lactate: 1.8 mmol/L (04-09-21 @ 02:47)  Blood Gas Arterial, Lactate: 1.7 mmol/L (04-08-21 @ 01:52)  Blood Gas Arterial, Lactate: 1.7 mmol/L (04-07-21 @ 03:15)  =====================Micro/Rad/Cardio=================  Telemetry: Reviewed   EKG: Reviewed  CXR: Reviewed  Culture: Reviewed   Echo: Transthoracic Echocardiogram:   Patient name: SHAHZAD ANN  YOB: 1950   Age: 70 (M)   MR#: [id number]  Study Date: 10/1/2020  Location: [id number]: [facility]  Study quality: Technically good  Referring Physician: Damien Reina MD  Blood Pressure: 167/75 mmHg  Height: 170 cm  Weight: 75 kg  BSA: 1.9 m2  ------------------------------------------------------------------------  PROCEDURE: Transthoracic echocardiogram with 2-D, M-Mode  and complete spectral and color flow Doppler.  INDICATION: Chest pain, unspecified (R07.9)  ------------------------------------------------------------------------  DIMENSIONS:  Dimensions:     Normal Values:  LA:     3.9 cm    2.0 - 4.0 cm  Ao:     3.2 cm    2.0 - 3.8 cm  SEPTUM: 0.9 cm    0.6 - 1.2 cm  PWT:   1.1 cm    0.6 - 1.1 cm  LVIDd:  4.6 cm    3.0 - 5.6 cm  LVIDs:  3.0 cm    1.8 - 4.0 cm  Derived Variables:  LVMI: 85 g/m2  RWT: 0.47  Fractional short: 35 %  Ejection Fraction (Vee): 64 %  ------------------------------------------------------------------------  OBSERVATIONS:  Mitral Valve: Normal mitral valve.  Aortic Root: Normal aortic root.  Aortic Valve: Calcified trileaflet aortic valve with normal  opening. Minimal aortic regurgitation.  Left Atrium: Normal left atrium.  LA volumeindex = 32  cc/m2.  Left Ventricle: Normal left ventricular systolic function.  No segmental wall motion abnormalities. Increased relative  wall thickness with normal left ventricular mass index,  consistent with concentric left ventricular remodeling.  (DT:241 ms).  Right Heart: Normal right atrium. Normal right ventricular  size and function. Normal tricuspid valve. Normal pulmonic  valve.  Pericardium/PleuraNormal pericardium with no pericardial  effusion.  ------------------------------------------------------------------------  CONCLUSIONS:  1. Calcified trileaflet aortic valve with normal opening.  Minimal aortic regurgitation.  2. Increased relative wall thickness with normal left  ventricular mass index, consistent with concentric left  ventricular remodeling.  3. Normal left ventricular systolic function. No segmental  wall motion abnormalities.  4. Normal right ventricular size and function.  ------------------------------------------------------------------------  Confirmed on10/2/2020 - 10:00:16 by TASH Ruiz  ------------------------------------------------------------------------ (10-01-20 @ 18:39)  Transthoracic Echocardiogram:   Patient name: SHAHZAD ANN  YOB: 1950   Age: 67 (M)   MR#: [id number]  Study Date: 10/9/2017  Location: 428Sonographer: EMI Soto  Study quality: Technically Fair  Referring Physician: Shahzad Ann MD  Blood Pressure: 137/79 mmHg  Height: 168 cm  Weight: 77 kg  BSA: 1.9 m2  ------------------------------------------------------------------------  PROCEDURE: Transthoracic echocardiogram with 2-D, M-Mode  and complete spectral and color flow Doppler.  INDICATION: Chestpain, unspecified (R07.9)  ------------------------------------------------------------------------  DIMENSIONS:  Dimensions:     Normal Values:  LA:     3.4 cm    2.0 - 4.0 cm  Ao:     3.3 cm    2.0 - 3.8 cm  SEPTUM: 0.6 cm    0.6 - 1.2 cm  PWT:    0.7 cm    0.6 - 1.1 cm  LVIDd:  4.8 cm    3.0 - 5.6 cm  LVIDs:  3.2 cm    1.8 - 4.0 cm  Derived Variables:  LVMI: 52 g/m2  RWT: 0.29  Fractional short: 33 %  Ejection Fraction (Teicholtz): 62 %  ------------------------------------------------------------------------  OBSERVATIONS:  Mitral Valve: Mitral annular calcification, otherwise  normal mitral valve. Minimal mitral regurgitation.  Aortic Root: Normal aortic root.  Aortic Valve: Calcified trileaflet aortic valve with normal  opening.  Left Atrium: Normal left atrium.  Left Ventricle: Endocardium not well visualized; grossly  normal left ventricular systolic function. Normal left  ventricular internal dimensions and wall thicknesses.  Right Heart: Normal right atrium. The right ventricle is  not well visualized; grossly normal right ventricular  systolic function. Normal tricuspid valve. Minimal  tricuspid regurgitation. Normal pulmonic valve.  Pericardium/PleuraNormal pericardium with no pericardial  effusion.  ------------------------------------------------------------------------  CONCLUSIONS:  1. Mitral annular calcification, otherwise normal mitral  valve. Minimal mitral regurgitation.  2. Normal left ventricular internal dimensions and wall  thicknesses.  3. Endocardium notwell visualized; grossly normal left  ventricular systolic function.  4. The right ventricle is not well visualized; grossly  normal right ventricular systolic function.  --======================================================  PAST MEDICAL & SURGICAL HISTORY:  Diabetes mellitus  CAD (coronary artery disease)  CABG x2 2012, Stents x2 2014  Hypertension  HLD (hyperlipidemia)  GERD (gastroesophageal reflux disease)  BPH (benign prostatic hyperplasia)  Bladder neck obstruction  3 surgeries for BPH, most recent 2012  History of coronary artery bypass graft  X2 in March of 2012  Presence of stent in coronary artery  x2 2014 SHAHZAD ANN  MRN-[id number]  Patient is a 70y old  Male who presents with a chief complaint of acute respiratory failure d/t covid (08 Apr 2021 15:47)    HPI: 70 year old Romansh/Jhonny-speaking male with a history of CAD s/p CABG and 2 stents, Type 2 DM, HTN, BPH s/p TURP and Parkinson's disease, who was recently admitted on 3/14 for weakness, covid, hypoxia, started on remdesivir/decadron, discharged home yesterday (3/16). However, at home, pt decompensated with increasing sob/labored breathing, o2 sat dropped from 88% to 66% within 1/2 hour per daughter, gasping for air. Pt's son called 911 and brought him back to the hospital. Pt denies chest pain/abd pain/n/v/diarrhea. He reports he was sent home without oxygen.     REVIEW OF SYSTEMS:  - Unable to obtain, intubated/sedated    Mode: CPAP with PS, FiO2: 30, PEEP: 5    I&O's Summary    08 Apr 2021 07:01  -  09 Apr 2021 07:00  --------------------------------------------------------  IN: 1506.3 mL / OUT: 1705 mL / NET: -198.7 mL    CAPILLARY BLOOD GLUCOSE  POCT Blood Glucose.: 288 mg/dL (08 Apr 2021 17:09)  ============================I/O===========================   I&O's Detail    08 Apr 2021 07:01  -  09 Apr 2021 07:00  --------------------------------------------------------  IN:    Dexmedetomidine: 576.3 mL    Free Water: 160 mL    Glucerna 1.5: 720 mL    IV PiggyBack: 50 mL  Total IN: 1506.3 mL    OUT:    Rectal Tube (mL): 100 mL    Ureteral Catheter (mL): 1605 mL  Total OUT: 1705 mL    Total NET: -198.7 mL  ============================ LABS =========================                   8.4    7.88  )-----------( 520      ( 09 Apr 2021 02:47 )             27.2     04-09    148<H>  |  117<H>  |  44<H>  ----------------------------<  294<H>  4.0   |  25  |  1.35<H>    Ca    7.5<L>      09 Apr 2021 02:47  Phos  3.1     04-09  Mg     2.4     04-09    TPro  6.8  /  Alb  1.6<L>  /  TBili  0.2  /  DBili  x   /  AST  27  /  ALT  29  /  AlkPhos  96  04-09    LIVER FUNCTIONS - ( 09 Apr 2021 02:47 )  Alb: 1.6 g/dL / Pro: 6.8 g/dL / ALK PHOS: 96 U/L / ALT: 29 U/L / AST: 27 U/L / GGT: x           PT/INR - ( 09 Apr 2021 02:47 )   PT: 13.7 sec;   INR: 1.21 ratio    PTT - ( 09 Apr 2021 02:47 )  PTT:30.0 sec  ABG - ( 09 Apr 2021 02:47 )  pH, Arterial: 7.43  pH, Blood: x     /  pCO2: 39    /  pO2: 71    / HCO3: 26    / Base Excess: 1.2   /  SaO2: 94.8      Blood Gas Arterial, Lactate: 1.8 mmol/L (04-09-21 @ 02:47)  Blood Gas Arterial, Lactate: 1.7 mmol/L (04-08-21 @ 01:52)  Blood Gas Arterial, Lactate: 1.7 mmol/L (04-07-21 @ 03:15)  =====================Micro/Rad/Cardio=================  Telemetry: Reviewed   EKG: Reviewed  CXR: Reviewed  Culture: Reviewed   Echo: Transthoracic Echocardiogram:   Patient name: SHAHZAD ANN  YOB: 1950   Age: 70 (M)   MR#: [id number]  Study Date: 10/1/2020  Location: [id number]: Elizabeth Esteves [facility]  Study quality: Technically good  Referring Physician: Damien Reina MD  Blood Pressure: 167/75 mmHg  Height: 170 cm  Weight: 75 kg  BSA: 1.9 m2  ------------------------------------------------------------------------  PROCEDURE: Transthoracic echocardiogram with 2-D, M-Mode  and complete spectral and color flow Doppler.  INDICATION: Chest pain, unspecified (R07.9)  ------------------------------------------------------------------------  DIMENSIONS:  Dimensions:     Normal Values:  LA:     3.9 cm    2.0 - 4.0 cm  Ao:     3.2 cm    2.0 - 3.8 cm  SEPTUM: 0.9 cm    0.6 - 1.2 cm  PWT:   1.1 cm    0.6 - 1.1 cm  LVIDd:  4.6 cm    3.0 - 5.6 cm  LVIDs:  3.0 cm    1.8 - 4.0 cm  Derived Variables:  LVMI: 85 g/m2  RWT: 0.47  Fractional short: 35 %  Ejection Fraction (Vee): 64 %  ------------------------------------------------------------------------  OBSERVATIONS:  Mitral Valve: Normal mitral valve.  Aortic Root: Normal aortic root.  Aortic Valve: Calcified trileaflet aortic valve with normal  opening. Minimal aortic regurgitation.  Left Atrium: Normal left atrium.  LA volumeindex = 32  cc/m2.  Left Ventricle: Normal left ventricular systolic function.  No segmental wall motion abnormalities. Increased relative  wall thickness with normal left ventricular mass index,  consistent with concentric left ventricular remodeling.  (DT:241 ms).  Right Heart: Normal right atrium. Normal right ventricular  size and function. Normal tricuspid valve. Normal pulmonic  valve.  Pericardium/PleuraNormal pericardium with no pericardial  effusion.  ------------------------------------------------------------------------  CONCLUSIONS:  1. Calcified trileaflet aortic valve with normal opening.  Minimal aortic regurgitation.  2. Increased relative wall thickness with normal left  ventricular mass index, consistent with concentric left  ventricular remodeling.  3. Normal left ventricular systolic function. No segmental  wall motion abnormalities.  4. Normal right ventricular size and function.  ------------------------------------------------------------------------  Confirmed on10/2/2020 - 10:00:16 by TASH Ruiz  ------------------------------------------------------------------------ (10-01-20 @ 18:39)  Transthoracic Echocardiogram:   Patient name: SHHAZAD ANN  YOB: 1950   Age: 67 (M)   MR#: [id number]  Study Date: 10/9/2017  Location: 428Sonographer: EMI Soto  Study quality: Technically Fair  Referring Physician: Shahzad Ann MD  Blood Pressure: 137/79 mmHg  Height: 168 cm  Weight: 77 kg  BSA: 1.9 m2  ------------------------------------------------------------------------  PROCEDURE: Transthoracic echocardiogram with 2-D, M-Mode  and complete spectral and color flow Doppler.  INDICATION: Chestpain, unspecified (R07.9)  ------------------------------------------------------------------------  DIMENSIONS:  Dimensions:     Normal Values:  LA:     3.4 cm    2.0 - 4.0 cm  Ao:     3.3 cm    2.0 - 3.8 cm  SEPTUM: 0.6 cm    0.6 - 1.2 cm  PWT:    0.7 cm    0.6 - 1.1 cm  LVIDd:  4.8 cm    3.0 - 5.6 cm  LVIDs:  3.2 cm    1.8 - 4.0 cm  Derived Variables:  LVMI: 52 g/m2  RWT: 0.29  Fractional short: 33 %  Ejection Fraction (Teicholtz): 62 %  ------------------------------------------------------------------------  OBSERVATIONS:  Mitral Valve: Mitral annular calcification, otherwise  normal mitral valve. Minimal mitral regurgitation.  Aortic Root: Normal aortic root.  Aortic Valve: Calcified trileaflet aortic valve with normal  opening.  Left Atrium: Normal left atrium.  Left Ventricle: Endocardium not well visualized; grossly  normal left ventricular systolic function. Normal left  ventricular internal dimensions and wall thicknesses.  Right Heart: Normal right atrium. The right ventricle is  not well visualized; grossly normal right ventricular  systolic function. Normal tricuspid valve. Minimal  tricuspid regurgitation. Normal pulmonic valve.  Pericardium/PleuraNormal pericardium with no pericardial  effusion.  ------------------------------------------------------------------------  CONCLUSIONS:  1. Mitral annular calcification, otherwise normal mitral  valve. Minimal mitral regurgitation.  2. Normal left ventricular internal dimensions and wall  thicknesses.  3. Endocardium notwell visualized; grossly normal left  ventricular systolic function.  4. The right ventricle is not well visualized; grossly  normal right ventricular systolic function.  --======================================================  PAST MEDICAL & SURGICAL HISTORY:  Diabetes mellitus  CAD (coronary artery disease)  CABG x2 2012, Stents x2 2014  Hypertension  HLD (hyperlipidemia)  GERD (gastroesophageal reflux disease)  BPH (benign prostatic hyperplasia)  Bladder neck obstruction  3 surgeries for BPH, most recent 2012  History of coronary artery bypass graft  X2 in March of 2012  Presence of stent in coronary artery  x2 2014

## 2021-04-09 NOTE — PROGRESS NOTE ADULT - ATTENDING COMMENTS
Patient seen in the icu    noted to remain with encepathlopathy  neuro following  unable to wean secondary to mental state  planned for trach  ivig disocntnued  aeds for seizure  prognosis very poor

## 2021-04-09 NOTE — PROGRESS NOTE ADULT - SUBJECTIVE AND OBJECTIVE BOX
Chief Complaint: DM 2, COVID+, hyperglycemia    History: Patient seen at bedside in ICU - Surge B  Remains intubated/sedated  On Glucerna 1.5 ignacio   Off steroids    MEDICATIONS  (STANDING):  aspirin  chewable 81 milliGRAM(s) Oral daily  carbidopa/levodopa  10/100 2 Tablet(s) Oral <User Schedule>  chlorhexidine 4% Liquid 1 Application(s) Topical <User Schedule>  dexMEDEtomidine Infusion 0.05 MICROgram(s)/kG/Hr (1 mL/Hr) IV Continuous <Continuous>  heparin   Injectable 5000 Unit(s) SubCutaneous every 8 hours  hydrALAZINE 75 milliGRAM(s) Oral every 8 hours  insulin lispro (ADMELOG) corrective regimen sliding scale   SubCutaneous every 6 hours  insulin NPH human recombinant 8 Unit(s) SubCutaneous every 6 hours  ketorolac 0.5% Ophthalmic Solution 1 Drop(s) Left EYE two times a day  levETIRAcetam  IVPB 1000 milliGRAM(s) IV Intermittent every 12 hours  levoFLOXacin IVPB 750 milliGRAM(s) IV Intermittent every 48 hours  metoprolol tartrate 25 milliGRAM(s) Enteral Tube two times a day  pantoprazole  Injectable 40 milliGRAM(s) IV Push daily  petrolatum Ophthalmic Ointment 1 Application(s) Both EYES two times a day  polyethylene glycol 3350 17 Gram(s) Oral daily  prednisoLONE acetate 1% Suspension 1 Drop(s) Left EYE four times a day  propofol Infusion 25 MICROgram(s)/kG/Min (12 mL/Hr) IV Continuous <Continuous>  senna Syrup 10 milliLiter(s) Oral daily  simvastatin 20 milliGRAM(s) Oral at bedtime      No Known Allergies    Review of Systems:  UNABLE TO OBTAIN    Vital Signs Last 24 Hrs  Vital Signs Last 24 Hrs  T(C): 37 (09 Apr 2021 11:00), Max: 37.8 (08 Apr 2021 16:00)  T(F): 98.6 (09 Apr 2021 11:00), Max: 100.1 (08 Apr 2021 17:00)  HR: 57 (09 Apr 2021 11:00) (53 - 65)  BP: --  BP(mean): --  RR: 24 (09 Apr 2021 11:00) (22 - 34)  SpO2: 99% (09 Apr 2021 11:00) (92% - 99%)  GENERAL: critically ill, intubated/sedated   HEENT:  Atraumatic, Normocephalic  RESPIRATORY: on ventilator support   NEURO: unable to assess    CAPILLARY BLOOD GLUCOSE      POCT Blood Glucose.: 289 mg/dL (09 Apr 2021 11:18)  POCT Blood Glucose.: 288 mg/dL (08 Apr 2021 17:09)        04-09    148<H>  |  117<H>  |  44<H>  ----------------------------<  294<H>  4.0   |  25  |  1.35<H>    Ca    7.5<L>      09 Apr 2021 02:47  Phos  3.1     04-09  Mg     2.4     04-09    TPro  6.8  /  Alb  1.6<L>  /  TBili  0.2  /  DBili  x   /  AST  27  /  ALT  29  /  AlkPhos  96  04-09        Thyroid Function Tests:  03-15 @ 06:30 TSH 0.96 FreeT4 -- T3 -- Anti TPO -- Anti Thyroglobulin Ab -- TSI --      A1C with Estimated Average Glucose Result: 10.3 % (03-14-21 @ 08:57)  A1C with Estimated Average Glucose: 9.7 % (10-01-20 @ 06:30)      Diet, NPO with Tube Feed:   Tube Feeding Modality: Orogastric  Glucerna 1.5 Ignacio (GLUCERNA1.5RTH)  Total Volume for 24 Hours (mL): 1080  Continuous  Starting Tube Feed Rate mL per Hour: 10  Increase Tube Feed Rate by (mL): 10     Every 4 hours  Until Goal Tube Feed Rate (mL per Hour): 45  Tube Feed Duration (in Hours): 24  Tube Feed Start Time: 15:45  No Carb Prosource (1pkg = 15gms Protein)     Qty per Day:  2 (03-30-21 @ 13:24) [Active]

## 2021-04-09 NOTE — CHART NOTE - NSCHARTNOTEFT_GEN_A_CORE
Nutrition Follow-Up Note   Reason for follow-up: Critical care length of stay follow- up. Medical record reviewed. Spoke to RN at bedside.    Clinical Course history:  Per chart review patient with medical history of CAD s/p CABG and 2 stents, Type 2 DM, HTN, BPH s/p TURP and Parkinson's disease, who was recently admitted on 3/14 for weakness, covid, hypoxia, started on remdesivir/decadron. S/p RRT 3/20 and patient intubated. Sedated on Precedex and Propofol. Plan to trach.    Propofol 14.3mL/hr  =  provides 378 additional Kcal if sedation continues at this rate x24 hours.    Diet Order: Diet, NPO with Tube Feed:   Tube Feeding Modality: Orogastric  Glucerna 1.5 Ignacio (GLUCERNA1.5RTH)  Total Volume for 24 Hours (mL): 1080  Continuous  Starting Tube Feed Rate {mL per Hour}: 10  Increase Tube Feed Rate by (mL): 10     Every 4 hours  Until Goal Tube Feed Rate (mL per Hour): 45  Tube Feed Duration (in Hours): 24  Tube Feed Start Time: 15:45  No Carb Prosource (1pkg = 15gms Protein)     Qty per Day:  2 (21 @ 13:24)    CURRENT EN ORDER PROVIDES: *Feeds not running in anticipation of procedure*  Total Volume: 1080mL/day  Energy: 1620Kcal/day  Protein: 119g protein/day  Free Water: 820mL/day    Nutrition Related Information: - Tube feeding intake: -: 1080mL, -: 900mL, -: 675mL, -: 720mL.  *Receiving % of goal volume in the last 4 days.   - Feeds currently not running as patient planned for trach per RN.   - Previously receiving Glucerna 1.5 and no intolerances to feeds reported.   - Noted hyperglycemia (B, 288, 235, 228) - Endo following, ordered for SSI and NPH to increase to 10 units q6hr.   - Noted hypernatremia, Na 148, ordered for free water 250mL q6rs.     GI: - Rectal tube output: 200mL (), 400mL (), 300mL ()  - Bowel regimen: Senna, Miralax.   - No vomiting or abdominal distention noted.    Anthropometric Measurements:   Height (cm): 170.2 (21 @ 10:38)  Weight (kg): no new weight to assess, 82.5 (3/29), 79.8 (3/26), 79.7 (3/17), 74.8 (3/16)  *Noted weight changes, possibly due to scale discrepancies.  BMI (kg/m2): 27.5 (3/17)  IBW: 67.1kg +/-10%    Medications: MEDICATIONS  (STANDING):  aspirin  chewable 81 milliGRAM(s) Oral daily  carbidopa/levodopa  10/100 2 Tablet(s) Oral <User Schedule>  dexMEDEtomidine Infusion 0.05 MICROgram(s)/kG/Hr (1 mL/Hr) IV Continuous <Continuous>  heparin   Injectable 5000 Unit(s) SubCutaneous every 8 hours  hydrALAZINE 75 milliGRAM(s) Oral every 8 hours  insulin lispro (ADMELOG) corrective regimen sliding scale   SubCutaneous every 6 hours  insulin NPH human recombinant 10 Unit(s) SubCutaneous every 6 hours  ketorolac 0.5% Ophthalmic Solution 1 Drop(s) Left EYE two times a day  levETIRAcetam  IVPB 1000 milliGRAM(s) IV Intermittent every 12 hours  levoFLOXacin IVPB 750 milliGRAM(s) IV Intermittent every 48 hours  metoprolol tartrate 25 milliGRAM(s) Enteral Tube two times a day  pantoprazole  Injectable 40 milliGRAM(s) IV Push daily  petrolatum Ophthalmic Ointment 1 Application(s) Both EYES two times a day  polyethylene glycol 3350 17 Gram(s) Oral daily  prednisoLONE acetate 1% Suspension 1 Drop(s) Left EYE four times a day  propofol Infusion 25 MICROgram(s)/kG/Min (12 mL/Hr) IV Continuous <Continuous>  senna Syrup 10 milliLiter(s) Oral daily  simvastatin 20 milliGRAM(s) Oral at bedtime    Labs:  @ 02:47: Sodium 148<H>, Potassium 4.0, Calcium 7.5<L>, Magnesium 2.4, Phosphorus 3.1, BUN 44<H>, Creatinine 1.35<H>, Glucose 294<H>, Alk Phos 96, ALT/SGPT 29, AST/SGOT 27, Albumin 1.6<L>, Prealbumin --, Total Bilirubin 0.2, Hemoglobin 8.4<L>, Hematocrit 27.2<L>, Ferritin --, C-Reactive Protein --, Creatine Kinase <<27>    Triglycerides, Serum: 152 mg/dL (21 @ 06:33)  Triglycerides, Serum: 229 mg/dL (21 @ 02:06)    POCT Blood Glucose.: 289 mg/dL (21 @ 11:18)  POCT Blood Glucose.: 288 mg/dL (21 @ 17:09)    Skin: No pressure ulcers/DTI noted in flowsheets.   Edema: 3+ generalized, left/right hand, arm, scrotum per flowsheets    Estimated Nutrition Needs: calculated using admit weight 79.7kg for energy and protein needs.  Estimated Energy Needs (15-20Kcal/kg): 1195-1594Kcal/day  Estimated Protein Needs (1.5g/kg): 120g protein/day    Previous Nutrition Diagnosis:  Altered nutrition-related labs   Diagnosis is ongoing    Nutrition Interventions/Recommendations:   1. Resume enteral nutrition when medically appropriate, Glucerna 1.5 @ 45mL/hr x24 hours + No Carb Prosource 2x daily [provides 1080mL total volume, 1620KCal, 119g protein and 820mL free water daily] (20Kcal/kg and ~1.5g/kg).  2. Additional free water per physician discretion.   3. Monitor glucose levels and electrolytes.   4. Bowel regimen as needed. Hold if patient having diarrhea.   5. Obtain daily weight.     Monitoring and Evaluation:   Monitor nutrition provision, tolerance to diet, weights, labs, skin integrity and GI function.   RD remains available, please consult as needed. Will follow up per protocol.  Heather Vázquez, MS, RD, CDN, [facility] Pager #[phone]

## 2021-04-09 NOTE — EEG REPORT - NS EEG TEXT BOX
[facility]  Comprehensive Epilepsy Center  Report of Continuous Video EEG    [facility]: [address], [place] [address], Phone [phone]  [place]: [address] [place], [place] [address], [place] [address], Phone [phone]  [place] Office: [address], [address], [place] [address] Phone [phone]    [facility]: [address] [place] [address], Phone [phone]  [place] Office: [address] [place] [address], Phone [phone]    Patient Name: SHAHZAD FIELD  Age: 70 year, : 1950  Patient ID: -, MRN #: -, Cormier: SR [address] [facility] 46  Referring Physician: alix velasquez    Study Time/Date: 8AM on 2021  	  End Time/Date: 08:00 AM on 2021          			   Duration: 24H     Study Information:    EEG Recording Technique:  The patient underwent continuous Video-EEG monitoring, using Telemetry System hardware on the XLTek Digital System. EEG and video data were stored on a computer hard drive with important events saved in digital archive files. The material was reviewed by a physician (electroencephalographer / epileptologist) on a daily basis. Josemanuel and seizure detection algorithms were utilized and reviewed. An EEG Technician attended to the patient, and was available throughout daytime work hours.  The epilepsy center neurologist was available in person or on call 24-hours per day.    EEG Placement and Labeling of Electrodes:  The EEG was performed utilizing 20 channel referential EEG connections (coronal over temporal over parasagittal montage) using all standard 10-20 electrode placements with additional electrodes placed in the inferior temporal region using the modified 10-10 montage electrode placements for elective admissions, or if deemed necessary. Recording was at a sampling rate of 256 samples per second per channel. Time synchronized digital video recording was done simultaneously with EEG recording. A low light infrared camera was used for low light recording.     History:   VEEG AT BEDSIDE SRGB 46  70 YEAR OLD MALE  COR: INTUBATED-VENT/ DROWSY  P/W: BPH  PMH:GERD,CAD,HTN,DM  HV: NOT COMPLETED DUE TO AGE  PHOTIC: NOT COMPLETED  A&OX  CONCERN FOR SEIZURES      Interpretation:    Daily EEG Visual Analysis  Findings: The background was continuous, spontaneously variable and reactive.   No posterior dominant rhythm seen.  Background predominantly consisted of theta, delta and faster activities.    Focal Slowing:   None were present.    Sleep Background:  Drowsiness and stage II sleep transients were not recorded.    Other Findings:  Abundant generalized periodic discharges with triphasic morphology (GPDs) with shifting laterality.  No evolution.  Frequency of 0.5-1.5Hz.    Events:  Clinical events: None recorded.  Seizures: None recorded.    Activation Procedures:   Hyperventilation was not performed.    Photic stimulation was not performed.     Artifacts:  Intermittent myogenic and movement artifacts were noted.    EEG Summary / Classification:  Abnormal EEG   - GPDs with triphasic morphology.  - Moderate generalized slowing.    EEG Impression / Clinical Correlate:  Abnormal EEG study.  Moderate nonspecific diffuse or multifocal cerebral dysfunction.   GPDs with triphasic morphology can be seen in the presence of diffuse or multifocal non-specific cerebral dysfunction.  Although they can be shown to increase the risk for generalized seizure activity, they are likely secondary to underlying toxic/metabolic etiology in this case given their appearance.  No seizure seen.    Rodo Roblero MD  Attending Physician, [facility]

## 2021-04-09 NOTE — PROGRESS NOTE ADULT - ASSESSMENT
70 year old Italian/Jhonny-speaking male with a history of DM2 (uncontrolled A1c 10.3%, on basal/bolus insulin at home), HTN, CAD s/p CABG and stents, BPH s/p TURP and Parkinson's disease, now readmitted with COVID and started on high dose Decadron. Endocrine team consulted for management of DM2.   Patient is high risk with high level decision making, requiring ICU level of care    1. Type 2 diabetes mellitus with other specified complication, with long-term current use of insulin.   A1c 10.3% indicating poorly controlled DM2. Home regimen reported as Basaglar 40 units qHS and Admelog 20 units before breakfast only    While inpatient:  Currently in ICU, target glucose 140-180 mg/dl  On continuous tube feeding, Glucerna 1.5 elin   Now off steroids  Hyperglycemic, not at goal   Would increase NPH to 10 units q6h (Hold if tube feeds are held, hold if FS < 100)  Continue Admelog MODERATE dose correctional scale q6h  Check BG q6h      2. Steroid-induced hyperglycemia.    Last dose Dexamethasone 6 mg daily 3/25  Insulin regimen as above     3. Hypertension, unspecified type.    Now off pressors, defer management to primary team    4. Hyperlipidemia, unspecified hyperlipidemia type.    Continue with statin if no contraindications, monitor lipid profile as outpatient, goal LDL is <70.       SMITHA Sequeira-BC  Nurse Practitioner   Division of Endocrinology  Pager: [phone] pager [phone]    If out of hospital/unavailable when paged, please note: patient will be cared for by another provider on the endocrine service.  Please call the endocrine answering service for assistance to reach covering provider ([phone]). For non-urgent matters, please email [email or web address] for assistance.

## 2021-04-09 NOTE — EEG REPORT - NS EEG TEXT BOX
[facility]  Comprehensive Epilepsy Center  Report of Continuous Video EEG    [facility]: [address], [place] [address], Phone [phone]  [facility]: [address] [place], [place] [address] [address], Phone [phone]  [place] Office: [address], [address], [place] [address] Phone [phone]    [facility]: [address] [place] [address], Phone [phone]  [place] Office: [address] [place] [address], Phone [phone]    Patient Name: SHAHZAD FIELD  Age: 70 year, : 1950  Patient ID: -, MRN #: -, Cormier: [facility] 46  Referring Physician: alix velasquez    Study Time/Date: 8AM on 2021  	  End Time/Date: 12:00 PM on 2021          			   Duration: 24H     Study Information:    EEG Recording Technique:  The patient underwent continuous Video-EEG monitoring, using Telemetry System hardware on the XLTek Digital System. EEG and video data were stored on a computer hard drive with important events saved in digital archive files. The material was reviewed by a physician (electroencephalographer / epileptologist) on a daily basis. Josemanuel and seizure detection algorithms were utilized and reviewed. An EEG Technician attended to the patient, and was available throughout daytime work hours.  The epilepsy center neurologist was available in person or on call 24-hours per day.    EEG Placement and Labeling of Electrodes:  The EEG was performed utilizing 20 channel referential EEG connections (coronal over temporal over parasagittal montage) using all standard 10-20 electrode placements with additional electrodes placed in the inferior temporal region using the modified 10-10 montage electrode placements for elective admissions, or if deemed necessary. Recording was at a sampling rate of 256 samples per second per channel. Time synchronized digital video recording was done simultaneously with EEG recording. A low light infrared camera was used for low light recording.     History:   VEEG AT BEDSIDE SRGB 46  70 YEAR OLD MALE  COR: INTUBATED-VENT/ DROWSY  P/W: BPH  PMH:GERD,CAD,HTN,DM  HV: NOT COMPLETED DUE TO AGE  PHOTIC: NOT COMPLETED  A&OX  CONCERN FOR SEIZURES      Interpretation:    Daily EEG Visual Analysis  Findings: The background was continuous, spontaneously variable and reactive.   No posterior dominant rhythm seen.  Background predominantly consisted of theta, delta and faster activities.    Focal Slowing:   None were present.    Sleep Background:  Drowsiness and stage II sleep transients were not recorded.    Other Findings:  Abundant generalized periodic discharges with triphasic morphology (GPDs) with shifting laterality.  No evolution.  Frequency of 0.5-1.5Hz.    Events:  Clinical events: None recorded.  Seizures: None recorded.    Activation Procedures:   Hyperventilation was not performed.    Photic stimulation was not performed.     Artifacts:  Intermittent myogenic and movement artifacts were noted.    EEG Summary / Classification:  Abnormal EEG   - GPDs with triphasic morphology.  - Moderate generalized slowing.    EEG Impression / Clinical Correlate:  Abnormal EEG study.  Moderate nonspecific diffuse or multifocal cerebral dysfunction.   GPDs with triphasic morphology can be seen in the presence of diffuse or multifocal non-specific cerebral dysfunction.  Although they can be shown to increase the risk for generalized seizure activity, they are likely secondary to underlying toxic/metabolic etiology in this case given their appearance.  No seizure seen.    Rodo Roblero MD  Attending Physician, [facility]

## 2021-04-10 NOTE — PROGRESS NOTE ADULT - SUBJECTIVE AND OBJECTIVE BOX
ICU Daily PROGRESS NOTE  ===============================  HPI  70 year old Persian/Jhonny-speaking male with a history of CAD s/p CABG and 2 stents, Type 2 DM, HTN, BPH s/p TURP and Parkinson's disease, who was recently admitted on 3/14 for weakness, covid, hypoxia, started on remdesivir/decadron, discharged home yesterday (3/16). However, at home, pt decompensated with increasing sob/labored breathing, o2 sat dropped from 88% to 66% within 1/2 hour per daughter, gasping for air. Pt's son called 911 and brought him back to the hospital. Pt denies chest pain/abd pain/n/v/diarrhea. He reports he was sent home without oxygen.     Interval Events:  -No acute events overnight  -awaiting trach      VITAL SIGNS, INS/OUTS (last 24 hours):  --------------------------------------------------------------------------------------  T(C): 36.7 (04-10-21 @ 12:00), Max: 37.1 (04-09-21 @ 14:00)  HR: 65 (04-10-21 @ 13:00) (51 - 65)  ABP: 102/41 (04-10-21 @ 13:00) (96/40 - 130/46)  ABP(mean): 60 (04-10-21 @ 13:00) (59 - 79)  RR: 30 (04-10-21 @ 13:00) (16 - 30)  SpO2: 93% (04-10-21 @ 13:00) (92% - 100%)  CAPILLARY BLOOD GLUCOSE      POCT Blood Glucose.: 178 mg/dL (10 Apr 2021 11:15)  POCT Blood Glucose.: 191 mg/dL (10 Apr 2021 05:04)  POCT Blood Glucose.: 145 mg/dL (10 Apr 2021 00:46)  POCT Blood Glucose.: 148 mg/dL (09 Apr 2021 17:43)   N/A      04-09 @ 07:01  -  04-10 @ 07:00  --------------------------------------------------------  IN:    Dexmedetomidine: 427.4 mL    Free Water: 680 mL    Glucerna 1.5: 90 mL    IV PiggyBack: 105 mL    Propofol: 362.4 mL  Total IN: 1664.8 mL    OUT:    Rectal Tube (mL): 200 mL    Ureteral Catheter (mL): 1470 mL  Total OUT: 1670 mL    Total NET: -5.2 mL      04-10 @ 07:01  -  04-10 @ 13:59  --------------------------------------------------------  IN:    Dexmedetomidine: 69.5 mL    Propofol: 100.1 mL  Total IN: 169.6 mL    OUT:    Glucerna 1.5: 0 mL    Indwelling Catheter - Urethral (mL): 250 mL  Total OUT: 250 mL    Total NET: -80.4 mL  --------------------------------------------------------------------------------------    EXAM  NEUROLOGY  Exam: Sedated    RESPIRATORY  Exam: Lungs clear to auscultation, Normal expansion/effort.   [] Tracheostomy   [x] Intubated  Mechanical Ventilation: Mode: AC/ CMV (Assist Control/ Continuous Mandatory Ventilation), RR (machine): 16, TV (machine): 400, FiO2: 30, PEEP: 5, ITime: 0.8, MAP: 14, PIP: 35    CARDIOVASCULAR  Exam: S1, S2.  Regular rate and rhythm     GI/NUTRITION  Exam: Abdomen soft, Non-tender, Non-distended.   Nasogastric tube in place.    Current Diet: Tube feeds at goal    METABOLIC/FLUIDS/ELECTROLYTES  potassium chloride   Solution 40 milliEquivalent(s) Oral once      HEMATOLOGIC  [x] DVT Prophylaxis: aspirin  chewable 81 milliGRAM(s) Oral daily  heparin   Injectable 5000 Unit(s) SubCutaneous every 8 hours    Transfusions:	[] PRBC	[] Platelets		[] FFP	[] Cryoprecipitate    INFECTIOUS DISEASE  Antimicrobials/Immunologic Medications:  levoFLOXacin IVPB 750 milliGRAM(s) IV Intermittent every 48 hours    Day# of  ***    VASCULAR  Exam: Extremities warm, pink, well-perfused.     MUSCULOSKELETAL  Exam: All extremities moving spontaneously without limitations.    SKIN  Exam: Good skin turgor, no skin breakdown.     LABS  --------------------------------------------------------------------------------------  CBC (04-10 @ 01:30)                              8.1<L>                         8.99    )----------------(  623<H>     68.6  % Neutrophils, 13.1  % Lymphocytes, ANC: 6.16                                26.8<L>  CBC (04-09 @ 02:47)                              8.4<L>                         7.88    )----------------(  520<H>     72.5  % Neutrophils, 12.2<L>% Lymphocytes, ANC: 5.72                                27.2<L>    BMP (04-10 @ 01:30)             152<H>  |  120<H>  |  41<H> 		Ca++ --      Ca 7.7<L>             ---------------------------------( 148<H>		Mg 2.3                3.7     |  24      |  1.34<H>			Ph 3.5     BMP (04-09 @ 02:47)             148<H>  |  117<H>  |  44<H> 		Ca++ --      Ca 7.5<L>             ---------------------------------( 294<H>		Mg 2.4                4.0     |  25      |  1.35<H>			Ph 3.1       LFTs (04-10 @ 01:30)      TPro 6.7 / Alb 1.6<L> / TBili 0.2 / DBili -- / AST 25 / ALT <5<L> / AlkPhos 80  LFTs (04-09 @ 02:47)      TPro 6.8 / Alb 1.6<L> / TBili 0.2 / DBili -- / AST 27 / ALT 29 / AlkPhos 96    Coags (04-10 @ 01:30)  aPTT -- / INR 1.23<H> / PT 14.0<H>  Coags (04-09 @ 02:47)  aPTT 30.0 / INR 1.21<H> / PT 13.7<H>      ABG (04-09 @ 02:47)     7.43 / 39 / 71<L> / 26 / 1.2 / 94.8<L>%     Lactate:     --------------------------------------------------------------------------------------    IMAGING STUDIES

## 2021-04-10 NOTE — PROGRESS NOTE ADULT - ASSESSMENT
70 year old Mongolian/Jhonny-speaking male with a history of CAD s/p CABG and 2 stents, Type 2 DM, HTN, BPH s/p TURP and Parkinson's disease, who was recently admitted 3/14 for weakness and COVID-19. Started on decadron and remdesivir and discharged 3/16. Returned 3/17 with worsening respiratory failure. Intubated 3/17. Course c/b ?parotitis, Pseudomonas PNA, and new onset atrial fibrillation now in sinus rhythm s/p amio conversion. S/p trial of IVIG for poss COVID encephalopathy from 4/5-4/8 without improvement and dced 1 day early. S/p vEEG on 4/6 without seizures but waveforms c/f poss development of one and so keppra loaded.     Neuro  Mental status remains poor, not following commands  - Neuro consult- on 24h+ EEG, no MRI at this time  - CTH negative for bleed 4/2  - Ammonia level, B12, and folate WNL   - Fentanyl 25mcg IVP PRN for dyssynchrony/agitation    - Stop methadone  - C/w sinemet for Parkinson's disease   - IVIG x 5d (4/5-4/9) for possible COVID encephalopathy, stopped a day early due to no effect      Cardiovascular  - had recent h/o PAF, converted to NSR with amio  - maintaining NSR off amio, c/w lopressor 25 BID     Pulmonary  - Intubated 3/20  - Continue full support with AC/VC 16/400/5/40%, SBTs  - Unable to wean from vent given poor mental status  - Pending Tracheostomy     GI  - NPO for Trach  - IV PPI daily   - Bowel regimen    Renal  ZELALEM 2/2 contrast load 3/26? vs COVID/SEPSIS  - Non oliguric, creatinine peaked and is currently downtrending   - Da Silva for monitoring urine output  - HyperNa worsening, restarted on free water     ID  COVID  - S/p remdesivir, decadron     Neck swelling  - CT neck 3/26 c/f parotitis and or masseter myositis with cellulitis vs facial edema from proning   - Sputum from 3/26 growing Pseudomonas fluorescens   - S/p santos (3/30-4/8) 10d course per ID, s/p vanco 10d course  - Switched to renally dosed levaquin 4/8- (ID rec only levaquin x 4 more days (to completed 14 day course between santos and levaquin for sputum pseudomonas)     Endo  - C/w NPH 8u, Mod SSI  - Endo following  - TSH 4.25, T4 WNL, subclinical (4/1), can repeat TFTs in 4-6 weeks     Heme  - SQH 5k q8h  - ASA 81mg   - LE DVT study negative 4/7    [facility]  - Full code  ======================= DISPOSITION  =====================  [X ] Critical   [ ] Guarded    [ ] Stable    [X ] Maintain in COVID ICU  [ ] Downgrade to Medicine   [ ] Discharge Home

## 2021-04-10 NOTE — PROGRESS NOTE ADULT - ASSESSMENT
70y Male with history of HTN on HCTZ presents with weakness found to have COVID-19. Nephrology consulted for hyponatremia.    1) ZELALEM: Non-oliguric ZELALEM likely due to DEMIAN. Scr now improving with good UO. Continue with supportive care. Urine sodium low which is often seen in DEMIAN. Avoid nephrotoxins.     2) HTN: BP improved on hydralazine 75 mg TID. Rate control as per ICU. Monitor BP.    3) Hypernatremia:  free water 400 ml Q6 hours, titrate as needed. Monitor serum Na.    4) COVID-19: As per primary team.      [facility] NEPHROLOGY  Mahesh Lomax M.D.  Perez Armstrong D.O.  Gricelda Choe M.D.  Juliet Roegrs, MSN, ANP-C    Telephone: [phone]  Facsimile: [phone]    [address] [place] [address]

## 2021-04-10 NOTE — PROGRESS NOTE ADULT - SUBJECTIVE AND OBJECTIVE BOX
[facility] NEPHROLOGY- PROGRESS NOTE, Follow up     Patient is a 70y Male with history of HTN on HCTZ presents with weakness found to have COVID-19. Nephrology consulted for hyponatremia.    Patient with resolution of hyponatremia for which nephrology had signed off on 3/19. Patient subsequently with non-oliguric ZELALEM.    REVIEW OF SYSTEMS: Unable to obtain as patient intubated.    No Known Allergies    Hospital Medications: Reviewed.      VITALS:  T(F): 98.3 (04-10-21 @ 08:00), Max: 98.7 (04-09-21 @ 14:00)  HR: 55 (04-10-21 @ 11:34)  BP: --  RR: 23 (04-10-21 @ 09:00)  SpO2: 95% (04-10-21 @ 11:34)  Wt(kg): --    04-09 @ 07:01  -  04-10 @ 07:00  --------------------------------------------------------  IN: 1664.8 mL / OUT: 1670 mL / NET: -5.2 mL    04-10 @ 07:01  -  04-10 @ 12:22  --------------------------------------------------------  IN: 28.6 mL / OUT: 0 mL / NET: 28.6 mL        PHYSICAL EXAM:  Gen: Intubated  Cards: RRR, +S1/S2, no M/G/R  Resp: CTA B/L  GI: soft, NT/ND, NABS  Vascular: trace LE edema B/L, + UE edema        LABS:  04-10    152<H>  |  120<H>  |  41<H>  ----------------------------<  148<H>  3.7   |  24  |  1.34<H>    Ca    7.7<L>      10 Apr 2021 01:30  Phos  3.5     04-10  Mg     2.3     04-10    TPro  6.7  /  Alb  1.6<L>  /  TBili  0.2  /  DBili      /  AST  25  /  ALT  <5<L>  /  AlkPhos  80  04-10    Creatinine Trend: 1.34 <--, 1.35 <--, 1.39 <--, 1.52 <--, 1.57 <--, 1.76 <--, 1.74 <--, 1.78 <--                        8.1    8.99  )-----------( 623      ( 10 Apr 2021 01:30 )             26.8     Urine Studies:

## 2021-04-11 NOTE — PROGRESS NOTE ADULT - ATTENDING COMMENTS
some response to noxious. had resp distress last night required inc sedation looks comfortable now. encephalopathy, metabolic slowly improving?to get trache. can change hydralazine to po for HTN. day #26 vented

## 2021-04-11 NOTE — PROGRESS NOTE ADULT - SUBJECTIVE AND OBJECTIVE BOX
ICU Daily PROGRESS NOTE  ===============================  Interval Events:  -Overnight patient started on fentanyl gtt for vent desynchrony desatting to 80s requiring BVM  -F/u sputum, blood Cx, UA increasing leukocytosis    HPI      VITAL SIGNS, INS/OUTS (last 24 hours):   --------------------------------------------------------------------------------------  T(C): 37.5 (04-11-21 @ 10:00), Max: 37.8 (04-11-21 @ 08:00)  HR: 88 (04-11-21 @ 12:00) (57 - 88)  BP: --  BP(mean): --  ABP: 115/39 (04-11-21 @ 12:00) (103/35 - 141/46)  ABP(mean): 65 (04-11-21 @ 12:00) (58 - 82)  RR: 29 (04-11-21 @ 12:00) (23 - 30)  SpO2: 97% (04-11-21 @ 12:00) (92% - 100%)  Wt(kg): --  CVP(mm Hg): --  CI: --  CAPILLARY BLOOD GLUCOSE      POCT Blood Glucose.: 258 mg/dL (11 Apr 2021 11:15)  POCT Blood Glucose.: 136 mg/dL (11 Apr 2021 05:32)  POCT Blood Glucose.: 93 mg/dL (10 Apr 2021 23:44)  POCT Blood Glucose.: 136 mg/dL (10 Apr 2021 17:18)   N/A      04-10 @ 07:01  -  04-11 @ 07:00  --------------------------------------------------------  IN:    Dexmedetomidine: 305.8 mL    FentaNYL: 159 mL    Free Water: 1600 mL    Glucerna 1.5: 405 mL    IV PiggyBack: 250 mL    Phenylephrine: 63 mL    Propofol: 347.8 mL  Total IN: 3130.6 mL    OUT:    Indwelling Catheter - Urethral (mL): 1345 mL    Rectal Tube (mL): 300 mL  Total OUT: 1645 mL    Total NET: 1485.6 mL      04-11 @ 07:01  -  04-11 @ 13:12  --------------------------------------------------------  IN:    Dexmedetomidine: 27.8 mL    FentaNYL: 31.8 mL    Free Water: 210 mL    Glucerna 1.5: 90 mL    Phenylephrine: 15 mL    Propofol: 24 mL  Total IN: 398.6 mL    OUT:    Indwelling Catheter - Urethral (mL): 100 mL  Total OUT: 100 mL    Total NET: 298.6 mL  --------------------------------------------------------------------------------------    EXAM  NEUROLOGY  Exam: Sedated    RESPIRATORY  Exam: Lungs clear to auscultation, Normal expansion/effort.    [] Tracheostomy   [x] Intubated  Mechanical Ventilation: AC/ CMV (Assist Control/ Continuous Mandatory Ventilation), RR (machine): 16, TV (machine): 400, FiO2: 40, PEEP: 5,     CARDIOVASCULAR  Exam: S1, S2.  Regular rate and rhythm     GI/NUTRITION  Exam: Abdomen soft, Non-tender, Non-distended.   Nasogastric tube in place.    Current Diet:  Tube feeds at goal    VASCULAR  Exam: Extremities warm, pink, well-perfused.      MUSCULOSKELETAL  Exam: All extremities moving spontaneously without limitations.      SKIN  Exam: Good skin turgor, no skin breakdown.     METABOLIC/FLUIDS/ELECTROLYTES  potassium chloride   Solution 40 milliEquivalent(s) Oral once      HEMATOLOGIC  [x] DVT Prophylaxis: aspirin  chewable 81 milliGRAM(s) Oral daily  heparin   Injectable 5000 Unit(s) SubCutaneous every 8 hours    Transfusions:	[] PRBC	[] Platelets		[] FFP	[] Cryoprecipitate    INFECTIOUS DISEASE  Antimicrobials/Immunologic Medications:  levoFLOXacin IVPB 750 milliGRAM(s) IV Intermittent every 48 hours    Day #  	of   ***    LABS  --------------------------------------------------------------------------------------  CBC (04-11 @ 00:57)                              8.6<L>                         14.90<H>  )----------------(  805<H>     83.3<H>% Neutrophils, 4.4<L>% Lymphocytes, ANC: 13.34<H>                              28.8<L>  CBC (04-10 @ 01:30)                              8.1<L>                         8.99    )----------------(  623<H>     68.6  % Neutrophils, 13.1  % Lymphocytes, ANC: 6.16                                26.8<L>    BMP (04-11 @ 00:57)             148<H>  |  116<H>  |  38<H> 		Ca++ --      Ca 7.7<L>             ---------------------------------( 93    		Mg 2.2                3.7     |  22      |  1.31<H>			Ph 4.2     BMP (04-10 @ 01:30)             152<H>  |  120<H>  |  41<H> 		Ca++ --      Ca 7.7<L>             ---------------------------------( 148<H>		Mg 2.3                3.7     |  24      |  1.34<H>			Ph 3.5       LFTs (04-11 @ 00:57)      TPro 6.8 / Alb 1.8<L> / TBili 0.2 / DBili -- / AST 24 / ALT <5<L> / AlkPhos 81  LFTs (04-10 @ 01:30)      TPro 6.7 / Alb 1.6<L> / TBili 0.2 / DBili -- / AST 25 / ALT <5<L> / AlkPhos 80    Coags (04-11 @ 00:57)  aPTT -- / INR 1.30<H> / PT 14.8<H>  Coags (04-10 @ 01:30)  aPTT -- / INR 1.23<H> / PT 14.0<H>      ABG (04-11 @ 00:57)     7.38 / 39 / 76<L> / 23 / -2.0 / 94.7<L>%     Lactate:     --------------------------------------------------------------------------------------    IMAGING STUDIES

## 2021-04-11 NOTE — PROGRESS NOTE ADULT - ASSESSMENT
70 year old Syriac/Jhonny-speaking male with a history of CAD s/p CABG and 2 stents, Type 2 DM, HTN, BPH s/p TURP and Parkinson's disease, who was recently admitted 3/14 for weakness and COVID-19. Started on decadron and remdesivir and discharged 3/16. Returned 3/17 with worsening respiratory failure. Intubated 3/17. Course c/b ?parotitis, Pseudomonas PNA, and new onset atrial fibrillation now in sinus rhythm s/p amio conversion. S/p trial of IVIG for poss COVID encephalopathy from 4/5-4/8 without improvement and dced 1 day early. S/p vEEG on 4/6 without seizures but waveforms c/f poss development of one and so keppra loaded.     Neuro  Mental status remains poor, not following commands  - Neuro consult- on 24h+ EEG, no MRI at this time  - CTH negative for bleed 4/2  - Ammonia level, B12, and folate WNL   - Fentanyl 25mcg IVP PRN for dyssynchrony/agitation    - Stop methadone  - C/w sinemet for Parkinson's disease   - IVIG x 5d (4/5-4/9) for possible COVID encephalopathy, stopped a day early due to no effect      Cardiovascular  - had recent h/o PAF, converted to NSR with amio  - maintaining NSR off amio, c/w lopressor 25 BID     Pulmonary  - Intubated 3/20  - Continue full support with AC/VC 16/400/5/40%, SBTs  - Unable to wean from vent given poor mental status  - Pending Tracheostomy     GI  - NPO for Trach  - IV PPI daily   - Bowel regimen    Renal  ZELALEM 2/2 contrast load 3/26? vs COVID/SEPSIS  - Non oliguric, creatinine peaked and is currently downtrending   - Da Silva for monitoring urine output  - HyperNa worsening, restarted on free water     ID  COVID  - S/p remdesivir, decadron     Neck swelling  - CT neck 3/26 c/f parotitis and or masseter myositis with cellulitis vs facial edema from proning   - Sputum from 3/26 growing Pseudomonas fluorescens   - S/p santos (3/30-4/8) 10d course per ID, s/p vanco 10d course  - Switched to renally dosed levaquin 4/8- (ID rec only levaquin x 4 more days (to completed 14 day course between santos and levaquin for sputum pseudomonas)     Endo  - C/w NPH 8u, Mod SSI  - Endo following  - TSH 4.25, T4 WNL, subclinical (4/1), can repeat TFTs in 4-6 weeks     Heme  - SQH 5k q8h  - ASA 81mg   - LE DVT study negative 4/7    [facility]  - Full code  ======================= DISPOSITION  =====================  [X ] Critical   [ ] Guarded    [ ] Stable    [X ] Maintain in COVID ICU  [ ] Downgrade to Medicine   [ ] Discharge Home

## 2021-04-11 NOTE — PROGRESS NOTE ADULT - ASSESSMENT
70y Male with history of HTN on HCTZ presents with weakness found to have COVID-19. Nephrology consulted for hyponatremia.    1) ZELALEM: Non-oliguric ZELALEM likely due to DEMIAN. Scr now improving with good UO. Continue with supportive care. Urine sodium low which is often seen in DEMIAN. Avoid nephrotoxins.     2) HTN: BP improved on hydralazine 75 mg TID. Rate control as per ICU. Monitor BP.    3) Hypernatremia:  Serum sodium improving on free water 400 ml Q6 hours, titrate as needed. Monitor serum Na.    4) COVID-19: As per primary team.      [facility] NEPHROLOGY  Mahesh Lomax M.D.  Perez Armstrong D.O.  Gricelda Choe M.D.  Juliet Rogers, MSN, ANP-C    Telephone: [phone]  Facsimile: [phone]    [address] [place] [address]

## 2021-04-11 NOTE — PROGRESS NOTE ADULT - SUBJECTIVE AND OBJECTIVE BOX
[facility] NEPHROLOGY- PROGRESS NOTE, Follow up     Patient is a 70y Male with history of HTN on HCTZ presents with weakness found to have COVID-19. Nephrology consulted for hyponatremia.    Patient with resolution of hyponatremia for which nephrology had signed off on 3/19. Patient subsequently with non-oliguric ZELALEM.    REVIEW OF SYSTEMS: Unable to obtain as patient intubated.    No Known Allergies    Hospital Medications: Reviewed.      VITALS:  T(F): 99.5 (04-11-21 @ 10:00), Max: 100 (04-11-21 @ 08:00)  HR: 88 (04-11-21 @ 12:00)  BP: --  RR: 29 (04-11-21 @ 12:00)  SpO2: 97% (04-11-21 @ 12:00)  Wt(kg): --    04-10 @ 07:01  -  04-11 @ 07:00  --------------------------------------------------------  IN: 3130.6 mL / OUT: 1645 mL / NET: 1485.6 mL    04-11 @ 07:01  -  04-11 @ 11:57  --------------------------------------------------------  IN: 398.6 mL / OUT: 100 mL / NET: 298.6 mL      PHYSICAL EXAM:  Gen: Intubated  Cards: RRR, +S1/S2, no M/G/R  Resp: CTA B/L  GI: soft, NT/ND, NABS  Vascular: trace LE edema B/L, + UE edema      LABS:  04-11    148<H>  |  116<H>  |  38<H>  ----------------------------<  93  3.7   |  22  |  1.31<H>    Ca    7.7<L>      11 Apr 2021 00:57  Phos  4.2     04-11  Mg     2.2     04-11    TPro  6.8  /  Alb  1.8<L>  /  TBili  0.2  /  DBili      /  AST  24  /  ALT  <5<L>  /  AlkPhos  81  04-11    Creatinine Trend: 1.31 <--, 1.34 <--, 1.35 <--, 1.39 <--, 1.52 <--, 1.57 <--, 1.76 <--                        8.6    14.90 )-----------( 805      ( 11 Apr 2021 00:57 )             28.8     Urine Studies:

## 2021-04-12 NOTE — PROGRESS NOTE ADULT - SUBJECTIVE AND OBJECTIVE BOX
HPI: 70 year old Sinhala/Jhonny-speaking male with a history of CAD s/p CABG and 2 stents, Type 2 DM, HTN, BPH s/p TURP and Parkinson's disease, who was recently admitted 3/14 for weakness and COVID-19. Started on decadron and remdesivir and discharged 3/16. Returned 3/17 with worsening respiratory failure. Intubated 3/17. Course c/b ?parotitis, Pseudomonas PNA, and new onset atrial fibrillation now in sinus rhythm s/p amio conversion. S/p trial of IVIG for poss COVID encephalopathy from - without improvement and dced 1 day early. S/p vEEG on  without seizures but waveforms c/f poss development of one and so keppra loaded.     INTERVAL OVERNIGHT EVENTS:  Made NPO and SQH held for poss trach today. Started on empiric vanc/cefe yesterday.     Allergies:  No Known Allergies    SUBJECTIVE:    Review of Systems: Unable to obtain as patient is intubated & sedated    OBJECTIVE:  Vitals:  ICU Vital Signs Last 24 Hrs  T(C): 36.8 (2021 12:00), Max: 37.5 (2021 20:00)  T(F): 98.3 (2021 12:00), Max: 99.5 (2021 20:00)  HR: 78 (2021 13:00) (65 - 87)  BP: --  BP(mean): --  ABP: 127/43 (2021 13:00) (113/36 - 155/50)  ABP(mean): 70 (2021 13:00) (60 - 88)  RR: 22 (2021 13:00) (16 - 26)  SpO2: 97% (2021 13:00) (93% - 99%)    Physical Exam:   Constitutional: NAD  HEENT: NC  Respiratory: Intubated  Cardiovascular: Regular rate  Gastrointestinal: Soft, ND  Extremities: No LE edema   Neurological: Intubated, sedated     Vent Settings:  Mode: AC/ CMV (Assist Control/ Continuous Mandatory Ventilation)  RR (machine): 22  TV (machine): 400  FiO2: 60  PEEP: 5  ITime: 0.8  MAP: 13  PC: 26  PIP: 32    ABG - ( 2021 12:15 )  pH, Arterial: 7.32  pH, Blood: x     /  pCO2: 43    /  pO2: 68    / HCO3: 21    / Base Excess: -3.5  /  SaO2: 91.9              I&O:  I&O's Detail    2021 07:01  -  2021 07:00  --------------------------------------------------------  IN:    Dexmedetomidine: 166.8 mL    FentaNYL: 190.8 mL    FentaNYL: 190.8 mL    Free Water: 1020 mL    Glucerna 1.5: 720 mL    IV PiggyBack: 600 mL    Phenylephrine: 133.5 mL    Propofol: 288 mL  Total IN: 3309.9 mL    OUT:    Indwelling Catheter - Urethral (mL): 1480 mL    Rectal Tube (mL): 700 mL  Total OUT: 2180 mL    Total NET: 1129.9 mL      2021 07:01  -  2021 14:12  --------------------------------------------------------  IN:    FentaNYL: 19 mL    Free Water: 400 mL    Phenylephrine: 43.5 mL    Propofol: 68 mL  Total IN: 530.5 mL    OUT:    Dexmedetomidine: 0 mL    Indwelling Catheter - Urethral (mL): 145 mL  Total OUT: 145 mL    Total NET: 385.5 mL        Labs:                        8.1    22.84 )-----------( 868      ( 2021 12:15 )             26.8     0412    144  |  114<H>  |  37<H>  ----------------------------<  148<H>  3.5   |  22  |  1.49<H>    Ca    7.5<L>      2021 01:59  Phos  3.5       Mg     2.1         TPro  6.2  /  Alb  1.5<L>  /  TBili  <0.2  /  DBili  x   /  AST  18  /  ALT  <5<L>  /  AlkPhos  105      LIVER FUNCTIONS - ( 2021 01:59 )  Alb: 1.5 g/dL / Pro: 6.2 g/dL / ALK PHOS: 105 U/L / ALT: <5 U/L / AST: 18 U/L / GGT: x           PT/INR - ( 2021 01:59 )   PT: 13.7 sec;   INR: 1.20 ratio               Urinalysis Basic - ( 2021 19:09 )    Color: Light Yellow / Appearance: Slightly Turbid / S.012 / pH: x  Gluc: x / Ketone: Negative  / Bili: Negative / Urobili: <2 mg/dL   Blood: x / Protein: 30 mg/dL / Nitrite: Negative   Leuk Esterase: Negative / RBC: 5 /HPF / WBC 5 /HPF   Sq Epi: x / Non Sq Epi: 2 /HPF / Bacteria: Occasional      POCT Blood Glucose.: 120 mg/dL *H* (21 @ 11:32)  POCT Blood Glucose.: 121 mg/dL *H* (21 @ 05:16)  POCT Blood Glucose.: 152 mg/dL *H* (21 @ 00:53)  POCT Blood Glucose.: 210 mg/dL *H* (21 @ 17:17)    Micro:    Culture - Sputum (collected 21 @ 02:22)  Source: .Sputum Sputum  Gram Stain (21 @ 05:09):    Numerous polymorphonuclear leukocytes per low power field    No Squamous epithelial cells per low power field    Few Gram positive cocci in pairs per oil power field      Medications:  MEDICATIONS  (STANDING):  aspirin  chewable 81 milliGRAM(s) Oral daily  carbidopa/levodopa  10/100 2 Tablet(s) Oral <User Schedule>  cefepime   IVPB      cefepime   IVPB 1000 milliGRAM(s) IV Intermittent every 12 hours  chlorhexidine 0.12% Liquid 15 milliLiter(s) Oral Mucosa every 12 hours  chlorhexidine 4% Liquid 1 Application(s) Topical <User Schedule>  fentaNYL   Infusion..... 0.5 MICROgram(s)/kG/Hr (0.8 mL/Hr) IV Continuous <Continuous>  heparin   Injectable 5000 Unit(s) SubCutaneous every 8 hours  hydrALAZINE 75 milliGRAM(s) Oral every 8 hours  insulin lispro (ADMELOG) corrective regimen sliding scale   SubCutaneous every 6 hours  insulin NPH human recombinant 10 Unit(s) SubCutaneous every 6 hours  ketorolac 0.5% Ophthalmic Solution 1 Drop(s) Left EYE two times a day  levETIRAcetam  IVPB 1000 milliGRAM(s) IV Intermittent every 12 hours  metoprolol tartrate 25 milliGRAM(s) Enteral Tube two times a day  midazolam Injectable 4 milliGRAM(s) IV Push once  pantoprazole  Injectable 40 milliGRAM(s) IV Push daily  petrolatum Ophthalmic Ointment 1 Application(s) Both EYES two times a day  phenylephrine    Infusion 0.1 MICROgram(s)/kG/Min (1.49 mL/Hr) IV Continuous <Continuous>  polyethylene glycol 3350 17 Gram(s) Oral daily  prednisoLONE acetate 1% Suspension 1 Drop(s) Left EYE four times a day  propofol Infusion 25 MICROgram(s)/kG/Min (12 mL/Hr) IV Continuous <Continuous>  senna Syrup 10 milliLiter(s) Oral daily  simvastatin 20 milliGRAM(s) Oral at bedtime  vancomycin  IVPB 1250 milliGRAM(s) IV Intermittent every 12 hours  vancomycin  IVPB        MEDICATIONS  (PRN):  acetaminophen    Suspension .. 650 milliGRAM(s) Enteral Tube every 6 hours PRN Temp greater or equal to 38C (100.4F), Mild Pain (1 - 3)  sodium chloride 0.9% lock flush 10 milliLiter(s) IV Push every 1 hour PRN Pre/post blood products, medications, blood draw, and to maintain line patency

## 2021-04-12 NOTE — PROGRESS NOTE ADULT - ATTENDING COMMENTS
ARF- needed inc O2 overnite. waiting for trache    PAF- responded to IV lopressor. inc po.    leukocytosis- sputem cx 4/10 - no blood cx. still rising. will cont. if no better tomorrow get blood cx consider adding antifungal. afeb    pleural effusions. R>L. to look at with US to see if can be drained.    mod prot elin malnutrition- inc TF after trache

## 2021-04-12 NOTE — PROGRESS NOTE ADULT - ASSESSMENT
70y Male with history of HTN on HCTZ presents with weakness found to have COVID-19. Nephrology consulted for hyponatremia.    1) ZELALEM: Non-oliguric ZELALEM likely due to DEMIAN. Scr slightly higher today.  Pt with good UO. Continue with supportive care. Urine sodium low which is often seen in DEMIAN. Avoid nephrotoxins.     2) HTN: BP improved on hydralazine 75 mg TID. Rate control as per ICU. Monitor BP.    3) Hypernatremia:  Serum sodium improving on free water 400 ml Q6 hours, titrate as needed. Monitor serum Na.    4) COVID-19: As per primary team.      [facility] NEPHROLOGY  Mahesh Lomax M.D.  Perez Armstrong D.O.  Gricelda Choe M.D.  Juliet Rogers, MSN, ANP-C    Telephone: [phone]  Facsimile: [phone]    [address] [place] [address]

## 2021-04-12 NOTE — PROGRESS NOTE ADULT - ASSESSMENT
70 year old Amharic/Jhonny-speaking male with a history of CAD s/p CABG and 2 stents, Type 2 DM, HTN, BPH s/p TURP and Parkinson's disease, who was recently admitted 3/14 for weakness and COVID-19. Started on decadron and remdesivir and discharged 3/16. Returned 3/17 with worsening respiratory failure. Intubated 3/17. Course c/b ?parotitis, Pseudomonas PNA, and new onset atrial fibrillation now in sinus rhythm s/p amio conversion. S/p trial of IVIG for poss COVID encephalopathy from 4/5-4/8 without improvement and dced 1 day early. S/p vEEG on 4/6 without seizures but waveforms c/f poss development of one and so keppra loaded.     Neuro  Mental status remains poor, not following commands  - Neuro consult, s/p 24h+ EEG no seizures but started keppra, no MRI at this time  - CTH negative for bleed 4/2  - Ammonia level, B12, and folate WNL   - Fent and prop gtt   - C/w sinemet for Parkinson's disease   - IVIG x 5d (4/5-4/9) for possible COVID encephalopathy, stopped a day early due to no effect    - Keppra 1g BID for poss concerning waveforms on EEG     Cardiovascular  - had recent h/o PAF, converted to NSR with amio  - maintaining NSR off amio, c/w lopressor 25 BID     Pulmonary  - Intubated 3/20  - Continue full support with AC/PC 22/31/5/60%  - Unable to wean from vent given poor mental status  - Pending Tracheostomy today     GI  - NPO for Trach  - IV PPI daily   - Bowel regimen    Renal  ZELALEM 2/2 contrast load 3/26? vs COVID/SEPSIS  - Non oliguric, Cr stable   - Da Silva for monitoring urine output  - HyperNa on FWF 400q6h     ID  COVID  - S/p remdesivir, decadron     Neck swelling  - CT neck 3/26 c/f parotitis and or masseter myositis with cellulitis vs facial edema from proning   - Sputum from 3/26 growing Pseudomonas fluorescens   - S/p santos (3/30-4/8) 10d course per ID, s/p vanco 10d course  - Switched to renally dosed levaquin 4/8-4/12 (s/p 14 day course between santos and levaquin for sputum pseudomonas)   - Vanc/cefe (4/11-), vanc trough tonight before 4th dose     Endo  - C/w NPH 10u (held for holding TF), Mod SSI  - Endo following  - TSH 4.25, T4 WNL, subclinical (4/1), can repeat TFTs in 4-6 weeks     Heme  - SQH 5k q8h  - ASA 81mg   - LE DVT study negative 4/7    GOC  - Full code

## 2021-04-12 NOTE — PROGRESS NOTE ADULT - ASSESSMENT
70 year old Icelandic/Jhonny-speaking male with a history of DM2 (uncontrolled A1c 10.3%, on basal/bolus insulin at home), HTN, CAD s/p CABG and stents, BPH s/p TURP and Parkinson's disease, now readmitted with COVID and started on high dose Decadron. Endocrine team consulted for management of DM2.   Patient is high risk with high level decision making, requiring ICU level of care    1. Type 2 diabetes mellitus with other specified complication, with long-term current use of insulin.   A1c 10.3% indicating poorly controlled DM2. Home regimen reported as Basaglar 40 units qHS and Admelog 20 units before breakfast only    While inpatient:  Currently in ICU, target glucose 140-180 mg/dl  Off steroids  Was on Glucerna 1.5 elin at 45 ml/hr x 24h, now NPO today with tube feeding OFF  Agree with holding NPH while off tube feeding   Continue Admelog MODERATE dose correctional scale q6h  Once TF is resumed, would continue with NPH to 10 units q6h (Hold if tube feeds are held, hold if FS < 100)  Check BG q6h    2. Steroid-induced hyperglycemia.    Last dose Dexamethasone 6 mg daily 3/25  Insulin regimen as above     3. Hypertension, unspecified type.    Now off pressors, defer management to ICU team    4. Hyperlipidemia, unspecified hyperlipidemia type.    Continue with statin if no contraindications, monitor lipid profile as outpatient, goal LDL is <70.     Isa Benito  Nurse Practitioner  Division of Endocrinology & Diabetes  In house pager #[phone]/long range pager #[phone]    If before 9AM or after 6PM, or on weekends/holidays, please call endocrine answering service for assistance ([phone]).  For nonurgent matters email [email or web address] for assistance.

## 2021-04-12 NOTE — PROGRESS NOTE ADULT - SUBJECTIVE AND OBJECTIVE BOX
[facility] NEPHROLOGY- PROGRESS NOTE, Follow up     Patient is a 70y Male with history of HTN on HCTZ presents with weakness found to have COVID-19. Nephrology consulted for hyponatremia.    Patient with resolution of hyponatremia for which nephrology had signed off on 3/19. Patient subsequently with non-oliguric ZELALEM.    REVIEW OF SYSTEMS: Unable to obtain as patient intubated.    No Known Allergies    Hospital Medications: Reviewed.      VITALS:  T(F): 98 (21 @ 20:00), Max: 99.4 (21 @ 04:00)  HR: 78 (21 @ 23:00)  BP: --  RR: 21 (21 @ 23:00)  SpO2: 65% (21 @ 23:00)  Wt(kg): --     @ 07:01  -   @ 07:00  --------------------------------------------------------  IN: 3309.9 mL / OUT: 2180 mL / NET: 1129.9 mL     @ 07:01  -   @ 23:24  --------------------------------------------------------  IN: 1565.7 mL / OUT: 440 mL / NET: 1125.7 mL          PHYSICAL EXAM:  Gen: Intubated  Cards: RRR, +S1/S2, no M/G/R  Resp: CTA B/L  GI: soft, NT/ND, NABS  Vascular: trace LE edema B/L, + UE edema    LABS:      144  |  114<H>  |  37<H>  ----------------------------<  148<H>  3.5   |  22  |  1.49<H>    Ca    7.5<L>      2021 01:59  Phos  3.5       Mg     2.1         TPro  6.2  /  Alb  1.5<L>  /  TBili  <0.2  /  DBili      /  AST  18  /  ALT  <5<L>  /  AlkPhos  105      Creatinine Trend: 1.49 <--, 1.31 <--, 1.34 <--, 1.35 <--, 1.39 <--, 1.52 <--, 1.57 <--                        8.1    22.84 )-----------( 868      ( 2021 12:15 )             26.8     Urine Studies:  Urinalysis Basic - ( 2021 19:09 )    Color: Light Yellow / Appearance: Slightly Turbid / S.012 / pH:   Gluc:  / Ketone: Negative  / Bili: Negative / Urobili: <2 mg/dL   Blood:  / Protein: 30 mg/dL / Nitrite: Negative   Leuk Esterase: Negative / RBC: 5 /HPF / WBC 5 /HPF   Sq Epi:  / Non Sq Epi: 2 /HPF / Bacteria: Occasional

## 2021-04-12 NOTE — PROGRESS NOTE ADULT - SUBJECTIVE AND OBJECTIVE BOX
Chief Complaint: DM 2, COVID+    History: Patient seen at bedside in ICU - Surge B  Remains intubated/sedated  Currently NPO with tube feeding OFF for possible trach today   Most recent  mg/dl     MEDICATIONS  (STANDING):  aspirin  chewable 81 milliGRAM(s) Oral daily  carbidopa/levodopa  10/100 2 Tablet(s) Oral <User Schedule>  cefepime   IVPB      cefepime   IVPB 1000 milliGRAM(s) IV Intermittent every 12 hours  chlorhexidine 0.12% Liquid 15 milliLiter(s) Oral Mucosa every 12 hours  chlorhexidine 4% Liquid 1 Application(s) Topical <User Schedule>  fentaNYL   Infusion..... 0.5 MICROgram(s)/kG/Hr (0.8 mL/Hr) IV Continuous <Continuous>  heparin   Injectable 5000 Unit(s) SubCutaneous every 8 hours  hydrALAZINE 75 milliGRAM(s) Oral every 8 hours  insulin lispro (ADMELOG) corrective regimen sliding scale   SubCutaneous every 6 hours  insulin NPH human recombinant 10 Unit(s) SubCutaneous every 6 hours  ketorolac 0.5% Ophthalmic Solution 1 Drop(s) Left EYE two times a day  levETIRAcetam  IVPB 1000 milliGRAM(s) IV Intermittent every 12 hours  metoprolol tartrate 25 milliGRAM(s) Enteral Tube two times a day  midazolam Injectable 4 milliGRAM(s) IV Push once  pantoprazole  Injectable 40 milliGRAM(s) IV Push daily  petrolatum Ophthalmic Ointment 1 Application(s) Both EYES two times a day  phenylephrine    Infusion 0.1 MICROgram(s)/kG/Min (1.49 mL/Hr) IV Continuous <Continuous>  polyethylene glycol 3350 17 Gram(s) Oral daily  prednisoLONE acetate 1% Suspension 1 Drop(s) Left EYE four times a day  propofol Infusion 25 MICROgram(s)/kG/Min (12 mL/Hr) IV Continuous <Continuous>  senna Syrup 10 milliLiter(s) Oral daily  simvastatin 20 milliGRAM(s) Oral at bedtime  vancomycin  IVPB 1250 milliGRAM(s) IV Intermittent every 12 hours  vancomycin  IVPB        MEDICATIONS  (PRN):  acetaminophen    Suspension .. 650 milliGRAM(s) Enteral Tube every 6 hours PRN Temp greater or equal to 38C (100.4F), Mild Pain (1 - 3)  sodium chloride 0.9% lock flush 10 milliLiter(s) IV Push every 1 hour PRN Pre/post blood products, medications, blood draw, and to maintain line patency    No Known Allergies    Review of Systems:  UNABLE TO OBTAIN    PHYSICAL EXAM:  VITALS: T(C): 36.8 (04-12-21 @ 12:00)  T(F): 98.3 (04-12-21 @ 12:00), Max: 99.5 (04-11-21 @ 20:00)  HR: 78 (04-12-21 @ 13:00) (65 - 87)  BP: --  RR:  (16 - 26)  SpO2:  (93% - 99%)  Wt(kg): --  GENERAL: critically ill, intubated/sedated   HEENT:  Atraumatic, Normocephalic  RESPIRATORY: ventilator support   CARDIOVASCULAR: SR on monitor   PSYCH: unable to assess     CAPILLARY BLOOD GLUCOSE    POCT Blood Glucose.: 120 mg/dL (12 Apr 2021 11:32)  POCT Blood Glucose.: 121 mg/dL (12 Apr 2021 05:16)  POCT Blood Glucose.: 152 mg/dL (12 Apr 2021 00:53)  POCT Blood Glucose.: 210 mg/dL (11 Apr 2021 17:17)      04-12    144  |  114<H>  |  37<H>  ----------------------------<  148<H>  3.5   |  22  |  1.49<H>    EGFR if : 54<L>  EGFR if non : 46<L>    Ca    7.5<L>      04-12  Mg     2.1     04-12  Phos  3.5     04-12    TPro  6.2  /  Alb  1.5<L>  /  TBili  <0.2  /  DBili  x   /  AST  18  /  ALT  <5<L>  /  AlkPhos  105  04-12      Thyroid Function Tests:  04-02 @ 04:17 TSH -- FreeT4 1.1 T3 -- Anti TPO -- Anti Thyroglobulin Ab -- TSI --  04-01 @ 13:56 TSH 4.52 FreeT4 -- T3 -- Anti TPO -- Anti Thyroglobulin Ab -- TSI --      A1C with Estimated Average Glucose Result: 10.3 % (03-14-21 @ 08:57)  A1C with Estimated Average Glucose: 9.7 % (10-01-20 @ 06:30)    Diet, NPO after Midnight:      NPO Start Date: 11-Apr-2021,   NPO Start Time: 23:59 (04-11-21 @ 20:35)  Diet, NPO with Tube Feed:   Tube Feeding Modality: Orogastric  Glucerna 1.5 Ignacio (GLUCERNA1.5RTH)  Total Volume for 24 Hours (mL): 1080  Continuous  Starting Tube Feed Rate mL per Hour: 10  Increase Tube Feed Rate by (mL): 10     Every 4 hours  Until Goal Tube Feed Rate (mL per Hour): 45  Tube Feed Duration (in Hours): 24  Tube Feed Start Time: 15:45  No Carb Prosource (1pkg = 15gms Protein)     Qty per Day:  2 (03-30-21 @ 13:24)

## 2021-04-13 NOTE — PROGRESS NOTE ADULT - SUBJECTIVE AND OBJECTIVE BOX
[facility] NEPHROLOGY- PROGRESS NOTE    Patient is a 70y Male with history of HTN on HCTZ presents with weakness found to have COVID-19. Nephrology consulted for hyponatremia.    Patient with resolution of hyponatremia for which nephrology had signed off on 3/19. Patient subsequently with non-oliguric ZELALEM.    REVIEW OF SYSTEMS: Unable to obtain as patient intubated.    No Known Allergies    Hospital Medications: Reviewed.      VITALS:  T(F): 97.7 (21 @ 12:00), Max: 99 (21 @ 00:00)  HR: 86 (21 @ 12:00)  BP: --  RR: 27 (21 @ 12:00)  SpO2: 91% (21 @ 12:00)  Wt(kg): --     @ 07:01  -   @ 07:00  --------------------------------------------------------  IN: 1828.9 mL / OUT: 910 mL / NET: 918.9 mL     @ 07:01  -   @ 12:32  --------------------------------------------------------  IN: 0 mL / OUT: 165 mL / NET: -165 mL      PHYSICAL EXAM:  Gen: + trach  Cards: RRR, +S1/S2, no M/G/R  Resp: CTA B/L  GI: soft, NT/ND, NABS  Vascular: trace LE edema B/L, + UE edema        LABS:      141  |  113<H>  |  36<H>  ----------------------------<  166<H>  4.6   |  19<L>  |  1.83<H>    Ca    7.6<L>      2021 00:59  Phos  4.7       Mg     2.1         TPro  6.2  /  Alb  1.2<L>  /  TBili  0.2  /  DBili      /  AST  26  /  ALT  <5<L>  /  AlkPhos  114      Creatinine Trend: 1.83 <--, 1.49 <--, 1.31 <--, 1.34 <--, 1.35 <--, 1.39 <--, 1.52 <--                        7.6    20.01 )-----------( 924      ( 2021 00:59 )             25.8     Urine Studies:  Urinalysis Basic - ( 2021 19:09 )    Color: Light Yellow / Appearance: Slightly Turbid / S.012 / pH:   Gluc:  / Ketone: Negative  / Bili: Negative / Urobili: <2 mg/dL   Blood:  / Protein: 30 mg/dL / Nitrite: Negative   Leuk Esterase: Negative / RBC: 5 /HPF / WBC 5 /HPF   Sq Epi:  / Non Sq Epi: 2 /HPF / Bacteria: Occasional      Sodium, Random Urine: 20 mmol/L ( @ 11:10)

## 2021-04-13 NOTE — CHART NOTE - NSCHARTNOTEFT_GEN_A_CORE
: [place]    INDICATION: Percutaneous Tracheostomy    PROCEDURE:  [ ] LIMITED ECHO  [ ] LIMITED CHEST  [ ] LIMITED RETROPERITONEAL  [ ] LIMITED ABDOMINAL  [ ] LIMITED DVT  [ ] NEEDLE GUIDANCE VASCULAR  [ ] NEEDLE GUIDANCE THORACENTESIS  [ ] NEEDLE GUIDANCE PARACENTESIS  [ ] NEEDLE GUIDANCE PERICARDIOCENTESIS  [X] LIMITED NECK    FINDINGS: The thyroid cartilage, cricoid cartilage and tracheal rings were located. The thyroid and isthmus was located. No high riding vessel noted. No vessels noted in the field of the percutaneous tracheostomy.     INTERPRETATION: No contrandications to proceeding with percutaneous tracheostomy.     Images stored on [employer].

## 2021-04-13 NOTE — PROGRESS NOTE ADULT - ASSESSMENT
70 year old Turkish/Jhonny-speaking male with a history of CAD s/p CABG and 2 stents, Type 2 DM, HTN, BPH s/p TURP and Parkinson's disease, who was recently admitted 3/14 for weakness and COVID-19. Started on decadron and remdesivir and discharged 3/16. Returned 3/17 with worsening respiratory failure. Intubated 3/17. Course c/b ?parotitis, Pseudomonas PNA, and new onset atrial fibrillation now in sinus rhythm s/p amio conversion. S/p trial of IVIG for poss COVID encephalopathy from 4/5-4/8 without improvement and dced 1 day early. S/p vEEG on 4/6 without seizures but waveforms c/f poss development of one and so keppra loaded.     Neuro  Mental status remains poor, not following commands  - Neuro consult, s/p 24h+ EEG no seizures but started keppra, no MRI at this time  - CTH negative for bleed 4/2  - Ammonia level, B12, and folate WNL   - Fent and prop gtt   - C/w sinemet for Parkinson's disease   - IVIG x 5d (4/5-4/9) for possible COVID encephalopathy, stopped a day early due to no effect    - Keppra 1g BID for poss concerning waveforms on EEG     Cardiovascular  - had recent h/o PAF, converted to NSR with amio  - maintaining NSR off amio, c/w lopressor 25 BID   - hydralazine 75 q6h     Pulmonary  - Intubated 3/20, trach'ed 4/13  - Continue full support with AC/PC 22/31/5/60%      GI  - NPO/TFs  - IV PPI daily   - Bowel regimen    Renal  ZELALEM 2/2 contrast load 3/26? vs COVID/SEPSIS  - Non oliguric, Cr stable   - Da Sliva for monitoring urine output  - HyperNa resolved, free water D/C'ed    ID  COVID  - S/p remdesivir, decadron     Neck swelling  - CT neck 3/26 c/f parotitis and or masseter myositis with cellulitis vs facial edema from proning   - Sputum from 3/26 growing Pseudomonas fluorescens   - S/p santos (3/30-4/8) 10d course per ID, s/p vanco 10d course  - Switched to renally dosed levaquin 4/8-4/12 (s/p 14 day course between santos and levaquin for sputum pseudomonas)   - Vancomycin ordered 2/2 gram+ cocci in clusters, dose by level 2/2 to ZELALEM   - Cefepime D/C'ed 4/13    Endo  - C/w NPH 8u q6h (held for holding TF), Mod SSI  - Endo following  - TSH 4.25, T4 WNL, subclinical (4/1), can repeat TFTs in 4-6 weeks     Heme  - SQH 5k q8h  - ASA 81mg   - LE DVT study negative 4/7    GOC  - Full code

## 2021-04-13 NOTE — PROGRESS NOTE ADULT - SUBJECTIVE AND OBJECTIVE BOX
Follow Up:  COVID, MRSA    Interval History/ROS: MRSA bacteremia. Remains critically ill.     Allergies  No Known Allergies        ANTIMICROBIALS:      OTHER MEDS:  acetaminophen    Suspension .. 650 milliGRAM(s) Enteral Tube every 6 hours PRN  aspirin  chewable 81 milliGRAM(s) Oral daily  carbidopa/levodopa  10/100 2 Tablet(s) Oral <User Schedule>  chlorhexidine 0.12% Liquid 15 milliLiter(s) Oral Mucosa every 12 hours  chlorhexidine 4% Liquid 1 Application(s) Topical <User Schedule>  fentaNYL   Infusion..... 0.5 MICROgram(s)/kG/Hr IV Continuous <Continuous>  heparin   Injectable 5000 Unit(s) SubCutaneous every 8 hours  hydrALAZINE 75 milliGRAM(s) Oral every 8 hours  insulin lispro (ADMELOG) corrective regimen sliding scale   SubCutaneous every 6 hours  insulin NPH human recombinant 8 Unit(s) SubCutaneous every 6 hours  ketorolac 0.5% Ophthalmic Solution 1 Drop(s) Left EYE two times a day  levETIRAcetam  IVPB 1000 milliGRAM(s) IV Intermittent every 12 hours  metoprolol tartrate 25 milliGRAM(s) Enteral Tube two times a day  pantoprazole  Injectable 40 milliGRAM(s) IV Push daily  petrolatum Ophthalmic Ointment 1 Application(s) Both EYES two times a day  phenylephrine    Infusion 0.1 MICROgram(s)/kG/Min IV Continuous <Continuous>  polyethylene glycol 3350 17 Gram(s) Oral daily  prednisoLONE acetate 1% Suspension 1 Drop(s) Left EYE four times a day  propofol Infusion 25 MICROgram(s)/kG/Min IV Continuous <Continuous>  senna Syrup 10 milliLiter(s) Oral daily  simvastatin 20 milliGRAM(s) Oral at bedtime  sodium chloride 0.9% lock flush 10 milliLiter(s) IV Push every 1 hour PRN      Vital Signs Last 24 Hrs  T(C): 36.9 (2021 16:00), Max: 37.2 (2021 00:00)  T(F): 98.4 (2021 16:00), Max: 99 (2021 00:00)  HR: 94 (2021 17:00) (72 - 94)  BP: --  BP(mean): --  RR: 26 (2021 17:00) (18 - 27)  SpO2: 89% (2021 17:00) (89% - 100%)    Physical Exam:  General: intubated sedated   Head: atraumatic, normocephalic  ENT: feeding and ET tubes   Cardio: regular rate   Respiratory: mechanically ventilated   abd: soft, nondistended   Musculoskeletal: no focal joint swelling, no edema  vascular: left IJ CVC, no phlebitis   Skin: no rash                          7.6    20.01 )-----------( 924      ( 2021 00:59 )             25.8       04-    141  |  113<H>  |  36<H>  ----------------------------<  166<H>  4.6   |  19<L>  |  1.83<H>    Ca    7.6<L>      2021 00:59  Phos  4.7       Mg     2.1         TPro  6.2  /  Alb  1.2<L>  /  TBili  0.2  /  DBili  x   /  AST  26  /  ALT  <5<L>  /  AlkPhos  114  13      Urinalysis Basic - ( 2021 19:09 )    Color: Light Yellow / Appearance: Slightly Turbid / S.012 / pH: x  Gluc: x / Ketone: Negative  / Bili: Negative / Urobili: <2 mg/dL   Blood: x / Protein: 30 mg/dL / Nitrite: Negative   Leuk Esterase: Negative / RBC: 5 /HPF / WBC 5 /HPF   Sq Epi: x / Non Sq Epi: 2 /HPF / Bacteria: Occasional        MICROBIOLOGY:  Vancomycin Level, Trough: 26.7 ug/mL (21 @ 04:49)    C Diff by PCR Result: NotDetec ( @ 08:55)    C Diff by PCR Result: NotDetec (21 @ 08:55)    Culture - Sputum (collected 21 @ 02:22)  Source: .Sputum Sputum  Gram Stain (21 @ 05:09):    Numerous polymorphonuclear leukocytes per low power field    No Squamous epithelial cells per low power field    Few Gram positive cocci in pairs per oil power field  Final Report (21 @ 17:14):    Moderate Methicillin resistant Staphylococcus aureus    Moderate Stenotrophomonas maltophilia    Normal Respiratory Fallon present  Organism: Methicillin resistant Staphylococcus aureus  Stenotrophomonas maltophilia (21 @ 17:14)  Organism: Stenotrophomonas maltophilia (21 @ 17:14)      -  Ceftazidime: I 16      -  Levofloxacin: S <=0.5      -  Trimethoprim/Sulfamethoxazole: S <=0.5/9.5      Method Type: SHARLENE  Organism: Methicillin resistant Staphylococcus aureus (21 @ 17:14)      -  Ampicillin/Sulbactam: R <=8/4      -  Cefazolin: R >16      -  Clindamycin: R >4      -  Erythromycin: R >4      -  Gentamicin: R >8 Should not be used as monotherapy      -  Linezolid: S 2      -  Oxacillin: R >2      -  Penicillin: R >8      -  RIF- Rifampin: S <=1 Should not be used as monotherapy      -  Tetra/Doxy: S 2      -  Trimethoprim/Sulfamethoxazole: S <=0.5/9.5      -  Vancomycin: S 1      Method Type: SHARLENE    Culture - Blood (collected 21 @ 23:03)  Source: .Blood Blood-Venous  Gram Stain (21 @ 22:39):    Growth in aerobic bottle: Gram Positive Cocci in Clusters  Preliminary Report (21 @ 22:39):    Growth in aerobic bottle: Gram Positive Cocci in Clusters    ***Blood Panel PCR results on this specimen are available    approximately 3 hours after the Gram stain result.***    Gram stain, PCR, and/or culture results may not always    correspond due to difference in methodologies.    ************************************************************    This PCR assay was performed by multiplex PCR. This    Assay tests for 66 bacterial and resistance gene targets.    Please refer to the [facility] test directory    at [email or web address] for details.  Organism: Blood Culture PCR (21 @ 01:11)  Organism: Blood Culture PCR (21 @ 01:11)      -  Methicillin resistant Staphylococcus aureus (MRSA): Detec      Method Type: PCR    Culture - Blood (collected 21 @ 23:00)  Source: .Blood Blood-Peripheral  Gram Stain (21 @ 05:00):    Growth in anaerobic bottle: Gram Positive Cocci in Clusters  Preliminary Report (21 @ 05:01):    Growth in anaerobic bottle: Gram Positive Cocci in Clusters    RADIOLOGY:  Images below reviewed personally  Xray Chest 1 View- PORTABLE-Urgent (Xray Chest 1 View- PORTABLE-Urgent .) (21 @ 12:50)   Inferior tip of tracheostomy tube in proper position. Remaining catheters in place.  Unchanged bilateral  multifocal and diffuse ill-defined airspace opacities.

## 2021-04-13 NOTE — BRIEF OPERATIVE NOTE - OPERATION/FINDINGS
With the patient in a supine position, the patient had an indwelling 7.5 endotracheal tube through which bronchoscopy was performed for guiding the tracheostomy procedure.  We positioned the patient’s neck in hyperextension and inspected and examined the neck anatomy, and then we selected a spot for the tracheostomy between the 2nd and the 3rd tracheal rings. After the neck area was prepared in a sterile fashion, we injected lidocaine mixed with epinephrine in the four quadrants for subcutaneous analgesia. Then we use the small bore finder needle and advanced it perpendicular to the airway while the trachea was held in place. Once the trachea was entered in midline we then advanced the large bore needle right next to the finder needle, remove the finder needle and advance the guide wire through the large needle. The needle was then removed, and then we performed a small horizontal incision to the subcutaneous tissue, 2cm around the insertion point. We then used the small blue dilator available in the percutaneous tracheostomy kit firmly advancing it and rotating it over the guide wire. The dilator was then removed and then we inserted the cone dilator over the white stiffening catheter and over the guide wire until the thick black line was noted bronchoscopically. At that point, the cone dilator was removed, and we inserted a number 6 non fenestrated Shiley tracheostomy tube over the 26 Amharic dilator and over the guide wire with the stiffening catheter. Once the tracheostomy tube was properly placed inside the airway and noted bronchoscopically, we removed the dilator, the guide wire and the stiffening catheter.   Four 2-0 Vicryl sutures were applied to secure the tracheostomy tube in the four quadrants and a tracheostomy tie was placed as well. There was no evidence of bleeding and the tip of the tube was at 4 cm away from the main mamadou and 4cm away from the cords.

## 2021-04-13 NOTE — PHARMACOTHERAPY INTERVENTION NOTE - COMMENTS
70y Male with COVID-19 requiring mechanical ventilation and pressor support, now with MRSA bacteremia. The patient also has ZELALEM with SCr to 1.83. Patient currently being managed with cefepime and vancomycin. Vancomycin level this morning = 26.7 ug/mL.    Recommendations:  1. Suggest holding vancomycin dose today and re-checking level tomorrow AM  2. Per hospital policy, recommend ID consult for management of invasive Staphylococcus aureus disease  3. Patient will require repeat blood cultures Q48 hours to ensure clearance    Brionna Ordonez, PharmD  PGY-2 Infectious Diseases Pharmacy Resident  Spectra [id number]

## 2021-04-13 NOTE — PROGRESS NOTE ADULT - SUBJECTIVE AND OBJECTIVE BOX
INTERVAL HPI/OVERNIGHT EVENTS:  - trach'ed today  - free water D/C'ed  - vanc level 26 - dosing switched to vanc by level. Cefepime D/C'ed, repeat BCx and vanc level ordered for  AM. ID consulted for MRSA+ in blood per protocol.    - NPH switched to 8 q6h per endo  +=    SUBJECTIVE:  HPI: 70 year old Bengali/Jhonny-speaking male with a history of CAD s/p CABG and 2 stents, Type 2 DM, HTN, BPH s/p TURP and Parkinson's disease, who was recently admitted 3/14 for weakness and COVID-19. Started on decadron and remdesivir and discharged 3/16. Returned 3/17 with worsening respiratory failure. Intubated 3/17. Course c/b ?parotitis, Pseudomonas PNA, and new onset atrial fibrillation now in sinus rhythm s/p amio conversion. S/p trial of IVIG for poss COVID encephalopathy from - without improvement and dced 1 day early. S/p vEEG on  without seizures but waveforms c/f poss development of one and so keppra loaded.     OBJECTIVE:    VITAL SIGNS:  ICU Vital Signs Last 24 Hrs  T(C): 36.5 (2021 08:00), Max: 37.2 (2021 00:00)  T(F): 97.7 (2021 08:00), Max: 99 (2021 00:00)  HR: 87 (2021 10:00) (72 - 87)  ABP: 127/47 (2021 10:00) (114/40 - 136/47)  ABP(mean): 72 (2021 10:00) (63 - 79)  RR: 22 (2021 10:00) (18 - 24)  SpO2: 95% (2021 10:00) (95% - 100%)    Mode: AC/ CMV (Assist Control/ Continuous Mandatory Ventilation), RR (machine): 22, FiO2: 50, PEEP: 5, ITime: 0.8, MAP: 13, PC: 26, PIP: 32    04-12 @ 07:01  -  -13 @ 07:00  --------------------------------------------------------  IN: 1828.9 mL / OUT: 910 mL / NET: 918.9 mL     @ 07:01   @ 11:53  --------------------------------------------------------  IN: 0 mL / OUT: 140 mL / NET: -140 mL      CAPILLARY BLOOD GLUCOSE      POCT Blood Glucose.: 86 mg/dL (2021 05:31)      PHYSICAL EXAM:    Physical Exam:   Constitutional: NAD  Respiratory: trach sutured in place  Cardiovascular: NSR on monitor   Gastrointestinal: Soft, ND  Extremities: No LE edema   Neurological: sedated        MEDICATIONS:  MEDICATIONS  (STANDING):  aspirin  chewable 81 milliGRAM(s) Oral daily  carbidopa/levodopa  10/100 2 Tablet(s) Oral <User Schedule>  chlorhexidine 0.12% Liquid 15 milliLiter(s) Oral Mucosa every 12 hours  chlorhexidine 4% Liquid 1 Application(s) Topical <User Schedule>  fentaNYL   Infusion..... 0.5 MICROgram(s)/kG/Hr (0.8 mL/Hr) IV Continuous <Continuous>  heparin   Injectable 5000 Unit(s) SubCutaneous every 8 hours  hydrALAZINE 75 milliGRAM(s) Oral every 8 hours  insulin lispro (ADMELOG) corrective regimen sliding scale   SubCutaneous every 6 hours  insulin NPH human recombinant 8 Unit(s) SubCutaneous every 6 hours  ketorolac 0.5% Ophthalmic Solution 1 Drop(s) Left EYE two times a day  levETIRAcetam  IVPB 1000 milliGRAM(s) IV Intermittent every 12 hours  metoprolol tartrate 25 milliGRAM(s) Enteral Tube two times a day  midazolam Injectable 4 milliGRAM(s) IV Push once  pantoprazole  Injectable 40 milliGRAM(s) IV Push daily  petrolatum Ophthalmic Ointment 1 Application(s) Both EYES two times a day  phenylephrine    Infusion 0.1 MICROgram(s)/kG/Min (1.49 mL/Hr) IV Continuous <Continuous>  polyethylene glycol 3350 17 Gram(s) Oral daily  prednisoLONE acetate 1% Suspension 1 Drop(s) Left EYE four times a day  propofol Infusion 25 MICROgram(s)/kG/Min (12 mL/Hr) IV Continuous <Continuous>  senna Syrup 10 milliLiter(s) Oral daily  simvastatin 20 milliGRAM(s) Oral at bedtime    MEDICATIONS  (PRN):  acetaminophen    Suspension .. 650 milliGRAM(s) Enteral Tube every 6 hours PRN Temp greater or equal to 38C (100.4F), Mild Pain (1 - 3)  sodium chloride 0.9% lock flush 10 milliLiter(s) IV Push every 1 hour PRN Pre/post blood products, medications, blood draw, and to maintain line patency      ALLERGIES:  Allergies    No Known Allergies    Intolerances        LABS:                        7.6    20.01 )-----------( 924      ( 2021 00:59 )             25.8     04-13    141  |  113<H>  |  36<H>  ----------------------------<  166<H>  4.6   |  19<L>  |  1.83<H>    Ca    7.6<L>      2021 00:59  Phos  4.7     04-13  Mg     2.1     04-13    TPro  6.2  /  Alb  1.2<L>  /  TBili  0.2  /  DBili  x   /  AST  26  /  ALT  <5<L>  /  AlkPhos  114  04-13    PT/INR - ( 2021 01:59 )   PT: 13.7 sec;   INR: 1.20 ratio         PTT - ( 2021 00:59 )  PTT:34.6 sec  Urinalysis Basic - ( 2021 19:09 )    Color: Light Yellow / Appearance: Slightly Turbid / S.012 / pH: x  Gluc: x / Ketone: Negative  / Bili: Negative / Urobili: <2 mg/dL   Blood: x / Protein: 30 mg/dL / Nitrite: Negative   Leuk Esterase: Negative / RBC: 5 /HPF / WBC 5 /HPF   Sq Epi: x / Non Sq Epi: 2 /HPF / Bacteria: Occasional        RADIOLOGY & ADDITIONAL TESTS: Reviewed.

## 2021-04-13 NOTE — CHART NOTE - NSCHARTNOTEFT_GEN_A_CORE
INTERVENTIONAL PULMONOLOGY POST-TRACHEOSTOMY NOTE      Patient underwent uneventful percutaneous tracheostomy with size 6 Shiley. See procedure notes for further details.       Post-tracheostomy care instructions:  -Minimize tension on tracheostomy: Keep tracheostomy elevated on towels to maintain perpendicular to neck and keep enough slack on vent tubing to avoid tension  -Sedate/Restrain patient to ensure does not pull at tracheostomy  -Keep tracheostomy retention collar in place at all times  -Utilize tracheostomy specific in-line suction or red rubber suction tubing through the swivel valve  -First dressing change at 72 hours. If dressings are grossly saturated before that time, reenforce dressings and page pulmonary/critical care fellow  -At 4 days the 2 o'clock and 8 o'clock sutures may be removed. The other 2 sutures are to remain in place for 10 days, please do not remove before then. Sutures may be removed by the primary service. If feel there are issues with the sutures, please contact MICU/Pulmonary fellow  -First tracheostomy change to happen in 3 weeks. If patient still admitted at that time please page pulmonary service to arrange exchange      -If issues with bleeding call pulmonary/critical care fellow on call  -If tracheostomy becomes dislodged prior to initial tracheostomy exchange do NOT attempt to replace the tracheostomy. Call MICU or Anesthesia immediately to intubate the patient orally with an ETT and page pulmonary/critical care fellow on call    Caleb Morgan MD  Pulmonary/Critical Care Fellow

## 2021-04-13 NOTE — PROCEDURE NOTE - ADDITIONAL PROCEDURE DETAILS
20 G 6 CM Arrow Extended Dwelling Catheter placed under ultrasound guidance.
20 G 6 CM Arrow Extended Dwelling Catheter placed under ultrasound guidance.
NG tube placed for administration of necessary tube feeding and medications.

## 2021-04-13 NOTE — PROGRESS NOTE ADULT - ATTENDING COMMENTS
ARF- dyspnea this am. support inc. had trache without apparent complication. will reevaluate after paralysis wears off    ZELALEM- vanco level little high ? toxicity. no other toxins. urine Na indeterminate. + fluid balance. waiting for renal rec.    MRSA PNA - vanco high. dose by level. check tonight. WBC better after one day    DM - glucoses ok on current regimen

## 2021-04-13 NOTE — CHART NOTE - NSCHARTNOTEFT_GEN_A_CORE
Pre-Bronchoscopy Tuberculosis Risk Screening Tool  To reduce the risk for airborne transmission of Mycobacterium tuberculosis, this assessment form must be completed prior to bronchoscopy procedures being performed outside of a negative pressure environment.    Procedure Date: 04/13/21  Health Care Provider Name: Cal Lo MD  Form Completed By: Cal Lo MD  Reason for the Bronchoscopy: Percutaneous tracheostomy  Date of Procedure: 04/13/21  Planned Location for the Procedure:  [ ] Emergency Department     [x ] Intensive Care Unit     [ ] Operating Room     Other: ___________________    Risk Assessment  I. I have personally evaluated this patient for Mycobacterium tuberculosis and I determined the following risk:  [x ] No Risk for TB     [ ] Low risk or TB     [ ] Significant risk for TB    II. Additional Findings: _________________________    III. Based on the Determined Risk for TB the following Action(s) are Suggested:  1. If there are no risk factors for TB infection proceed with the procedure.  2. If there is low risk or significant risk for TB infection the following recommendations should be followed:            a. Perform the procedure in a negative pressure room, with N95 respirator.            b. If not feasible to move the patient or defer the procedure:                    i. Use a single-bedded room low traffic area to perform the bronchoscopy procedure.                   ii. Place a portable high-efficiency particulate air (HEPA) filter in the space prior to starting the procedure and keep closed.                       Refer to the INF.1132 titled “Tuberculosis Control Strategy Plan” for additional information.                  iii. All Health Care Providers within the procedure room shall wear an N95 respirator.            c. Documentation of the tuberculosis risk assessment should be included within the patient’s medical record.      Indication: Percutaneous tracheostomy    Operators: Cal Lo MD / Duyen Sibley MD    Pre-op Dx: Hypoxic respiratory failure secondary to COVID-19    Preop medications:     Xray/CT Findings: Diffuse opacities    Findings:  Bronchoscope inserted through ETT. ETT noted to be in good position. Bronchoscopy was used during percutaneous tracheostomy to confirm placement of wire above the mamadou and then dilation of the trachea. Once the tracheostomy tube was inserted into the airway the scope was withdrawn from ETT and inserted through tracheostomy, showing that it is in good position.    --------------------------------------------------------  Cal Lo, PGY-5  Pulmonary/Critical Care Fellow  Pager: [phone] ([place]), [phone] ([place])

## 2021-04-13 NOTE — PROGRESS NOTE ADULT - ASSESSMENT
70y Male with history of HTN on HCTZ presents with weakness found to have COVID-19. Nephrology consulted for hyponatremia.    1) ZELALEM: Non-oliguric ZELALEM likely due to DEMIAN resolved with rise in Scr today likely hemodynamically mediated secondary to elevated vancomycin level. Agree with holding vancomycin and dosing by level. F/U ID. Avoid nephrotoxins.     2) HTN: BP improved on hydralazine 75 mg TID. Rate control as per ICU. Monitor BP.    3) Hypernatremia: Resolved for which free water discontinued. Monitor serum Na.    4) COVID-19: As per primary team.      [facility] NEPHROLOGY  Mahesh Lomax M.D.  Perez Armstrong D.O.  Gricelda Choe M.D.  Juliet Rogers, MSN, ANP-C    Telephone: [phone]  Facsimile: [phone]    [address] [place] [address]

## 2021-04-13 NOTE — CHART NOTE - NSCHARTNOTEFT_GEN_A_CORE
1 year old Uzbek/Jhonny-speaking male with a history of DM2 (uncontrolled A1c 10.3%, on basal/bolus insulin at home), HTN, CAD s/p CABG and stents, BPH s/p TURP and Parkinson's disease, now readmitted with COVID and started on high dose Decadron. Steroids stopped 3/25. Endocrine team consulted for management of DM2.     BG, insulin administration and chart reviewed  Patient NPO for part of day yesterday with TF off, appears NPH and TF resumed at 12 PM. Glucerna 1.5 elin at 45 ml/hr x 24h. Received 3 doses NPH 10u, now with tightly controlled glucose this AM to 86 mg/dl  6 AM NPH dose held - marked as NPO  When TF resumed, recommend to decrease NPH to 8 units q6h (Hold if TF is held)   Continue with Admelog MODERATE dose correctional scale q6h  Check BG q6h  See prior endocrine progress notes for complete plan of care  Will continue to follow    CAPILLARY BLOOD GLUCOSE    POCT Blood Glucose.: 86 mg/dL (13 Apr 2021 05:31)  POCT Blood Glucose.: 162 mg/dL (12 Apr 2021 23:20)  POCT Blood Glucose.: 126 mg/dL (12 Apr 2021 17:19)  POCT Blood Glucose.: 120 mg/dL (12 Apr 2021 11:32)  POCT Blood Glucose.: 121 mg/dL (12 Apr 2021 05:16)  POCT Blood Glucose.: 152 mg/dL (12 Apr 2021 00:53)  POCT Blood Glucose.: 210 mg/dL (11 Apr 2021 17:17)    04-13    141  |  113<H>  |  36<H>  ----------------------------<  166<H>  4.6   |  19<L>  |  1.83<H>    Ca    7.6<L>      13 Apr 2021 00:59  Phos  4.7     04-13  Mg     2.1     04-13    TPro  6.2  /  Alb  1.2<L>  /  TBili  0.2  /  DBili  x   /  AST  26  /  ALT  <5<L>  /  AlkPhos  114  04-13    MEDICATIONS  (STANDING):  aspirin  chewable 81 milliGRAM(s) Oral daily  carbidopa/levodopa  10/100 2 Tablet(s) Oral <User Schedule>  chlorhexidine 0.12% Liquid 15 milliLiter(s) Oral Mucosa every 12 hours  chlorhexidine 4% Liquid 1 Application(s) Topical <User Schedule>  fentaNYL   Infusion..... 0.5 MICROgram(s)/kG/Hr (0.8 mL/Hr) IV Continuous <Continuous>  heparin   Injectable 5000 Unit(s) SubCutaneous every 8 hours  hydrALAZINE 75 milliGRAM(s) Oral every 8 hours  insulin lispro (ADMELOG) corrective regimen sliding scale   SubCutaneous every 6 hours  insulin NPH human recombinant 10 Unit(s) SubCutaneous every 6 hours  ketorolac 0.5% Ophthalmic Solution 1 Drop(s) Left EYE two times a day  levETIRAcetam  IVPB 1000 milliGRAM(s) IV Intermittent every 12 hours  metoprolol tartrate 25 milliGRAM(s) Enteral Tube two times a day  midazolam Injectable 4 milliGRAM(s) IV Push once  pantoprazole  Injectable 40 milliGRAM(s) IV Push daily  petrolatum Ophthalmic Ointment 1 Application(s) Both EYES two times a day  phenylephrine    Infusion 0.1 MICROgram(s)/kG/Min (1.49 mL/Hr) IV Continuous <Continuous>  polyethylene glycol 3350 17 Gram(s) Oral daily  prednisoLONE acetate 1% Suspension 1 Drop(s) Left EYE four times a day  propofol Infusion 25 MICROgram(s)/kG/Min (12 mL/Hr) IV Continuous <Continuous>  senna Syrup 10 milliLiter(s) Oral daily  simvastatin 20 milliGRAM(s) Oral at bedtime    A1C with Estimated Average Glucose Result: 10.3 % (03-14-21 @ 08:57)  A1C with Estimated Average Glucose: 9.7 % (10-01-20 @ 06:30)    Diet, NPO after Midnight:      NPO Start Date: 11-Apr-2021,   NPO Start Time: 23:59 (04-11-21 @ 20:35)  Diet, NPO with Tube Feed:   Tube Feeding Modality: Orogastric  Glucerna 1.5 Elin (GLUCERNA1.5RTH)  Total Volume for 24 Hours (mL): 1080  Continuous  Starting Tube Feed Rate {mL per Hour}: 10  Increase Tube Feed Rate by (mL): 10     Every 4 hours  Until Goal Tube Feed Rate (mL per Hour): 45  Tube Feed Duration (in Hours): 24  Tube Feed Start Time: 15:45  No Carb Prosource (1pkg = 15gms Protein)     Qty per Day:  2 (03-30-21 @ 13:24)      Isa Benito  Nurse Practitioner  Division of Endocrinology & Diabetes  In house pager #[phone]/long range pager #[phone]    If before 9AM or after 6PM, or on weekends/holidays, please call endocrine answering service for assistance ([phone]).  For nonurgent matters email [email or web address] for assistance.

## 2021-04-13 NOTE — PROGRESS NOTE ADULT - ASSESSMENT
70M with DM, CAD s/p CABG, BPH s/p TURP, Parkinson disease, diagnosed with COVID 3/9/21, admitted 3/14 without hypoxia, readmitted 3/17 with respiratory failure.   Intubated 3/20.   Suspect CoNS on blood cultures 3/20 were contaminants.   Developed right parotitis with cellulitis on CT 3/26.   Unclear significance of sputum Pseudomonas fluorescens 3/26.   Has been on antibiotics almost continuously for the above and now has MRSA bacteremia 4/11. Possibly from VAP (sputum growth 4/12) or CLABSI (left IJ CVC placed 3/20).   Stenotrophomonas also grew in sputum despite four days of Levaquin just prior, unclear if contributing to illness or colonizing.   Remains critically ill.     Suggest  -hold Vanc and recheck daily levels until reaches around 15 then will redose   -repeat two sets of blood cultures   -exchange left IJ central line or remove if not needed   -check TTE   -can give two more dose of Levaquin 750mg q48h to complete a course for possible Stenotrophomonas VAP     Spoke with ICU     Freddie Flores MD   Infectious Disease   Pager [phone]   After 5PM and on weekends please page fellow on call or call [phone]

## 2021-04-14 NOTE — PROGRESS NOTE ADULT - SUBJECTIVE AND OBJECTIVE BOX
SUBJECTIVE:  HPI: 70 year old Kazakh/Jhonny-speaking male with a history of CAD s/p CABG and 2 stents, Type 2 DM, HTN, BPH s/p TURP and Parkinson's disease, who was recently admitted 3/14 for weakness and COVID-19. Started on decadron and remdesivir and discharged 3/16. Returned 3/17 with worsening respiratory failure. Intubated 3/17. Course c/b ?parotitis, Pseudomonas PNA, and new onset atrial fibrillation now in sinus rhythm s/p amio conversion. S/p trial of IVIG for poss COVID encephalopathy from - without improvement and dced. S/p vEEG on  without seizures but waveforms c/f poss development of one and so keppra loaded. Trached .     INTERVAL HPI/OVERNIGHT EVENTS:  Trached yesterday. Febrile. Started on cipro/vanc with ID following. Rpted cultures today. [facility] central line removed.     Medications  acetaminophen    Suspension .. 650 milliGRAM(s) Enteral Tube every 6 hours PRN  aspirin  chewable 81 milliGRAM(s) Oral daily  carbidopa/levodopa  10/100 2 Tablet(s) Oral <User Schedule>  chlorhexidine 0.12% Liquid 15 milliLiter(s) Oral Mucosa every 12 hours  chlorhexidine 4% Liquid 1 Application(s) Topical <User Schedule>  fentaNYL   Infusion..... 0.5 MICROgram(s)/kG/Hr IV Continuous <Continuous>  heparin   Injectable 5000 Unit(s) SubCutaneous every 8 hours  insulin lispro (ADMELOG) corrective regimen sliding scale   SubCutaneous every 6 hours  insulin NPH human recombinant 8 Unit(s) SubCutaneous every 6 hours  ketorolac 0.5% Ophthalmic Solution 1 Drop(s) Left EYE two times a day  levETIRAcetam  IVPB 1000 milliGRAM(s) IV Intermittent every 12 hours  levoFLOXacin IVPB 750 milliGRAM(s) IV Intermittent every 48 hours  pantoprazole  Injectable 40 milliGRAM(s) IV Push daily  petrolatum Ophthalmic Ointment 1 Application(s) Both EYES two times a day  phenylephrine    Infusion 0.1 MICROgram(s)/kG/Min IV Continuous <Continuous>  polyethylene glycol 3350 17 Gram(s) Oral daily  prednisoLONE acetate 1% Suspension 1 Drop(s) Left EYE four times a day  propofol Infusion 25 MICROgram(s)/kG/Min IV Continuous <Continuous>  senna Syrup 10 milliLiter(s) Oral daily  simvastatin 20 milliGRAM(s) Oral at bedtime  sodium bicarbonate 1300 milliGRAM(s) Oral every 12 hours  sodium chloride 0.9% lock flush 10 milliLiter(s) IV Push every 1 hour PRN      ICU Vitals  T(C): 37.1 (21 @ 08:00), Max: 37.1 (21 @ 08:00)  HR: 90 (21 @ 12:00) (81 - 97)  BP: 148/80 (21 @ 12:53) (140/74 - 148/80)  RR: 35 (21 @ 11:00) (25 - 35)  SpO2: 93% (21 @ 11:00) (89% - 96%)    Capillary blood glucose  POCT Blood Glucose.: 208 mg/dL (21 @ 11:52)  POCT Blood Glucose.: 185 mg/dL (21 @ 05:52)  POCT Blood Glucose.: 140 mg/dL (21 @ 23:10)  POCT Blood Glucose.: 126 mg/dL (21 @ 17:19)    Physical Exam:  Constitutional: NAD  Respiratory: trach sutured in place  Cardiovascular: NSR on monitor   Gastrointestinal: Soft, ND  Extremities: No LE edema   Neurological: sedated      I&O    21 @ 07:01  -  21 @ 07:00  --------------------------------------------------------  IN: 652 mL / OUT: 350 mL / NET: 302 mL        Labs  CBC ( @ 01:38)                          7.1<L>                   21.79<H>  )--------------(  960<H>     84.5<H>% Neuts, 5.9<L>% Lymphs, ANC: 18.42<H>                          24.0<L>    BMP ( @ 01:38)       141     |  111<H>  |  37<H> 			Ca++ --      Ca 8.0<L>       ---------------------------------( 144<H>		Mg 2.3          4.7     |  20<L>   |  2.25<H>			Ph 5.6<H>    LFTs ( @ 01:38)      TPro 6.4 / Alb 1.6<L> / TBili 0.3 / DBili -- / AST 21 / ALT <5<L> / AlkPhos 131<H>    Coags ( @ 01:38)  aPTT 35.3 / INR 1.26<H> / PT 14.2<H>      ABG ( @ 01:38)     7.24<L> / 48 / 63<L> / 19<L> / -6.4<L> / 90.0<L>%     Lactate:    ABG ( @ 19:04)     7.28<L> / 42 / 63<L> / 18<L> / -6.9<L> / 89.5<L>%     Lactate:        Urinalysis ( @ 19:09):     Color: Light Yellow / Appearance: Slightly Turbid<!> / S.012 / pH: 5.0 / Gluc: Negative / Ketones: Negative / Bili: Negative / Urobili: <2 mg/dL / Protein :30 mg/dL<!> / Nitrites: Negative / Leuk.Est: Negative / RBC: 5<H> / WBC: 5 / Sq Epi:  / Non Sq Epi: 2 / Bacteria Occasional<!>   SEE NOTE    -> .Sputum Sputum Culture ( @ 02:22)       Numerous polymorphonuclear leukocytes per low power field  No Squamous epithelial cells per low power field  Few Gram positive cocci in pairs per oil power field    Methicillin resistant Staphylococcus aureus  Stenotrophomonas maltophilia    Moderate Methicillin resistant Staphylococcus aureus  Moderate Stenotrophomonas maltophilia  Normal Respiratory Fallon present    -> .Blood Blood-Venous Culture ( @ 23:03)       Growth in aerobic bottle: Gram Positive Cocci in Clusters    Blood Culture PCR  Methicillin resistant Staphylococcus aureus    Growth in aerobic bottle: Methicillin resistant Staphylococcus aureus  ***Blood Panel PCR results on this specimen are available  approximately 3 hours after the Gram stain result.***  Gram stain, PCR, and/or culture results may not always  correspond due to difference in methodologies.  ************************************************************  This PCR assay was performed by multiplex PCR. This  Assay tests for 66 bacterial and resistance gene targets.  Please refer to the [facility] test directory  at [email or web address] for details.    -> .Blood Blood-Peripheral Culture ( @ 23:00)       Growth in anaerobic bottle: Gram Positive Cocci in Clusters    NG    Growth in anaerobic bottle: Methicillin resistant Staphylococcus aureus  See previous culture [id number]

## 2021-04-14 NOTE — PROCEDURE NOTE - NSPROCDETAILS_GEN_ALL_CORE
location identified, draped/prepped, sterile technique used, needle inserted/introduced/positive blood return obtained via catheter/connected to a pressurized flush line/sutured in place/Seldinger technique/all materials/supplies accounted for at end of procedure
guidewire recovered/lumen(s) aspirated and flushed/sterile dressing applied/ultrasound guidance with use of sterile gel and probe cove
location identified, draped/prepped, sterile technique used, needle inserted/introduced/positive blood return obtained via catheter/connected to a pressurized flush line/sutured in place/Seldinger technique/all materials/supplies accounted for at end of procedure
nasogastric/audible air bolus/placement confirmed by auscultation
patient pre-oxygenated, tube inserted, placement confirmed
location identified, draped/prepped, sterile technique used/blood seen on insertion/dressing applied/flushes easily/secured in place/sterile technique, catheter placed
location identified, draped/prepped, sterile technique used/blood seen on insertion/dressing applied/flushes easily/secured in place/sterile technique, catheter placed
guidewire recovered/lumen(s) aspirated and flushed/sterile dressing applied/sterile technique, catheter placed/ultrasound guidance with use of sterile gel and probe cove

## 2021-04-14 NOTE — PROGRESS NOTE ADULT - ASSESSMENT
70y Male with history of HTN on HCTZ presents with weakness found to have COVID-19. Nephrology consulted for hyponatremia.    1) ZELALEM: Non-oliguric ZELALEM likely due to DEMIAN resolved with recurrent ZELALEM likely ATN secondary to elevated vancomycin levels. Please check UA with microscopic, urine sodium and urine creatinine. Can start IV albumin 25% in 50 ml Q6 hours X 48 hours to increase renal perfusion. Can also start concurrent lasix gtt @ 10 mg/hour given volume overload and high FIO2 requirements (80%). Avoid nephrotoxins.     2) HTN: NOW with hypotension on pressors. As per ICU. Monitor BP.    3) Metabolic acidosis: With concurrent respiratory acidosis. Can start sodium bicarbonate 1300 mg twice daily to keep pH > 7.30 if unable to improve ventilation. Monitor pH.    4) Hypernatremia: Resolved for which free water discontinued. Monitor serum Na.    5) COVID-19: As per primary team.      [facility] NEPHROLOGY  Mahesh Lomax M.D.  Perez Armstrong D.O.  Gricelda Choe M.D.  Juliet Rogers, NITA, ANP-C    Telephone: [phone]  Facsimile: [phone]    [address] [place] [address]

## 2021-04-14 NOTE — PROGRESS NOTE ADULT - SUBJECTIVE AND OBJECTIVE BOX
Chief Complaint: DM 2, COVID+    History: Patient seen at bedside in ICU - Surge B  +trach, intubated/sedated   On Glucerna 1.5 ignacio at 45 ml/hr x 24h, feeding temporarily on hold at time of visit for hygiene, now resumed  Most recent  mg/dl     MEDICATIONS  (STANDING):  aspirin  chewable 81 milliGRAM(s) Oral daily  carbidopa/levodopa  10/100 2 Tablet(s) Oral <User Schedule>  chlorhexidine 0.12% Liquid 15 milliLiter(s) Oral Mucosa every 12 hours  chlorhexidine 4% Liquid 1 Application(s) Topical <User Schedule>  cisatracurium Infusion 3 MICROgram(s)/kG/Min (14.3 mL/Hr) IV Continuous <Continuous>  fentaNYL   Infusion..... 0.5 MICROgram(s)/kG/Hr (0.8 mL/Hr) IV Continuous <Continuous>  heparin   Injectable 5000 Unit(s) SubCutaneous every 8 hours  insulin lispro (ADMELOG) corrective regimen sliding scale   SubCutaneous every 6 hours  insulin NPH human recombinant 8 Unit(s) SubCutaneous every 6 hours  ketorolac 0.5% Ophthalmic Solution 1 Drop(s) Left EYE two times a day  levETIRAcetam  IVPB 1000 milliGRAM(s) IV Intermittent every 12 hours  levoFLOXacin IVPB 750 milliGRAM(s) IV Intermittent every 48 hours  pantoprazole  Injectable 40 milliGRAM(s) IV Push daily  petrolatum Ophthalmic Ointment 1 Application(s) Both EYES two times a day  phenylephrine    Infusion 0.1 MICROgram(s)/kG/Min (1.49 mL/Hr) IV Continuous <Continuous>  polyethylene glycol 3350 17 Gram(s) Oral daily  prednisoLONE acetate 1% Suspension 1 Drop(s) Left EYE four times a day  propofol Infusion 25 MICROgram(s)/kG/Min (12 mL/Hr) IV Continuous <Continuous>  senna Syrup 10 milliLiter(s) Oral daily  simvastatin 20 milliGRAM(s) Oral at bedtime  sodium bicarbonate 1300 milliGRAM(s) Oral every 12 hours    MEDICATIONS  (PRN):  acetaminophen    Suspension .. 650 milliGRAM(s) Enteral Tube every 6 hours PRN Temp greater or equal to 38C (100.4F), Mild Pain (1 - 3)  sodium chloride 0.9% lock flush 10 milliLiter(s) IV Push every 1 hour PRN Pre/post blood products, medications, blood draw, and to maintain line patency    No Known Allergies    Review of Systems:  UNABLE TO OBTAIN    PHYSICAL EXAM:  VITALS: T(C): 37.7 (04-14-21 @ 12:00)  T(F): 99.8 (04-14-21 @ 12:00), Max: 99.8 (04-14-21 @ 12:00)  HR: 95 (04-14-21 @ 16:17) (84 - 97)  BP: 123/73 (04-14-21 @ 16:15) (123/68 - 149/81)  RR:  (25 - 44)  SpO2:  (89% - 94%)  Wt(kg): --  GENERAL: critically ill, intubated/sedated   HEENT: +trach   RESPIRATORY: ventilator support   CARDIOVASCULAR: SR on monitor   PSYCH: unable to assess     CAPILLARY BLOOD GLUCOSE    POCT Blood Glucose.: 208 mg/dL (14 Apr 2021 11:52)  POCT Blood Glucose.: 185 mg/dL (14 Apr 2021 05:52)  POCT Blood Glucose.: 140 mg/dL (13 Apr 2021 23:10)  POCT Blood Glucose.: 126 mg/dL (13 Apr 2021 17:19)      04-14    144  |  113<H>  |  36<H>  ----------------------------<  177<H>  4.5   |  19<L>  |  2.34<H>    EGFR if : 31<L>  EGFR if non : 27<L>    Ca    8.1<L>      04-14  Mg     2.3     04-14  Phos  5.6     04-14    TPro  6.4  /  Alb  1.6<L>  /  TBili  0.3  /  DBili  x   /  AST  21  /  ALT  <5<L>  /  AlkPhos  131<H>  04-14      Thyroid Function Tests:  04-02 @ 04:17 TSH -- FreeT4 1.1 T3 -- Anti TPO -- Anti Thyroglobulin Ab -- TSI --  04-01 @ 13:56 TSH 4.52 FreeT4 -- T3 -- Anti TPO -- Anti Thyroglobulin Ab -- TSI --      A1C with Estimated Average Glucose Result: 10.3 % (03-14-21 @ 08:57)  A1C with Estimated Average Glucose: 9.7 % (10-01-20 @ 06:30)    Diet, NPO after Midnight:      NPO Start Date: 11-Apr-2021,   NPO Start Time: 23:59 (04-11-21 @ 20:35)  Diet, NPO with Tube Feed:   Tube Feeding Modality: Orogastric  Glucerna 1.5 Ignacio (GLUCERNA1.5RTH)  Total Volume for 24 Hours (mL): 1080  Continuous  Starting Tube Feed Rate mL per Hour: 10  Increase Tube Feed Rate by (mL): 10     Every 4 hours  Until Goal Tube Feed Rate (mL per Hour): 45  Tube Feed Duration (in Hours): 24  Tube Feed Start Time: 15:45  No Carb Prosource (1pkg = 15gms Protein)     Qty per Day:  2 (03-30-21 @ 13:24)

## 2021-04-14 NOTE — PROCEDURE NOTE - NSINDICATIONS_GEN_A_CORE
emergency venous access
respiratory failure
feeds
arterial puncture to obtain ABG's/cannulation purposes/critical patient/monitoring purposes
emergency venous access
arterial puncture to obtain ABG's/blood sampling/critical patient/monitoring purposes
emergency venous access
critical illness/emergency venous access/hemodynamic monitoring/volume resuscitation

## 2021-04-14 NOTE — PROGRESS NOTE ADULT - ATTENDING COMMENTS
ARF- doing poorly and worsening last few days. likely superimposed MRSA PNA. unable to relieve dyspnea despite multiple attempts with different modes. will need paralysis and proning but this late in course unlikely to be effective.    MRSA PNA- vanco started 4/12. no dose today. check level.    Streptomonas multiphilia- prob minor player in ARF. on levaquin. ? why not Bactrim, which is preferred. d/w ID    ZELALEM- likely ATN. d/w renaL, LASIX BICARB    Anemia- no bleeding. tx 1 unit    Severe prot elin malnutrtion. is on TF at goal but if proned will be limited.

## 2021-04-14 NOTE — PROCEDURE NOTE - NSSITEPREP_SKIN_A_CORE
chlorhexidine
chlorhexidine/Adherence to aseptic technique: hand hygiene prior to donning barriers (gown, gloves), don cap and mask, sterile drape over patient
chlorhexidine

## 2021-04-14 NOTE — PROGRESS NOTE ADULT - ASSESSMENT
70 year old Amharic/Jhonny-speaking male with a history of DM2 (uncontrolled A1c 10.3%, on basal/bolus insulin at home), HTN, CAD s/p CABG and stents, BPH s/p TURP and Parkinson's disease, now readmitted with COVID and started on high dose Decadron. Endocrine team consulted for management of DM2.   Patient is high risk with high level decision making, requiring ICU level of care    1. Type 2 diabetes mellitus with other specified complication, with long-term current use of insulin.   A1c 10.3% indicating poorly controlled DM2. Home regimen reported as Basaglar 40 units qHS and Admelog 20 units before breakfast only    While inpatient:  Currently in ICU, target glucose 140-180 mg/dl  Off steroids  On Glucerna 1.5 elin at 45 ml/hr x 24h  For now, would continue NPH 8 units SQ q6h (Hold if tube feeding is held, hold if FS < 100)  Continue Admelog MODERATE dose correctional scale q6h  Check BG q6h    2. Steroid-induced hyperglycemia.    Last dose Dexamethasone 6 mg daily 3/25  Insulin regimen as above     3. Hypertension, unspecified type.    Defer management to ICU team    4. Hyperlipidemia, unspecified hyperlipidemia type.    Continue with statin if no contraindications, monitor lipid profile as outpatient, goal LDL is <70.     Isa Benito  Nurse Practitioner  Division of Endocrinology & Diabetes  In house pager #[phone]/long range pager #[phone]    If before 9AM or after 6PM, or on weekends/holidays, please call endocrine answering service for assistance ([phone]).  For nonurgent matters email [email or web address] for assistance.

## 2021-04-14 NOTE — PROGRESS NOTE ADULT - SUBJECTIVE AND OBJECTIVE BOX
[facility] NEPHROLOGY- PROGRESS NOTE    Patient is a 70y Male with history of HTN on HCTZ presents with weakness found to have COVID-19. Nephrology consulted for hyponatremia.    Patient with resolution of hyponatremia for which nephrology had signed off on 3/19. Patient subsequently with non-oliguric ZELALEM.    REVIEW OF SYSTEMS: Unable to obtain as patient intubated.    No Known Allergies    Hospital Medications: Reviewed.      VITALS:  T(F): 98.7 (21 @ 08:00), Max: 98.7 (21 @ 08:00)  HR: 93 (21 @ 11:00)  BP: --  RR: 35 (21 @ 11:00)  SpO2: 93% (21 @ 11:00)  Wt(kg): --     @ 07:01  -   @ 07:00  --------------------------------------------------------  IN: 652 mL / OUT: 350 mL / NET: 302 mL      PHYSICAL EXAM:  Gen: + trach  Cards: RRR, +S1/S2, no M/G/R  Resp: CTA B/L  GI: soft, NT/ND, NABS  Vascular: + LE edema B/L, + UE edema        LABS:      141  |  111<H>  |  37<H>  ----------------------------<  144<H>  4.7   |  20<L>  |  2.25<H>    Ca    8.0<L>      2021 01:38  Phos  5.6       Mg     2.3         TPro  6.4  /  Alb  1.6<L>  /  TBili  0.3  /  DBili      /  AST  21  /  ALT  <5<L>  /  AlkPhos  131<H>      Creatinine Trend: 2.25 <--, 1.83 <--, 1.49 <--, 1.31 <--, 1.34 <--, 1.35 <--, 1.39 <--                        7.1    21.79 )-----------( 960      ( 2021 01:38 )             24.0     Urine Studies:  Urinalysis Basic - ( 2021 19:09 )    Color: Light Yellow / Appearance: Slightly Turbid / S.012 / pH:   Gluc:  / Ketone: Negative  / Bili: Negative / Urobili: <2 mg/dL   Blood:  / Protein: 30 mg/dL / Nitrite: Negative   Leuk Esterase: Negative / RBC: 5 /HPF / WBC 5 /HPF   Sq Epi:  / Non Sq Epi: 2 /HPF / Bacteria: Occasional      Sodium, Random Urine: 20 mmol/L ( @ 11:10)

## 2021-04-14 NOTE — PROCEDURE NOTE - NSPOSTPRCRAD_GEN_A_CORE
post-procedure radiography performed
chest xray ordered
central line located in the superior vena cava/no pneumothorax

## 2021-04-14 NOTE — PROCEDURE NOTE - NSPROCNAME_GEN_A_CORE
Gastric Intubation/Gastric Lavage
Peripheral Line Insertion
Tracheal Intubation
Arterial Puncture/Cannulation
Central Line Insertion
Peripheral Line Insertion
Arterial Puncture/Cannulation
Central Line Insertion

## 2021-04-14 NOTE — PROCEDURE NOTE - NSPOSTCAREGUIDE_GEN_A_CORE
Care for catheter as per unit/ICU protocols
Verbal/written post procedure instructions were given to patient/caregiver/Care for catheter as per unit/ICU protocols
Verbal/written post procedure instructions were given to patient/caregiver/Care for catheter as per unit/ICU protocols

## 2021-04-14 NOTE — PROGRESS NOTE ADULT - ASSESSMENT
70 year old German/Jhonny-speaking male with a history of CAD s/p CABG and 2 stents, Type 2 DM, HTN, BPH s/p TURP and Parkinson's disease, who was recently admitted 3/14 for weakness and COVID-19. Started on decadron and remdesivir and discharged 3/16. Returned 3/17 with worsening respiratory failure. Intubated 3/17. Course c/b ?parotitis, Pseudomonas PNA, and new onset atrial fibrillation now in sinus rhythm s/p amio conversion. S/p trial of IVIG for poss COVID encephalopathy from 4/5-4/8 without improvement and dced. S/p vEEG on 4/6 without seizures but waveforms c/f poss development of one and so keppra loaded. Trached 4/13.     Neuro  Mental status remains poor  - Neuro consult, s/p 24h+ EEG no seizures but started keppra, no MRI at this time  - CTH negative for bleed 4/2  - Ammonia level, B12, and folate WNL   - Fent and prop gtt   - C/w sinemet for Parkinson's disease   - IVIG x 5d (4/5-4/9) for possible COVID encephalopathy, no effect   - Keppra 1g BID for poss concerning waveforms on EEG     Cardiovascular  - had recent h/o PAF, converted to NSR with amio  - maintaining NSR off amio, stopped lopressor 25/25 on 4/14 due to increasing vasopressor requirements  - Braxton to maintain MAP>65 due to likely vasoplegia from sedation     Pulmonary  - Intubated 3/20, trach'ed 4/13  - Continue full support with AC/PC 22/31/5/80%  - FiO2 increasing, pending rpt ABG and BMP, poss acidosis component     GI  - NPO/TFs  - IV PPI daily   - Bowel regimen    Renal  ZELALEM   - Non oliguric, Cr increasing  - Da Silva for monitoring urine output (was kinked overnight, unkinked today with >450cc of UO immediately)  - HyperNa resolved, free water D/C'ed  - Renal following, appreciate recs: will hold off on diuretics/albumin until rpt labs return  - Urine studies     ID  COVID  - S/p remdesivir, decadron     Neck swelling  - CT neck 3/26 c/f parotitis and or masseter myositis with cellulitis vs facial edema from proning   - Sputum from 3/26 growing Pseudomonas fluorescens   - S/p santos (3/30-4/8) 10d course per ID, s/p vanco 10d course  - Switched to renally dosed levaquin 4/8-4/12 (s/p 14 day course between santos and levaquin for sputum pseudomonas)     MRSA Bacteremia 4/11, SCx with stenotrophomonas 4/12 (despite completing levaquin as above)  - Vanco by level (check level daily, today >20)  - Levaquin 750mg q48h to complete course for poss stenotrophomonas VAP (4/13-4/14 tonight ending)  - TTE  - Rpt blood cultures 4/14 sent   - ID following, appreciate recs     Endo  - C/w NPH 8u q6h, Mod SSI  - Endo following  - TSH 4.25, T4 WNL, subclinical (4/1), can repeat TFTs in 4-6 weeks     Heme  - SQH 5k q8h  - ASA 81mg   - LE DVT study negative 4/7    GOC  - Full code

## 2021-04-14 NOTE — CHART NOTE - NSCHARTNOTEFT_GEN_A_CORE
ACP Team PA Note    pt is in septic shock, started on Levophed, requirements are going up.  -- FiO2 requirements are going up,   -- + MRSA in sputum, + MRSA bacteremia-- 4/11, + stenotrophomonas VAP, __ on Levaquin until 4/14,   -- UA_ + leuk's, WBC- 78, + thick sputum,   -- discussed with ID on call __ continue Levaquin -- it covers UTI also, and Vancomycin by level,   -- repeat blood cxs-- 4/14 x 2 sent,   -- continue monitoring, ACP Team PA Note    pt is in septic shock, started on Levophed, requirements are going up.  -- FiO2 requirements are going up,   -- + MRSA in sputum, + MRSA bacteremia-- 4/11, + stenotrophomonas VAP, __ on Levaquin until 4/14,   -- UA_ + leuk's, WBC- 78, + thick sputum,   -- discussed with ID on call __ continue Levaquin -- it covers UTI also, and Vancomycin by level,   -- repeat blood cxs-- 4/14 x 2 sent,     -- pt is decompensating very quickly, now with worsening hypoxia and metabolic acidosis__ changed Abx to Meropenem, discussed with pharmacy__   -- continue monitoring, follow up ID recommendations ACP Team PA Note    pt is in septic shock, started on Levophed, requirements are going up.  -- FiO2 requirements are going up,   -- + MRSA in sputum, + MRSA bacteremia-- 4/11, + stenotrophomonas VAP, __ on Levaquin until 4/14,   -- UA_ + leuk's, WBC- 78, + thick sputum,   -- discussed with ID on call __ continue Levaquin -- it covers UTI also, and Vancomycin by level,   -- repeat blood cxs-- 4/14 x 2 sent,   - pt is oliduric-- UOP for the past 2 hours- <5 cc/hr,     -- pt is decompensating very quickly, now with worsening hypoxia and metabolic acidosis__ changed Abx to Meropenem, discussed with pharmacy the dose   -- continue monitoring, follow up ID recommendations ACP Team PA Note    pt is in septic shock, started on Levophed, requirements are going up.  -- FiO2 requirements are going up,   -- + MRSA in sputum, + MRSA bacteremia-- 4/11, + stenotrophomonas VAP, __ on Levaquin until 4/14,   -- UA_ + leuk's, WBC- 78, + thick sputum,   -- discussed with ID on call __ continue Levaquin -- it covers UTI also, and Vancomycin by level,   -- repeat blood cxs-- 4/14 x 2 sent,       -- pt is decompensating very quickly, now with worsening hypoxia and metabolic acidosis__ changed Abx to Meropenem, discussed with pharmacy the dose   -- continue monitoring, follow up ID recommendations    - ABG- 7.02/82/57/16/76, - pt is oliguric-- UOP for the past 2 hours- <5 cc/hr, -- 2 amps of bicarb gtt and bicarb gtt at150 mEq at 100 cc/hr, SPA 5 % 250 gr q 6 hrs,     repeat ABG-- 7.16/64/74/19/92, pt is anuric, on POCUS-- pt does not retain urine - - continue bicarb gtt, -- as per discussion with nephrology on call, started Lasix gtt at 10 mg /hr and decreased bicarb gtt to 150 mEq at 75 cc/hr     -- ABG - ( 15 Apr 2021 06:03 )  pH, Arterial: 7.16  pH, Blood: x     /  pCO2: 64    /  pO2: 74    / HCO3: 19    / Base Excess: -5.5  /  SaO2: 92.6    _ no UOP for the past hour, continue bicarb gtt and Lasix gtt, ACP Team PA Note    pt is in septic shock, started on Levophed, requirements are going up.  -- FiO2 requirements are going up,   -- + MRSA in sputum, + MRSA bacteremia-- 4/11, + stenotrophomonas VAP, __ on Levaquin until 4/14,   -- UA_ + leuk's, WBC- 78, + thick sputum,   -- discussed with ID on call __ continue Levaquin -- it covers UTI also, and Vancomycin by level,   -- repeat blood cxs-- 4/14 x 2 sent,       -- pt is decompensating very quickly, now with worsening hypoxia and metabolic acidosis__ changed Abx to Meropenem, discussed with pharmacy the dose   -- continue monitoring, follow up ID recommendations    - ABG- 7.02/82/57/16/76, - pt is oliguric-- UOP for the past 2 hours- <5 cc/hr, -- 2 amps of bicarb gtt and bicarb gtt at150 mEq at 100 cc/hr, SPA 25% 50 ml q 6 hrs x 48 hrs,    %   -- ABG -  pH, Arterial: 7.12  pH, Blood: x     /  pCO2: 69    /  pO2: 66    / HCO3: 18    / Base Excess: -5.5  /  SaO2: 88   , pt is anuric, on POCUS-- pt does not retain urine - - continue bicarb gtt, -- as per discussion with nephrology on call, started Lasix gtt at 10 mg /hr and decreased bicarb gtt to 150 mEq at 75 cc/hr       ABG - ( 15 Apr 2021 06:03 )  pH, Arterial: 7.16  pH, Blood: x     /  pCO2: 64    /  pO2: 74    / HCO3: 19    / Base Excess: -5.5  /  SaO2: 92.6    _ no UOP for the past hour, continue bicarb gtt and Lasix gtt, f/u nephrology recs  =-- discussed with Dr Lemos

## 2021-04-14 NOTE — PROCEDURE NOTE - NSINFORMCONSENT_GEN_A_CORE
This was an emergent procedure.
Benefits, risks, and possible complications of procedure explained to patient/caregiver who verbalized understanding and gave verbal consent.
This was an emergent procedure.

## 2021-04-15 NOTE — PROGRESS NOTE ADULT - SUBJECTIVE AND OBJECTIVE BOX
Follow Up:  COVID, MRSA    Interval History/ROS: Decompensated since yesterday - hypoxia worsening shock. Levaquin changed to Meropenem. New left IJ placed last night. I saw him yesterday and noted his former IJ line was out.     Allergies  No Known Allergies      ANTIMICROBIALS:  meropenem  IVPB    meropenem  IVPB 1000 every 12 hours      OTHER MEDS:  acetaminophen    Suspension .. 650 milliGRAM(s) Enteral Tube every 6 hours PRN  albumin human 25% IVPB 50 milliLiter(s) IV Intermittent every 6 hours  aspirin  chewable 81 milliGRAM(s) Oral daily  carbidopa/levodopa  10/100 2 Tablet(s) Oral <User Schedule>  chlorhexidine 0.12% Liquid 15 milliLiter(s) Oral Mucosa every 12 hours  chlorhexidine 4% Liquid 1 Application(s) Topical <User Schedule>  cisatracurium Infusion 3 MICROgram(s)/kG/Min IV Continuous <Continuous>  fentaNYL   Infusion..... 0.5 MICROgram(s)/kG/Hr IV Continuous <Continuous>  furosemide Infusion 10 mG/Hr IV Continuous <Continuous>  heparin   Injectable 5000 Unit(s) SubCutaneous every 8 hours  insulin lispro (ADMELOG) corrective regimen sliding scale   SubCutaneous every 6 hours  insulin NPH human recombinant 17 Unit(s) SubCutaneous every 6 hours  ketamine Infusion. 0.25 mG/kG/Hr IV Continuous <Continuous>  ketorolac 0.5% Ophthalmic Solution 1 Drop(s) Left EYE two times a day  levETIRAcetam  IVPB 500 milliGRAM(s) IV Intermittent every 12 hours  norepinephrine Infusion 0.05 MICROgram(s)/kG/Min IV Continuous <Continuous>  pantoprazole  Injectable 40 milliGRAM(s) IV Push daily  petrolatum Ophthalmic Ointment 1 Application(s) Both EYES two times a day  phenylephrine    Infusion 0.1 MICROgram(s)/kG/Min IV Continuous <Continuous>  polyethylene glycol 3350 17 Gram(s) Oral daily  prednisoLONE acetate 1% Suspension 1 Drop(s) Left EYE four times a day  propofol Infusion 25 MICROgram(s)/kG/Min IV Continuous <Continuous>  senna Syrup 10 milliLiter(s) Oral daily  simvastatin 20 milliGRAM(s) Oral at bedtime  sodium bicarbonate 1300 milliGRAM(s) Oral every 12 hours  sodium bicarbonate  Infusion 0.141 mEq/kG/Hr IV Continuous <Continuous>  sodium chloride 0.9% lock flush 10 milliLiter(s) IV Push every 1 hour PRN  vasopressin Infusion 0.04 Unit(s)/Min IV Continuous <Continuous>      Vital Signs Last 24 Hrs  T(C): 38.4 (15 Apr 2021 16:00), Max: 38.4 (15 Apr 2021 16:00)  T(F): 101.1 (15 Apr 2021 16:00), Max: 101.1 (15 Apr 2021 16:00)  HR: 78 (15 Apr 2021 18:00) (55 - 170)  BP: 109/59 (15 Apr 2021 03:00) (98/45 - 117/58)  BP(mean): 81 (15 Apr 2021 03:00) (62 - 82)  RR: 35 (15 Apr 2021 18:00) (31 - 35)  SpO2: 93% (15 Apr 2021 18:00) (67% - 97%)    Physical Exam:  General: intubated sedated   Head: atraumatic, normocephalic  ENT: NG and OT tubes   Cardio: regular rate   Respiratory: mechanically ventilated   abd: soft, nondistended   Musculoskeletal: no focal joint swelling   vascular: new left IJ CVC. no phlebitis                           7.0    26.20 )-----------( 1047     ( 15 Apr 2021 08:44 )             24.4       04-15    142  |  107  |  40<H>  ----------------------------<  299<H>  5.5<H>   |  21<L>  |  2.86<H>    Ca    8.1<L>      15 Apr 2021 08:44  Phos  6.8     04-15  Mg     2.4     04-15    TPro  6.3  /  Alb  1.5<L>  /  TBili  0.2  /  DBili  x   /  AST  83<H>  /  ALT  15  /  AlkPhos  180<H>  04-15      Urinalysis Basic - ( 2021 14:44 )    Color: Yellow / Appearance: Slightly Turbid / S.018 / pH: x  Gluc: x / Ketone: Negative  / Bili: Negative / Urobili: <2 mg/dL   Blood: x / Protein: 100 mg/dL / Nitrite: Negative   Leuk Esterase: Large / RBC: 4 /HPF / WBC 78 /HPF   Sq Epi: x / Non Sq Epi: 14 /HPF / Bacteria: Negative        MICROBIOLOGY:  Vancomycin Level, Random: 16.9 ug/mL (04-15-21 @ 00:57)    Culture - Blood (collected 21 @ 12:17)  Source: .Blood Blood  Preliminary Report (04-15-21 @ 13:02):    No growth to date.    Culture - Blood (collected 21 @ 12:17)  Source: .Blood Blood  Preliminary Report (04-15-21 @ 13:02):    No growth to date.    Culture - Sputum (collected 21 @ 02:22)  Source: .Sputum Sputum  Gram Stain (21 @ 05:09):    Numerous polymorphonuclear leukocytes per low power field    No Squamous epithelial cells per low power field    Few Gram positive cocci in pairs per oil power field  Final Report (21 @ 17:14):    Moderate Methicillin resistant Staphylococcus aureus    Moderate Stenotrophomonas maltophilia    Normal Respiratory Fallon present  Organism: Methicillin resistant Staphylococcus aureus  Stenotrophomonas maltophilia (21 @ 17:14)  Organism: Stenotrophomonas maltophilia (21 @ 17:14)      -  Ceftazidime: I 16      -  Levofloxacin: S <=0.5      -  Trimethoprim/Sulfamethoxazole: S <=0.5/9.5      Method Type: SHARLENE  Organism: Methicillin resistant Staphylococcus aureus (21 @ 17:14)      -  Ampicillin/Sulbactam: R <=8/4      -  Cefazolin: R >16      -  Clindamycin: R >4      -  Erythromycin: R >4      -  Gentamicin: R >8 Should not be used as monotherapy      -  Linezolid: S 2      -  Oxacillin: R >2      -  Penicillin: R >8      -  RIF- Rifampin: S <=1 Should not be used as monotherapy      -  Tetra/Doxy: S 2      -  Trimethoprim/Sulfamethoxazole: S <=0.5/9.5      -  Vancomycin: S 1      Method Type: SHARLENE    Culture - Blood (collected 21 @ 23:03)  Source: .Blood Blood-Venous  Gram Stain (21 @ 22:39):    Growth in aerobic bottle: Gram Positive Cocci in Clusters  Final Report (21 @ 11:52):    Growth in aerobic bottle: Methicillin resistant Staphylococcus aureus    ***Blood Panel PCR results on this specimen are available    approximately 3 hours after the Gram stain result.***    Gram stain, PCR, and/or culture results may not always    correspond due to difference in methodologies.    ************************************************************    This PCR assay was performed by multiplex PCR. This    Assay tests for 66 bacterial and resistance gene targets.    Please refer to the [facility] test directory    at [email or web address] for details.  Organism: Blood Culture PCR  Methicillin resistant Staphylococcus aureus (21 @ 11:52)  Organism: Methicillin resistant Staphylococcus aureus (21 @ 11:52)      -  Ampicillin/Sulbactam: R 16/8      -  Cefazolin: R >16      -  Clindamycin: R >4      -  Daptomycin: S 0.5      -  Erythromycin: R >4      -  Gentamicin: R >8 Should not be used as monotherapy      -  Linezolid: S 2      -  Oxacillin: R >2      -  Penicillin: R >8      -  RIF- Rifampin: S <=1 Should not be used as monotherapy      -  Tetra/Doxy: S <=1      -  Trimethoprim/Sulfamethoxazole: S <=0.5/9.5      -  Vancomycin: S 1      Method Type: SHARLENE  Organism: Blood Culture PCR (21 @ 11:52)      -  Methicillin resistant Staphylococcus aureus (MRSA): Detec      Method Type: PCR    Culture - Blood (collected 21 @ 23:00)  Source: .Blood Blood-Peripheral  Gram Stain (21 @ 05:00):    Growth in anaerobic bottle: Gram Positive Cocci in Clusters  Final Report (21 @ 11:53):    Growth in anaerobic bottle: Methicillin resistant Staphylococcus aureus    See previous culture [id number]    C Diff by PCR Result: NotDetec ( @ 08:55)    C Diff by PCR Result: NotDetec (21 @ 08:55)    RADIOLOGY:  Images below reviewed personally  Xray Chest 1 View- PORTABLE-Urgent (Xray Chest 1 View- PORTABLE-Urgent .) (21 @ 12:50)   Inferior tip of tracheostomy tube in proper position. Remaining catheters in place.  Unchanged bilateral  multifocal and diffuse ill-defined airspace opacities.

## 2021-04-15 NOTE — CHART NOTE - NSCHARTNOTEFT_GEN_A_CORE
70 year old Thai/Jhonny-speaking male with a history of DM2 (uncontrolled A1c 10.3%, on basal/bolus insulin at home), HTN, CAD s/p CABG and stents, BPH s/p TURP and Parkinson's disease, readmitted with COVID and started on high dose Decadron. Endocrine team consulted for management of DM2. Last dose Dexamethasone 3/25/21    BG, insulin administration and chart reviewed  Patient remains on continuous tube feeding, Glucerna 1.5 elin at 45 ml/hr x 24h  Glucose trending above target range with most recent 271 mg/dl  NPH dose increased by primary team from 8 units q6h to 11 units q6h. First dose is scheduled for 12 PM today  Recommend to monitor for at least 4 doses prior to increasing to accommodate for stacking. Additionally, patient with worsening renal fxn, at higher risk for hypoglycemia  Continue Admelog MODERATE dose correctional scale q6h  Check BG q6h  See prior endocrine progress notes for complete plan of care  Will follow    CAPILLARY BLOOD GLUCOSE    POCT Blood Glucose.: 271 mg/dL (15 Apr 2021 05:06)  POCT Blood Glucose.: 249 mg/dL (15 Apr 2021 02:31)  POCT Blood Glucose.: 234 mg/dL (14 Apr 2021 23:30)  POCT Blood Glucose.: 178 mg/dL (14 Apr 2021 17:13)  POCT Blood Glucose.: 208 mg/dL (14 Apr 2021 11:52)    04-15    142  |  107  |  40<H>  ----------------------------<  299<H>  5.5<H>   |  21<L>  |  2.86<H>    Ca    8.1<L>      15 Apr 2021 08:44  Phos  6.8     04-15  Mg     2.4     04-15    TPro  6.3  /  Alb  1.5<L>  /  TBili  0.2  /  DBili  x   /  AST  83<H>  /  ALT  15  /  AlkPhos  180<H>  04-15    MEDICATIONS  (STANDING):  albumin human 25% IVPB 50 milliLiter(s) IV Intermittent every 6 hours  aspirin  chewable 81 milliGRAM(s) Oral daily  carbidopa/levodopa  10/100 2 Tablet(s) Oral <User Schedule>  chlorhexidine 0.12% Liquid 15 milliLiter(s) Oral Mucosa every 12 hours  chlorhexidine 4% Liquid 1 Application(s) Topical <User Schedule>  cisatracurium Infusion 3 MICROgram(s)/kG/Min (14.3 mL/Hr) IV Continuous <Continuous>  fentaNYL   Infusion..... 0.5 MICROgram(s)/kG/Hr (0.8 mL/Hr) IV Continuous <Continuous>  furosemide Infusion 10 mG/Hr (5 mL/Hr) IV Continuous <Continuous>  heparin   Injectable 5000 Unit(s) SubCutaneous every 8 hours  insulin lispro (ADMELOG) corrective regimen sliding scale   SubCutaneous every 6 hours  insulin NPH human recombinant 11 Unit(s) SubCutaneous every 6 hours  ketamine Infusion. 0.25 mG/kG/Hr (1.99 mL/Hr) IV Continuous <Continuous>  ketorolac 0.5% Ophthalmic Solution 1 Drop(s) Left EYE two times a day  levETIRAcetam  IVPB 500 milliGRAM(s) IV Intermittent every 12 hours  meropenem  IVPB      meropenem  IVPB 1000 milliGRAM(s) IV Intermittent every 12 hours  norepinephrine Infusion 0.05 MICROgram(s)/kG/Min (7.47 mL/Hr) IV Continuous <Continuous>  pantoprazole  Injectable 40 milliGRAM(s) IV Push daily  petrolatum Ophthalmic Ointment 1 Application(s) Both EYES two times a day  phenylephrine    Infusion 0.1 MICROgram(s)/kG/Min (1.49 mL/Hr) IV Continuous <Continuous>  polyethylene glycol 3350 17 Gram(s) Oral daily  prednisoLONE acetate 1% Suspension 1 Drop(s) Left EYE four times a day  propofol Infusion 25 MICROgram(s)/kG/Min (12 mL/Hr) IV Continuous <Continuous>  senna Syrup 10 milliLiter(s) Oral daily  simvastatin 20 milliGRAM(s) Oral at bedtime  sodium bicarbonate 1300 milliGRAM(s) Oral every 12 hours  sodium bicarbonate  Infusion 0.141 mEq/kG/Hr (75 mL/Hr) IV Continuous <Continuous>  vasopressin Infusion 0.04 Unit(s)/Min (2.4 mL/Hr) IV Continuous <Continuous>    A1C with Estimated Average Glucose Result: 10.3 % (03-14-21 @ 08:57)  A1C with Estimated Average Glucose: 9.7 % (10-01-20 @ 06:30)    Diet, NPO with Tube Feed:   Tube Feeding Modality: Orogastric  Glucerna 1.5 Elin (GLUCERNA1.5RTH)  Total Volume for 24 Hours (mL): 1080  Continuous  Starting Tube Feed Rate {mL per Hour}: 10  Increase Tube Feed Rate by (mL): 10     Every 4 hours  Until Goal Tube Feed Rate (mL per Hour): 45  Tube Feed Duration (in Hours): 24  Tube Feed Start Time: 15:45  No Carb Prosource (1pkg = 15gms Protein)     Qty per Day:  2 (03-30-21 @ 13:24)    Isa Benito  Nurse Practitioner  Division of Endocrinology & Diabetes  In house pager #[phone]/long range pager #[phone]    If before 9AM or after 6PM, or on weekends/holidays, please call endocrine answering service for assistance ([phone]).  For nonurgent matters email [email or web address] for assistance.

## 2021-04-15 NOTE — PROGRESS NOTE ADULT - ASSESSMENT
70 year old Turkmen/Jhonny-speaking male with a history of CAD s/p CABG and 2 stents, Type 2 DM, HTN, BPH s/p TURP and Parkinson's disease, who was recently admitted 3/14 for weakness and COVID-19. Started on decadron and remdesivir and discharged 3/16. Returned 3/17 with worsening respiratory failure. Intubated 3/17. Course c/b ?parotitis, Pseudomonas PNA, and new onset atrial fibrillation now in sinus rhythm s/p amio conversion. S/p trial of IVIG for poss COVID encephalopathy from 4/5-4/8 without improvement and dced. S/p vEEG on 4/6 without seizures but waveforms c/f poss development of one and so keppra loaded. Trached 4/13. Acutely worsening ARDS/metabolic acidosis    Neuro  Mental status remains poor  - Continue fentanyl and ketamine for sedation, propofol weaned off for hypertriglyceridemia  - Continue chemical paralysis with nimbex for asynchrony  - Neuro consult, s/p 24h+ EEG without seizures, continue Keppra BID  - CTH negative for bleed 4/2  - C/w sinemet for Parkinson's disease   - IVIG x 5d (4/5-4/9) for possible COVID encephalopathy, no effect     Cardiovascular  - Recent h/o PAF, converted to NSR with amio  - Septic vs. vasoplegic shock on norepinpehrine, vasopressin and phenylephrine  - Will wean off norepinephrine as it is causing worsening tachycardia  - MAP>65    Pulmonary  - Intubated 3/20, trach 4/13  - Severe ARDS likely 2/2 stenotrophomonas PNA  - Pressure AC 32/30/12/100      - Plateau 39, Peak 40, PF 74    GI  - Worsening abdominal distension, hold TF  - Continue protonix for prophylaxis  - Bowel regimen with senna & miralax  - Continue statin    Renal  ZELALEM   - Oliguric renal failure      - Lasix infusion at 10mg/hr, metolazone and albumin with no response  - Discussed with nephrology, HD will not change patient's overall outcome, likely will not tolerate HD  - Continue bicarb infusion at 75ml/hr and 1300mg PO bicarb BID    ID  COVID  - S/p remdesivir, decadron     MRSA Bacteremia 4/11, SCx with stenotrophomonas 4/12 (despite course of Levaquin 4/13-4/14)  - Vanco by level, dosed today, level 16.9  - Meropenem for empiric coverage, f/u repeat blood cultures  - ID following, appreciate recs     Endo  - NPH 11 units q 6hrs  - Endo following  - TSH 4.25, T4 WNL, subclinical (4/1), can repeat TFTs in 4-6 weeks     Heme  - SQH 5k q8h  - ASA 81mg   - LE DVT study negative 4/7    GOC  - Full code 70 year old Amharic/Jhonny-speaking male with a history of CAD s/p CABG and 2 stents, Type 2 DM, HTN, BPH s/p TURP and Parkinson's disease, who was recently admitted 3/14 for weakness and COVID-19. Started on decadron and remdesivir and discharged 3/16. Returned 3/17 with worsening respiratory failure. Intubated 3/17. Course c/b ?parotitis, Pseudomonas PNA, and new onset atrial fibrillation now in sinus rhythm s/p amio conversion. S/p trial of IVIG for poss COVID encephalopathy from 4/5-4/8 without improvement and dced. S/p vEEG on 4/6 without seizures but waveforms c/f poss development of one and so keppra loaded. Trached 4/13. Acutely worsening ARDS/metabolic acidosis    Neuro  Mental status remains poor  - Continue fentanyl and ketamine for sedation, propofol weaned off for hypertriglyceridemia  - Continue chemical paralysis with nimbex for asynchrony  - Neuro consult, s/p 24h+ EEG without seizures, continue Keppra BID  - CTH negative for bleed 4/2  - C/w sinemet for Parkinson's disease   - IVIG x 5d (4/5-4/9) for possible COVID encephalopathy, no effect     Cardiovascular  - Recent h/o PAF, converted to NSR with amio  - Septic vs. vasoplegic shock on norepinpehrine, vasopressin and phenylephrine  - Will wean off norepinephrine as it is causing worsening tachycardia  - MAP>65    Pulmonary  - Intubated 3/20, trach 4/13  - Severe ARDS likely 2/2 stenotrophomonas PNA  - Pressure AC 32/30/12/100      - Plateau 39, Peak 40, PF 74    GI  - Worsening abdominal distension, hold TF  - Continue protonix for prophylaxis  - Bowel regimen with senna & miralax  - Continue statin    Renal  ZELALEM   - Oliguric renal failure      - Lasix infusion at 10mg/hr, metolazone and albumin with no response  - Discussed with nephrology, HD will not change patient's overall outcome, likely will not tolerate HD  - Continue bicarb infusion at 75ml/hr and 1300mg PO bicarb BID    ID  COVID  - S/p remdesivir, decadron     MRSA Bacteremia 4/11, SCx with stenotrophomonas 4/12 (despite course of Levaquin 4/13-4/14)  - Vanco by level, dosed today, level 16.9  - Meropenem for empiric coverage, f/u repeat blood cultures  - ID following, appreciate recs     Endo  - NPH 11 units q 6hrs  - Endo following  - TSH 4.25, T4 WNL, subclinical (4/1), can repeat TFTs in 4-6 weeks     Heme  - SQH 5k q8h  - ASA 81mg   - LE DVT study negative 4/7    GOC  - Full code    Discussed patient's worsening clinical status, will arrange for end of life visit 70 year old Kazakh/Jhonny-speaking male with a history of CAD s/p CABG and 2 stents, Type 2 DM, HTN, BPH s/p TURP and Parkinson's disease, who was recently admitted 3/14 for weakness and COVID-19. Started on decadron and remdesivir and discharged 3/16. Returned 3/17 with worsening respiratory failure. Intubated 3/17. Course c/b ?parotitis, Pseudomonas PNA, and new onset atrial fibrillation now in sinus rhythm s/p amio conversion. S/p trial of IVIG for poss COVID encephalopathy from 4/5-4/8 without improvement and dced. S/p vEEG on 4/6 without seizures but waveforms c/f poss development of one and so keppra loaded. Trached 4/13. Acutely worsening ARDS/metabolic acidosis    Neuro  Mental status remains poor  - Continue fentanyl and ketamine for sedation, propofol weaned off for hypertriglyceridemia  - Continue chemical paralysis with nimbex for asynchrony  - Neuro consult, s/p 24h+ EEG without seizures, continue Keppra BID  - CTH negative for bleed 4/2  - C/w sinemet for Parkinson's disease   - IVIG x 5d (4/5-4/9) for possible COVID encephalopathy, no effect     Cardiovascular  - Recent h/o PAF, converted to NSR with amio  - Septic vs. vasoplegic shock on norepinpehrine, vasopressin and phenylephrine  - Will wean off norepinephrine as it is causing worsening tachycardia  - MAP>65    Pulmonary  - Intubated 3/20, trach 4/13  - Severe ARDS likely 2/2 stenotrophomonas PNA  - Pressure AC 32/30/12/100      - Plateau 39, Peak 40, PF 74    GI  - Worsening abdominal distension, hold TF  - Continue protonix for prophylaxis  - Bowel regimen with senna & miralax  - Continue statin    Renal  ZELALEM   - Oliguric renal failure      - Lasix infusion at 10mg/hr, metolazone and albumin with no response  - Discussed with nephrology, HD will not change patient's overall outcome, likely will not tolerate HD  - Continue bicarb infusion at 75ml/hr and 1300mg PO bicarb BID    ID  COVID  - S/p remdesivir, decadron     MRSA Bacteremia 4/11, SCx with stenotrophomonas 4/12 (despite course of Levaquin 4/13-4/14)  - Vanco by level, dosed today, level 16.9  - Meropenem for empiric coverage, f/u repeat blood cultures  - ID following, appreciate recs     Endo  - NPH 11 units q 6hrs  - Endo following  - TSH 4.25, T4 WNL, subclinical (4/1), can repeat TFTs in 4-6 weeks     Heme  - SQH 5k q8h  - ASA 81mg   - LE DVT study negative 4/7    GOC  - Full code    Discussed patient's worsening clinical status and overall grim prognosis with Daughter Nneka, will arrange for end of life visit

## 2021-04-15 NOTE — PROGRESS NOTE ADULT - ATTENDING COMMENTS
further detioration overnite requiring 100% O2 and inc vasopressors with addition of levo. hypoxic despite again mult attempts vent changes without effect. family called last nite and again this am. they are coming for EOL visit. will try to talk with them at that time. death appears imminent.

## 2021-04-15 NOTE — PROGRESS NOTE ADULT - SUBJECTIVE AND OBJECTIVE BOX
[facility] NEPHROLOGY- PROGRESS NOTE    Patient is a 70y Male with history of HTN on HCTZ presents with weakness found to have COVID-19. Nephrology consulted for hyponatremia.    Patient with resolution of hyponatremia for which nephrology had signed off on 3/19. Patient subsequently with non-oliguric ZELALEM.    Overnight events reviewed.    REVIEW OF SYSTEMS: Unable to obtain as patient intubated.    No Known Allergies    Hospital Medications: Reviewed.      VITALS:  T(F): 100.3 (04-15-21 @ 12:00), Max: 100.3 (04-15-21 @ 12:00)  HR: 81 (04-15-21 @ 15:00)  BP: 109/59 (04-15-21 @ 03:00)  RR: 35 (04-15-21 @ 15:00)  SpO2: 88% (04-15-21 @ 15:00)  Wt(kg): --     @ 07:01  -  04-15 @ 07:00  --------------------------------------------------------  IN: 4272.3 mL / OUT: 633 mL / NET: 3639.3 mL    04-15 @ 07:01  -  04-15 @ 15:45  --------------------------------------------------------  IN: 1882.7 mL / OUT: 0 mL / NET: 1882.7 mL        PHYSICAL EXAM:  Gen: + trach  Cards: RRR, +S1/S2, no M/G/R  Resp: CTA B/L  GI: soft, NT/ND, NABS  Vascular: + LE edema B/L, + UE edema        LABS:  04-15    142  |  107  |  40<H>  ----------------------------<  299<H>  5.5<H>   |  21<L>  |  2.86<H>    Ca    8.1<L>      15 Apr 2021 08:44  Phos  6.8     04-15  Mg     2.4     04-15    TPro  6.3  /  Alb  1.5<L>  /  TBili  0.2  /  DBili      /  AST  83<H>  /  ALT  15  /  AlkPhos  180<H>  04-15    Creatinine Trend: 2.86 <--, 2.56 <--, 2.34 <--, 2.25 <--, 1.83 <--, 1.49 <--, 1.31 <--, 1.34 <--, 1.35 <--                        7.0    26.20 )-----------( 1047     ( 15 Apr 2021 08:44 )             24.4     Urine Studies:  Urinalysis Basic - ( 2021 14:44 )    Color: Yellow / Appearance: Slightly Turbid / S.018 / pH:   Gluc:  / Ketone: Negative  / Bili: Negative / Urobili: <2 mg/dL   Blood:  / Protein: 100 mg/dL / Nitrite: Negative   Leuk Esterase: Large / RBC: 4 /HPF / WBC 78 /HPF   Sq Epi:  / Non Sq Epi: 14 /HPF / Bacteria: Negative      Sodium, Random Urine: 20 mmol/L ( @ 11:10)     62

## 2021-04-15 NOTE — PROGRESS NOTE ADULT - ASSESSMENT
70M with DM, CABG, Parkinson disease, diagnosed with COVID 3/9/21, admitted 3/14 without hypoxia, readmitted 3/17 with respiratory failure.   Intubated 3/20.   Suspect CoNS on blood cultures 3/20 were contaminants.   Developed right parotitis with cellulitis on CT 3/26.   Unclear significance of sputum Pseudomonas fluorescens 3/26.   Has been on antibiotics almost continuously for the above and then developed MRSA bacteremia 4/11, cultures 4/14 negative to date.   Possibly from VAP (sputum growth 4/12) or CLABSI (left IJ CVC placed 3/20, removed 4/13, new one placed 4/14).   Stenotrophomonas also grew in sputum despite four days of Levaquin just prior, unclear if contributing to illness or colonizing.   Remains critically ill, decompensated further since yesterday and Levaquin was change to Meropenem.     Suggest  -daily Vanc levels, redose when around 15 (received 1GM this morning for level 16)   -monitor blood cultures 4/14   -check TTE   -reasonable to continue Meropenem given decompensation although loses Stenotrophomonas coverage which may be ok as he got about a week already     Freddie Flores MD   Infectious Disease   Pager [phone]   After 5PM and on weekends please page fellow on call or call [phone]

## 2021-04-15 NOTE — PROGRESS NOTE ADULT - ASSESSMENT
70y Male with history of HTN on HCTZ presents with weakness found to have COVID-19. Nephrology consulted for hyponatremia.    1) ZELALEM: likely ATN secondary to vanco toxicity and sepsis. UO remains poor despite albumin with concurrent lasix gtt. Patient currently hemodynamically unstable to tolerate RRT and would not change overall prognosis regardless. Family to arrive for EOL visit. Avoid nephrotoxins.     2) HTN: NOW with hypotension on 3 pressors. As per ICU. Monitor BP.    3) Metabolic acidosis: With concurrent respiratory acidosis. Continue with IV and PO bicarb to keep pH > 7.30. Monitor pH.    4) Hypernatremia: Resolved for which free water discontinued. Monitor serum Na.    5) Hyperkalemia: Would change diet to nepro. Can medically manage as needed. Monitor serum K.    6) COVID-19: As per primary team.      [facility] NEPHROLOGY  Mahesh Lomax M.D.  Perez Armstrong D.O.  Gricelda Choe M.D.  Juliet Rogers, MSN, ANP-C    Telephone: [phone]  Facsimile: [phone]    [address] [place] [address] 70y Male with history of HTN on HCTZ presents with weakness found to have COVID-19. Nephrology consulted for hyponatremia.    1) ZELALEM: likely ATN secondary to vanco toxicity and sepsis. UO remains poor despite albumin with concurrent lasix gtt. Patient currently hemodynamically unstable to tolerate RRT and would not change overall prognosis regardless. Family to arrive for EOL visit. Avoid nephrotoxins.     2) HTN: NOW with hypotension on 3 pressors. As per ICU. Monitor BP.    3) Metabolic acidosis: With concurrent respiratory acidosis. Continue with IV and PO bicarb to keep pH > 7.30. Monitor pH.    4) Hypernatremia: Resolved for which free water discontinued. Monitor serum Na.    5) Hyperkalemia: Would change diet to nepro. Can medically manage as needed. Monitor serum K.    6) COVID-19: As per primary team.      POOR PROGNOSIS.    [facility] NEPHROLOGY  Mahesh Lomax M.D.  Perez Armstrong D.O.  Gricelda Choe M.D.  Juliet Rogers, NITA, ANP-C    Telephone: [phone]  Facsimile: [phone]    [address] [place] [address]

## 2021-04-16 NOTE — PROGRESS NOTE ADULT - ASSESSMENT
70M with DM, CABG, Parkinson disease, diagnosed with COVID 3/9/21, admitted 3/14 without hypoxia, readmitted 3/17 with respiratory failure, intubated 3/20.   Right parotitis with cellulitis 3/26.   Received antibiotics for Pseudomonas fluorescens and Stenotrophomonas in sputum.   MRSA bacteremia 4/11 probably from VAP or CLABSI (left IJ catheter removed, replaced 4/14). Cultures 4/14 still positive but clearing.   Renal failure, doing poorly, GOC being addressed.     Suggest  -if within goals of care- repeat two sets of blood cultures, check TTE and monitor daily Vanc levels, redosing when around 15   -additional antibiotics unlikely to benefit     Spoke with ICU    Freddie Flores MD   Infectious Disease   Pager [phone]   After 5PM and on weekends please page fellow on call or call [phone]

## 2021-04-16 NOTE — PROGRESS NOTE ADULT - ASSESSMENT
70 year old Setswana/Jhonny-speaking male with a history of DM2 (uncontrolled A1c 10.3%, on basal/bolus insulin at home), HTN, CAD s/p CABG and stents, BPH s/p TURP and Parkinson's disease, now readmitted with COVID and started on high dose Decadron. Endocrine team consulted for management of DM2.   Patient is high risk with high level decision making, requiring ICU level of care    1. Type 2 diabetes mellitus with other specified complication, with long-term current use of insulin.   A1c 10.3% indicating poorly controlled DM2. Home regimen reported as Basaglar 40 units qHS and Admelog 20 units before breakfast only    While inpatient:  Currently in ICU, target glucose 140-180 mg/dl  Off steroids  Glucerna 1.5 elin at 45 ml/hr x 24h - NOW OFF  Given guarded prognosis and ARF, recommend to STOP NPH and will proceed with weight based 24h basal (Lantus) and scale for now  Recommend Lantus 15 units SQ qHS (HOLD IF FS LESS THAN 150 mg/dl)  Continue Admelog MODERATE dose correctional scale q6h  Check BG q6h    2. Steroid-induced hyperglycemia.    Last dose Dexamethasone 6 mg daily 3/25  Insulin regimen as above     3. Hypertension, unspecified type.    Defer management to ICU team    Isa Benito  Nurse Practitioner  Division of Endocrinology & Diabetes  In house pager #[phone]/long range pager #[phone]    If before 9AM or after 6PM, or on weekends/holidays, please call endocrine answering service for assistance ([phone]).  For nonurgent matters email [email or web address] for assistance.

## 2021-04-16 NOTE — PROGRESS NOTE ADULT - PROBLEM SELECTOR PROBLEM 2
Steroid-induced hyperglycemia

## 2021-04-16 NOTE — PROGRESS NOTE ADULT - SUBJECTIVE AND OBJECTIVE BOX
INTERVAL HPI/OVERNIGHT EVENTS: UOP dropped to 10cc/24 Hours. H/H dropped to 6.6, giving 1u prbcs.     SUBJECTIVE: Patient intubated/sedated    OBJECTIVE:    VITAL SIGNS:  ICU Vital Signs Last 24 Hrs  T(C): 36.6 (2021 12:00), Max: 38.4 (15 Apr 2021 16:00)  T(F): 97.9 (2021 12:00), Max: 101.1 (15 Apr 2021 16:00)  HR: 78 (:00) (76 - 89)  BP: --  BP(mean): --  ABP: 156/73 (2021 12:00) (90/41 - 165/78)  ABP(mean): 99 (:00) (60 - 100)  RR: 34 (:00) (34 - 98)  SpO2: 99% (:) (80% - 99%)    Mode: AC/ CMV (Assist Control/ Continuous Mandatory Ventilation), RR (machine): 35, FiO2: 100, PEEP: 12, ITime: 0.7, MAP: 27, PC: 37, PIP: 50    04-15 @ 07: @ 07:00  --------------------------------------------------------  IN: 5335.8 mL / OUT: 370 mL / NET: 4965.8 mL     @ 07:16 @ 13:00  --------------------------------------------------------  IN: 572 mL / OUT: 0 mL / NET: 572 mL      CAPILLARY BLOOD GLUCOSE      POCT Blood Glucose.: 327 mg/dL (2021 11:03)      PHYSICAL EXAM:    General: NAD  HEENT: NC/AT; PERRL, clear conjunctiva  Neck: supple  Respiratory: CTA b/l  Cardiovascular: +S1/S2; RRR  Abdomen: soft, NT/ND; +BS x4  Extremities: WWP, 2+ peripheral pulses b/l; no LE edema  Skin: normal color and turgor; no rash  Neurological:    MEDICATIONS:  MEDICATIONS  (STANDING):  albumin human 25% IVPB 50 milliLiter(s) IV Intermittent every 6 hours  aspirin  chewable 81 milliGRAM(s) Oral daily  carbidopa/levodopa  10/100 2 Tablet(s) Oral <User Schedule>  chlorhexidine 0.12% Liquid 15 milliLiter(s) Oral Mucosa every 12 hours  chlorhexidine 4% Liquid 1 Application(s) Topical <User Schedule>  cisatracurium Infusion 3 MICROgram(s)/kG/Min (14.3 mL/Hr) IV Continuous <Continuous>  fentaNYL   Infusion..... 0.5 MICROgram(s)/kG/Hr (0.8 mL/Hr) IV Continuous <Continuous>  heparin   Injectable 5000 Unit(s) SubCutaneous every 8 hours  insulin lispro (ADMELOG) corrective regimen sliding scale   SubCutaneous every 6 hours  insulin NPH human recombinant 4 Unit(s) SubCutaneous every 6 hours  ketamine Infusion. 0.25 mG/kG/Hr (1.99 mL/Hr) IV Continuous <Continuous>  ketorolac 0.5% Ophthalmic Solution 1 Drop(s) Left EYE two times a day  levETIRAcetam  IVPB 500 milliGRAM(s) IV Intermittent every 12 hours  pantoprazole  Injectable 40 milliGRAM(s) IV Push daily  petrolatum Ophthalmic Ointment 1 Application(s) Both EYES two times a day  phenylephrine    Infusion 0.1 MICROgram(s)/kG/Min (1.49 mL/Hr) IV Continuous <Continuous>  polyethylene glycol 3350 17 Gram(s) Oral daily  prednisoLONE acetate 1% Suspension 1 Drop(s) Left EYE four times a day  propofol Infusion 25 MICROgram(s)/kG/Min (12 mL/Hr) IV Continuous <Continuous>  senna Syrup 10 milliLiter(s) Oral daily  simvastatin 20 milliGRAM(s) Oral at bedtime  sodium bicarbonate 1300 milliGRAM(s) Oral every 12 hours  sodium bicarbonate  Infusion 0.141 mEq/kG/Hr (75 mL/Hr) IV Continuous <Continuous>  vasopressin Infusion 0.04 Unit(s)/Min (2.4 mL/Hr) IV Continuous <Continuous>    MEDICATIONS  (PRN):  acetaminophen    Suspension .. 650 milliGRAM(s) Enteral Tube every 6 hours PRN Temp greater or equal to 38C (100.4F), Mild Pain (1 - 3)  sodium chloride 0.9% lock flush 10 milliLiter(s) IV Push every 1 hour PRN Pre/post blood products, medications, blood draw, and to maintain line patency      ALLERGIES:  Allergies    No Known Allergies    Intolerances        LABS:                        8.2    21.64 )-----------( 845      ( 2021 11:07 )             26.0     04-16    141  |  105  |  54<H>  ----------------------------<  373<H>  5.5<H>   |  20<L>  |  3.50<H>    Ca    7.5<L>      2021 01:54  Phos  7.1     04-16  Mg     2.6     04-16    TPro  6.3  /  Alb  1.5<L>  /  TBili  0.2  /  DBili  x   /  AST  2103<H>  /  ALT  283<H>  /  AlkPhos  262<H>  -16    PT/INR - ( 2021 01:54 )   PT: 15.3 sec;   INR: 1.35 ratio         PTT - ( 2021 01:54 )  PTT:33.2 sec  Urinalysis Basic - ( 2021 14:44 )    Color: Yellow / Appearance: Slightly Turbid / S.018 / pH: x  Gluc: x / Ketone: Negative  / Bili: Negative / Urobili: <2 mg/dL   Blood: x / Protein: 100 mg/dL / Nitrite: Negative   Leuk Esterase: Large / RBC: 4 /HPF / WBC 78 /HPF   Sq Epi: x / Non Sq Epi: 14 /HPF / Bacteria: Negative        RADIOLOGY & ADDITIONAL TESTS: Reviewed.

## 2021-04-16 NOTE — PROGRESS NOTE ADULT - SUBJECTIVE AND OBJECTIVE BOX
[facility] NEPHROLOGY- PROGRESS NOTE    Patient is a 70y Male with history of HTN on HCTZ presents with weakness found to have COVID-19. Nephrology consulted for hyponatremia.    Patient with resolution of hyponatremia for which nephrology had signed off on 3/19. Patient subsequently with non-oliguric ZELALEM.    Overnight events reviewed.    REVIEW OF SYSTEMS: Unable to obtain as patient intubated.    No Known Allergies    Hospital Medications: Reviewed.      VITALS:  T(F): 97.9 (21 @ 12:00), Max: 101.1 (04-15-21 @ 16:00)  HR: 78 (21 @ 12:00)  BP: --  RR: 34 (21 @ 12:00)  SpO2: 99% (21 @ 12:00)  Wt(kg): --    04-15 @ 07:01  -   @ 07:00  --------------------------------------------------------  IN: 5335.8 mL / OUT: 370 mL / NET: 4965.8 mL     @ 07:01  -   @ 12:20  --------------------------------------------------------  IN: 572 mL / OUT: 0 mL / NET: 572 mL      PHYSICAL EXAM:  Gen: + trach  Cards: RRR, +S1/S2, no M/G/R  Resp: CTA B/L  GI: soft, NT/ND, NABS  Vascular: + LE edema B/L, + UE edema      LABS:      141  |  105  |  54<H>  ----------------------------<  373<H>  5.5<H>   |  20<L>  |  3.50<H>    Ca    7.5<L>      2021 01:54  Phos  7.1       Mg     2.6         TPro  6.3  /  Alb  1.5<L>  /  TBili  0.2  /  DBili      /  AST  2103<H>  /  ALT  283<H>  /  AlkPhos  262<H>      Creatinine Trend: 3.50 <--, 2.86 <--, 2.56 <--, 2.34 <--, 2.25 <--, 1.83 <--, 1.49 <--, 1.31 <--, 1.34 <--                        8.2    21.64 )-----------( 845      ( 2021 11:07 )             26.0     Urine Studies:  Urinalysis Basic - ( 2021 14:44 )    Color: Yellow / Appearance: Slightly Turbid / S.018 / pH:   Gluc:  / Ketone: Negative  / Bili: Negative / Urobili: <2 mg/dL   Blood:  / Protein: 100 mg/dL / Nitrite: Negative   Leuk Esterase: Large / RBC: 4 /HPF / WBC 78 /HPF   Sq Epi:  / Non Sq Epi: 14 /HPF / Bacteria: Negative      Sodium, Random Urine: 20 mmol/L ( @ 11:10)

## 2021-04-16 NOTE — PROGRESS NOTE ADULT - SUBJECTIVE AND OBJECTIVE BOX
Chief Complaint: DM 2 with hyperglycemia, COVID+    History: Patient seen at bedside in ICU - Surge B   Condition worsening, tube feeding OFF, NPH decreased from 17 units q6h to 4 units q6h  Hyperglycemia persisting with most recent glucose in 300s    MEDICATIONS  (STANDING):  albumin human 25% IVPB 50 milliLiter(s) IV Intermittent every 6 hours  aspirin  chewable 81 milliGRAM(s) Oral daily  carbidopa/levodopa  10/100 2 Tablet(s) Oral <User Schedule>  chlorhexidine 0.12% Liquid 15 milliLiter(s) Oral Mucosa every 12 hours  chlorhexidine 4% Liquid 1 Application(s) Topical <User Schedule>  cisatracurium Infusion 3 MICROgram(s)/kG/Min (14.3 mL/Hr) IV Continuous <Continuous>  fentaNYL   Infusion..... 0.5 MICROgram(s)/kG/Hr (0.8 mL/Hr) IV Continuous <Continuous>  heparin   Injectable 5000 Unit(s) SubCutaneous every 8 hours  insulin lispro (ADMELOG) corrective regimen sliding scale   SubCutaneous every 6 hours  insulin NPH human recombinant 4 Unit(s) SubCutaneous every 6 hours  ketamine Infusion. 0.25 mG/kG/Hr (1.99 mL/Hr) IV Continuous <Continuous>  ketorolac 0.5% Ophthalmic Solution 1 Drop(s) Left EYE two times a day  levETIRAcetam  IVPB 500 milliGRAM(s) IV Intermittent every 12 hours  pantoprazole  Injectable 40 milliGRAM(s) IV Push daily  petrolatum Ophthalmic Ointment 1 Application(s) Both EYES two times a day  phenylephrine    Infusion 0.1 MICROgram(s)/kG/Min (1.49 mL/Hr) IV Continuous <Continuous>  polyethylene glycol 3350 17 Gram(s) Oral daily  prednisoLONE acetate 1% Suspension 1 Drop(s) Left EYE four times a day  propofol Infusion 25 MICROgram(s)/kG/Min (12 mL/Hr) IV Continuous <Continuous>  senna Syrup 10 milliLiter(s) Oral daily  simvastatin 20 milliGRAM(s) Oral at bedtime  sodium bicarbonate 1300 milliGRAM(s) Oral every 12 hours  sodium bicarbonate  Infusion 0.141 mEq/kG/Hr (75 mL/Hr) IV Continuous <Continuous>  vasopressin Infusion 0.04 Unit(s)/Min (2.4 mL/Hr) IV Continuous <Continuous>    MEDICATIONS  (PRN):  acetaminophen    Suspension .. 650 milliGRAM(s) Enteral Tube every 6 hours PRN Temp greater or equal to 38C (100.4F), Mild Pain (1 - 3)  sodium chloride 0.9% lock flush 10 milliLiter(s) IV Push every 1 hour PRN Pre/post blood products, medications, blood draw, and to maintain line patency    No Known Allergies    Review of Systems:  UNABLE TO OBTAIN    PHYSICAL EXAM:  VITALS: T(C): 36.6 (04-16-21 @ 12:00)  T(F): 97.9 (04-16-21 @ 12:00), Max: 100.4 (04-15-21 @ 20:00)  HR: 77 (04-16-21 @ 15:52) (77 - 89)  BP: --  RR:  (34 - 98)  SpO2:  (80% - 99%)  Wt(kg): --  GENERAL: ill appearing, intubated/sedated   HEENT:  +trach  RESPIRATORY: vent support   PSYCH: unable to assess     CAPILLARY BLOOD GLUCOSE    POCT Blood Glucose.: 327 mg/dL (16 Apr 2021 11:03)  POCT Blood Glucose.: 361 mg/dL (16 Apr 2021 06:22)  POCT Blood Glucose.: 392 mg/dL (15 Apr 2021 23:27)      04-16    141  |  105  |  54<H>  ----------------------------<  373<H>  5.5<H>   |  20<L>  |  3.50<H>    EGFR if : 19<L>  EGFR if non : 16<L>    Ca    7.5<L>      04-16  Mg     2.6     04-16  Phos  7.1     04-16    TPro  6.3  /  Alb  1.5<L>  /  TBili  0.2  /  DBili  x   /  AST  2103<H>  /  ALT  283<H>  /  AlkPhos  262<H>  04-16      Thyroid Function Tests:  04-02 @ 04:17 TSH -- FreeT4 1.1 T3 -- Anti TPO -- Anti Thyroglobulin Ab -- TSI --  04-01 @ 13:56 TSH 4.52 FreeT4 -- T3 -- Anti TPO -- Anti Thyroglobulin Ab -- TSI --      A1C with Estimated Average Glucose Result: 10.3 % (03-14-21 @ 08:57)  A1C with Estimated Average Glucose: 9.7 % (10-01-20 @ 06:30)    Diet, NPO with Tube Feed:   Tube Feeding Modality: Orogastric  Glucerna 1.5 Ignacio (GLUCERNA1.5RTH)  Total Volume for 24 Hours (mL): 1080  Continuous  Starting Tube Feed Rate mL per Hour: 10  Increase Tube Feed Rate by (mL): 10     Every 4 hours  Until Goal Tube Feed Rate (mL per Hour): 45  Tube Feed Duration (in Hours): 24  Tube Feed Start Time: 15:45  No Carb Prosource (1pkg = 15gms Protein)     Qty per Day:  2 (03-30-21 @ 13:24)

## 2021-04-16 NOTE — PROGRESS NOTE ADULT - SUBJECTIVE AND OBJECTIVE BOX
Follow Up: COVID, MRSA    Interval History/ROS: Not doing well. Family coming in later. Latest blood cultures positive.     Allergies  No Known Allergies        ANTIMICROBIALS:      OTHER MEDS:  acetaminophen    Suspension .. 650 milliGRAM(s) Enteral Tube every 6 hours PRN  albumin human 25% IVPB 50 milliLiter(s) IV Intermittent every 6 hours  aspirin  chewable 81 milliGRAM(s) Oral daily  carbidopa/levodopa  10/100 2 Tablet(s) Oral <User Schedule>  chlorhexidine 0.12% Liquid 15 milliLiter(s) Oral Mucosa every 12 hours  chlorhexidine 4% Liquid 1 Application(s) Topical <User Schedule>  cisatracurium Infusion 3 MICROgram(s)/kG/Min IV Continuous <Continuous>  fentaNYL   Infusion..... 0.5 MICROgram(s)/kG/Hr IV Continuous <Continuous>  heparin   Injectable 5000 Unit(s) SubCutaneous every 8 hours  insulin lispro (ADMELOG) corrective regimen sliding scale   SubCutaneous every 6 hours  insulin NPH human recombinant 4 Unit(s) SubCutaneous every 6 hours  ketamine Infusion. 0.25 mG/kG/Hr IV Continuous <Continuous>  ketorolac 0.5% Ophthalmic Solution 1 Drop(s) Left EYE two times a day  levETIRAcetam  IVPB 500 milliGRAM(s) IV Intermittent every 12 hours  pantoprazole  Injectable 40 milliGRAM(s) IV Push daily  petrolatum Ophthalmic Ointment 1 Application(s) Both EYES two times a day  phenylephrine    Infusion 0.1 MICROgram(s)/kG/Min IV Continuous <Continuous>  polyethylene glycol 3350 17 Gram(s) Oral daily  prednisoLONE acetate 1% Suspension 1 Drop(s) Left EYE four times a day  propofol Infusion 25 MICROgram(s)/kG/Min IV Continuous <Continuous>  senna Syrup 10 milliLiter(s) Oral daily  simvastatin 20 milliGRAM(s) Oral at bedtime  sodium bicarbonate 1300 milliGRAM(s) Oral every 12 hours  sodium bicarbonate  Infusion 0.141 mEq/kG/Hr IV Continuous <Continuous>  sodium chloride 0.9% lock flush 10 milliLiter(s) IV Push every 1 hour PRN  sodium zirconium cyclosilicate 5 Gram(s) Oral every 8 hours  vasopressin Infusion 0.04 Unit(s)/Min IV Continuous <Continuous>      Vital Signs Last 24 Hrs  T(C): 37.7 (16 Apr 2021 20:00), Max: 38 (16 Apr 2021 00:00)  T(F): 99.8 (16 Apr 2021 20:00), Max: 100.4 (16 Apr 2021 00:00)  HR: 74 (16 Apr 2021 20:28) (74 - 89)  BP: --  BP(mean): --  RR: 35 (16 Apr 2021 21:00) (34 - 98)  SpO2: 100% (16 Apr 2021 21:00) (80% - 100%)    Physical Exam:  Head: atraumatic, normocephalic  ENT: ET and OG tubes   Cardio: regular rate   Respiratory: mechanically ventilated   abd: soft, bowel sounds present, no tenderness  MSK: anasarca   vascular: left IJ CVC   Skin: no rash                          8.2    21.64 )-----------( 845      ( 16 Apr 2021 11:07 )             26.0       04-16    141  |  105  |  54<H>  ----------------------------<  373<H>  5.5<H>   |  20<L>  |  3.50<H>    Ca    7.5<L>      16 Apr 2021 01:54  Phos  7.1     04-16  Mg     2.6     04-16    TPro  6.3  /  Alb  1.5<L>  /  TBili  0.2  /  DBili  x   /  AST  2103<H>  /  ALT  283<H>  /  AlkPhos  262<H>  04-16          MICROBIOLOGY:  Vancomycin Level, Random: 22.9 ug/mL (04-16-21 @ 11:07)    Culture - Blood (collected 04-14-21 @ 12:17)  Source: .Blood Blood  Gram Stain (04-16-21 @ 09:21):    Growth in aerobic bottle: Gram Positive Cocci in Clusters  Preliminary Report (04-16-21 @ 09:21):    Growth in aerobic bottle: Gram Positive Cocci in Clusters    Culture - Blood (collected 04-14-21 @ 12:17)  Source: .Blood Blood  Preliminary Report (04-15-21 @ 13:02):    No growth to date.    Culture - Sputum (collected 04-12-21 @ 02:22)  Source: .Sputum Sputum  Gram Stain (04-12-21 @ 05:09):    Numerous polymorphonuclear leukocytes per low power field    No Squamous epithelial cells per low power field    Few Gram positive cocci in pairs per oil power field  Final Report (04-13-21 @ 17:14):    Moderate Methicillin resistant Staphylococcus aureus    Moderate Stenotrophomonas maltophilia    Normal Respiratory Fallon present  Organism: Methicillin resistant Staphylococcus aureus  Stenotrophomonas maltophilia (04-13-21 @ 17:14)  Organism: Stenotrophomonas maltophilia (04-13-21 @ 17:14)      -  Ceftazidime: I 16      -  Levofloxacin: S <=0.5      -  Trimethoprim/Sulfamethoxazole: S <=0.5/9.5      Method Type: SHARLENE  Organism: Methicillin resistant Staphylococcus aureus (04-13-21 @ 17:14)      -  Ampicillin/Sulbactam: R <=8/4      -  Cefazolin: R >16      -  Clindamycin: R >4      -  Erythromycin: R >4      -  Gentamicin: R >8 Should not be used as monotherapy      -  Linezolid: S 2      -  Oxacillin: R >2      -  Penicillin: R >8      -  RIF- Rifampin: S <=1 Should not be used as monotherapy      -  Tetra/Doxy: S 2      -  Trimethoprim/Sulfamethoxazole: S <=0.5/9.5      -  Vancomycin: S 1      Method Type: SHARLENE    Culture - Blood (collected 04-11-21 @ 23:03)  Source: .Blood Blood-Venous  Gram Stain (04-12-21 @ 22:39):    Growth in aerobic bottle: Gram Positive Cocci in Clusters  Final Report (04-14-21 @ 11:52):    Growth in aerobic bottle: Methicillin resistant Staphylococcus aureus    ***Blood Panel PCR results on this specimen are available    approximately 3 hours after the Gram stain result.***    Gram stain, PCR, and/or culture results may not always    correspond due to difference in methodologies.    ************************************************************    This PCR assay was performed by multiplex PCR. This    Assay tests for 66 bacterial and resistance gene targets.    Please refer to the [facility] test directory    at [email or web address] for details.  Organism: Blood Culture PCR  Methicillin resistant Staphylococcus aureus (04-14-21 @ 11:52)  Organism: Methicillin resistant Staphylococcus aureus (04-14-21 @ 11:52)      -  Ampicillin/Sulbactam: R 16/8      -  Cefazolin: R >16      -  Clindamycin: R >4      -  Daptomycin: S 0.5      -  Erythromycin: R >4      -  Gentamicin: R >8 Should not be used as monotherapy      -  Linezolid: S 2      -  Oxacillin: R >2      -  Penicillin: R >8      -  RIF- Rifampin: S <=1 Should not be used as monotherapy      -  Tetra/Doxy: S <=1      -  Trimethoprim/Sulfamethoxazole: S <=0.5/9.5      -  Vancomycin: S 1      Method Type: SHARLENE  Organism: Blood Culture PCR (04-14-21 @ 11:52)      -  Methicillin resistant Staphylococcus aureus (MRSA): Detec      Method Type: PCR    Culture - Blood (collected 04-11-21 @ 23:00)  Source: .Blood Blood-Peripheral  Gram Stain (04-13-21 @ 05:00):    Growth in anaerobic bottle: Gram Positive Cocci in Clusters  Final Report (04-14-21 @ 11:53):    Growth in anaerobic bottle: Methicillin resistant Staphylococcus aureus    See previous culture [id number]    C Diff by PCR Result: NotDetec (04-12 @ 08:55)    C Diff by PCR Result: NotDetec (04-12-21 @ 08:55)    RADIOLOGY:  Images below reviewed personally  Xray Chest 1 View- PORTABLE-Urgent (Xray Chest 1 View- PORTABLE-Urgent .) (04.14.21 @ 20:23)   Diffuse bilateral airspace disease with bilateral pleural effusions.

## 2021-04-16 NOTE — PROGRESS NOTE ADULT - ASSESSMENT
70 year old Pashto/Jhonny-speaking male with a history of CAD s/p CABG and 2 stents, Type 2 DM, HTN, BPH s/p TURP and Parkinson's disease, who was recently admitted 3/14 for weakness and COVID-19. Started on decadron and remdesivir and discharged 3/16. Returned 3/17 with worsening respiratory failure. Intubated 3/17. Course c/b ?parotitis, Pseudomonas PNA, and new onset atrial fibrillation now in sinus rhythm s/p amio conversion. S/p trial of IVIG for poss COVID encephalopathy from 4/5-4/8 without improvement and dced. S/p vEEG on 4/6 without seizures but waveforms c/f poss development of one and so keppra loaded. Trached 4/13. Acutely worsening ARDS/metabolic acidosis    Neuro  Mental status remains poor  - Continue fentanyl and ketamine for sedation, propofol weaned off for hypertriglyceridemia  - Continue chemical paralysis with nimbex for asynchrony  - Neuro consult, s/p 24h+ EEG without seizures, continue Keppra BID  - CTH negative for bleed 4/2  - C/w sinemet for Parkinson's disease   - IVIG x 5d (4/5-4/9) for possible COVID encephalopathy, no effect     Cardiovascular  - Recent h/o PAF, converted to NSR with amio  - Septic vs. vasoplegic shock on norepinpehrine, vasopressin and phenylephrine  - Will wean off norepinephrine as it is causing worsening tachycardia  - MAP>65    Pulmonary  - Intubated 3/20, trach 4/13  - Severe ARDS likely 2/2 stenotrophomonas PNA  - Pressure AC 32/30/12/100      - Plateau 39, Peak 40, PF 74    GI  - Worsening abdominal distension, hold TF  - Continue protonix for prophylaxis  - Bowel regimen with senna & miralax  - Continue statin    Renal  ZELALEM   - Oliguric renal failure      - Lasix infusion at 10mg/hr, metolazone and albumin with no response  - Discussed with nephrology, HD will not change patient's overall outcome, likely will not tolerate HD  - Continue bicarb infusion at 75ml/hr and 1300mg PO bicarb BID    ID  COVID  - S/p remdesivir, decadron     MRSA Bacteremia 4/11, SCx with stenotrophomonas 4/12 (despite course of Levaquin 4/13-4/14)  - Vanco by level, dosed today, level 16.9  - Meropenem for empiric coverage, f/u repeat blood cultures  - ID following, appreciate recs     Endo  - NPH 20 units q 6hrs  - Endo following  - TSH 4.25, T4 WNL, subclinical (4/1), can repeat TFTs in 4-6 weeks     Heme  - SQH 5k q8h  - ASA 81mg   - LE DVT study negative 4/7    Dispo  - GOC conversation  - Family visit offered but patient family declined  - Remains full code     70 year old Faroese/Jhonny-speaking male with a history of CAD s/p CABG and 2 stents, Type 2 DM, HTN, BPH s/p TURP and Parkinson's disease, who was recently admitted 3/14 for weakness and COVID-19. Started on decadron and remdesivir and discharged 3/16. Returned 3/17 with worsening respiratory failure. Intubated 3/17. Course c/b ?parotitis, Pseudomonas PNA, and new onset atrial fibrillation now in sinus rhythm s/p amio conversion. S/p trial of IVIG for poss COVID encephalopathy from 4/5-4/8 without improvement and dced. S/p vEEG on 4/6 without seizures but waveforms c/f poss development of one and so keppra loaded. Trached 4/13. Acutely worsening ARDS/metabolic acidosis    Neuro  Mental status remains poor  - Continue fentanyl and ketamine for sedation, propofol weaned off for hypertriglyceridemia  - Continue chemical paralysis with nimbex for asynchrony  - Neuro consult, s/p 24h+ EEG without seizures, continue Keppra BID  - CTH negative for bleed 4/2  - C/w sinemet for Parkinson's disease   - IVIG x 5d (4/5-4/9) for possible COVID encephalopathy, no effect     Cardiovascular  - Recent h/o PAF, converted to NSR with amio  - Septic vs. vasoplegic shock on norepinpehrine, vasopressin and phenylephrine  - Will wean off norepinephrine as it is causing worsening tachycardia  - MAP>65    Pulmonary  - Intubated 3/20, trach 4/13  - Severe ARDS likely 2/2 stenotrophomonas PNA  - Pressure AC 32/30/12/100      - Plateau 39, Peak 40, PF 74    GI  - Worsening abdominal distension, hold TF  - Continue protonix for prophylaxis  - Bowel regimen with senna & miralax  - Continue statin    Renal  ZELALEM   - Oliguric renal failure      - Lasix infusion at 10mg/hr, metolazone and albumin with no response  - Discussed with nephrology, HD will not change patient's overall outcome, likely will not tolerate HD  - Continue bicarb infusion at 75ml/hr and 1300mg PO bicarb BID    ID  COVID  - S/p remdesivir, decadron     MRSA Bacteremia 4/11, SCx with stenotrophomonas 4/12 (despite course of Levaquin 4/13-4/14)  - Vanco by level, dosed today, level 16.9  - Meropenem for empiric coverage, f/u repeat blood cultures  - ID following, appreciate recs     Endo  - NPH 20 units q 6hrs  - Endo following  - TSH 4.25, T4 WNL, subclinical (4/1), can repeat TFTs in 4-6 weeks     Heme  - SQH 5k q8h  - ASA 81mg   - LE DVT study negative 4/7    Dispo  - GOC conversation  - Family visit offered.

## 2021-04-16 NOTE — PROGRESS NOTE ADULT - ATTENDING COMMENTS
70M with CAD s/p CABG and PCI, DM2, HTN, BPH s/p TURP and PD, admitted 3/14 for weakness & COVID-19 PNA. Started on decadron and remdesivir and discharged 3/16. Returned 3/17 with worsening respiratory failure. Intubated 3/17. Course complicated by parotitis, Pseudomonas PNA, and new onset AF. S/p IVIG for possible COVID encephalopathy from 4/5-4/8 without improvement. Trach 4/13. Now with acutely worsening ARDS, metabolic acidosis, anuric renal failure not candidate for RRT. Family previously notified of poor status and that Mr. Ann is dying despite best efforts the past 30 days.    Neuro: Fentanyl for analgesia, ketamine and propofol for sedation while intubated. Nimbex likely not adding much at this time, so would favor coming off and titrating analgesia/sedation to comfort. Continue Keppra given possible epileptiform activity on recent EEG. Continue carb/levopoda for PD.    CV: Shock, likely from worsening acidosis and sepsis. Continue phenylephrine & vasopressin (NE caused too much tachycardia I am told).     Pulm: Acute hypoxic respiratory failure s/p trach. Continue mechanical ventilation. Currently requiring FiO2 1 with high pressurs and PF <100 in the setting of COVID and now hypervolemia from renal failure.    GI: Worsening liver failure, likely from shock state. Limit hepatotoxins. Prevent further hypotension. Do not feel that RUQ ultrasound is going to change trajectory at this time. Reasonable to continue TF at this time, even if only trickle.    Renal: Anuric ZELALEM, likely ATN from shock, vanco toxicity. Seen by neprhology who says hes not RRT candidate given instability and poor clinical course. Despite lasix drip overnight only made 10 cc UOP, so will discontinue lasix.    Heme: Acute on chronic anemia, s/p 1u PRBC overnight. No evidence of active bleeding.    ID: MRSA bacteremia, s/p COVID-19 and stenotrophomonas. Continue Vanco and follow levels. Can likely d/c meropenem but confirm with ID.    Endo: DM2/Hyperglycemia, increase NPH but watch closely for hypoglycemia in setting of ZELALEM and KOFFI.    Prophylaxis: PPI, SC heparin, chlorhexidine.    Code status: Currently full. Would speak to family given poor clinical course and worsening status; I recommend transition to CMO -- or at least No Code.

## 2021-04-16 NOTE — PROGRESS NOTE ADULT - ASSESSMENT
70y Male with history of HTN on HCTZ presents with weakness found to have COVID-19. Nephrology consulted for hyponatremia.    1) ZELALEM: likely ATN secondary to sepsis and vanco toxicity. UO remains poor despite albumin with concurrent lasix gtt (now discontinued). Patient currently hemodynamically unstable to tolerate RRT and would not change overall prognosis regardless. Family to arrive for EOL visit. Avoid nephrotoxins.     2) HTN: NOW with hypotension currently on 2 pressors. As per ICU. Monitor BP.    3) Metabolic acidosis: With concurrent respiratory acidosis. Continue with IV and PO bicarb to keep pH > 7.30. Monitor pH.    4) Hyperkalemia: Would change diet to nepro if diet resumed. Can medically manage as needed. Monitor serum K.    5) COVID-19: As per primary team.      POOR PROGNOSIS.    [facility] NEPHROLOGY  Mahesh Lomax M.D.  Perez Armstrong D.O.  Gricelda Choe M.D.  Juliet Rogers, NITA, ANP-C    Telephone: [phone]  Facsimile: [phone]    [address] [place] [address]

## 2021-04-16 NOTE — PROGRESS NOTE ADULT - PROBLEM SELECTOR PROBLEM 1
Type 2 diabetes mellitus with other specified complication, with long-term current use of insulin

## 2021-04-17 NOTE — PROGRESS NOTE ADULT - ATTENDING COMMENTS
70M with CAD s/p CABG and PCI, DM2, HTN, BPH s/p TURP and PD, admitted 3/14 for weakness & COVID-19 PNA. Started on decadron and remdesivir and discharged 3/16. Returned 3/17 with worsening respiratory failure. Intubated 3/17. Course complicated by parotitis, Pseudomonas PNA, and new onset AF. S/p IVIG for possible COVID encephalopathy from 4/5-4/8 without improvement. Trach 4/13. Now with acutely worsening ARDS, metabolic acidosis, anuric renal failure not candidate for RRT. Family previously notified of poor status and that Mr. Ann is dying despite best efforts the past 30 days.    Neuro: Fentanyl for analgesia, ketamine for sedation while intubated. Nimbex was stopped today. Continue Keppra given possible epileptiform activity on recent EEG. Continue carb/levopoda for PD.    CV: Shock, likely sepsis, resolved and off pressors.     Pulm: Acute hypoxic respiratory failure s/p trach. Continue mechanical ventilation.    GI: Shock liver, stable to somewhat improved. Continue TF at this time, even if only trickle.    Renal: Anuric ZELALEM, likely ATN from shock, vanco toxicity. Seen by nephrology. We discussed the case and renal issues. They feel that RRT will not change the overall trajectory of his illness, and may lead to hemodynamic compromise with flows, so they do not recommend RRT. I would agree with their overall assessment that RRT will not change the overall picture. It may help with volume & acid-base status while still in the ICU, but will not have an impact on his overall inability to recover from respiratory failure, encephalopathy, weakness, etc. Could try forced diuresis if needed, but not RRT candidate.    Heme: Acute on chronic anemia, s/p 1u PRBC on 4/16. No evidence of active bleeding.    ID: MRSA bacteremia, s/p COVID-19 & stenotrophomonas. Continue Vanco and follow levels. Repeat blood cultures pending.    Endo: DM2/Hyperglycemia, continue NPH & SSI.    Prophylaxis: PPI, SC heparin, chlorhexidine.    Code status: Family visit for EOL recently. Made DNR but not ready from comfort measures only care. Recently lost his wife to COVID, too. 70M with CAD s/p CABG and PCI, DM2, HTN, BPH s/p TURP and PD, admitted 3/14 for weakness & COVID-19 PNA. Started on decadron and remdesivir and discharged 3/16. Returned 3/17 with worsening respiratory failure. Intubated 3/17. Course complicated by parotitis, Pseudomonas PNA, and new onset AF. S/p IVIG for possible COVID encephalopathy from 4/5-4/8 without improvement. Trach 4/13. Now with acutely worsening ARDS, metabolic acidosis, anuric renal failure not candidate for RRT. Family previously notified of poor status and that Mr. Ann is dying despite best efforts the past 30 days.    Neuro: Fentanyl for analgesia, ketamine for sedation while intubated. Nimbex was stopped today. Continue Keppra given possible epileptiform activity on recent EEG. Continue carbo/levopoda for PD.    CV: Shock, likely sepsis, resolved and off pressors.     Pulm: Acute hypoxic respiratory failure s/p trach. Continue mechanical ventilation.    GI: Shock liver, stable to somewhat improved. Continue TF at this time, even if only trickle.    Renal: Anuric ZELALEM, likely ATN from shock, vanco toxicity. Seen by nephrology. We discussed his case and specifically renal issues. They feel that RRT will not change the overall trajectory of his illness, and may lead to hemodynamic compromise with flows, so they do not recommend RRT. I would agree with their overall assessment that RRT will not change the overall picture. It may help with volume & acid-base status in the short term, but I do not see it having an impact vis-a-vis inability to recover from respiratory failure, encephalopathy, weakness, etc. Could try forced diuresis if needed.    Heme: Acute on chronic anemia, s/p 1u PRBC on 4/16. No evidence of active bleeding.    ID: MRSA bacteremia, s/p COVID-19 & stenotrophomonas. Continue Vanco and follow levels. Also on empiric Meropenem given recent instability. Repeat blood cultures pending. Appreciate ID input.    Endo: DM2/hyperglycemia, continue NPH & SSI.    Prophylaxis: PPI, SC heparin, chlorhexidine.    Code status: Family visit for EOL recently. Made DNR but not ready from comfort measures only care. Recently lost his wife to COVID, too.

## 2021-04-17 NOTE — PROGRESS NOTE ADULT - SUBJECTIVE AND OBJECTIVE BOX
Follow Up:  MRSA bacteremia, COVID    Inverval History/ROS:Patient is a 70y old  Male who presents with a chief complaint of acute respiratory failure d/t covid (16 Apr 2021 21:47)    Renal function worsening, team considering CRRT    Allergies    No Known Allergies    Intolerances        ANTIMICROBIALS:      OTHER MEDS:  acetaminophen    Suspension .. 650 milliGRAM(s) Enteral Tube every 6 hours PRN  albumin human 25% IVPB 50 milliLiter(s) IV Intermittent every 6 hours  aspirin  chewable 81 milliGRAM(s) Oral daily  carbidopa/levodopa  10/100 2 Tablet(s) Oral <User Schedule>  chlorhexidine 0.12% Liquid 15 milliLiter(s) Oral Mucosa every 12 hours  chlorhexidine 4% Liquid 1 Application(s) Topical <User Schedule>  cisatracurium Infusion 3 MICROgram(s)/kG/Min IV Continuous <Continuous>  fentaNYL   Infusion..... 0.5 MICROgram(s)/kG/Hr IV Continuous <Continuous>  heparin   Injectable 5000 Unit(s) SubCutaneous every 8 hours  insulin lispro (ADMELOG) corrective regimen sliding scale   SubCutaneous every 6 hours  insulin NPH human recombinant 4 Unit(s) SubCutaneous every 6 hours  ketamine Infusion. 0.25 mG/kG/Hr IV Continuous <Continuous>  ketorolac 0.5% Ophthalmic Solution 1 Drop(s) Left EYE two times a day  levETIRAcetam  IVPB 500 milliGRAM(s) IV Intermittent every 12 hours  pantoprazole  Injectable 40 milliGRAM(s) IV Push daily  petrolatum Ophthalmic Ointment 1 Application(s) Both EYES two times a day  polyethylene glycol 3350 17 Gram(s) Oral daily  prednisoLONE acetate 1% Suspension 1 Drop(s) Left EYE four times a day  senna Syrup 10 milliLiter(s) Oral daily  simvastatin 20 milliGRAM(s) Oral at bedtime  sodium bicarbonate 1300 milliGRAM(s) Oral every 12 hours  sodium bicarbonate  Infusion 0.141 mEq/kG/Hr IV Continuous <Continuous>  sodium chloride 0.9% lock flush 10 milliLiter(s) IV Push every 1 hour PRN  sodium zirconium cyclosilicate 5 Gram(s) Oral every 8 hours      Vital Signs Last 24 Hrs  T(C): 35.6 (17 Apr 2021 08:00), Max: 37.7 (16 Apr 2021 20:00)  T(F): 96 (17 Apr 2021 08:00), Max: 99.8 (16 Apr 2021 20:00)  HR: 110 (17 Apr 2021 11:00) (72 - 110)  BP: --  BP(mean): --  RR: 32 (17 Apr 2021 11:00) (32 - 35)  SpO2: 100% (17 Apr 2021 11:00) (93% - 100%)    Physical Exam    Head: atraumatic, normocephalic  ENT: ET and OG tubes   Cardio: regular rate   Respiratory: mechanically ventilated   abd: soft, bowel sounds present, no tenderness  MSK: anasarca   Skin: no rash                                       7.8    20.79 )-----------( 793      ( 17 Apr 2021 00:40 )             23.8       04-17    141  |  103  |  66<H>  ----------------------------<  198<H>  4.0   |  22  |  3.96<H>    Ca    7.3<L>      17 Apr 2021 00:40  Phos  6.8     04-17  Mg     2.4     04-17    TPro  5.8<L>  /  Alb  1.3<L>  /  TBili  0.3  /  DBili  x   /  AST  1272<H>  /  ALT  318<H>  /  AlkPhos  251<H>  04-17          MICROBIOLOGY:Culture Results:   No growth to date. (04-14 @ 12:17)  Culture Results:   Growth in aerobic bottle: Staphylococcus epidermidis (04-14 @ 12:17)  Culture Results:   Moderate Methicillin resistant Staphylococcus aureus  Moderate Stenotrophomonas maltophilia  Normal Respiratory Fallon present (04-12 @ 02:22)  Culture Results:   Growth in aerobic bottle: Methicillin resistant Staphylococcus aureus  ***Blood Panel PCR results on this specimen are available  approximately 3 hours after the Gram stain result.***  Gram stain, PCR, and/or culture results may not always  correspond due to difference in methodologies.  ************************************************************  This PCR assay was performed by multiplex PCR. This  Assay tests for 66 bacterial and resistance gene targets.  Please refer to the [facility] test directory  at [email or web address] for details. (04-11 @ 23:03)  Culture Results:   Growth in anaerobic bottle: Methicillin resistant Staphylococcus aureus  See previous culture [id number] (04-11 @ 23:00)      RADIOLOGY:  < from: Xray Chest 1 View- PORTABLE-Urgent (Xray Chest 1 View- PORTABLE-Urgent .) (04.14.21 @ 20:23) >      IMPRESSION:    Diffuse bilateral airspace disease with bilateral pleural effusions.          < end of copied text >

## 2021-04-17 NOTE — PROGRESS NOTE ADULT - SUBJECTIVE AND OBJECTIVE BOX
[facility] NEPHROLOGY- PROGRESS NOTE    Patient is a 70y Male with history of HTN on HCTZ presents with weakness found to have COVID-19. Nephrology consulted for hyponatremia.    Patient with resolution of hyponatremia for which nephrology had signed off on 3/19. Patient subsequently with non-oliguric ZELALEM.    Overnight events reviewed.  Pt off pressors, on bicarb gtt, on albumin IV, on po lokelma,     REVIEW OF SYSTEMS: Unable to obtain as patient sedated    No Known Allergies    Hospital Medications: Reviewed.      VITALS:  T(F): 95.9 (21 @ 12:00), Max: 99.8 (21 @ 20:00)  HR: 112 (21 @ 12:00)  BP: --  RR: 32 (21 @ 12:00)  SpO2: 100% (21 @ 12:00)  Wt(kg): --     @ 07:01  -   @ 07:00  --------------------------------------------------------  IN: 2849.4 mL / OUT: 310 mL / NET: 2539.4 mL     @ 07:01  -   @ 12:41  --------------------------------------------------------  IN: 614.1 mL / OUT: 75 mL / NET: 539.1 mL          PHYSICAL EXAM:  Gen: + trach  Cards: RRR, +S1/S2, no M/G/R  Resp: CTA B/L  GI: soft, NT/ND, NABS  Vascular: mild anasarca          LABS:      141  |  103  |  66<H>  ----------------------------<  198<H>  4.0   |  22  |  3.96<H>    Ca    7.3<L>      2021 00:40  Phos  6.8       Mg     2.4         TPro  5.8<L>  /  Alb  1.3<L>  /  TBili  0.3  /  DBili      /  AST  1272<H>  /  ALT  318<H>  /  AlkPhos  251<H>      Creatinine Trend: 3.96 <--, 3.50 <--, 2.86 <--, 2.56 <--, 2.34 <--, 2.25 <--, 1.83 <--, 1.49 <--, 1.31 <--                        7.8    20.79 )-----------( 793      ( 2021 00:40 )             23.8     Urine Studies:  Urinalysis Basic - ( 2021 14:44 )    Color: Yellow / Appearance: Slightly Turbid / S.018 / pH:   Gluc:  / Ketone: Negative  / Bili: Negative / Urobili: <2 mg/dL   Blood:  / Protein: 100 mg/dL / Nitrite: Negative   Leuk Esterase: Large / RBC: 4 /HPF / WBC 78 /HPF   Sq Epi:  / Non Sq Epi: 14 /HPF / Bacteria: Negative      Sodium, Random Urine: 20 mmol/L ( @ 11:10)

## 2021-04-17 NOTE — PROGRESS NOTE ADULT - ASSESSMENT
70M with DM, CABG, Parkinson disease, diagnosed with COVID 3/9/21, admitted 3/14 without hypoxia, readmitted 3/17 with respiratory failure.   Intubated 3/20.   Suspect CoNS on blood cultures 3/20 were contaminants.   Developed right parotitis with cellulitis on CT 3/26.   Unclear significance of sputum Pseudomonas fluorescens 3/26.   Has been on antibiotics almost continuously for the above and then developed MRSA bacteremia 4/11, cultures 4/14 negative to date.   Possibly from VAP (sputum growth 4/12) or CLABSI (left IJ CVC placed 3/20, removed 4/13, new one placed 4/14).   Stenotrophomonas also grew in sputum despite four days of Levaquin just prior, unclear if contributing to illness or colonizing.   Remains critically ill, decompensated further since yesterday and Levaquin was change to Meropenem.     Suggest  -daily Vanc levels, redose when around 15 (received 1GM this morning for level 16). If switching to CRRT adjust vancomycin accordingly to standing dose   -monitor blood cultures 4/14 Staph Epi, repeat for clearance  -check TTE   -reasonable to continue Meropenem given decompensation although loses Stenotrophomonas coverage which may be ok as he got about a week already       Infectious Disease   Pager [phone]   After 5PM and on weekends please page fellow on call or call [phone]

## 2021-04-17 NOTE — PROGRESS NOTE ADULT - ATTENDING COMMENTS
Patient seen and examined  Above verified  Agree with above unless as noted below.  70M with DM, CABG, Parkinson disease, diagnosed with COVID 3/9/21, admitted 3/14 without hypoxia, readmitted 3/17 with respiratory failure.   Intubated 3/20.   MRSA bacteremia  septoic shock  GOC being defined  ? CRRT  continue vanco  dose by levels  adjust per renal function CRRT  Will tailor plan for ID issues  per course,results.Will defer to primary team on management of other issues.  Assessment, plan and recommendations as detailed above were discussed with the MICU  team.  ID service  will cover patient over weekend.Please call ID fellow on call if issues or questions.  prognosis poor

## 2021-04-17 NOTE — PROGRESS NOTE ADULT - ASSESSMENT
70y Male with history of HTN on HCTZ presents with weakness found to have COVID-19. Nephrology consulted for hyponatremia.    1) ZELALEM: likely ATN secondary to sepsis and vanco toxicity. UO remains poor.  Would not offer dialysis as it would not change overall prognosis. Tried to call family in presence of ICU team, but unable to reach via phone and voicemail was full.  Avoid nephrotoxins.     2) HTN: NOW with hypotension but off pressors at the moment. As per ICU. Monitor BP.    3) Metabolic acidosis: With concurrent respiratory acidosis. Continue with IV and PO bicarb to keep pH > 7.30. Monitor pH.    4) Hyperkalemia: Would change diet to nepro if diet resumed. Can medically manage as needed. Monitor serum K.    5) COVID-19: As per primary team.      GRAVE PROGNOSIS. INVASIVE MEASURES WOULD BE FUTILE.    [facility] NEPHROLOGY  Mahesh Lomax M.D.  Perez Armstrong D.O.  Gricelda Choe M.D.  Juliet Rogers, NITA, ANP-C    Telephone: [phone]  Facsimile: [phone]    [address] [place] [address]

## 2021-04-17 NOTE — PROGRESS NOTE ADULT - ASSESSMENT
70 year old Irish/Jhonny-speaking male with a history of CAD s/p CABG and 2 stents, Type 2 DM, HTN, BPH s/p TURP and Parkinson's disease, who was recently admitted 3/14 for weakness and COVID-19. Started on decadron and remdesivir and discharged 3/16. Returned 3/17 with worsening respiratory failure. Intubated 3/17. Course c/b ?parotitis, Pseudomonas PNA, and new onset atrial fibrillation now in sinus rhythm s/p amio conversion. S/p trial of IVIG for poss COVID encephalopathy from 4/5-4/8 without improvement and dced. S/p vEEG on 4/6 without seizures but waveforms c/f poss development of one and so keppra loaded. Trached 4/13. Acutely worsening ARDS/metabolic acidosis    Neuro  Mental status remains poor  - Continue fentanyl and ketamine for sedation, propofol weaned off for hypertriglyceridemia  - Continue chemical paralysis with nimbex for asynchrony  - Neuro consult, s/p 24h+ EEG without seizures, continue Keppra BID  - CTH negative for bleed 4/2  - C/w sinemet for Parkinson's disease   - IVIG x 5d (4/5-4/9) for possible COVID encephalopathy, no effect     Cardiovascular  - Recent h/o PAF, converted to NSR with amio, now back in AF today with rate in 110's  - weaned off pressors overnight  - MAP>65    Pulmonary  - Intubated 3/20, trach 4/13  - Severe ARDS likely 2/2 stenotrophomonas PNA  - Pressure AC 32/37/12/90      - Plateau 39, Peak 40, PF 74    GI  - Shock liver, now improved as compared to yesterday  - hold statin  - Continue protonix for prophylaxis  - Bowel regimen with senna & miralax    Renal  ZELALEM   - Oliguric renal failure      - Lasix infusion at 10mg/hr, metolazone and albumin with no response  - Discussed with nephrology, HD will not change patient's overall outcome, likely will not tolerate HD  - Continue bicarb infusion at 75ml/hr and 1300mg PO bicarb BID    ID  COVID  - S/p remdesivir, decadron     MRSA Bacteremia 4/11, SCx with stenotrophomonas 4/12 (despite course of Levaquin 4/13-4/14)  - Vanco by level  - Meropenem for empiric coverage, f/u repeat blood cultures  - ID following, appreciate recs     Endo  - NPH 4 units q 6hrs  - Endo following  - TSH 4.25, T4 WNL, subclinical (4/1), can repeat TFTs in 4-6 weeks     Heme  - SQH 5k q8h  - ASA 81mg   - LE DVT study negative 4/7    Dispo  - EOL visit overnight, patient made DNR   70 year old Luxembourgish/Jhonny-speaking male with a history of CAD s/p CABG and 2 stents, Type 2 DM, HTN, BPH s/p TURP and Parkinson's disease, who was recently admitted 3/14 for weakness and COVID-19. Started on decadron and remdesivir and discharged 3/16. Returned 3/17 with worsening respiratory failure. Intubated 3/17. Course c/b ?parotitis, Pseudomonas PNA, and new onset atrial fibrillation now in sinus rhythm s/p amio conversion. S/p trial of IVIG for poss COVID encephalopathy from 4/5-4/8 without improvement and dced. S/p vEEG on 4/6 without seizures but waveforms c/f poss development of one and so keppra loaded. Trached 4/13. Acutely worsening ARDS/metabolic acidosis    Neuro  Mental status remains poor  - Continue fentanyl and ketamine for sedation, propofol weaned off for hypertriglyceridemia  - Continue chemical paralysis with nimbex for asynchrony  - Neuro consult, s/p 24h+ EEG without seizures, continue Keppra BID  - CTH negative for bleed 4/2  - C/w sinemet for Parkinson's disease   - IVIG x 5d (4/5-4/9) for possible COVID encephalopathy, no effect     Cardiovascular  - Recent h/o PAF, converted to NSR with amio, now back in AF today with rate in 110's  - weaned off pressors overnight  - MAP>65    Pulmonary  - Intubated 3/20, trach 4/13  - Severe ARDS  - Pressure AC 32/37/12/90      - Plateau 39, Peak 40, PF 74    GI  - Shock liver, now improved as compared to yesterday  - hold statin  - Continue protonix for prophylaxis  - Bowel regimen with senna & miralax    Renal  ZELALEM   - Oliguric renal failure      - Lasix infusion at 10mg/hr, metolazone and albumin with no response  - Discussed with nephrology, HD will not change patient's overall outcome, likely will not tolerate HD  - Continue bicarb infusion at 75ml/hr and 1300mg PO bicarb BID    ID  COVID  - S/p remdesivir, decadron     MRSA Bacteremia 4/11, SCx with stenotrophomonas 4/12 (despite course of Levaquin 4/13-4/14)  - Vanco by level  - Meropenem for empiric coverage, f/u repeat blood cultures  - ID following, appreciate recs     Endo  - NPH 4 units q 6hrs  - Endo following  - TSH 4.25, T4 WNL, subclinical (4/1), can repeat TFTs in 4-6 weeks     Heme  - SQH 5k q8h  - ASA 81mg   - LE DVT study negative 4/7    Dispo  - EOL visit overnight, patient made DNR

## 2021-04-17 NOTE — PROGRESS NOTE ADULT - SUBJECTIVE AND OBJECTIVE BOX
SHAHZAD FIELD  MRN-[id number]  Patient is a 70y old  Male who presents with a chief complaint of acute respiratory failure d/t covid (17 Apr 2021 12:41)    HPI:  Patient is a 70 year old Kinyarwanda/Jhonny-speaking male with a history of CAD s/p CABG and 2 stents, Type 2 DM, HTN, BPH s/p TURP and Parkinson's disease, who was recently admitted on 3/14 for weakness, covid, hypoxia, started on remdesivir/decadron, discharged home yesterday (3/16). However, at home, pt decompensated with increasing sob/labored breathing, o2 sat dropped from 88% to 66% within 1/2 hour per daughter, gasping for air. Pt's son called 911 and brought him back to the hospital. Pt denies chest pain/abd pain/n/v/diarrhea. He reports he was sent home without oxygen.     In ED, he was put on 100% nonrebreather for labored breathing. CXR showed b./l opacities c/w covid progression.  (17 Mar 2021 16:14)      24 HOUR EVENTS: acidosis improved overnight wiht bicarb gtt and pressor requirements decreased    ICU Vital Signs Last 24 Hrs  T(C): 35.5 (17 Apr 2021 12:00), Max: 37.7 (16 Apr 2021 20:00)  T(F): 95.9 (17 Apr 2021 12:00), Max: 99.8 (16 Apr 2021 20:00)  HR: 98 (17 Apr 2021 13:00) (72 - 121)  BP: --  BP(mean): --  ABP: 108/55 (17 Apr 2021 13:00) (105/61 - 177/82)  ABP(mean): 72 (17 Apr 2021 13:00) (69 - 99)  RR: 32 (17 Apr 2021 13:00) (32 - 35)  SpO2: 100% (17 Apr 2021 13:00) (93% - 100%)    Mode: AC/ CMV (Assist Control/ Continuous Mandatory Ventilation), RR (machine): 32, FiO2: 90, PEEP: 10, ITime: 0.7  I&O's Summary    16 Apr 2021 07:01  -  17 Apr 2021 07:00  --------------------------------------------------------  IN: 2849.4 mL / OUT: 310 mL / NET: 2539.4 mL    17 Apr 2021 07:01  -  17 Apr 2021 13:49  --------------------------------------------------------  IN: 960.8 mL / OUT: 175 mL / NET: 785.8 mL      CAPILLARY BLOOD GLUCOSE      POCT Blood Glucose.: 174 mg/dL (17 Apr 2021 11:03)        ============================ LABS =========================                        7.8    20.79 )-----------( 793      ( 17 Apr 2021 00:40 )             23.8     04-17    141  |  103  |  66<H>  ----------------------------<  198<H>  4.0   |  22  |  3.96<H>    Ca    7.3<L>      17 Apr 2021 00:40  Phos  6.8     04-17  Mg     2.4     04-17    TPro  5.8<L>  /  Alb  1.3<L>  /  TBili  0.3  /  DBili  x   /  AST  1272<H>  /  ALT  318<H>  /  AlkPhos  251<H>  04-17    LIVER FUNCTIONS - ( 17 Apr 2021 00:40 )  Alb: 1.3 g/dL / Pro: 5.8 g/dL / ALK PHOS: 251 U/L / ALT: 318 U/L / AST: 1272 U/L / GGT: x           PT/INR - ( 17 Apr 2021 00:40 )   PT: 14.4 sec;   INR: 1.28 ratio         PTT - ( 17 Apr 2021 00:40 )  PTT:30.3 sec  ABG - ( 17 Apr 2021 09:31 )  pH, Arterial: 7.46  pH, Blood: x     /  pCO2: 37    /  pO2: 75    / HCO3: 27    / Base Excess: 2.5   /  SaO2: 94.6      PHYSICIAL EXAM:    GENERAL: intubated, sedated  HEENT:  NC/AT  EYES: PERRLA, conjunctiva and sclera clear  NECK: Supple, No JVD  CHEST/LUNG: diminished bilaterally; No wheezes, rales, or rhonchi  HEART: Regular rate and rhythm; No murmurs, rubs, or gallops  ABDOMEN: Soft, Nontender, Nondistended; Bowel sounds present  EXTREMITIES:  2+ Peripheral Pulses, No clubbing, cyanosis, or edema  PSYCH: unable as pt is sedated  NEUROLOGY: sedated  SKIN: No rashes or lesions

## 2021-04-18 NOTE — PROGRESS NOTE ADULT - CRITICAL CARE SERVICES PROVIDED
Critical care services provided

## 2021-04-18 NOTE — PROGRESS NOTE ADULT - SUBJECTIVE AND OBJECTIVE BOX
ICU Daily PROGRESS NOTE  ===============================  HPI  70 year old Turkmen/Jhonny-speaking male with a history of CAD s/p CABG and 2 stents, Type 2 DM, HTN, BPH s/p TURP and Parkinson's disease, who was recently admitted 3/14 for weakness and COVID-19. Started on decadron and remdesivir and discharged 3/16. Returned 3/17 with worsening respiratory failure. Intubated 3/17. Course c/b ?parotitis, Pseudomonas PNA, and new onset atrial fibrillation now in sinus rhythm s/p amio conversion. S/p trial of IVIG for poss COVID encephalopathy from 4/5-4/8 without improvement and dced. S/p vEEG on 4/6 without seizures but waveforms c/f poss development of one and so keppra loaded. Trached 4/13. Acutely worsening ARDS/metabolic acidosis    Interval Events:  -Became dysynchronous with vent, increased sedation requirements  -HoTN 2/2 requiring phenylephrine  -bicarb gtt d/c'd 2/2 alkalosis, am labs bicarb 26    VITAL SIGNS, INS/OUTS (last 24 hours):  --------------------------------------------------------------------------------------  T(C): 36.8 (04-18-21 @ 12:00), Max: 36.8 (04-18-21 @ 12:00)  HR: 82 (04-18-21 @ 13:00) (67 - 115)  BP: --  BP(mean): --  ABP: 102/39 (04-18-21 @ 13:00) (92/40 - 142/61)  ABP(mean): 57 (04-18-21 @ 13:00) (56 - 89)  RR: 38 (04-18-21 @ 13:00) (30 - 39)  SpO2: 93% (04-18-21 @ 13:00) (91% - 100%)  Wt(kg): --  CVP(mm Hg): --  CI: --  CAPILLARY BLOOD GLUCOSE      POCT Blood Glucose.: 92 mg/dL (18 Apr 2021 11:05)  POCT Blood Glucose.: 81 mg/dL (18 Apr 2021 05:57)  POCT Blood Glucose.: 119 mg/dL (17 Apr 2021 23:06)  POCT Blood Glucose.: 125 mg/dL (17 Apr 2021 18:08)   N/A      04-17 @ 07:01  -  04-18 @ 07:00  --------------------------------------------------------  IN:    Cisatracurium: 254.8 mL    Enteral Tube Flush: 430 mL    FentaNYL: 119 mL    Glucerna 1.5: 498 mL    IV PiggyBack: 335 mL    Ketamine: 288 mL    Sodium Bicarbonate: 900 mL  Total IN: 2824.8 mL    OUT:    Indwelling Catheter - Urethral (mL): 245 mL    Phenylephrine: 0 mL    Propofol: 0 mL    Rectal Tube (mL): 100 mL    Ureteral Catheter (mL): 83 mL    Vasopressin: 0 mL  Total OUT: 428 mL    Total NET: 2396.8 mL  --------------------------------------------------------------------------------------    EXAM  NEUROLOGY  Exam: Normal, NAD, alert, oriented x3, no focal deficits. PERRLA.     RESPIRATORY  Exam: Lungs clear to auscultation, Normal expansion/effort.   [x] Tracheostomy   [] Intubated  Mechanical Ventilation: Pressure AC 28/36 10/70    CARDIOVASCULAR  Exam: S1, S2.  Regular rate and rhythm     GI/NUTRITION  Exam: Abdomen soft, Non-tender, Non-distended.  Nasogastric tube in place.   Current Diet:  Glucerna at goal    METABOLIC/FLUIDS/ELECTROLYTES  albumin human 25% IVPB 50 milliLiter(s) IV Intermittent every 6 hours  sodium bicarbonate 1300 milliGRAM(s) Oral every 12 hours      HEMATOLOGIC  [x] DVT Prophylaxis: aspirin  chewable 81 milliGRAM(s) Oral daily  heparin   Injectable 5000 Unit(s) SubCutaneous every 8 hours    Transfusions:	[] PRBC	[] Platelets		[] FFP	[] Cryoprecipitate    INFECTIOUS DISEASE  Antimicrobials/Immunologic Medications:  meropenem  IVPB      meropenem  IVPB 1000 milliGRAM(s) IV Intermittent every 12 hours    Day# of  ***    VASCULAR  Exam: Extremities warm, pink, well-perfused.     MUSCULOSKELETAL  Exam: All extremities moving spontaneously without limitations.     SKIN  Exam: Good skin turgor, no skin breakdown.    LABS  --------------------------------------------------------------------------------------  CBC (04-18 @ 01:12)                              7.5<L>                         20.31<H>  )----------------(  673<H>     83.4<H>% Neutrophils, 6.0<L>% Lymphocytes, ANC: 16.94<H>                              22.7<L>    BMP (04-18 @ 01:12)             139     |  100     |  77<H> 		Ca++ --      Ca 6.8<L>             ---------------------------------( 103<H>		Mg 2.2                3.4<L>  |  24      |  4.13<H>			Ph 6.7<H>  BMP (04-17 @ 16:25)             141     |  100     |  72<H> 		Ca++ --      Ca 7.0<L>             ---------------------------------( 135<H>		Mg --                 3.3<L>  |  26      |  4.04<H>			Ph --        LFTs (04-18 @ 01:12)      TPro 5.7<L> / Alb 1.3<L> / TBili 0.3 / DBili -- / AST 1407<H> / <H> / AlkPhos 234<H>  LFTs (04-17 @ 16:25)      TPro 5.4<L> / Alb 1.5<L> / TBili 0.2 / DBili -- / AST 1297<H> / <H> / AlkPhos 214<H>    Coags (04-18 @ 01:12)  aPTT 29.3 / INR 1.30<H> / PT 14.8<H>      ABG (04-18 @ 04:46)     7.48<H> / 33<L> / 117<H> / 26 / 1.4 / 98.1%     Lactate:     ABG (04-18 @ 01:12)     7.48<H> / 37 / 122<H> / 28<H> / 3.5<H> / 98.5%     Lactate:           -> .Blood Blood-Peripheral Culture (04-17 @ 12:24)     NG    NG    No growth to date.    -> .Blood Blood Culture (04-14 @ 12:17)     NG    NG    No growth to date.    -> .Blood Blood Culture (04-14 @ 10:26)       Growth in aerobic bottle: Gram Positive Cocci in Clusters    NG    Growth in aerobic bottle: Staphylococcus epidermidis  Coag Negative Staphylococcus  Single set isolate, possible contaminant. Contact  Microbiology if susceptibility testing clinically  indicated.    -> .Sputum Sputum Culture (04-12 @ 02:22)       Numerous polymorphonuclear leukocytes per low power field  No Squamous epithelial cells per low power field  Few Gram positive cocci in pairs per oil power field    Methicillin resistant Staphylococcus aureus  Stenotrophomonas maltophilia    Moderate Methicillin resistant Staphylococcus aureus  Moderate Stenotrophomonas maltophilia  Normal Respiratory Fallon present    -> .Blood Blood-Venous Culture (04-11 @ 23:03)       Growth in aerobic bottle: Gram Positive Cocci in Clusters    Blood Culture PCR  Methicillin resistant Staphylococcus aureus    Growth in aerobic bottle: Methicillin resistant Staphylococcus aureus  ***Blood Panel PCR results on this specimen are available  approximately 3 hours after the Gram stain result.***  Gram stain, PCR, and/or culture results may not always  correspond due to difference in methodologies.  ************************************************************  This PCR assay was performed by multiplex PCR. This  Assay tests for 66 bacterial and resistance gene targets.  Please refer to the [facility] test directory  at [email or web address] for details.    -> .Blood Blood-Peripheral Culture (04-11 @ 23:00)       Growth in anaerobic bottle: Gram Positive Cocci in Clusters    NG    Growth in anaerobic bottle: Methicillin resistant Staphylococcus aureus  See previous culture [id number]      --------------------------------------------------------------------------------------    IMAGING STUDIES    ASSESSMENT  70y Male ((insert from previous note))***    PLAN  Neurologic:   Respiratory:   Cardiovascular:   Gastrointestinal/Nutrition:   Renal/Genitourinary:   Hematologic:   Infectious Disease:   Endocrine:   Disposition:     --------------------------------------------------------------------------------------    Critical Care Diagnoses: ICU Daily PROGRESS NOTE  ===============================  HPI  70 year old Kyrgyz/Jhonny-speaking male with a history of CAD s/p CABG and 2 stents, Type 2 DM, HTN, BPH s/p TURP and Parkinson's disease, who was recently admitted 3/14 for weakness and COVID-19. Started on decadron and remdesivir and discharged 3/16. Returned 3/17 with worsening respiratory failure. Intubated 3/17. Course c/b ?parotitis, Pseudomonas PNA, and new onset atrial fibrillation now in sinus rhythm s/p amio conversion. S/p trial of IVIG for poss COVID encephalopathy from 4/5-4/8 without improvement and dced. S/p vEEG on 4/6 without seizures but waveforms c/f poss development of one and so keppra loaded. Trached 4/13. Acutely worsening ARDS/metabolic acidosis    Interval Events:  -Became dysynchronous with vent, increased sedation requirements  -HoTN 2/2 requiring phenylephrine  -bicarb gtt d/c'd 2/2 alkalosis, am labs bicarb 26    VITAL SIGNS, INS/OUTS (last 24 hours):  --------------------------------------------------------------------------------------  T(C): 36.8 (04-18-21 @ 12:00), Max: 36.8 (04-18-21 @ 12:00)  HR: 82 (04-18-21 @ 13:00) (67 - 115)  BP: --  BP(mean): --  ABP: 102/39 (04-18-21 @ 13:00) (92/40 - 142/61)  ABP(mean): 57 (04-18-21 @ 13:00) (56 - 89)  RR: 38 (04-18-21 @ 13:00) (30 - 39)  SpO2: 93% (04-18-21 @ 13:00) (91% - 100%)  Wt(kg): --  CVP(mm Hg): --  CI: --  CAPILLARY BLOOD GLUCOSE      POCT Blood Glucose.: 92 mg/dL (18 Apr 2021 11:05)  POCT Blood Glucose.: 81 mg/dL (18 Apr 2021 05:57)  POCT Blood Glucose.: 119 mg/dL (17 Apr 2021 23:06)  POCT Blood Glucose.: 125 mg/dL (17 Apr 2021 18:08)   N/A      04-17 @ 07:01  -  04-18 @ 07:00  --------------------------------------------------------  IN:    Cisatracurium: 254.8 mL    Enteral Tube Flush: 430 mL    FentaNYL: 119 mL    Glucerna 1.5: 498 mL    IV PiggyBack: 335 mL    Ketamine: 288 mL    Sodium Bicarbonate: 900 mL  Total IN: 2824.8 mL    OUT:    Indwelling Catheter - Urethral (mL): 245 mL    Phenylephrine: 0 mL    Propofol: 0 mL    Rectal Tube (mL): 100 mL    Ureteral Catheter (mL): 83 mL    Vasopressin: 0 mL  Total OUT: 428 mL    Total NET: 2396.8 mL  --------------------------------------------------------------------------------------    EXAM  NEUROLOGY  Exam: Sedated RASS -1    RESPIRATORY  Exam: Lungs clear to auscultation, Normal expansion/effort.   [x] Tracheostomy   [] Intubated  Mechanical Ventilation: Pressure AC 28/36 10/70    CARDIOVASCULAR  Exam: S1, S2.  Regular rate and rhythm     GI/NUTRITION  Exam: Abdomen soft, Non-tender, Non-distended.  Nasogastric tube in place.   Current Diet:  Glucerna at goal    METABOLIC/FLUIDS/ELECTROLYTES  albumin human 25% IVPB 50 milliLiter(s) IV Intermittent every 6 hours  sodium bicarbonate 1300 milliGRAM(s) Oral every 12 hours      HEMATOLOGIC  [x] DVT Prophylaxis: aspirin  chewable 81 milliGRAM(s) Oral daily  heparin   Injectable 5000 Unit(s) SubCutaneous every 8 hours    Transfusions:	[] PRBC	[] Platelets		[] FFP	[] Cryoprecipitate    INFECTIOUS DISEASE  Antimicrobials/Immunologic Medications:  meropenem  IVPB      meropenem  IVPB 1000 milliGRAM(s) IV Intermittent every 12 hours    Day# of  ***    VASCULAR  Exam: Extremities warm, pink, well-perfused.     MUSCULOSKELETAL  Exam: All extremities moving spontaneously without limitations.     SKIN  Exam: Good skin turgor, no skin breakdown.    LABS  --------------------------------------------------------------------------------------  CBC (04-18 @ 01:12)                              7.5<L>                         20.31<H>  )----------------(  673<H>     83.4<H>% Neutrophils, 6.0<L>% Lymphocytes, ANC: 16.94<H>                              22.7<L>    BMP (04-18 @ 01:12)             139     |  100     |  77<H> 		Ca++ --      Ca 6.8<L>             ---------------------------------( 103<H>		Mg 2.2                3.4<L>  |  24      |  4.13<H>			Ph 6.7<H>  BMP (04-17 @ 16:25)             141     |  100     |  72<H> 		Ca++ --      Ca 7.0<L>             ---------------------------------( 135<H>		Mg --                 3.3<L>  |  26      |  4.04<H>			Ph --        LFTs (04-18 @ 01:12)      TPro 5.7<L> / Alb 1.3<L> / TBili 0.3 / DBili -- / AST 1407<H> / <H> / AlkPhos 234<H>  LFTs (04-17 @ 16:25)      TPro 5.4<L> / Alb 1.5<L> / TBili 0.2 / DBili -- / AST 1297<H> / <H> / AlkPhos 214<H>    Coags (04-18 @ 01:12)  aPTT 29.3 / INR 1.30<H> / PT 14.8<H>      ABG (04-18 @ 04:46)     7.48<H> / 33<L> / 117<H> / 26 / 1.4 / 98.1%     Lactate:     ABG (04-18 @ 01:12)     7.48<H> / 37 / 122<H> / 28<H> / 3.5<H> / 98.5%     Lactate:           -> .Blood Blood-Peripheral Culture (04-17 @ 12:24)     NG    NG    No growth to date.    -> .Blood Blood Culture (04-14 @ 12:17)     NG    NG    No growth to date.    -> .Blood Blood Culture (04-14 @ 10:26)       Growth in aerobic bottle: Gram Positive Cocci in Clusters    NG    Growth in aerobic bottle: Staphylococcus epidermidis  Coag Negative Staphylococcus  Single set isolate, possible contaminant. Contact  Microbiology if susceptibility testing clinically  indicated.    -> .Sputum Sputum Culture (04-12 @ 02:22)       Numerous polymorphonuclear leukocytes per low power field  No Squamous epithelial cells per low power field  Few Gram positive cocci in pairs per oil power field    Methicillin resistant Staphylococcus aureus  Stenotrophomonas maltophilia    Moderate Methicillin resistant Staphylococcus aureus  Moderate Stenotrophomonas maltophilia  Normal Respiratory Fallon present    -> .Blood Blood-Venous Culture (04-11 @ 23:03)       Growth in aerobic bottle: Gram Positive Cocci in Clusters    Blood Culture PCR  Methicillin resistant Staphylococcus aureus    Growth in aerobic bottle: Methicillin resistant Staphylococcus aureus  ***Blood Panel PCR results on this specimen are available  approximately 3 hours after the Gram stain result.***  Gram stain, PCR, and/or culture results may not always  correspond due to difference in methodologies.  ************************************************************  This PCR assay was performed by multiplex PCR. This  Assay tests for 66 bacterial and resistance gene targets.  Please refer to the [facility] test directory  at [email or web address] for details.    -> .Blood Blood-Peripheral Culture (04-11 @ 23:00)       Growth in anaerobic bottle: Gram Positive Cocci in Clusters    NG    Growth in anaerobic bottle: Methicillin resistant Staphylococcus aureus  See previous culture [id number]      --------------------------------------------------------------------------------------    IMAGING STUDIES    ASSESSMENT  70y Male ((insert from previous note))***    PLAN  Neurologic:   Respiratory:   Cardiovascular:   Gastrointestinal/Nutrition:   Renal/Genitourinary:   Hematologic:   Infectious Disease:   Endocrine:   Disposition:     --------------------------------------------------------------------------------------    Critical Care Diagnoses:

## 2021-04-18 NOTE — PROGRESS NOTE ADULT - ASSESSMENT
70 year old Hebrew/Jhonny-speaking male with a history of CAD s/p CABG and 2 stents, Type 2 DM, HTN, BPH s/p TURP and Parkinson's disease, who was recently admitted 3/14 for weakness and COVID-19. Started on decadron and remdesivir and discharged 3/16. Returned 3/17 with worsening respiratory failure. Intubated 3/17. Course c/b ?parotitis, Pseudomonas PNA, and new onset atrial fibrillation now in sinus rhythm s/p amio conversion. S/p trial of IVIG for poss COVID encephalopathy from 4/5-4/8 without improvement and dced. S/p vEEG on 4/6 without seizures but waveforms c/f poss development of one and so keppra loaded. Trached 4/13. Acutely worsening ARDS/metabolic acidosis    Neuro  Mental status remains poor  - Continue fentanyl and ketamine for sedation, propofol weaned off for hypertriglyceridemia  - Neuro consult, s/p 24h+ EEG without seizures, continue Keppra BID  - CTH negative for bleed 4/2  - C/w sinemet for Parkinson's disease   - IVIG x 5d (4/5-4/9) for possible COVID encephalopathy, no effect     Cardiovascular  - Recent h/o PAF, converted to NSR with amio, now back in AF today with rate in 110's  - added phenylephrine  - MAP>65    Pulmonary  - Intubated 3/20, trach 4/13  - Severe ARDS  - Pressure AC 32/37/12/90      - Plateau 39, Peak 40, PF 74    GI  - Shock liver, now improved as compared to yesterday  - hold statin  - Continue protonix for prophylaxis  - Bowel regimen with senna & miralax    Renal  ZELALEM   - Oliguric renal failure      - Lasix infusion at 10mg/hr, metolazone and albumin with no response  - Discussed with nephrology, HD will not change patient's overall outcome, likely will not tolerate HD  -bicarb gtt d/c'd continue to monitor    ID  COVID  - S/p remdesivir, decadron  - MRSA Bacteremia 4/11, SCx with stenotrophomonas 4/12 (despite course of Levaquin 4/13-4/14)  - Meropenem for empiric coverage, f/u repeat blood cultures  - ID following, appreciate recs     Endo  - NPH 4 units q 6hrs  - Endo following  - TSH 4.25, T4 WNL, subclinical (4/1), can repeat TFTs in 4-6 weeks     Heme  - SQH 5k q8h  - ASA 81mg   - LE DVT study negative 4/7    Dispo  - EOL visit overnight, patient made DNR

## 2021-04-18 NOTE — PROGRESS NOTE ADULT - ATTENDING COMMENTS
70M with CAD s/p CABG and PCI, DM2, HTN, BPH s/p TURP and PD, admitted 3/14 for weakness & COVID-19 PNA. Started on decadron and remdesivir and discharged 3/16. Returned 3/17 with worsening respiratory failure. Intubated 3/17. Course complicated by parotitis, Pseudomonas PNA, and new onset AF. S/p IVIG for possible COVID encephalopathy from 4/5-4/8 without improvement. Trach 4/13. Now with acutely worsening ARDS, metabolic acidosis, anuric renal failure not candidate for RRT. Family previously notified of poor status and that Mr. Ann is dying despite best efforts the past 32 days.    Neuro: Fentanyl for analgesia, ketamine & Precedex for sedation while intubated. Continue Keppra given possible epileptiform activity on recent EEG. Continue carbo/levopoda for PD.    CV: Shock, likely sepsis, resolved.    Pulm: Acute hypoxic respiratory failure s/p trach. Continue mechanical ventilation.    GI: Shock liver, stable LFTs. Continue TF at this time.    Renal: Oliguric ZELALEM, likely ATN from shock, vanco toxicity. Seen by nephrology. Not RRT candidate. Will try forced diuresis with loop diuretic if needed.    Heme: Acute on chronic anemia, s/p 1u PRBC on 4/16. No evidence of active bleeding.    ID: MRSA bacteremia, s/p COVID-19 & stenotrophomonas. Vanco on hold now for supratherapeutic levels -- check daily and re-dose as needed. Also on empiric Meropenem given recent instability. Repeat blood cultures pending. Appreciate ID input.    Endo: DM2/hyperglycemia, continue NPH & SSI.    Prophylaxis: PPI, SC heparin, chlorhexidine.    Code status: Family visit for EOL recently. Made DNR but not ready from comfort measures only care. Recently lost his wife to COVID, too.

## 2021-04-18 NOTE — PROGRESS NOTE ADULT - SUBJECTIVE AND OBJECTIVE BOX
[facility] NEPHROLOGY- PROGRESS NOTE    Patient is a 70y Male with history of HTN on HCTZ presents with weakness found to have COVID-19. Nephrology consulted for hyponatremia.    Patient with resolution of hyponatremia for which nephrology had signed off on 3/19. Patient subsequently with non-oliguric ZELALEM.    : Overnight events reviewed.  Pt off pressors, on bicarb gtt, on albumin IV, on po lokelma,   : On IV albumin still, on phenylephrine.    REVIEW OF SYSTEMS: Unable to obtain as patient sedated    No Known Allergies    Hospital Medications: Reviewed.      VITALS:  T(F): 99.7 (21 @ 20:00), Max: 99.7 (21 @ 20:00)  HR: 63 (21 @ 21:00)  BP: --  RR: 38 (21 @ 21:00)  SpO2: 98% (21 @ 21:00)  Wt(kg): --     @ 07:01  -   @ 07:00  --------------------------------------------------------  IN: 2824.8 mL / OUT: 428 mL / NET: 2396.8 mL     @ 07:01  -   @ 21:58  --------------------------------------------------------  IN: 1727.6 mL / OUT: 195 mL / NET: 1532.6 mL      PHYSICAL EXAM:  Gen: + trach  Cards: RRR, +S1/S2, no M/G/R  Resp: CTA B/L  GI: soft, NT/ND, NABS  Vascular: mild anasarca      LABS:      139  |  100  |  77<H>  ----------------------------<  103<H>  3.4<L>   |  24  |  4.13<H>    Ca    6.8<L>      2021 01:12  Phos  6.7       Mg     2.2         TPro  5.7<L>  /  Alb  1.3<L>  /  TBili  0.3  /  DBili      /  AST  1407<H>  /  ALT  375<H>  /  AlkPhos  234<H>      Creatinine Trend: 4.13 <--, 4.04 <--, 3.96 <--, 3.50 <--, 2.86 <--, 2.56 <--, 2.34 <--, 2.25 <--, 1.83 <--, 1.49 <--                        7.1    22.14 )-----------( 592      ( 2021 20:38 )             21.5     Blood Gas Profile - Arterial (21 @ 15:56)    pH, Arterial: 7.49    pCO2, Arterial: 27 mmHg    pO2, Arterial: 98 mmHg    HCO3, Arterial: 23 mmol/L    Base Excess, Arterial: -2.3 mmol/L    Oxygen Saturation, Arterial: 97.7 %        Urine Studies:  Urinalysis Basic - ( 2021 14:44 )    Color: Yellow / Appearance: Slightly Turbid / S.018 / pH:   Gluc:  / Ketone: Negative  / Bili: Negative / Urobili: <2 mg/dL   Blood:  / Protein: 100 mg/dL / Nitrite: Negative   Leuk Esterase: Large / RBC: 4 /HPF / WBC 78 /HPF   Sq Epi:  / Non Sq Epi: 14 /HPF / Bacteria: Negative    Sodium, Random Urine: 20 mmol/L ( @ 11:10)

## 2021-04-19 NOTE — CHART NOTE - NSCHARTNOTEFT_GEN_A_CORE
70 year old French/Jhonny-speaking male with a history of DM2 (uncontrolled A1c 10.3%, on basal/bolus insulin at home), HTN, CAD s/p CABG and stents, BPH s/p TURP and Parkinson's disease, now readmitted with COVID and started on high dose Decadron. Endocrine team consulted for management of DM2.     BG, insulin administration and chart reviewed  Glucose trending tightly controlled, last 2 doses of NPH were held, patient noted with hypoglycemia this AM. Most recent BG now 99 mg/dl  Remains ordered for Glucerna 1.5 elin at 45 ml/hr x 24h   Recommend to discontinue NPH for now. Will evaluate overall insulin requirements and can consider adding low dose 24h basal insulin depending on glucose trend  Continue Admelog MODERATE dose correctional scale q6h  Check BG q6h  See prior endocrine progress notes for complete plan of care  Will follow    CAPILLARY BLOOD GLUCOSE    POCT Blood Glucose.: 99 mg/dL (19 Apr 2021 06:34)  POCT Blood Glucose.: 60 mg/dL (19 Apr 2021 05:28)  POCT Blood Glucose.: 88 mg/dL (18 Apr 2021 23:35)  POCT Blood Glucose.: 102 mg/dL (18 Apr 2021 16:59)  POCT Blood Glucose.: 92 mg/dL (18 Apr 2021 11:05)    04-19    142  |  101  |  96<H>  ----------------------------<  85  4.3   |  19<L>  |  4.44<H>    Ca    6.6<L>      19 Apr 2021 02:35  Phos  7.6     04-19  Mg     2.3     04-19    TPro  6.1  /  Alb  1.6<L>  /  TBili  0.7  /  DBili  x   /  AST  996<H>  /  ALT  293<H>  /  AlkPhos  505<H>  04-19    MEDICATIONS  (STANDING):  albumin human 25% IVPB 50 milliLiter(s) IV Intermittent every 6 hours  aspirin  chewable 81 milliGRAM(s) Oral daily  carbidopa/levodopa  10/100 2 Tablet(s) Oral <User Schedule>  chlorhexidine 0.12% Liquid 15 milliLiter(s) Oral Mucosa every 12 hours  chlorhexidine 4% Liquid 1 Application(s) Topical <User Schedule>  dexMEDEtomidine Infusion 1.5 MICROgram(s)/kG/Hr (29.9 mL/Hr) IV Continuous <Continuous>  fentaNYL   Infusion..... 0.5 MICROgram(s)/kG/Hr (0.8 mL/Hr) IV Continuous <Continuous>  heparin   Injectable 5000 Unit(s) SubCutaneous every 8 hours  insulin lispro (ADMELOG) corrective regimen sliding scale   SubCutaneous every 6 hours  insulin NPH human recombinant 4 Unit(s) SubCutaneous every 6 hours  ketamine Infusion. 0.25 mG/kG/Hr (1.99 mL/Hr) IV Continuous <Continuous>  ketorolac 0.5% Ophthalmic Solution 1 Drop(s) Left EYE two times a day  levETIRAcetam  IVPB 500 milliGRAM(s) IV Intermittent every 12 hours  meropenem  IVPB      meropenem  IVPB 1000 milliGRAM(s) IV Intermittent every 12 hours  pantoprazole  Injectable 40 milliGRAM(s) IV Push daily  petrolatum Ophthalmic Ointment 1 Application(s) Both EYES two times a day  phenylephrine    Infusion 0.1 MICROgram(s)/kG/Min (1.49 mL/Hr) IV Continuous <Continuous>  polyethylene glycol 3350 17 Gram(s) Oral daily  prednisoLONE acetate 1% Suspension 1 Drop(s) Left EYE four times a day  senna Syrup 10 milliLiter(s) Oral daily  vasopressin Infusion 0.04 Unit(s)/Min (2.4 mL/Hr) IV Continuous <Continuous>    A1C with Estimated Average Glucose Result: 10.3 % (03-14-21 @ 08:57)  A1C with Estimated Average Glucose: 9.7 % (10-01-20 @ 06:30)    Diet, NPO with Tube Feed:   Tube Feeding Modality: Nasogastric  Nepro with Carb Steady (NEPRORTH)  Continuous  Starting Tube Feed Rate {mL per Hour}: 10  Increase Tube Feed Rate by (mL): 10     Every 2 hours  Until Goal Tube Feed Rate (mL per Hour): 45  Tube Feed Duration (in Hours): 24  Tube Feed Start Time: 13:00 (04-17-21 @ 13:07)    Isa Benito  Nurse Practitioner  Division of Endocrinology & Diabetes  In house pager #[phone]/long range pager #[phone]    If before 9AM or after 6PM, or on weekends/holidays, please call endocrine answering service for assistance ([phone]).  For nonurgent matters email [email or web address] for assistance.

## 2021-04-19 NOTE — DISCHARGE NOTE FOR THE EXPIRED PATIENT - SECONDARY DIAGNOSIS.
Essential hypertension Pure hypercholesterolemia Coronary artery disease involving native coronary artery of native heart without angina pectoris

## 2021-04-19 NOTE — CHART NOTE - NSCHARTNOTEFT_GEN_A_CORE
Nutrition Follow-Up Note   Reason for follow-up: Critical care length of stay follow- up. Medical record reviewed. Spoke to RN and medical team.    Clinical Course history: Per chart review patient with medical history of CAD s/p CABG and 2 stents, Type 2 DM, HTN, BPH s/p TURP and Parkinson's disease, who was recently admitted on 3/14 for weakness, covid, hypoxia, started on remdesivir/decadron. S/p RRT 3/20 and patient intubated. Sedated on Precedex and Propofol. S/p trach 4/13. Now with worsening ARDS/metabolic acidosis.    Diet Order: Diet, NPO with Tube Feed:   Tube Feeding Modality: Nasogastric  Nepro with Carb Steady (NEPRORTH)  Continuous  Starting Tube Feed Rate {mL per Hour}: 10  Increase Tube Feed Rate by (mL): 10     Every 2 hours  Until Goal Tube Feed Rate (mL per Hour): 45  Tube Feed Duration (in Hours): 24  Tube Feed Start Time: 13:00 (04-17-21 @ 13:07)    CURRENT EN ORDER PROVIDES:  Total Volume 1080mL/day  Energy: 1944Kcal/day  Protein: 87g protein/day  Free Water: 785mL/day    Nutrition Related Information: - Patient ordered for Nepro however, Glucerna 1.5 hanging and running @ 40mL/hr.   - Patient with renal dysfunction. Noted hyperkalemia on 4/15 -> K+ 5.8. Potassium within normal limits today - s/p repletion yesterday.   - Patient with hyperphosphatemia (phos 7.6).   - Nephrology following - " No need for dialysis at this time and patient currently hemodynamically unstable to tolerate RRT if needed and would not change overall prognosis regardless."  - Per critical care note today, "prognosis is poor"  - Per RN, family coming to visit patient.     Current clinical condition precludes nutrition intervention.   RD remains available, consult as needed. Heather Vázquez, MS, RD, CDN, CNSC Pager #[phone]

## 2021-04-19 NOTE — PROGRESS NOTE ADULT - ASSESSMENT
This is a 70 year old Mohawk/Jhonny-speaking male with a history of CAD s/p CABG and 2 stents, Type 2 DM, HTN, BPH s/p TURP and Parkinson's disease, who was recently admitted 3/14/21 for weakness and COVID-19. Started on decadron and remdesivir and discharged 3/16/21. Returned 3/17/21 with worsening respiratory failure requiring intubation. Course c/b ?parotitis, Pseudomonas PNA, and new onset atrial fibrillation now in sinus rhythm s/p amio conversion. S/p trial of IVIG for poss COVID encephalopathy from 4/5-4/8 without improvement, and stopped.  S/p vEEG on 4/6/21 without seizures but waveforms c/f poss development of one and so keppra loaded. Trached 4/13/21. Acutely worsening ARDS/metabolic acidosis.    Neuro  Mental status remains poor  - Continue fentanyl and ketamine for sedation, propofol weaned off for hypertriglyceridemia  - Neuro consulted, s/p 24h+ EEG without seizures, continue Keppra BID  - CTH negative for bleed 4/2/21  - C/w sinemet for Parkinson's disease   - IVIG x 5d (4/5-4/9) for possible COVID encephalopathy, no effect     Cardiovascular  - Recent h/o PAF, converted to NSR with amio.  Currently NSR.   - Requiring Braxton and Vaso   - Goal MAP>65    Pulmonary  - Intubated 3/20, trach 4/13  - Severe ARDS  - Pressure AC   - latest gas 7/40/33/93/22/96%    GI  - Shock liver, improving  - statin held   - Continue protonix for prophylaxis  - Bowel regimen with senna & miralax    Renal  ZELALEM   - Oliguric renal failure      - Lasix infusion at 10mg/hr, metolazone and albumin with no response  - Discussed with nephrology, HD will not change patient's overall outcome, likely will not tolerate HD  -bicarb gtt d/c'd continue to monitor  -Today, UOP marginal.     ID  COVID  - S/p remdesivir, decadron  - MRSA Bacteremia 4/11, SCx with stenotrophomonas 4/12 (despite course of Levaquin 4/13-4/14)  - Meropenem for empiric coverage, f/u repeat blood cultures  - ID following, appreciate recs     Endo  - Endo following  -Episode of hypoglycemia this am, received 2 amps of D50. Back up to normal levels.   - TSH 4.25, T4 WNL    Heme  - SQH 5k q8h  - ASA 81mg   - LE DVT study negative 4/7/21    Dispo  - EOL visit overnight, patient made DNR    Fellow to discuss w/ family this afternoon re: poor prognosis and the possibility of initiating comfort care.  ?

## 2021-04-19 NOTE — PROGRESS NOTE ADULT - SUBJECTIVE AND OBJECTIVE BOX
Follow Up:  COVID, MRSA    Interval History/ROS: Not doing well. Worsening renal failure. Meropenem was restarted.     Allergies  No Known Allergies        ANTIMICROBIALS:  meropenem  IVPB    meropenem  IVPB 1000 every 12 hours      OTHER MEDS:  acetaminophen    Suspension .. 650 milliGRAM(s) Enteral Tube every 6 hours PRN  albumin human 25% IVPB 50 milliLiter(s) IV Intermittent every 6 hours  aspirin  chewable 81 milliGRAM(s) Oral daily  carbidopa/levodopa  10/100 2 Tablet(s) Oral <User Schedule>  chlorhexidine 0.12% Liquid 15 milliLiter(s) Oral Mucosa every 12 hours  chlorhexidine 4% Liquid 1 Application(s) Topical <User Schedule>  dexMEDEtomidine Infusion 1.5 MICROgram(s)/kG/Hr IV Continuous <Continuous>  fentaNYL   Infusion..... 0.5 MICROgram(s)/kG/Hr IV Continuous <Continuous>  heparin   Injectable 5000 Unit(s) SubCutaneous every 8 hours  insulin lispro (ADMELOG) corrective regimen sliding scale   SubCutaneous every 6 hours  ketamine Infusion. 0.25 mG/kG/Hr IV Continuous <Continuous>  ketorolac 0.5% Ophthalmic Solution 1 Drop(s) Left EYE two times a day  levETIRAcetam  IVPB 500 milliGRAM(s) IV Intermittent every 12 hours  norepinephrine Infusion 0.05 MICROgram(s)/kG/Min IV Continuous <Continuous>  pantoprazole  Injectable 40 milliGRAM(s) IV Push daily  petrolatum Ophthalmic Ointment 1 Application(s) Both EYES two times a day  phenylephrine    Infusion 0.1 MICROgram(s)/kG/Min IV Continuous <Continuous>  polyethylene glycol 3350 17 Gram(s) Oral daily  prednisoLONE acetate 1% Suspension 1 Drop(s) Left EYE four times a day  senna Syrup 10 milliLiter(s) Oral daily  sodium chloride 0.9% lock flush 10 milliLiter(s) IV Push every 1 hour PRN  vasopressin Infusion 0.04 Unit(s)/Min IV Continuous <Continuous>      Vital Signs Last 24 Hrs  T(C): 37.7 (19 Apr 2021 16:00), Max: 37.8 (19 Apr 2021 12:00)  T(F): 99.9 (19 Apr 2021 16:00), Max: 100 (19 Apr 2021 12:00)  HR: 61 (19 Apr 2021 17:00) (51 - 82)  BP: --  BP(mean): --  RR: 34 (19 Apr 2021 17:00) (34 - 41)  SpO2: 87% (19 Apr 2021 17:00) (82% - 100%)    Physical Exam:  General: sedated, intubated   Head: atraumatic, normocephalic  ENT: ET and NG tubes   Cardio: regular rate   Respiratory: mechanically ventilated   Musculoskeletal: anasarca   vascular: left IJ CVC, no phlebitis                           7.4    23.69 )-----------( 558      ( 19 Apr 2021 02:35 )             22.0       04-19    142  |  101  |  96<H>  ----------------------------<  85  4.3   |  19<L>  |  4.44<H>    Ca    6.6<L>      19 Apr 2021 02:35  Phos  7.6     04-19  Mg     2.3     04-19    TPro  6.1  /  Alb  1.6<L>  /  TBili  0.7  /  DBili  x   /  AST  996<H>  /  ALT  293<H>  /  AlkPhos  505<H>  04-19          MICROBIOLOGY:  Vancomycin Level, Trough: 18.8 ug/mL (04-19-21 @ 13:45)    Culture - Blood (collected 04-17-21 @ 12:24)  Source: .Blood Blood-Peripheral  Preliminary Report (04-18-21 @ 13:01):    No growth to date.    Culture - Blood (collected 04-14-21 @ 10:26)  Source: .Blood Blood  Gram Stain (04-16-21 @ 09:21):    Growth in aerobic bottle: Gram Positive Cocci in Clusters  Final Report (04-18-21 @ 11:18):    Growth in aerobic bottle: Staphylococcus epidermidis    Coag Negative Staphylococcus    Single set isolate, possible contaminant. Contact    Microbiology if susceptibility testing clinically    indicated.    Culture - Blood (collected 04-14-21 @ 09:43)  Source: .Blood Blood  Final Report (04-19-21 @ 13:00):    No Growth Final      RADIOLOGY:  Images below reviewed personally  Xray Chest 1 View- PORTABLE-Urgent (Xray Chest 1 View- PORTABLE-Urgent .) (04.14.21 @ 20:23)   Diffuse bilateral airspace disease with bilateral pleural effusions.

## 2021-04-19 NOTE — PROGRESS NOTE ADULT - NSICDXPILOT_GEN_ALL_CORE
[place]

## 2021-04-19 NOTE — PROGRESS NOTE ADULT - SUBJECTIVE AND OBJECTIVE BOX
Patient discussed on morning rounds with on-call attending      SUBJECTIVE ASSESSMENT:  Patient is intubated and sedated        Vital Signs Last 24 Hrs  T(C): 37.7 (19 Apr 2021 04:00), Max: 37.7 (19 Apr 2021 00:00)  T(F): 99.8 (19 Apr 2021 04:00), Max: 99.8 (19 Apr 2021 00:00)  HR: 54 (19 Apr 2021 12:00) (51 - 83)  BP: --  BP(mean): --  RR: 40 (19 Apr 2021 12:00) (37 - 41)  SpO2: 92% (19 Apr 2021 12:00) (82% - 100%)  I&O's Detail    18 Apr 2021 07:01  -  19 Apr 2021 07:00  --------------------------------------------------------  IN:    Dexmedetomidine: 658.8 mL    Enteral Tube Flush: 120 mL    FentaNYL: 144 mL    FentaNYL: 40 mL    Glucerna 1.5: 880 mL    IV PiggyBack: 400 mL    Ketamine: 654.8 mL    Phenylephrine: 305.8 mL    Vasopressin: 7.2 mL  Total IN: 3210.6 mL    OUT:    Indwelling Catheter - Urethral (mL): 225 mL    Rectal Tube (mL): 100 mL    Ureteral Catheter (mL): 10 mL  Total OUT: 335 mL    Total NET: 2875.6 mL            PHYSICAL EXAM:  GEN: Sedated on vent, however appears to be breathing over the vent and uncomfortable.  Diaphoretic.    Psych: Unable to test  Neuro: Unable to test  HEENT: trach in place  CV: S1S2, regular, no murmurs appreciated.  No carotid bruits.  No JVD  Lungs: Clear B/L.  No wheezing, rales or rhonchi  ABD: Soft, non-tender, non-distended.  +Bowel sounds  EXT: Warm but anasarca, with pitting edema of B/L LE up to thighs.   Musculoskeletal: Not able to test  PV: Pedal pulses palpable      LABS:                        7.4    23.69 )-----------( 558      ( 19 Apr 2021 02:35 )             22.0       PT/INR - ( 18 Apr 2021 01:12 )   PT: 14.8 sec;   INR: 1.30 ratio         PTT - ( 18 Apr 2021 01:12 )  PTT:29.3 sec    04-19    142  |  101  |  96<H>  ----------------------------<  85  4.3   |  19<L>  |  4.44<H>    Ca    6.6<L>      19 Apr 2021 02:35  Phos  7.6     04-19  Mg     2.3     04-19    TPro  6.1  /  Alb  1.6<L>  /  TBili  0.7  /  DBili  x   /  AST  996<H>  /  ALT  293<H>  /  AlkPhos  505<H>  04-19          MEDICATIONS  (STANDING):  albumin human 25% IVPB 50 milliLiter(s) IV Intermittent every 6 hours  aspirin  chewable 81 milliGRAM(s) Oral daily  carbidopa/levodopa  10/100 2 Tablet(s) Oral <User Schedule>  chlorhexidine 0.12% Liquid 15 milliLiter(s) Oral Mucosa every 12 hours  chlorhexidine 4% Liquid 1 Application(s) Topical <User Schedule>  dexMEDEtomidine Infusion 1.5 MICROgram(s)/kG/Hr (29.9 mL/Hr) IV Continuous <Continuous>  fentaNYL   Infusion..... 0.5 MICROgram(s)/kG/Hr (0.8 mL/Hr) IV Continuous <Continuous>  heparin   Injectable 5000 Unit(s) SubCutaneous every 8 hours  insulin lispro (ADMELOG) corrective regimen sliding scale   SubCutaneous every 6 hours  ketamine Infusion. 0.25 mG/kG/Hr (1.99 mL/Hr) IV Continuous <Continuous>  ketorolac 0.5% Ophthalmic Solution 1 Drop(s) Left EYE two times a day  levETIRAcetam  IVPB 500 milliGRAM(s) IV Intermittent every 12 hours  meropenem  IVPB      meropenem  IVPB 1000 milliGRAM(s) IV Intermittent every 12 hours  pantoprazole  Injectable 40 milliGRAM(s) IV Push daily  petrolatum Ophthalmic Ointment 1 Application(s) Both EYES two times a day  phenylephrine    Infusion 0.1 MICROgram(s)/kG/Min (1.49 mL/Hr) IV Continuous <Continuous>  polyethylene glycol 3350 17 Gram(s) Oral daily  prednisoLONE acetate 1% Suspension 1 Drop(s) Left EYE four times a day  senna Syrup 10 milliLiter(s) Oral daily  vasopressin Infusion 0.04 Unit(s)/Min (2.4 mL/Hr) IV Continuous <Continuous>    MEDICATIONS  (PRN):  acetaminophen    Suspension .. 650 milliGRAM(s) Enteral Tube every 6 hours PRN Temp greater or equal to 38C (100.4F), Mild Pain (1 - 3)  sodium chloride 0.9% lock flush 10 milliLiter(s) IV Push every 1 hour PRN Pre/post blood products, medications, blood draw, and to maintain line patency        RADIOLOGY & ADDITIONAL TESTS:  < from: Xray Chest 1 View- PORTABLE-Urgent (Xray Chest 1 View- PORTABLE-Urgent .) (04.14.21 @ 20:23) >  FINDINGS:    Tracheostomy in place. Left internaljugular line with tip in the SVC. Prior sternotomy. NG tube into the stomach. Heart is enlarged. Diffuse bilateral airspace disease and bilateral pleural effusions. Visualized osseous structures within normal limits. Visualized osseous structures arewithin normal limits.        IMPRESSION:    Diffuse bilateral airspace disease with bilateral pleural effusions.    < end of copied text >

## 2021-04-19 NOTE — PROGRESS NOTE ADULT - SUBJECTIVE AND OBJECTIVE BOX
[facility] NEPHROLOGY- PROGRESS NOTE    Patient is a 70y Male with history of HTN on HCTZ presents with weakness found to have COVID-19. Nephrology consulted for hyponatremia.    Patient with resolution of hyponatremia for which nephrology had signed off on 3/19. Patient subsequently with non-oliguric ZELALEM.    REVIEW OF SYSTEMS: Unable to obtain as sedated.  No Known Allergies    Hospital Medications: Reviewed.      VITALS:  T(F): 99.8 (21 @ 04:00), Max: 99.8 (21 @ 00:00)  HR: 54 (21 @ 12:00)  BP: --  RR: 40 (21 @ 12:00)  SpO2: 92% (21 @ 12:00)  Wt(kg): --     @ 07:01  -   @ 07:00  --------------------------------------------------------  IN: 3210.6 mL / OUT: 335 mL / NET: 2875.6 mL      PHYSICAL EXAM:  Gen: + trach  Cards: RRR, +S1/S2, no M/G/R  Resp: CTA B/L  GI: soft, NT/ND, NABS  Vascular: + LE edema B/L, + UE edema      LABS:      142  |  101  |  96<H>  ----------------------------<  85  4.3   |  19<L>  |  4.44<H>    Ca    6.6<L>      2021 02:35  Phos  7.6       Mg     2.3         TPro  6.1  /  Alb  1.6<L>  /  TBili  0.7  /  DBili      /  AST  996<H>  /  ALT  293<H>  /  AlkPhos  505<H>      Creatinine Trend: 4.44 <--, 4.13 <--, 4.04 <--, 3.96 <--, 3.50 <--, 2.86 <--, 2.56 <--, 2.34 <--, 2.25 <--, 1.83 <--                        7.4    23.69 )-----------( 558      ( 2021 02:35 )             22.0     Urine Studies:  Urinalysis Basic - ( 2021 14:44 )    Color: Yellow / Appearance: Slightly Turbid / S.018 / pH:   Gluc:  / Ketone: Negative  / Bili: Negative / Urobili: <2 mg/dL   Blood:  / Protein: 100 mg/dL / Nitrite: Negative   Leuk Esterase: Large / RBC: 4 /HPF / WBC 78 /HPF   Sq Epi:  / Non Sq Epi: 14 /HPF / Bacteria: Negative      Sodium, Random Urine: 20 mmol/L ( @ 11:10)

## 2021-04-19 NOTE — PROGRESS NOTE ADULT - ATTENDING COMMENTS
patient seen and examined with the team during am rounds. I have reviewed the providers documentation and I am in agreement with documentation.    prognosis is extremely poor    resp failure  s/p trach  -unable to wean    delirium/encepathlopathy  -sangeetha sepsis/oid  -no response to ivig    leukoctyosis  -bactermia  -id following    anemia-severe  -no current need for prbcs    thrombocytosis  -suspect knee infection  -will tape and send for sens/cx  -contiue abxs    gene  -unable to ebenfit from hd    -prognosis is poor and approaching futility

## 2021-04-19 NOTE — PROGRESS NOTE ADULT - ASSESSMENT
70y Male with history of HTN on HCTZ presents with weakness found to have COVID-19. Nephrology consulted for hyponatremia.    1) ZELALEM: likely ATN secondary to sepsis and vanco toxicity. UO remains poor despite albumin with concurrent lasix gtt (now discontinued). No need for dialysis at this time and patient currently hemodynamically unstable to tolerate RRT if needed and would not change overall prognosis regardless. Avoid nephrotoxins.     2) Hypotension: Currently on 2 pressors. As per ICU. Monitor BP.    3) Metabolic acidosis: Secondary to lactic acidosis and renal insufficiency which is compensated as per blood gas this morning. No need for sodium bicarbonate at this time. Keep pH > 7.30. Monitor pH.    4) Hyperkalemia: Resolved. Continue with low K diet. Monitor serum K.    5) COVID-19: As per primary team.      POOR PROGNOSIS.    [facility]  Mahesh Lomax M.D.  Perez Armstrong D.O.  Gricelda Choe M.D.  Juliet Rogers, NITA, ANP-C    Telephone: [phone]  Facsimile: [phone]    [id number] [place] [address]

## 2021-04-19 NOTE — CHART NOTE - NSCHARTNOTEFT_GEN_A_CORE
Decision made by family at bedside during end of life visitation to terminally wean and palliative extubate patient.   Patient unresponsive to verbal/noxious stimuli. No corneal reflex. Pupils fixed and dilated. No spontaneous breathing off ventilator. No palpable carotid/radial pulse. No heart sounds. No breath sounds.  Time of Death: 6:15 PM  Attending notified.  Family notified at bedside and emotional support provided. Autopsy offered and family declines.

## 2021-04-19 NOTE — PROGRESS NOTE ADULT - ATTENDING SUPERVISION STATEMENT
ACP
ACP/Resident/Fellow
Resident
ACP
Fellow
Resident
ACP
Fellow
Resident
Resident
ACP
Resident
ACP
Resident/Fellow
Resident

## 2021-04-19 NOTE — PROGRESS NOTE ADULT - ASSESSMENT
70M with DM, CABG, Parkinson disease, diagnosed with COVID 3/9/21, admitted 3/14 without hypoxia, readmitted 3/17 with respiratory failure, intubated 3/20.   Right parotitis with cellulitis 3/26.   Received antibiotics for Pseudomonas fluorescens and Stenotrophomonas in sputum.   MRSA bacteremia 4/11 probably from VAP or CLABSI (left IJ catheter removed, replaced 4/14).   Renal failure, would not tolerate RRT.   Doing poorly. GOC being addressed.     Suggest  -monitor blood cultures, 4/14 and 4/17 negative to date   -dose Vanc 750mg today and continue to check daily Vanc levels, redosing when around 15   -TTE   -additional antibiotics unlikely to benefit, would stop Meropenem but if it's continued please renally dose at 500mg IV q24h   -the above can be stopped if no longer within goals of care     Spoke with ICU    Freddie Flores MD   Infectious Disease   Pager [phone]   After 5PM and on weekends please page fellow on call or call [phone]

## 2021-04-19 NOTE — PROGRESS NOTE ADULT - PROVIDER SPECIALTY LIST ADULT
Critical Care
Infectious Disease
Infectious Disease
Internal Medicine
MICU
MICU
Nephrology
Nephrology
Cardiology
Critical Care
ENT
ENT
Endocrinology
Infectious Disease
MICU
MICU
Nephrology
Neurology
Critical Care
Endocrinology
Endocrinology
Infectious Disease
Infectious Disease
MICU
MICU
Nephrology
Pulmonology
Critical Care
Endocrinology
Infectious Disease
MICU
Nephrology
Critical Care
Endocrinology
Infectious Disease
Nephrology
Endocrinology

## 2021-04-19 NOTE — DISCHARGE NOTE FOR THE EXPIRED PATIENT - HOSPITAL COURSE
This is a 70 year old Amharic/Jhonny-speaking male with a history of CAD s/p CABG and 2 stents, Type 2 DM, HTN, BPH s/p TURP and Parkinson's disease, who was recently admitted 3/14/21 for weakness and COVID-19. Started on decadron and remdesivir and discharged 3/16/21. Returned 3/17/21 with worsening respiratory failure requiring intubation. Course c/b ?parotitis, Pseudomonas PNA, and new onset atrial fibrillation now in sinus rhythm s/p amio conversion. S/p trial of IVIG for poss COVID encephalopathy from - without improvement, and stopped.  S/p vEEG on 21 without seizures but waveforms c/f poss development of one and so keppra loaded. Trached 21. Acutely worsening ARDS/metabolic acidosis.  Today, 2021 his metabolic acidosis worsened with rising lactate (12.7) and acidosis on blood gas.  Prognosis remained poor and discussions were had with the family.  He was previously made DNR.  They came in to see him, and while at the bedside made the decision to withdraw care.  The ETT was removed for terminal wean.  Shortly thereafter his HR and BP dropped and had no breath sounds nor pulse.  He was pronounced  at 6:15pm.  Family had the chance to spend time with him.

## 2021-04-19 NOTE — CHART NOTE - NSCHARTNOTESELECT_GEN_ALL_CORE
Endocrine
Endocrinology
Event Note
Follow-Up/Nutrition Services
Neurology
Bronchoscopy
EEG Prelim
Endocrine
Endocrinology
Endocrinology/Event Note
Endocrinology/Event Note
Event Note
Follow-Up/Nutrition Services
POCUS
Perc Trach
Pronouncement Note/Event Note
SURGE B ACCEPT/Event Note
Ultrasound Note
Event Note

## 2021-04-20 LAB

## 2021-04-22 LAB
CULTURE RESULTS: SIGNIFICANT CHANGE UP
SPECIMEN SOURCE: SIGNIFICANT CHANGE UP

## 2021-08-10 ENCOUNTER — APPOINTMENT (OUTPATIENT)
Dept: CARDIOLOGY | Facility: CLINIC | Age: 71
End: 2021-08-10

## 2021-08-16 NOTE — H&P ADULT - VASCULAR
I left a message. May be the Vyvanse causing loss of appetite.     Will try tomorrow   detailed exam

## 2022-01-27 NOTE — PROGRESS NOTE ADULT - SUBJECTIVE AND OBJECTIVE BOX
MICU Daily Progress Note  =====================================================  Interval/Overnight Events:    - Continued decompensation overnight with worsening ARDS & renal failure  - Oliguric despite attempts at lasix gtt coupled with albumin  - Persistent metabolic acidosis requiring initiation of bicarb infusion and PO bicarb  - Started on norepinephrine, phenylephrine and vasopressin  - Remains hypoxic    HPI:  70 year old Bel[place]ian/Jhonny-speaking male with a history of CAD s/p CABG and 2 stents, Type 2 DM, HTN, BPH s/p TURP and Parkinson's disease, who was recently admitted 3/14 for weakness and COVID-19. Started on decadron and remdesivir and discharged 3/16. Returned 3/17 with worsening respiratory failure. Intubated 3/17. Course c/b ?parotitis, Pseudomonas PNA, and new onset atrial fibrillation now in sinus rhythm s/p amio conversion. S/p trial of IVIG for poss COVID encephalopathy from - without improvement and dced. S/p vEEG on  without seizures but waveforms c/f poss development of one and so keppra loaded. Trached .     Allergies: No Known Allergies    MEDICATIONS:   --------------------------------------------------------------------------------------  Neurologic Medications  acetaminophen    Suspension .. 650 milliGRAM(s) Enteral Tube every 6 hours PRN Temp greater or equal to 38C (100.4F), Mild Pain (1 - 3)  carbidopa/levodopa  10/100 2 Tablet(s) Oral <User Schedule>  cisatracurium Infusion 3 MICROgram(s)/kG/Min IV Continuous <Continuous>  fentaNYL   Infusion..... 0.5 MICROgram(s)/kG/Hr IV Continuous <Continuous>  ketamine Infusion. 0.25 mG/kG/Hr IV Continuous <Continuous>  levETIRAcetam  IVPB 1000 milliGRAM(s) IV Intermittent every 12 hours  propofol Infusion 25 MICROgram(s)/kG/Min IV Continuous <Continuous>    Respiratory Medications    Cardiovascular Medications  furosemide Infusion 10 mG/Hr IV Continuous <Continuous>  norepinephrine Infusion 0.05 MICROgram(s)/kG/Min IV Continuous <Continuous>  phenylephrine    Infusion 0.1 MICROgram(s)/kG/Min IV Continuous <Continuous>    Gastrointestinal Medications  albumin human 25% IVPB 50 milliLiter(s) IV Intermittent every 6 hours  pantoprazole  Injectable 40 milliGRAM(s) IV Push daily  polyethylene glycol 3350 17 Gram(s) Oral daily  senna Syrup 10 milliLiter(s) Oral daily  sodium bicarbonate 1300 milliGRAM(s) Oral every 12 hours  sodium bicarbonate  Infusion 0.141 mEq/kG/Hr IV Continuous <Continuous>  sodium chloride 0.9% lock flush 10 milliLiter(s) IV Push every 1 hour PRN Pre/post blood products, medications, blood draw, and to maintain line patency    Genitourinary Medications    Hematologic/Oncologic Medications  aspirin  chewable 81 milliGRAM(s) Oral daily  heparin   Injectable 5000 Unit(s) SubCutaneous every 8 hours    Antimicrobial/Immunologic Medications  meropenem  IVPB      meropenem  IVPB 1000 milliGRAM(s) IV Intermittent every 12 hours    Endocrine/Metabolic Medications  insulin lispro (ADMELOG) corrective regimen sliding scale   SubCutaneous every 6 hours  insulin NPH human recombinant 11 Unit(s) SubCutaneous every 6 hours  simvastatin 20 milliGRAM(s) Oral at bedtime  vasopressin Infusion 0.04 Unit(s)/Min IV Continuous <Continuous>    Topical/Other Medications  chlorhexidine 0.12% Liquid 15 milliLiter(s) Oral Mucosa every 12 hours  chlorhexidine 4% Liquid 1 Application(s) Topical <User Schedule>  ketorolac 0.5% Ophthalmic Solution 1 Drop(s) Left EYE two times a day  petrolatum Ophthalmic Ointment 1 Application(s) Both EYES two times a day  prednisoLONE acetate 1% Suspension 1 Drop(s) Left EYE four times a day    --------------------------------------------------------------------------------------    VITAL SIGNS, INS/OUTS (last 24 hours):  --------------------------------------------------------------------------------------  Vital Signs Last 24 Hrs  T(C): 36.8 (15 Apr 2021 04:00), Max: 37.7 (2021 12:00)  T(F): 98.2 (15 Apr 2021 04:00), Max: 99.8 (2021 12:00)  HR: 115 (15 Apr 2021 08:00) (89 - 170)  BP: 109/59 (15 Apr 2021 03:00) (97/62 - 149/81)  BP(mean): 81 (15 Apr 2021 03:00) (62 - 101)  RR: 32 (15 Apr 2021 08:00) (27 - 44)  SpO2: 82% (15 Apr 2021 08:00) (82% - 97%)  --------------------------------------------------------------------------------------    EXAM  NEUROLOGY  Exam: Intubated and sedated    HEENT  Exam: Normocephalic, ETT/OGT in place    RESPIRATORY  Exam: Coarse breath sounds bilaterally, equal bilateral expansion  Mechanical Ventilation: Mode: AC/ CMV (Assist Control/ Continuous Mandatory Ventilation), RR (machine): 32, FiO2: 100, PEEP: 12, ITime: 0.7, MAP: 23, PIP: 43    CARDIOVASCULAR  Exam: S1, S2.  Tachycardic rate, regular rhythm    GI/NUTRITION  Exam: Abdomen softly distended, non-tender  Current Diet:  NPO    VASCULAR  Exam: Extremities warm, pink and well perfused, 2+ bilateral pedal edema    SKIN  Exam: Good skin turgor, no skin breakdown.     METABOLIC/FLUIDS/ELECTROLYTES  albumin human 25% IVPB 50 milliLiter(s) IV Intermittent every 6 hours  sodium bicarbonate 1300 milliGRAM(s) Oral every 12 hours  sodium bicarbonate  Infusion 0.141 mEq/kG/Hr IV Continuous <Continuous>    HEMATOLOGIC  [x] VTE Prophylaxis: aspirin  chewable 81 milliGRAM(s) Oral daily  heparin   Injectable 5000 Unit(s) SubCutaneous every 8 hours    Transfusions:	[] PRBC	[] Platelets		[] FFP	[] Cryoprecipitate    INFECTIOUS DISEASE  Antimicrobials/Immunologic Medications:  meropenem  IVPB      meropenem  IVPB 1000 milliGRAM(s) IV Intermittent every 12 hours    Day 1    Tubes/Lines/Drains   [x] Peripheral IV  [x] Central Venous Line     	[] R	[] L	[] IJ	[] Fem	[] SC	Date Placed:   [x] Arterial Line		[] R	[] L	[] Fem	[] Rad	[] Ax	Date Placed:   [] PICC		[] Midline		[] Mediport  [x] Urinary Catheter		Date Placed:   [x] Necessity of urinary, arterial, and venous catheters discussed    LABS  --------------------------------------------------------------------------------------  CBC (04-15 @ 00:57)                          7.3<L>                   28.21<H>  )--------------(  1100<HH>     84.7<H>% Neuts, 0.0<L>% Lymphs, ANC: 25.92<H>                          24.6<L>  CBC ( @ 01:38)                          7.1<L>                   21.79<H>  )--------------(  960<H>     84.5<H>% Neuts, 5.9<L>% Lymphs, ANC: 18.42<H>                          24.0<L>    BMP (04-15 @ 00:57)       143     |  111<H>  |  38<H> 			Ca++ --      Ca 7.8<L>       ---------------------------------( 248<H>		Mg 2.5          5.8<H>  |  21<L>   |  2.56<H>			Ph 7.4<H>  BMP ( 14:44)       144     |  113<H>  |  36<H> 			Ca++ --      Ca 8.1<L>       ---------------------------------( 177<H>		Mg --           4.5     |  19<L>   |  2.34<H>			Ph --        LFTs (04-15 @ 00:57)      TPro 6.3 / Alb 1.1<L> / TBili 0.2 / DBili -- / AST 35 / ALT <5 / AlkPhos 178<H>  LFTs ( @ 01:38)      TPro 6.4 / Alb 1.6<L> / TBili 0.3 / DBili -- / AST 21 / ALT <5<L> / AlkPhos 131<H>    Coags (04-15 @ 00:57)  aPTT 33.6 / INR 1.17<H> / PT 13.2  Coags ( @ 01:38)  aPTT 35.3 / INR 1.26<H> / PT 14.2<H>      ABG (04-15 @ 06:03)     7.16<LL> / 64<H> / 74<L> / 19<L> / -5.5<L> / 92.6<L>%     Lactate:    ABG (04-15 @ 01:53)     7.12<LL> / 69<H> / 66<L> / 18<L> / -6.4<L> / 88.5<L>%     Lactate:        Urinalysis ( 14:44):     Color: Yellow / Appearance: Slightly Turbid<!> / S.018 / pH: 5.5 / Gluc: Negative / Ketones: Negative / Bili: Negative / Urobili: <2 mg/dL / Protein :100 mg/dL<!> / Nitrites: Negative / Leuk.Est: Large<!> / RBC: 4 / WBC: 78<H> / Sq Epi:  / Non Sq Epi: 14<H> / Bacteria Negative   Hyphae yeast present and some amorphos urate present  Urinalysis ( @ 19:09):     Color: Light Yellow / Appearance: Slightly Turbid<!> / S.012 / pH: 5.0 / Gluc: Negative / Ketones: Negative / Bili: Negative / Urobili: <2 mg/dL / Protein :30 mg/dL<!> / Nitrites: Negative / Leuk.Est: Negative / RBC: 5<H> / WBC: 5 / Sq Epi:  / Non Sq Epi: 2 / Bacteria Occasional<!>   SEE NOTE    -> .Sputum Sputum Culture ( @ 02:22)       Numerous polymorphonuclear leukocytes per low power field  No Squamous epithelial cells per low power field  Few Gram positive cocci in pairs per oil power field    Methicillin resistant Staphylococcus aureus  Stenotrophomonas maltophilia    Moderate Methicillin resistant Staphylococcus aureus  Moderate Stenotrophomonas maltophilia  Normal Respiratory Fallon present    -> .Blood Blood-Venous Culture ( @ 23:03)       Growth in aerobic bottle: Gram Positive Cocci in Clusters    Blood Culture PCR  Methicillin resistant Staphylococcus aureus    Growth in aerobic bottle: Methicillin resistant Staphylococcus aureus  ***Blood Panel PCR results on this specimen are available  approximately 3 hours after the Gram stain result.***  Gram stain, PCR, and/or culture results may not always  correspond due to difference in methodologies.  ************************************************************  This PCR assay was performed by multiplex PCR. This  Assay tests for 66 bacterial and resistance gene targets.  Please refer to the [facility] test directory  at [email or web address] for details.    -> .Blood Blood-Peripheral Culture ( @ 23:00)       Growth in anaerobic bottle: Gram Positive Cocci in Clusters    NG    Growth in anaerobic bottle: Methicillin resistant Staphylococcus aureus  See previous culture [id number]  --------------------------------------------------------------------------------------    OTHER LABORATORY:     IMAGING STUDIES:   CXR:    No

## 2022-06-07 NOTE — ED ADULT NURSE NOTE - NEURO WDL
Patient returned call this morning  Patient was ok with traveling to [facility] for consult but would like future appts to be in [place]  Appt time and location confirmed with patient  In- basket message sent to team to update them on the the appt 
Alert and oriented to person, place and time, memory intact, behavior appropriate to situation, PERRL.

## 2022-11-14 NOTE — ED PROVIDER NOTE - PSH
2 seconds or less
Bladder neck obstruction  3 surgeries for BPH, most recent 2012  History of coronary artery bypass graft  X2 in March of 2012  Presence of stent in coronary artery  x2 2014

## 2023-03-17 NOTE — DISCHARGE NOTE PROVIDER - NSDCQMCOGNITION_NEU_ALL_CORE
No difficulties Interpolation Flap Text: A decision was made to reconstruct the defect utilizing an interpolation axial flap and a staged reconstruction.  A telfa template was made of the defect.  This telfa template was then used to outline the interpolation flap.  The donor area for the pedicle flap was then injected with anesthesia.  The flap was excised through the skin and subcutaneous tissue down to the layer of the underlying musculature.  The interpolation flap was carefully excised within this deep plane to maintain its blood supply.  The edges of the donor site were undermined.   The donor site was closed in a primary fashion.  The pedicle was then rotated into position and sutured.  Once the tube was sutured into place, adequate blood supply was confirmed with blanching and refill.  The pedicle was then wrapped with xeroform gauze and dressed appropriately with a telfa and gauze bandage to ensure continued blood supply and protect the attached pedicle.
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
## 2023-11-19 NOTE — ASU PATIENT PROFILE, ADULT - PROVIDER NOTIFICATION
Teaching Attestation:   I have discussed the history, exam, and medical decision making with Dr. Jin and agree with the assessment and plan as documented.         Declines

## 2024-09-12 NOTE — DISCHARGE NOTE ADULT - PROVIDER TOKENS
none TOKEN:'[id number]',FREE:[LAST:[follow up],PHONE:[(   )    -],FAX:[(   )    -],ADDRESS:[with your Endocrinologist within 1 week]]

## 2025-04-16 NOTE — PHYSICAL THERAPY INITIAL EVALUATION ADULT - REHAB POTENTIAL, PT EVAL
Cardiac Rehab Education  [place]   [phone]    4/16/2025  Risk factors of cardiovascular disease were discussed today's. Education on modifiable and non-modifiable risk factors of CVD and recommendations to manage them was done today. Handouts were provided along with further reference. Patient was educated to comeback with further questions.     Signature Lexii Wynne RN        
good, to achieve stated therapy goals
